# Patient Record
Sex: MALE | Race: WHITE | NOT HISPANIC OR LATINO | Employment: OTHER | URBAN - METROPOLITAN AREA
[De-identification: names, ages, dates, MRNs, and addresses within clinical notes are randomized per-mention and may not be internally consistent; named-entity substitution may affect disease eponyms.]

---

## 2019-05-15 ENCOUNTER — HOSPITAL ENCOUNTER (OUTPATIENT)
Dept: GENERAL RADIOLOGY | Facility: HOSPITAL | Age: 79
Discharge: HOME | End: 2019-05-15
Attending: INTERNAL MEDICINE

## 2019-07-29 ENCOUNTER — HOSPITAL ENCOUNTER (OUTPATIENT)
Dept: LAB | Facility: HOSPITAL | Age: 79
Discharge: HOME | End: 2019-07-29
Attending: INTERNAL MEDICINE

## 2019-07-29 LAB
ALBUMIN SERPL-MCNC: 4 G/DL (ref 3.5–5)
ALBUMIN/GLOB SERPL: 1.6 {RATIO} (ref 1.4–2.6)
ALP SERPL-CCNC: 73 U/L (ref 56–155)
ALT SERPL-CCNC: 17 U/L (ref 10–40)
ANION GAP SERPL CALC-SCNC: 19 MMOL/L (ref 8–19)
AST SERPL-CCNC: 19 U/L (ref 15–50)
BILIRUB SERPL-MCNC: 1.11 MG/DL (ref 0.2–1.3)
BUN SERPL-MCNC: 27 MG/DL (ref 5–25)
BUN/CREAT SERPL: 19 {RATIO} (ref 6–20)
CALCIUM SERPL-MCNC: 9.2 MG/DL (ref 8.7–10.4)
CHLORIDE SERPL-SCNC: 102 MMOL/L (ref 99–111)
CHOLEST SERPL-MCNC: 187 MG/DL (ref 107–200)
CHOLEST/HDLC SERPL: 6.2 {RATIO} (ref 3–6)
CONV CO2: 23 MMOL/L (ref 22–32)
CONV TOTAL PROTEIN: 6.5 G/DL (ref 6.3–8.2)
CREAT UR-MCNC: 1.41 MG/DL (ref 0.7–1.2)
GFR SERPLBLD BASED ON 1.73 SQ M-ARVRAT: 48 ML/MIN/{1.73_M2}
GLOBULIN UR ELPH-MCNC: 2.5 G/DL (ref 2–3.5)
GLUCOSE SERPL-MCNC: 102 MG/DL (ref 70–99)
HDLC SERPL-MCNC: 30 MG/DL (ref 40–60)
LDLC SERPL CALC-MCNC: 123 MG/DL (ref 70–100)
OSMOLALITY SERPL CALC.SUM OF ELEC: 295 MOSM/KG (ref 273–304)
POTASSIUM SERPL-SCNC: 4.4 MMOL/L (ref 3.5–5.3)
SODIUM SERPL-SCNC: 140 MMOL/L (ref 135–147)
TRIGL SERPL-MCNC: 170 MG/DL (ref 40–150)
VLDLC SERPL-MCNC: 34 MG/DL (ref 5–37)

## 2019-08-05 ENCOUNTER — HOSPITAL ENCOUNTER (OUTPATIENT)
Dept: SLEEP MEDICINE | Facility: HOSPITAL | Age: 79
Discharge: HOME | End: 2019-08-05
Attending: INTERNAL MEDICINE

## 2020-07-01 ENCOUNTER — TRANSCRIBE ORDERS (OUTPATIENT)
Dept: ADMINISTRATIVE | Facility: HOSPITAL | Age: 80
End: 2020-07-01

## 2020-07-01 DIAGNOSIS — G89.29 CHRONIC PAIN OF LEFT KNEE: Primary | ICD-10-CM

## 2020-07-01 DIAGNOSIS — M25.562 CHRONIC PAIN OF LEFT KNEE: Primary | ICD-10-CM

## 2021-01-22 ENCOUNTER — OFFICE VISIT CONVERTED (OUTPATIENT)
Dept: UROLOGY | Facility: CLINIC | Age: 81
End: 2021-01-22
Attending: NURSE PRACTITIONER

## 2021-02-05 ENCOUNTER — CONVERSION ENCOUNTER (OUTPATIENT)
Dept: CARDIOLOGY | Facility: CLINIC | Age: 81
End: 2021-02-05
Attending: INTERNAL MEDICINE

## 2021-02-17 ENCOUNTER — OFFICE VISIT CONVERTED (OUTPATIENT)
Dept: CARDIOLOGY | Facility: CLINIC | Age: 81
End: 2021-02-17
Attending: INTERNAL MEDICINE

## 2021-05-10 NOTE — H&P
History and Physical      Patient Name: Javi Martínez   Patient ID: [id number]   Sex: Male   YOB: 1940    Primary Care Provider: Marta Garcia MD   Referring Provider: Marta Garcia MD    Visit Date: February 17, 2021    Provider: Ge Whelan MD   Location: [facility]   Location Address: [address] [place] [address], [facility]   [place]  [id number]   Location Phone: [phone]          Chief Complaint     Atrial fibrillation and CAD.       History Of Present Illness  Consult requested by: Marta Garcia MD   Javi Martínez is a 80 year old /White male with a prior history of 3-vessel CABG in 2006 and atrial fibrillation with dual chamber pacemaker implantation following up for evaluation and treatment for his cardiac problems. The patient symptomatically has been doing very well. He has not been experiencing any chest pain, shortness of breath, or PND. He does sleep on 2 pillows which has been stable. He has had no weight gain problems recently.   PAST MEDICAL HISTORY: Significant for CAD with CABG in 2006, history of prostate cancer in 2010, systolic congestive heart failure, most recent EF of 40%, atrial fibrillation persistent.   FAMILY MEDICAL HISTORY: Diabetes. Nonsignificant for premature coronary atherosclerotic disease.   PSYCHOSOCIAL HISTORY: Rarely uses alcohol. Denies tobacco use.   CURRENT MEDICATIONS: Metoprolol Tartrate 50 mg b.i.d.; Eliquis 5 mg q.d.; Prilosec 20 mg q.d.; Tamsulosin 0.4 mg q.d.   ALLERGIES: No known drug allergies.       Review of Systems  · Constitutional  o Admits  o : fatigue, good general health lately  o Denies  o : recent weight changes   · Eyes  o Denies  o : double vision  · HENT  o Admits  o : hearing loss or ringing  o Denies  o : chronic sinus problem, swollen glands in neck  · Cardiovascular  o Denies  o : chest pain, palpitations (fast, fluttering, or skipping beats), swelling (feet, ankles, hands), shortness of breath while walking or lying  "flat  · Respiratory  o Denies  o : asthma or wheezing, COPD  · Gastrointestinal  o Denies  o : ulcers, nausea or vomiting  · Neurologic  o Admits  o : lightheaded or dizzy  o Denies  o : stroke, headaches  · Musculoskeletal  o Denies  o : joint pain, back pain  · Endocrine  o Denies  o : thyroid disease, diabetes, heat or cold intolerance, excessive thirst or urination  · Heme-Lymph  o Denies  o : bleeding or bruising tendency, anemia      Vitals  Date Time BP Position Site L\R Cuff Size HR RR TEMP (F) WT  HT  BMI kg/m2 BSA m2 O2 Sat FR L/min FiO2        02/17/2021 09:21 /84 Sitting    70 - R   220lbs 0oz 6'  4\" 26.78 2.31             Physical Examination  · Constitutional  o Appearance  o : Awake, alert, in no acute distress.   · Head and Face  o HEENT  o : PERRLA.  · Eyes  o Conjunctivae  o : Normal.  · Ears, Nose, Mouth and Throat  o Oral Cavity  o :   § Oral Mucosa  § : Normal.  · Neck  o Inspection/Palpation  o : No JVD.  · Respiratory  o Respiratory  o : Chest is symmetrical. Lung fields are clear. No rhonchi or wheezes heard.  · Cardiovascular  o Heart  o :   § Auscultation of Heart  § : S1, S2 are normal. Regular rate and rhythm without murmurs, gallops, or rubs.  o Peripheral Vascular System  o :   § Extremities  § : Nails normal. No clubbing or cyanosis. Femoral pulses adequate. Pedal pulses adequate. No peripheral edema.  · Gastrointestinal  o Abdominal Examination  o : Abdomen soft. No masses. No guarding or rigidity. No hepatosplenomegaly. Bowel sounds normal.  · Musculoskeletal  o General  o :   § General Musculoskeletal  § : Muscle tone and strength were normal.  · Skin and Subcutaneous Tissue  o General Inspection  o : Unremarkable.  · EKG  o EKG  o : Was performed in the office today.  o Results  o : Shows a ventricularly paced rhythm.  · Labs  o Labs  o : Magnesium 2.2, hgb A1c was 5.6, LDL cholesterol was 154, HDL 42.   · Device Interrogation  o Device Interrogation  o : Interrogation of " dual chamber Medtronic pacemaker. The atrial lead is paced 0% of the time. Could not get threshold due to patient being in atrial fibrillation. His right ventricular lead was 100% paced. R-wave could not be tested due to pacemaker dependence. Otherwise, the device is working properly. There were no events recorded. No changes were made to his device. Battery life is estimaed at 2.83 years.          Assessment     1.  Coronary artery disease, no ongoing anginal discomfort. I did recommend chronic aspirin 81 mg once a day. Discussed risks and benefits of increased breathing versus decreased cardiovascular events. I told him overall that the benefits outweigh the risk of dual therapy.   2.  Congestive heart failure systolic, likely ischemic in nature. The patient was class 2 symptoms currently. We will start on Entresto 24/26 b.i.d. Repeat a renal panel in a few weeks.   3.  Hyperlipidemia. I recommended the addition of Lipitor 40 mg q.day. We will repeat lipids and LFT and follow-up labs.   4.  Atrial fibrillation, persistent in nature, rate controlled, pacemaker dependent. We will recommend increasing his Eliquis to 5 b.i.d.  5.  Bradycardia. Dual chamber pacemaker working properly. Will repeat interrogation in 3 months.        Ge Whelan MD  /                  Electronically Signed by: Isabela Sherman-, OT -Author on February 28, 2021 06:15:49 AM  Electronically Co-signed by: Ge Whelan MD -Reviewer on March 5, 2021 02:51:53 PM

## 2021-05-10 NOTE — PROCEDURES
"   Procedure Note      Patient Name: Javi Martínez   Patient ID: [id number]   Sex: Male   YOB: 1940    Primary Care Provider: Marta Garcia MD    Visit Date: February 5, 2021    Provider: Ge Whelan MD   Location: [facility]   Location Address: [address] [place] [address], [address]   [place]  [id number]   Location Phone: [phone]          FINAL REPORT   TRANSTHORACIC ECHOCARDIOGRAM REPORT    Diagnosis: CHF (Congestive Heart Failure)   Height: 6'4\" Weight: 213 B/P: BLOOD PRESSURE BSA: 2.28   Tech: JLW   MEASUREMENTS:  RVID (Diastole) : RVID. (NORMAL: 0.7 to 2.4 cm max)   LVID (Systole): 4.8 cm (Diastole): 6.5 cm. (NORMAL: 3.7 - 5.4 cm)   Posterior Wall Thickness (Diastole): 1.2 cm. (NORMAL: 0.8 - 1.1 cm)   Septal Thickness (Diastole): 1.2 cm. (NORMAL: 0.7 - 1.2 cm)   LAID (Systole): 5.5 cm. (NORMAL: 1.9 - 3.8 cm)   Aortic Root Diameter (Diastole): 3.6 cm. (NORMAL: 2.0 - 3.7 cm)   DOPPLER: Continuous-wave, pulse-wave, and color-flow examination of the mitral, aortic, and tricuspid valves was performed. There was moderate tricuspid regurgitation, mild mitral regurgitation, and mild aortic insufficiency. E/A ratio is 1.9 . DT= 261 msec. IVRT is 59 msec. E/E' is 8.   COMMENTS:  The patient underwent 2-D, M-Mode, and Doppler examination, including pulse-wave, continuous-wave, and color-flow analysis; the study is technically adequate.   FINDINGS:  AORTIC VALVE: Appeared to be normal. Trileaflet with central closure point. No evidence of any obstruction. No regurgitation.   MITRAL VALVE: Appeared to be normal. No evidence of any obstruction. No regurgitation.   TRICUSPID VALVE: Appeared to be normal.   PULMONIC VALVE: Not well seen.   LEFT ATRIUM: Moderately enlarged. LA volume index is 90 mL/m2.   AORTIC ROOT: Appeared to be normal in size.   LEFT VENTRICLE: Mildly reduced ejection fraction of 40% with global hypokinesis, mild to moderate LV enlargement, and mild left ventricular hypertrophy.   RIGHT " ATRIUM: Mildly to moderately enlarged.   RIGHT VENTRICLE: Normal size and function.   PERICARDIUM: Unremarkable. No evidence of effusion.   INFERIOR VENA CAVA: Diameter is 1.8 cm.   Fax: 02/07/2021      IMPRESSION:  1.  Mildly reduced ejection fraction of 40% with global hypokinesis.   2.  Mild to moderate left ventricular enlargement.   3.  Mild left ventricular hypertrophy.  4.  Moderate left atrial enlargement.   5.  Mild right atrial enlargement.  6.  Mild aortic insufficiency.   7.  Mild mitral regurgitation.  8.  Moderate tricuspid regurgitation.       Ge Whelan MD  JH/rt                    Electronically Signed by: Laura Antoine-, Other -Author on February 7, 2021 12:51:54 PM  Electronically Co-signed by: Ge Whelan MD -Reviewer on February 8, 2021 10:25:47 AM

## 2021-05-10 NOTE — H&P
History and Physical      Patient Name: Javi Martínez   Patient ID: [id number]   Sex: Male   YOB: 1940    Primary Care Provider: Marta Garcia MD    Visit Date: January 22, 2021    Provider: MEJIA Workman   Location: [facility]   Location Address: [address] [place] [address]  [id number]   Location Phone: [phone]          Chief Complaint  · pt here for urological concerns      History Of Present Illness  The patient is a 80 year old /White male , who is sent by telehealth MD from his insurance company, for evaluation of urinary symptoms.   Symptoms  The patient's complaints are described as frequency, nocturia, urgency of urination, weak stream, and straining to urinate, post void dribbling. The patient needs to void 2-3 times per day. He describes getting up more than 5 times during the night. The patient reports no additional symptoms. This patient denies gross hematuria and dysuria.   He states that nothing has made symptoms better in the past, and nothing makes it worse. The patient's past medical history notable for prostate cancer and BPH.      Patient DX with prostate cancer in 2010 was treated with XRT.    PSA level was checked this week. We will get labs from his PCP.     He is also prescribed testosterone from his PCP Who placed him on this related to his low libido and depression.     Tamsulosin was prescribed in 2010.     He reports no relief of symptoms with this  med.    He drinks 30 oz of orange juice a day.       Past Medical History  Disease Name Date Onset Notes   Cancer --  --    GERD --  --    Heart Disease --  --    Prostate Disorder --  --          Past Surgical History  Procedure Name Date Notes   Appendectomy --  --    Cardiac --  --          Medication List  Name Date Started Instructions   Eliquis 5 mg oral tablet  take 1 tablet (5 mg) by oral route 2 times per day   metoprolol succinate 50 mg oral tablet extended release 24 hr   "take 1 tablet (50 mg) by oral route once daily   omeprazole 20 mg oral capsule,delayed release(DR/EC)  take 1 capsule (20 mg) by oral route once daily before a meal   tamsulosin 0.4 mg oral capsule 01/22/2021 take 2 capsules (0.8 mg) by oral route once daily 1/2 hour following the same meal each day for 90 days         Allergy List  Allergen Name Date Reaction Notes   NO KNOWN DRUG ALLERGIES --  --  --        Allergies Reconciled  Social History  Finding Status Start/Stop Quantity Notes   Tobacco Former --/-- --  --          Review of Systems  · Constitutional  o Denies  o : fever, chills  · Eyes  o Denies  o : double vision, cataracts  · HENT  o Denies  o : hearing loss, headaches  · Cardiovascular  o Denies  o : chest pain at rest, chest pain with exercise, irregular heart beats, palpitations, leg cramps with exercise  · Respiratory  o Denies  o : shortness of breath, wheezing, sleep apnea  · Gastrointestinal  o Denies  o : heartburn or indigestion, nausea or vomiting, change in abdominal girth, diarrhea, constipation, blood in stools  · Genitourinary  o Admits  o : additional symptoms as noted in HPI  · Integument  o Denies  o : rash, new skin lesions  · Neurologic  o Denies  o : memory difficulties, headache, mini-strokes, seizures  · Endocrine  o Denies  o : hot flashes, thyroid disorders  · Psychiatric  o Denies  o : depression, schizophrenia, bipolar disorder  · Heme-Lymph  o Denies  o : easy bleeding, easy bruising, sickle cell disease or trait, lymph node enlargement or tenderness  · Allergic-Immunologic  o Denies  o : immune deficiency, HIV, Hepatitis C      Vitals  Date Time BP Position Site L\R Cuff Size HR RR TEMP (F) WT  HT  BMI kg/m2 BSA m2 O2 Sat FR L/min FiO2 HC       01/22/2021 03:40 PM       16  212lbs 16oz 6'  4\" 25.93 2.28             Physical Examination  · Constitutional  o Appearance  o : Well nourished, well developed patient in no acute distress. Ambulating without difficulty.  · Head and " Face  o Head  o :   § Inspection  § : atraumatic, normocephalic  o Face  o :   § Inspection  § : no facial lesions  · Eyes  o Sclerae  o : sclerae white  · Ears, Nose, Mouth and Throat  o Ears  o :   § External Ears  § : appearance within normal limits, no lesions present  o Nose  o :   § External Nose  § : appearance normal  · Neck  o Inspection/Palpation  o : normal appearance, trachea midline  · Respiratory  o Respiratory Effort  o : breathing unlabored  o Inspection of Chest  o : normal appearance, no retractions  o Auscultation of Lungs  o : normal breath sounds throughout  · Skin and Subcutaneous Tissue  o General Inspection  o : no rashes or lesions present, no lesions present, no areas of discoloration  · Neurologic  o Mental Status Examination  o :   § Orientation  § : grossly oriented to person, place and time  § Speech/Language  § : communication ability within normal limits  o Gait and Station  o : normal gait, able to stand without difficulty  · Psychiatric  o Judgement and Insight  o : judgment and insight intact, judgement for everyday activities and social situations within normal limits, insight intact  o Mood and Affect  o : mood normal, affect appropriate          Results  · In-Office Procedures  o Lab procedure  § Automated dipstick urinalysis with microscopy (10139)   § Color Ur: Yellow   § Clarity Ur: Clear   § Glucose Ur Ql Strip: Negative   § Bilirub Ur Ql Strip: Negative   § Ketones Ur Ql Strip: Negative   § Sp Gr Ur Qn: 1.025   § Hgb Ur Ql Strip: Trace-intact   § pH Ur-LsCnc: 5.5   § Prot Ur Ql Strip: 30   § Urobilinogen Ur Strip-mCnc: 0.2   § Nitrite Ur Ql Strip: Negative   § WBC Est Ur Ql Strip: Negative   § RBC UrnS Qn HPF: 0   § WBC UrnS Qn HPF: 0   § Bacteria UrnS Qn HPF: 0   § Crystals UrnS Qn HPF: 0   § Epithelial Cells (non renal): 0 /HPF  § Epithelial Cells (renal): 0   o Surgical procedure  § IOP - Bladder Scan/Residual Urine (44585)   § Specimen vol Ur: 180        Assessment  · BPH with Urinary Obstruction       Benign prostatic hyperplasia with lower urinary tract symptoms     600.01/N40.1  Other obstructive and reflux uropathy     600.01/N13.8  · Prostate cancer     185/C61      Plan  · Medications  o tamsulosin 0.4 mg oral capsule   SIG: take 2 capsules (0.8 mg) by oral route once daily 1/2 hour following the same meal each day for 90 days   DISP: (180) Capsule with 4 refills  Adjusted on 01/22/2021     o Medications have been Reconciled  o Transition of Care or Provider Policy  · Instructions  o DISCUSSION: discussed behavioral modifications to help alleviate his nocturia including stopping fluid intake at least 2 hours prior to bedtime, elevation of feet at or above level of the heart for 2 hours prior to bedtime as well as speaking to his pcp about a sleep study for his sleep apnea  o Findings, possible etiologies and management options were discussed with patient in comprehensive detail  o PLAN: f/u in 6 weeks  o Increase his tamsulosin dosage to 0.8 mg q day to see if this will alleviate his nocturia  o Electronically Identified Patient Education Materials Provided Electronically            Electronically Signed by: MEJIA Workman -Author on January 24, 2021 01:38:45 PM

## 2021-05-14 VITALS — HEIGHT: 76 IN | RESPIRATION RATE: 16 BRPM | BODY MASS INDEX: 25.94 KG/M2 | WEIGHT: 213 LBS

## 2021-05-14 VITALS
HEIGHT: 76 IN | BODY MASS INDEX: 26.79 KG/M2 | WEIGHT: 220 LBS | SYSTOLIC BLOOD PRESSURE: 140 MMHG | HEART RATE: 70 BPM | DIASTOLIC BLOOD PRESSURE: 84 MMHG

## 2021-07-14 ENCOUNTER — PREP FOR SURGERY (OUTPATIENT)
Dept: OTHER | Facility: HOSPITAL | Age: 81
End: 2021-07-14

## 2021-07-14 ENCOUNTER — OFFICE VISIT (OUTPATIENT)
Dept: ORTHOPEDIC SURGERY | Facility: CLINIC | Age: 81
End: 2021-07-14

## 2021-07-14 ENCOUNTER — TELEPHONE (OUTPATIENT)
Dept: CARDIOLOGY | Facility: CLINIC | Age: 81
End: 2021-07-14

## 2021-07-14 VITALS — HEIGHT: 76 IN | HEART RATE: 82 BPM | OXYGEN SATURATION: 94 % | WEIGHT: 215 LBS | BODY MASS INDEX: 26.18 KG/M2

## 2021-07-14 DIAGNOSIS — M17.12 PRIMARY OSTEOARTHRITIS OF LEFT KNEE: Primary | ICD-10-CM

## 2021-07-14 DIAGNOSIS — M25.562 LEFT KNEE PAIN, UNSPECIFIED CHRONICITY: ICD-10-CM

## 2021-07-14 PROCEDURE — 99203 OFFICE O/P NEW LOW 30 MIN: CPT | Performed by: ORTHOPAEDIC SURGERY

## 2021-07-14 RX ORDER — TRANEXAMIC ACID 10 MG/ML
1000 INJECTION, SOLUTION INTRAVENOUS ONCE
Status: CANCELLED | OUTPATIENT
Start: 2021-07-14 | End: 2021-07-14

## 2021-07-14 RX ORDER — CEFAZOLIN SODIUM IN 0.9 % NACL 3 G/100 ML
3 INTRAVENOUS SOLUTION, PIGGYBACK (ML) INTRAVENOUS ONCE
Status: CANCELLED | OUTPATIENT
Start: 2021-07-14 | End: 2021-07-14

## 2021-07-14 RX ORDER — APIXABAN 5 MG/1
5 TABLET, FILM COATED ORAL 2 TIMES DAILY
COMMUNITY
Start: 2021-06-28 | End: 2022-07-14

## 2021-07-14 RX ORDER — LISINOPRIL 5 MG/1
2.5 TABLET ORAL DAILY
COMMUNITY
Start: 2021-06-28 | End: 2021-07-20 | Stop reason: SDUPTHER

## 2021-07-14 RX ORDER — CEFAZOLIN SODIUM 2 G/100ML
2 INJECTION, SOLUTION INTRAVENOUS ONCE
Status: CANCELLED | OUTPATIENT
Start: 2021-07-14 | End: 2021-07-14

## 2021-07-14 RX ORDER — METOPROLOL SUCCINATE 50 MG/1
TABLET, EXTENDED RELEASE ORAL
COMMUNITY
End: 2021-07-20

## 2021-07-14 RX ORDER — TAMSULOSIN HYDROCHLORIDE 0.4 MG/1
1 CAPSULE ORAL NIGHTLY
COMMUNITY
Start: 2021-06-28 | End: 2022-05-03

## 2021-07-14 NOTE — TELEPHONE ENCOUNTER
Called and informed patient.  Unable to fax to Dr. Zhao.  Will route clearance in [facility].

## 2021-07-14 NOTE — TELEPHONE ENCOUNTER
Cardiac Clearance and Medication Directive:    Procedure: left total knee replacement with Dr. Zhao on 7/23.    Medication Directive:  Hold Eliquis.    Hx:  A-fib w/ dual chamber pacemaker, CABG 2006.  CHF    Echo on 02/21/2021  1.  Mildly reduced ejection fraction of 40% with global hypokinesis.   2.  Mild to moderate left ventricular enlargement.   3.  Mild left ventricular hypertrophy.  4.  Moderate left atrial enlargement.   5.  Mild right atrial enlargement.  6.  Mild aortic insufficiency.   7.  Mild mitral regurgitation.  8.  Moderate tricuspid regurgitation.    Last seen on 2/17/21- stable.    Can patient be cleared and hold medication?

## 2021-07-14 NOTE — PROGRESS NOTES
"Chief Complaint  Pain of the Left Knee     Subjective      Javi Martínez presents to [facility] for an evaluation of left knee. Patient has been having left knee pain that has been ongoing for 28+ years. He has been a  in the past. He has pain with walking and going up and down steps. He states his knee jorgito and gives way on him. He states pain is worse medially. Patient has a history of getting his left knee aspirated and getting cortisone injections, which have helped. Patient takes Aleeve as needed. He has become less active due to left knee pain.     No Known Allergies     Social History     Socioeconomic History   • Marital status:      Spouse name: Not on file   • Number of children: Not on file   • Years of education: Not on file   • Highest education level: Not on file   Tobacco Use   • Smoking status: Former Smoker        Review of Systems     Objective   Vital Signs:   Pulse 82   Ht 193 cm (76\")   Wt 97.5 kg (215 lb)   SpO2 94%   BMI 26.17 kg/m²       Physical Exam  Constitutional:       Appearance: Normal appearance. He is well-developed and normal weight.   HENT:      Head: Normocephalic.      Right Ear: Hearing and external ear normal.      Left Ear: Hearing and external ear normal.      Nose: Nose normal.   Eyes:      Conjunctiva/sclera: Conjunctivae normal.   Cardiovascular:      Rate and Rhythm: Normal rate.   Pulmonary:      Effort: Pulmonary effort is normal.      Breath sounds: No wheezing or rales.   Abdominal:      Palpations: Abdomen is soft.      Tenderness: There is no abdominal tenderness.   Musculoskeletal:      Cervical back: Normal range of motion.   Skin:     Findings: No rash.   Neurological:      Mental Status: He is alert and oriented to person, place, and time.   Psychiatric:         Mood and Affect: Mood and affect normal.         Judgment: Judgment normal.       Ortho Exam      LEFT KNEE: Full extension. 15 degrees of " Valgus deformity. Significant swelling. Tender medial and lateral joint line. Skin intact. Calf supple, non-tender. Weight bearing. Limping gait. Good strength to hamstrings, quadriceps, dorsiflexors and plantar flexors. Sensation grossly intact. Neurovascular intact. Stable to varus/valgus stress. Negative Lachman. Positive crepitus. Flexion to 100 degrees.       Procedures        Imaging Results (Most Recent)     Procedure Component Value Units Date/Time    XR Knee 3 View Left [[id number]] Resulted: 07/14/21 1102     Updated: 07/14/21 1104           Result Review :       X-Ray Report:  Left knee(s) X-Ray  Indication: Evaluation of left knee   AP, Lateral and Standing view(s)  Findings: Bone on bone osteoarthritis. No fracture or dislocation.   Prior studies available for comparison: no       Assessment and Plan     DX: Left knee osteoarthritis     Discussed treatment plans and diagnosis with the patient. Discussed total knee replacement vs conservative management. Patient wishes to proceed with a left total knee arthroplasty.     Discussed surgery., Risks/benefits discussed with patient including, but not limited to: infection, bleeding, neurovascular damage, malunion, nonunion, aesthetic deformity, need for further surgery, and death., Discussed with patient the implant type being used during surgery and patient understands and desires to proceed., Surgery pamphlet given. and Call or return if worsening symptoms.    Follow Up     Post-operatively.       Patient was given instructions and counseling regarding his condition or for health maintenance advice. Please see specific information pulled into the AVS if appropriate.     Scribed for Carlitos Zhao MD by Faby Bryan.  07/14/21   11:13 EDT    I have personally performed the services described in this document as scribed by the above individual and it is both accurate and complete.  Carlitos Zhao MD 07/14/21  11:13 EDT

## 2021-07-15 DIAGNOSIS — M17.12 PRIMARY OSTEOARTHRITIS OF LEFT KNEE: Primary | ICD-10-CM

## 2021-07-15 DIAGNOSIS — Z47.1 AFTERCARE FOLLOWING LEFT KNEE JOINT REPLACEMENT SURGERY: Primary | ICD-10-CM

## 2021-07-15 DIAGNOSIS — Z96.652 AFTERCARE FOLLOWING LEFT KNEE JOINT REPLACEMENT SURGERY: Primary | ICD-10-CM

## 2021-07-20 ENCOUNTER — TELEPHONE (OUTPATIENT)
Dept: CARDIOLOGY | Facility: CLINIC | Age: 81
End: 2021-07-20

## 2021-07-20 ENCOUNTER — PRE-ADMISSION TESTING (OUTPATIENT)
Dept: PREADMISSION TESTING | Facility: HOSPITAL | Age: 81
End: 2021-07-20

## 2021-07-20 VITALS
HEIGHT: 76 IN | SYSTOLIC BLOOD PRESSURE: 110 MMHG | TEMPERATURE: 97.1 F | WEIGHT: 214.95 LBS | BODY MASS INDEX: 26.18 KG/M2 | DIASTOLIC BLOOD PRESSURE: 70 MMHG | HEART RATE: 75 BPM | OXYGEN SATURATION: 95 %

## 2021-07-20 DIAGNOSIS — M17.12 PRIMARY OSTEOARTHRITIS OF LEFT KNEE: ICD-10-CM

## 2021-07-20 LAB
ALBUMIN SERPL-MCNC: 3.6 G/DL (ref 3.5–5.2)
ALBUMIN/GLOB SERPL: 1.3 G/DL
ALP SERPL-CCNC: 83 U/L (ref 39–117)
ALT SERPL W P-5'-P-CCNC: 32 U/L (ref 1–41)
ANION GAP SERPL CALCULATED.3IONS-SCNC: 14.9 MMOL/L (ref 5–15)
AST SERPL-CCNC: 19 U/L (ref 1–40)
BASOPHILS # BLD AUTO: 0.05 10*3/MM3 (ref 0–0.2)
BASOPHILS NFR BLD AUTO: 0.5 % (ref 0–1.5)
BILIRUB SERPL-MCNC: 1.5 MG/DL (ref 0–1.2)
BUN SERPL-MCNC: 29 MG/DL (ref 8–23)
BUN/CREAT SERPL: 14.6 (ref 7–25)
CALCIUM SPEC-SCNC: 9 MG/DL (ref 8.6–10.5)
CHLORIDE SERPL-SCNC: 102 MMOL/L (ref 98–107)
CO2 SERPL-SCNC: 22.1 MMOL/L (ref 22–29)
CREAT SERPL-MCNC: 1.98 MG/DL (ref 0.76–1.27)
DEPRECATED RDW RBC AUTO: 45.4 FL (ref 37–54)
EOSINOPHIL # BLD AUTO: 0.16 10*3/MM3 (ref 0–0.4)
EOSINOPHIL NFR BLD AUTO: 1.7 % (ref 0.3–6.2)
ERYTHROCYTE [DISTWIDTH] IN BLOOD BY AUTOMATED COUNT: 13.9 % (ref 12.3–15.4)
GFR SERPL CREATININE-BSD FRML MDRD: 33 ML/MIN/1.73
GLOBULIN UR ELPH-MCNC: 2.8 GM/DL
GLUCOSE SERPL-MCNC: 126 MG/DL (ref 65–99)
HBA1C MFR BLD: 5.7 % (ref 4.8–5.6)
HCT VFR BLD AUTO: 47.6 % (ref 37.5–51)
HGB BLD-MCNC: 15.5 G/DL (ref 13–17.7)
IMM GRANULOCYTES # BLD AUTO: 0.05 10*3/MM3 (ref 0–0.05)
IMM GRANULOCYTES NFR BLD AUTO: 0.5 % (ref 0–0.5)
INR PPP: 1.21 (ref 2–3)
LYMPHOCYTES # BLD AUTO: 2.65 10*3/MM3 (ref 0.7–3.1)
LYMPHOCYTES NFR BLD AUTO: 27.9 % (ref 19.6–45.3)
MCH RBC QN AUTO: 29.4 PG (ref 26.6–33)
MCHC RBC AUTO-ENTMCNC: 32.6 G/DL (ref 31.5–35.7)
MCV RBC AUTO: 90.3 FL (ref 79–97)
MONOCYTES # BLD AUTO: 0.88 10*3/MM3 (ref 0.1–0.9)
MONOCYTES NFR BLD AUTO: 9.3 % (ref 5–12)
NEUTROPHILS NFR BLD AUTO: 5.7 10*3/MM3 (ref 1.7–7)
NEUTROPHILS NFR BLD AUTO: 60.1 % (ref 42.7–76)
NRBC BLD AUTO-RTO: 0 /100 WBC (ref 0–0.2)
PLATELET # BLD AUTO: 207 10*3/MM3 (ref 140–450)
PMV BLD AUTO: 10.1 FL (ref 6–12)
POTASSIUM SERPL-SCNC: 4 MMOL/L (ref 3.5–5.2)
PROT SERPL-MCNC: 6.4 G/DL (ref 6–8.5)
PROTHROMBIN TIME: 12.8 SECONDS (ref 9.4–12)
RBC # BLD AUTO: 5.27 10*6/MM3 (ref 4.14–5.8)
SODIUM SERPL-SCNC: 139 MMOL/L (ref 136–145)
WBC # BLD AUTO: 9.49 10*3/MM3 (ref 3.4–10.8)

## 2021-07-20 PROCEDURE — 83036 HEMOGLOBIN GLYCOSYLATED A1C: CPT

## 2021-07-20 PROCEDURE — 36415 COLL VENOUS BLD VENIPUNCTURE: CPT

## 2021-07-20 PROCEDURE — 80053 COMPREHEN METABOLIC PANEL: CPT

## 2021-07-20 PROCEDURE — 85610 PROTHROMBIN TIME: CPT

## 2021-07-20 PROCEDURE — 85025 COMPLETE CBC W/AUTO DIFF WBC: CPT

## 2021-07-20 RX ORDER — LISINOPRIL 2.5 MG/1
2.5 TABLET ORAL DAILY
Qty: 30 TABLET | Refills: 1 | Status: SHIPPED | OUTPATIENT
Start: 2021-07-20 | End: 2021-08-17

## 2021-07-20 RX ORDER — OMEPRAZOLE 20 MG/1
20 CAPSULE, DELAYED RELEASE ORAL DAILY
COMMUNITY

## 2021-07-20 RX ORDER — ATORVASTATIN CALCIUM 40 MG/1
40 TABLET, FILM COATED ORAL NIGHTLY
COMMUNITY
End: 2021-10-20

## 2021-07-20 ASSESSMENT — KOOS JR
KOOS JR SCORE: 36.931
KOOS JR SCORE: 20

## 2021-07-20 NOTE — TELEPHONE ENCOUNTER
Received message from Pre-admission Testing at [facility].    Patient is there today, BP is 90/58 and similar on recheck.  Patient c/o dizziness.  Apparently this is unusual for patient.    He has taken all his morning medications.    Anesthesia is asking for you to review.

## 2021-07-20 NOTE — DISCHARGE INSTRUCTIONS
IMPORTANT INSTRUCTIONS - PRE-ADMISSION TESTING  1. DO NOT EAT OR CHEW anything after midnight the night before your procedure.    2. You may have CLEAR liquids up to _3_ hours prior to ARRIVAL time.   Take the following medications the morning of your procedure with JUST A SIP OF WATER: Metoprolol, omeprazole __  3. DO NOT BRING your medications to the hospital with you, UNLESS something has changed since your PRE-Admission Testing appointment.  4. Hold all vitamins, supplements, and NSAIDS (Non- steroidal anti-inflammatory meds) for one week prior to surgery (you MAY take Tylenol or Acetaminophen).  5. If you are diabetic, check your blood sugar the morning of your procedure. If it is less than 70 or if you are feeling symptomatic, call the following number for further instructions: [phone].  6. Use your inhalers/nebulizers as usual, the morning of your procedure. BRING YOUR INHALERS with you.   7. Bring your CPAP or BIPAP to hospital, ONLY IF YOU WILL BE SPENDING THE NIGHT.   8. Make sure you have a ride home and have someone who will stay with you the day of your procedure after you go home.  9. If you have any questions, please call your Pre-Admission Testing Nurse, BERNARD_ at [phone] .   10. Per anesthesia request, do not smoke for 24 hours before your procedure or as instructed by your surgeon.        ••••••Clear Liquid Diet        Find out when you need to start a clear liquid diet.   Think of “clear liquids” as anything you could read a newspaper through. This includes things like water, broth, sports drinks, or tea WITHOUT any kind of milk or cream.           Once you are told to start a clear liquid diet, only drink these things until 3 hours before arrival to the hospital or when the hospital says to stop. Total volume limitation: 8 oz.       Clear liquids you CAN drink:   ; Water   ; Clear broth: beef, chicken, vegetable, or bone broth with nothing in it   ; Gatorade   ; Lemonade or Jann-aid    ; Soda   ; Tea, coffee (NO cream or honey)   ; Jell-O (without fruit)   ; Popsicles (without fruit or cream)   ; Italian ices   ; Juice without pulp: apple, white, grape   ; You may use salt, pepper, and sugar    Do NOT drink:   ; Milk or cream   ; Soy milk, almond milk, coconut milk, or other non-dairy drinks and   creamers   ; Milkshakes or smoothies   ; Tomato juice   ; Orange juice   ; Grapefruit juice   ; Cream soups or any other than broth         Clear Liquid Diet:  ? Do NOT eat any solid food.  ? Do NOT eat or suck on mints or candy.  ? Do NOT chew gum.  ? Do NOT drink thick liquids like milk or juice with pulp in it.  ? Do NOT add milk, cream, or anything like soy milk or almond milk to coffee or tea.

## 2021-07-20 NOTE — TELEPHONE ENCOUNTER
Change lisinopril to 2.5mg daily. Check BP daily for the next week  Okay to proceed with procedure

## 2021-07-20 NOTE — SIGNIFICANT NOTE
Patient has wife who is able to take to therapy.  Patient states already has outpatient rehab set up and states has a rolling walker.  Goal is just to walk better.

## 2021-07-20 NOTE — NURSING NOTE
Patient vitals taken in PAT appointment.  BP 90/58  Patient states this is unusual for him and dose state is dizzy.  Notified colt at Dr. Peck office per Dr. Bailey's request.  Note from office in epic.  Per note patient to decrease lisinopril to 2.5mg and keep BP  log for 1 week.  Patient ok for surgery per note.  Will  instruct patient if further problems go to ED.     Patient aware of changes to med and instructed to keep bp log for 1 week. Patient voiced understanding.

## 2021-07-21 ENCOUNTER — ANESTHESIA EVENT (OUTPATIENT)
Dept: PERIOP | Facility: HOSPITAL | Age: 81
End: 2021-07-21

## 2021-07-23 ENCOUNTER — APPOINTMENT (OUTPATIENT)
Dept: GENERAL RADIOLOGY | Facility: HOSPITAL | Age: 81
End: 2021-07-23

## 2021-07-23 ENCOUNTER — HOSPITAL ENCOUNTER (OUTPATIENT)
Facility: HOSPITAL | Age: 81
Discharge: HOME | End: 2021-07-24
Attending: ORTHOPAEDIC SURGERY | Admitting: ORTHOPAEDIC SURGERY

## 2021-07-23 ENCOUNTER — ANESTHESIA (OUTPATIENT)
Dept: PERIOP | Facility: HOSPITAL | Age: 81
End: 2021-07-23

## 2021-07-23 DIAGNOSIS — M17.12 PRIMARY OSTEOARTHRITIS OF LEFT KNEE: ICD-10-CM

## 2021-07-23 DIAGNOSIS — Z78.9 DECREASED ACTIVITIES OF DAILY LIVING (ADL): ICD-10-CM

## 2021-07-23 DIAGNOSIS — R26.2 DIFFICULTY WALKING: Primary | ICD-10-CM

## 2021-07-23 PROBLEM — Z96.659 S/P TKR (TOTAL KNEE REPLACEMENT): Status: ACTIVE | Noted: 2021-07-23

## 2021-07-23 LAB — QT INTERVAL: 497 MS

## 2021-07-23 PROCEDURE — 97116 GAIT TRAINING THERAPY: CPT

## 2021-07-23 PROCEDURE — 25010000002 EPINEPHRINE 1 MG/ML SOLUTION: Performed by: ORTHOPAEDIC SURGERY

## 2021-07-23 PROCEDURE — A9270 NON-COVERED ITEM OR SERVICE: HCPCS | Performed by: ANESTHESIOLOGY

## 2021-07-23 PROCEDURE — 63710000001 LISINOPRIL 2.5 MG TABLET: Performed by: INTERNAL MEDICINE

## 2021-07-23 PROCEDURE — 63710000001 CELECOXIB 100 MG CAPSULE: Performed by: ANESTHESIOLOGY

## 2021-07-23 PROCEDURE — A9270 NON-COVERED ITEM OR SERVICE: HCPCS | Performed by: INTERNAL MEDICINE

## 2021-07-23 PROCEDURE — 25010000002 DEXAMETHASONE PER 1 MG: Performed by: NURSE ANESTHETIST, CERTIFIED REGISTERED

## 2021-07-23 PROCEDURE — 93005 ELECTROCARDIOGRAM TRACING: CPT | Performed by: ANESTHESIOLOGY

## 2021-07-23 PROCEDURE — 25010000002 ROPIVACAINE PER 1 MG: Performed by: ORTHOPAEDIC SURGERY

## 2021-07-23 PROCEDURE — 25010000002 ONDANSETRON PER 1 MG: Performed by: NURSE ANESTHETIST, CERTIFIED REGISTERED

## 2021-07-23 PROCEDURE — 63710000001 ATORVASTATIN 40 MG TABLET: Performed by: INTERNAL MEDICINE

## 2021-07-23 PROCEDURE — 25010000003 CEFAZOLIN IN DEXTROSE 2-4 GM/100ML-% SOLUTION: Performed by: ORTHOPAEDIC SURGERY

## 2021-07-23 PROCEDURE — 25010000002 MIDAZOLAM PER 1MG: Performed by: ANESTHESIOLOGY

## 2021-07-23 PROCEDURE — C1776 JOINT DEVICE (IMPLANTABLE): HCPCS | Performed by: ORTHOPAEDIC SURGERY

## 2021-07-23 PROCEDURE — 73560 X-RAY EXAM OF KNEE 1 OR 2: CPT

## 2021-07-23 PROCEDURE — 63710000001 GABAPENTIN 300 MG CAPSULE: Performed by: ANESTHESIOLOGY

## 2021-07-23 PROCEDURE — 25010000002 MORPHINE (PF) 10 MG/ML SOLUTION: Performed by: ORTHOPAEDIC SURGERY

## 2021-07-23 PROCEDURE — C1713 ANCHOR/SCREW BN/BN,TIS/BN: HCPCS | Performed by: ORTHOPAEDIC SURGERY

## 2021-07-23 PROCEDURE — 63710000001 TAMSULOSIN 0.4 MG CAPSULE: Performed by: INTERNAL MEDICINE

## 2021-07-23 PROCEDURE — 97161 PT EVAL LOW COMPLEX 20 MIN: CPT

## 2021-07-23 PROCEDURE — 25010000002 PROPOFOL 10 MG/ML EMULSION: Performed by: NURSE ANESTHETIST, CERTIFIED REGISTERED

## 2021-07-23 PROCEDURE — 93010 ELECTROCARDIOGRAM REPORT: CPT | Performed by: INTERNAL MEDICINE

## 2021-07-23 PROCEDURE — 97110 THERAPEUTIC EXERCISES: CPT

## 2021-07-23 PROCEDURE — 76942 ECHO GUIDE FOR BIOPSY: CPT | Performed by: ORTHOPAEDIC SURGERY

## 2021-07-23 PROCEDURE — 25010000002 FENTANYL CITRATE (PF) 50 MCG/ML SOLUTION: Performed by: NURSE ANESTHETIST, CERTIFIED REGISTERED

## 2021-07-23 PROCEDURE — 27447 TOTAL KNEE ARTHROPLASTY: CPT | Performed by: ORTHOPAEDIC SURGERY

## 2021-07-23 PROCEDURE — 25010000002 NEOSTIGMINE 10 MG/10ML SOLUTION: Performed by: NURSE ANESTHETIST, CERTIFIED REGISTERED

## 2021-07-23 PROCEDURE — 20985 CPTR-ASST DIR MS PX: CPT | Performed by: ORTHOPAEDIC SURGERY

## 2021-07-23 PROCEDURE — 94799 UNLISTED PULMONARY SVC/PX: CPT

## 2021-07-23 PROCEDURE — 25010000002 KETOROLAC TROMETHAMINE PER 15 MG: Performed by: ORTHOPAEDIC SURGERY

## 2021-07-23 PROCEDURE — 63710000001 ACETAMINOPHEN 500 MG TABLET: Performed by: ANESTHESIOLOGY

## 2021-07-23 PROCEDURE — 63710000001 METOPROLOL TARTRATE 25 MG TABLET: Performed by: INTERNAL MEDICINE

## 2021-07-23 DEVICE — CMT BONE PALACOS R HI/VISC 1X40: Type: IMPLANTABLE DEVICE | Site: KNEE | Status: FUNCTIONAL

## 2021-07-23 DEVICE — CAP TOTL KN CMT PRIMARY: Type: IMPLANTABLE DEVICE | Site: KNEE | Status: FUNCTIONAL

## 2021-07-23 DEVICE — IMPLANTABLE DEVICE: Type: IMPLANTABLE DEVICE | Site: KNEE | Status: FUNCTIONAL

## 2021-07-23 DEVICE — STEM TIB/KN PERSONA CMT 5D SZG LT: Type: IMPLANTABLE DEVICE | Site: KNEE | Status: FUNCTIONAL

## 2021-07-23 DEVICE — COMP FEM/KN PERSONA CR CMT COCR STD SZ11 LT: Type: IMPLANTABLE DEVICE | Site: KNEE | Status: FUNCTIONAL

## 2021-07-23 RX ORDER — MIDAZOLAM HYDROCHLORIDE 2 MG/2ML
4 INJECTION, SOLUTION INTRAMUSCULAR; INTRAVENOUS ONCE
Status: COMPLETED | OUTPATIENT
Start: 2021-07-23 | End: 2021-07-23

## 2021-07-23 RX ORDER — SODIUM CHLORIDE 0.9 % (FLUSH) 0.9 %
3 SYRINGE (ML) INJECTION EVERY 12 HOURS SCHEDULED
Status: DISCONTINUED | OUTPATIENT
Start: 2021-07-23 | End: 2021-07-24 | Stop reason: HOSPADM

## 2021-07-23 RX ORDER — PANTOPRAZOLE SODIUM 40 MG/1
40 TABLET, DELAYED RELEASE ORAL
Status: DISCONTINUED | OUTPATIENT
Start: 2021-07-24 | End: 2021-07-24 | Stop reason: HOSPADM

## 2021-07-23 RX ORDER — ATORVASTATIN CALCIUM 40 MG/1
40 TABLET, FILM COATED ORAL NIGHTLY
Status: DISCONTINUED | OUTPATIENT
Start: 2021-07-23 | End: 2021-07-24 | Stop reason: HOSPADM

## 2021-07-23 RX ORDER — ONDANSETRON 4 MG/1
4 TABLET, FILM COATED ORAL EVERY 6 HOURS PRN
Status: DISCONTINUED | OUTPATIENT
Start: 2021-07-23 | End: 2021-07-24 | Stop reason: HOSPADM

## 2021-07-23 RX ORDER — NEOSTIGMINE METHYLSULFATE 1 MG/ML
INJECTION, SOLUTION INTRAVENOUS AS NEEDED
Status: DISCONTINUED | OUTPATIENT
Start: 2021-07-23 | End: 2021-07-23 | Stop reason: SURG

## 2021-07-23 RX ORDER — TAMSULOSIN HYDROCHLORIDE 0.4 MG/1
0.4 CAPSULE ORAL NIGHTLY
Status: DISCONTINUED | OUTPATIENT
Start: 2021-07-23 | End: 2021-07-24 | Stop reason: HOSPADM

## 2021-07-23 RX ORDER — OXYCODONE HYDROCHLORIDE 5 MG/1
5 TABLET ORAL
Status: DISCONTINUED | OUTPATIENT
Start: 2021-07-23 | End: 2021-07-23 | Stop reason: HOSPADM

## 2021-07-23 RX ORDER — KETAMINE HYDROCHLORIDE 50 MG/ML
INJECTION, SOLUTION, CONCENTRATE INTRAMUSCULAR; INTRAVENOUS AS NEEDED
Status: DISCONTINUED | OUTPATIENT
Start: 2021-07-23 | End: 2021-07-23 | Stop reason: SURG

## 2021-07-23 RX ORDER — ONDANSETRON 2 MG/ML
4 INJECTION INTRAMUSCULAR; INTRAVENOUS EVERY 6 HOURS PRN
Status: DISCONTINUED | OUTPATIENT
Start: 2021-07-23 | End: 2021-07-24 | Stop reason: HOSPADM

## 2021-07-23 RX ORDER — CEFAZOLIN SODIUM 2 G/100ML
2 INJECTION, SOLUTION INTRAVENOUS EVERY 8 HOURS
Status: COMPLETED | OUTPATIENT
Start: 2021-07-23 | End: 2021-07-24

## 2021-07-23 RX ORDER — SODIUM CHLORIDE, SODIUM LACTATE, POTASSIUM CHLORIDE, CALCIUM CHLORIDE 600; 310; 30; 20 MG/100ML; MG/100ML; MG/100ML; MG/100ML
80 INJECTION, SOLUTION INTRAVENOUS CONTINUOUS
Status: DISCONTINUED | OUTPATIENT
Start: 2021-07-23 | End: 2021-07-24 | Stop reason: HOSPADM

## 2021-07-23 RX ORDER — SODIUM CHLORIDE 0.9 % (FLUSH) 0.9 %
10 SYRINGE (ML) INJECTION AS NEEDED
Status: DISCONTINUED | OUTPATIENT
Start: 2021-07-23 | End: 2021-07-24 | Stop reason: HOSPADM

## 2021-07-23 RX ORDER — PROMETHAZINE HYDROCHLORIDE 12.5 MG/1
25 TABLET ORAL ONCE AS NEEDED
Status: DISCONTINUED | OUTPATIENT
Start: 2021-07-23 | End: 2021-07-23 | Stop reason: HOSPADM

## 2021-07-23 RX ORDER — FENTANYL CITRATE 50 UG/ML
INJECTION, SOLUTION INTRAMUSCULAR; INTRAVENOUS AS NEEDED
Status: DISCONTINUED | OUTPATIENT
Start: 2021-07-23 | End: 2021-07-23 | Stop reason: SURG

## 2021-07-23 RX ORDER — BISACODYL 5 MG/1
10 TABLET, DELAYED RELEASE ORAL DAILY PRN
Status: DISCONTINUED | OUTPATIENT
Start: 2021-07-23 | End: 2021-07-24 | Stop reason: HOSPADM

## 2021-07-23 RX ORDER — HYDROCODONE BITARTRATE AND ACETAMINOPHEN 7.5; 325 MG/1; MG/1
1 TABLET ORAL EVERY 4 HOURS PRN
Status: DISCONTINUED | OUTPATIENT
Start: 2021-07-23 | End: 2021-07-24 | Stop reason: HOSPADM

## 2021-07-23 RX ORDER — DEXAMETHASONE SODIUM PHOSPHATE 4 MG/ML
INJECTION, SOLUTION INTRA-ARTICULAR; INTRALESIONAL; INTRAMUSCULAR; INTRAVENOUS; SOFT TISSUE AS NEEDED
Status: DISCONTINUED | OUTPATIENT
Start: 2021-07-23 | End: 2021-07-23 | Stop reason: SURG

## 2021-07-23 RX ORDER — AMOXICILLIN 250 MG
2 CAPSULE ORAL 2 TIMES DAILY PRN
Status: DISCONTINUED | OUTPATIENT
Start: 2021-07-23 | End: 2021-07-24 | Stop reason: HOSPADM

## 2021-07-23 RX ORDER — PROPOFOL 10 MG/ML
VIAL (ML) INTRAVENOUS AS NEEDED
Status: DISCONTINUED | OUTPATIENT
Start: 2021-07-23 | End: 2021-07-23 | Stop reason: SURG

## 2021-07-23 RX ORDER — BUPIVACAINE HYDROCHLORIDE AND EPINEPHRINE 5; 5 MG/ML; UG/ML
INJECTION, SOLUTION EPIDURAL; INTRACAUDAL; PERINEURAL
Status: COMPLETED | OUTPATIENT
Start: 2021-07-23 | End: 2021-07-23

## 2021-07-23 RX ORDER — LIDOCAINE HYDROCHLORIDE 20 MG/ML
INJECTION, SOLUTION INFILTRATION; PERINEURAL AS NEEDED
Status: DISCONTINUED | OUTPATIENT
Start: 2021-07-23 | End: 2021-07-23 | Stop reason: SURG

## 2021-07-23 RX ORDER — CELECOXIB 100 MG/1
200 CAPSULE ORAL ONCE
Status: COMPLETED | OUTPATIENT
Start: 2021-07-23 | End: 2021-07-23

## 2021-07-23 RX ORDER — TRANEXAMIC ACID 10 MG/ML
1000 INJECTION, SOLUTION INTRAVENOUS ONCE
Status: COMPLETED | OUTPATIENT
Start: 2021-07-23 | End: 2021-07-23

## 2021-07-23 RX ORDER — HYDROCODONE BITARTRATE AND ACETAMINOPHEN 7.5; 325 MG/1; MG/1
2 TABLET ORAL EVERY 4 HOURS PRN
Status: DISCONTINUED | OUTPATIENT
Start: 2021-07-23 | End: 2021-07-24 | Stop reason: HOSPADM

## 2021-07-23 RX ORDER — MAGNESIUM HYDROXIDE 1200 MG/15ML
LIQUID ORAL AS NEEDED
Status: DISCONTINUED | OUTPATIENT
Start: 2021-07-23 | End: 2021-07-23 | Stop reason: HOSPADM

## 2021-07-23 RX ORDER — SODIUM CHLORIDE 0.9 % (FLUSH) 0.9 %
10 SYRINGE (ML) INJECTION AS NEEDED
Status: DISCONTINUED | OUTPATIENT
Start: 2021-07-23 | End: 2021-07-23 | Stop reason: HOSPADM

## 2021-07-23 RX ORDER — GLYCOPYRROLATE 0.2 MG/ML
0.2 INJECTION INTRAMUSCULAR; INTRAVENOUS
Status: COMPLETED | OUTPATIENT
Start: 2021-07-23 | End: 2021-07-23

## 2021-07-23 RX ORDER — SODIUM CHLORIDE, SODIUM LACTATE, POTASSIUM CHLORIDE, CALCIUM CHLORIDE 600; 310; 30; 20 MG/100ML; MG/100ML; MG/100ML; MG/100ML
9 INJECTION, SOLUTION INTRAVENOUS CONTINUOUS PRN
Status: DISCONTINUED | OUTPATIENT
Start: 2021-07-23 | End: 2021-07-23 | Stop reason: HOSPADM

## 2021-07-23 RX ORDER — TRANEXAMIC ACID 10 MG/ML
1000 INJECTION, SOLUTION INTRAVENOUS ONCE
Status: DISCONTINUED | OUTPATIENT
Start: 2021-07-23 | End: 2021-07-23 | Stop reason: HOSPADM

## 2021-07-23 RX ORDER — GLYCOPYRROLATE 0.2 MG/ML
INJECTION INTRAMUSCULAR; INTRAVENOUS AS NEEDED
Status: DISCONTINUED | OUTPATIENT
Start: 2021-07-23 | End: 2021-07-23 | Stop reason: SURG

## 2021-07-23 RX ORDER — BISACODYL 10 MG
10 SUPPOSITORY, RECTAL RECTAL DAILY PRN
Status: DISCONTINUED | OUTPATIENT
Start: 2021-07-23 | End: 2021-07-24 | Stop reason: HOSPADM

## 2021-07-23 RX ORDER — PROMETHAZINE HYDROCHLORIDE 25 MG/1
25 SUPPOSITORY RECTAL ONCE AS NEEDED
Status: DISCONTINUED | OUTPATIENT
Start: 2021-07-23 | End: 2021-07-23 | Stop reason: HOSPADM

## 2021-07-23 RX ORDER — LISINOPRIL 2.5 MG/1
2.5 TABLET ORAL
Status: DISCONTINUED | OUTPATIENT
Start: 2021-07-23 | End: 2021-07-24 | Stop reason: HOSPADM

## 2021-07-23 RX ORDER — NALOXONE HCL 0.4 MG/ML
0.4 VIAL (ML) INJECTION
Status: DISCONTINUED | OUTPATIENT
Start: 2021-07-23 | End: 2021-07-24 | Stop reason: HOSPADM

## 2021-07-23 RX ORDER — ONDANSETRON 2 MG/ML
INJECTION INTRAMUSCULAR; INTRAVENOUS AS NEEDED
Status: DISCONTINUED | OUTPATIENT
Start: 2021-07-23 | End: 2021-07-23 | Stop reason: SURG

## 2021-07-23 RX ORDER — MEPERIDINE HYDROCHLORIDE 25 MG/ML
12.5 INJECTION INTRAMUSCULAR; INTRAVENOUS; SUBCUTANEOUS
Status: DISCONTINUED | OUTPATIENT
Start: 2021-07-23 | End: 2021-07-23 | Stop reason: HOSPADM

## 2021-07-23 RX ORDER — CEFAZOLIN SODIUM 2 G/100ML
2 INJECTION, SOLUTION INTRAVENOUS ONCE
Status: COMPLETED | OUTPATIENT
Start: 2021-07-23 | End: 2021-07-23

## 2021-07-23 RX ORDER — ACETAMINOPHEN 500 MG
1000 TABLET ORAL ONCE
Status: COMPLETED | OUTPATIENT
Start: 2021-07-23 | End: 2021-07-23

## 2021-07-23 RX ORDER — DEXMEDETOMIDINE HYDROCHLORIDE 100 UG/ML
INJECTION, SOLUTION INTRAVENOUS AS NEEDED
Status: DISCONTINUED | OUTPATIENT
Start: 2021-07-23 | End: 2021-07-23 | Stop reason: SURG

## 2021-07-23 RX ORDER — ROCURONIUM BROMIDE 10 MG/ML
INJECTION, SOLUTION INTRAVENOUS AS NEEDED
Status: DISCONTINUED | OUTPATIENT
Start: 2021-07-23 | End: 2021-07-23 | Stop reason: SURG

## 2021-07-23 RX ORDER — KETOROLAC TROMETHAMINE 15 MG/ML
15 INJECTION, SOLUTION INTRAMUSCULAR; INTRAVENOUS EVERY 6 HOURS PRN
Status: DISCONTINUED | OUTPATIENT
Start: 2021-07-23 | End: 2021-07-24 | Stop reason: HOSPADM

## 2021-07-23 RX ORDER — ACETAMINOPHEN 500 MG
1000 TABLET ORAL EVERY 6 HOURS
Status: DISCONTINUED | OUTPATIENT
Start: 2021-07-23 | End: 2021-07-24 | Stop reason: HOSPADM

## 2021-07-23 RX ORDER — ONDANSETRON 2 MG/ML
4 INJECTION INTRAMUSCULAR; INTRAVENOUS ONCE AS NEEDED
Status: DISCONTINUED | OUTPATIENT
Start: 2021-07-23 | End: 2021-07-23 | Stop reason: HOSPADM

## 2021-07-23 RX ORDER — GABAPENTIN 300 MG/1
600 CAPSULE ORAL ONCE
Status: COMPLETED | OUTPATIENT
Start: 2021-07-23 | End: 2021-07-23

## 2021-07-23 RX ORDER — PHENYLEPHRINE HCL IN 0.9% NACL 1 MG/10 ML
SYRINGE (ML) INTRAVENOUS AS NEEDED
Status: DISCONTINUED | OUTPATIENT
Start: 2021-07-23 | End: 2021-07-23 | Stop reason: SURG

## 2021-07-23 RX ADMIN — ROCURONIUM BROMIDE 50 MG: 10 INJECTION INTRAVENOUS at 08:25

## 2021-07-23 RX ADMIN — LIDOCAINE HYDROCHLORIDE 100 MG: 20 INJECTION, SOLUTION INFILTRATION; PERINEURAL at 08:23

## 2021-07-23 RX ADMIN — CELECOXIB 200 MG: 100 CAPSULE ORAL at 07:41

## 2021-07-23 RX ADMIN — CEFAZOLIN SODIUM 2 G: 2 INJECTION, SOLUTION INTRAVENOUS at 08:24

## 2021-07-23 RX ADMIN — Medication 100 MCG: at 09:38

## 2021-07-23 RX ADMIN — SODIUM CHLORIDE, POTASSIUM CHLORIDE, SODIUM LACTATE AND CALCIUM CHLORIDE 80 ML/HR: 600; 310; 30; 20 INJECTION, SOLUTION INTRAVENOUS at 22:09

## 2021-07-23 RX ADMIN — Medication 50 MCG: at 08:37

## 2021-07-23 RX ADMIN — GLYCOPYRROLATE 0.2 MG: 0.2 INJECTION INTRAMUSCULAR; INTRAVENOUS at 07:42

## 2021-07-23 RX ADMIN — Medication 50 MCG: at 08:32

## 2021-07-23 RX ADMIN — MIDAZOLAM HYDROCHLORIDE 4 MG: 1 INJECTION, SOLUTION INTRAMUSCULAR; INTRAVENOUS at 07:42

## 2021-07-23 RX ADMIN — LISINOPRIL 2.5 MG: 2.5 TABLET ORAL at 15:18

## 2021-07-23 RX ADMIN — Medication 100 MCG: at 09:42

## 2021-07-23 RX ADMIN — KETAMINE HYDROCHLORIDE 25 MG: 50 INJECTION, SOLUTION INTRAMUSCULAR; INTRAVENOUS at 08:37

## 2021-07-23 RX ADMIN — KETAMINE HYDROCHLORIDE 25 MG: 50 INJECTION, SOLUTION INTRAMUSCULAR; INTRAVENOUS at 08:22

## 2021-07-23 RX ADMIN — DEXAMETHASONE SODIUM PHOSPHATE 4 MG: 4 INJECTION INTRA-ARTICULAR; INTRALESIONAL; INTRAMUSCULAR; INTRAVENOUS; SOFT TISSUE at 08:21

## 2021-07-23 RX ADMIN — Medication 100 MCG: at 09:33

## 2021-07-23 RX ADMIN — METOPROLOL TARTRATE 25 MG: 25 TABLET, FILM COATED ORAL at 15:18

## 2021-07-23 RX ADMIN — GABAPENTIN 600 MG: 300 CAPSULE ORAL at 07:41

## 2021-07-23 RX ADMIN — ACETAMINOPHEN 1000 MG: 500 TABLET ORAL at 07:41

## 2021-07-23 RX ADMIN — TRANEXAMIC ACID 1000 MG: 10 INJECTION, SOLUTION INTRAVENOUS at 07:42

## 2021-07-23 RX ADMIN — CEFAZOLIN SODIUM 2 G: 2 INJECTION, SOLUTION INTRAVENOUS at 17:08

## 2021-07-23 RX ADMIN — NEOSTIGMINE METHYLSULFATE 3 MG: 1 INJECTION, SOLUTION INTRAVENOUS at 09:36

## 2021-07-23 RX ADMIN — DEXMEDETOMIDINE HYDROCHLORIDE 25 MCG: 100 INJECTION, SOLUTION INTRAVENOUS at 08:25

## 2021-07-23 RX ADMIN — PROPOFOL 150 MG: 10 INJECTION, EMULSION INTRAVENOUS at 08:25

## 2021-07-23 RX ADMIN — TRANEXAMIC ACID 1000 MG: 100 INJECTION, SOLUTION INTRAVENOUS at 09:26

## 2021-07-23 RX ADMIN — FENTANYL CITRATE 100 MCG: 50 INJECTION INTRAMUSCULAR; INTRAVENOUS at 08:25

## 2021-07-23 RX ADMIN — Medication 100 MCG: at 09:44

## 2021-07-23 RX ADMIN — ATORVASTATIN CALCIUM 40 MG: 40 TABLET, FILM COATED ORAL at 21:16

## 2021-07-23 RX ADMIN — GLYCOPYRROLATE 0.3 MG: 0.2 INJECTION INTRAMUSCULAR; INTRAVENOUS at 09:36

## 2021-07-23 RX ADMIN — Medication 100 MCG: at 09:40

## 2021-07-23 RX ADMIN — ONDANSETRON 4 MG: 2 INJECTION INTRAMUSCULAR; INTRAVENOUS at 08:37

## 2021-07-23 RX ADMIN — TAMSULOSIN HYDROCHLORIDE 0.4 MG: 0.4 CAPSULE ORAL at 21:16

## 2021-07-23 RX ADMIN — SODIUM CHLORIDE, POTASSIUM CHLORIDE, SODIUM LACTATE AND CALCIUM CHLORIDE 9 ML/HR: 600; 310; 30; 20 INJECTION, SOLUTION INTRAVENOUS at 07:40

## 2021-07-23 RX ADMIN — BUPIVACAINE HYDROCHLORIDE AND EPINEPHRINE BITARTRATE 30 ML: 5; .005 INJECTION, SOLUTION EPIDURAL; INTRACAUDAL; PERINEURAL at 08:00

## 2021-07-23 NOTE — THERAPY TREATMENT NOTE
"Acute Care - Physical Therapy Treatment Note   Vickie     Patient Name: Javi Martínez  : 1940  MRN: [id number]  Today's Date: 2021   Onset of Illness/Injury or Date of Surgery: 21  Visit Dx:     ICD-10-CM ICD-9-CM   1. Difficulty walking  R26.2 719.7   2. Primary osteoarthritis of left knee  M17.12 715.16     Patient Active Problem List   Diagnosis   • Arthritis of knee, left   • Primary osteoarthritis of left knee     Past Medical History:   Diagnosis Date   • Arthritis     left knee    • Atrial fibrillation (CMS/HCC)    • Cancer (CMS/HCC)     prostate - with seeds implant    • GERD (gastroesophageal reflux disease)    • Heart disease    • Prostate disorder      Past Surgical History:   Procedure Laterality Date   • APPENDECTOMY     • CARDIAC ABLATION      x2  \"years ago\"    • CARDIAC SURGERY  2006    cabg 3v   • ENDOSCOPY      with dilation    • KNEE ARTHROSCOPY Left    • VENTRICULAR CARDIAC PACEMAKER INSERTION      medtronic         PT Assessment (last 12 hours)      PT Evaluation and Treatment     Row Name 21 1349 21 1100       Physical Therapy Time and Intention    Subjective Information  no complaints  -CS  no complaints  -CS    Document Type  therapy note (daily note)  -CS  evaluation  -CS    Mode of Treatment  individual therapy;physical therapy  -CS  individual therapy;physical therapy  -CS    Patient Effort  good  -CS  fair very lethargic  -CS    Symptoms Noted During/After Treatment  significant change in vital signs O2 sat dropped to 79  -CS  other (see comments) lethargic  -CS    Row Name 21 1100          General Information    Patient Profile Reviewed  yes  -CS     Onset of Illness/Injury or Date of Surgery  21  -CS     Referring Physician  Carlitos Zhao  -CS     Patient Observations  cooperative;agree to therapy;lethargic  -CS     Prior Level of Function  independent:;all household mobility;community mobility;gait;transfer  -CS     Equipment Currently " Used at Home  none  -CS     Barriers to Rehab  none identified  -CS     Row Name 07/23/21 1100          Living Environment    Current Living Arrangements  home/apartment/condo  -CS     Home Accessibility  stairs to enter home  -CS     Lives With  spouse  -CS     Row Name 07/23/21 1100          Home Main Entrance    Number of Stairs, Main Entrance  four  -CS     Row Name 07/23/21 1100          Home Use of Assistive/Adaptive Equipment    Equipment Currently Used at Home  none  -CS     Row Name 07/23/21 1100          Range of Motion (ROM)    Range of Motion  other (see comments) unable to assess due to lethargy  -CS     Row Name 07/23/21 1100          Strength (Manual Muscle Testing)    Strength (Manual Muscle Testing)  -- unable to assess due to lethargy  -CS     Row Name 07/23/21 1100          Bed Mobility    Bed Mobility  bed mobility (all) activities  -     All Activities, [place] (Bed Mobility)  minimum assist (75% patient effort)  -     Assistive Device (Bed Mobility)  bed rails  -     Row Name 07/23/21 1100          Transfers    Transfers  sit-stand transfer  -     Sit-Stand [place] (Transfers)  minimum assist (75% patient effort)  -     Row Name 07/23/21 1100          Sit-Stand Transfer    Assistive Device (Sit-Stand Transfers)  walker, front-wheeled  -     Row Name 07/23/21 1100          Gait/Stairs (Locomotion)    Gait/Stairs Locomotion  gait/ambulation independence  -     [place] Level (Gait)  minimum assist (75% patient effort)  -     Assistive Device (Gait)  walker, front-wheeled  -     Distance in Feet (Gait)  30  -     Pattern (Gait)  step-to  -     Row Name 07/23/21 1100          Balance    Balance Assessment  standing dynamic balance  -     Dynamic Standing Balance  mild impairment  -     Row Name 07/23/21 1349          Motor Skills    Therapeutic Exercise  hip;knee;ankle  -     Row Name 07/23/21 1349          Hip (Therapeutic Exercise)    Hip (Therapeutic  Exercise)  AROM (active range of motion);isometric exercises  -     Hip Isometrics (Therapeutic Exercise)  gluteal sets;bilateral;sitting 20 reps  -CS     Row Name 07/23/21 1349          Knee (Therapeutic Exercise)    Knee (Therapeutic Exercise)  AROM (active range of motion);isometric exercises  -CS     Knee AROM (Therapeutic Exercise)  SAQ (short arc quad);SLR (straight leg raise);quadriceps stretch;bilateral;sitting;heel slides;LAQ (long arc quad) 20 reps  -     Row Name 07/23/21 1349          Ankle (Therapeutic Exercise)    Ankle (Therapeutic Exercise)  AROM (active range of motion)  -     Ankle AROM (Therapeutic Exercise)  bilateral;dorsiflexion;plantarflexion;sitting 20 reps  -     Row Name             Wound 07/23/21 0852 Left anterior knee Incision    Wound - Properties Group Placement Date: 07/23/21  -MB Placement Time: 0852  -MB Present on Hospital Admission: N  -MB Side: Left  -MB Orientation: anterior  -MB Location: knee  -MB Primary Wound Type: Incision  -MB    Retired Wound - Properties Group Date first assessed: 07/23/21  -MB Time first assessed: 0852  -MB Present on Hospital Admission: N  -MB Side: Left  -MB Location: knee  -MB Primary Wound Type: Incision  -MB    Row Name 07/23/21 1100          Plan of Care Review    Plan of Care Reviewed With  patient;family  -     Outcome Summary  Patient is a 81 year old male that presents with mobility deficits post knee surgery. He will benefit from skilled PT to address these deficits.  -     Row Name 07/23/21 1100          Physical Therapy Goals    Bed Mobility Goal Selection (PT)  bed mobility, PT goal 1  -CS     Transfer Goal Selection (PT)  transfer, PT goal 1  -CS     Gait Training Goal Selection (PT)  gait training, PT goal 1  -CS     ROM Goal Selection (PT)  ROM, PT goal 1  -     Row Name 07/23/21 1100          Bed Mobility Goal 1 (PT)    Activity/Assistive Device (Bed Mobility Goal 1, PT)  bed mobility activities, all  -CS     [place]  Level/Cues Needed (Bed Mobility Goal 1, PT)  contact guard assist  -CS     Time Frame (Bed Mobility Goal 1, PT)  10 days  -CS     Row Name 07/23/21 1100          Transfer Goal 1 (PT)    Activity/Assistive Device (Transfer Goal 1, PT)  transfers, all  -CS     [place] Level/Cues Needed (Transfer Goal 1, PT)  contact guard assist  -CS     Time Frame (Transfer Goal 1, PT)  10 days  -CS     Row Name 07/23/21 1100          Gait Training Goal 1 (PT)    Activity/Assistive Device (Gait Training Goal 1, PT)  gait (walking locomotion);walker, rolling  -CS     [place] Level (Gait Training Goal 1, PT)  contact guard assist  -CS     Distance (Gait Training Goal 1, PT)  150  -CS     Time Frame (Gait Training Goal 1, PT)  10 days  -CS     Row Name 07/23/21 1100          ROM Goal 1 (PT)    ROM Goal 1 (PT)  0-120  knee ext/ flex  -CS     Time Frame (ROM Goal 1, PT)  -- 10 days  -CS     Row Name 07/23/21 1100          Therapy Assessment/Plan (PT)    Patient/Family Therapy Goals Statement (PT)  to walk better  -CS     PT Diagnosis (PT)  difficulty walking  -CS     Rehab Potential (PT)  good, to achieve stated therapy goals  -CS     Criteria for Skilled Interventions Met (PT)  yes  -CS     Predicted Duration of Therapy Intervention (PT)  10 days  -CS     Problem List (PT)  mobility  -CS     Row Name 07/23/21 1100          PT Evaluation Complexity    History, PT Evaluation Complexity  no personal factors and/or comorbidities  -CS     Examination of Body Systems (PT Eval Complexity)  total of 3 or more elements  -CS     Clinical Presentation (PT Evaluation Complexity)  stable  -CS     Clinical Decision Making (PT Evaluation Complexity)  low complexity  -CS     Overall Complexity (PT Evaluation Complexity)  low complexity  -CS     Row Name 07/23/21 1349          Progress Summary (PT)    Progress Toward Functional Goals (PT)  progress toward functional goals as expected  -CS     Daily Progress Summary (PT)  Patient is progressing  well towards functional goals. He is to continue current plan of care.  -     Row Name 07/23/21 1100          Therapy Plan Review/Discharge Plan (PT)    Therapy Plan Review (PT)  evaluation/treatment results reviewed  -       User Key  (r) = Recorded By, (t) = Taken By, (c) = Cosigned By    Initials Name Provider Type    Padma Bauer, RN Registered Nurse    Coco Garcia PT Physical Therapist        Physical Therapy Education                 Title: PT OT SLP Therapies (Done)     Topic: Physical Therapy (Done)     Point: Mobility training (Done)     Learning Progress Summary           Patient Acceptance, E,TB, VU by  at 7/23/2021 1114                   Point: Home exercise program (Done)     Learning Progress Summary           Patient Acceptance, E,TB, VU by  at 7/23/2021 1114                   Point: Body mechanics (Done)     Learning Progress Summary           Patient Acceptance, E,TB, VU by  at 7/23/2021 1114                   Point: Precautions (Done)     Learning Progress Summary           Patient Acceptance, E,TB, VU by  at 7/23/2021 1114                               User Key     Initials Effective Dates Name Provider Type Discipline     04/25/21 -  Coco Swanson, OMAR Physical Therapist PT              PT Recommendation and Plan  Anticipated Discharge Disposition (PT): home with outpatient therapy services  Planned Therapy Interventions (PT): balance training, gait training, strengthening, transfer training  Therapy Frequency (PT): 2 times/day  Progress Summary (PT)  Progress Toward Functional Goals (PT): progress toward functional goals as expected  Daily Progress Summary (PT): Patient is progressing well towards functional goals. He is to continue current plan of care.  Plan of Care Reviewed With: patient, family  Outcome Summary: Patient is a 81 year old male that presents with mobility deficits post knee surgery. He will benefit from skilled PT to address these  deficits.  Outcome Measures     Row Name 07/23/21 1100             How much help from another person do you currently need...    Turning from your back to your side while in flat bed without using bedrails?  3  -CS      Moving from lying on back to sitting on the side of a flat bed without bedrails?  3  -CS      Moving to and from a bed to a chair (including a wheelchair)?  3  -CS      Standing up from a chair using your arms (e.g., wheelchair, bedside chair)?  3  -CS      Climbing 3-5 steps with a railing?  2  -CS      To walk in hospital room?  2  -CS      AM-PAC 6 Clicks Score (PT)  16  -CS         Functional Assessment    Outcome Measure Options  AM-PAC 6 Clicks Basic Mobility (PT)  -CS        User Key  (r) = Recorded By, (t) = Taken By, (c) = Cosigned By    Initials Name Provider Type    Coco Garcia PT Physical Therapist           Time Calculation:   PT Charges     Row Name 07/23/21 1353 07/23/21 1115          Time Calculation    PT Received On  07/23/21  -CS  07/23/21  -CS     PT Goal Re-Cert Due Date  08/01/21  -CS  08/01/21  -CS        Timed Charges    13118 - PT Therapeutic Exercise Minutes  30  -CS  --     21903 - Gait Training Minutes   --  8  -CS        Untimed Charges    PT Eval/Re-eval Minutes  --  30  -CS        Total Minutes    Timed Charges Total Minutes  30  -CS  8  -CS     Untimed Charges Total Minutes  --  30  -CS      Total Minutes  30  -CS  38  -CS       User Key  (r) = Recorded By, (t) = Taken By, (c) = Cosigned By    Initials Name Provider Type    Coco Garcia PT Physical Therapist        Therapy Charges for Today     Code Description Service Date Service Provider Modifiers Qty    05298243161 HC GAIT TRAINING EA 15 MIN 7/23/2021 Coco Swanson, PT GP 1    80094074944 HC PT EVAL LOW COMPLEXITY 2 7/23/2021 Coco Swanson, PT GP 1    30725594064 HC PT THER PROC EA 15 MIN 7/23/2021 Coco Swanson, PT GP 2          PT G-Codes  Outcome Measure Options: AM-PAC 6 Clicks Basic  Mobility (PT)  AM-PAC 6 Clicks Score (PT): 17    Coco Swanson, PT  7/23/2021

## 2021-07-23 NOTE — PLAN OF CARE
Goal Outcome Evaluation:  Plan of Care Reviewed With: patient, family           Outcome Summary: Patient is a 81 year old male that presents with mobility deficits post knee surgery. He will benefit from skilled PT to address these deficits.

## 2021-07-23 NOTE — ANESTHESIA PROCEDURE NOTES
Airway  Urgency: elective    Date/Time: 7/23/2021 8:27 AM  End Time:7/23/2021 8:27 AM  Airway not difficult    General Information and Staff    Patient location during procedure: OR  CRNA: Beto Khalil CRNA    Indications and Patient Condition  Indications for airway management: airway protection    Preoxygenated: yes  MILS maintained throughout  Mask difficulty assessment: 1 - vent by mask    Final Airway Details  Final airway type: endotracheal airway      Successful airway: ETT  Cuffed: yes   Successful intubation technique: direct laryngoscopy  Endotracheal tube insertion site: oral  Blade: [place]  Blade size: 3  ETT size (mm): 7.5  Cormack-Lehane Classification: grade I - full view of glottis  Placement verified by: chest auscultation, capnometry and palpation of cuff   Measured from: lips  ETT/EBT  to lips (cm): 22  Number of attempts at approach: 1  Assessment: lips, teeth, and gum same as pre-op and atraumatic intubation

## 2021-07-23 NOTE — PLAN OF CARE
Goal Outcome Evaluation:  Plan of Care Reviewed With: patient        Progress: improving  Outcome Summary: POST OP KNEE REPLACEMENT TODAY; PATIENT IS STABLE WITH NO COMPLAINTS OF PAIN

## 2021-07-23 NOTE — ANESTHESIA PREPROCEDURE EVALUATION
Anesthesia Evaluation     Patient summary reviewed and Nursing notes reviewed   no history of anesthetic complications:  NPO Solid Status: > 8 hours  NPO Liquid Status: > 2 hours           Airway   Mallampati: I  TM distance: >3 FB  Neck ROM: full  No difficulty expected  Dental    (+) lower dentures and poor dentition    Pulmonary - negative pulmonary ROS and normal exam    breath sounds clear to auscultation  Cardiovascular - normal exam  Exercise tolerance: good (4-7 METS)    Beta blocker given within 24 hours of surgery  Rhythm: regular    (+) pacemaker pacemaker, CABG >6 Months, dysrhythmias Paroxysmal Atrial Fib,       Neuro/Psych- negative ROS  GI/Hepatic/Renal/Endo - negative ROS     Musculoskeletal     Abdominal    Substance History - negative use     OB/GYN negative ob/gyn ROS         Other   arthritis,                    Anesthesia Plan    ASA 1     general and regional   (Patient understands anesthesia not responsible for dental damage. Regional anesthesia options discussed with patient. Pt accepts regional block.)  intravenous induction     Anesthetic plan, all risks, benefits, and alternatives have been provided, discussed and informed consent has been obtained with: patient.  Use of blood products discussed with patient .   Plan discussed with CRNA.

## 2021-07-23 NOTE — ANESTHESIA PROCEDURE NOTES
Peripheral Block    Pre-sedation assessment completed: 7/23/2021 7:56 AM    Patient reassessed immediately prior to procedure    Patient location during procedure: pre-op  Start time: 7/23/2021 7:56 AM  Stop time: 7/23/2021 8:00 AM  Reason for block: at surgeon's request and post-op pain management  Performed by  Anesthesiologist: Jalil Melo MD  Preanesthetic Checklist  Completed: patient identified, IV checked, site marked, risks and benefits discussed, surgical consent, monitors and equipment checked, pre-op evaluation and timeout performed  Prep:  Pt Position: supine  Sterile barriers:alcohol skin prep, partial drape, cap, washed/disinfected hands, mask and gloves  Prep: ChloraPrep  Patient monitoring: blood pressure monitoring, continuous pulse oximetry and EKG  Procedure  Sedation:yes  Performed under: local infiltration  Guidance:ultrasound guided and nerve stimulator  ULTRASOUND INTERPRETATION. Using ultrasound guidance a 20 G gauge needle was placed in close proximity to the nerve, at which point, under ultrasound guidance anesthetic was injected in the area of the nerve and spread of the anesthesia was seen on ultrasound in close proximity thereto.  There were no abnormalities seen on ultrasound; a digital image was taken; and the patient tolerated the procedure with no complications. Images:still images obtained, printed/placed on chart    Laterality:left  Block Type:adductor canal block  Injection Technique:single-shot  Needle Type:echogenic  Needle Gauge:20 G (4in)  Resistance on Injection: none    Medications Used: bupivacaine-EPINEPHrine PF (MARCAINE w/EPI) 0.5% -1:[id number] injection, 30 mL  Med admintered at 7/23/2021 8:00 AM      Post Assessment  Injection Assessment: negative aspiration for heme, no paresthesia on injection and incremental injection  Patient Tolerance:comfortable throughout block  Complications:no  Additional Notes  The block or continuous infusion is requested by the referring  physician for management of postoperative pain, or pain related to a procedure. Ultrasound guidance (deemed medically necessary). Painless injection, pt was awake and conversant during the procedure without complications. Needle and surrounding structures visualized throughout procedure. No adverse reactions or complications seen during this period. Post-procedure image showed no signs of complication, and anatomy was consistent with an uncomplicated nerve blockade.

## 2021-07-23 NOTE — ANESTHESIA POSTPROCEDURE EVALUATION
Patient: Javi Martínez    Procedure Summary     Date: 07/23/21 Room / Location: [facility] OR 06 / [facility] MAIN OR    Anesthesia Start: 0819 Anesthesia Stop: 0957    Procedure: TOTAL KNEE ARTHROPLASTY WITH DORA NAVIGATION WITH BIOMET (Left Knee) Diagnosis:       Primary osteoarthritis of left knee      (Primary osteoarthritis of left knee [M17.12])    Surgeons: Carlitos Zhao MD Provider: Timothy Bailey MD    Anesthesia Type: general, regional ASA Status: 1          Anesthesia Type: general, regional    Vitals  Vitals Value Taken Time   /81 07/23/21 1040   Temp 36.9 °C (98.5 °F) 07/23/21 1000   Pulse 70 07/23/21 1043   Resp 15 07/23/21 1020   SpO2 95 % 07/23/21 1043   Vitals shown include unvalidated device data.        Post Anesthesia Care and Evaluation    Patient location during evaluation: bedside  Patient participation: complete - patient participated  Level of consciousness: awake  Pain management: adequate  Airway patency: patent  Anesthetic complications: No anesthetic complications  PONV Status: none  Cardiovascular status: acceptable  Respiratory status: acceptable  Hydration status: acceptable    Comments: An Anesthesiologist personally participated in the most demanding procedures (including induction and emergence if applicable) in the anesthesia plan, monitored the course of anesthesia administration at frequent intervals and remained physically present and available for immediate diagnosis and treatment of emergencies.

## 2021-07-23 NOTE — THERAPY EVALUATION
"Acute Care - Physical Therapy Initial Evaluation  [facility]     Patient Name: Javi Martínez  : 1940  MRN: [id number]  Today's Date: 2021   Onset of Illness/Injury or Date of Surgery: 21  Visit Dx:   Admit date: 2021     Referring Physician: Sarai Garcia MD     SurgeryDate:2021   Procedure(s) (LRB):  TOTAL KNEE ARTHROPLASTY WITH DORA NAVIGATION WITH BIOMET (Left)         ICD-10-CM ICD-9-CM   1. Difficulty walking  R26.2 719.7   2. Primary osteoarthritis of left knee  M17.12 715.16     Patient Active Problem List   Diagnosis   • Arthritis of knee, left   • Primary osteoarthritis of left knee     Past Medical History:   Diagnosis Date   • Arthritis     left knee    • Atrial fibrillation (CMS/HCC)    • Cancer (CMS/HCC)     prostate - with seeds implant    • GERD (gastroesophageal reflux disease)    • Heart disease    • Prostate disorder      Past Surgical History:   Procedure Laterality Date   • APPENDECTOMY     • CARDIAC ABLATION      x2  \"years ago\"    • CARDIAC SURGERY      cabg 3v   • ENDOSCOPY      with dilation    • VENTRICULAR CARDIAC PACEMAKER INSERTION      medtronic         PT Assessment (last 12 hours)      PT Evaluation and Treatment     Row Name 21 1100          Physical Therapy Time and Intention    Subjective Information  no complaints  -CS     Document Type  evaluation  -CS     Mode of Treatment  individual therapy;physical therapy  -CS     Patient Effort  fair very lethargic  -CS     Symptoms Noted During/After Treatment  other (see comments) lethargic  -CS     Row Name 21 1100          General Information    Patient Profile Reviewed  yes  -CS     Onset of Illness/Injury or Date of Surgery  21  -CS     Referring Physician  Carlitos Zhao  -CS     Patient Observations  cooperative;agree to therapy;lethargic  -CS     Prior Level of Function  independent:;all household mobility;community mobility;gait;transfer  -CS     Equipment Currently Used " at Home  none  -CS     Barriers to Rehab  none identified  -CS     Row Name 07/23/21 1100          Living Environment    Current Living Arrangements  home/apartment/condo  -CS     Home Accessibility  stairs to enter home  -CS     Lives With  spouse  -CS     Row Name 07/23/21 1100          Home Main Entrance    Number of Stairs, Main Entrance  four  -CS     Row Name 07/23/21 1100          Home Use of Assistive/Adaptive Equipment    Equipment Currently Used at Home  none  -CS     Row Name 07/23/21 1100          Range of Motion (ROM)    Range of Motion  other (see comments) unable to assess due to lethargy  -CS     Row Name 07/23/21 1100          Strength (Manual Muscle Testing)    Strength (Manual Muscle Testing)  -- unable to assess due to lethargy  -CS     Row Name 07/23/21 1100          Bed Mobility    Bed Mobility  bed mobility (all) activities  -     All Activities, Linn (Bed Mobility)  minimum assist (75% patient effort)  -CS     Assistive Device (Bed Mobility)  bed rails  -CS     Row Name 07/23/21 1100          Transfers    Transfers  sit-stand transfer  -     Sit-Stand Linn (Transfers)  minimum assist (75% patient effort)  -CS     Row Name 07/23/21 1100          Sit-Stand Transfer    Assistive Device (Sit-Stand Transfers)  walker, front-wheeled  -CS     Row Name 07/23/21 1100          Gait/Stairs (Locomotion)    Gait/Stairs Locomotion  gait/ambulation independence  -     Linn Level (Gait)  minimum assist (75% patient effort)  -CS     Assistive Device (Gait)  walker, front-wheeled  -     Distance in Feet (Gait)  30  -     Pattern (Gait)  step-to  -CS     Row Name 07/23/21 1100          Balance    Balance Assessment  standing dynamic balance  -     Dynamic Standing Balance  mild impairment  -CS     Row Name             Wound 07/23/21 0852 Left anterior knee Incision    Wound - Properties Group Placement Date: 07/23/21  -MB Placement Time: 0852 -MB Present on Hospital  Admission: N  -MB Side: Left  -MB Orientation: anterior  -MB Location: knee  -MB Primary Wound Type: Incision  -MB    Retired Wound - Properties Group Date first assessed: 07/23/21  -MB Time first assessed: 0852  -MB Present on Hospital Admission: N  -MB Side: Left  -MB Location: knee  -MB Primary Wound Type: Incision  -MB    Row Name 07/23/21 1100          Plan of Care Review    Plan of Care Reviewed With  patient;family  -CS     Outcome Summary  Patient is a 81 year old male that presents with mobility deficits post knee surgery. He will benefit from skilled PT to address these deficits.  -CS     Row Name 07/23/21 1100          Physical Therapy Goals    Bed Mobility Goal Selection (PT)  bed mobility, PT goal 1  -CS     Transfer Goal Selection (PT)  transfer, PT goal 1  -CS     Gait Training Goal Selection (PT)  gait training, PT goal 1  -CS     ROM Goal Selection (PT)  ROM, PT goal 1  -CS     Row Name 07/23/21 1100          Bed Mobility Goal 1 (PT)    Activity/Assistive Device (Bed Mobility Goal 1, PT)  bed mobility activities, all  -CS     [place] Level/Cues Needed (Bed Mobility Goal 1, PT)  contact guard assist  -CS     Time Frame (Bed Mobility Goal 1, PT)  10 days  -CS     Row Name 07/23/21 1100          Transfer Goal 1 (PT)    Activity/Assistive Device (Transfer Goal 1, PT)  transfers, all  -CS     [place]ima Level/Cues Needed (Transfer Goal 1, PT)  contact guard assist  -CS     Time Frame (Transfer Goal 1, PT)  10 days  -CS     Row Name 07/23/21 1100          Gait Training Goal 1 (PT)    Activity/Assistive Device (Gait Training Goal 1, PT)  gait (walking locomotion);walker, rolling  -CS     Pima Level (Gait Training Goal 1, PT)  contact guard assist  -CS     Distance (Gait Training Goal 1, PT)  150  -CS     Time Frame (Gait Training Goal 1, PT)  10 days  -CS     Row Name 07/23/21 1100          ROM Goal 1 (PT)    ROM Goal 1 (PT)  0-120  knee ext/ flex  -CS     Time Frame (ROM Goal 1, PT)  -- 10  days  -CS     Row Name 07/23/21 1100          Therapy Assessment/Plan (PT)    Patient/Family Therapy Goals Statement (PT)  to walk better  -CS     PT Diagnosis (PT)  difficulty walking  -CS     Rehab Potential (PT)  good, to achieve stated therapy goals  -CS     Criteria for Skilled Interventions Met (PT)  yes  -CS     Predicted Duration of Therapy Intervention (PT)  10 days  -CS     Problem List (PT)  mobility  -CS     Row Name 07/23/21 1100          PT Evaluation Complexity    History, PT Evaluation Complexity  no personal factors and/or comorbidities  -CS     Examination of Body Systems (PT Eval Complexity)  total of 3 or more elements  -CS     Clinical Presentation (PT Evaluation Complexity)  stable  -CS     Clinical Decision Making (PT Evaluation Complexity)  low complexity  -CS     Overall Complexity (PT Evaluation Complexity)  low complexity  -CS     Row Name 07/23/21 1100          Therapy Plan Review/Discharge Plan (PT)    Therapy Plan Review (PT)  evaluation/treatment results reviewed  -CS       User Key  (r) = Recorded By, (t) = Taken By, (c) = Cosigned By    Initials Name Provider Type    Padma Bauer, RN Registered Nurse    Coco Garcia, PT Physical Therapist        Physical Therapy Education                 Title: PT OT SLP Therapies (Done)     Topic: Physical Therapy (Done)     Point: Mobility training (Done)     Learning Progress Summary           Patient Acceptance, E,TB, VU by DEMIAN at 7/23/2021 1114                   Point: Home exercise program (Done)     Learning Progress Summary           Patient Acceptance, E,TB, VU by DEMIAN at 7/23/2021 1114                   Point: Body mechanics (Done)     Learning Progress Summary           Patient Acceptance, E,TB, VU by CS at 7/23/2021 1114                   Point: Precautions (Done)     Learning Progress Summary           Patient Acceptance, E,TB, VU by DEMIAN at 7/23/2021 1114                               User Key     Initials Effective Dates Name  Provider Type Discipline     04/25/21 -  Coco Swanson PT Physical Therapist PT              PT Recommendation and Plan  Anticipated Discharge Disposition (PT): home with outpatient therapy services  Planned Therapy Interventions (PT): balance training, gait training, strengthening, transfer training  Therapy Frequency (PT): 2 times/day  Plan of Care Reviewed With: patient, family  Outcome Summary: Patient is a 81 year old male that presents with mobility deficits post knee surgery. He will benefit from skilled PT to address these deficits.  Outcome Measures     Row Name 07/23/21 1100             How much help from another person do you currently need...    Turning from your back to your side while in flat bed without using bedrails?  3  -CS      Moving from lying on back to sitting on the side of a flat bed without bedrails?  3  -CS      Moving to and from a bed to a chair (including a wheelchair)?  3  -CS      Standing up from a chair using your arms (e.g., wheelchair, bedside chair)?  3  -CS      Climbing 3-5 steps with a railing?  2  -CS      To walk in hospital room?  2  -CS      AM-PAC 6 Clicks Score (PT)  16  -CS         Functional Assessment    Outcome Measure Options  AM-PAC 6 Clicks Basic Mobility (PT)  -CS        User Key  (r) = Recorded By, (t) = Taken By, (c) = Cosigned By    Initials Name Provider Type    CS Coco Swanson PT Physical Therapist           Time Calculation:   PT Charges     Row Name 07/23/21 1115             Time Calculation    PT Received On  07/23/21  -CS      PT Goal Re-Cert Due Date  08/01/21  -CS         Timed Charges    69212 - Gait Training Minutes   8  -CS         Untimed Charges    PT Eval/Re-eval Minutes  30  -CS         Total Minutes    Timed Charges Total Minutes  8  -CS      Untimed Charges Total Minutes  30  -CS       Total Minutes  38  -CS        User Key  (r) = Recorded By, (t) = Taken By, (c) = Cosigned By    Initials Name Provider Type    Coco Garcia PT  Physical Therapist        Therapy Charges for Today     Code Description Service Date Service Provider Modifiers Qty    18669888820 HC GAIT TRAINING EA 15 MIN 7/23/2021 Coco Swanson, PT GP 1    46154042453 HC PT EVAL LOW COMPLEXITY 2 7/23/2021 Coco Swanson, PT GP 1          PT G-Codes  Outcome Measure Options: AM-PAC 6 Clicks Basic Mobility (PT)  AM-PAC 6 Clicks Score (PT): 16    Coco Swanson, PT  7/23/2021

## 2021-07-23 NOTE — H&P
"[facility]   HISTORY AND PHYSICAL    Patient Name: Javi Martínez  : 1940  MRN: [id number]  Primary Care Physician:  Marta Garcia MD  Date of admission: 2021    Subjective   Subjective     Chief Complaint: TOTAL KNEE ARTHROPLASTY WITH DORA NAVIGATION WITH BIOMET     Patient reported to the hospital for a left TKA.  He has had knee issues for over 28 years, and had a left knee arthroscopy and injections in the past.  Patient states he feels well and denies N/V or pain.      Review of Systems   All other systems reviewed and are negative.       Personal History     Past Medical History:   Diagnosis Date   • Arthritis     left knee    • Atrial fibrillation (CMS/HCC)    • Cancer (CMS/HCC)     prostate - with seeds implant    • GERD (gastroesophageal reflux disease)    • Heart disease    • Prostate disorder        Past Surgical History:   Procedure Laterality Date   • APPENDECTOMY     • CARDIAC ABLATION      x2  \"years ago\"    • CARDIAC SURGERY      cabg 3v   • ENDOSCOPY      with dilation    • KNEE ARTHROSCOPY Left    • VENTRICULAR CARDIAC PACEMAKER INSERTION      medtronic        Family History: family history includes No Known Problems in his brother, father, maternal aunt, maternal grandfather, maternal grandmother, maternal uncle, mother, paternal aunt, paternal grandfather, paternal grandmother, paternal uncle, sister, and another family member. Otherwise pertinent FHx was reviewed and not pertinent to current issue.    Social History:  reports that he quit smoking about 26 years ago. He has never used smokeless tobacco. Alcohol use questions deferred to the physician. He reports that he does not use drugs.    Home Medications:  apixaban, atorvastatin, lisinopril, metoprolol tartrate, omeprazole, and tamsulosin    Allergies:  No Known Allergies    Objective    Objective     Vitals:   Temp:  [96.9 °F (36.1 °C)-98.5 °F (36.9 °C)] 98.4 °F (36.9 °C)  Heart Rate:  [69-86] 70  Resp:  [14-22] " 18  BP: (125-163)/(51-89) 135/75  Flow (L/min):  [2-4] 2    Physical Exam  HENT:      Mouth/Throat:      Mouth: Mucous membranes are moist.   Eyes:      Pupils: Pupils are equal, round, and reactive to light.   Cardiovascular:      Rate and Rhythm: Normal rate and regular rhythm.      Pulses: Normal pulses.      Heart sounds: Normal heart sounds.   Pulmonary:      Effort: Pulmonary effort is normal.   Abdominal:      General: Abdomen is flat.   Musculoskeletal:        Legs:    Skin:     General: Skin is warm.      Capillary Refill: Capillary refill takes less than 2 seconds.   Neurological:      Mental Status: He is alert.         Result Review    Result Review:  I have personally reviewed the results from the time of this admission to 7/23/2021 15:36 EDT and agree with these findings:  [x]  Laboratory  [x]  Microbiology  [x]  Radiology  []  EKG/Telemetry   []  Cardiology/Vascular   []  Pathology  []  Old records  []  Other:  Most notable findings include:   a1c-5.7  CXR-No acute osseous abnormality.  Postsurgical changes are seen from a total left knee arthroplasty   with no evidence of acute hardware failure  Assessment/Plan   Assessment / Plan     Brief Patient Summary:  Javi Martínez is a 81 y.o. male who reported to the hospital for a left TKA.  He has a PMH of CABG, a fib, GERD and prostate cancer. He has had knee issues for over 28 years, and had a left knee arthroscopy and injections in the past.  Patient states he feels well and denies N/V or pain    Active Hospital Problems:  Active Hospital Problems    Diagnosis    • **Primary osteoarthritis of left knee      Added automatically from request for surgery [id number]     • S/P TKR (total knee replacement)    • Arthritis of knee, left      Plan:   Resume home medication  PT/OT eval and treat  Apply ice machine PRN to knee  Pain medication management    DVT prophylaxis:  Medical and mechanical DVT prophylaxis orders are present.    CODE STATUS:    Level Of  Support Discussed With: Patient  Code Status: CPR  Medical Interventions (Level of Support Prior to Arrest): Full  I have personally seen this patient and confirmed above findings of Vicki Marques  Admission Status:  I believe this patient meets obs status.    Electronically signed by MEJIA Orosco, 07/23/21, 1:24 PM EDT.

## 2021-07-23 NOTE — H&P
"  History and Physical  Chief Complaint  Pain of the Left Knee        Subjective          Javi Martínez presents to [facility] for an evaluation of left knee. Patient has been having left knee pain that has been ongoing for 28+ years. He has been a  in the past. He has pain with walking and going up and down steps. He states his knee jorgito and gives way on him. He states pain is worse medially. Patient has a history of getting his left knee aspirated and getting cortisone injections, which have helped. Patient takes Aleeve as needed. He has become less active due to left knee pain.      No Known Allergies      Social History   Social History            Socioeconomic History   • Marital status:        Spouse name: Not on file   • Number of children: Not on file   • Years of education: Not on file   • Highest education level: Not on file   Tobacco Use   • Smoking status: Former Smoker            Review of Systems            Objective      Vital Signs:   Pulse 82   Ht 193 cm (76\")   Wt 97.5 kg (215 lb)   SpO2 94%   BMI 26.17 kg/m²        Physical Exam  Constitutional:       Appearance: Normal appearance. He is well-developed and normal weight.   HENT:      Head: Normocephalic.      Right Ear: Hearing and external ear normal.      Left Ear: Hearing and external ear normal.      Nose: Nose normal.   Eyes:      Conjunctiva/sclera: Conjunctivae normal.   Cardiovascular:      Rate and Rhythm: Normal rate.   Pulmonary:      Effort: Pulmonary effort is normal.      Breath sounds: No wheezing or rales.   Abdominal:      Palpations: Abdomen is soft.      Tenderness: There is no abdominal tenderness.   Musculoskeletal:      Cervical back: Normal range of motion.   Skin:     Findings: No rash.   Neurological:      Mental Status: He is alert and oriented to person, place, and time.   Psychiatric:         Mood and Affect: Mood and affect normal.         Judgment: Judgment " normal.         Ortho Exam       LEFT KNEE: Full extension. 15 degrees of Valgus deformity. Significant swelling. Tender medial and lateral joint line. Skin intact. Calf supple, non-tender. Weight bearing. Limping gait. Good strength to hamstrings, quadriceps, dorsiflexors and plantar flexors. Sensation grossly intact. Neurovascular intact. Stable to varus/valgus stress. Negative Lachman. Positive crepitus. Flexion to 100 degrees.         Procedures                    Imaging Results (Most Recent)      Procedure Component Value Units Date/Time     XR Knee 3 View Left [[id number]] Resulted: 07/14/21 1102       Updated: 07/14/21 1104                   Result Review    :         X-Ray Report:  Left knee(s) X-Ray  Indication: Evaluation of left knee   AP, Lateral and Standing view(s)  Findings: Bone on bone osteoarthritis. No fracture or dislocation.   Prior studies available for comparison: no         Assessment and Plan      DX: Left knee osteoarthritis      Discussed treatment plans and diagnosis with the patient. Discussed total knee replacement vs conservative management. Patient wishes to proceed with a left total knee arthroplasty.      Discussed surgery., Risks/benefits discussed with patient including, but not limited to: infection, bleeding, neurovascular damage, malunion, nonunion, aesthetic deformity, need for further surgery, and death., Discussed with patient the implant type being used during surgery and patient understands and desires to proceed., Surgery pamphlet given. and Call or return if worsening symptoms.     Follow Up      Post-operatively.      Carlitos Zhao MD  07/22/21

## 2021-07-23 NOTE — OP NOTE
TOTAL KNEE ARTHROPLASTY WITH DORA NAVIGATION  Procedure Report    Patient Name:  Javi Martínez  YOB: 1940    Date of Surgery:  7/23/2021       Pre-op Diagnosis:   Primary osteoarthritis of left knee [M17.12]       Post-Op Diagnosis Codes:     * Primary osteoarthritis of left knee [M17.12]    Procedure/CPT® Codes:      Procedure(s):  TOTAL KNEE ARTHROPLASTY WITH DORA NAVIGATION WITH BIOMET    Staff:  Surgeon(s):  Carlitos Zhao MD    Assistant: Caridad Richards    Anesthesia: General    Estimated Blood Loss: 50 mL    Implants:    Implant Name Type Inv. Item Serial No.  Lot No. LRB No. Used Action   CMT BONE PALACOS R HI/VISC 1X40 - [id number] Implant CMT BONE PALACOS R HI/VISC 1X40  [employer] [id number] Left 1 Implanted   CMT BONE PALACOS R HI/VISC 1X40 - [id number] Implant CMT BONE PALACOS R HI/VISC 1X40  [employer] [id number] Left 1 Implanted   PAT PERSONA ALLPOLY CMT 35MM - [id number] Implant PAT PERSONA ALLPOLY CMT 35MM  [employer] [id number] Left 1 Implanted   STEM TIB PERSONA CMT 5D SZG LT - [id number] Implant STEM TIB PERSONA CMT 5D SZG LT  [employer] [id number] Left 1 Implanted   COMP FEM/KN PERSONA CR CMT COCR STD SZ11 LT - [id number] Implant COMP FEM/KN PERSONA CR CMT COCR STD SZ11 LT  [employer] [id number] Left 1 Implanted   ART/SRF KN PERSONA/VE PS GH 8TO11 14MM LT - [id number] Implant ART/SRF KN PERSONA/VE PS GH 8TO11 14MM LT  [employer] [id number] Left 1 Implanted       Specimen:          None      Complications: None    Description of Procedure: See H&P for risks and benefits.The patient was taken to the operating room and placed supine on the operating table after adductor canal block was done in preoperative holding. After general endotracheal anesthesia was established, the left knee was examined.  The patient had full range of motion and no instability.  The left lower extremity was prepped and draped in the standard usual fashion using alcohol and  ChloraPrep.  A standard incision was made one to two fingerbreadths superior to the superior pole of the patella down to the medial aspect of the tibial tubercle with a knife.  Dissection was carried down raising full thickness skin flaps laterally and medially.  A medial rectus parapatellar approach was undertaken for a.  Appropriate soft tissue release was done in a standard fashion with a knife and a curved osteotome.  The patient had significant signs of osteoarthritis and the knee was brought up into flexion.  The collateral ligaments were protected with retractors and the tracking device for the computer navigation software was placed in the distal femur and distal femoral points were mapped out according to the computer navigation software and the distal femoral cut was made.  The distal femoral cut was in 0 degrees of varus and valgus and 2 degrees of flexion.  It was accepted.  The posterior condylar referencing guide was then pinned.  The femur was sized.  The cutting block was placed in the distal femur and all of the femoral cuts were made, anterior posterior, anterior chamfer, and posterior chamfer cuts.  The bone, from the cuts, was removed.  The PCL retractor was placed and attention was focused on the tibia.  The tracking device was mounted on the medial tibial plateau in standard fashion.  All the femoral points were mapped out according to the computer navigation software and the proximal tibial cut was made.  The resulting cut was in 0 degrees of varus and valgus with a 5 degree posterior slope.  It was accepted.  The posterior condylar spurs were removed, along with the medial and lateral meniscus.  Trials were placed and the extramedullary guide and the trials were pointing to the base of the second metatarsal in the center of the ankle.  The patella was calipered and cut to accommodate a medialized patellar button trial.  The knee was taken through range of motion, had equal flexion-extension  gaps, was stable to varus and valgus stress and had full passive motion.  The trials were removed and the tibia was punched in the standard fashion and the bone ends were copiously irrigated with Bacitracin Simpulse irrigation as the cement was mixed. The tibial implant was cemented in position, followed by the size the femur.  They were malleted and cemented in position and the trial poly was placed.  The knee was held in extension and the patella was cemented into place and held with a clamp.  The knee was held in extension until the cement had hardened.  Excess cement was then removed from the implant edges.  Trials were undertaken and the correct was chosen polyethylene insert was chosen and was implanted.  The knee had the same range of motion and stability as the trials.  The wound was copiously irrigated with Bacitracin Simpulse irrigation and the arthrotomy was closed with Ethibond.  More copious irrigation followed and the deep fat was closed with and 0 Vicryl.  The subcutaneous tissue was closed with 2-0 Vicryl and the skin was closed with staples.  The closure was done in 45 degrees of knee flexion.  The incision was washed and dried and Aquacel dressing and long david hose was applied to the lower extremity. The patient tolerated the procedure well, was extubated, and taken to the recovery room.           Carlitos Zhao MD     Date: 7/23/2021  Time: 17:16 EDT

## 2021-07-24 VITALS
DIASTOLIC BLOOD PRESSURE: 64 MMHG | HEART RATE: 70 BPM | SYSTOLIC BLOOD PRESSURE: 120 MMHG | OXYGEN SATURATION: 94 % | BODY MASS INDEX: 26.04 KG/M2 | RESPIRATION RATE: 19 BRPM | TEMPERATURE: 98.3 F | HEIGHT: 76 IN | WEIGHT: 213.85 LBS

## 2021-07-24 LAB
DEPRECATED RDW RBC AUTO: 45 FL (ref 37–54)
ERYTHROCYTE [DISTWIDTH] IN BLOOD BY AUTOMATED COUNT: 13.9 % (ref 12.3–15.4)
HCT VFR BLD AUTO: 41.4 % (ref 37.5–51)
HGB BLD-MCNC: 13.5 G/DL (ref 13–17.7)
MCH RBC QN AUTO: 29.5 PG (ref 26.6–33)
MCHC RBC AUTO-ENTMCNC: 32.6 G/DL (ref 31.5–35.7)
MCV RBC AUTO: 90.6 FL (ref 79–97)
PLATELET # BLD AUTO: 183 10*3/MM3 (ref 140–450)
PMV BLD AUTO: 10 FL (ref 6–12)
RBC # BLD AUTO: 4.57 10*6/MM3 (ref 4.14–5.8)
WBC # BLD AUTO: 17.61 10*3/MM3 (ref 3.4–10.8)

## 2021-07-24 PROCEDURE — 25010000003 CEFAZOLIN IN DEXTROSE 2-4 GM/100ML-% SOLUTION: Performed by: ORTHOPAEDIC SURGERY

## 2021-07-24 PROCEDURE — A9270 NON-COVERED ITEM OR SERVICE: HCPCS | Performed by: INTERNAL MEDICINE

## 2021-07-24 PROCEDURE — 97165 OT EVAL LOW COMPLEX 30 MIN: CPT

## 2021-07-24 PROCEDURE — 63710000001 PANTOPRAZOLE 40 MG TABLET DELAYED-RELEASE: Performed by: INTERNAL MEDICINE

## 2021-07-24 PROCEDURE — 63710000001 ACETAMINOPHEN 500 MG TABLET: Performed by: ORTHOPAEDIC SURGERY

## 2021-07-24 PROCEDURE — A9270 NON-COVERED ITEM OR SERVICE: HCPCS | Performed by: ORTHOPAEDIC SURGERY

## 2021-07-24 PROCEDURE — 94799 UNLISTED PULMONARY SVC/PX: CPT

## 2021-07-24 PROCEDURE — 97116 GAIT TRAINING THERAPY: CPT

## 2021-07-24 PROCEDURE — 63710000001 APIXABAN 5 MG TABLET: Performed by: ORTHOPAEDIC SURGERY

## 2021-07-24 PROCEDURE — 97535 SELF CARE MNGMENT TRAINING: CPT

## 2021-07-24 PROCEDURE — 97530 THERAPEUTIC ACTIVITIES: CPT

## 2021-07-24 PROCEDURE — 85027 COMPLETE CBC AUTOMATED: CPT | Performed by: ORTHOPAEDIC SURGERY

## 2021-07-24 PROCEDURE — 63710000001 METOPROLOL TARTRATE 25 MG TABLET: Performed by: INTERNAL MEDICINE

## 2021-07-24 RX ORDER — HYDROCODONE BITARTRATE AND ACETAMINOPHEN 7.5; 325 MG/1; MG/1
1 TABLET ORAL EVERY 4 HOURS PRN
Qty: 45 TABLET | Refills: 0 | Status: SHIPPED | OUTPATIENT
Start: 2021-07-24 | End: 2021-07-28 | Stop reason: DRUGHIGH

## 2021-07-24 RX ADMIN — PANTOPRAZOLE SODIUM 40 MG: 40 TABLET, DELAYED RELEASE ORAL at 06:03

## 2021-07-24 RX ADMIN — CEFAZOLIN SODIUM 2 G: 2 INJECTION, SOLUTION INTRAVENOUS at 00:03

## 2021-07-24 RX ADMIN — APIXABAN 5 MG: 5 TABLET, FILM COATED ORAL at 09:51

## 2021-07-24 RX ADMIN — METOPROLOL TARTRATE 25 MG: 25 TABLET, FILM COATED ORAL at 09:52

## 2021-07-24 RX ADMIN — SODIUM CHLORIDE, PRESERVATIVE FREE 3 ML: 5 INJECTION INTRAVENOUS at 09:52

## 2021-07-24 NOTE — THERAPY TREATMENT NOTE
"Acute Care - Physical Therapy Treatment Note  [facility]     Patient Name: Javi Martínez  : 1940  MRN: [id number]  Today's Date: 2021   Onset of Illness/Injury or Date of Surgery: 21  Visit Dx:     ICD-10-CM ICD-9-CM   1. Difficulty walking  R26.2 719.7   2. Primary osteoarthritis of left knee  M17.12 715.16   3. Decreased activities of daily living (ADL)  Z78.9 V49.89     Patient Active Problem List   Diagnosis   • Arthritis of knee, left   • Primary osteoarthritis of left knee   • S/P TKR (total knee replacement)     Past Medical History:   Diagnosis Date   • Arthritis     left knee    • Atrial fibrillation (CMS/HCC)    • Cancer (CMS/HCC) 2010    prostate - with seeds implant    • GERD (gastroesophageal reflux disease)    • Heart disease    • Prostate disorder      Past Surgical History:   Procedure Laterality Date   • APPENDECTOMY     • CARDIAC ABLATION      x2  \"years ago\"    • CARDIAC SURGERY  2006    cabg 3v   • ENDOSCOPY      with dilation    • KNEE ARTHROSCOPY Left    • VENTRICULAR CARDIAC PACEMAKER INSERTION      [place]         PT Assessment (last 12 hours)      PT Evaluation and Treatment     Row Name 21 1459 21 0946       Physical Therapy Time and Intention    Subjective Information  no complaints  -MP  no complaints  -MP    Document Type  therapy note (daily note)  -MP  therapy note (daily note)  -MP    Mode of Treatment  individual therapy  -MP  individual therapy  -MP    Total Minutes, Physical Therapy  25  -MP  24  -MP    Patient Effort  excellent  -MP  excellent  -MP    Symptoms Noted During/After Treatment  none  -MP  dizziness  -MP    Row Name 21 1459          Bed Mobility    All Activities, [place] (Bed Mobility)  independent  -MP     Assistive Device (Bed Mobility)  head of bed elevated;bed rails  -MP     Row Name 21 1459 21 0946       Transfers    Transfers  sit-stand transfer;stand-sit transfer  -MP  sit-stand transfer;stand-sit transfer  " -MP    Maintains Weight-bearing Status (Transfers)  able to maintain  -MP  verbal cues to maintain  -MP    Sit-Stand [place] (Transfers)  independent  -MP  modified independence  -MP    Stand-Sit [place] (Transfers)  independent  -MP  independent  -MP    Row Name 07/24/21 1459 07/24/21 0946       Gait/Stairs (Locomotion)    Gait/Stairs Locomotion  gait/ambulation independence  -MP  --    [place] Level (Gait)  independent  -MP  modified independence  -MP    Assistive Device (Gait)  walker, front-wheeled  -MP  walker, front-wheeled  -MP    Distance in Feet (Gait)  310'  -MP  200'+  -MP    Pattern (Gait)  step-through  -MP  step-through  -MP    Row Name 07/24/21 1459 07/24/21 0946       Safety Issues, Functional Mobility    Safety Issues Affecting Function (Mobility)  --  impulsivity  -MP    Impairments Affecting Function (Mobility)  pain  -MP  --    Row Name 07/24/21 1459 07/24/21 0946       Balance    Dynamic Sitting Balance  WNL  -MP  WNL  -MP    Dynamic Standing Balance  WFL  -MP  WFL  -MP    Row Name             Wound 07/23/21 0852 Left anterior knee Incision    Wound - Properties Group Placement Date: 07/23/21  -MB Placement Time: 0852  -MB Present on Hospital Admission: N  -MB Side: Left  -MB Orientation: anterior  -MB Location: knee  -MB Primary Wound Type: Incision  -MB    Retired Wound - Properties Group Date first assessed: 07/23/21  -MB Time first assessed: 0852  -MB Present on Hospital Admission: N  -MB Side: Left  -MB Location: knee  -MB Primary Wound Type: Incision  -MB    Row Name 07/24/21 1459 07/24/21 0946       Therapy Assessment/Plan (PT)    Rehab Potential (PT)  good, to achieve stated therapy goals  -MP  good, to achieve stated therapy goals  -MP    Criteria for Skilled Interventions Met (PT)  skilled treatment is necessary  -MP  skilled treatment is necessary  -MP    Problem List (PT)  pain;range of motion (ROM)  -MP  --    Row Name 07/24/21 1459 07/24/21 0946       Progress  Summary (PT)    Progress Toward Functional Goals (PT)  progress toward functional goals as expected  -MP  progress toward functional goals as expected  -MP    Row Name 07/24/21 1459 07/24/21 0946       Therapy Plan Review/Discharge Plan (PT)    Therapy Plan Review (PT)  spouse/significant other  -MP  evaluation/treatment results reviewed  -MP      User Key  (r) = Recorded By, (t) = Taken By, (c) = Cosigned By    Initials Name Provider Type    MB Padma You, RN Registered Nurse    Neal Soto PTA Physical Therapy Assistant        Physical Therapy Education                 Title: PT OT SLP Therapies (In Progress)     Topic: Physical Therapy (Done)     Point: Mobility training (Done)     Learning Progress Summary           Patient Acceptance, E,TB, VU by  at 7/23/2021 1114                   Point: Home exercise program (Done)     Learning Progress Summary           Patient Acceptance, E,TB, VU by  at 7/23/2021 1114                   Point: Body mechanics (Done)     Learning Progress Summary           Patient Acceptance, E,TB, VU by  at 7/23/2021 1114                   Point: Precautions (Done)     Learning Progress Summary           Patient Acceptance, E,TB, VU by  at 7/23/2021 1114                               User Key     Initials Effective Dates Name Provider Type Discipline     04/25/21 -  Coco Swanson, OMAR Physical Therapist PT              PT Recommendation and Plan  Anticipated Discharge Disposition (PT): home with assist  Planned Therapy Interventions (PT): balance training, gait training, strengthening  Therapy Frequency (PT): 2 times/day  Progress Summary (PT)  Progress Toward Functional Goals (PT): progress toward functional goals as expected  Outcome Measures     Row Name 07/24/21 1500 07/24/21 0945 07/23/21 1100       How much help from another person do you currently need...    Turning from your back to your side while in flat bed without using bedrails?  4  -MP  4  -MP  3  -CS     Moving from lying on back to sitting on the side of a flat bed without bedrails?  4  -MP  4  -MP  3  -CS    Moving to and from a bed to a chair (including a wheelchair)?  4  -MP  4  -MP  3  -CS    Standing up from a chair using your arms (e.g., wheelchair, bedside chair)?  4  -MP  4  -MP  3  -CS    Climbing 3-5 steps with a railing?  3  -MP  3  -MP  2  -CS    To walk in hospital room?  3  -MP  3  -MP  2  -CS    AM-PAC 6 Clicks Score (PT)  22  -MP  22  -MP  16  -CS       Functional Assessment    Outcome Measure Options  AM-PAC 6 Clicks Basic Mobility (PT)  -MP  --  AM-PAC 6 Clicks Basic Mobility (PT)  -CS      User Key  (r) = Recorded By, (t) = Taken By, (c) = Cosigned By    Initials Name Provider Type    Neal Soto PTA Physical Therapy Assistant    CS Coco Swanson, OMAR Physical Therapist           Time Calculation:   PT Charges     Row Name 07/24/21 1502 07/24/21 0953          Time Calculation    PT Received On  07/24/21  -MP  07/24/21  -MP     PT Goal Re-Cert Due Date  08/01/21  -MP  08/01/21  -MP        Timed Charges    08341 - Gait Training Minutes   25  -MP  24  -MP        Total Minutes    Timed Charges Total Minutes  25  -MP  24  -MP      Total Minutes  25  -MP  24  -MP       User Key  (r) = Recorded By, (t) = Taken By, (c) = Cosigned By    Initials Name Provider Type    Neal Soto PTA Physical Therapy Assistant        Therapy Charges for Today     Code Description Service Date Service Provider Modifiers Qty    47692375968 HC GAIT TRAINING EA 15 MIN 7/24/2021 Neal Jones PTA GP 2    91836691933 HC GAIT TRAINING EA 15 MIN 7/24/2021 Neal Jones PTA GP 2          PT G-Codes  Outcome Measure Options: AM-PAC 6 Clicks Basic Mobility (PT)  AM-PAC 6 Clicks Score (PT): 22  AM-PAC 6 Clicks Score (OT): 21    Neal Jones PTA  7/24/2021

## 2021-07-24 NOTE — PROGRESS NOTES
"    Orthopedic Total Joint Progress Note        Patient: Javi Martínez    Date of Admission: 7/23/2021  6:34 AM    YOB: 1940    Medical Record Number: [id number]    Attending Physician: Sarai Garcia MD      POD # 1 Day Post-Op Procedure(s) (LRB):  TOTAL KNEE ARTHROPLASTY WITH DORA NAVIGATION WITH BIOMET (Left)       Systemic or Specific Complaints: The patient has had a relatively normal postoperative course.  The patient has had no current complaints. The patient has had improving normal postoperative pain.  The patient has had no issues with the wound..      Allergies: No Known Allergies    Medications:   Current Medications:  Scheduled Meds:acetaminophen, 1,000 mg, Oral, Q6H  apixaban, 5 mg, Oral, Q12H  atorvastatin, 40 mg, Oral, Nightly  lisinopril, 2.5 mg, Oral, Q24H  metoprolol tartrate, 25 mg, Oral, Q12H  pantoprazole, 40 mg, Oral, Q AM  sodium chloride, 3 mL, Intravenous, Q12H  tamsulosin, 0.4 mg, Oral, Nightly      Continuous Infusions:lactated ringers, 80 mL/hr, Last Rate: 80 mL/hr (07/23/21 2209)      PRN Meds:.bisacodyl  •  bisacodyl  •  HYDROcodone-acetaminophen  •  HYDROcodone-acetaminophen  •  ketorolac  •  magnesium hydroxide  •  Morphine **AND** naloxone  •  ondansetron **OR** ondansetron  •  senna-docusate sodium  •  sodium chloride      Physical Exam: 81 y.o. male   Wt Readings from Last 3 Encounters:   07/23/21 97 kg (213 lb 13.5 oz)   07/20/21 97.5 kg (214 lb 15.2 oz)   07/14/21 97.5 kg (215 lb)     Ht Readings from Last 3 Encounters:   07/23/21 193 cm (76\")   07/20/21 193 cm (76\")   07/14/21 193 cm (76\")     Body mass index is 26.03 kg/m².    Vitals:    07/23/21 2349 07/24/21 0047 07/24/21 0300 07/24/21 0408   BP: 109/65  122/68    BP Location:   Right arm    Patient Position:   Lying    Pulse: 70  70    Resp:   18    Temp:   97.5 °F (36.4 °C)    TempSrc:   Oral    SpO2: 99% 96% 97% 95%   Weight:       Height:            General Appearance:    General: alert and oriented "         Abdomen/:     soft non-tender, non-distended, voiding without difficulty       Extremities:   Operative extremity neurovascular status intact. ROM appropriate.  Incision intact w/out signs or symptoms of infection.  No cyanosis, calf is soft and nontender.     Activity: Mobilizing Per P.T.   Weight Bearing: As Tolerated    Diagnostic Tests:   Admission on 07/23/2021   Component Date Value Ref Range Status   • QT Interval 07/23/2021 497  ms Final   • WBC 07/24/2021 17.61* 3.40 - 10.80 10*3/mm3 Final   • RBC 07/24/2021 4.57  4.14 - 5.80 10*6/mm3 Final   • Hemoglobin 07/24/2021 13.5  13.0 - 17.7 g/dL Final   • Hematocrit 07/24/2021 41.4  37.5 - 51.0 % Final   • MCV 07/24/2021 90.6  79.0 - 97.0 fL Final   • MCH 07/24/2021 29.5  26.6 - 33.0 pg Final   • MCHC 07/24/2021 32.6  31.5 - 35.7 g/dL Final   • RDW 07/24/2021 13.9  12.3 - 15.4 % Final   • RDW-SD 07/24/2021 45.0  37.0 - 54.0 fl Final   • MPV 07/24/2021 10.0  6.0 - 12.0 fL Final   • Platelets 07/24/2021 183  140 - 450 10*3/mm3 Final       Imaging Results (Last 72 Hours)     Procedure Component Value Units Date/Time    XR Knee 1 or 2 View Left [[id number]] Collected: 07/23/21 1023     Updated: 07/23/21 1027    Narrative:      PROCEDURE: XR KNEE 1 OR 2 VW LEFT     COMPARISON: None     INDICATIONS: POST-OP LEFT KNEE     FINDINGS:   No acute fracture or dislocation is identified.  Postsurgical changes are seen from a total left   knee   arthroplasty.  The hardware appears intact.  No evidence of surrounding lucency.  A joint effusion   is present with anterior soft tissue swelling.  Skin staples are present.     CONCLUSION:   No acute osseous abnormality.  Postsurgical changes are seen from a total left knee arthroplasty   with no evidence of acute hardware failure.               SAYRA WEAVER MD         Electronically Signed and Approved By: SAYRA WEAVER MD on 7/23/2021 at 10:23                           Personally viewed ortho images and report      Assessment:  Doing well 1 Day Post-Op following total joint replacement  Acute Blood Loss Anemia, stable  Post-operative Pain  Limited mobility, requires use of walker and assistance when OOB.    Patient Active Problem List   Diagnosis   • Arthritis of knee, left   • Primary osteoarthritis of left knee   • S/P TKR (total knee replacement)        Plan:    Consults: none, Agree with consultant diagnosis and plan of care.  Continue to monitor labs and/or v/s, for tolerance to post op blood loss.  Continue efforts to increase mobilization.  Continue Pain Control Measures.  Continue incisional Care.  DVT prophylaxis.  Follow up in office with Víctor Becker M.D. In 2 weeks.    Discharge Plan:today to home and when cleared by physical therapy as safe for discharge    Date: 7/24/2021  Carlitos Zhao MD

## 2021-07-24 NOTE — DISCHARGE SUMMARY
[facility]   DISCHARGE SUMMARY    Patient Name: Javi Martínez  : 1940  MRN: [id number]    Date of Admission: 2021  Date of Discharge: 2021  Primary Care Physician: Marta Garcia MD    Consults     Date and Time Order Name Status Description    2021 11:04 AM Inpatient Nephrology Consult      2021 11:00 AM Inpatient Hospitalist Consult            Hospital Course     Presenting Problem:   Primary osteoarthritis of left knee [M17.12]  Arthritis of knee, left [M17.12]    Active and resolved problems  Principal Problem:    Primary osteoarthritis of left knee  Overview:  Active Problems:    Arthritis of knee, left  Overview:    S/P TKR (total knee replacement)  Overview:  Resolved Problems:    * No resolved hospital problems. *       Hospital Course:  Javi Martínez is a 81 y.o. male who has a history of severe arthritis and patient was electively admitted for left knee total replacement.  Postoperatively patient has done really well and he hardly have complaint of pain this morning.  Patient is hemodynamically stable and he is up walking already and he is set up for physical therapy on Monday and patient is interested to go home so he will be discharged home      Vital Signs:  Temp:  [97.4 °F (36.3 °C)-98.5 °F (36.9 °C)] 97.5 °F (36.4 °C)  Heart Rate:  [69-86] 70  Resp:  [14-20] 18  BP: (109-163)/(51-89) 122/68  Flow (L/min):  [2-4] 2      Discharge Details        Discharge Medications      New Medications      Instructions Start Date   HYDROcodone-acetaminophen 7.5-325 MG per tablet  Commonly known as: Norco   1 tablet, Oral, Every 4 Hours PRN         Continue These Medications      Instructions Start Date   atorvastatin 40 MG tablet  Commonly known as: LIPITOR   40 mg, Oral, Nightly      Eliquis 5 MG tablet tablet  Generic drug: apixaban   5 mg, Oral, 2 Times Daily, To stop 2 days prior to surgery as directed by Dr. Whelan      lisinopril 2.5 MG tablet  Commonly known as:  PRINIVIL,ZESTRIL   2.5 mg, Oral, Daily      metoprolol tartrate 25 MG tablet  Commonly known as: LOPRESSOR   25 mg, Oral, 2 Times Daily      omeprazole 20 MG capsule  Commonly known as: priLOSEC   20 mg, Oral, Daily      tamsulosin 0.4 MG capsule 24 hr capsule  Commonly known as: FLOMAX   2 capsules, Oral, Nightly             No Known Allergies      Discharge Disposition:  Home or Self Care    Diet:  Diet Instructions     Diet: Regular      Discharge Diet: Regular            Discharge Activity:   Activity Instructions     Activity as Tolerated              CODE STATUS:    Code Status and Medical Interventions:   Ordered at: 07/23/21 1334     Level Of Support Discussed With:    Patient     Code Status:    CPR     Medical Interventions (Level of Support Prior to Arrest):    Full         Future Appointments   Date Time Provider Department Center   7/26/2021  8:00 AM Tootie Orellana PT [facility] PT [facility]   8/6/2021  3:15 PM Misty Maradiaga PA [facility] ORS [place]   8/12/2021 11:30 AM [facility] CARD [place] DEVICE CHECK [facility] CD [place] [facility]   8/12/2021 11:45 AM Ge Whelan MD [facility] [place] [facility]   10/20/2021 11:00 AM Ge Whelan MD [facility] [place] [facility]           Time spent on Discharge including face to face service: Thirty minutes    Electronically signed by Sarai Garcia MD, 07/24/21, 9:10 AM EDT.

## 2021-07-24 NOTE — THERAPY EVALUATION
"Patient Name: Javi Martínez  : 1940    MRN: [id number]                              Today's Date: 2021       Admit Date: 2021    Visit Dx:     ICD-10-CM ICD-9-CM   1. Difficulty walking  R26.2 719.7   2. Primary osteoarthritis of left knee  M17.12 715.16   3. Decreased activities of daily living (ADL)  Z78.9 V49.89     Patient Active Problem List   Diagnosis   • Arthritis of knee, left   • Primary osteoarthritis of left knee   • S/P TKR (total knee replacement)     Past Medical History:   Diagnosis Date   • Arthritis     left knee    • Atrial fibrillation (CMS/HCC)    • Cancer (CMS/HCC)     prostate - with seeds implant    • GERD (gastroesophageal reflux disease)    • Heart disease    • Prostate disorder      Past Surgical History:   Procedure Laterality Date   • APPENDECTOMY     • CARDIAC ABLATION      x2  \"years ago\"    • CARDIAC SURGERY      cabg 3v   • ENDOSCOPY      with dilation    • KNEE ARTHROSCOPY Left    • VENTRICULAR CARDIAC PACEMAKER INSERTION      medtronic      General Information     Row Name 21 0947 21 0930       OT Time and Intention    Document Type  therapy note (daily note)  -LF  evaluation  -LF    Mode of Treatment  individual therapy;occupational therapy  -LF  individual therapy;occupational therapy  -LF    Row Name 21 0930          General Information    Patient Profile Reviewed  yes  -LF     Prior Level of Function  -- Independent with ADLs, ambulated without a device, walk-in shower with chair, elevated commode, stands to groom, drives, and no home oxygen in place.  -LF     Existing Precautions/Restrictions  no known precautions/restrictions  -LF     Barriers to Rehab  none identified  -     Row Name 21 0930          Occupational Profile    Reason for Services/Referral (Occupational Profile)  Patient is currently status post left total knee replacement on 2021.  Occupational therapy consulted due to recent decline in " ADLs/functional transfers. No previous occupational therapy services for current condition.  -     Row Name 07/24/21 0930          Living Environment    Lives With  spouse  -     Row Name 07/24/21 0930          Home Main Entrance    Number of Stairs, Main Entrance  four  -     Row Name 07/24/21 0930          Cognition    Orientation Status (Cognition)  oriented x 3  -     Row Name 07/24/21 0930          Safety Issues, Functional Mobility    Impairments Affecting Function (Mobility)  balance  -LF       User Key  (r) = Recorded By, (t) = Taken By, (c) = Cosigned By    Initials Name Provider Type     Dang Muñoz OT Occupational Therapist          Mobility/ADL's     Row Name 07/24/21 09 07/24/21 0932       Transfers    Transfers  sit-stand transfer  -LF  sit-stand transfer  -LF    Sit-Stand [place] (Transfers)  contact guard  -LF  contact guard  -    Row Name 07/24/21 09 07/24/21 0932       Sit-Stand Transfer    Assistive Device (Sit-Stand Transfers)  walker, front-wheeled  -LF  walker, front-wheeled  -    Row Name 07/24/21 09 07/24/21 0932       Functional Mobility    Functional Mobility- Ind. Level  contact guard assist  -LF  contact guard assist  -LF    Functional Mobility- Device  rolling walker  -LF  rolling walker  -LF    Functional Mobility- Safety Issues  balance decreased during turns;loses balance backward  -LF  balance decreased during turns;loses balance backward  -LF    Functional Mobility- Comment  Patient ambulated to/from the bathroom with contact-guard assist using rolling walker.  -LF  Patient ambulated to/from the bathroom with contact-guard assist using rolling walker.  -    Row Name 07/24/21 0947 07/24/21 0932       Activities of Daily Living    BADL Assessment/Intervention  upper body dressing;lower body dressing;toileting  -LF  bathing;upper body dressing;lower body dressing;grooming;toileting;feeding  -    Row Name 07/24/21 0947 07/24/21 0932       Mobility     Extremity Weight-bearing Status  left lower extremity  -LF  left lower extremity  -LF    Left Lower Extremity (Weight-bearing Status)  weight-bearing as tolerated (WBAT)  -LF  weight-bearing as tolerated (WBAT)  -LF    Row Name 07/24/21 09 07/24/21 0932       Bathing Assessment/Intervention    [place] Level (Bathing)  --  bathing skills;upper body;set up;lower body;contact guard assist;minimum assist (75% patient effort)  -LF    Position (Bathing)  supported standing;unsupported sitting  -LF  --    Comment (Bathing)  Patient declined bathing.  -LF  --    Row Name 07/24/21 09 07/24/21 0932       Upper Body Dressing Assessment/Training    [place] Level (Upper Body Dressing)  upper body dressing skills;doff;don;pull-over garment;set up  -LF  upper body dressing skills;set up  -LF    Position (Upper Body Dressing)  unsupported sitting  -LF  --    Row Name 07/24/21 09 07/24/21 0932       Lower Body Dressing Assessment/Training    [place] Level (Lower Body Dressing)  lower body dressing skills;doff;don;pants/bottoms;contact guard assist;minimum assist (75% patient effort)  -LF  lower body dressing skills;contact guard assist;minimum assist (75% patient effort)  -LF    Position (Lower Body Dressing)  unsupported sitting;supported standing  -LF  --    Comment (Lower Body Dressing)  Using lower body adaptive dressing technique.  -LF  --    Row Name 07/24/21 0932          Grooming Assessment/Training    [place] Level (Grooming)  grooming skills;set up  -     Row Name 07/24/21 09 07/24/21 0932       Toileting Assessment/Training    [place] Level (Toileting)  toileting skills;standby assist  -LF  toileting skills;standby assist  -LF    Assistive Devices (Toileting)  other (see comments) Rolling walker and grab bars  -LF  --    Position (Toileting)  supported standing  -LF  --    Comment (Toileting)  Patient stood at the commode to void while holding grab bar to maintain balance with standby  assist.  -LF  --    Row Name 07/24/21 0932          Self-Feeding Assessment/Training    [place] Level (Feeding)  feeding skills;set up  -       User Key  (r) = Recorded By, (t) = Taken By, (c) = Cosigned By    Initials Name Provider Type     Dang Muñoz OT Occupational Therapist        Obj/Interventions     Row Name 07/24/21 0933          Sensory Assessment (Somatosensory)    Sensory Assessment (Somatosensory)  UE sensation intact  -     Row Name 07/24/21 0933          Vision Assessment/Intervention    Visual Impairment/Limitations  WFL;corrective lenses full-time  -     Row Name 07/24/21 0933          Range of Motion Comprehensive    General Range of Motion  no range of motion deficits identified  -     Row Name 07/24/21 0933          Strength Comprehensive (MMT)    General Manual Muscle Testing (MMT) Assessment  no strength deficits identified  -     Comment, General Manual Muscle Testing (MMT) Assessment  5/5 bilateral upper extremities  -     Row Name 07/24/21 0933          Motor Skills    Motor Skills  coordination;functional endurance  -     Coordination  WFL Right hand dominant.  -     Functional Endurance  Good for ADLs  -     Row Name 07/24/21 0949 07/24/21 0933       Balance    Balance Assessment  sitting dynamic balance;standing dynamic balance  -LF  sitting dynamic balance;standing dynamic balance  -LF    Dynamic Sitting Balance  WFL  -LF  WFL  -LF    Dynamic Standing Balance  mild impairment;supported;standing  -LF  mild impairment;supported  -LF    Balance Interventions  sitting;standing;supported;dynamic;occupation based/functional task  -LF  --    Comment, Balance  Patient had one loss of balance backwards when performing sit to stand from recliner, requiring mod assist to correct.  -LF  Patient had one loss of balance backwards when performing sit to stand from recliner, requiring mod assist to correct.  -LF      User Key  (r) = Recorded By, (t) = Taken By, (c) =  Cosigned By    Initials Name Provider Type     Dang Muñoz, OT Occupational Therapist        Goals/Plan     Row Name 07/24/21 0945          Transfer Goal 1 (OT)    Activity/Assistive Device (Transfer Goal 1, OT)  transfers, all  -LF     [place] Level/Cues Needed (Transfer Goal 1, OT)  modified independence  -LF     Time Frame (Transfer Goal 1, OT)  long term goal (LTG);10 days  -LF     Row Name 07/24/21 0945          Bathing Goal 1 (OT)    Activity/Device (Bathing Goal 1, OT)  bathing skills, all  -LF     [place] Level/Cues Needed (Bathing Goal 1, OT)  modified independence  -LF     Time Frame (Bathing Goal 1, OT)  long term goal (LTG);10 days  -LF     Row Name 07/24/21 0945          Dressing Goal 1 (OT)    Activity/Device (Dressing Goal 1, OT)  dressing skills, all  -LF     [place]/Cues Needed (Dressing Goal 1, OT)  modified independence  -LF     Time Frame (Dressing Goal 1, OT)  long term goal (LTG);10 days  -LF     Row Name 07/24/21 0945          Toileting Goal 1 (OT)    Activity/Device (Toileting Goal 1, OT)  toileting skills, all  -LF     [place] Level/Cues Needed (Toileting Goal 1, OT)  modified independence  -LF     Time Frame (Toileting Goal 1, OT)  long term goal (LTG);10 days  -LF     Row Name 07/24/21 0945          Therapy Assessment/Plan (OT)    Planned Therapy Interventions (OT)  activity tolerance training;patient/caregiver education/training;BADL retraining;functional balance retraining;transfer/mobility retraining;occupation/activity based interventions  -       User Key  (r) = Recorded By, (t) = Taken By, (c) = Cosigned By    Initials Name Provider Type     Dang Muñoz, OT Occupational Therapist        Clinical Impression     Row Name 07/24/21 0934          Pain Assessment    Additional Documentation  Pain Scale: FACES Pre/Post-Treatment (Group)  -LF     Row Name 07/24/21 0934          Pain Scale: FACES Pre/Post-Treatment    Pain: FACES Scale, Pretreatment  0-->no  hurt  -LF     Posttreatment Pain Rating  0-->no hurt  -LF     Row Name 07/24/21 0934          Plan of Care Review    Plan of Care Reviewed With  patient  -LF     Progress  no change  -LF     Outcome Summary  Patient presents with limitations in self-care, functional transfers, and balance. He would benefit from continued skilled occupational therapy services to maximize ADL performance and return home safely and independently.  -     Row Name 07/24/21 0934          Therapy Assessment/Plan (OT)    Patient/Family Therapy Goal Statement (OT)  To walk better.  -LF     Rehab Potential (OT)  good, to achieve stated therapy goals  -LF     Criteria for Skilled Therapeutic Interventions Met (OT)  yes;meets criteria;skilled treatment is necessary  -LF     Therapy Frequency (OT)  5 times/wk  -     Row Name 07/24/21 0934          Therapy Plan Review/Discharge Plan (OT)    Anticipated Discharge Disposition (OT)  home with assist;home with outpatient therapy services  -       User Key  (r) = Recorded By, (t) = Taken By, (c) = Cosigned By    Initials Name Provider Type     Dang Muñoz, OT Occupational Therapist        Outcome Measures     Row Name 07/24/21 0945          How much help from another is currently needed...    Putting on and taking off regular lower body clothing?  3  -LF     Bathing (including washing, rinsing, and drying)  3  -LF     Toileting (which includes using toilet bed pan or urinal)  3  -LF     Putting on and taking off regular upper body clothing  4  -LF     Taking care of personal grooming (such as brushing teeth)  4  -LF     Eating meals  4  -LF     AM-PAC 6 Clicks Score (OT)  21  -LF     Row Name 07/24/21 0945          Functional Assessment    Outcome Measure Options  AM-PAC 6 Clicks Daily Activity (OT);Optimal Instrument  -LF     Row Name 07/24/21 0945          Optimal Instrument    Optimal Instrument  Optimal - 3  -LF     Bending/Stooping  2  -LF     Standing  2  -LF     Reaching  1  -LF      From the list, choose the 3 activities you would most like to be able to do without any difficulty  Bending/stooping;Standing;Reaching  -     Total Score Optimal - 3  5  -LF       User Key  (r) = Recorded By, (t) = Taken By, (c) = Cosigned By    Initials Name Provider Type     Dang Muñoz OT Occupational Therapist          Occupational Therapy Education                 Title: PT OT SLP Therapies (In Progress)     Topic: Occupational Therapy (In Progress)     Point: ADL training (Done)     Description:   Instruct learner(s) on proper safety adaptation and remediation techniques during self care or transfers.   Instruct in proper use of assistive devices.              Learning Progress Summary           Patient Acceptance, E,TB, VU by  at 7/24/2021 0946                   Point: Home exercise program (Not Started)     Description:   Instruct learner(s) on appropriate technique for monitoring, assisting and/or progressing therapeutic exercises/activities.              Learner Progress:  Not documented in this visit.          Point: Precautions (Done)     Description:   Instruct learner(s) on prescribed precautions during self-care and functional transfers.              Learning Progress Summary           Patient Acceptance, E,TB, VU by  at 7/24/2021 0946                   Point: Body mechanics (Done)     Description:   Instruct learner(s) on proper positioning and spine alignment during self-care, functional mobility activities and/or exercises.              Learning Progress Summary           Patient Acceptance, E,TB, VU by  at 7/24/2021 0946                               User Key     Initials Effective Dates Name Provider Type Discipline     06/16/21 -  Dang Muñoz OT Occupational Therapist OT              OT Recommendation and Plan  Planned Therapy Interventions (OT): activity tolerance training, patient/caregiver education/training, BADL retraining, functional balance retraining,  transfer/mobility retraining, occupation/activity based interventions  Therapy Frequency (OT): 5 times/wk  Plan of Care Review  Plan of Care Reviewed With: patient  Progress: no change  Outcome Summary: Patient presents with limitations in self-care, functional transfers, and balance. He would benefit from continued skilled occupational therapy services to maximize ADL performance and return home safely and independently.     Time Calculation:   Time Calculation- OT     Row Name 07/24/21 0950             Time Calculation- OT    OT Received On  07/24/21  -LF      OT Goal Re-Cert Due Date  08/02/21  -LF         Timed Charges    26273 - OT Therapeutic Activity Minutes  10  -LF      06515 - OT Self Care/Mgmt Minutes  13  -LF         Untimed Charges    OT Eval/Re-eval Minutes  35  -LF         Total Minutes    Timed Charges Total Minutes  23  -LF      Untimed Charges Total Minutes  35  -LF       Total Minutes  58  -LF        User Key  (r) = Recorded By, (t) = Taken By, (c) = Cosigned By    Initials Name Provider Type    LF Dang Muñoz OT Occupational Therapist        Therapy Charges for Today     Code Description Service Date Service Provider Modifiers Qty    31092878981 HC OT THERAPEUTIC ACT EA 15 MIN 7/24/2021 Dang Muñoz OT GO 1    41126300471 HC OT SELF CARE/MGMT/TRAIN EA 15 MIN 7/24/2021 Dang Muñoz OT GO 1    02750560003 HC OT EVAL LOW COMPLEXITY 3 7/24/2021 Dang Muñoz OT GO 1               Dang Muñoz OT  7/24/2021

## 2021-07-24 NOTE — PLAN OF CARE
Goal Outcome Evaluation:      Pt discharging home today. Pt has walker at home. Outpt therapy set up at [facility], first appt 7/26 @ 8am.

## 2021-07-24 NOTE — THERAPY TREATMENT NOTE
"Acute Care - Physical Therapy Treatment Note  [facility]     Patient Name: Javi Martínez  : 1940  MRN: [id number]  Today's Date: 2021   Onset of Illness/Injury or Date of Surgery: 21  Visit Dx:     ICD-10-CM ICD-9-CM   1. Difficulty walking  R26.2 719.7   2. Primary osteoarthritis of left knee  M17.12 715.16   3. Decreased activities of daily living (ADL)  Z78.9 V49.89     Patient Active Problem List   Diagnosis   • Arthritis of knee, left   • Primary osteoarthritis of left knee   • S/P TKR (total knee replacement)     Past Medical History:   Diagnosis Date   • Arthritis     left knee    • Atrial fibrillation (CMS/HCC)    • Cancer (CMS/HCC)     prostate - with seeds implant    • GERD (gastroesophageal reflux disease)    • Heart disease    • Prostate disorder      Past Surgical History:   Procedure Laterality Date   • APPENDECTOMY     • CARDIAC ABLATION      x2  \"years ago\"    • CARDIAC SURGERY  2006    cabg 3v   • ENDOSCOPY      with dilation    • KNEE ARTHROSCOPY Left    • VENTRICULAR CARDIAC PACEMAKER INSERTION      [facility]         PT Assessment (last 12 hours)      PT Evaluation and Treatment     Row Name 21 0946          Physical Therapy Time and Intention    Subjective Information  no complaints  -MP     Document Type  therapy note (daily note)  -MP     Mode of Treatment  individual therapy  -MP     Total Minutes, Physical Therapy  24  -MP     Patient Effort  excellent  -MP     Symptoms Noted During/After Treatment  dizziness  -MP     Row Name 21 0946          Transfers    Transfers  sit-stand transfer;stand-sit transfer  -MP     Maintains Weight-bearing Status (Transfers)  verbal cues to maintain  -MP     Sit-Stand [place] (Transfers)  modified independence  -MP     Stand-Sit [place] (Transfers)  independent  -MP     Row Name 21 0946          Gait/Stairs (Locomotion)    [place] Level (Gait)  modified independence  -MP     Assistive Device (Gait)  " walker, front-wheeled  -MP     Distance in Feet (Gait)  200'+  -MP     Pattern (Gait)  step-through  -MP     Row Name 07/24/21 0946          Safety Issues, Functional Mobility    Safety Issues Affecting Function (Mobility)  impulsivity  -MP     Row Name 07/24/21 0946          Balance    Dynamic Sitting Balance  WNL  -MP     Dynamic Standing Balance  WFL  -MP     Row Name             Wound 07/23/21 0852 Left anterior knee Incision    Wound - Properties Group Placement Date: 07/23/21  -MB Placement Time: 0852  -MB Present on Hospital Admission: N  -MB Side: Left  -MB Orientation: anterior  -MB Location: knee  -MB Primary Wound Type: Incision  -MB    Retired Wound - Properties Group Date first assessed: 07/23/21  -MB Time first assessed: 0852  -MB Present on Hospital Admission: N  -MB Side: Left  -MB Location: knee  -MB Primary Wound Type: Incision  -MB    Row Name 07/24/21 0946          Therapy Assessment/Plan (PT)    Rehab Potential (PT)  good, to achieve stated therapy goals  -MP     Criteria for Skilled Interventions Met (PT)  skilled treatment is necessary  -MP     Row Name 07/24/21 0946          Progress Summary (PT)    Progress Toward Functional Goals (PT)  progress toward functional goals as expected  -MP     Row Name 07/24/21 0946          Therapy Plan Review/Discharge Plan (PT)    Therapy Plan Review (PT)  evaluation/treatment results reviewed  -MP       User Key  (r) = Recorded By, (t) = Taken By, (c) = Cosigned By    Initials Name Provider Type    Padma Bauer, RN Registered Nurse    Neal Soto PTA Physical Therapy Assistant        Physical Therapy Education                 Title: PT OT SLP Therapies (In Progress)     Topic: Physical Therapy (Done)     Point: Mobility training (Done)     Learning Progress Summary           Patient Acceptance, E,TB, VU by  at 7/23/2021 1114                   Point: Home exercise program (Done)     Learning Progress Summary           Patient Acceptance,  E,TB, VU by  at 7/23/2021 1114                   Point: Body mechanics (Done)     Learning Progress Summary           Patient Acceptance, E,TB, VU by  at 7/23/2021 1114                   Point: Precautions (Done)     Learning Progress Summary           Patient Acceptance, E,TB, VU by  at 7/23/2021 1114                               User Key     Initials Effective Dates Name Provider Type Discipline     04/25/21 -  Coco Swanson, OMAR Physical Therapist PT              PT Recommendation and Plan  Anticipated Discharge Disposition (PT): home with assist  Planned Therapy Interventions (PT): balance training, gait training, strengthening  Therapy Frequency (PT): 2 times/day  Progress Summary (PT)  Progress Toward Functional Goals (PT): progress toward functional goals as expected  Outcome Measures     Row Name 07/24/21 0945 07/23/21 1100          How much help from another person do you currently need...    Turning from your back to your side while in flat bed without using bedrails?  4  -MP  3  -CS     Moving from lying on back to sitting on the side of a flat bed without bedrails?  4  -MP  3  -CS     Moving to and from a bed to a chair (including a wheelchair)?  4  -MP  3  -CS     Standing up from a chair using your arms (e.g., wheelchair, bedside chair)?  4  -MP  3  -CS     Climbing 3-5 steps with a railing?  3  -MP  2  -CS     To walk in hospital room?  3  -MP  2  -CS     AM-PAC 6 Clicks Score (PT)  22  -MP  16  -CS        Functional Assessment    Outcome Measure Options  --  AM-PAC 6 Clicks Basic Mobility (PT)  -CS       User Key  (r) = Recorded By, (t) = Taken By, (c) = Cosigned By    Initials Name Provider Type    Neal Soto PTA Physical Therapy Assistant    CS Coco Swanson, PT Physical Therapist           Time Calculation:   PT Charges     Row Name 07/24/21 0953             Time Calculation    PT Received On  07/24/21  -MP      PT Goal Re-Cert Due Date  08/01/21  -MP         Timed Charges     06323 - Gait Training Minutes   24  -MP         Total Minutes    Timed Charges Total Minutes  24  -MP       Total Minutes  24  -MP        User Key  (r) = Recorded By, (t) = Taken By, (c) = Cosigned By    Initials Name Provider Type    Neal Soto PTA Physical Therapy Assistant        Therapy Charges for Today     Code Description Service Date Service Provider Modifiers Qty    20696001760 HC GAIT TRAINING EA 15 MIN 7/24/2021 Neal Jones PTA GP 2          PT G-Codes  Outcome Measure Options: AM-PAC 6 Clicks Daily Activity (OT), Optimal Instrument  AM-PAC 6 Clicks Score (PT): 22  AM-PAC 6 Clicks Score (OT): 21    Neal Jones PTA  7/24/2021

## 2021-07-24 NOTE — NURSING NOTE
MD notified that pt took metoprolol prior to his surgery and received another dose of metoprolol at 1500. Pt is schedule to receive another dose of metoprolol at 2100. MD Order not to give the 2100 metoprolol. Pt aware

## 2021-07-24 NOTE — PLAN OF CARE
Goal Outcome Evaluation:     Pt denies any pain throughout the shift. Dressing to the Lt knee dry and intact

## 2021-07-24 NOTE — PLAN OF CARE
Goal Outcome Evaluation:  Plan of Care Reviewed With: patient        Progress: no change  Outcome Summary: Patient presents with limitations in self-care, functional transfers, and balance. He would benefit from continued skilled occupational therapy services to maximize ADL performance and return home safely and independently.

## 2021-07-26 ENCOUNTER — TELEPHONE (OUTPATIENT)
Dept: ORTHOPEDIC SURGERY | Facility: CLINIC | Age: 81
End: 2021-07-26

## 2021-07-26 ENCOUNTER — HOSPITAL ENCOUNTER (EMERGENCY)
Facility: HOSPITAL | Age: 81
Discharge: HOME | End: 2021-07-26
Attending: EMERGENCY MEDICINE | Admitting: EMERGENCY MEDICINE

## 2021-07-26 VITALS
HEIGHT: 76 IN | OXYGEN SATURATION: 94 % | RESPIRATION RATE: 18 BRPM | HEART RATE: 68 BPM | SYSTOLIC BLOOD PRESSURE: 139 MMHG | TEMPERATURE: 97.8 F | BODY MASS INDEX: 26.03 KG/M2 | DIASTOLIC BLOOD PRESSURE: 74 MMHG

## 2021-07-26 DIAGNOSIS — Z47.1 AFTERCARE FOLLOWING LEFT KNEE JOINT REPLACEMENT SURGERY: Primary | ICD-10-CM

## 2021-07-26 DIAGNOSIS — G89.18 POST-OP PAIN: Primary | ICD-10-CM

## 2021-07-26 DIAGNOSIS — Z96.652 AFTERCARE FOLLOWING LEFT KNEE JOINT REPLACEMENT SURGERY: Primary | ICD-10-CM

## 2021-07-26 PROCEDURE — 25010000002 KETOROLAC TROMETHAMINE PER 15 MG: Performed by: NURSE PRACTITIONER

## 2021-07-26 PROCEDURE — 99283 EMERGENCY DEPT VISIT LOW MDM: CPT

## 2021-07-26 PROCEDURE — 96372 THER/PROPH/DIAG INJ SC/IM: CPT

## 2021-07-26 RX ORDER — KETOROLAC TROMETHAMINE 30 MG/ML
30 INJECTION, SOLUTION INTRAMUSCULAR; INTRAVENOUS EVERY 6 HOURS PRN
Status: DISCONTINUED | OUTPATIENT
Start: 2021-07-26 | End: 2021-07-26 | Stop reason: HOSPADM

## 2021-07-26 RX ADMIN — KETOROLAC TROMETHAMINE 30 MG: 30 INJECTION, SOLUTION INTRAMUSCULAR; INTRAVENOUS at 04:15

## 2021-07-26 NOTE — DISCHARGE INSTRUCTIONS
If hydrocodone causes changes in mental status after taking he can take 1/2 tablet instead of whole tablet to see if this reduces his agitation. Follow up with Dr. Zhao as scheduled for re check of the knee and removal of staples. Return to the ER for fever, drainage from wound,

## 2021-07-26 NOTE — TELEPHONE ENCOUNTER
L TKA 7-23-21 went to ER for pain control last night. Pharm [facility], pt #[phone].

## 2021-07-26 NOTE — TELEPHONE ENCOUNTER
I called and spoke with patient and he said that the 1/2 tablet was making him incoherant and that he went to ER because he was having a panic attack.  I spoke with Dr. Zhao, he said to continue 1 tablet not 2 and take Benadryl with to counteract the agitation.  Patient voiced understanding.

## 2021-07-26 NOTE — ED PROVIDER NOTES
"Subjective   Pt with increased left knee pain today. Pt had knee replacement surgery on Friday with Dr. Zhao and pain has increased since the surgery. Spouse reports changes in mental status after taking prescribed pain medications. Pt reports difficulty initiating urine since surgery.       History provided by:  Patient and spouse      Review of Systems   Constitutional: Negative for chills and fever.   HENT: Negative for congestion, ear pain and sore throat.    Eyes: Negative for pain.   Respiratory: Negative for cough, chest tightness and shortness of breath.    Cardiovascular: Negative for chest pain.   Gastrointestinal: Negative for abdominal pain, diarrhea, nausea and vomiting.   Genitourinary: Negative for flank pain and hematuria.   Musculoskeletal: Positive for joint swelling (left knee).   Skin: Negative for pallor.   Neurological: Negative for seizures and headaches.   All other systems reviewed and are negative.      Past Medical History:   Diagnosis Date   • Arthritis     left knee    • Atrial fibrillation (CMS/HCC)    • Cancer (CMS/HCC) 2010    prostate - with seeds implant    • GERD (gastroesophageal reflux disease)    • Heart disease    • Prostate disorder        No Known Allergies    Past Surgical History:   Procedure Laterality Date   • APPENDECTOMY     • CARDIAC ABLATION      x2  \"years ago\"    • CARDIAC SURGERY  2006    cabg 3v   • ENDOSCOPY      with dilation    • KNEE ARTHROSCOPY Left    • VENTRICULAR CARDIAC PACEMAKER INSERTION      medtronic        Family History   Problem Relation Age of Onset   • No Known Problems Mother    • No Known Problems Father    • No Known Problems Sister    • No Known Problems Brother    • No Known Problems Maternal Aunt    • No Known Problems Maternal Uncle    • No Known Problems Paternal Aunt    • No Known Problems Paternal Uncle    • No Known Problems Maternal Grandmother    • No Known Problems Maternal Grandfather    • No Known Problems Paternal Grandmother  "   • No Known Problems Paternal Grandfather    • No Known Problems Other        Social History     Socioeconomic History   • Marital status:      Spouse name: Not on file   • Number of children: Not on file   • Years of education: Not on file   • Highest education level: Not on file   Tobacco Use   • Smoking status: Former Smoker     Quit date: 1995     Years since quittin.0   • Smokeless tobacco: Never Used   Vaping Use   • Vaping Use: Never assessed   Substance and Sexual Activity   • Alcohol use: Defer   • Drug use: Never   • Sexual activity: Defer           Objective   Physical Exam  Vitals and nursing note reviewed.   Constitutional:       General: He is not in acute distress.     Appearance: Normal appearance. He is not toxic-appearing.   HENT:      Head: Normocephalic and atraumatic.      Mouth/Throat:      Mouth: Mucous membranes are moist.   Eyes:      Extraocular Movements: Extraocular movements intact.      Pupils: Pupils are equal, round, and reactive to light.   Cardiovascular:      Rate and Rhythm: Normal rate and regular rhythm.      Pulses: Normal pulses.      Heart sounds: Normal heart sounds.   Pulmonary:      Effort: Pulmonary effort is normal. No respiratory distress.      Breath sounds: Normal breath sounds.   Abdominal:      General: Abdomen is flat.      Palpations: Abdomen is soft.      Tenderness: There is no abdominal tenderness.   Musculoskeletal:         General: Normal range of motion.      Cervical back: Normal range of motion and neck supple.      Left knee: Swelling and ecchymosis present. No deformity, erythema, lacerations or crepitus.        Legs:    Skin:     General: Skin is warm and dry.   Neurological:      Mental Status: He is alert and oriented to person, place, and time. Mental status is at baseline.         Procedures           ED Course                                           MDM  Number of Diagnoses or Management Options  Post-op pain: new and does not  require workup  Diagnosis management comments: No signs of infection. Pt denies fever. Description of pain sounds like normal post op pain. Description of mental status changes sound to be caused by hydrocodone as it only occurs after a dose. Pt reports his pain has been controlled with the medication but it makes him feel agitated. Discussed taking 1/2 of the tablet instead of whole. Pt is stable, in no distress. Follow up as scheduled with ortho.     Risk of Complications, Morbidity, and/or Mortality  Presenting problems: minimal  Diagnostic procedures: minimal  Management options: minimal    Patient Progress  Patient progress: stable      Final diagnoses:   Post-op pain       ED Disposition  ED Disposition     ED Disposition Condition Comment    Discharge Stable           Marta Garcia MD  [address] [facility]  [address]  [place] [address] [phone]    In 2 days  As needed    Been, Carlitos PETERSON MD  [address]  [place] [address] [phone]      As scheduled         Medication List      No changes were made to your prescriptions during this visit.          Harshil Abbasi, APRN  07/26/21 0634

## 2021-07-27 NOTE — THERAPY DISCHARGE NOTE
Acute Care - Physical Therapy Discharge Summary   Vickie       Patient Name: Javi Martínez  : 1940  MRN: [id number]    Today's Date: 2021  Onset of Illness/Injury or Date of Surgery: 21       Referring Physician: Carlitos Zhao      Admit Date: 2021      PT Recommendation and Plan    Visit Dx:    ICD-10-CM ICD-9-CM   1. Difficulty walking  R26.2 719.7   2. Primary osteoarthritis of left knee  M17.12 715.16   3. Decreased activities of daily living (ADL)  Z78.9 V49.89       Outcome Measures     Row Name 21 1500 21 0945          How much help from another person do you currently need...    Turning from your back to your side while in flat bed without using bedrails?  4  -MP  4  -MP     Moving from lying on back to sitting on the side of a flat bed without bedrails?  4  -MP  4  -MP     Moving to and from a bed to a chair (including a wheelchair)?  4  -MP  4  -MP     Standing up from a chair using your arms (e.g., wheelchair, bedside chair)?  4  -MP  4  -MP     Climbing 3-5 steps with a railing?  3  -MP  3  -MP     To walk in hospital room?  3  -MP  3  -MP     AM-PAC 6 Clicks Score (PT)  22  -MP  22  -MP        Functional Assessment    Outcome Measure Options  AM-PAC 6 Clicks Basic Mobility (PT)  -MP  --       User Key  (r) = Recorded By, (t) = Taken By, (c) = Cosigned By    Initials Name Provider Type    Neal Soto PTA Physical Therapy Assistant                      PT Discharge Summary  Anticipated Discharge Disposition (PT): home with assist  Reason for Discharge: Discharge from facility  Outcomes Achieved: Patient able to partially acheive established goals      Deo Juarez, PT   2021

## 2021-07-28 ENCOUNTER — TELEPHONE (OUTPATIENT)
Dept: ORTHOPEDIC SURGERY | Facility: CLINIC | Age: 81
End: 2021-07-28

## 2021-07-28 DIAGNOSIS — Z96.652 AFTERCARE FOLLOWING LEFT KNEE JOINT REPLACEMENT SURGERY: Primary | ICD-10-CM

## 2021-07-28 DIAGNOSIS — Z47.1 AFTERCARE FOLLOWING LEFT KNEE JOINT REPLACEMENT SURGERY: Primary | ICD-10-CM

## 2021-07-28 RX ORDER — OXYCODONE AND ACETAMINOPHEN 7.5; 325 MG/1; MG/1
1 TABLET ORAL EVERY 4 HOURS PRN
Qty: 42 TABLET | Refills: 0 | Status: SHIPPED | OUTPATIENT
Start: 2021-07-28 | End: 2021-07-30 | Stop reason: SDUPTHER

## 2021-07-29 NOTE — PROGRESS NOTES
"Chief Complaint  Pain of the Left Knee     Subjective      Javi Martínez presents to [facility] for an evaluation of left knee. Patient has been having left knee pain that has been ongoing for 28+ years. He has been a  in the past. He has pain with walking and going up and down steps. He states his knee jorgito and gives way on him. He states pain is worse medially. Patient has a history of getting his left knee aspirated and getting cortisone injections, which have helped. Patient takes Aleeve as needed. He has become less active due to left knee pain.     No Known Allergies     Social History     Socioeconomic History   • Marital status:      Spouse name: Not on file   • Number of children: Not on file   • Years of education: Not on file   • Highest education level: Not on file   Tobacco Use   • Smoking status: Former Smoker     Quit date: 1995     Years since quittin.0   • Smokeless tobacco: Never Used   Vaping Use   • Vaping Use: Never assessed   Substance and Sexual Activity   • Alcohol use: Defer   • Drug use: Never   • Sexual activity: Defer        Review of Systems     Objective   Vital Signs:   Pulse 82   Ht 193 cm (76\")   Wt 97.5 kg (215 lb)   SpO2 94%   BMI 26.17 kg/m²       Physical Exam  Constitutional:       Appearance: Normal appearance. He is well-developed and normal weight.   HENT:      Head: Normocephalic.      Right Ear: Hearing and external ear normal.      Left Ear: Hearing and external ear normal.      Nose: Nose normal.   Eyes:      Conjunctiva/sclera: Conjunctivae normal.   Cardiovascular:      Rate and Rhythm: Normal rate.   Pulmonary:      Effort: Pulmonary effort is normal.      Breath sounds: No wheezing or rales.   Abdominal:      Palpations: Abdomen is soft.      Tenderness: There is no abdominal tenderness.   Musculoskeletal:      Cervical back: Normal range of motion.   Skin:     Findings: No rash.   Neurological: "      Mental Status: He is alert and oriented to person, place, and time.   Psychiatric:         Mood and Affect: Mood and affect normal.         Judgment: Judgment normal.       Ortho Exam      LEFT KNEE: Full extension. 15 degrees of Valgus deformity. Significant swelling. Tender medial and lateral joint line. Skin intact. Calf supple, non-tender. Weight bearing. Limping gait. Good strength to hamstrings, quadriceps, dorsiflexors and plantar flexors. Sensation grossly intact. Neurovascular intact. Stable to varus/valgus stress. Negative Lachman. Positive crepitus. Flexion to 100 degrees.       Procedures        Imaging Results (Most Recent)     Procedure Component Value Units Date/Time    XR Knee 3 View Left [[id number]] Resulted: 07/14/21 1302     Updated: 07/14/21 1303    Narrative:      X-Ray Report:  Left knee(s) X-Ray  Indication: Evaluation of left knee   AP, Lateral and Standing view(s)  Findings: Bone on bone osteoarthritis. No fracture or dislocation.   Prior studies available for comparison: no            Result Review :       X-Ray Report:  Left knee(s) X-Ray  Indication: Evaluation of left knee   AP, Lateral and Standing view(s)  Findings: Bone on bone osteoarthritis. No fracture or dislocation.   Prior studies available for comparison: no       Assessment and Plan     DX: Left knee osteoarthritis     Discussed treatment plans and diagnosis with the patient. Discussed total knee replacement vs conservative management. Patient wishes to proceed with a left total knee arthroplasty.     Discussed surgery., Risks/benefits discussed with patient including, but not limited to: infection, bleeding, neurovascular damage, malunion, nonunion, aesthetic deformity, need for further surgery, and death., Discussed with patient the implant type being used during surgery and patient understands and desires to proceed., Surgery pamphlet given. and Call or return if worsening symptoms.    Follow Up     Post-operatively.        Patient was given instructions and counseling regarding his condition or for health maintenance advice. Please see specific information pulled into the AVS if appropriate.     Scribed for Carlitos Zhao MD by Faby Bryan.  07/14/21   11:13 EDT    I have personally performed the services described in this document as scribed by the above individual and it is both accurate and complete. Carlitos Zhao MD 07/29/21

## 2021-07-30 DIAGNOSIS — Z96.652 AFTERCARE FOLLOWING LEFT KNEE JOINT REPLACEMENT SURGERY: ICD-10-CM

## 2021-07-30 DIAGNOSIS — Z47.1 AFTERCARE FOLLOWING LEFT KNEE JOINT REPLACEMENT SURGERY: ICD-10-CM

## 2021-07-30 RX ORDER — ONDANSETRON 4 MG/1
4 TABLET, ORALLY DISINTEGRATING ORAL EVERY 6 HOURS PRN
Qty: 20 TABLET | Refills: 0 | Status: SHIPPED | OUTPATIENT
Start: 2021-07-30 | End: 2021-10-20

## 2021-07-30 RX ORDER — ONDANSETRON 4 MG/1
4 TABLET, ORALLY DISINTEGRATING ORAL EVERY 6 HOURS PRN
COMMUNITY
Start: 2021-07-30 | End: 2021-07-30 | Stop reason: SDUPTHER

## 2021-07-30 RX ORDER — OXYCODONE AND ACETAMINOPHEN 7.5; 325 MG/1; MG/1
1 TABLET ORAL EVERY 4 HOURS PRN
Qty: 42 TABLET | Refills: 0 | Status: SHIPPED | OUTPATIENT
Start: 2021-07-30 | End: 2021-10-20

## 2021-08-06 ENCOUNTER — OFFICE VISIT (OUTPATIENT)
Dept: ORTHOPEDIC SURGERY | Facility: CLINIC | Age: 81
End: 2021-08-06

## 2021-08-06 VITALS — OXYGEN SATURATION: 95 % | WEIGHT: 211 LBS | HEART RATE: 68 BPM | BODY MASS INDEX: 25.69 KG/M2 | HEIGHT: 76 IN

## 2021-08-06 DIAGNOSIS — Z96.652 AFTERCARE FOLLOWING LEFT KNEE JOINT REPLACEMENT SURGERY: ICD-10-CM

## 2021-08-06 DIAGNOSIS — M25.562 LEFT KNEE PAIN, UNSPECIFIED CHRONICITY: Primary | ICD-10-CM

## 2021-08-06 DIAGNOSIS — Z47.1 AFTERCARE FOLLOWING LEFT KNEE JOINT REPLACEMENT SURGERY: ICD-10-CM

## 2021-08-06 PROCEDURE — 99024 POSTOP FOLLOW-UP VISIT: CPT | Performed by: PHYSICIAN ASSISTANT

## 2021-08-06 NOTE — PROGRESS NOTES
"Chief Complaint  Follow-up and Pain of the Left Knee    Subjective          Javi Martínez presents to [facility] for s/p left total knee arthroplasty on 07-23-21 by Dr. Zhao. Patient states that he feels he is making progress with pain. He states therapy is going well and  therapy is visiting three times weekly. He states swelling of his knee has been minimal. Patient presents today in wheelchair, but states he is using a walker at home for ambulation assistance.     Objective   Vital Signs:   Pulse 68   Ht 193 cm (76\")   Wt 95.7 kg (211 lb)   SpO2 95%   BMI 25.68 kg/m²       Physical Exam  Constitutional:       Appearance: Normal appearance. He is well-developed and normal weight.   HENT:      Head: Normocephalic.      Right Ear: Hearing and external ear normal.      Left Ear: Hearing and external ear normal.      Nose: Nose normal.   Eyes:      Conjunctiva/sclera: Conjunctivae normal.   Cardiovascular:      Rate and Rhythm: Normal rate.   Pulmonary:      Effort: Pulmonary effort is normal.      Breath sounds: No wheezing or rales.   Abdominal:      Palpations: Abdomen is soft.      Tenderness: There is no abdominal tenderness.   Musculoskeletal:      Cervical back: Normal range of motion.   Skin:     Findings: No rash.   Neurological:      Mental Status: He is alert and oriented to person, place, and time.   Psychiatric:         Mood and Affect: Mood and affect normal.         Judgment: Judgment normal.     Ortho Exam  Left knee: Well-healing incision.  Mild swelling.  Mild bruising of the knee.  Tenderness on the medial aspect of the knee.  Patient in wheelchair.  AROM is -5-90 degrees of flexion.  Normal plantar and dorsiflexion.  Good muscle tone of the quadriceps, hamstrings and ankle flexors.  Calf soft nontender.  Negative Homans.  Posterior tibialis pulse 2+.  Dorsalis pedis pulse 2+.  Sensation intact.  Neurovascular intact.    Result Review :   The following data was " reviewed by: DOLORES Castellanos on 08/06/2021:         Imaging Results (Most Recent)     Procedure Component Value Units Date/Time    XR Knee 3 View Left [[id number]] Resulted: 08/06/21 1621     Updated: 08/06/21 1621    Narrative:      X-Ray Report:  Study: X-rays ordered, taken in the office, and reviewed today  Site: left knee Xray  Indication: left knee pain   View: AP, Lateral and Sunrise view(s)  Findings: Intact left total knee arthroplasty without signs of loosening   or hardware failure.   Prior studies available for comparison: no                   Assessment and Plan    Problem List Items Addressed This Visit        Musculoskeletal and Injuries    Left knee pain - Primary    Relevant Orders    XR Knee 3 View Left (Completed)      Other Visit Diagnoses     Aftercare following left knee joint replacement surgery        Relevant Orders    Ambulatory Referral to Physical Therapy POST OP          Follow Up   Return in about 4 weeks (around 9/3/2021).  Patient Instructions   Keep incision clean and dry  Continue ice and elevation for associated swelling  Continue with stretching , ROM and strengthening exercises at PT. Order given today for outpatient therapy.   Advised patient on falls precautions  Follow up in 4 week(s)      Patient was given instructions and counseling regarding his condition or for health maintenance advice. Please see specific information pulled into the AVS if appropriate.

## 2021-08-06 NOTE — PATIENT INSTRUCTIONS
Keep incision clean and dry  Continue ice and elevation for associated swelling  Continue with stretching , ROM and strengthening exercises at PT. Order given today for outpatient therapy.   Advised patient on falls precautions  Follow up in 4 week(s)

## 2021-08-11 PROBLEM — M25.562 LEFT KNEE PAIN: Status: RESOLVED | Noted: 2021-08-06 | Resolved: 2021-08-11

## 2021-08-11 PROBLEM — I25.10 CORONARY ARTERIOSCLEROSIS IN NATIVE ARTERY: Status: ACTIVE | Noted: 2019-05-08

## 2021-08-11 PROBLEM — I11.9 HYPERTENSIVE HEART DISEASE WITHOUT CONGESTIVE HEART FAILURE: Status: RESOLVED | Noted: 2021-08-11 | Resolved: 2021-08-11

## 2021-08-11 PROBLEM — I11.9 HYPERTENSIVE HEART DISEASE WITHOUT CONGESTIVE HEART FAILURE: Status: ACTIVE | Noted: 2021-08-11

## 2021-08-11 PROBLEM — I50.20 HFREF (HEART FAILURE WITH REDUCED EJECTION FRACTION): Status: ACTIVE | Noted: 2021-08-11

## 2021-08-11 PROBLEM — E78.5 HYPERLIPIDEMIA LDL GOAL <70: Status: ACTIVE | Noted: 2021-08-11

## 2021-08-11 PROBLEM — I10 BENIGN ESSENTIAL HYPERTENSION: Status: ACTIVE | Noted: 2019-05-08

## 2021-08-12 ENCOUNTER — TELEPHONE (OUTPATIENT)
Dept: CARDIOLOGY | Facility: CLINIC | Age: 81
End: 2021-08-12

## 2021-08-13 ENCOUNTER — TELEPHONE (OUTPATIENT)
Dept: CARDIOLOGY | Facility: CLINIC | Age: 81
End: 2021-08-13

## 2021-08-13 NOTE — TELEPHONE ENCOUNTER
Received VM from Angie, home health nurse.    RE: parameters for hold BP medications.  Patient has a fall yesterday d/t low BP.    Discussed with ELYSIA BA, hold lisinopril and Lopressor if BP less than 100/60, HR less than 60.    I called and notified home health nurse.

## 2021-08-16 ENCOUNTER — CLINICAL SUPPORT NO REQUIREMENTS (OUTPATIENT)
Dept: CARDIOLOGY | Facility: CLINIC | Age: 81
End: 2021-08-16

## 2021-08-16 ENCOUNTER — OFFICE VISIT (OUTPATIENT)
Dept: CARDIOLOGY | Facility: CLINIC | Age: 81
End: 2021-08-16

## 2021-08-16 VITALS
WEIGHT: 210 LBS | DIASTOLIC BLOOD PRESSURE: 63 MMHG | HEIGHT: 76 IN | SYSTOLIC BLOOD PRESSURE: 119 MMHG | BODY MASS INDEX: 25.57 KG/M2 | HEART RATE: 71 BPM

## 2021-08-16 DIAGNOSIS — E78.5 HYPERLIPIDEMIA LDL GOAL <70: ICD-10-CM

## 2021-08-16 DIAGNOSIS — I44.2 CHB (COMPLETE HEART BLOCK) (HCC): Primary | ICD-10-CM

## 2021-08-16 DIAGNOSIS — I50.20 HFREF (HEART FAILURE WITH REDUCED EJECTION FRACTION) (HCC): ICD-10-CM

## 2021-08-16 DIAGNOSIS — I10 ESSENTIAL HYPERTENSION: ICD-10-CM

## 2021-08-16 DIAGNOSIS — I25.10 CORONARY ARTERIOSCLEROSIS IN NATIVE ARTERY: Primary | ICD-10-CM

## 2021-08-16 DIAGNOSIS — I48.0 PAROXYSMAL ATRIAL FIBRILLATION (HCC): ICD-10-CM

## 2021-08-16 PROCEDURE — 93280 PM DEVICE PROGR EVAL DUAL: CPT | Performed by: INTERNAL MEDICINE

## 2021-08-16 PROCEDURE — 99214 OFFICE O/P EST MOD 30 MIN: CPT | Performed by: NURSE PRACTITIONER

## 2021-08-16 NOTE — PROGRESS NOTES
Normal VVIR Chamber Pacemaker device interrogation.  Normal evaluation of device function and lead measurements.  No optimization was needed of parameters or maximization of device longevity.  Patient is approaching ATTILA, battery voltage is 2.86 and ATTILA is 2.83.  He will come back for a 6 week device follow up.

## 2021-08-16 NOTE — PROGRESS NOTES
"Chief Complaint  Coronary Artery Disease and Hypertension    Subjective            History of Present Illness  Javi Martínez is a 41-year-old white/ male patient who presents to the office today for follow up. He has been having issues with low blood pressure which has resulted in a fall on 8/12/2021. He has history of CAD with 3 vessel CABG in 2006, systolic CHF, atrial fibrillation, hyperlipidemia, and dual chamber pacemaker implantation.  On 8/13/2021 I have instructed the patient to hold his lisinopril and metoprolol for blood pressure less than 100/60 and/or heart rate less than 60.  The patient brought a blood pressure log with him today which shows blood pressure ranging from 104/80 to 130/90.  He reports that he has not taken either medication for the last 2 days.  Today he denies any chest pain, shortness of breath, lightheadedness, or palpitations.  He reports compliance with all his other medications.  His last echocardiogram was 2/5/2021 which shows mildly reduced ejection fraction of 40%, mild to moderate LV enlargement, mild LVH, mild aortic insufficiency, mild mitral regurgitation, and moderate tricuspid regurgitation.    PMH  Past Medical History:   Diagnosis Date   • Arthritis     left knee    • Essential hypertension 5/8/2019   • GERD (gastroesophageal reflux disease)    • HFrEF (heart failure with reduced ejection fraction) 8/11/2021   • Hyperlipidemia 8/11/2021   • Paroxysmal atrial fibrillation    • Prostate cancer 2010    with seeds implant          ALLERGY  No Known Allergies       SURGICALHX  Past Surgical History:   Procedure Laterality Date   • APPENDECTOMY     • CARDIAC ABLATION      x2  \"years ago\"    • CARDIAC SURGERY  2006    cabg 3v   • ENDOSCOPY      with dilation    • KNEE ARTHROSCOPY Left    • TOTAL KNEE ARTHROPLASTY Left 7/23/2021    Procedure: TOTAL KNEE ARTHROPLASTY WITH DORA NAVIGATION WITH BIOMET;  Surgeon: Carlitos Zhao MD;  Location: [facility];  Service: " Orthopedics;  Laterality: Left;   • VENTRICULAR CARDIAC PACEMAKER INSERTION      medtronic           SOC  Social History     Socioeconomic History   • Marital status:      Spouse name: Not on file   • Number of children: Not on file   • Years of education: Not on file   • Highest education level: Not on file   Tobacco Use   • Smoking status: Former Smoker     Quit date: 1995     Years since quittin.0   • Smokeless tobacco: Never Used   Vaping Use   • Vaping Use: Never assessed   Substance and Sexual Activity   • Alcohol use: Defer   • Drug use: Never   • Sexual activity: Defer         FAMHX  Family History   Problem Relation Age of Onset   • No Known Problems Mother    • No Known Problems Father    • No Known Problems Sister    • No Known Problems Brother    • No Known Problems Maternal Aunt    • No Known Problems Maternal Uncle    • No Known Problems Paternal Aunt    • No Known Problems Paternal Uncle    • No Known Problems Maternal Grandmother    • No Known Problems Maternal Grandfather    • No Known Problems Paternal Grandmother    • No Known Problems Paternal Grandfather    • No Known Problems Other           MEDSIGONLY  Current Outpatient Medications on File Prior to Visit   Medication Sig   • atorvastatin (LIPITOR) 40 MG tablet Take 40 mg by mouth Every Night.   • Eliquis 5 MG tablet tablet Take 5 mg by mouth 2 (Two) Times a Day. To stop 2 days prior to surgery as directed by Dr. Whelan   • lisinopril (PRINIVIL,ZESTRIL) 2.5 MG tablet Take 1 tablet by mouth Daily.   • metoprolol tartrate (LOPRESSOR) 25 MG tablet Take 25 mg by mouth 2 (Two) Times a Day.   • omeprazole (priLOSEC) 20 MG capsule Take 20 mg by mouth Daily.   • tamsulosin (FLOMAX) 0.4 MG capsule 24 hr capsule Take 2 capsules by mouth Every Night.   • ondansetron ODT (ZOFRAN-ODT) 4 MG disintegrating tablet Place 1 tablet on the tongue Every 6 (Six) Hours As Needed for Nausea. Disp. 20   • oxyCODONE-acetaminophen (PERCOCET) 7.5-325 MG  "per tablet Take 1 tablet by mouth Every 4 (Four) Hours As Needed for Moderate Pain .     No current facility-administered medications on file prior to visit.         Objective   /63   Pulse 71   Ht 193 cm (76\")   Wt 95.3 kg (210 lb)   BMI 25.56 kg/m²       Physical Exam  Constitutional:       Appearance: Normal appearance.   HENT:      Head: Normocephalic.   Neck:      Vascular: No carotid bruit.   Cardiovascular:      Rate and Rhythm: Normal rate and regular rhythm.      Pulses: Normal pulses.      Heart sounds: Normal heart sounds. No murmur heard.     Pulmonary:      Effort: Pulmonary effort is normal.      Breath sounds: Normal breath sounds.   Musculoskeletal:      Cervical back: Neck supple.      Right lower leg: No edema.      Left lower leg: No edema.   Skin:     General: Skin is dry.   Neurological:      Mental Status: He is alert and oriented to person, place, and time.   Psychiatric:         Mood and Affect: Mood normal.         Behavior: Behavior normal.       Result Review :   The following data was reviewed by: MEJIA Davidson on 08/16/2021:  No results found for: PROBNP  CMP    CMP 7/20/21   Glucose 126 (A)   BUN 29 (A)   Creatinine 1.98 (A)   eGFR Non African Am 33 (A)   Sodium 139   Potassium 4.0   Chloride 102   Calcium 9.0   Albumin 3.60   Total Bilirubin 1.5 (A)   Alkaline Phosphatase 83   AST (SGOT) 19   ALT (SGPT) 32   (A) Abnormal value            CBC w/diff    CBC w/Diff 7/24/21   WBC 17.61 (A)   RBC 4.57   Hemoglobin 13.5   Hematocrit 41.4   MCV 90.6   MCH 29.5   MCHC 32.6   RDW 13.9   Platelets 183   Neutrophil Rel %    Immature Granulocyte Rel %    Lymphocyte Rel %    Monocyte Rel %    Eosinophil Rel %    Basophil Rel %    (A) Abnormal value             No results found for: TSH   No results found for: FREET4   No results found for: DDIMERQUANT  No results found for: MG   No results found for: DIGOXIN   No results found for: TROPONINT               Assessment and Plan  "   Diagnoses and all orders for this visit:    1. CAD with CABG (Primary)  Currently without any anginal symptoms, continue current regimen.    2. HFrEF (heart failure with reduced ejection fraction)        Currently stable symptomatically. I advised patient to take lisinopril and metoprolol if systolic is above 140 and/or heart rate above 100. Check blood pressure twice a day for the next two weeks, blood pressure log provided for patient.  Will review blood pressure log once available to me and make further medication recommendations at that time.     3. Paroxysmal atrial fibrillation        Currently in sinus rhythm and without symptoms, continue Eliquis twice daily for CVA prevention.    4. Essential hypertension         Currently having issues with hypotension. Check blood pressure twice a day for the next two weeks, blood pressure log provided for patient. Check BMP to evaluate renal function.              -     Basic Metabolic Panel; Future    5. Hyperlipidemia          Last lipid panel was over a year ago with LDL of 123. Recheck lipid and hepatic panel. Continue current dose of atorvastatin for now.  -     Lipid Panel; Future  -     Hepatic Function Panel; Future    Pacemaker was checked during this office visit:  Normal VVIR Chamber Pacemaker device interrogation.  Normal evaluation of device function and lead measurements.  No optimization was needed of parameters or maximization of device longevity.  Patient is approaching ATTILA, battery voltage is 2.86 and ATTILA is 2.83.  He will come back for a 6 week device follow up.      Follow Up   Return for He has follow up scheduled with Dr Whelan in October. He needs 6 week device check follow up.    Patient was given instructions and counseling regarding his condition or for health maintenance advice. Please see specific information pulled into the AVS if appropriate.     Javi Martínez  reports that he quit smoking about 26 years ago. He has never used smokeless  tobacco.           Isabel Navarrete, APRN  08/16/21  08:57 EDT    Dictated Utilizing Dragon Dictation

## 2021-08-16 NOTE — PATIENT INSTRUCTIONS
"Low-Sodium Eating Plan  Sodium, which is an element that makes up salt, helps you maintain a healthy balance of fluids in your body. Too much sodium can increase your blood pressure and cause fluid and waste to be held in your body.  Your health care provider or dietitian may recommend following this plan if you have high blood pressure (hypertension), kidney disease, liver disease, or heart failure. Eating less sodium can help lower your blood pressure, reduce swelling, and protect your heart, liver, and kidneys.  What are tips for following this plan?  Reading food labels  · The Nutrition Facts label lists the amount of sodium in one serving of the food. If you eat more than one serving, you must multiply the listed amount of sodium by the number of servings.  · Choose foods with less than 140 mg of sodium per serving.  · Avoid foods with 300 mg of sodium or more per serving.  Shopping    · Look for lower-sodium products, often labeled as \"low-sodium\" or \"no salt added.\"  · Always check the sodium content, even if foods are labeled as \"unsalted\" or \"no salt added.\"  · Buy fresh foods.  ? Avoid canned foods and pre-made or frozen meals.  ? Avoid canned, cured, or processed meats.  · Buy breads that have less than 80 mg of sodium per slice.  Cooking    · Eat more home-cooked food and less restaurant, buffet, and fast food.  · Avoid adding salt when cooking. Use salt-free seasonings or herbs instead of table salt or sea salt. Check with your health care provider or pharmacist before using salt substitutes.  · Cook with plant-based oils, such as canola, sunflower, or olive oil.  Meal planning  · When eating at a restaurant, ask that your food be prepared with less salt or no salt, if possible. Avoid dishes labeled as brined, pickled, cured, smoked, or made with soy sauce, miso, or teriyaki sauce.  · Avoid foods that contain MSG (monosodium glutamate). MSG is sometimes added to Chinese food, bouillon, and some canned " "foods.  · Make meals that can be grilled, baked, poached, roasted, or steamed. These are generally made with less sodium.  General information  Most people on this plan should limit their sodium intake to 1,500-2,000 mg (milligrams) of sodium each day.  What foods should I eat?  Fruits  Fresh, frozen, or canned fruit. Fruit juice.  Vegetables  Fresh or frozen vegetables. \"No salt added\" canned vegetables. \"No salt added\" tomato sauce and paste. Low-sodium or reduced-sodium tomato and vegetable juice.  Grains  Low-sodium cereals, including oats, puffed wheat and rice, and shredded wheat. Low-sodium crackers. Unsalted rice. Unsalted pasta. Low-sodium bread. Whole-grain breads and whole-grain pasta.  Meats and other proteins  Fresh or frozen (no salt added) meat, poultry, seafood, and fish. Low-sodium canned tuna and salmon. Unsalted nuts. Dried peas, beans, and lentils without added salt. Unsalted canned beans. Eggs. Unsalted nut butters.  Dairy  Milk. Soy milk. Cheese that is naturally low in sodium, such as ricotta cheese, fresh mozzarella, or Swiss cheese. Low-sodium or reduced-sodium cheese. Cream cheese. Yogurt.  Seasonings and condiments  Fresh and dried herbs and spices. Salt-free seasonings. Low-sodium mustard and ketchup. Sodium-free salad dressing. Sodium-free light mayonnaise. Fresh or refrigerated horseradish. Lemon juice. Vinegar.  Other foods  Homemade, reduced-sodium, or low-sodium soups. Unsalted popcorn and pretzels. Low-salt or salt-free chips.  The items listed above may not be a complete list of foods and beverages you can eat. Contact a dietitian for more information.  What foods should I avoid?  Vegetables  Sauerkraut, pickled vegetables, and relishes. Olives. French fries. Onion rings. Regular canned vegetables (not low-sodium or reduced-sodium). Regular canned tomato sauce and paste (not low-sodium or reduced-sodium). Regular tomato and vegetable juice (not low-sodium or reduced-sodium). Frozen " vegetables in sauces.  Grains  Instant hot cereals. Bread stuffing, pancake, and biscuit mixes. Croutons. Seasoned rice or pasta mixes. Noodle soup cups. Boxed or frozen macaroni and cheese. Regular salted crackers. Self-rising flour.  Meats and other proteins  Meat or fish that is salted, canned, smoked, spiced, or pickled. Precooked or cured meat, such as sausages or meat loaves. Swan. Ham. Pepperoni. Hot dogs. Corned beef. Chipped beef. Salt pork. Jerky. Pickled herring. Anchovies and sardines. Regular canned tuna. Salted nuts.  Dairy  Processed cheese and cheese spreads. Hard cheeses. Cheese curds. Blue cheese. Feta cheese. String cheese. Regular cottage cheese. Buttermilk. Canned milk.  Fats and oils  Salted butter. Regular margarine. Ghee. Swan fat.  Seasonings and condiments  Onion salt, garlic salt, seasoned salt, table salt, and sea salt. Canned and packaged gravies. Worcestershire sauce. Tartar sauce. Barbecue sauce. Teriyaki sauce. Soy sauce, including reduced-sodium. Steak sauce. Fish sauce. Oyster sauce. Cocktail sauce. Horseradish that you find on the shelf. Regular ketchup and mustard. Meat flavorings and tenderizers. Bouillon cubes. Hot sauce. Pre-made or packaged marinades. Pre-made or packaged taco seasonings. Relishes. Regular salad dressings. Salsa.  Other foods  Salted popcorn and pretzels. Corn chips and puffs. Potato and tortilla chips. Canned or dried soups. Pizza. Frozen entrees and pot pies.  The items listed above may not be a complete list of foods and beverages you should avoid. Contact a dietitian for more information.  Summary  · Eating less sodium can help lower your blood pressure, reduce swelling, and protect your heart, liver, and kidneys.  · Most people on this plan should limit their sodium intake to 1,500-2,000 mg (milligrams) of sodium each day.  · Canned, boxed, and frozen foods are high in sodium. Restaurant foods, fast foods, and pizza are also very high in sodium. You  also get sodium by adding salt to food.  · Try to cook at home, eat more fresh fruits and vegetables, and eat less fast food and canned, processed, or prepared foods.  This information is not intended to replace advice given to you by your health care provider. Make sure you discuss any questions you have with your health care provider.  Document Revised: 01/22/2021 Document Reviewed: 11/18/2020  [employer] Patient Education © 2021 [employer]      Cholesterol Content in Foods  Cholesterol is a waxy, fat-like substance that helps to carry fat in the blood. The body needs cholesterol in small amounts, but too much cholesterol can cause damage to the arteries and heart.  Most people should eat less than 200 milligrams (mg) of cholesterol a day.  Foods with cholesterol    Cholesterol is found in animal-based foods, such as meat, seafood, and dairy. Generally, low-fat dairy and lean meats have less cholesterol than full-fat dairy and fatty meats. The milligrams of cholesterol per serving (mg per serving) of common cholesterol-containing foods are listed below.  Meat and other proteins  · Egg -- one large whole egg has 186 mg.  · Veal shank -- 4 oz has 141 mg.  · Lean ground turkey (93% lean) -- 4 oz has 118 mg.  · Fat-trimmed lamb loin -- 4 oz has 106 mg.  · Lean ground beef (90% lean) -- 4 oz has 100 mg.  · Lobster -- 3.5 oz has 90 mg.  · Pork loin chops -- 4 oz has 86 mg.  · Canned salmon -- 3.5 oz has 83 mg.  · Fat-trimmed beef top loin -- 4 oz has 78 mg.  · Frankfurter -- 1 jorge (3.5 oz) has 77 mg.  · Crab -- 3.5 oz has 71 mg.  · Roasted chicken without skin, white meat -- 4 oz has 66 mg.  · Light bologna -- 2 oz has 45 mg.  · Deli-cut turkey -- 2 oz has 31 mg.  · Canned tuna -- 3.5 oz has 31 mg.  · Swan -- 1 oz has 29 mg.  · Oysters and mussels (raw) -- 3.5 oz has 25 mg.  · Mackerel -- 1 oz has 22 mg.  · Trout -- 1 oz has 20 mg.  · Pork sausage -- 1 link (1 oz) has 17 mg.  · [place] -- 1 oz has 16 mg.  · Tilapia  -- 1 oz has 14 mg.  Dairy  · Soft-serve ice cream -- ½ cup (4 oz) has 103 mg.  · Whole-milk yogurt -- 1 cup (8 oz) has 29 mg.  · Cheddar cheese -- 1 oz has 28 mg.  · American cheese -- 1 oz has 28 mg.  · Whole milk -- 1 cup (8 oz) has 23 mg.  · 2% milk -- 1 cup (8 oz) has 18 mg.  · Cream cheese -- 1 tablespoon (Tbsp) has 15 mg.  · Cottage cheese -- ½ cup (4 oz) has 14 mg.  · Low-fat (1%) milk -- 1 cup (8 oz) has 10 mg.  · Sour cream -- 1 Tbsp has 8.5 mg.  · Low-fat yogurt -- 1 cup (8 oz) has 8 mg.  · Nonfat Greek yogurt -- 1 cup (8 oz) has 7 mg.  · Half-and-half cream -- 1 Tbsp has 5 mg.  Fats and oils  · Cod liver oil -- 1 tablespoon (Tbsp) has 82 mg.  · Butter -- 1 Tbsp has 15 mg.  · Lard -- 1 Tbsp has 14 mg.  · Swan grease -- 1 Tbsp has 14 mg.  · Mayonnaise -- 1 Tbsp has 5-10 mg.  · Margarine -- 1 Tbsp has 3-10 mg.  Exact amounts of cholesterol in these foods may vary depending on specific ingredients and brands.  Foods without cholesterol  Most plant-based foods do not have cholesterol unless you combine them with a food that has cholesterol. Foods without cholesterol include:  · Grains and cereals.  · Vegetables.  · Fruits.  · Vegetable oils, such as olive, canola, and sunflower oil.  · Legumes, such as peas, beans, and lentils.  · Nuts and seeds.  · Egg whites.  Summary  · The body needs cholesterol in small amounts, but too much cholesterol can cause damage to the arteries and heart.  · Most people should eat less than 200 milligrams (mg) of cholesterol a day.  This information is not intended to replace advice given to you by your health care provider. Make sure you discuss any questions you have with your health care provider.  Document Revised: 11/30/2018 Document Reviewed: 08/14/2018  [employer] Patient Education © 2021 [employer]

## 2021-08-17 ENCOUNTER — TELEPHONE (OUTPATIENT)
Dept: CARDIOLOGY | Facility: CLINIC | Age: 81
End: 2021-08-17

## 2021-08-17 RX ORDER — LISINOPRIL 2.5 MG/1
2.5 TABLET ORAL DAILY
Qty: 30 TABLET | Refills: 1 | Status: SHIPPED | OUTPATIENT
Start: 2021-08-17 | End: 2021-10-20

## 2021-08-17 NOTE — TELEPHONE ENCOUNTER
Called and clarified medication instructions with patient.  Patient verbalized understanding with read-back.

## 2021-08-17 NOTE — TELEPHONE ENCOUNTER
Received VM from patient.  RE: clarification on when to take and when to hold his medication.    He stated this was discussed during office visit yesterday.  But he informed his Home Health nurse today who was giving him different instructions.     Does patient only take Lisinopril when SBP greater 140 and only take Metoprolol if HR is above 100?    Call back #: [phone].

## 2021-08-19 DIAGNOSIS — Z96.652 AFTERCARE FOLLOWING LEFT KNEE JOINT REPLACEMENT SURGERY: Primary | ICD-10-CM

## 2021-08-19 DIAGNOSIS — Z47.1 AFTERCARE FOLLOWING LEFT KNEE JOINT REPLACEMENT SURGERY: Primary | ICD-10-CM

## 2021-08-23 ENCOUNTER — HOSPITAL ENCOUNTER (EMERGENCY)
Facility: HOSPITAL | Age: 81
Discharge: HOME | End: 2021-08-24
Attending: EMERGENCY MEDICINE | Admitting: EMERGENCY MEDICINE

## 2021-08-23 ENCOUNTER — APPOINTMENT (OUTPATIENT)
Dept: GENERAL RADIOLOGY | Facility: HOSPITAL | Age: 81
End: 2021-08-23

## 2021-08-23 DIAGNOSIS — I95.2 HYPOTENSION DUE TO MEDICATION: Primary | ICD-10-CM

## 2021-08-23 LAB
ALBUMIN SERPL-MCNC: 3.5 G/DL (ref 3.5–5.2)
ALBUMIN/GLOB SERPL: 1.3 G/DL
ALP SERPL-CCNC: 95 U/L (ref 39–117)
ALT SERPL W P-5'-P-CCNC: 44 U/L (ref 1–41)
ANION GAP SERPL CALCULATED.3IONS-SCNC: 13.6 MMOL/L (ref 5–15)
AST SERPL-CCNC: 24 U/L (ref 1–40)
BASOPHILS # BLD AUTO: 0.03 10*3/MM3 (ref 0–0.2)
BASOPHILS NFR BLD AUTO: 0.2 % (ref 0–1.5)
BILIRUB SERPL-MCNC: 1.4 MG/DL (ref 0–1.2)
BUN SERPL-MCNC: 47 MG/DL (ref 8–23)
BUN/CREAT SERPL: 18.1 (ref 7–25)
CALCIUM SPEC-SCNC: 9 MG/DL (ref 8.6–10.5)
CHLORIDE SERPL-SCNC: 102 MMOL/L (ref 98–107)
CO2 SERPL-SCNC: 22.4 MMOL/L (ref 22–29)
CREAT SERPL-MCNC: 2.6 MG/DL (ref 0.76–1.27)
DEPRECATED RDW RBC AUTO: 48.8 FL (ref 37–54)
EOSINOPHIL # BLD AUTO: 0.12 10*3/MM3 (ref 0–0.4)
EOSINOPHIL NFR BLD AUTO: 0.7 % (ref 0.3–6.2)
ERYTHROCYTE [DISTWIDTH] IN BLOOD BY AUTOMATED COUNT: 15.1 % (ref 12.3–15.4)
GFR SERPL CREATININE-BSD FRML MDRD: 24 ML/MIN/1.73
GLOBULIN UR ELPH-MCNC: 2.6 GM/DL
GLUCOSE SERPL-MCNC: 112 MG/DL (ref 65–99)
HCT VFR BLD AUTO: 46.7 % (ref 37.5–51)
HGB BLD-MCNC: 15.1 G/DL (ref 13–17.7)
HOLD SPECIMEN: NORMAL
HOLD SPECIMEN: NORMAL
IMM GRANULOCYTES # BLD AUTO: 0.43 10*3/MM3 (ref 0–0.05)
IMM GRANULOCYTES NFR BLD AUTO: 2.6 % (ref 0–0.5)
LYMPHOCYTES # BLD AUTO: 3.06 10*3/MM3 (ref 0.7–3.1)
LYMPHOCYTES NFR BLD AUTO: 18.8 % (ref 19.6–45.3)
MAGNESIUM SERPL-MCNC: 2.2 MG/DL (ref 1.6–2.4)
MCH RBC QN AUTO: 28.9 PG (ref 26.6–33)
MCHC RBC AUTO-ENTMCNC: 32.3 G/DL (ref 31.5–35.7)
MCV RBC AUTO: 89.3 FL (ref 79–97)
MONOCYTES # BLD AUTO: 1.69 10*3/MM3 (ref 0.1–0.9)
MONOCYTES NFR BLD AUTO: 10.4 % (ref 5–12)
NEUTROPHILS NFR BLD AUTO: 10.97 10*3/MM3 (ref 1.7–7)
NEUTROPHILS NFR BLD AUTO: 67.3 % (ref 42.7–76)
NRBC BLD AUTO-RTO: 0 /100 WBC (ref 0–0.2)
PLATELET # BLD AUTO: 256 10*3/MM3 (ref 140–450)
PMV BLD AUTO: 9.5 FL (ref 6–12)
POTASSIUM SERPL-SCNC: 4 MMOL/L (ref 3.5–5.2)
PROT SERPL-MCNC: 6.1 G/DL (ref 6–8.5)
RBC # BLD AUTO: 5.23 10*6/MM3 (ref 4.14–5.8)
SODIUM SERPL-SCNC: 138 MMOL/L (ref 136–145)
TROPONIN T SERPL-MCNC: 0.04 NG/ML (ref 0–0.03)
WBC # BLD AUTO: 16.3 10*3/MM3 (ref 3.4–10.8)
WHOLE BLOOD HOLD SPECIMEN: NORMAL
WHOLE BLOOD HOLD SPECIMEN: NORMAL

## 2021-08-23 PROCEDURE — 84484 ASSAY OF TROPONIN QUANT: CPT | Performed by: EMERGENCY MEDICINE

## 2021-08-23 PROCEDURE — 83735 ASSAY OF MAGNESIUM: CPT | Performed by: EMERGENCY MEDICINE

## 2021-08-23 PROCEDURE — 93005 ELECTROCARDIOGRAM TRACING: CPT | Performed by: EMERGENCY MEDICINE

## 2021-08-23 PROCEDURE — 80053 COMPREHEN METABOLIC PANEL: CPT | Performed by: EMERGENCY MEDICINE

## 2021-08-23 PROCEDURE — 85025 COMPLETE CBC W/AUTO DIFF WBC: CPT

## 2021-08-23 PROCEDURE — 71045 X-RAY EXAM CHEST 1 VIEW: CPT

## 2021-08-23 PROCEDURE — 99285 EMERGENCY DEPT VISIT HI MDM: CPT

## 2021-08-23 RX ORDER — SODIUM CHLORIDE 0.9 % (FLUSH) 0.9 %
10 SYRINGE (ML) INJECTION AS NEEDED
Status: DISCONTINUED | OUTPATIENT
Start: 2021-08-23 | End: 2021-08-24 | Stop reason: HOSPADM

## 2021-08-23 RX ADMIN — SODIUM CHLORIDE 1000 ML: 9 INJECTION, SOLUTION INTRAVENOUS at 23:51

## 2021-08-24 ENCOUNTER — TELEPHONE (OUTPATIENT)
Dept: CARDIOLOGY | Facility: CLINIC | Age: 81
End: 2021-08-24

## 2021-08-24 VITALS
TEMPERATURE: 97.9 F | HEIGHT: 76 IN | DIASTOLIC BLOOD PRESSURE: 68 MMHG | HEART RATE: 73 BPM | SYSTOLIC BLOOD PRESSURE: 105 MMHG | OXYGEN SATURATION: 99 % | RESPIRATION RATE: 18 BRPM | BODY MASS INDEX: 25.16 KG/M2 | WEIGHT: 206.57 LBS

## 2021-08-24 LAB
QT INTERVAL: 524 MS
TROPONIN T SERPL-MCNC: 0.03 NG/ML (ref 0–0.03)

## 2021-08-24 PROCEDURE — 84484 ASSAY OF TROPONIN QUANT: CPT | Performed by: EMERGENCY MEDICINE

## 2021-08-24 NOTE — ED PROVIDER NOTES
"Time: 10:58 PM EDT  Arrived by: ambulance  Chief Complaint:   Chief Complaint   Patient presents with   • Dizziness     History provided by: pt and EMS  History is limited by: N/A     History of Present Illness:  Patient is a 81 y.o. year old male that presents to the emergency department with DIZZINESS.    This morning, pt's BP was 150/78 so he took a lisinopril which tanked his BP. Pt states he felt like he could not get out of the car or he would have fallen. Pt denies any abdominal pain or any urinary symptoms.  Pt's cardiologist told him to stop the lisinopril unless his systolic BP went over 140.   Pt had a total knee replacement in July 2021.       History provided by:  Patient and EMS personnel   used: No    Dizziness  Quality:  Unable to specify  Severity:  Moderate  Onset quality:  Gradual  Duration:  1 day  Timing:  Constant  Progression:  Improving  Chronicity:  New  Context: medication    Relieved by:  Nothing  Worsened by:  Medication and standing up  Ineffective treatments:  None tried  Associated symptoms: no chest pain, no diarrhea, no headaches, no nausea, no palpitations, no shortness of breath, no tinnitus, no vomiting and no weakness        Similar Symptoms Previously: none  Recently seen: recently seen in this ED (7/26/2021)    Patient Care Team  Primary Care Provider: Marta Garcia MD    Past Medical History:     No Known Allergies  Past Medical History:   Diagnosis Date   • Arthritis     left knee    • Essential hypertension 5/8/2019   • GERD (gastroesophageal reflux disease)    • HFrEF (heart failure with reduced ejection fraction) 8/11/2021   • Hyperlipidemia 8/11/2021   • Paroxysmal atrial fibrillation    • Prostate cancer 2010    with seeds implant      Past Surgical History:   Procedure Laterality Date   • APPENDECTOMY     • CARDIAC ABLATION      x2  \"years ago\"    • CARDIAC SURGERY  2006    cabg 3v   • ENDOSCOPY      with dilation    • KNEE ARTHROSCOPY Left    • " TOTAL KNEE ARTHROPLASTY Left 7/23/2021    Procedure: TOTAL KNEE ARTHROPLASTY WITH DORA NAVIGATION WITH BIOMET;  Surgeon: Carlitos Zhao MD;  Location: [facility] MAIN OR;  Service: Orthopedics;  Laterality: Left;   • VENTRICULAR CARDIAC PACEMAKER INSERTION      medtronic      Family History   Problem Relation Age of Onset   • No Known Problems Mother    • No Known Problems Father    • No Known Problems Sister    • No Known Problems Brother    • No Known Problems Maternal Aunt    • No Known Problems Maternal Uncle    • No Known Problems Paternal Aunt    • No Known Problems Paternal Uncle    • No Known Problems Maternal Grandmother    • No Known Problems Maternal Grandfather    • No Known Problems Paternal Grandmother    • No Known Problems Paternal Grandfather    • No Known Problems Other        Home Medications:  Prior to Admission medications    Medication Sig Start Date End Date Taking? Authorizing Provider   atorvastatin (LIPITOR) 40 MG tablet Take 40 mg by mouth Every Night.   Yes Ely Srinivasan MD   Eliquis 5 MG tablet tablet Take 5 mg by mouth 2 (Two) Times a Day. To stop 2 days prior to surgery as directed by Dr. Whelan 6/28/21  Yes Ely Srinivasan MD   lisinopril (PRINIVIL,ZESTRIL) 2.5 MG tablet Take 1 tablet by mouth Daily. ONLY TAKE IF SBP IS GREATER THAN 140 8/17/21  Yes Isabel Navarrete APRN   metoprolol tartrate (LOPRESSOR) 25 MG tablet Take 1 tablet by mouth As Needed (IF HR IS GREATER THAN 100.). 8/17/21  Yes Isabel Navarrete APRN   omeprazole (priLOSEC) 20 MG capsule Take 20 mg by mouth Daily.   Yes Ely Srinivasan MD   ondansetron ODT (ZOFRAN-ODT) 4 MG disintegrating tablet Place 1 tablet on the tongue Every 6 (Six) Hours As Needed for Nausea. Disp. 20 7/30/21  Yes Carlitos Zaho MD   tamsulosin (FLOMAX) 0.4 MG capsule 24 hr capsule Take 2 capsules by mouth Every Night. 6/28/21  Yes Ely Srinivasan MD   oxyCODONE-acetaminophen (PERCOCET) 7.5-325 MG per tablet Take  "1 tablet by mouth Every 4 (Four) Hours As Needed for Moderate Pain . 21   Been, Carlitos PETERSON MD        Social History:   Social History     Tobacco Use   • Smoking status: Former Smoker     Quit date: 1995     Years since quittin.1   • Smokeless tobacco: Never Used   Vaping Use   • Vaping Use: Never assessed   Substance Use Topics   • Alcohol use: Defer   • Drug use: Never          Review of Systems:  Review of Systems   Constitutional: Negative for chills and fever.   HENT: Negative for congestion, ear pain, rhinorrhea, sore throat and tinnitus.    Eyes: Negative for photophobia and pain.   Respiratory: Negative for choking and shortness of breath.    Cardiovascular: Negative for chest pain, palpitations and leg swelling.   Gastrointestinal: Negative for abdominal distention, abdominal pain, diarrhea, nausea and vomiting.   Endocrine: Negative for polydipsia.   Genitourinary: Negative for difficulty urinating, dysuria and hematuria.   Musculoskeletal: Negative for back pain, gait problem and neck pain.   Skin: Negative for rash.   Neurological: Positive for dizziness. Negative for seizures, speech difficulty, weakness, light-headedness, numbness and headaches.   Hematological: Negative for adenopathy.   Psychiatric/Behavioral: Negative for agitation, confusion, self-injury and suicidal ideas.        Physical Exam:  /79   Pulse 70   Temp 97.9 °F (36.6 °C) (Oral)   Resp 18   Ht 193 cm (76\")   Wt 93.7 kg (206 lb 9.1 oz)   SpO2 99%   BMI 25.14 kg/m²     Physical Exam  Vitals and nursing note reviewed.   Constitutional:       Appearance: Normal appearance. He is well-developed.   HENT:      Head: Normocephalic and atraumatic.      Right Ear: External ear normal.      Left Ear: External ear normal.      Nose: Nose normal.      Mouth/Throat:      Mouth: Mucous membranes are moist.      Pharynx: Oropharynx is clear.   Eyes:      General: Lids are normal.      Extraocular Movements: Extraocular " movements intact.      Conjunctiva/sclera: Conjunctivae normal.      Pupils: Pupils are equal, round, and reactive to light.   Cardiovascular:      Rate and Rhythm: Normal rate and regular rhythm.      Pulses: Normal pulses.      Heart sounds: Normal heart sounds. No murmur heard.     Pulmonary:      Effort: Pulmonary effort is normal.      Breath sounds: Normal breath sounds. No stridor. No wheezing.   Abdominal:      Palpations: Abdomen is soft.      Tenderness: There is no abdominal tenderness.   Musculoskeletal:         General: Normal range of motion.      Cervical back: Full passive range of motion without pain, normal range of motion and neck supple.      Right lower leg: No edema.      Left lower leg: No edema.      Comments: Pt has a healing surgical scar of the left knee due to a total knee replacement.    Skin:     General: Skin is warm and dry.      Capillary Refill: Capillary refill takes less than 2 seconds.   Neurological:      General: No focal deficit present.      Mental Status: He is alert and oriented to person, place, and time. Mental status is at baseline.      Cranial Nerves: Cranial nerves are intact.      Sensory: Sensation is intact.      Motor: Motor function is intact.      Coordination: Coordination is intact.   Psychiatric:         Attention and Perception: Attention and perception normal.         Mood and Affect: Mood and affect normal.         Speech: Speech normal.         Behavior: Behavior normal. Behavior is cooperative.         Thought Content: Thought content normal.         Cognition and Memory: Cognition and memory normal.                Medications in the Emergency Department:  Medications   sodium chloride 0.9 % flush 10 mL (has no administration in time range)   sodium chloride 0.9 % bolus 1,000 mL (0 mL Intravenous Stopped 8/24/21 0048)        Labs  Lab Results (last 24 hours)     Procedure Component Value Units Date/Time    CBC & Differential [[id number]]  (Abnormal)  Collected: 08/23/21 2229    Specimen: Blood Updated: 08/23/21 2241    Narrative:      The following orders were created for panel order CBC & Differential.  Procedure                               Abnormality         Status                     ---------                               -----------         ------                     CBC Auto Differential[[id number]]        Abnormal            Final result                 Please view results for these tests on the individual orders.    Comprehensive Metabolic Panel [[id number]]  (Abnormal) Collected: 08/23/21 2229    Specimen: Blood Updated: 08/23/21 2303     Glucose 112 mg/dL      BUN 47 mg/dL      Creatinine 2.60 mg/dL      Sodium 138 mmol/L      Potassium 4.0 mmol/L      Chloride 102 mmol/L      CO2 22.4 mmol/L      Calcium 9.0 mg/dL      Total Protein 6.1 g/dL      Albumin 3.50 g/dL      ALT (SGPT) 44 U/L      AST (SGOT) 24 U/L      Alkaline Phosphatase 95 U/L      Total Bilirubin 1.4 mg/dL      eGFR Non African Amer 24 mL/min/1.73      Globulin 2.6 gm/dL      A/G Ratio 1.3 g/dL      BUN/Creatinine Ratio 18.1     Anion Gap 13.6 mmol/L     Narrative:      GFR Normal >60  Chronic Kidney Disease <60  Kidney Failure <15      Troponin [[id number]]  (Abnormal) Collected: 08/23/21 2229    Specimen: Blood Updated: 08/23/21 2323     Troponin T 0.042 ng/mL     Narrative:      Troponin T Reference Range:  <= 0.03 ng/mL-   Negative for AMI  >0.03 ng/mL-     Abnormal for myocardial necrosis.  Clinicians would have to utilize clinical acumen, EKG, Troponin and serial changes to determine if it is an Acute Myocardial Infarction or myocardial injury due to an underlying chronic condition.       Results may be falsely decreased if patient taking Biotin.      Magnesium [[id number]]  (Normal) Collected: 08/23/21 2229    Specimen: Blood Updated: 08/23/21 2303     Magnesium 2.2 mg/dL     CBC Auto Differential [[id number]]  (Abnormal) Collected: 08/23/21 2229    Specimen: Blood Updated:  08/23/21 2241     WBC 16.30 10*3/mm3      RBC 5.23 10*6/mm3      Hemoglobin 15.1 g/dL      Hematocrit 46.7 %      MCV 89.3 fL      MCH 28.9 pg      MCHC 32.3 g/dL      RDW 15.1 %      RDW-SD 48.8 fl      MPV 9.5 fL      Platelets 256 10*3/mm3      Neutrophil % 67.3 %      Lymphocyte % 18.8 %      Monocyte % 10.4 %      Eosinophil % 0.7 %      Basophil % 0.2 %      Immature Grans % 2.6 %      Neutrophils, Absolute 10.97 10*3/mm3      Lymphocytes, Absolute 3.06 10*3/mm3      Monocytes, Absolute 1.69 10*3/mm3      Eosinophils, Absolute 0.12 10*3/mm3      Basophils, Absolute 0.03 10*3/mm3      Immature Grans, Absolute 0.43 10*3/mm3      nRBC 0.0 /100 WBC     Troponin [[id number]]  (Abnormal) Collected: 08/24/21 0104    Specimen: Blood Updated: 08/24/21 0200     Troponin T 0.033 ng/mL     Narrative:      Troponin T Reference Range:  <= 0.03 ng/mL-   Negative for AMI  >0.03 ng/mL-     Abnormal for myocardial necrosis.  Clinicians would have to utilize clinical acumen, EKG, Troponin and serial changes to determine if it is an Acute Myocardial Infarction or myocardial injury due to an underlying chronic condition.       Results may be falsely decreased if patient taking Biotin.             Imaging:  XR Chest 1 View    Result Date: 8/23/2021  PROCEDURE: XR CHEST 1 VW  COMPARISON: [facility], , CHEST PA/AP & LAT 2V, 5/15/2019, 11:45.  INDICATIONS: Weakness; dizziness; AMS (altered mental status).  FINDINGS:A single AP upright portable chest radiograph was performed.  Borderline cardiac enlargement is seen.  No acute infiltrate is appreciated.  No pleural effusion or pneumothorax is identified.  The patient has undergone median sternotomy and CABG surgery, seen previously.  There are fractured sternotomy wires.  There is a left-sided cardiac implantable electronic device (CIED) in place, seen previously.  External artifacts obscure detail.  No significant interval change is seen since the prior study.   CONCLUSION:No acute infiltrate is appreciated.      NELLY ELLIOTT JR, MD       Electronically Signed and Approved By: NELLY ELLIOTT JR, MD on 8/23/2021 at 23:34               Procedures:  Procedures    Progress  ED Course as of Aug 24 0207   Mon Aug 23, 2021   2484 EKG: artrial flutter with a ventricular paced rhythm at 70    [RK]      ED Course User Index  [RK] Nemo Navarrete                            Medical Decision Making:  MDM     Patient initially became hypotensive with standing during evaluation in the emergency department.  He was treated with IV fluids and is taking oral liquids at this time.  He is completely asymptomatic during his ED stay.  His troponin was found to be mildly elevated but has not trended upward over a 2-hour period.  He has not had chest pain or shortness of breath or anginal equivalents throughout the day.  He feels comfortable plan for discharge home and will follow up with his cardiologist.  He understands not to take his as needed antihypertensive dose.  He will maintain oral hydration at home.  We discussed return precautions including worsening symptoms or any additional concerns.    Final diagnoses:   Hypotension due to medication        Disposition:  ED Disposition     ED Disposition Condition Comment    Discharge Stable           Documentation assistance provided by Nemo Navarrete acting as scribe for Bobby Cuello MD. Information recorded by the scribe was done at my direction and has been verified and validated by me.          Nemo Navarrete  08/23/21 2819       Bobby Cuello MD  08/25/21 4215

## 2021-08-24 NOTE — ED NOTES
Pt to ED via ambulance from [facility] first. Pt c/o dizziness that started at 1230. Pt states taking lisinopril 2.5 at 1130. Per EMS pt BP was 80s systolic at care [facility]. Pt currently alert and oriented. Pt states dizziness worse with standing up. Pt hx pacemaker, CHF, HTN.     Rani Sanabria, MANOLO  08/23/21 4307

## 2021-08-24 NOTE — TELEPHONE ENCOUNTER
Received VM from patient.      Patient was in ER and Urgent care yesterday d/t low BP.    He is asking for you or Dr. Whelan to review and discuss with him blood pressure and medications.

## 2021-08-25 ENCOUNTER — TREATMENT (OUTPATIENT)
Dept: PHYSICAL THERAPY | Facility: CLINIC | Age: 81
End: 2021-08-25

## 2021-08-25 DIAGNOSIS — Z96.652 PRESENCE OF LEFT ARTIFICIAL KNEE JOINT: Primary | ICD-10-CM

## 2021-08-25 DIAGNOSIS — M25.562 LEFT KNEE PAIN, UNSPECIFIED CHRONICITY: ICD-10-CM

## 2021-08-25 DIAGNOSIS — R26.9 GAIT DISTURBANCE: ICD-10-CM

## 2021-08-25 PROCEDURE — 97162 PT EVAL MOD COMPLEX 30 MIN: CPT | Performed by: PHYSICAL THERAPIST

## 2021-08-25 NOTE — PROGRESS NOTES
Physical Therapy Initial Evaluation and Plan of Care      Patient: Javi Martínez   : 1940  Diagnosis/ICD-10 Code:  Presence of left artificial knee joint [Z96.652]  Referring practitioner: DOLORES Castellanos  Date of Initial Visit: 2021  Today's Date: 2021  Patient seen for 1 sessions    Progress note due: 2021  Re-cert due: 2021           Subjective Questionnaire: LEFS: 29      Precautions/Contraindication: paroxysmal a-fib, monitor for low BP  HR: 76 bpm  BP: 128/78  Sp02: 98%     Subjective Evaluation    History of Present Illness    Subjective comment: Pt states he has no pain walking into the clinic but states he is having blood pressure issues and has felt dizzy today.  He had HH PT for a month.  He fell about 2 weeks due to low BP but did not sustain any injuries.Pain  Current pain ratin    Patient Goals  Patient goals for therapy: decreased pain, decreased edema, improved balance, increased motion, independence with ADLs/IADLs, increased strength and return to sport/leisure activities             Objective          Active Range of Motion   Left Knee   Flexion: 105 degrees with pain  Extensor la degrees with pain    Right Knee   Flexion: 140 degrees   Extension: 0 degrees     Strength/Myotome Testing     Left Hip   Planes of Motion   Flexion: 4  Abduction: 4  Adduction: 4    Right Hip   Planes of Motion   Flexion: 4  Abduction: 4  Adduction: 4    Left Knee   Flexion: 4  Extension: 4  Quadriceps contraction: fair    Right Knee   Flexion: 4+  Extension: 4+  Quadriceps contraction: good    Ambulation     Observational Gait   Gait: antalgic   Decreased walking speed, stride length, left stance time, left swing time and left step length.               Assessment & Plan     Assessment  Impairments: abnormal gait, abnormal or restricted ROM, activity intolerance, impaired balance, impaired physical strength, pain with function and weight-bearing intolerance  Assessment  details: Pt presents with L TKR and has L knee mobility and strength deficits and pain with activity. Pt has gait dysfunction and decreased balance due to finding. Pt will benefit from skilled PT to address functional deficits and return to PLOF.       Prognosis: good  Functional Limitations: walking, uncomfortable because of pain, standing and unable to perform repetitive tasks  Goals  Plan Goals: KNEE PROBLEMS:     1. The patient has limited ROM of the L knee.   LTG 1: 12 weeks:  The patient will demonstrate 0 to 140 degrees of ROM for the L knee in order to allow patient to complete prolonged walking, standing, stairs and other ADLs with decreased pain/difficulty.    STATUS:  New   STG 1a:  6 weeks:  The patient will demonstrate 0 to 120 degrees of ROM for the L knee.    STATUS:  New   TREATMENT: Manual therapy, therapeutic exercise, home exercise instruction, and modalities as needed to include:  moist heat, electrical stimulation, ultrasound, and ice.    2. The patient has limited strength of the L knee.   LTG 2: 12 weeks: The patient will demonstrate 4+/5 strength for L knee flexion and extension in order to allow patient improved joint stability    STATUS:  New   STG 2a: 6 weeks: The patient will demonstrate 4/5 strength for l knee flexion and extension    STATUS:  New    TREATMENT: Manual therapy, therapeutic exercise, home exercise instruction, aquatic therapy, and modalities as needed to include:  moist heat, electrical stimulation, ultrasound, and ice.     3. The patient has gait dysfunction.   LTG 3: 12 weeks:  The patient will ambulate without assistive device, independently, for community distances with minimal limp to the l lower extremity in order to improve mobility and allow patient to perform activities such as grocery shopping with greater ease.    STATUS:  New   TREATMENT: Gait training, aquatic therapy, therapeutic exercise, and home exercise instruction.    4. Mobility: Walking/Moving Around  Functional Limitation     LTG 4: 12 weeks:  The patient will demonstrate  LEFS score of 61 in order to decrease limitations.    STATUS:  New   STG 4 a: 6 weeks:  The patient will demonstrate  LEFS score of 51 in order to decrease limitations.    STATUS:  New   TREATMENT:  Manual therapy, therapeutic exercise, home exercise instruction.       Plan  Therapy options: will be seen for skilled physical therapy services  Planned modality interventions: cryotherapy and TENS  Planned therapy interventions: balance/weight-bearing training, flexibility, functional ROM exercises, gait training, home exercise program, joint mobilization, manual therapy, neuromuscular re-education, soft tissue mobilization, strengthening, stretching and therapeutic activities  Frequency: 2x week  Duration in weeks: 12        Visit Diagnoses:    ICD-10-CM ICD-9-CM   1. Presence of left artificial knee joint  Z96.652 V43.65   2. Gait disturbance  R26.9 781.2   3. Left knee pain, unspecified chronicity  M25.562 719.46       Timed:         Manual Therapy:    0     mins  03115;     Therapeutic Exercise:    0     mins  61056;     Neuromuscular Hussain:    0    mins  06062;    Therapeutic Activity:     0     mins  90054;     Gait Trainin     mins  71984;     Ultrasound:     0     mins  84654;    Ionto                               0    mins   03511  Self-care  __0__ mins 56741    Un-Timed:  Electrical Stimulation:    0     mins  03085 ( );  Traction     0     mins 10138  Low Eval     0     Mins  57177  Mod Eval     30     Mins  76977  High Eval                       0     Mins  36220  Hot pack     0     Mins    Cold pack                       0     Mins      Timed Treatment:   0   mins   Total Treatment:     30   mins    PT SIGNATURE: Goldie Duarte PT, DPT        Initial Certification  Certification Period: 2021 thru 2021  I certify that the therapy services are furnished while this patient is under my care.  The services outlined  above are required by this patient, and will be reviewed every 90 days.     PHYSICIAN: Misty Maradiaga PA      DATE:     Please sign and return via fax to [phone].. Thank you, [facility].

## 2021-08-26 ENCOUNTER — TREATMENT (OUTPATIENT)
Dept: PHYSICAL THERAPY | Facility: CLINIC | Age: 81
End: 2021-08-26

## 2021-08-26 DIAGNOSIS — R26.9 GAIT DISTURBANCE: ICD-10-CM

## 2021-08-26 DIAGNOSIS — Z96.652 PRESENCE OF LEFT ARTIFICIAL KNEE JOINT: Primary | ICD-10-CM

## 2021-08-26 DIAGNOSIS — M25.562 LEFT KNEE PAIN, UNSPECIFIED CHRONICITY: ICD-10-CM

## 2021-08-26 PROCEDURE — 97110 THERAPEUTIC EXERCISES: CPT | Performed by: PHYSICAL THERAPIST

## 2021-08-26 PROCEDURE — 97140 MANUAL THERAPY 1/> REGIONS: CPT | Performed by: PHYSICAL THERAPIST

## 2021-08-26 NOTE — PROGRESS NOTES
Physical Therapy Daily Progress Note        Patient: Javi Martínez   : 1940  Diagnosis/ICD-10 Code:  Presence of left artificial knee joint [Z96.652]  Referring practitioner: DOLORES Castellanos  Date of Initial Visit: Type: THERAPY  Noted: 2021  Today's Date: 2021  Patient seen for 2 sessions             Subjective   Javi Martínez reports: no pain in knee, states that he does get tired pretty quick.    Objective   Knee Flexion: 116 deg  Knee Extension: 0 deg     See Exercise, Manual, and Modality Logs for complete treatment.       Assessment/Plan  Javi progressing as evident by decreased overall knee pain. Pt able to achieve 0 deg of knee extension and 116 deg of knee flexion. Pt would benefit from skilled PT to address Range of Motion  and Strength deficits, pain management and any concerns with ADLs.     Progress per Plan of Care           Timed:  Manual Therapy:    15     mins  34937;  Therapeutic Exercise:    15     mins  50852;     Neuromuscular Hussain:        mins  22196;    Therapeutic Activity:          mins  75040;     Gait Training:           mins  35782;       Untimed:  Electrical Stimulation:         mins  15940 ( );  Mechanical Traction:         mins  25322;       Timed Treatment:   30   mins   Total Treatment:     30   mins        Nelly Dangelo PTA  Physical Therapist Assistant

## 2021-08-27 ENCOUNTER — TELEPHONE (OUTPATIENT)
Dept: CARDIOLOGY | Facility: CLINIC | Age: 81
End: 2021-08-27

## 2021-08-31 ENCOUNTER — TREATMENT (OUTPATIENT)
Dept: PHYSICAL THERAPY | Facility: CLINIC | Age: 81
End: 2021-08-31

## 2021-08-31 DIAGNOSIS — R26.9 GAIT DISTURBANCE: ICD-10-CM

## 2021-08-31 DIAGNOSIS — Z96.652 PRESENCE OF LEFT ARTIFICIAL KNEE JOINT: Primary | ICD-10-CM

## 2021-08-31 DIAGNOSIS — M25.562 LEFT KNEE PAIN, UNSPECIFIED CHRONICITY: ICD-10-CM

## 2021-08-31 PROCEDURE — 97110 THERAPEUTIC EXERCISES: CPT | Performed by: PHYSICAL THERAPIST

## 2021-08-31 NOTE — PROGRESS NOTES
Physical Therapy Daily Treatment Note      Patient: Javi Martínez   : 1940  Referring practitioner: DOLORES Castellanos  Date of Initial Visit: Type: THERAPY  Noted: 2021  Today's Date: 2021  Patient seen for 3 sessions         Javi Martínez reports: 0/10 pain while walking in today. He reports he has been doing some HEP and not really icing often.       Subjective     Objective   R: 0-120   See Exercise, Manual, and Modality Logs for complete treatment.       Assessment & Plan     Assessment  Assessment details: Pt tolerated session and demonstrates improved L knee ROM and was able to progress exercises. Progress next visit as tolerated.         Visit Diagnoses:    ICD-10-CM ICD-9-CM   1. Presence of left artificial knee joint  Z96.652 V43.65   2. Gait disturbance  R26.9 781.2   3. Left knee pain, unspecified chronicity  M25.562 719.46       Progress per Plan of Care           Timed:         Manual Therapy:    0     mins  94265;     Therapeutic Exercise:    30     mins  40727;     Neuromuscular Hussain:    0    mins  11011;    Therapeutic Activity:     0     mins  78695;     Gait Trainin     mins  47601;     Ultrasound:     0     mins  57697;    Ionto                               0    mins   32949  Self-care  __0__ mins 28448    Un-Timed:  Electrical Stimulation:    0     mins  15360 ( );  Traction     0     mins 81450  Hot pack     00     Mins    Cold pack                       0     Mins      Timed Treatment:   30   mins   Total Treatment:     30   mins    Goldie Duarte PT, DPT  Physical Therapist

## 2021-09-02 ENCOUNTER — TREATMENT (OUTPATIENT)
Dept: PHYSICAL THERAPY | Facility: CLINIC | Age: 81
End: 2021-09-02

## 2021-09-02 DIAGNOSIS — Z96.652 PRESENCE OF LEFT ARTIFICIAL KNEE JOINT: Primary | ICD-10-CM

## 2021-09-02 DIAGNOSIS — R26.9 GAIT DISTURBANCE: ICD-10-CM

## 2021-09-02 DIAGNOSIS — M25.562 LEFT KNEE PAIN, UNSPECIFIED CHRONICITY: ICD-10-CM

## 2021-09-02 PROCEDURE — 97140 MANUAL THERAPY 1/> REGIONS: CPT | Performed by: PHYSICAL THERAPIST

## 2021-09-02 PROCEDURE — 97110 THERAPEUTIC EXERCISES: CPT | Performed by: PHYSICAL THERAPIST

## 2021-09-02 NOTE — PROGRESS NOTES
"Physical Therapy Daily Progress Note        Patient: Javi Martínez   : 1940  Diagnosis/ICD-10 Code:  Presence of left artificial knee joint [Z96.652]  Referring practitioner: DOLORES Castellanos  Date of Initial Visit: No linked episodes  Today's Date: 2021  Patient seen for Visit count could not be calculated. Make sure you are using a visit which is associated with an episode. sessions             Subjective   Javi Martínez denies having any pain in his left knee.  He does report that he has been having \"Gout pain\" in left foot/toe. He also reports that he has been having difficulty sleeping and slight \"Dizziness\" while ambulating into clinic today.  He stated that he has lost about 25# since surgery.    Objective     Left Knee Strength:  Flexion: 4/5  Extension:  4/5    Left Hip Abductor Strength:  Grossly 4-/5  (Ambulates with Trendelenburg pattern)    Hamstring Flexibility:    Hamstring 90/90 measurement:  Left -45 degrees    See Exercise and Manual Logs for complete treatment.       Assessment/Plan  Pt tolerated therapy session moderately well - with performance of basic therapeutic exercises, CKC-Functional activities, and Manual therapy. He has improved, but continues to have deficits in Left Knee ROM,  Strength, and Stability; limiting function and ability to perform ADLs at this time.  Progression continues to be hindered by decreased functional tolerance and need for frequent seated rest breaks secondary to complaints of fatigue.  Pt reports having lost 25# since surgery.  Recommended to pt that he discuss his weight loss and increased fatigue with his surgeon at follow-up and with PCP.  Pt and spouse verbalized good understanding.  Also suggested that he may benefit from supplementing intake with Ensure or protein shake.    Progress per Plan of Care           Timed:  Manual Therapy:    10     mins  04808;  Therapeutic Exercise:    28     mins  73426;     Neuromuscular Hussain:    0    mins  " 10011;    Therapeutic Activity:     0     mins  93428;     Gait Trainin     mins  75942;     Ultrasound:     0     mins  55674;    Electrical Stimulation:    0     mins  64938;  Iontophoresis     0     mins  75109    Untimed:  Electrical Stimulation:    0     mins  33530 ( );  Mechanical Traction:    0     mins  88962;   Fluidotherapy     0     mins  81384  Hot pack     0     mins  46277  Cold pack     0     mins  43989    Timed Treatment:   38   mins   Total Treatment:     38   mins        Brooke Navarro PTA  Physical Therapist Assistant

## 2021-09-08 ENCOUNTER — OFFICE VISIT (OUTPATIENT)
Dept: ORTHOPEDIC SURGERY | Facility: CLINIC | Age: 81
End: 2021-09-08

## 2021-09-08 ENCOUNTER — TREATMENT (OUTPATIENT)
Dept: PHYSICAL THERAPY | Facility: CLINIC | Age: 81
End: 2021-09-08

## 2021-09-08 VITALS — BODY MASS INDEX: 23.75 KG/M2 | WEIGHT: 195 LBS | HEART RATE: 108 BPM | OXYGEN SATURATION: 95 % | HEIGHT: 76 IN

## 2021-09-08 DIAGNOSIS — Z47.89 ORTHOPEDIC AFTERCARE: Primary | ICD-10-CM

## 2021-09-08 DIAGNOSIS — R26.9 GAIT DISTURBANCE: ICD-10-CM

## 2021-09-08 DIAGNOSIS — Z96.652 PRESENCE OF LEFT ARTIFICIAL KNEE JOINT: Primary | ICD-10-CM

## 2021-09-08 DIAGNOSIS — M25.562 LEFT KNEE PAIN, UNSPECIFIED CHRONICITY: ICD-10-CM

## 2021-09-08 PROCEDURE — 97140 MANUAL THERAPY 1/> REGIONS: CPT | Performed by: PHYSICAL THERAPIST

## 2021-09-08 PROCEDURE — 97110 THERAPEUTIC EXERCISES: CPT | Performed by: PHYSICAL THERAPIST

## 2021-09-08 PROCEDURE — 99024 POSTOP FOLLOW-UP VISIT: CPT | Performed by: ORTHOPAEDIC SURGERY

## 2021-09-08 RX ORDER — ALLOPURINOL 100 MG/1
TABLET ORAL
COMMUNITY
End: 2021-10-20

## 2021-09-08 NOTE — PROGRESS NOTES
"Chief Complaint  Pain of the Left Knee     Subjective      Javi Martínez presents to [facility] for an evaluation of left knee. Patient is s/p left total knee arthroplasty performed on 21 by Dr. Zhao. Patient is present today in a wheelchair for ambulation assistance. He states he is working with therapy to get full extension. He states he has had several set backs since surgery. He had a gout attack 2 weeks after surgery. Patient had low b/p and took lisinopril, this caused his BP to tank. He felt like he was unable to get out the car or he would have fallen. He ended up at the ED because of this. His BP is now under control. He has this under control now after being hospitalized. Patient states he has very little stamina. He denies being short of breath. He is unable to to walk very far without feeling tired. He is not very confident in standing on his feet due to his dizzy spells. Aside from these set backs, he has had no knee pain. He states the total knee is doing well, it's just hard being able to walk and get strength back due to his other medical complications.     No Known Allergies     Social History     Socioeconomic History   • Marital status:      Spouse name: Not on file   • Number of children: Not on file   • Years of education: Not on file   • Highest education level: Not on file   Tobacco Use   • Smoking status: Former Smoker     Quit date: 1995     Years since quittin.1   • Smokeless tobacco: Never Used   Vaping Use   • Vaping Use: Never used   Substance and Sexual Activity   • Alcohol use: Defer   • Drug use: Never   • Sexual activity: Defer        Review of Systems     Objective   Vital Signs:   Pulse 108   Ht 193 cm (76\")   Wt 88.5 kg (195 lb)   SpO2 95%   BMI 23.74 kg/m²       Physical Exam  Constitutional:       Appearance: Normal appearance. Patient is well-developed and normal weight.   HENT:      Head: Normocephalic.      Right Ear: " Hearing and external ear normal.      Left Ear: Hearing and external ear normal.      Nose: Nose normal.   Eyes:      Conjunctiva/sclera: Conjunctivae normal.   Cardiovascular:      Rate and Rhythm: Normal rate.   Pulmonary:      Effort: Pulmonary effort is normal.      Breath sounds: No wheezing or rales.   Abdominal:      Palpations: Abdomen is soft.      Tenderness: There is no abdominal tenderness.   Musculoskeletal:      Cervical back: Normal range of motion.   Skin:     Findings: No rash.   Neurological:      Mental Status: Patient  is alert and oriented to person, place, and time.   Psychiatric:         Mood and Affect: Mood and affect normal.         Judgment: Judgment normal.       Ortho Exam      LEFT KNEE:  -5 degrees of extension. Flexion to 110 degrees. Good strength to hamstrings, quadriceps, dorsiflexors and plantar flexors. Sensation grossly intact. Neurovascular intact. Calf supple, non-tender, no signs of DVT. Incisions are well healing, no signs of infection. Moderate swelling. Non-tender medial and lateral joint line. Ambulation with a wheelchair.       Procedures        Imaging Results (Most Recent)     None           Result Review :            Assessment and Plan     DX: Aftercare following left total knee arthroplasty     Patient will follow-up with his PCP to resolve the issue with his dizzy spells and have labs done. He is to continue therapy and home exercises to help with his extension and strength.     Call or return if worsening symptoms.    Follow Up     6 weeks.       Patient was given instructions and counseling regarding his condition or for health maintenance advice. Please see specific information pulled into the AVS if appropriate.     Scribed for Carlitos Zhao MD by Faby Bryan.  09/08/21   13:02 EDT      I have personally performed the services described in this document as scribed by the above individual and it is both accurate and complete. Carlitos Zhao MD 09/08/21

## 2021-09-08 NOTE — PROGRESS NOTES
Physical Therapy Daily Treatment Note      Patient: Javi Martínez   : 1940  Referring practitioner: DOLORES Castellanos  Date of Initial Visit: Type: THERAPY  Noted: 2021  Today's Date: 2021  Patient seen for 5 sessions         Javi Martínez reports: he saw the MD today and said his knee flexion looks good but he does not have full extension yet. No pain walking in the clinic today.       Subjective     Objective   L knee AROM: 3-115 deg    See Exercise, Manual, and Modality Logs for complete treatment.       Assessment & Plan     Assessment  Assessment details: Pt had decreased activity tolerance in today session due to decreased endurance and muscle fatigue. Pt states he feels really weak and tired after the session. Pt demonstrates improved L knee AROM however.         Visit Diagnoses:    ICD-10-CM ICD-9-CM   1. Presence of left artificial knee joint  Z96.652 V43.65   2. Gait disturbance  R26.9 781.2   3. Left knee pain, unspecified chronicity  M25.562 719.46       Progress per Plan of Care           Timed:         Manual Therapy:    8     mins  82922;     Therapeutic Exercise:    22     mins  02794;     Neuromuscular Hussain:    0    mins  30827;    Therapeutic Activity:     0     mins  11811;     Gait Trainin     mins  69460;     Ultrasound:     0     mins  97815;    Ionto                               0    mins   82793  Self-care  __0__ mins 76402    Un-Timed:  Electrical Stimulation:    0     mins  95311 ( );  Traction     0     mins 40630  Hot pack     0     Mins    Cold pack                       0     Mins      Timed Treatment:   30   mins   Total Treatment:     30   mins    Goldie Duarte PT, DPT  Physical Therapist

## 2021-09-10 ENCOUNTER — TREATMENT (OUTPATIENT)
Dept: PHYSICAL THERAPY | Facility: CLINIC | Age: 81
End: 2021-09-10

## 2021-09-10 DIAGNOSIS — M25.562 LEFT KNEE PAIN, UNSPECIFIED CHRONICITY: ICD-10-CM

## 2021-09-10 DIAGNOSIS — Z96.652 PRESENCE OF LEFT ARTIFICIAL KNEE JOINT: Primary | ICD-10-CM

## 2021-09-10 DIAGNOSIS — R26.9 GAIT DISTURBANCE: ICD-10-CM

## 2021-09-10 PROCEDURE — 97140 MANUAL THERAPY 1/> REGIONS: CPT | Performed by: PHYSICAL THERAPIST

## 2021-09-10 PROCEDURE — 97110 THERAPEUTIC EXERCISES: CPT | Performed by: PHYSICAL THERAPIST

## 2021-09-10 NOTE — PROGRESS NOTES
"Physical Therapy Daily Progress Note        Patient: Javi Martínez   : 1940  Diagnosis/ICD-10 Code:  Presence of left artificial knee joint [Z96.652]  Referring practitioner: DOLORES Castellanos  Date of Initial Visit: Type: THERAPY  Noted: 2021  Today's Date: 9/10/2021  Patient seen for 6 sessions             Subjective   Javi Martínez reports that his knee feels good, but states that his \"Gout\" is acting up in his left foot.  He reports that he has begun to take medicine, but states that it hasn't had time to take effect.  He reports that he has placed a call to get an appointment with PCP- awaiting call back.    Objective     Left Hip Abductor Strength:  Grossly 4-/5  (Ambulates with Trendelenburg pattern)     Hamstring Flexibility:    Hamstring 90/90 measurement:  Left -45 degrees    See Exercise and Manual Logs for complete treatment.       Assessment/Plan   Pt tolerated therapy session moderately well - with performance of basic therapeutic exercises, CKC-Functional activities, and Manual therapy. He has improved, but continues to have deficits in Left Knee ROM,  Strength, and Stability; limiting function and ability to perform ADLs at this time.  Progression continues to be hindered by decreased functional tolerance and need for frequent seated rest breaks secondary to complaints of fatigue.        Progress per Plan of Care as tolerated           Timed:  Manual Therapy:    8     mins  15957;  Therapeutic Exercise:    32     mins  16694;     Neuromuscular Hussain:    0    mins  72488;    Therapeutic Activity:     0     mins  64783;     Gait Trainin     mins  92743;     Ultrasound:     0     mins  77380;    Electrical Stimulation:    0     mins  50087;  Iontophoresis     0     mins  14976    Untimed:  Electrical Stimulation:    0     mins  68651 ( );  Mechanical Traction:    0     mins  29146;   Fluidotherapy     0     mins  75391  Hot pack     0     mins  29059  Cold pack     0     " mins  89138    Timed Treatment:   40   mins   Total Treatment:     40   mins        Brooke Navarro PTA  Physical Therapist Assistant

## 2021-09-14 ENCOUNTER — TREATMENT (OUTPATIENT)
Dept: PHYSICAL THERAPY | Facility: CLINIC | Age: 81
End: 2021-09-14

## 2021-09-14 DIAGNOSIS — R26.9 GAIT DISTURBANCE: ICD-10-CM

## 2021-09-14 DIAGNOSIS — Z96.652 PRESENCE OF LEFT ARTIFICIAL KNEE JOINT: Primary | ICD-10-CM

## 2021-09-14 DIAGNOSIS — M25.562 LEFT KNEE PAIN, UNSPECIFIED CHRONICITY: ICD-10-CM

## 2021-09-14 PROCEDURE — 97110 THERAPEUTIC EXERCISES: CPT | Performed by: PHYSICAL THERAPIST

## 2021-09-14 PROCEDURE — 97140 MANUAL THERAPY 1/> REGIONS: CPT | Performed by: PHYSICAL THERAPIST

## 2021-09-14 NOTE — PROGRESS NOTES
Physical Therapy Daily Progress Note        Patient: Javi Martínez   : 1940  Diagnosis/ICD-10 Code:  Presence of left artificial knee joint [Z96.652]  Referring practitioner: DOLORES Castellanos  Date of Initial Visit: Type: THERAPY  Noted: 2021  Today's Date: 2021  Patient seen for 7 sessions             Subjective   Javi Martínez reports: mild L knee pain at beginning of session today. Pt reports that he has R LE atrophy from chronic back issues.  Patient reports that he is going to primary care tomorrow to discuss fatigue and abnormal weight loss.    Objective   L knee PROM;0-5-124  See Exercise, Manual, and Modality Logs for complete treatment.       Assessment/Plan  Patient tolerated all exercise progressions and manual therapy well today but continues to demonstrate strength/ROM deficits limiting function.  Pt requires several seated rest breaks due to fatigue and weakness.  CGA assistance provided during standing activities. Continue to progress per patient tolerance.    Progress per Plan of Care           Timed:  Manual Therapy:    10     mins  67990;  Therapeutic Exercise:    35     mins  01797;     Neuromuscular Hussain:    0    mins  38336;    Therapeutic Activity:     0     mins  57809;     Gait Trainin     mins  35127;     Ultrasound:     0     mins  33981;    Electrical Stimulation:    0     mins  39474;  Iontophoresis     0     mins  92726    Untimed:  Electrical Stimulation:    0     mins  11972 ( );  Mechanical Traction:    0     mins  85513;   Fluidotherapy     0     mins  67351  Hot pack     0     Mins    Cold pack                       0     Mins     Timed Treatment:   45   mins   Total Treatment:     45   mins        Tootie Orellana PT  Physical Therapist

## 2021-09-16 ENCOUNTER — TELEPHONE (OUTPATIENT)
Dept: PHYSICAL THERAPY | Facility: CLINIC | Age: 81
End: 2021-09-16

## 2021-09-21 ENCOUNTER — TREATMENT (OUTPATIENT)
Dept: PHYSICAL THERAPY | Facility: CLINIC | Age: 81
End: 2021-09-21

## 2021-09-21 DIAGNOSIS — R26.9 GAIT DISTURBANCE: ICD-10-CM

## 2021-09-21 DIAGNOSIS — Z96.652 PRESENCE OF LEFT ARTIFICIAL KNEE JOINT: Primary | ICD-10-CM

## 2021-09-21 DIAGNOSIS — M25.562 LEFT KNEE PAIN, UNSPECIFIED CHRONICITY: ICD-10-CM

## 2021-09-21 PROCEDURE — 97110 THERAPEUTIC EXERCISES: CPT | Performed by: PHYSICAL THERAPIST

## 2021-09-21 NOTE — PROGRESS NOTES
Physical Therapy Daily Treatment Note      Patient: Javi Martínez   : 1940  Referring practitioner: DOLORES Castellanos  Date of Initial Visit: Type: THERAPY  Noted: 2021  Today's Date: 2021  Patient seen for 8 sessions         Javi Martínez reports: reports no knee pain today but had to cancel last visit due to gout pain in his foot. He states he has been working on walking at home but has not been doing any exercises.       Subjective     Objective   B knee strength: 4/5   See Exercise, Manual, and Modality Logs for complete treatment.       Assessment & Plan     Assessment  Assessment details: Pt demonstrates increased fatigue and mild report of dizziness after step ups which improved after seated rest break. Pt demonstrates good knee ROM and strength but continues to be limited in endurance and requires long rest breaks due to fatigue. Progress patient next visit as tolerated.   Prognosis: good        Visit Diagnoses:    ICD-10-CM ICD-9-CM   1. Presence of left artificial knee joint  Z96.652 V43.65   2. Gait disturbance  R26.9 781.2   3. Left knee pain, unspecified chronicity  M25.562 719.46       Progress per Plan of Care           Timed:         Manual Therapy:    0     mins  74808;     Therapeutic Exercise:    30     mins  23964;     Neuromuscular Hussain:    0    mins  69181;    Therapeutic Activity:     0     mins  88725;     Gait Trainin     mins  45941;     Ultrasound:     0     mins  04365;    Ionto                               0    mins   95221  Self-care  __0__ mins 65180    Un-Timed:  Electrical Stimulation:    0     mins  22921 ( );  Traction     0     mins 97950  Hot pack     0     Mins    Cold pack                       0     Mins      Timed Treatment:   30   mins   Total Treatment:     30   mins    Goldie Duarte PT, DPT  Physical Therapist

## 2021-09-23 ENCOUNTER — TREATMENT (OUTPATIENT)
Dept: PHYSICAL THERAPY | Facility: CLINIC | Age: 81
End: 2021-09-23

## 2021-09-23 DIAGNOSIS — M25.562 LEFT KNEE PAIN, UNSPECIFIED CHRONICITY: ICD-10-CM

## 2021-09-23 DIAGNOSIS — R26.9 GAIT DISTURBANCE: ICD-10-CM

## 2021-09-23 DIAGNOSIS — Z96.652 PRESENCE OF LEFT ARTIFICIAL KNEE JOINT: Primary | ICD-10-CM

## 2021-09-23 PROCEDURE — 97110 THERAPEUTIC EXERCISES: CPT | Performed by: PHYSICAL THERAPIST

## 2021-09-23 NOTE — PROGRESS NOTES
Physical Therapy Daily Treatment Note      Patient: Javi Martínez   : 1940  Referring practitioner: DOLORES Castellanos  Date of Initial Visit: Type: THERAPY  Noted: 2021  Today's Date: 2021  Patient seen for 9 sessions         Javi Martínez reports: no complaints today.       Subjective     Objective   See Exercise, Manual, and Modality Logs for complete treatment.       Assessment/Plan    Visit Diagnoses:    ICD-10-CM ICD-9-CM   1. Presence of left artificial knee joint  Z96.652 V43.65   2. Gait disturbance  R26.9 781.2   3. Left knee pain, unspecified chronicity  M25.562 719.46       Progress per Plan of Care           Timed:         Manual Therapy:    0     mins  79691;     Therapeutic Exercise:    38     mins  72646;     Neuromuscular Hussain:    0    mins  00639;    Therapeutic Activity:     0     mins  13807;     Gait Trainin     mins  44976;     Ultrasound:     0     mins  03369;    Ionto                               0    mins   48611  Self-care  __0__ mins 73612    Un-Timed:  Electrical Stimulation:    0     mins  67197 ( );  Traction     0     mins 50096  Hot pack     0     Mins    Cold pack                       0     Mins      Timed Treatment:   38   mins   Total Treatment:     38   mins    Goldie Duarte PT, DPT  Physical Therapist

## 2021-09-27 ENCOUNTER — TELEPHONE (OUTPATIENT)
Dept: CARDIOLOGY | Facility: CLINIC | Age: 81
End: 2021-09-27

## 2021-09-27 ENCOUNTER — CLINICAL SUPPORT NO REQUIREMENTS (OUTPATIENT)
Dept: CARDIOLOGY | Facility: CLINIC | Age: 81
End: 2021-09-27

## 2021-09-27 DIAGNOSIS — Z95.0 PRESENCE OF CARDIAC PACEMAKER: Primary | ICD-10-CM

## 2021-09-27 DIAGNOSIS — I44.2 CHB (COMPLETE HEART BLOCK) (HCC): ICD-10-CM

## 2021-09-27 PROCEDURE — 93279 PRGRMG DEV EVAL PM/LDLS PM: CPT | Performed by: INTERNAL MEDICINE

## 2021-09-27 NOTE — PROGRESS NOTES
Normal VVIR Chamber Pacemaker device interrogation.  Normal evaluation of device function and lead measurements.  Patients device has reached ATTILA and he is pacemaker dependent.

## 2021-09-27 NOTE — TELEPHONE ENCOUNTER
Patient has reached ATTILA, He can wait several weeks before being scheduled because he just had knee replacement.  He is Pacemaker Dependent.

## 2021-09-28 ENCOUNTER — TELEPHONE (OUTPATIENT)
Dept: CARDIOLOGY | Facility: CLINIC | Age: 81
End: 2021-09-28

## 2021-09-28 ENCOUNTER — TREATMENT (OUTPATIENT)
Dept: PHYSICAL THERAPY | Facility: CLINIC | Age: 81
End: 2021-09-28

## 2021-09-28 DIAGNOSIS — Z96.652 PRESENCE OF LEFT ARTIFICIAL KNEE JOINT: Primary | ICD-10-CM

## 2021-09-28 DIAGNOSIS — R26.9 GAIT DISTURBANCE: ICD-10-CM

## 2021-09-28 DIAGNOSIS — M25.562 LEFT KNEE PAIN, UNSPECIFIED CHRONICITY: ICD-10-CM

## 2021-09-28 PROCEDURE — 97110 THERAPEUTIC EXERCISES: CPT | Performed by: PHYSICAL THERAPIST

## 2021-09-28 NOTE — TELEPHONE ENCOUNTER
----- Message from Michelle Ramos sent at 9/27/2021 11:07 AM EDT -----  Patient has reached ATTILA

## 2021-09-28 NOTE — PROGRESS NOTES
PROG NOTE      Patient: Javi Martínez   : 1940  Diagnosis/ICD-10 Code:  Presence of left artificial knee joint [Z96.652]  Referring practitioner: DOLORES Castellanos  Date of Initial Visit: Type: THERAPY  Noted: 2021  Today's Date: 2021  Patient seen for 10 sessions    Progress note due: 10/28/2021  Re-cert due: 2021    Subjective:     Subjective Questionnaire: LEFS: 35/80  Clinical Progress: improved  Home Program Compliance: Yes  Treatment has included: therapeutic exercise, manual therapy and gait training    Subjective Evaluation    History of Present Illness    Subjective comment: Reports he is still concerned about his L knee swelling. He got his lab results back and told he is low on Iron and is pre-diabetic. He started a new iron pill.Pain  No pain reported         Objective   Active Range of Motion   Left Knee   Flexion: 123 degrees   Extensor ladegrees      Right Knee   Flexion: 140 degrees   Extension: 0 degrees      Strength/Myotome Testing      Left Hip   Planes of Motion   Flexion: 4  Abduction: 4  Adduction: 4     Right Hip   Planes of Motion   Flexion: 4  Abduction: 4  Adduction: 4     Left Knee   Flexion: 4+  Extension: 4  Quadriceps contraction: good     Right Knee   Flexion: 4+  Extension: 4+  Quadriceps contraction: good     Ambulation      Observational Gait   Gait: antalgic   Decreased walking speed, stride length, left stance time, left swing time and left step length.         Assessment & Plan     Assessment  Impairments: abnormal gait, abnormal or restricted ROM, activity intolerance, impaired balance, impaired physical strength, pain with function and weight-bearing intolerance  Assessment details: Pt continues to present with L knee pain, weakness, decreased strength and endurance, and limited walking and activity tolerance. Pt will benefit from skilled PT to address functional deficits and return to PLOF.       Functional Limitations: walking, uncomfortable  because of pain, standing and unable to perform repetitive tasks  Goals  Plan Goals:   1. The patient has limited ROM of the L knee.              LTG 1: 12 weeks:  The patient will demonstrate 0 to 140 degrees of ROM for the L knee in order to allow patient to complete prolonged walking, standing, stairs and other ADLs with decreased pain/difficulty.                          STATUS:  ongoing              STG 1a:  6 weeks:  The patient will demonstrate 0 to 120 degrees of ROM for the L knee.                          STATUS:  met              TREATMENT: Manual therapy, therapeutic exercise, home exercise instruction, and modalities as needed to include:  moist heat, electrical stimulation, ultrasound, and ice.    2. The patient has limited strength of the L knee.              LTG 2: 12 weeks: The patient will demonstrate 4+/5 strength for L knee flexion and extension in order to allow patient improved joint stability                          STATUS:  ongoing              STG 2a: 6 weeks: The patient will demonstrate 4/5 strength for l knee flexion and extension                          STATUS: met              TREATMENT: Manual therapy, therapeutic exercise, home exercise instruction, aquatic therapy, and modalities as needed to include:  moist heat, electrical stimulation, ultrasound, and ice.                3. The patient has gait dysfunction.              LTG 3: 12 weeks:  The patient will ambulate without assistive device, independently, for community distances with minimal limp to the l lower extremity in order to improve mobility and allow patient to perform activities such as grocery shopping with greater ease.                          STATUS: ongoing              TREATMENT: Gait training, aquatic therapy, therapeutic exercise, and home exercise instruction.    4. Mobility: Walking/Moving Around Functional Limitation                               LTG 4: 12 weeks:  The patient will demonstrate  LEFS score of 61  in order to decrease limitations.                          STATUS:  ongoing              STG 4 a: 6 weeks:  The patient will demonstrate  LEFS score of 51 in order to decrease limitations.                          STATUS:  ongoing              TREATMENT:  Manual therapy, therapeutic exercise, home exercise instruction.     Plan  Therapy options: will be seen for skilled physical therapy services  Planned modality interventions: cryotherapy, thermotherapy (hydrocollator packs) and TENS  Planned therapy interventions: balance/weight-bearing training, flexibility, functional ROM exercises, gait training, home exercise program, joint mobilization, manual therapy, neuromuscular re-education, soft tissue mobilization, strengthening, stretching and therapeutic activities  Frequency: 2x week  Duration in weeks: 8  Treatment plan discussed with: patient        Visit Diagnoses:    ICD-10-CM ICD-9-CM   1. Presence of left artificial knee joint  Z96.652 V43.65   2. Gait disturbance  R26.9 781.2   3. Left knee pain, unspecified chronicity  M25.562 719.46       Progress toward previous goals: Partially Met    Recommendations: Continue as planned  Timeframe: 2 months  Prognosis to achieve goals: good    PT Signature: Goldie Duarte PT, DPT      Based upon review of the patient's progress and continued therapy plan, it is my medical opinion that Javi Martínez should continue physical therapy treatment at [facility]  [address] [place] [id number] [phone].    Signature: __________________________________  Misty Maradiaga PA    Timed:         Manual Therapy:    0     mins  73116;     Therapeutic Exercise:    23     mins  18826;     Neuromuscular Hussain:    0    mins  04547;    Therapeutic Activity:     0     mins  75282;     Gait Trainin     mins  64732;     Ultrasound:     0     mins  92829;    Ionto                               0    mins    85936  Self-care  __0__ mins 06859    Un-Timed:  Electrical Stimulation:    0     mins  84056 ( );  Traction     0     mins 50765  Re- Eval     7     Mins     Hot pack     0     Mins    Cold pack                       0     Mins      Timed Treatment:   23  mins   Total Treatment:     30   mins

## 2021-09-29 DIAGNOSIS — I50.21 ACUTE SYSTOLIC CHF (CONGESTIVE HEART FAILURE) (HCC): ICD-10-CM

## 2021-09-29 DIAGNOSIS — I50.20 HFREF (HEART FAILURE WITH REDUCED EJECTION FRACTION) (HCC): Primary | ICD-10-CM

## 2021-10-07 ENCOUNTER — TREATMENT (OUTPATIENT)
Dept: PHYSICAL THERAPY | Facility: CLINIC | Age: 81
End: 2021-10-07

## 2021-10-07 DIAGNOSIS — M25.562 LEFT KNEE PAIN, UNSPECIFIED CHRONICITY: ICD-10-CM

## 2021-10-07 DIAGNOSIS — R26.9 GAIT DISTURBANCE: ICD-10-CM

## 2021-10-07 DIAGNOSIS — Z96.652 PRESENCE OF LEFT ARTIFICIAL KNEE JOINT: Primary | ICD-10-CM

## 2021-10-07 PROCEDURE — 97110 THERAPEUTIC EXERCISES: CPT | Performed by: PHYSICAL THERAPIST

## 2021-10-07 NOTE — PROGRESS NOTES
Physical Therapy Daily Treatment Note      Patient: Javi Martínez   : 1940  Referring practitioner: DOLORES Castellanos  Date of Initial Visit: Type: THERAPY  Noted: 2021  Today's Date: 10/7/2021  Patient seen for 11 sessions         Javi Martínez reports: reports no pain today in the knee. He presents today with a cane. He states he still feels a little light headed when getting up from sitting. Pt states he got lightheaded getting up from the sofa yesterday and fell back down into the sofa. He denies any injuries and felt fine sitting and resting. He is supposed to see his cardiologist about getting his pacemaker replaced.       Subjective     Objective   See Exercise, Manual, and Modality Logs for complete treatment.       Assessment & Plan     Assessment  Assessment details: Pt able to progress to increased repetitions on exercises indicating improve LE endurance but continues to have limited stamina and stability/strength. Pt had low back spasms during hip exercises but was reduced with moist heat pack. Progress patient next visit as tolerated but be cautions with supine exercises due to low back pain.         Visit Diagnoses:    ICD-10-CM ICD-9-CM   1. Presence of left artificial knee joint  Z96.652 V43.65   2. Gait disturbance  R26.9 781.2   3. Left knee pain, unspecified chronicity  M25.562 719.46       Progress strengthening /stabilization /functional activity           Timed:         Manual Therapy:   0     mins  01617;     Therapeutic Exercise:    45     mins  61942;     Neuromuscular Hussain:    0    mins  75655;    Therapeutic Activity:     0     mins  80815;     Gait Trainin     mins  73780;     Ultrasound:     0     mins  99224;    Ionto                               0    mins   84061  Self-care  __0__ mins 71194    Un-Timed:  Electrical Stimulation:    0     mins  97185 ( );  Traction     0     mins 07659  Hot pack     10     Mins    Cold pack                       0      Mins      Timed Treatment:     45 mins   Total Treatment:     45   mins    Goldie Duarte PT, DPT  Physical Therapist

## 2021-10-12 ENCOUNTER — TELEPHONE (OUTPATIENT)
Dept: CARDIOLOGY | Facility: CLINIC | Age: 81
End: 2021-10-12

## 2021-10-12 ENCOUNTER — TREATMENT (OUTPATIENT)
Dept: PHYSICAL THERAPY | Facility: CLINIC | Age: 81
End: 2021-10-12

## 2021-10-12 ENCOUNTER — PREP FOR SURGERY (OUTPATIENT)
Dept: OTHER | Facility: HOSPITAL | Age: 81
End: 2021-10-12

## 2021-10-12 DIAGNOSIS — Z96.652 PRESENCE OF LEFT ARTIFICIAL KNEE JOINT: Primary | ICD-10-CM

## 2021-10-12 DIAGNOSIS — M25.562 LEFT KNEE PAIN, UNSPECIFIED CHRONICITY: ICD-10-CM

## 2021-10-12 DIAGNOSIS — R26.9 GAIT DISTURBANCE: ICD-10-CM

## 2021-10-12 DIAGNOSIS — R00.1 BRADYCARDIA: Primary | ICD-10-CM

## 2021-10-12 PROCEDURE — 97110 THERAPEUTIC EXERCISES: CPT | Performed by: PHYSICAL THERAPIST

## 2021-10-12 PROCEDURE — 97140 MANUAL THERAPY 1/> REGIONS: CPT | Performed by: PHYSICAL THERAPIST

## 2021-10-12 RX ORDER — CEFAZOLIN SODIUM 2 G/100ML
2 INJECTION, SOLUTION INTRAVENOUS ONCE
Status: CANCELLED | OUTPATIENT
Start: 2021-10-12 | End: 2021-10-12

## 2021-10-12 NOTE — PROGRESS NOTES
Physical Therapy Daily Progress Note        Patient: Javi Martínez   : 1940  Diagnosis/ICD-10 Code:  Presence of left artificial knee joint [Z96.652]  Referring practitioner: DOLORES Castellanos  Date of Initial Visit: Type: THERAPY  Noted: 2021  Today's Date: 10/12/2021  Patient seen for 12 sessions             Subjective   Javi Martínez reports: minimal knee pain today but states that he has been more dizzy the past week.    Objective   Left knee extension MMT: 4-/5  See Exercise, Manual, and Modality Logs for complete treatment.       Assessment/Plan  Patient tolerated all exercise progressions and manual therapy well today but continues to demonstrate strength/ROM deficits limiting function. Pt requires increased rest breaks due to fatigue and weakness and verbal/tactile cues in order to complete exercises with proper form.  Continue to progress per patient tolerance.    Progress per Plan of Care           Timed:  Manual Therapy:    8     mins  03409;  Therapeutic Exercise:    30     mins  33230;     Neuromuscular Hussain:    0    mins  30667;    Therapeutic Activity:     0     mins  06950;     Gait Trainin     mins  54322;     Ultrasound:     0     mins  36153;    Electrical Stimulation:    0     mins  39645;  Iontophoresis     0     mins  88981    Untimed:  Electrical Stimulation:    0     mins  30964 ( );  Mechanical Traction:    0     mins  29328;   Fluidotherapy     0     mins  60492  Hot pack     0     Mins    Cold pack                       0     Mins     Timed Treatment:   38   mins   Total Treatment:     38   mins        Tootie Orellana PT  Physical Therapist

## 2021-10-12 NOTE — TELEPHONE ENCOUNTER
Called patient and discussed with him results of echocardiogram showing mildly reduced ejection fraction of 45%.  Since he has reached ATTILA on his pacemaker discussed with him the possibility of upgrade to biventricular pacemaker he felt since he was doing well symptomatically other than some intermittent dizziness issues that he wished to hold off on that and just do a battery change.  Did discuss the risk benefits alternatives including infection and hematoma with procedure patient was agreeable to proceed

## 2021-10-14 ENCOUNTER — TREATMENT (OUTPATIENT)
Dept: PHYSICAL THERAPY | Facility: CLINIC | Age: 81
End: 2021-10-14

## 2021-10-14 DIAGNOSIS — Z96.652 PRESENCE OF LEFT ARTIFICIAL KNEE JOINT: Primary | ICD-10-CM

## 2021-10-14 DIAGNOSIS — R26.9 GAIT DISTURBANCE: ICD-10-CM

## 2021-10-14 DIAGNOSIS — M25.562 LEFT KNEE PAIN, UNSPECIFIED CHRONICITY: ICD-10-CM

## 2021-10-14 PROCEDURE — 97110 THERAPEUTIC EXERCISES: CPT | Performed by: PHYSICAL THERAPIST

## 2021-10-14 NOTE — PROGRESS NOTES
Physical Therapy Daily Treatment Note      Patient: Javi Martínez   : 1940  Referring practitioner: DOLORES Castellanos  Date of Initial Visit: Type: THERAPY  Noted: 2021  Today's Date: 10/14/2021  Patient seen for 13 sessions         Javi Martínez reports: 0/10 knee pain today. He saw his heart DrKeara And is getting his pacemaker replaced next month. Its been 8 years so its time for a replacement. His Dr. Was unsure what was causing his dizziness. Pt states he was reading the side effects of one of his medication and one side effect listed was dizziness so he cut his pill in half and noticed that he did not have any dizziness today.       Subjective     Objective   See Exercise, Manual, and Modality Logs for complete treatment.       Assessment & Plan     Assessment  Assessment details: Tolerated session well and demonstrate increased BLE strength and endurance , requiring decreased rest breaks and did not have any symptoms of lightheadedness in today's session. Pt to follow up MD on Monday to see if they would like patient to continue with PT.         Visit Diagnoses:    ICD-10-CM ICD-9-CM   1. Presence of left artificial knee joint  Z96.652 V43.65   2. Gait disturbance  R26.9 781.2   3. Left knee pain, unspecified chronicity  M25.562 719.46       Progress per Plan of Care           Timed:         Manual Therapy:    0     mins  08827;     Therapeutic Exercise:    45     mins  17530;     Neuromuscular Hussain:    0    mins  88399;    Therapeutic Activity:     0     mins  11258;     Gait Trainin     mins  89468;     Ultrasound:     0     mins  91263;    Ionto                               0    mins   41862  Self-care  __0__ mins 00148    Un-Timed:  Electrical Stimulation:    0     mins  38275 ( );  Traction     0     mins 40974  Hot pack     0     Mins    Cold pack                       0     Mins      Timed Treatment:   45   mins   Total Treatment:     45   mins    Goldie Duarte PT,  DPT  Physical Therapist

## 2021-10-18 ENCOUNTER — OFFICE VISIT (OUTPATIENT)
Dept: ORTHOPEDIC SURGERY | Facility: CLINIC | Age: 81
End: 2021-10-18

## 2021-10-18 VITALS — WEIGHT: 199 LBS | HEART RATE: 77 BPM | OXYGEN SATURATION: 96 % | HEIGHT: 76 IN | BODY MASS INDEX: 24.23 KG/M2

## 2021-10-18 DIAGNOSIS — Z96.652 STATUS POST TOTAL LEFT KNEE REPLACEMENT: ICD-10-CM

## 2021-10-18 DIAGNOSIS — M25.562 LEFT KNEE PAIN, UNSPECIFIED CHRONICITY: Primary | ICD-10-CM

## 2021-10-18 DIAGNOSIS — Z47.89 ORTHOPEDIC AFTERCARE: ICD-10-CM

## 2021-10-18 PROCEDURE — 99024 POSTOP FOLLOW-UP VISIT: CPT | Performed by: PHYSICIAN ASSISTANT

## 2021-10-18 NOTE — PATIENT INSTRUCTIONS
X-rays taken and reviewed.  Recommend patient continues PT for the next 3 to 4 weeks to work on gait and to hopefully help him discontinue use of cane.  Follow-up in 1 month for recheck without x-rays at that visit.

## 2021-10-18 NOTE — PROGRESS NOTES
"Chief Complaint  Pain of the Left Knee    Subjective          Javi Martínez presents to [facility] for follow-up on left knee status post left knee arthroplasty performed by Dr. Zhao 7/23/2021.  He is 3 months postop and presents today using a cane for ambulation.  He states that he has 2 physical therapy visits left and he is wondering if he can discontinue therapy following this.  He states he occasionally has a sharp pain in the knee at the surface near the skin.  He states after he had his total knee replacement he had issues with his blood pressure and had an episode where he fainted due to low blood pressure, he has since discontinued blood pressure medication but he states he walks with the cane because he is scared he may pass out/fall again.    Objective   No Known Allergies    Vital Signs:   Pulse 77   Ht 193 cm (76\")   Wt 90.3 kg (199 lb)   SpO2 96%   BMI 24.22 kg/m²       Physical Exam  Constitutional:       Appearance: Normal appearance. Patient is well-developed and normal weight.   HENT:      Head: Normocephalic.      Right Ear: Hearing and external ear normal.      Left Ear: Hearing and external ear normal.      Nose: Nose normal.   Eyes:      Conjunctiva/sclera: Conjunctivae normal.   Cardiovascular:      Rate and Rhythm: Normal rate.   Pulmonary:      Effort: Pulmonary effort is normal.      Breath sounds: No wheezing or rales.   Abdominal:      Palpations: Abdomen is soft.      Tenderness: There is no abdominal tenderness.   Musculoskeletal:      Cervical back: Normal range of motion.   Skin:     Findings: No rash.   Neurological:      Mental Status: Patient is alert and oriented to person, place, and time.   Psychiatric:         Mood and Affect: Mood and affect normal.         Judgment: Judgment normal.     Ortho Exam  Left knee: Well-healing surgical incision without erythema dehiscence or purulent drainage, mild swelling, no tenderness to palpation, range of " motion 0-1 20, gait mildly antalgic while using cane, sensation light touch is intact and posterior tib pulses are 2+  Result Review :            Imaging Results (Most Recent)     Procedure Component Value Units Date/Time    XR Knee 3 View Left [[id number]] Resulted: 10/18/21 1431     Updated: 10/18/21 1432    Narrative:      X-Ray Report:  Study: X-rays ordered, taken in the office, and reviewed today  Site: Left knee xray  Indication: Pain  View: AP, Lateral and Sunrise view(s)  Findings: Hardware intact without acute osseous abnormality, malalignment   or soft tissue swelling  Prior studies available for comparison: yes                   Assessment and Plan    Problem List Items Addressed This Visit        Musculoskeletal and Injuries    S/P TKR (total knee replacement)    Current Assessment & Plan     X-rays taken and reviewed.  Recommend patient continues PT for the next 3 to 4 weeks to work on gait and to hopefully help him discontinue use of cane.  Follow-up in 1 month for recheck without x-rays at that visit.           Other Visit Diagnoses     Left knee pain, unspecified chronicity    -  Primary    Relevant Orders    XR Knee 3 View Left (Completed)    Ambulatory Referral to Physical Therapy Evaluate and treat, POST OP    Orthopedic aftercare        Relevant Orders    Ambulatory Referral to Physical Therapy Evaluate and treat, POST OP          Follow Up   Return in about 4 weeks (around 11/15/2021) for Recheck.  Patient Instructions   X-rays taken and reviewed.  Recommend patient continues PT for the next 3 to 4 weeks to work on gait and to hopefully help him discontinue use of cane.  Follow-up in 1 month for recheck without x-rays at that visit.    Patient was given instructions and counseling regarding his condition or for health maintenance advice. Please see specific information pulled into the AVS if appropriate.

## 2021-10-20 ENCOUNTER — OFFICE VISIT (OUTPATIENT)
Dept: CARDIOLOGY | Facility: CLINIC | Age: 81
End: 2021-10-20

## 2021-10-20 ENCOUNTER — PREP FOR SURGERY (OUTPATIENT)
Dept: OTHER | Facility: HOSPITAL | Age: 81
End: 2021-10-20

## 2021-10-20 VITALS
SYSTOLIC BLOOD PRESSURE: 133 MMHG | DIASTOLIC BLOOD PRESSURE: 76 MMHG | HEIGHT: 76 IN | BODY MASS INDEX: 23.99 KG/M2 | HEART RATE: 71 BPM | WEIGHT: 197 LBS

## 2021-10-20 DIAGNOSIS — I10 ESSENTIAL HYPERTENSION: ICD-10-CM

## 2021-10-20 DIAGNOSIS — I25.10 CORONARY ARTERIOSCLEROSIS IN NATIVE ARTERY: Primary | ICD-10-CM

## 2021-10-20 DIAGNOSIS — I50.20 HFREF (HEART FAILURE WITH REDUCED EJECTION FRACTION) (HCC): ICD-10-CM

## 2021-10-20 DIAGNOSIS — E78.5 HYPERLIPIDEMIA LDL GOAL <70: ICD-10-CM

## 2021-10-20 PROBLEM — R00.1 BRADYCARDIA: Status: ACTIVE | Noted: 2021-10-20

## 2021-10-20 PROBLEM — Z95.0 PRESENCE OF CARDIAC PACEMAKER: Status: ACTIVE | Noted: 2021-10-20

## 2021-10-20 PROCEDURE — 99214 OFFICE O/P EST MOD 30 MIN: CPT | Performed by: INTERNAL MEDICINE

## 2021-10-20 RX ORDER — LOSARTAN POTASSIUM 25 MG/1
12.5 TABLET ORAL DAILY
Qty: 45 TABLET | Refills: 3 | Status: SHIPPED | OUTPATIENT
Start: 2021-10-20 | End: 2021-11-26

## 2021-10-20 NOTE — ASSESSMENT & PLAN NOTE
Device has reached ATTILA recommended battery change discussed the possibility of BiV upgrade patient prefers just to have his battery changed after going over risk benefits alternatives

## 2021-10-20 NOTE — ASSESSMENT & PLAN NOTE
I discussed with patient the possibility of upgrade to a BiV pacemaking system he felt since he was doing stable symptom wise that he preferred just to have the battery change at this point and see how he does in the future clinically.  We will have her battery change November 1 discussed risk benefits alternatives.  We will go ahead and attempt to resume heart failure therapy with losartan 12.5 once a day

## 2021-10-20 NOTE — PROGRESS NOTES
Chief Complaint  Atrial Fibrillation, Coronary Artery Disease, Hyperlipidemia, and no refills needed    Subjective    Patient has been doing stable from a shortness of breath standpoint no increased edema.  His blood pressure has improved since holding his antihypertensives he does not report any weight gain    Past Medical History:   Diagnosis Date   • Arthritis     left knee    • Essential hypertension 2019   • GERD (gastroesophageal reflux disease)    • HFrEF (heart failure with reduced ejection fraction) 2021   • Hyperlipidemia 2021   • Paroxysmal atrial fibrillation    • Prostate cancer 2010    with seeds implant          Current Outpatient Medications:   •  Eliquis 5 MG tablet tablet, Take 5 mg by mouth 2 (Two) Times a Day. To stop 2 days prior to surgery as directed by Dr. Whelan, Disp: , Rfl:   •  omeprazole (priLOSEC) 20 MG capsule, Take 20 mg by mouth Daily., Disp: , Rfl:   •  tamsulosin (FLOMAX) 0.4 MG capsule 24 hr capsule, Take 1 capsule by mouth Every Night., Disp: , Rfl:     Medications Discontinued During This Encounter   Medication Reason   • allopurinol (ZYLOPRIM) 100 MG tablet    • atorvastatin (LIPITOR) 40 MG tablet    • lisinopril (PRINIVIL,ZESTRIL) 2.5 MG tablet    • metoprolol tartrate (LOPRESSOR) 25 MG tablet    • ondansetron ODT (ZOFRAN-ODT) 4 MG disintegrating tablet    • oxyCODONE-acetaminophen (PERCOCET) 7.5-325 MG per tablet      No Known Allergies     Social History     Tobacco Use   • Smoking status: Former Smoker     Packs/day: 0.50     Types: Cigarettes     Start date:      Quit date: 1995     Years since quittin.2   • Smokeless tobacco: Never Used   Vaping Use   • Vaping Use: Never used   Substance Use Topics   • Alcohol use: Defer   • Drug use: Never       Family History   Problem Relation Age of Onset   • No Known Problems Mother    • No Known Problems Father    • No Known Problems Sister    • No Known Problems Brother    • No Known Problems Maternal Aunt  "   • No Known Problems Maternal Uncle    • No Known Problems Paternal Aunt    • No Known Problems Paternal Uncle    • No Known Problems Maternal Grandmother    • No Known Problems Maternal Grandfather    • No Known Problems Paternal Grandmother    • No Known Problems Paternal Grandfather    • No Known Problems Other         Objective     /76   Pulse 71   Ht 193 cm (76\")   Wt 89.4 kg (197 lb)   BMI 23.98 kg/m²       Physical Exam    General Appearance:   · no acute distress  · Alert and oriented x3  HENT:   · lips not cyanotic  · Atraumatic  Neck:  · No jvd   · supple  Respiratory:  · no respiratory distress  · normal breath sounds  · no rales  Cardiovascular:  · Regular rate and rhythm  · no S3, no S4   · no murmur  · no rub  Extremities  · No cyanosis  · lower extremity edema: none    Skin:   · warm, dry  · No rashes      Result Review :     No results found for: PROBNP  CMP    CMP 7/20/21 8/23/21   Glucose 126 (A) 112 (A)   BUN 29 (A) 47 (A)   Creatinine 1.98 (A) 2.60 (A)   eGFR Non  Am 33 (A) 24 (A)   Sodium 139 138   Potassium 4.0 4.0   Chloride 102 102   Calcium 9.0 9.0   Albumin 3.60 3.50   Total Bilirubin 1.5 (A) 1.4 (A)   Alkaline Phosphatase 83 95   AST (SGOT) 19 24   ALT (SGPT) 32 44 (A)   (A) Abnormal value            CBC w/diff    CBC w/Diff 7/20/21 7/24/21 8/23/21   WBC 9.49 17.61 (A) 16.30 (A)   RBC 5.27 4.57 5.23   Hemoglobin 15.5 13.5 15.1   Hematocrit 47.6 41.4 46.7   MCV 90.3 90.6 89.3   MCH 29.4 29.5 28.9   MCHC 32.6 32.6 32.3   RDW 13.9 13.9 15.1   Platelets 207 183 256   Neutrophil Rel % 60.1  67.3   Immature Granulocyte Rel % 0.5  2.6 (A)   Lymphocyte Rel % 27.9  18.8 (A)   Monocyte Rel % 9.3  10.4   Eosinophil Rel % 1.7  0.7   Basophil Rel % 0.5  0.2   (A) Abnormal value             No results found for: TSH   No results found for: FREET4   No results found for: DDIMERQUANT  Magnesium   Date Value Ref Range Status   08/23/2021 2.2 1.6 - 2.4 mg/dL Final      No results found " for: DIGOXIN   Lab Results   Component Value Date    TROPONINT 0.033 (C) 08/24/2021             No results found for: POCTROP    Results for orders placed in visit on 10/01/21    Adult Transthoracic Echo Complete W/ Cont if Necessary Per Protocol    Interpretation Summary  · Estimated left ventricular EF = 45% Left ventricular systolic function is low normal.  · The left ventricular cavity is moderately dilated.  · Left ventricular wall thickness is consistent with mild to moderate concentric hypertrophy.  · The right ventricular cavity is mild to moderately dilated.  · The right atrial cavity is moderately dilated.  · Moderate dilation of the aortic root is present. Measuring 5.0 cm at greatest severity  · Estimated right ventricular systolic pressure from tricuspid regurgitation is mildly elevated (35-45 mmHg).  · Left atrium severely dilated  · Tricuspid regurgitation mild to moderate in severity  · Aortic insufficiency mild                 Diagnoses and all orders for this visit:    1. CAD with CABG (Primary)  Assessment & Plan:  No anginal symptoms recommended the resumption of aspirin 81 mg preventatively once a day      2. HFrEF (heart failure with reduced ejection fraction)  Assessment & Plan:  I discussed with patient the possibility of upgrade to a BiV pacemaking system he felt since he was doing stable symptom wise that he preferred just to have the battery change at this point and see how he does in the future clinically.  We will have her battery change November 1 discussed risk benefits alternatives.  We will go ahead and attempt to resume heart failure therapy with losartan 12.5 once a day      3. Essential hypertension    4. Hyperlipidemia          Follow Up     Return in about 6 months (around 4/20/2022).          Patient was given instructions and counseling regarding his condition or for health maintenance advice. Please see specific information pulled into the AVS if appropriate.

## 2021-10-20 NOTE — PATIENT INSTRUCTIONS
"Low-Sodium Eating Plan  Sodium, which is an element that makes up salt, helps you maintain a healthy balance of fluids in your body. Too much sodium can increase your blood pressure and cause fluid and waste to be held in your body.  Your health care provider or dietitian may recommend following this plan if you have high blood pressure (hypertension), kidney disease, liver disease, or heart failure. Eating less sodium can help lower your blood pressure, reduce swelling, and protect your heart, liver, and kidneys.  What are tips for following this plan?  Reading food labels  · The Nutrition Facts label lists the amount of sodium in one serving of the food. If you eat more than one serving, you must multiply the listed amount of sodium by the number of servings.  · Choose foods with less than 140 mg of sodium per serving.  · Avoid foods with 300 mg of sodium or more per serving.  Shopping    · Look for lower-sodium products, often labeled as \"low-sodium\" or \"no salt added.\"  · Always check the sodium content, even if foods are labeled as \"unsalted\" or \"no salt added.\"  · Buy fresh foods.  ? Avoid canned foods and pre-made or frozen meals.  ? Avoid canned, cured, or processed meats.  · Buy breads that have less than 80 mg of sodium per slice.    Cooking    · Eat more home-cooked food and less restaurant, buffet, and fast food.  · Avoid adding salt when cooking. Use salt-free seasonings or herbs instead of table salt or sea salt. Check with your health care provider or pharmacist before using salt substitutes.  · Cook with plant-based oils, such as canola, sunflower, or olive oil.    Meal planning  · When eating at a restaurant, ask that your food be prepared with less salt or no salt, if possible. Avoid dishes labeled as brined, pickled, cured, smoked, or made with soy sauce, miso, or teriyaki sauce.  · Avoid foods that contain MSG (monosodium glutamate). MSG is sometimes added to Chinese food, bouillon, and some " "canned foods.  · Make meals that can be grilled, baked, poached, roasted, or steamed. These are generally made with less sodium.  General information  Most people on this plan should limit their sodium intake to 1,500-2,000 mg (milligrams) of sodium each day.  What foods should I eat?  Fruits  Fresh, frozen, or canned fruit. Fruit juice.  Vegetables  Fresh or frozen vegetables. \"No salt added\" canned vegetables. \"No salt added\" tomato sauce and paste. Low-sodium or reduced-sodium tomato and vegetable juice.  Grains  Low-sodium cereals, including oats, puffed wheat and rice, and shredded wheat. Low-sodium crackers. Unsalted rice. Unsalted pasta. Low-sodium bread. Whole-grain breads and whole-grain pasta.  Meats and other proteins  Fresh or frozen (no salt added) meat, poultry, seafood, and fish. Low-sodium canned tuna and salmon. Unsalted nuts. Dried peas, beans, and lentils without added salt. Unsalted canned beans. Eggs. Unsalted nut butters.  Dairy  Milk. Soy milk. Cheese that is naturally low in sodium, such as ricotta cheese, fresh mozzarella, or Swiss cheese. Low-sodium or reduced-sodium cheese. Cream cheese. Yogurt.  Seasonings and condiments  Fresh and dried herbs and spices. Salt-free seasonings. Low-sodium mustard and ketchup. Sodium-free salad dressing. Sodium-free light mayonnaise. Fresh or refrigerated horseradish. Lemon juice. Vinegar.  Other foods  Homemade, reduced-sodium, or low-sodium soups. Unsalted popcorn and pretzels. Low-salt or salt-free chips.  The items listed above may not be a complete list of foods and beverages you can eat. Contact a dietitian for more information.  What foods should I avoid?  Vegetables  Sauerkraut, pickled vegetables, and relishes. Olives. French fries. Onion rings. Regular canned vegetables (not low-sodium or reduced-sodium). Regular canned tomato sauce and paste (not low-sodium or reduced-sodium). Regular tomato and vegetable juice (not low-sodium or reduced-sodium). " Frozen vegetables in sauces.  Grains  Instant hot cereals. Bread stuffing, pancake, and biscuit mixes. Croutons. Seasoned rice or pasta mixes. Noodle soup cups. Boxed or frozen macaroni and cheese. Regular salted crackers. Self-rising flour.  Meats and other proteins  Meat or fish that is salted, canned, smoked, spiced, or pickled. Precooked or cured meat, such as sausages or meat loaves. Swan. Ham. Pepperoni. Hot dogs. Corned beef. Chipped beef. Salt pork. Jerky. Pickled herring. Anchovies and sardines. Regular canned tuna. Salted nuts.  Dairy  Processed cheese and cheese spreads. Hard cheeses. Cheese curds. Blue cheese. Feta cheese. String cheese. Regular cottage cheese. Buttermilk. Canned milk.  Fats and oils  Salted butter. Regular margarine. Ghee. Swan fat.  Seasonings and condiments  Onion salt, garlic salt, seasoned salt, table salt, and sea salt. Canned and packaged gravies. Worcestershire sauce. Tartar sauce. Barbecue sauce. Teriyaki sauce. Soy sauce, including reduced-sodium. Steak sauce. Fish sauce. Oyster sauce. Cocktail sauce. Horseradish that you find on the shelf. Regular ketchup and mustard. Meat flavorings and tenderizers. Bouillon cubes. Hot sauce. Pre-made or packaged marinades. Pre-made or packaged taco seasonings. Relishes. Regular salad dressings. Salsa.  Other foods  Salted popcorn and pretzels. Corn chips and puffs. Potato and tortilla chips. Canned or dried soups. Pizza. Frozen entrees and pot pies.  The items listed above may not be a complete list of foods and beverages you should avoid. Contact a dietitian for more information.  Summary  · Eating less sodium can help lower your blood pressure, reduce swelling, and protect your heart, liver, and kidneys.  · Most people on this plan should limit their sodium intake to 1,500-2,000 mg (milligrams) of sodium each day.  · Canned, boxed, and frozen foods are high in sodium. Restaurant foods, fast foods, and pizza are also very high in sodium.  You also get sodium by adding salt to food.  · Try to cook at home, eat more fresh fruits and vegetables, and eat less fast food and canned, processed, or prepared foods.  This information is not intended to replace advice given to you by your health care provider. Make sure you discuss any questions you have with your health care provider.  Document Revised: 01/22/2021 Document Reviewed: 11/18/2020  [employer] Patient Education © 2021 [employer]      Cholesterol Content in Foods  Cholesterol is a waxy, fat-like substance that helps to carry fat in the blood. The body needs cholesterol in small amounts, but too much cholesterol can cause damage to the arteries and heart.  Most people should eat less than 200 milligrams (mg) of cholesterol a day.  Foods with cholesterol    Cholesterol is found in animal-based foods, such as meat, seafood, and dairy. Generally, low-fat dairy and lean meats have less cholesterol than full-fat dairy and fatty meats. The milligrams of cholesterol per serving (mg per serving) of common cholesterol-containing foods are listed below.  Meat and other proteins  · Egg -- one large whole egg has 186 mg.  · Veal shank -- 4 oz has 141 mg.  · Lean ground turkey (93% lean) -- 4 oz has 118 mg.  · Fat-trimmed lamb loin -- 4 oz has 106 mg.  · Lean ground beef (90% lean) -- 4 oz has 100 mg.  · Lobster -- 3.5 oz has 90 mg.  · Pork loin chops -- 4 oz has 86 mg.  · Canned salmon -- 3.5 oz has 83 mg.  · Fat-trimmed beef top loin -- 4 oz has 78 mg.  · Frankfurter -- 1 jorge (3.5 oz) has 77 mg.  · Crab -- 3.5 oz has 71 mg.  · Roasted chicken without skin, white meat -- 4 oz has 66 mg.  · Light bologna -- 2 oz has 45 mg.  · Deli-cut turkey -- 2 oz has 31 mg.  · Canned tuna -- 3.5 oz has 31 mg.  · Swan -- 1 oz has 29 mg.  · Oysters and mussels (raw) -- 3.5 oz has 25 mg.  · Mackerel -- 1 oz has 22 mg.  · Trout -- 1 oz has 20 mg.  · Pork sausage -- 1 link (1 oz) has 17 mg.  · Kent -- 1 oz has 16  mg.  · Tilapia -- 1 oz has 14 mg.  Dairy  · Soft-serve ice cream -- ½ cup (4 oz) has 103 mg.  · Whole-milk yogurt -- 1 cup (8 oz) has 29 mg.  · Cheddar cheese -- 1 oz has 28 mg.  · American cheese -- 1 oz has 28 mg.  · Whole milk -- 1 cup (8 oz) has 23 mg.  · 2% milk -- 1 cup (8 oz) has 18 mg.  · Cream cheese -- 1 tablespoon (Tbsp) has 15 mg.  · Cottage cheese -- ½ cup (4 oz) has 14 mg.  · Low-fat (1%) milk -- 1 cup (8 oz) has 10 mg.  · Sour cream -- 1 Tbsp has 8.5 mg.  · Low-fat yogurt -- 1 cup (8 oz) has 8 mg.  · Nonfat Greek yogurt -- 1 cup (8 oz) has 7 mg.  · Half-and-half cream -- 1 Tbsp has 5 mg.  Fats and oils  · Cod liver oil -- 1 tablespoon (Tbsp) has 82 mg.  · Butter -- 1 Tbsp has 15 mg.  · Lard -- 1 Tbsp has 14 mg.  · Swan grease -- 1 Tbsp has 14 mg.  · Mayonnaise -- 1 Tbsp has 5-10 mg.  · Margarine -- 1 Tbsp has 3-10 mg.  Exact amounts of cholesterol in these foods may vary depending on specific ingredients and brands.  Foods without cholesterol  Most plant-based foods do not have cholesterol unless you combine them with a food that has cholesterol. Foods without cholesterol include:  · Grains and cereals.  · Vegetables.  · Fruits.  · Vegetable oils, such as olive, canola, and sunflower oil.  · Legumes, such as peas, beans, and lentils.  · Nuts and seeds.  · Egg whites.  Summary  · The body needs cholesterol in small amounts, but too much cholesterol can cause damage to the arteries and heart.  · Most people should eat less than 200 milligrams (mg) of cholesterol a day.  This information is not intended to replace advice given to you by your health care provider. Make sure you discuss any questions you have with your health care provider.  Document Revised: 05/10/2021 Document Reviewed: 05/10/2021  [employer] Patient Education © 2021 [employer]

## 2021-10-26 ENCOUNTER — TREATMENT (OUTPATIENT)
Dept: PHYSICAL THERAPY | Facility: CLINIC | Age: 81
End: 2021-10-26

## 2021-10-26 DIAGNOSIS — M25.562 LEFT KNEE PAIN, UNSPECIFIED CHRONICITY: ICD-10-CM

## 2021-10-26 DIAGNOSIS — Z96.652 PRESENCE OF LEFT ARTIFICIAL KNEE JOINT: Primary | ICD-10-CM

## 2021-10-26 DIAGNOSIS — R26.9 GAIT DISTURBANCE: ICD-10-CM

## 2021-10-26 PROCEDURE — 97110 THERAPEUTIC EXERCISES: CPT | Performed by: PHYSICAL THERAPIST

## 2021-10-26 NOTE — PROGRESS NOTES
Re-Assessment / Re-Certification      Patient: Javi Martínez   : 1940  Diagnosis/ICD-10 Code:  Presence of left artificial knee joint [Z96.652]  Referring practitioner: DOLORES Castellanos  Date of Initial Visit: Type: THERAPY  Noted: 2021  Today's Date: 10/26/2021  Patient seen for 14 sessions    Progress note due: 2021  Re-cert due: 2022    Subjective:     Subjective Questionnaire: LEFS: 45/80  Clinical Progress: improved  Home Program Compliance: Yes  Treatment has included: therapeutic exercise, manual therapy and gait training    Subjective   Objective   Active Range of Motion   Left Knee   Flexion: 123 degrees   Extensor ladegrees      Right Knee   Flexion: 130 degrees   Extension: 0 degrees      Strength/Myotome Testing      Left Hip   Planes of Motion   Flexion: 4  Abduction: 4  Adduction: 4+     Right Hip   Planes of Motion   Flexion: 4  Abduction: 4  Adduction: 4+     Left Knee   Flexion: 4+  Extension: 4+  Quadriceps contraction: good     Right Knee   Flexion: 4+  Extension: 4+  Quadriceps contraction: good     Ambulation      Observational Gait   Gait: antalgic   Decreased walking speed, stride length, left stance time, left swing time and left step length.    Assessment & Plan     Assessment  Impairments: abnormal gait, abnormal or restricted ROM, activity intolerance, impaired balance, impaired physical strength, pain with function and weight-bearing intolerance  Assessment details:  Pt continues to present with L knee pain, weakness, decreased strength and endurance, and limited walking and activity tolerance. Pt will benefit from skilled PT to address functional deficits and return to PLOF.   Functional Limitations: walking, uncomfortable because of pain, standing and unable to perform repetitive tasks  Goals  Plan Goals: 1. The patient has limited ROM of the L knee.              LTG 1: 12 weeks:  The patient will demonstrate 0 to 130 degrees of ROM for the L knee in  order to allow patient to complete prolonged walking, standing, stairs and other ADLs with decreased pain/difficulty.                          STATUS:  ongoing              STG 1a:  6 weeks:  The patient will demonstrate 0 to 120 degrees of ROM for the L knee.                          STATUS:  met              TREATMENT: Manual therapy, therapeutic exercise, home exercise instruction, and modalities as needed to include:  moist heat, electrical stimulation, ultrasound, and ice.    2. The patient has limited strength of the L knee.              LTG 2: 12 weeks: The patient will demonstrate 4+/5 strength for L knee flexion and extension in order to allow patient improved joint stability                          STATUS:  met              STG 2a: 6 weeks: The patient will demonstrate 4/5 strength for l knee flexion and extension                          STATUS: met              TREATMENT: Manual therapy, therapeutic exercise, home exercise instruction, aquatic therapy, and modalities as needed to include:  moist heat, electrical stimulation, ultrasound, and ice.                3. The patient has gait dysfunction.              LTG 3: 12 weeks:  The patient will ambulate without assistive device, independently, for community distances with minimal limp to the l lower extremity in order to improve mobility and allow patient to perform activities such as grocery shopping with greater ease.                          STATUS: ongoing              TREATMENT: Gait training, aquatic therapy, therapeutic exercise, and home exercise instruction.    4. Mobility: Walking/Moving Around Functional Limitation                               LTG 4: 12 weeks:  The patient will demonstrate  LEFS score of 61 in order to decrease limitations.                          STATUS:  ongoing              STG 4 a: 6 weeks:  The patient will demonstrate  LEFS score of 51 in order to decrease limitations.                          STATUS:  ongoing               TREATMENT:  Manual therapy, therapeutic exercise, home exercise instruction.     Plan  Therapy options: will be seen for skilled physical therapy services  Planned modality interventions: cryotherapy and thermotherapy (hydrocollator packs)  Planned therapy interventions: balance/weight-bearing training, flexibility, functional ROM exercises, gait training, home exercise program, joint mobilization, manual therapy, neuromuscular re-education, soft tissue mobilization, strengthening, stretching and therapeutic activities  Frequency: 2x week  Duration in weeks: 6  Treatment plan discussed with: patient        Visit Diagnoses:    ICD-10-CM ICD-9-CM   1. Presence of left artificial knee joint  Z96.652 V43.65   2. Gait disturbance  R26.9 781.2   3. Left knee pain, unspecified chronicity  M25.562 719.46       Progress toward previous goals: Partially Met    Recommendations: Continue as planned  Timeframe: 6 weeks  Prognosis to achieve goals: good    PT Signature: Goldie Duarte PT, DPT      Based upon review of the patient's progress and continued therapy plan, it is my medical opinion that Javi Martínez should continue physical therapy treatment at [facility]  [address] [place] [address] [id number] [phone].    Signature: __________________________________  Misty Maradiaga PA    Timed:         Manual Therapy:    0     mins  61113;     Therapeutic Exercise:    38     mins  32292;     Neuromuscular Hussain:    0    mins  68359;    Therapeutic Activity:     0     mins  48314;     Gait Trainin     mins  49333;     Ultrasound:     0     mins  38083;    Ionto                               0    mins   73508  Self-care  __0__ mins 15240    Un-Timed:  Electrical Stimulation:    0     mins  04651 (MC );  Traction     0     mins 37570  Re- Eval     5     Mins     Hot pack     0     Mins    Cold pack                       0     Mins      Timed Treatment:   38    mins   Total Treatment:     43   mins

## 2021-10-31 ENCOUNTER — PREP FOR SURGERY (OUTPATIENT)
Dept: OTHER | Facility: HOSPITAL | Age: 81
End: 2021-10-31

## 2021-11-01 ENCOUNTER — HOSPITAL ENCOUNTER (OUTPATIENT)
Facility: HOSPITAL | Age: 81
Setting detail: HOSPITAL OUTPATIENT SURGERY
Discharge: HOME | End: 2021-11-01
Attending: INTERNAL MEDICINE | Admitting: INTERNAL MEDICINE

## 2021-11-01 VITALS
DIASTOLIC BLOOD PRESSURE: 86 MMHG | OXYGEN SATURATION: 100 % | HEART RATE: 61 BPM | TEMPERATURE: 98.2 F | SYSTOLIC BLOOD PRESSURE: 157 MMHG | RESPIRATION RATE: 14 BRPM

## 2021-11-01 DIAGNOSIS — R00.1 BRADYCARDIA: ICD-10-CM

## 2021-11-01 LAB
ANION GAP SERPL CALCULATED.3IONS-SCNC: 11.7 MMOL/L (ref 5–15)
BUN SERPL-MCNC: 18 MG/DL (ref 8–23)
BUN/CREAT SERPL: 13 (ref 7–25)
CALCIUM SPEC-SCNC: 8.7 MG/DL (ref 8.6–10.5)
CHLORIDE SERPL-SCNC: 105 MMOL/L (ref 98–107)
CO2 SERPL-SCNC: 22.3 MMOL/L (ref 22–29)
CREAT SERPL-MCNC: 1.38 MG/DL (ref 0.76–1.27)
DEPRECATED RDW RBC AUTO: 51.7 FL (ref 37–54)
ERYTHROCYTE [DISTWIDTH] IN BLOOD BY AUTOMATED COUNT: 15.9 % (ref 12.3–15.4)
GFR SERPL CREATININE-BSD FRML MDRD: 50 ML/MIN/1.73
GLUCOSE SERPL-MCNC: 122 MG/DL (ref 65–99)
HCT VFR BLD AUTO: 40.1 % (ref 37.5–51)
HGB BLD-MCNC: 12.7 G/DL (ref 13–17.7)
INR PPP: 1.05 (ref 2–3)
MCH RBC QN AUTO: 28.5 PG (ref 26.6–33)
MCHC RBC AUTO-ENTMCNC: 31.7 G/DL (ref 31.5–35.7)
MCV RBC AUTO: 89.9 FL (ref 79–97)
PLATELET # BLD AUTO: 234 10*3/MM3 (ref 140–450)
PMV BLD AUTO: 9.1 FL (ref 6–12)
POTASSIUM SERPL-SCNC: 4.4 MMOL/L (ref 3.5–5.2)
PROTHROMBIN TIME: 11.4 SECONDS (ref 9.4–12)
RBC # BLD AUTO: 4.46 10*6/MM3 (ref 4.14–5.8)
SODIUM SERPL-SCNC: 139 MMOL/L (ref 136–145)
WBC # BLD AUTO: 10 10*3/MM3 (ref 3.4–10.8)

## 2021-11-01 PROCEDURE — 0 CEFAZOLIN IN DEXTROSE 2-4 GM/100ML-% SOLUTION: Performed by: INTERNAL MEDICINE

## 2021-11-01 PROCEDURE — 25010000002 MIDAZOLAM PER 1MG: Performed by: INTERNAL MEDICINE

## 2021-11-01 PROCEDURE — 85027 COMPLETE CBC AUTOMATED: CPT | Performed by: INTERNAL MEDICINE

## 2021-11-01 PROCEDURE — 80048 BASIC METABOLIC PNL TOTAL CA: CPT | Performed by: INTERNAL MEDICINE

## 2021-11-01 PROCEDURE — 85610 PROTHROMBIN TIME: CPT | Performed by: INTERNAL MEDICINE

## 2021-11-01 PROCEDURE — 25010000002 FENTANYL CITRATE (PF) 100 MCG/2ML SOLUTION: Performed by: INTERNAL MEDICINE

## 2021-11-01 PROCEDURE — 33228 REMV&REPLC PM GEN DUAL LEAD: CPT | Performed by: INTERNAL MEDICINE

## 2021-11-01 PROCEDURE — C1785 PMKR, DUAL, RATE-RESP: HCPCS | Performed by: INTERNAL MEDICINE

## 2021-11-01 DEVICE — GEN PM AZURE XT SURESCAN DR MRI: Type: IMPLANTABLE DEVICE | Status: FUNCTIONAL

## 2021-11-01 RX ORDER — MIDAZOLAM HYDROCHLORIDE 2 MG/2ML
INJECTION, SOLUTION INTRAMUSCULAR; INTRAVENOUS AS NEEDED
Status: DISCONTINUED | OUTPATIENT
Start: 2021-11-01 | End: 2021-11-01 | Stop reason: HOSPADM

## 2021-11-01 RX ORDER — SODIUM CHLORIDE 9 MG/ML
INJECTION, SOLUTION INTRAVENOUS CONTINUOUS PRN
Status: COMPLETED | OUTPATIENT
Start: 2021-11-01 | End: 2021-11-01

## 2021-11-01 RX ORDER — CEPHALEXIN 750 MG/1
750 CAPSULE ORAL 4 TIMES DAILY
Qty: 12 CAPSULE | Refills: 0 | Status: SHIPPED | OUTPATIENT
Start: 2021-11-01 | End: 2021-11-26

## 2021-11-01 RX ORDER — LIDOCAINE HYDROCHLORIDE 20 MG/ML
INJECTION, SOLUTION INFILTRATION; PERINEURAL AS NEEDED
Status: DISCONTINUED | OUTPATIENT
Start: 2021-11-01 | End: 2021-11-01 | Stop reason: HOSPADM

## 2021-11-01 RX ORDER — CEFAZOLIN SODIUM 2 G/100ML
2 INJECTION, SOLUTION INTRAVENOUS ONCE
Status: DISCONTINUED | OUTPATIENT
Start: 2021-11-01 | End: 2021-11-01 | Stop reason: HOSPADM

## 2021-11-01 RX ORDER — CEFAZOLIN SODIUM 2 G/100ML
2 INJECTION, SOLUTION INTRAVENOUS ONCE
Status: COMPLETED | OUTPATIENT
Start: 2021-11-01 | End: 2021-11-01

## 2021-11-01 RX ORDER — FENTANYL CITRATE 50 UG/ML
INJECTION, SOLUTION INTRAMUSCULAR; INTRAVENOUS AS NEEDED
Status: DISCONTINUED | OUTPATIENT
Start: 2021-11-01 | End: 2021-11-01 | Stop reason: HOSPADM

## 2021-11-01 RX ORDER — BUPIVACAINE HCL/0.9 % NACL/PF 0.1 %
2 PLASTIC BAG, INJECTION (ML) EPIDURAL ONCE
Status: DISCONTINUED | OUTPATIENT
Start: 2021-11-01 | End: 2021-11-01

## 2021-11-01 RX ORDER — BUPIVACAINE HYDROCHLORIDE 5 MG/ML
INJECTION, SOLUTION EPIDURAL; INTRACAUDAL AS NEEDED
Status: DISCONTINUED | OUTPATIENT
Start: 2021-11-01 | End: 2021-11-01 | Stop reason: HOSPADM

## 2021-11-01 RX ADMIN — CEFAZOLIN SODIUM 2 G: 2 INJECTION, SOLUTION INTRAVENOUS at 08:05

## 2021-11-10 ENCOUNTER — TREATMENT (OUTPATIENT)
Dept: PHYSICAL THERAPY | Facility: CLINIC | Age: 81
End: 2021-11-10

## 2021-11-10 ENCOUNTER — CLINICAL SUPPORT NO REQUIREMENTS (OUTPATIENT)
Dept: CARDIOLOGY | Facility: CLINIC | Age: 81
End: 2021-11-10

## 2021-11-10 DIAGNOSIS — Z96.652 PRESENCE OF LEFT ARTIFICIAL KNEE JOINT: Primary | ICD-10-CM

## 2021-11-10 DIAGNOSIS — I44.2 CHB (COMPLETE HEART BLOCK) (HCC): ICD-10-CM

## 2021-11-10 DIAGNOSIS — R26.9 GAIT DISTURBANCE: ICD-10-CM

## 2021-11-10 DIAGNOSIS — M25.562 LEFT KNEE PAIN, UNSPECIFIED CHRONICITY: ICD-10-CM

## 2021-11-10 DIAGNOSIS — Z95.0 PRESENCE OF CARDIAC PACEMAKER: Primary | ICD-10-CM

## 2021-11-10 PROCEDURE — 97530 THERAPEUTIC ACTIVITIES: CPT | Performed by: PHYSICAL THERAPIST

## 2021-11-10 PROCEDURE — 97110 THERAPEUTIC EXERCISES: CPT | Performed by: PHYSICAL THERAPIST

## 2021-11-10 PROCEDURE — 93280 PM DEVICE PROGR EVAL DUAL: CPT | Performed by: INTERNAL MEDICINE

## 2021-11-10 PROCEDURE — 97140 MANUAL THERAPY 1/> REGIONS: CPT | Performed by: PHYSICAL THERAPIST

## 2021-11-10 NOTE — PROGRESS NOTES
Normal Dual Chamber Pacemaker device interrogation.  Normal evaluation of device function and lead measurements.  No optimization was needed of parameters or maximization of device longevity.  Patient is on automated Home Remote Monitoring.  Steri Strips were removed and incision is healing very well and is intact, all corners are closed.

## 2021-11-10 NOTE — PROGRESS NOTES
"Physical Therapy Daily Progress Note        Patient: Javi Martínez   : 1940  Diagnosis/ICD-10 Code:  Presence of left artificial knee joint [Z96.652]  Referring practitioner: DOLORES Castellanos  Date of Initial Visit: No linked episodes  Today's Date: 11/10/2021  Patient seen for Visit count could not be calculated. Make sure you are using a visit which is associated with an episode. sessions             Subjective   Javi Martínez reports having had follow-up with Cardiologist prior to therapy session today.  He reports that he has been given no new restrictions after receiving - \"New Pacemaker\" last week.    Objective     Bilateral Hip Abductor Strength Grossly 4/5 each    Knee Strength:  Left- approximately 15 degree extensor lag- noted during SLR- Active Quad Insufficiency    +Hamstring Tightness- 90/90 grossly -45 degrees from full extension    See Exercise, Manual, and Logs for complete treatment.       Assessment/Plan   Pt tolerated therapy session moderately well - with performance of basic therapeutic exercises, CKC-Functional activities, and Manual therapy. He has improved, but continues to have deficits in Left Knee ROM,  Strength, Stability, and Tolerance.  He continues to have significant weakness in bilateral Hip abductors and         Progress per Plan of Care           Timed:  Manual Therapy:    8     mins  67613;  Therapeutic Exercise:    30     mins  21241;     Neuromuscular Hussain:    0    mins  76870;    Therapeutic Activity:     15     mins  79740;     Gait Trainin     mins  63827;     Ultrasound:     0     mins  15464;    Electrical Stimulation:    0     mins  98276;  Iontophoresis     0     mins  32733    Untimed:  Electrical Stimulation:    0     mins  59375 (MC );  Mechanical Traction:    0     mins  59499;   Fluidotherapy     0     mins  90543  Hot pack     0     mins  10724  Cold pack     0     mins  71887    Timed Treatment:   53   mins   Total Treatment:     53   " konstantin Navarro PTA  Physical Therapist Assistant

## 2021-11-12 ENCOUNTER — TREATMENT (OUTPATIENT)
Dept: PHYSICAL THERAPY | Facility: CLINIC | Age: 81
End: 2021-11-12

## 2021-11-12 DIAGNOSIS — R26.9 GAIT DISTURBANCE: ICD-10-CM

## 2021-11-12 DIAGNOSIS — M25.562 LEFT KNEE PAIN, UNSPECIFIED CHRONICITY: ICD-10-CM

## 2021-11-12 DIAGNOSIS — Z96.652 PRESENCE OF LEFT ARTIFICIAL KNEE JOINT: Primary | ICD-10-CM

## 2021-11-12 PROCEDURE — 97110 THERAPEUTIC EXERCISES: CPT | Performed by: PHYSICAL THERAPIST

## 2021-11-12 NOTE — PROGRESS NOTES
Physical Therapy Daily Treatment Note      Patient: Javi Martínez   : 1940  Referring practitioner: DOLORES Castellanos  Date of Initial Visit: Type: THERAPY  Noted: 2021  Today's Date: 2021  Patient seen for 16 sessions         Javi Martínez reports: Pt reports his strength and endurance is improving. He is going grocery shopping alone today. He still uses his cane occasionally because his R knee ( non surgical) gives out on him sometimes.     Subjective     Objective   See Exercise, Manual, and Modality Logs for complete treatment.       Assessment & Plan     Assessment  Assessment details: Pt demonstrates increased BLE strength and activity tolerance but still has L knee decreased stability and continues to have difficulty with STS without using UE. Progress next visit as tolerated.         Visit Diagnoses:    ICD-10-CM ICD-9-CM   1. Presence of left artificial knee joint  Z96.652 V43.65   2. Gait disturbance  R26.9 781.2   3. Left knee pain, unspecified chronicity  M25.562 719.46       Progress per Plan of Care           Timed:         Manual Therapy:    0     mins  34808;     Therapeutic Exercise:    30     mins  75026;     Neuromuscular Hussain:    0    mins  65459;    Therapeutic Activity:     0     mins  24524;     Gait Trainin     mins  80788;     Ultrasound:     0     mins  79401;    Ionto                               0    mins   99071  Self-care  __0__ mins 19960    Un-Timed:  Electrical Stimulation:    00     mins  36855 ( );  Traction     0     mins 45791  Hot pack     0     Mins    Cold pack                       0     Mins      Timed Treatment:   30   mins   Total Treatment:     30   mins    Goldie Duarte PT, DPT  Physical Therapist    NPI:[id number]  [place] License: [id number]

## 2021-11-16 ENCOUNTER — TREATMENT (OUTPATIENT)
Dept: PHYSICAL THERAPY | Facility: CLINIC | Age: 81
End: 2021-11-16

## 2021-11-16 DIAGNOSIS — Z96.652 PRESENCE OF LEFT ARTIFICIAL KNEE JOINT: ICD-10-CM

## 2021-11-16 DIAGNOSIS — M25.562 LEFT KNEE PAIN, UNSPECIFIED CHRONICITY: ICD-10-CM

## 2021-11-16 DIAGNOSIS — R26.9 GAIT DISTURBANCE: Primary | ICD-10-CM

## 2021-11-16 PROCEDURE — 97140 MANUAL THERAPY 1/> REGIONS: CPT | Performed by: PHYSICAL THERAPIST

## 2021-11-16 PROCEDURE — 97530 THERAPEUTIC ACTIVITIES: CPT | Performed by: PHYSICAL THERAPIST

## 2021-11-16 PROCEDURE — 97110 THERAPEUTIC EXERCISES: CPT | Performed by: PHYSICAL THERAPIST

## 2021-11-16 NOTE — PROGRESS NOTES
Physical Therapy Daily Progress Note        Patient: Javi Martínez   : 1940  Diagnosis/ICD-10 Code:  Gait disturbance [R26.9]  Referring practitioner: DOLORES Castellanos  Date of Initial Visit: No linked episodes  Today's Date: 2021  Patient seen for Visit count could not be calculated. Make sure you are using a visit which is associated with an episode. sessions             Subjective   Javi Martínez denies having any pain upon arrival- Primarily reporting having general stiffness.    Objective     Bilateral Hip Strength  Abductor Strength Grossly 4-/5 - 4/5 each     Left- approximately 15 degree extensor lag- noted during SLR- Active Quad Insufficiency     +Hamstring Tightness- 90/90 grossly -45 degrees from full extension- Bilaterally    See Exercise and  Manual Logs for complete treatment.       Assessment/Plan     Pt tolerated therapy session moderately well - with performance of basic therapeutic exercises, CKC-Functional activities, and Manual therapy. He has improved, but continues to have deficits in Left Knee ROM,  Strength, Stability, and Tolerance.  He alscontinues to have significant weakness in bilateral Hip abductors and is hindered by instability of right hip and knee.       Progress per Plan of Care           Timed:  Manual Therapy:    8     mins  53366;  Therapeutic Exercise:    22     mins  90177;     Neuromuscular Hussain:    0    mins  28921;    Therapeutic Activity:     15     mins  98303;     Gait Trainin     mins  01841;     Ultrasound:     0     mins  63397;    Electrical Stimulation:    0     mins  23091;  Iontophoresis     0     mins  82307    Untimed:  Electrical Stimulation:    0     mins  08996 (MC );  Mechanical Traction:    0     mins  96502;   Fluidotherapy     0     mins  68750  Hot pack     0     mins  66614  Cold pack     0     mins  34911    Timed Treatment: 45     mins   Total Treatment:     45   mins        Brooke Navarro PTA  Physical Therapist  Assistant

## 2021-11-18 ENCOUNTER — TREATMENT (OUTPATIENT)
Dept: PHYSICAL THERAPY | Facility: CLINIC | Age: 81
End: 2021-11-18

## 2021-11-18 DIAGNOSIS — Z96.652 PRESENCE OF LEFT ARTIFICIAL KNEE JOINT: Primary | ICD-10-CM

## 2021-11-18 DIAGNOSIS — R26.9 GAIT DISTURBANCE: ICD-10-CM

## 2021-11-18 DIAGNOSIS — M25.562 LEFT KNEE PAIN, UNSPECIFIED CHRONICITY: ICD-10-CM

## 2021-11-18 PROCEDURE — 97110 THERAPEUTIC EXERCISES: CPT | Performed by: PHYSICAL THERAPIST

## 2021-11-18 PROCEDURE — 97530 THERAPEUTIC ACTIVITIES: CPT | Performed by: PHYSICAL THERAPIST

## 2021-11-18 PROCEDURE — 97140 MANUAL THERAPY 1/> REGIONS: CPT | Performed by: PHYSICAL THERAPIST

## 2021-11-18 NOTE — PROGRESS NOTES
Physical Therapy Daily Progress Note        Patient: Javi Martínez   : 1940  Diagnosis/ICD-10 Code:  Presence of left artificial knee joint [Z96.652]  Referring practitioner: DOLORES Castellanos  Date of Initial Visit: Type: THERAPY  Noted: 2021  Today's Date: 2021  Patient seen for 18 sessions             Subjective   Javi Martínez reports having only minimal pain or discomfort in his left knee.He reports that increased standing - functional activities cause him to be fatigued for 1-2 days after therapy sessions.    Objective     Active Range of Motion   Left Knee   Flexion: 124 degrees   Extension: 0 degrees      Right Knee   Flexion: 130 degrees   Extension: Hyperextension    Bilateral Hamstring tightness:  Approximately -40 degrees Bilaterally       Strength/Myotome Testing      Left Hip   Planes of Motion   Flexion: 4+/5  Abduction: 4/5  Adduction: 4+/5     Right Hip   Planes of Motion   Flexion: 4+/5  Abduction: 4-/5  Adduction: 4+/5     Left Knee   Flexion: 4+/5  Extension: 4/5  Quadriceps contraction: Fair+/Good-    Left- approximately 15 degree extensor lag- noted during SLR- Active Quad Insufficiency     Right Knee   Flexion: 4+/5  Extension: 4+/5  Quadriceps contraction: Good    See Exercise and Manual Logs for complete treatment.       Assessment/Plan     Pt tolerated therapy session moderately well - with performance of  therapeutic exercises, CKC-Functional activities, and Manual therapy. He has improved, but continues to have deficits in Left Knee ROM,  Strength, Stability, and Tolerance. He also continues to have significant weakness in bilateral Hip abductors and is hindered by instability of right hip and knee.  See Progress note to MD for follow-up with Orthopedic 21       Progress per Plan of Care           Timed:  Manual Therapy:    8     mins  29884;  Therapeutic Exercise:    22     mins  78276;     Neuromuscular Hussain:    0    mins  57384;    Therapeutic Activity:      15     mins  52326;     Gait Trainin     mins  80402;     Ultrasound:     0     mins  06847;    Electrical Stimulation:    0     mins  67979;  Iontophoresis     0     mins  85427    Untimed:  Electrical Stimulation:    0     mins  21834 ( );  Mechanical Traction:    0     mins  41471;   Fluidotherapy     0     mins  09724  Hot pack     0     mins  91525  Cold pack     0     mins  19173    Timed Treatment:   45   mins   Total Treatment:     45   mins        Brooke Navarro PTA  Physical Therapist Assistant

## 2021-11-19 ENCOUNTER — OFFICE VISIT (OUTPATIENT)
Dept: ORTHOPEDIC SURGERY | Facility: CLINIC | Age: 81
End: 2021-11-19

## 2021-11-19 VITALS — OXYGEN SATURATION: 98 % | HEART RATE: 89 BPM | WEIGHT: 197 LBS | BODY MASS INDEX: 23.99 KG/M2 | HEIGHT: 76 IN

## 2021-11-19 DIAGNOSIS — Z96.652 STATUS POST TOTAL LEFT KNEE REPLACEMENT: Primary | ICD-10-CM

## 2021-11-19 PROCEDURE — 99024 POSTOP FOLLOW-UP VISIT: CPT | Performed by: PHYSICIAN ASSISTANT

## 2021-11-19 RX ORDER — ERGOCALCIFEROL 1.25 MG/1
CAPSULE ORAL
COMMUNITY
End: 2021-11-26

## 2021-11-19 RX ORDER — ERGOCALCIFEROL 1.25 MG/1
50000 CAPSULE ORAL WEEKLY
COMMUNITY
Start: 2021-09-28 | End: 2021-11-26

## 2021-11-19 RX ORDER — PREDNISONE 20 MG/1
TABLET ORAL
COMMUNITY
Start: 2021-10-05 | End: 2021-11-26

## 2021-11-19 RX ORDER — COLCHICINE 0.6 MG/1
TABLET ORAL
COMMUNITY
Start: 2021-10-04 | End: 2021-11-26

## 2021-11-19 RX ORDER — FERROUS SULFATE 325(65) MG
1 TABLET ORAL DAILY
COMMUNITY
Start: 2021-09-22 | End: 2021-11-26

## 2021-11-19 NOTE — PATIENT INSTRUCTIONS
Patient has 1 physical therapy visit left and plans to transition to a home exercise program.  Patient still has an antalgic gait and is using a cane but given valgus deformity and symptoms in right knee I believe this is the reason.  He has good range of motion and no pain in the left knee.  We will plan to follow-up in February 2022 for recheck with no x-rays at that time.  I discussed with the patient if he is doing well he may cancel and plan to follow-up in July 2022 for his annual recheck on his total joint.

## 2021-11-19 NOTE — PROGRESS NOTES
"Chief Complaint  Follow-up and Pain of the Left Knee    Subjective          Javi Martínez presents to [facility] for follow-up on left knee status post left TKA 7/23/2021.  Patient is 4 months postop and presents today using a cane for ambulation.  Patient states he has 1 physical therapy visit left and plans to transition to home exercise program thereafter.  He states overall he is doing well, has achieved good range of motion in the left knee and states pain is minimal.  He is no longer taking anything for pain.  Patient is still walking with an antalgic gait.  He is aware that he has right knee osteoarthritis but states he is not interested in a knee replacement at this time.    Objective   No Known Allergies    Vital Signs:   Pulse 89   Ht 193 cm (76\")   Wt 89.4 kg (197 lb)   SpO2 98%   BMI 23.98 kg/m²       Physical Exam  Constitutional:       Appearance: Normal appearance. Patient is well-developed and normal weight.   HENT:      Head: Normocephalic.      Right Ear: Hearing and external ear normal.      Left Ear: Hearing and external ear normal.      Nose: Nose normal.   Eyes:      Conjunctiva/sclera: Conjunctivae normal.   Cardiovascular:      Rate and Rhythm: Normal rate.   Pulmonary:      Effort: Pulmonary effort is normal.      Breath sounds: No wheezing or rales.   Abdominal:      Palpations: Abdomen is soft.      Tenderness: There is no abdominal tenderness.   Musculoskeletal:      Cervical back: Normal range of motion.   Skin:     Findings: No rash.   Neurological:      Mental Status: Patient is alert and oriented to person, place, and time.   Psychiatric:         Mood and Affect: Mood and affect normal.         Judgment: Judgment normal.     Ortho Exam  Left knee: Skin intact with well-healed surgical incision, mild swelling, extension 0 and flexion 120, stable to varus valgus stress, sensation light touch intact in bilateral lower extremities, posterior tib pulses " are 2+ bilaterally, good range of motion bilateral ankles and able to wiggle all digits.  Right knee: Skin intact, no swelling, extension 0 and flexion 115, valgus deformity noted to the knee, gait antalgic while using cane.  Result Review :            Imaging Results (Most Recent)     None                Assessment and Plan    Problem List Items Addressed This Visit        Musculoskeletal and Injuries    S/P TKR (total knee replacement) - Primary    Current Assessment & Plan     Patient has 1 physical therapy visit left and plans to transition to a home exercise program.  Patient still has an antalgic gait and is using a cane but given valgus deformity and symptoms in right knee I believe this is the reason.  He has good range of motion and no pain in the left knee.  We will plan to follow-up in February 2022 for recheck with no x-rays at that time.  I discussed with the patient if he is doing well he may cancel and plan to follow-up in July 2022 for his annual recheck on his total joint.               Follow Up   Return in about 3 months (around 2/16/2022) for Recheck.  Patient Instructions   Patient has 1 physical therapy visit left and plans to transition to a home exercise program.  Patient still has an antalgic gait and is using a cane but given valgus deformity and symptoms in right knee I believe this is the reason.  He has good range of motion and no pain in the left knee.  We will plan to follow-up in February 2022 for recheck with no x-rays at that time.  I discussed with the patient if he is doing well he may cancel and plan to follow-up in July 2022 for his annual recheck on his total joint.    Patient was given instructions and counseling regarding his condition or for health maintenance advice. Please see specific information pulled into the AVS if appropriate.

## 2021-11-23 ENCOUNTER — TREATMENT (OUTPATIENT)
Dept: PHYSICAL THERAPY | Facility: CLINIC | Age: 81
End: 2021-11-23

## 2021-11-23 DIAGNOSIS — M25.562 LEFT KNEE PAIN, UNSPECIFIED CHRONICITY: ICD-10-CM

## 2021-11-23 DIAGNOSIS — R26.9 GAIT DISTURBANCE: ICD-10-CM

## 2021-11-23 DIAGNOSIS — Z96.652 PRESENCE OF LEFT ARTIFICIAL KNEE JOINT: Primary | ICD-10-CM

## 2021-11-23 PROCEDURE — 97110 THERAPEUTIC EXERCISES: CPT | Performed by: PHYSICAL THERAPIST

## 2021-11-23 NOTE — PROGRESS NOTES
Physical Therapy Discharge  Note      Patient: Javi Martínez   : 1940  Referring practitioner: DOLORES Castellanos  Date of Initial Visit: Type: THERAPY  Noted: 2021  Today's Date: 2021  Patient seen for 19 sessions         Javi Martínez reports: no pain. He is doing better and says he will keep up with HEP at home.     LEFS: 45/80      Subjective     Objective          Active Range of Motion   Left Knee   Flexion: 123 degrees   Extension: 0 degrees     Strength/Myotome Testing     Left Knee   Flexion: 4+  Extension: 4+  Quadriceps contraction: good      See Exercise, Manual, and Modality Logs for complete treatment.       Assessment & Plan     Assessment    Assessment details: Pt has reached majority of goals and demonstrates independence with all functional activities and has no pain with activities. Pt demonstrates independence with HEP and will be discharged from PT and will continue with HEP at home.    Goals  Plan Goals: Plan Goals: 1. The patient has limited ROM of the L knee.              LTG 1: 12 weeks:  The patient will demonstrate 0 to 130 degrees of ROM for the L knee in order to allow patient to complete prolonged walking, standing, stairs and other ADLs with decreased pain/difficulty.                          STATUS:  Not met               STG 1a:  6 weeks:  The patient will demonstrate 0 to 120 degrees of ROM for the L knee.                          STATUS:  met              TREATMENT: Manual therapy, therapeutic exercise, home exercise instruction, and modalities as needed to include:  moist heat, electrical stimulation, ultrasound, and ice.    2. The patient has limited strength of the L knee.              LTG 2: 12 weeks: The patient will demonstrate 4+/5 strength for L knee flexion and extension in order to allow patient improved joint stability                          STATUS:  met              STG 2a: 6 weeks: The patient will demonstrate 4/5 strength for l knee flexion and  extension                          STATUS: met              TREATMENT: Manual therapy, therapeutic exercise, home exercise instruction, aquatic therapy, and modalities as needed to include:  moist heat, electrical stimulation, ultrasound, and ice.                3. The patient has gait dysfunction.              LTG 3: 12 weeks:  The patient will ambulate without assistive device, independently, for community distances with minimal limp to the l lower extremity in order to improve mobility and allow patient to perform activities such as grocery shopping with greater ease.                          STATUS: met              TREATMENT: Gait training, aquatic therapy, therapeutic exercise, and home exercise instruction.    4. Mobility: Walking/Moving Around Functional Limitation                               LTG 4: 12 weeks:  The patient will demonstrate  LEFS score of 61 in order to decrease limitations.                          STATUS:  Not met               STG 4 a: 6 weeks:  The patient will demonstrate  LEFS score of 51 in order to decrease limitations.                          STATUS:  Not met               TREATMENT:  Manual therapy, therapeutic exercise, home exercise instruction.     Plan  Therapy options: will not be seen for skilled therapy services  Treatment plan discussed with: patient        Visit Diagnoses:    ICD-10-CM ICD-9-CM   1. Presence of left artificial knee joint  Z96.652 V43.65   2. Left knee pain, unspecified chronicity  M25.562 719.46   3. Gait disturbance  R26.9 781.2       Progress per Plan of Care           Timed:         Manual Therapy:    0     mins  43699;     Therapeutic Exercise:    25     mins  16055;     Neuromuscular Hussain:    0    mins  83388;    Therapeutic Activity:     0     mins  35335;     Gait Trainin     mins  15157;     Ultrasound:     0     mins  90304;    Ionto                               0    mins   37139  Self-care  __0__ mins 62169    Un-Timed:  Electrical  Stimulation:    00     mins  77495 ( );  Traction     0     mins 85907  Hot pack     0     Mins    Cold pack                       00     Mins      Timed Treatment:   25   mins   Total Treatment:     25   mins    Goldie Duarte PT, DPT  Physical Therapist    NPI:[id number]  [place] License: [id number]

## 2021-11-26 ENCOUNTER — HOSPITAL ENCOUNTER (INPATIENT)
Facility: HOSPITAL | Age: 81
LOS: 2 days | Discharge: HOME | End: 2021-11-28
Attending: EMERGENCY MEDICINE | Admitting: INTERNAL MEDICINE

## 2021-11-26 ENCOUNTER — APPOINTMENT (OUTPATIENT)
Dept: GENERAL RADIOLOGY | Facility: HOSPITAL | Age: 81
End: 2021-11-26

## 2021-11-26 DIAGNOSIS — M25.461 EFFUSION OF KNEE JOINT RIGHT: Primary | ICD-10-CM

## 2021-11-26 PROBLEM — M00.9 SEPTIC JOINT (HCC): Status: ACTIVE | Noted: 2021-11-26

## 2021-11-26 LAB
ANION GAP SERPL CALCULATED.3IONS-SCNC: 14.7 MMOL/L (ref 5–15)
APPEARANCE FLD: ABNORMAL
BASOPHILS # BLD AUTO: 0.05 10*3/MM3 (ref 0–0.2)
BASOPHILS NFR BLD AUTO: 0.4 % (ref 0–1.5)
BUN SERPL-MCNC: 23 MG/DL (ref 8–23)
BUN/CREAT SERPL: 15.5 (ref 7–25)
CALCIUM SPEC-SCNC: 9.7 MG/DL (ref 8.6–10.5)
CHLORIDE SERPL-SCNC: 99 MMOL/L (ref 98–107)
CO2 SERPL-SCNC: 24.3 MMOL/L (ref 22–29)
COLOR FLD: YELLOW
CREAT SERPL-MCNC: 1.48 MG/DL (ref 0.76–1.27)
CRP SERPL-MCNC: 8.36 MG/DL (ref 0–0.5)
CRYSTALS FLD MICRO: NORMAL
D-LACTATE SERPL-SCNC: 1.9 MMOL/L (ref 0.5–2)
DEPRECATED RDW RBC AUTO: 45.4 FL (ref 37–54)
EOSINOPHIL # BLD AUTO: 0.05 10*3/MM3 (ref 0–0.4)
EOSINOPHIL NFR BLD AUTO: 0.4 % (ref 0.3–6.2)
ERYTHROCYTE [DISTWIDTH] IN BLOOD BY AUTOMATED COUNT: 14.2 % (ref 12.3–15.4)
ERYTHROCYTE [SEDIMENTATION RATE] IN BLOOD: 14 MM/HR (ref 0–20)
GFR SERPL CREATININE-BSD FRML MDRD: 46 ML/MIN/1.73
GLUCOSE SERPL-MCNC: 138 MG/DL (ref 65–99)
HCT VFR BLD AUTO: 45.4 % (ref 37.5–51)
HGB BLD-MCNC: 14.9 G/DL (ref 13–17.7)
HOLD SPECIMEN: NORMAL
HOLD SPECIMEN: NORMAL
IMM GRANULOCYTES # BLD AUTO: 0.07 10*3/MM3 (ref 0–0.05)
IMM GRANULOCYTES NFR BLD AUTO: 0.5 % (ref 0–0.5)
LYMPHOCYTES # BLD AUTO: 2.18 10*3/MM3 (ref 0.7–3.1)
LYMPHOCYTES NFR BLD AUTO: 16.5 % (ref 19.6–45.3)
LYMPHOCYTES NFR FLD MANUAL: 2 %
MCH RBC QN AUTO: 28.8 PG (ref 26.6–33)
MCHC RBC AUTO-ENTMCNC: 32.8 G/DL (ref 31.5–35.7)
MCV RBC AUTO: 87.8 FL (ref 79–97)
MONOCYTES # BLD AUTO: 1.52 10*3/MM3 (ref 0.1–0.9)
MONOCYTES NFR BLD AUTO: 11.5 % (ref 5–12)
MONOCYTES NFR FLD: 6 %
NEUTROPHILS NFR BLD AUTO: 70.7 % (ref 42.7–76)
NEUTROPHILS NFR BLD AUTO: 9.31 10*3/MM3 (ref 1.7–7)
NEUTROPHILS NFR FLD MANUAL: 92 %
NRBC BLD AUTO-RTO: 0 /100 WBC (ref 0–0.2)
NUC CELL # FLD: ABNORMAL /MM3
PLATELET # BLD AUTO: 275 10*3/MM3 (ref 140–450)
PMV BLD AUTO: 9.6 FL (ref 6–12)
POTASSIUM SERPL-SCNC: 4.5 MMOL/L (ref 3.5–5.2)
RBC # BLD AUTO: 5.17 10*6/MM3 (ref 4.14–5.8)
RBC # FLD AUTO: 2000 /MM3
SODIUM SERPL-SCNC: 138 MMOL/L (ref 136–145)
WBC NRBC COR # BLD: 13.18 10*3/MM3 (ref 3.4–10.8)
WHOLE BLOOD HOLD SPECIMEN: NORMAL
WHOLE BLOOD HOLD SPECIMEN: NORMAL

## 2021-11-26 PROCEDURE — 85025 COMPLETE CBC W/AUTO DIFF WBC: CPT | Performed by: EMERGENCY MEDICINE

## 2021-11-26 PROCEDURE — 87040 BLOOD CULTURE FOR BACTERIA: CPT | Performed by: EMERGENCY MEDICINE

## 2021-11-26 PROCEDURE — 89051 BODY FLUID CELL COUNT: CPT | Performed by: EMERGENCY MEDICINE

## 2021-11-26 PROCEDURE — 25010000002 ONDANSETRON PER 1 MG: Performed by: EMERGENCY MEDICINE

## 2021-11-26 PROCEDURE — 86140 C-REACTIVE PROTEIN: CPT | Performed by: EMERGENCY MEDICINE

## 2021-11-26 PROCEDURE — 80048 BASIC METABOLIC PNL TOTAL CA: CPT | Performed by: EMERGENCY MEDICINE

## 2021-11-26 PROCEDURE — 83605 ASSAY OF LACTIC ACID: CPT | Performed by: EMERGENCY MEDICINE

## 2021-11-26 PROCEDURE — 36415 COLL VENOUS BLD VENIPUNCTURE: CPT | Performed by: EMERGENCY MEDICINE

## 2021-11-26 PROCEDURE — 87205 SMEAR GRAM STAIN: CPT | Performed by: EMERGENCY MEDICINE

## 2021-11-26 PROCEDURE — 99284 EMERGENCY DEPT VISIT MOD MDM: CPT

## 2021-11-26 PROCEDURE — 87070 CULTURE OTHR SPECIMN AEROBIC: CPT | Performed by: EMERGENCY MEDICINE

## 2021-11-26 PROCEDURE — 89060 EXAM SYNOVIAL FLUID CRYSTALS: CPT | Performed by: EMERGENCY MEDICINE

## 2021-11-26 PROCEDURE — 25010000002 DEXAMETHASONE PER 1 MG: Performed by: EMERGENCY MEDICINE

## 2021-11-26 PROCEDURE — 25010000002 HYDROMORPHONE 1 MG/ML SOLUTION: Performed by: EMERGENCY MEDICINE

## 2021-11-26 PROCEDURE — 85652 RBC SED RATE AUTOMATED: CPT | Performed by: EMERGENCY MEDICINE

## 2021-11-26 PROCEDURE — 0 CEFTRIAXONE PER 250 MG: Performed by: EMERGENCY MEDICINE

## 2021-11-26 PROCEDURE — 25010000002 HYDROMORPHONE 1 MG/ML SOLUTION: Performed by: INTERNAL MEDICINE

## 2021-11-26 PROCEDURE — 25010000002 VANCOMYCIN 5 G RECONSTITUTED SOLUTION: Performed by: EMERGENCY MEDICINE

## 2021-11-26 PROCEDURE — 73560 X-RAY EXAM OF KNEE 1 OR 2: CPT

## 2021-11-26 RX ORDER — SODIUM CHLORIDE 0.9 % (FLUSH) 0.9 %
10 SYRINGE (ML) INJECTION AS NEEDED
Status: DISCONTINUED | OUTPATIENT
Start: 2021-11-26 | End: 2021-11-28 | Stop reason: HOSPADM

## 2021-11-26 RX ORDER — TAMSULOSIN HYDROCHLORIDE 0.4 MG/1
0.4 CAPSULE ORAL NIGHTLY
Status: DISCONTINUED | OUTPATIENT
Start: 2021-11-26 | End: 2021-11-28 | Stop reason: HOSPADM

## 2021-11-26 RX ORDER — HYDROCODONE BITARTRATE AND ACETAMINOPHEN 10; 325 MG/1; MG/1
1 TABLET ORAL EVERY 4 HOURS PRN
Status: DISCONTINUED | OUTPATIENT
Start: 2021-11-26 | End: 2021-11-28 | Stop reason: HOSPADM

## 2021-11-26 RX ORDER — ACETAMINOPHEN 160 MG/5ML
650 SOLUTION ORAL EVERY 4 HOURS PRN
Status: DISCONTINUED | OUTPATIENT
Start: 2021-11-26 | End: 2021-11-28 | Stop reason: HOSPADM

## 2021-11-26 RX ORDER — FAMOTIDINE 20 MG/1
40 TABLET, FILM COATED ORAL DAILY
Status: DISCONTINUED | OUTPATIENT
Start: 2021-11-26 | End: 2021-11-28 | Stop reason: HOSPADM

## 2021-11-26 RX ORDER — CHOLECALCIFEROL (VITAMIN D3) 125 MCG
5 CAPSULE ORAL NIGHTLY PRN
Status: DISCONTINUED | OUTPATIENT
Start: 2021-11-26 | End: 2021-11-28 | Stop reason: HOSPADM

## 2021-11-26 RX ORDER — POLYETHYLENE GLYCOL 3350 17 G/17G
17 POWDER, FOR SOLUTION ORAL DAILY PRN
Status: DISCONTINUED | OUTPATIENT
Start: 2021-11-26 | End: 2021-11-28 | Stop reason: HOSPADM

## 2021-11-26 RX ORDER — DEXAMETHASONE SODIUM PHOSPHATE 10 MG/ML
10 INJECTION INTRAMUSCULAR; INTRAVENOUS ONCE
Status: COMPLETED | OUTPATIENT
Start: 2021-11-26 | End: 2021-11-26

## 2021-11-26 RX ORDER — HYDROCODONE BITARTRATE AND ACETAMINOPHEN 5; 325 MG/1; MG/1
1 TABLET ORAL EVERY 4 HOURS PRN
Status: DISCONTINUED | OUTPATIENT
Start: 2021-11-26 | End: 2021-11-28 | Stop reason: HOSPADM

## 2021-11-26 RX ORDER — CEFTRIAXONE SODIUM 2 G/50ML
2 INJECTION, SOLUTION INTRAVENOUS ONCE
Status: COMPLETED | OUTPATIENT
Start: 2021-11-26 | End: 2021-11-26

## 2021-11-26 RX ORDER — BISACODYL 10 MG
10 SUPPOSITORY, RECTAL RECTAL DAILY PRN
Status: DISCONTINUED | OUTPATIENT
Start: 2021-11-26 | End: 2021-11-28 | Stop reason: HOSPADM

## 2021-11-26 RX ORDER — ACETAMINOPHEN 650 MG/1
650 SUPPOSITORY RECTAL EVERY 4 HOURS PRN
Status: DISCONTINUED | OUTPATIENT
Start: 2021-11-26 | End: 2021-11-28 | Stop reason: HOSPADM

## 2021-11-26 RX ORDER — BISACODYL 5 MG/1
5 TABLET, DELAYED RELEASE ORAL DAILY PRN
Status: DISCONTINUED | OUTPATIENT
Start: 2021-11-26 | End: 2021-11-28 | Stop reason: HOSPADM

## 2021-11-26 RX ORDER — ONDANSETRON 2 MG/ML
4 INJECTION INTRAMUSCULAR; INTRAVENOUS EVERY 6 HOURS PRN
Status: DISCONTINUED | OUTPATIENT
Start: 2021-11-26 | End: 2021-11-28 | Stop reason: HOSPADM

## 2021-11-26 RX ORDER — ACETAMINOPHEN 325 MG/1
650 TABLET ORAL EVERY 4 HOURS PRN
Status: DISCONTINUED | OUTPATIENT
Start: 2021-11-26 | End: 2021-11-28 | Stop reason: HOSPADM

## 2021-11-26 RX ORDER — CEFTRIAXONE SODIUM 1 G/50ML
1 INJECTION, SOLUTION INTRAVENOUS EVERY 12 HOURS
Status: DISCONTINUED | OUTPATIENT
Start: 2021-11-27 | End: 2021-11-28 | Stop reason: HOSPADM

## 2021-11-26 RX ORDER — NALOXONE HCL 0.4 MG/ML
0.4 VIAL (ML) INJECTION
Status: DISCONTINUED | OUTPATIENT
Start: 2021-11-26 | End: 2021-11-28 | Stop reason: HOSPADM

## 2021-11-26 RX ORDER — ONDANSETRON 2 MG/ML
4 INJECTION INTRAMUSCULAR; INTRAVENOUS ONCE
Status: COMPLETED | OUTPATIENT
Start: 2021-11-26 | End: 2021-11-26

## 2021-11-26 RX ORDER — ALUMINA, MAGNESIA, AND SIMETHICONE 2400; 2400; 240 MG/30ML; MG/30ML; MG/30ML
15 SUSPENSION ORAL EVERY 6 HOURS PRN
Status: DISCONTINUED | OUTPATIENT
Start: 2021-11-26 | End: 2021-11-28 | Stop reason: HOSPADM

## 2021-11-26 RX ORDER — LIDOCAINE HYDROCHLORIDE 10 MG/ML
10 INJECTION, SOLUTION EPIDURAL; INFILTRATION; INTRACAUDAL; PERINEURAL ONCE
Status: COMPLETED | OUTPATIENT
Start: 2021-11-26 | End: 2021-11-26

## 2021-11-26 RX ORDER — ALPRAZOLAM 0.25 MG/1
0.5 TABLET ORAL EVERY 8 HOURS PRN
Status: DISCONTINUED | OUTPATIENT
Start: 2021-11-26 | End: 2021-11-28 | Stop reason: HOSPADM

## 2021-11-26 RX ORDER — AMOXICILLIN 250 MG
2 CAPSULE ORAL 2 TIMES DAILY
Status: DISCONTINUED | OUTPATIENT
Start: 2021-11-26 | End: 2021-11-28 | Stop reason: HOSPADM

## 2021-11-26 RX ADMIN — HYDROMORPHONE HYDROCHLORIDE 0.5 MG: 1 INJECTION, SOLUTION INTRAMUSCULAR; INTRAVENOUS; SUBCUTANEOUS at 15:55

## 2021-11-26 RX ADMIN — SENNOSIDES AND DOCUSATE SODIUM 2 TABLET: 50; 8.6 TABLET ORAL at 21:22

## 2021-11-26 RX ADMIN — DEXAMETHASONE SODIUM PHOSPHATE 10 MG: 10 INJECTION INTRAMUSCULAR; INTRAVENOUS at 15:55

## 2021-11-26 RX ADMIN — APIXABAN 5 MG: 5 TABLET, FILM COATED ORAL at 21:22

## 2021-11-26 RX ADMIN — VANCOMYCIN HYDROCHLORIDE 1750 MG: 5 INJECTION, POWDER, LYOPHILIZED, FOR SOLUTION INTRAVENOUS at 18:56

## 2021-11-26 RX ADMIN — SODIUM CHLORIDE, PRESERVATIVE FREE 10 ML: 5 INJECTION INTRAVENOUS at 20:34

## 2021-11-26 RX ADMIN — CEFTRIAXONE SODIUM 2 G: 2 INJECTION, SOLUTION INTRAVENOUS at 18:13

## 2021-11-26 RX ADMIN — ONDANSETRON 4 MG: 2 INJECTION INTRAMUSCULAR; INTRAVENOUS at 15:55

## 2021-11-26 RX ADMIN — TAMSULOSIN HYDROCHLORIDE 0.4 MG: 0.4 CAPSULE ORAL at 21:23

## 2021-11-26 RX ADMIN — HYDROMORPHONE HYDROCHLORIDE 0.5 MG: 1 INJECTION, SOLUTION INTRAMUSCULAR; INTRAVENOUS; SUBCUTANEOUS at 20:34

## 2021-11-26 RX ADMIN — LIDOCAINE HYDROCHLORIDE 10 ML: 10 INJECTION, SOLUTION EPIDURAL; INFILTRATION; INTRACAUDAL; PERINEURAL at 15:15

## 2021-11-26 RX ADMIN — FAMOTIDINE 40 MG: 20 TABLET, FILM COATED ORAL at 21:22

## 2021-11-27 LAB — CHOLEST CRY FLD QL MICRO: NORMAL

## 2021-11-27 PROCEDURE — 25010000002 VANCOMYCIN 5 G RECONSTITUTED SOLUTION: Performed by: INTERNAL MEDICINE

## 2021-11-27 PROCEDURE — 25010000002 CEFTRIAXONE PER 250 MG: Performed by: INTERNAL MEDICINE

## 2021-11-27 PROCEDURE — 99221 1ST HOSP IP/OBS SF/LOW 40: CPT | Performed by: ORTHOPAEDIC SURGERY

## 2021-11-27 PROCEDURE — 94799 UNLISTED PULMONARY SVC/PX: CPT

## 2021-11-27 RX ADMIN — CEFTRIAXONE SODIUM 1 G: 1 INJECTION, SOLUTION INTRAVENOUS at 05:32

## 2021-11-27 RX ADMIN — APIXABAN 5 MG: 5 TABLET, FILM COATED ORAL at 08:52

## 2021-11-27 RX ADMIN — Medication 5 MG: at 23:28

## 2021-11-27 RX ADMIN — VANCOMYCIN HYDROCHLORIDE 1000 MG: 5 INJECTION, POWDER, LYOPHILIZED, FOR SOLUTION INTRAVENOUS at 19:05

## 2021-11-27 RX ADMIN — FAMOTIDINE 40 MG: 20 TABLET, FILM COATED ORAL at 08:52

## 2021-11-27 RX ADMIN — TAMSULOSIN HYDROCHLORIDE 0.4 MG: 0.4 CAPSULE ORAL at 20:36

## 2021-11-27 RX ADMIN — APIXABAN 5 MG: 5 TABLET, FILM COATED ORAL at 20:36

## 2021-11-27 RX ADMIN — CEFTRIAXONE SODIUM 1 G: 1 INJECTION, SOLUTION INTRAVENOUS at 17:48

## 2021-11-28 VITALS
BODY MASS INDEX: 22.87 KG/M2 | OXYGEN SATURATION: 97 % | TEMPERATURE: 98 F | HEART RATE: 70 BPM | WEIGHT: 187.83 LBS | RESPIRATION RATE: 18 BRPM | DIASTOLIC BLOOD PRESSURE: 77 MMHG | SYSTOLIC BLOOD PRESSURE: 138 MMHG | HEIGHT: 76 IN

## 2021-11-28 LAB
ANION GAP SERPL CALCULATED.3IONS-SCNC: 8.9 MMOL/L (ref 5–15)
BASOPHILS # BLD AUTO: 0.06 10*3/MM3 (ref 0–0.2)
BASOPHILS NFR BLD AUTO: 0.5 % (ref 0–1.5)
BUN SERPL-MCNC: 38 MG/DL (ref 8–23)
BUN/CREAT SERPL: 25.5 (ref 7–25)
CALCIUM SPEC-SCNC: 9 MG/DL (ref 8.6–10.5)
CHLORIDE SERPL-SCNC: 105 MMOL/L (ref 98–107)
CO2 SERPL-SCNC: 24.1 MMOL/L (ref 22–29)
CREAT SERPL-MCNC: 1.49 MG/DL (ref 0.76–1.27)
CRP SERPL-MCNC: 4.94 MG/DL (ref 0–0.5)
DEPRECATED RDW RBC AUTO: 45.4 FL (ref 37–54)
EOSINOPHIL # BLD AUTO: 0.12 10*3/MM3 (ref 0–0.4)
EOSINOPHIL NFR BLD AUTO: 1 % (ref 0.3–6.2)
ERYTHROCYTE [DISTWIDTH] IN BLOOD BY AUTOMATED COUNT: 14.1 % (ref 12.3–15.4)
GFR SERPL CREATININE-BSD FRML MDRD: 45 ML/MIN/1.73
GLUCOSE SERPL-MCNC: 97 MG/DL (ref 65–99)
HCT VFR BLD AUTO: 40.3 % (ref 37.5–51)
HGB BLD-MCNC: 12.8 G/DL (ref 13–17.7)
IMM GRANULOCYTES # BLD AUTO: 0.09 10*3/MM3 (ref 0–0.05)
IMM GRANULOCYTES NFR BLD AUTO: 0.7 % (ref 0–0.5)
LYMPHOCYTES # BLD AUTO: 2.74 10*3/MM3 (ref 0.7–3.1)
LYMPHOCYTES NFR BLD AUTO: 21.9 % (ref 19.6–45.3)
MCH RBC QN AUTO: 28.1 PG (ref 26.6–33)
MCHC RBC AUTO-ENTMCNC: 31.8 G/DL (ref 31.5–35.7)
MCV RBC AUTO: 88.4 FL (ref 79–97)
MONOCYTES # BLD AUTO: 1.35 10*3/MM3 (ref 0.1–0.9)
MONOCYTES NFR BLD AUTO: 10.8 % (ref 5–12)
NEUTROPHILS NFR BLD AUTO: 65.1 % (ref 42.7–76)
NEUTROPHILS NFR BLD AUTO: 8.17 10*3/MM3 (ref 1.7–7)
NRBC BLD AUTO-RTO: 0 /100 WBC (ref 0–0.2)
PLATELET # BLD AUTO: 267 10*3/MM3 (ref 140–450)
PMV BLD AUTO: 10 FL (ref 6–12)
POTASSIUM SERPL-SCNC: 4.5 MMOL/L (ref 3.5–5.2)
RBC # BLD AUTO: 4.56 10*6/MM3 (ref 4.14–5.8)
SODIUM SERPL-SCNC: 138 MMOL/L (ref 136–145)
WBC NRBC COR # BLD: 12.53 10*3/MM3 (ref 3.4–10.8)

## 2021-11-28 PROCEDURE — 86140 C-REACTIVE PROTEIN: CPT | Performed by: NURSE PRACTITIONER

## 2021-11-28 PROCEDURE — 80048 BASIC METABOLIC PNL TOTAL CA: CPT | Performed by: NURSE PRACTITIONER

## 2021-11-28 PROCEDURE — 25010000002 CEFTRIAXONE PER 250 MG: Performed by: INTERNAL MEDICINE

## 2021-11-28 PROCEDURE — 85025 COMPLETE CBC W/AUTO DIFF WBC: CPT | Performed by: NURSE PRACTITIONER

## 2021-11-28 RX ORDER — CEPHALEXIN 500 MG/1
500 CAPSULE ORAL 2 TIMES DAILY
Qty: 14 CAPSULE | Refills: 0 | Status: SHIPPED | OUTPATIENT
Start: 2021-11-28 | End: 2021-12-05

## 2021-11-28 RX ADMIN — APIXABAN 5 MG: 5 TABLET, FILM COATED ORAL at 09:00

## 2021-11-28 RX ADMIN — FAMOTIDINE 40 MG: 20 TABLET, FILM COATED ORAL at 08:59

## 2021-11-28 RX ADMIN — CEFTRIAXONE SODIUM 1 G: 1 INJECTION, SOLUTION INTRAVENOUS at 06:02

## 2021-11-29 ENCOUNTER — READMISSION MANAGEMENT (OUTPATIENT)
Dept: CALL CENTER | Facility: HOSPITAL | Age: 81
End: 2021-11-29

## 2021-11-29 LAB
BACTERIA FLD CULT: NORMAL
GRAM STN SPEC: NORMAL
GRAM STN SPEC: NORMAL

## 2021-11-29 NOTE — OUTREACH NOTE
Prep Survey      Responses   [facility] patient discharged from? [facility]   Is LACE score < 7 ? No   Emergency Room discharge w/ pulse ox? No   Eligibility Readm Mgmt   Discharge diagnosis Septic joint (   Does the patient have one of the following disease processes/diagnoses(primary or secondary)? Other   Does the patient have Home health ordered? No   Is there a DME ordered? No   Prep survey completed? Yes          Sadie Fisher RN

## 2021-12-01 LAB
BACTERIA SPEC AEROBE CULT: NORMAL
BACTERIA SPEC AEROBE CULT: NORMAL

## 2021-12-02 ENCOUNTER — READMISSION MANAGEMENT (OUTPATIENT)
Dept: CALL CENTER | Facility: HOSPITAL | Age: 81
End: 2021-12-02

## 2021-12-02 NOTE — OUTREACH NOTE
Medical Week 1 Survey      Responses   [facility] patient discharged from? [facility]   Does the patient have one of the following disease processes/diagnoses(primary or secondary)? Other   Week 1 attempt successful? Yes   Call start time 0812   Call end time 0816   Discharge diagnosis Septic joint (   Meds reviewed with patient/caregiver? Yes   Is the patient having any side effects they believe may be caused by any medication additions or changes? No   Does the patient have all medications ordered at discharge? Yes   Is the patient taking all medications as directed (includes completed medication regime)? Yes   Comments regarding appointments Dr. Tubbs 12/3/21   Does the patient have a primary care provider?  Yes   Comments regarding PCP Will make appt    Has the patient kept scheduled appointments due by today? N/A   Psychosocial issues? No   Did the patient receive a copy of their discharge instructions? Yes   Nursing interventions Reviewed instructions with patient   What is the patient's perception of their health status since discharge? Same   Is the patient/caregiver able to teach back signs and symptoms related to disease process for when to call PCP? Yes   Is the patient/caregiver able to teach back signs and symptoms related to disease process for when to call 911? Yes   Is the patient/caregiver able to teach back the hierarchy of who to call/visit for symptoms/problems? PCP, Specialist, Home health nurse, Urgent Care, ED, 911 Yes   Week 1 call completed? Yes   Wrap up additional comments Pt reports he is still having alot of pain in the leg/ knee. he states he has an appt tomorrow with Dr. Tubbs. He states that the Dr is aware of the pain.            Ladonna Mathis, RN

## 2021-12-03 ENCOUNTER — OFFICE VISIT (OUTPATIENT)
Dept: ORTHOPEDIC SURGERY | Facility: CLINIC | Age: 81
End: 2021-12-03

## 2021-12-03 VITALS — HEIGHT: 75 IN | BODY MASS INDEX: 23.25 KG/M2 | WEIGHT: 187 LBS

## 2021-12-03 DIAGNOSIS — M25.561 RIGHT KNEE PAIN, UNSPECIFIED CHRONICITY: Primary | ICD-10-CM

## 2021-12-03 PROCEDURE — 99213 OFFICE O/P EST LOW 20 MIN: CPT | Performed by: ORTHOPAEDIC SURGERY

## 2021-12-03 RX ORDER — HYDROCODONE BITARTRATE AND ACETAMINOPHEN 7.5; 325 MG/1; MG/1
1 TABLET ORAL EVERY 4 HOURS PRN
Qty: 35 TABLET | Refills: 0 | Status: SHIPPED | OUTPATIENT
Start: 2021-12-03 | End: 2022-11-16

## 2021-12-03 RX ORDER — METHYLPREDNISOLONE 4 MG/1
TABLET ORAL
Qty: 21 TABLET | Refills: 0 | Status: SHIPPED | OUTPATIENT
Start: 2021-12-03 | End: 2022-05-03

## 2021-12-03 NOTE — PROGRESS NOTES
"Chief Complaint  Initial Evaluation of the Right Knee and Initial Evaluation of the Right Leg     Subjective      Javi Martínez presents to [facility] for an evaluation of right knee and right leg. Patient is present today in a wheelchair for ambulation assistance. Patient presented himself at the ED on 21 due to right knee pain and swelling. He had a joint aspiration that resulted in septic arthritis. He had been admitted to the hospital and immediately placed on antibiotics. He is now discharged from the hospital and following up with his right knee. Patient has a history of a left total knee arthroplasty and chronic kidney disease stage III. Patient is still taking antibiotics and swelling has improved. He has pain radiating down his leg. At discharge patient was able to ambulate and move around. He now has difficulty with weight bearing. Patient states he has had gout in the past and the pain feels a bit similar. He states depending on the position of the knee, he has pain. Patient reports that he is constantly thirsty and urine does come out dark. He is drinking more than usual.       No Known Allergies     Social History     Socioeconomic History   • Marital status:    Tobacco Use   • Smoking status: Former Smoker     Packs/day: 0.50     Types: Cigarettes     Start date:      Quit date: 1995     Years since quittin.3   • Smokeless tobacco: Never Used   Vaping Use   • Vaping Use: Never used   Substance and Sexual Activity   • Alcohol use: Defer   • Drug use: Never   • Sexual activity: Defer        Review of Systems     Objective   Vital Signs:   Ht 190.5 cm (75\")   Wt 84.8 kg (187 lb)   BMI 23.37 kg/m²       Physical Exam  Constitutional:       Appearance: Normal appearance. Patient is well-developed and normal weight.   HENT:      Head: Normocephalic.      Right Ear: Hearing and external ear normal.      Left Ear: Hearing and external ear normal.      " Nose: Nose normal.   Eyes:      Conjunctiva/sclera: Conjunctivae normal.   Cardiovascular:      Rate and Rhythm: Normal rate.   Pulmonary:      Effort: Pulmonary effort is normal.      Breath sounds: No wheezing or rales.   Abdominal:      Palpations: Abdomen is soft.      Tenderness: There is no abdominal tenderness.   Musculoskeletal:      Cervical back: Normal range of motion.   Skin:     Findings: No rash.   Neurological:      Mental Status: Patient  is alert and oriented to person, place, and time.   Psychiatric:         Mood and Affect: Mood and affect normal.         Judgment: Judgment normal.       Ortho Exam      RIGHT KNEE: Ambulation with a wheelchair. Dorsal Pedal Pulse 2+, posterior tibialis pulse 2+. Calf supple, non-tender, no signs of DVT. Sensation grossly intact. Neurovascular intact. Pain with extension and flexion. Full extension. Flexion is full. No swelling. No skin discoloration. The knee is not hot to touch. Tender patella tendon insertion. No signs of infection in the knee. Mild effusion.     Procedures        Imaging Results (Most Recent)     None           Result Review :         XR Knee 1 or 2 View Right    Result Date: 11/26/2021  Narrative: PROCEDURE: XR KNEE 1 OR 2 VW RIGHT  COMPARISON: None  INDICATIONS: No known injury, possible fluid on right knee  FINDINGS:  There is chondrocalcinosis at the medial and lateral compartments.  Mild joint space narrowing at the lateral compartment.  Minimal marginal spurring.  No fracture.  There is a large joint effusion noted.  The patella is intact.  CONCLUSION:  1. Negative for fracture. 2. 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 3. Large joint effusion.      YI NAVA MD       Electronically Signed and Approved By: YI NAVA MD on 11/26/2021 at 14:47                     Assessment and Plan     DX: Right knee pain    Discussed treatment plans with the patient. Continue antibiotics. Patient placed on Medrol dose  pack. MRI order placed. Follow-up with primary care as well with increased thirst and dark urine to be evaluated for this as well.     Call or return if worsening symptoms.    Follow Up     Follow-up after MRI.       Patient was given instructions and counseling regarding his condition or for health maintenance advice. Please see specific information pulled into the AVS if appropriate.     Scribed for Carlitos Zhao MD by Faby Bryan.  12/03/21   14:00 EST    I have personally performed the services described in this document as scribed by the above individual and it is both accurate and complete. Carlitos Zhao MD 12/08/21

## 2021-12-09 ENCOUNTER — TELEPHONE (OUTPATIENT)
Dept: ORTHOPEDIC SURGERY | Facility: CLINIC | Age: 81
End: 2021-12-09

## 2021-12-09 ENCOUNTER — READMISSION MANAGEMENT (OUTPATIENT)
Dept: CALL CENTER | Facility: HOSPITAL | Age: 81
End: 2021-12-09

## 2021-12-09 NOTE — TELEPHONE ENCOUNTER
[facility] called to let Dr Zhao know that pt cancelled MRI , he doesn't want to have at this time

## 2021-12-09 NOTE — OUTREACH NOTE
Medical Week 2 Survey      Responses   [facility] [place] patient discharged from? [facility]   Does the patient have one of the following disease processes/diagnoses(primary or secondary)? Other   Week 2 attempt successful? Yes   Call start time 0811   Discharge diagnosis Septic joint (   Call end time 0816   Person spoke with today (if not patient) and relationship Wife and Pt   Meds reviewed with patient/caregiver? Yes   Prescription comments Added 10 day prednisone   Is the patient taking all medications as directed (includes completed medication regime)? Yes   Medication comments Dr. Tubbs added extra pain med.    Comments regarding appointments Dr. Tubbs 12/3/21   Does the patient have a primary care provider?  Yes   Comments regarding PCP Has talked with PCP   Has the patient kept scheduled appointments due by today? Yes   Psychosocial issues? No   Did the patient receive a copy of their discharge instructions? Yes   What is the patient's perception of their health status since discharge? Improving   Is the patient/caregiver able to teach back signs and symptoms related to disease process for when to call PCP? Yes   Is the patient/caregiver able to teach back signs and symptoms related to disease process for when to call 911? Yes   Is the patient/caregiver able to teach back the hierarchy of who to call/visit for symptoms/problems? PCP, Specialist, Home health nurse, Urgent Care, ED, 911 Yes   Week 2 Call Completed? Yes   Wrap up additional comments Pt states that Dr. Tubbs took him off the ABT and started Prednisone. He feels that he is making some improvement.           Ladonna Mathis, RN

## 2021-12-09 NOTE — TELEPHONE ENCOUNTER
April FROM MRI AT  [facility] CALLED ABOUT THE PAT AND SAID THAT BEEN ORDERED AN MRI STAT. HE DOES HAVE A PACEMAKER BUT SHE NEEDS MORE INFORMATION IN ORDER TO GET MRI. SHE HAS TRIED TO CALL THE PAT SEVERAL TIMES BUT CANNOT REACH HIM. SHE ALSO SAID THAT IF SHE HAD HIS CARDIOLOGIST, SHE COULD CALL HIM AND GET THE INFO AS WELL. SHE CANNOT GO FORWARD WITH MRI UNTIL SHE REACHES SOMEONE TO GET THE REQUIRED INFO.

## 2021-12-20 ENCOUNTER — TELEPHONE (OUTPATIENT)
Dept: SURGERY | Facility: CLINIC | Age: 81
End: 2021-12-20

## 2021-12-20 NOTE — TELEPHONE ENCOUNTER
Left message on pts vm to please call the office to r/s.      Called [facility] office and got the office VM. Left message that pt did not come to appt and has not r/s.

## 2022-05-03 ENCOUNTER — OFFICE VISIT (OUTPATIENT)
Dept: CARDIOLOGY | Facility: CLINIC | Age: 82
End: 2022-05-03

## 2022-05-03 ENCOUNTER — CLINICAL SUPPORT NO REQUIREMENTS (OUTPATIENT)
Dept: CARDIOLOGY | Facility: CLINIC | Age: 82
End: 2022-05-03

## 2022-05-03 VITALS
HEART RATE: 77 BPM | DIASTOLIC BLOOD PRESSURE: 83 MMHG | BODY MASS INDEX: 25.12 KG/M2 | HEIGHT: 75 IN | WEIGHT: 202 LBS | SYSTOLIC BLOOD PRESSURE: 150 MMHG

## 2022-05-03 DIAGNOSIS — I50.20 HFREF (HEART FAILURE WITH REDUCED EJECTION FRACTION): ICD-10-CM

## 2022-05-03 DIAGNOSIS — I44.2 CHB (COMPLETE HEART BLOCK): Primary | ICD-10-CM

## 2022-05-03 DIAGNOSIS — E78.5 HYPERLIPIDEMIA LDL GOAL <70: ICD-10-CM

## 2022-05-03 DIAGNOSIS — I10 ESSENTIAL HYPERTENSION: ICD-10-CM

## 2022-05-03 DIAGNOSIS — Z95.0 PRESENCE OF CARDIAC PACEMAKER: ICD-10-CM

## 2022-05-03 DIAGNOSIS — I25.10 CORONARY ARTERIOSCLEROSIS IN NATIVE ARTERY: Primary | ICD-10-CM

## 2022-05-03 PROCEDURE — 93279 PRGRMG DEV EVAL PM/LDLS PM: CPT | Performed by: INTERNAL MEDICINE

## 2022-05-03 PROCEDURE — 99214 OFFICE O/P EST MOD 30 MIN: CPT | Performed by: INTERNAL MEDICINE

## 2022-05-03 RX ORDER — ALLOPURINOL 100 MG/1
100 TABLET ORAL DAILY
COMMUNITY
Start: 2022-03-11

## 2022-05-03 RX ORDER — LOSARTAN POTASSIUM 25 MG/1
TABLET ORAL
COMMUNITY
Start: 2022-03-14 | End: 2022-08-23

## 2022-05-03 RX ORDER — ERGOCALCIFEROL 1.25 MG/1
50000 CAPSULE ORAL WEEKLY
COMMUNITY
Start: 2022-03-14 | End: 2022-11-16

## 2022-05-03 NOTE — ASSESSMENT & PLAN NOTE
Recommended restarting on a statin discussed the preventative benefits patient was concerned about side effects and wanted to hold off he is scheduled to get repeat lipids as well previous ones have been borderline high but given his CAD history he was clearly benefit from statin treatment or cholesterol-lowering medication

## 2022-05-03 NOTE — PROGRESS NOTES
Normal Dual Chamber Pacemaker Device Interrogation and Device Testing.  Normal evaluation of device function and lead measurements.  No optimization was needed of parameters or maximization of device longevity.

## 2022-05-03 NOTE — PROGRESS NOTES
Chief Complaint  Hypertension, CAD with CABG, Hyperlipidemia, and HFrEF    Subjective    Patient is doing well denies any chest pain or shortness of breath    Past Medical History:   Diagnosis Date   • Arthritis     left knee    • Essential hypertension 2019   • GERD (gastroesophageal reflux disease)    • HFrEF (heart failure with reduced ejection fraction) 2021   • Hyperlipidemia 2021   • Paroxysmal atrial fibrillation    • Prostate cancer     with seeds implant          Current Outpatient Medications:   •  allopurinol (ZYLOPRIM) 100 MG tablet, Take 100 mg by mouth Daily., Disp: , Rfl:   •  Eliquis 5 MG tablet tablet, Take 5 mg by mouth 2 (Two) Times a Day. To stop 2 days prior to surgery as directed by Dr. Whelan, Disp: , Rfl:   •  HYDROcodone-acetaminophen (Norco) 7.5-325 MG per tablet, Take 1 tablet by mouth Every 4 (Four) Hours As Needed for Moderate Pain ., Disp: 35 tablet, Rfl: 0  •  losartan (COZAAR) 25 MG tablet, Take  by mouth. 1/2 tab qd, Disp: , Rfl:   •  omeprazole (priLOSEC) 20 MG capsule, Take 20 mg by mouth Daily., Disp: , Rfl:   •  vitamin D (ERGOCALCIFEROL) 1.25 MG (68192 UT) capsule capsule, Take 50,000 Units by mouth 1 (One) Time Per Week., Disp: , Rfl:     Medications Discontinued During This Encounter   Medication Reason   • methylPREDNISolone (MEDROL) 4 MG dose pack *Therapy completed   • tamsulosin (FLOMAX) 0.4 MG capsule 24 hr capsule *Therapy completed     No Known Allergies     Social History     Tobacco Use   • Smoking status: Former Smoker     Packs/day: 0.50     Types: Cigarettes     Start date:      Quit date: 1995     Years since quittin.7   • Smokeless tobacco: Never Used   Vaping Use   • Vaping Use: Never used   Substance Use Topics   • Alcohol use: Defer   • Drug use: Never       Family History   Problem Relation Age of Onset   • No Known Problems Mother    • No Known Problems Father    • No Known Problems Sister    • No Known Problems Brother    • No  "Known Problems Maternal Aunt    • No Known Problems Maternal Uncle    • No Known Problems Paternal Aunt    • No Known Problems Paternal Uncle    • No Known Problems Maternal Grandmother    • No Known Problems Maternal Grandfather    • No Known Problems Paternal Grandmother    • No Known Problems Paternal Grandfather    • No Known Problems Other         Objective     /83   Pulse 77   Ht 190.5 cm (75\")   Wt 91.6 kg (202 lb)   BMI 25.25 kg/m²       Physical Exam    General Appearance:   · no acute distress  · Alert and oriented x3  HENT:   · lips not cyanotic  · Atraumatic  Neck:  · No jvd   · supple  Respiratory:  · no respiratory distress  · normal breath sounds  · no rales  Cardiovascular:  · Regular rate and rhythm  · no S3, no S4   · no murmur  · no rub  Extremities  · No cyanosis  · lower extremity edema: none    Skin:   · warm, dry  · No rashes      Result Review :     No results found for: PROBNP  CMP    CMP 11/1/21 11/26/21 11/28/21   Glucose 122 (A) 138 (A) 97   BUN 18 23 38 (A)   Creatinine 1.38 (A) 1.48 (A) 1.49 (A)   eGFR Non  Am 50 (A) 46 (A) 45 (A)   Sodium 139 138 138   Potassium 4.4 4.5 4.5   Chloride 105 99 105   Calcium 8.7 9.7 9.0   (A) Abnormal value            CBC w/diff    CBC w/Diff 11/1/21 11/26/21 11/28/21   WBC 10.00 13.18 (A) 12.53 (A)   RBC 4.46 5.17 4.56   Hemoglobin 12.7 (A) 14.9 12.8 (A)   Hematocrit 40.1 45.4 40.3   MCV 89.9 87.8 88.4   MCH 28.5 28.8 28.1   MCHC 31.7 32.8 31.8   RDW 15.9 (A) 14.2 14.1   Platelets 234 275 267   Neutrophil Rel %  70.7 65.1   Immature Granulocyte Rel %  0.5 0.7 (A)   Lymphocyte Rel %  16.5 (A) 21.9   Monocyte Rel %  11.5 10.8   Eosinophil Rel %  0.4 1.0   Basophil Rel %  0.4 0.5   (A) Abnormal value             No results found for: TSH   No results found for: FREET4   No results found for: DDIMERQUANT  Magnesium   Date Value Ref Range Status   08/23/2021 2.2 1.6 - 2.4 mg/dL Final      No results found for: DIGOXIN   Lab Results "   Component Value Date    TROPONINT 0.033 (C) 08/24/2021           Single-chamber ICD interrogated working well biventricular paced 100% time no episodes no changes made    No results found for: POCTROP    Results for orders placed in visit on 10/01/21    Adult Transthoracic Echo Complete W/ Cont if Necessary Per Protocol    Interpretation Summary  · Estimated left ventricular EF = 45% Left ventricular systolic function is low normal.  · The left ventricular cavity is moderately dilated.  · Left ventricular wall thickness is consistent with mild to moderate concentric hypertrophy.  · The right ventricular cavity is mild to moderately dilated.  · The right atrial cavity is moderately dilated.  · Moderate dilation of the aortic root is present. Measuring 5.0 cm at greatest severity  · Estimated right ventricular systolic pressure from tricuspid regurgitation is mildly elevated (35-45 mmHg).  · Left atrium severely dilated  · Tricuspid regurgitation mild to moderate in severity  · Aortic insufficiency mild                 Diagnoses and all orders for this visit:    1. CAD with CABG (Primary)  Assessment & Plan:  Recommended with chronic aspirin 81 mg preventatively discussed pros and cons of risk versus prevention given concomitant Eliquis use      2. HFrEF (heart failure with reduced ejection fraction)  Assessment & Plan:  Patient with stable symptoms continue on losartan 25 mg once a day      3. Essential hypertension    4. Hyperlipidemia  Assessment & Plan:  Recommended restarting on a statin discussed the preventative benefits patient was concerned about side effects and wanted to hold off he is scheduled to get repeat lipids as well previous ones have been borderline high but given his CAD history he was clearly benefit from statin treatment or cholesterol-lowering medication      5. Presence of cardiac pacemaker single chamber          Follow Up     Return in about 6 months (around 11/3/2022).          Patient  was given instructions and counseling regarding his condition or for health maintenance advice. Please see specific information pulled into the AVS if appropriate.

## 2022-05-03 NOTE — ASSESSMENT & PLAN NOTE
Recommended with chronic aspirin 81 mg preventatively discussed pros and cons of risk versus prevention given concomitant Eliquis use

## 2022-05-12 ENCOUNTER — TELEPHONE (OUTPATIENT)
Dept: CARDIOLOGY | Facility: CLINIC | Age: 82
End: 2022-05-12

## 2022-05-12 NOTE — TELEPHONE ENCOUNTER
Increase to whole tablet, continue to monitor BP for the next 5 days. Notify office if out of range

## 2022-05-12 NOTE — TELEPHONE ENCOUNTER
Received VM from patient    SW patient. Patient states his BP been running 160/90s. Patient denies any symptoms  Confirmed taking:  Losartan 25 mg 0.5 tab HS    Advised I would return call with recommendations.

## 2022-07-13 ENCOUNTER — TRANSCRIBE ORDERS (OUTPATIENT)
Dept: ADMINISTRATIVE | Facility: HOSPITAL | Age: 82
End: 2022-07-13

## 2022-07-13 DIAGNOSIS — N28.89 RENAL MASS: Primary | ICD-10-CM

## 2022-07-14 RX ORDER — APIXABAN 5 MG/1
TABLET, FILM COATED ORAL
Qty: 180 TABLET | Refills: 1 | Status: SHIPPED | OUTPATIENT
Start: 2022-07-14 | End: 2023-03-27

## 2022-07-20 ENCOUNTER — HOSPITAL ENCOUNTER (OUTPATIENT)
Dept: CT IMAGING | Facility: HOSPITAL | Age: 82
Discharge: HOME | End: 2022-07-20
Admitting: UROLOGY

## 2022-07-20 DIAGNOSIS — N28.89 RENAL MASS: ICD-10-CM

## 2022-07-20 LAB
CREAT BLDA-MCNC: 1.7 MG/DL
EGFRCR SERPLBLD CKD-EPI 2021: 40.2 ML/MIN/1.73

## 2022-07-20 PROCEDURE — 74170 CT ABD WO CNTRST FLWD CNTRST: CPT

## 2022-07-20 PROCEDURE — 0 IOPAMIDOL PER 1 ML: Performed by: UROLOGY

## 2022-07-20 PROCEDURE — 82565 ASSAY OF CREATININE: CPT

## 2022-07-20 RX ADMIN — IOPAMIDOL 100 ML: 755 INJECTION, SOLUTION INTRAVENOUS at 11:12

## 2022-07-30 ENCOUNTER — TELEPHONE ENCOUNTER (OUTPATIENT)
Age: 82
End: 2022-07-30

## 2022-07-31 ENCOUNTER — TELEPHONE ENCOUNTER (OUTPATIENT)
Age: 82
End: 2022-07-31

## 2022-08-10 ENCOUNTER — TELEPHONE (OUTPATIENT)
Dept: CARDIOLOGY | Facility: CLINIC | Age: 82
End: 2022-08-10

## 2022-08-23 RX ORDER — LOSARTAN POTASSIUM 25 MG/1
TABLET ORAL
Qty: 45 TABLET | Refills: 0 | Status: SHIPPED | OUTPATIENT
Start: 2022-08-23 | End: 2022-10-07

## 2022-09-02 ENCOUNTER — TRANSCRIBE ORDERS (OUTPATIENT)
Dept: ADMINISTRATIVE | Facility: HOSPITAL | Age: 82
End: 2022-09-02

## 2022-09-02 DIAGNOSIS — N28.89 RENAL MASS: Primary | ICD-10-CM

## 2022-10-07 RX ORDER — LOSARTAN POTASSIUM 25 MG/1
25 TABLET ORAL DAILY
Qty: 90 TABLET | Refills: 1 | Status: SHIPPED | OUTPATIENT
Start: 2022-10-07 | End: 2022-12-14

## 2022-11-16 ENCOUNTER — OFFICE VISIT (OUTPATIENT)
Dept: CARDIOLOGY | Facility: CLINIC | Age: 82
End: 2022-11-16

## 2022-11-16 VITALS
HEIGHT: 75 IN | HEART RATE: 76 BPM | SYSTOLIC BLOOD PRESSURE: 149 MMHG | DIASTOLIC BLOOD PRESSURE: 74 MMHG | WEIGHT: 202 LBS | BODY MASS INDEX: 25.12 KG/M2

## 2022-11-16 DIAGNOSIS — I10 ESSENTIAL HYPERTENSION: ICD-10-CM

## 2022-11-16 DIAGNOSIS — I25.10 CORONARY ARTERIOSCLEROSIS IN NATIVE ARTERY: Primary | ICD-10-CM

## 2022-11-16 DIAGNOSIS — I50.20 HFREF (HEART FAILURE WITH REDUCED EJECTION FRACTION): ICD-10-CM

## 2022-11-16 DIAGNOSIS — E78.5 HYPERLIPIDEMIA LDL GOAL <70: ICD-10-CM

## 2022-11-16 DIAGNOSIS — Z95.0 PRESENCE OF CARDIAC PACEMAKER: ICD-10-CM

## 2022-11-16 DIAGNOSIS — I48.19 ATRIAL FIBRILLATION, PERSISTENT: ICD-10-CM

## 2022-11-16 DIAGNOSIS — R94.31 ABNORMAL ELECTROCARDIOGRAM (ECG) (EKG): ICD-10-CM

## 2022-11-16 PROBLEM — E29.1 TESTICULAR HYPOFUNCTION: Status: ACTIVE | Noted: 2019-05-08

## 2022-11-16 PROBLEM — K21.9 GASTROESOPHAGEAL REFLUX DISEASE: Status: ACTIVE | Noted: 2019-05-08

## 2022-11-16 PROBLEM — M17.12 ARTHRITIS OF KNEE, LEFT: Status: RESOLVED | Noted: 2021-07-14 | Resolved: 2022-11-16

## 2022-11-16 PROBLEM — F41.9 ANXIETY: Status: ACTIVE | Noted: 2022-11-16

## 2022-11-16 PROBLEM — M10.9 GOUT: Status: ACTIVE | Noted: 2019-05-08

## 2022-11-16 PROBLEM — Z85.46 HISTORY OF MALIGNANT NEOPLASM OF PROSTATE: Status: ACTIVE | Noted: 2022-11-16

## 2022-11-16 PROCEDURE — 99214 OFFICE O/P EST MOD 30 MIN: CPT | Performed by: NURSE PRACTITIONER

## 2022-11-16 NOTE — PROGRESS NOTES
"Chief Complaint  Follow-up    Subjective            History of Present Illness  Javi Martínez is an 82-year-old male patient who presents to the office today for follow up. He has CAD with prior CABG, HFrEF, persistent atrial fibrillation, hypertension, hyperlipidemia, and presence of pacemaker. He denies any chest pain, shortness of breath, palpitations, lightheadedness/dizziness, or edema. He reports compliance with medications.     PMH  Past Medical History:   Diagnosis Date   • Arthritis     left knee    • Atrial fibrillation, persistent    • Essential hypertension 2019   • GERD (gastroesophageal reflux disease)    • HFrEF (heart failure with reduced ejection fraction) 2021   • Hyperlipidemia 2021   • Paroxysmal atrial fibrillation    • Prostate cancer     with seeds implant          ALLERGY  No Known Allergies       SURGICALHX  Past Surgical History:   Procedure Laterality Date   • APPENDECTOMY     • CARDIAC ABLATION      x2  \"years ago\"    • CARDIAC ELECTROPHYSIOLOGY PROCEDURE N/A 2021    Procedure: PPM battery change;  Surgeon: Ge Whelan MD;  Location: [facility] CATH INVASIVE LOCATION;  Service: Cardiovascular;  Laterality: N/A;   • CARDIAC SURGERY      cabg 3v   • ENDOSCOPY      with dilation    • KNEE ARTHROSCOPY Left    • TOTAL KNEE ARTHROPLASTY Left 2021    Procedure: TOTAL KNEE ARTHROPLASTY WITH DORA NAVIGATION WITH BIOMET;  Surgeon: Carlitos Zhao MD;  Location: [facility] MAIN OR;  Service: Orthopedics;  Laterality: Left;   • VENTRICULAR CARDIAC PACEMAKER INSERTION      [employer]           SOC  Social History     Socioeconomic History   • Marital status:    Tobacco Use   • Smoking status: Former     Packs/day: 0.50     Types: Cigarettes     Start date:      Quit date: 1995     Years since quittin.3   • Smokeless tobacco: Never   Vaping Use   • Vaping Use: Never used   Substance and Sexual Activity   • Alcohol use: Defer   • Drug use: Never   • " "Sexual activity: Defer         FAMHX  Family History   Problem Relation Age of Onset   • No Known Problems Mother    • No Known Problems Father    • No Known Problems Sister    • No Known Problems Brother    • No Known Problems Maternal Aunt    • No Known Problems Maternal Uncle    • No Known Problems Paternal Aunt    • No Known Problems Paternal Uncle    • No Known Problems Maternal Grandmother    • No Known Problems Maternal Grandfather    • No Known Problems Paternal Grandmother    • No Known Problems Paternal Grandfather    • No Known Problems Other           MEDSIGONLY  Current Outpatient Medications on File Prior to Visit   Medication Sig   • allopurinol (ZYLOPRIM) 100 MG tablet Take 100 mg by mouth Daily.   • Eliquis 5 MG tablet tablet Take 1 tablet by mouth twice daily   • losartan (COZAAR) 25 MG tablet Take 1 tablet by mouth Daily.   • omeprazole (priLOSEC) 20 MG capsule Take 20 mg by mouth Daily.   • [DISCONTINUED] HYDROcodone-acetaminophen (Norco) 7.5-325 MG per tablet Take 1 tablet by mouth Every 4 (Four) Hours As Needed for Moderate Pain .   • [DISCONTINUED] vitamin D (ERGOCALCIFEROL) 1.25 MG (82648 UT) capsule capsule Take 50,000 Units by mouth 1 (One) Time Per Week.     No current facility-administered medications on file prior to visit.       Objective   /74   Pulse 76   Ht 190.5 cm (75\")   Wt 91.6 kg (202 lb)   BMI 25.25 kg/m²       Physical Exam  HENT:      Head: Normocephalic.   Neck:      Vascular: No carotid bruit.   Cardiovascular:      Rate and Rhythm: Normal rate and regular rhythm.      Pulses: Normal pulses.      Heart sounds: Normal heart sounds. No murmur heard.  Pulmonary:      Effort: Pulmonary effort is normal.      Breath sounds: Normal breath sounds.   Musculoskeletal:      Cervical back: Neck supple.      Right lower leg: No edema.      Left lower leg: No edema.   Skin:     General: Skin is dry.      Capillary Refill: Capillary refill takes less than 2 seconds. "   Neurological:      Mental Status: He is alert and oriented to person, place, and time.   Psychiatric:         Behavior: Behavior normal.         Result Review :   The following data was reviewed by: MEJIA Davidson on 11/16/2022:  No results found for: PROBNP  CMP    CMP 11/28/21 7/20/22   Glucose 97    BUN 38 (A)    Creatinine 1.49 (A) 1.70   eGFR Non African Am 45 (A)    Sodium 138    Potassium 4.5    Chloride 105    Calcium 9.0    (A) Abnormal value       Comments are available for some flowsheets but are not being displayed.           CBC w/diff    CBC w/Diff 11/28/21   WBC 12.53 (A)   RBC 4.56   Hemoglobin 12.8 (A)   Hematocrit 40.3   MCV 88.4   MCH 28.1   MCHC 31.8   RDW 14.1   Platelets 267   Neutrophil Rel % 65.1   Immature Granulocyte Rel % 0.7 (A)   Lymphocyte Rel % 21.9   Monocyte Rel % 10.8   Eosinophil Rel % 1.0   Basophil Rel % 0.5   (A) Abnormal value             No results found for: TSH   No results found for: FREET4   No results found for: DDIMERQUANT  Magnesium   Date Value Ref Range Status   08/23/2021 2.2 1.6 - 2.4 mg/dL Final      No results found for: DIGOXIN   Lab Results   Component Value Date    TROPONINT 0.033 (C) 08/24/2021           Results for orders placed in visit on 10/01/21    Adult Transthoracic Echo Complete W/ Cont if Necessary Per Protocol    Interpretation Summary  · Estimated left ventricular EF = 45% Left ventricular systolic function is low normal.  · The left ventricular cavity is moderately dilated.  · Left ventricular wall thickness is consistent with mild to moderate concentric hypertrophy.  · The right ventricular cavity is mild to moderately dilated.  · The right atrial cavity is moderately dilated.  · Moderate dilation of the aortic root is present. Measuring 5.0 cm at greatest severity  · Estimated right ventricular systolic pressure from tricuspid regurgitation is mildly elevated (35-45 mmHg).  · Left atrium severely dilated  · Tricuspid regurgitation mild  to moderate in severity  · Aortic insufficiency mild    [id number] Score: 5        Assessment and Plan    Diagnoses and all orders for this visit:    1. CAD with CABG (Primary)  He denies any anginal symptoms, continue to monitor. He is not on aspirin therapy since he takes eliquis and increased risk for bleeding.     2. HFrEF (heart failure with reduced ejection fraction)  Symptomatically stable at this time and euvolemic on exam today, continue losartan 25 mg daily. We discussed importance of fluid restriction and daily weights. Repeat echocardiogram to assess left ventricular systolic function.   -     Adult Transthoracic Echo Complete W/ Cont if Necessary Per Protocol; Future    3. Atrial fibrillation, persistent  Symptomatically stable, continue eliquis for CVA prevention.     4. Essential hypertension  His blood pressure is slightly elevated in office today. I asked him to check blood pressure at home daily for the next week and to report any elevated readings. Low sodium diet discussed.     5. Hyperlipidemia  Recent lipid panel not available for review, will request most recent labs from PCP. He is not currently on statin therapy. If LDL is above 70, then statin should be initiated.     6. Presence of cardiac pacemaker single chamber  Currently functioning normally, will interrogate on next visit.    Other orders  -     apixaban (ELIQUIS) 5 MG tablet tablet; Take 1 tablet by mouth 2 (Two) Times a Day.  Dispense: 56 tablet; Refill: 0        Follow Up   Return in about 6 months (around 5/16/2023) for Follow up with Dr Whelan with device check.    Patient was given instructions and counseling regarding his condition or for health maintenance advice. Please see specific information pulled into the AVS if appropriate.     Javi Martínez  reports that he quit smoking about 27 years ago. His smoking use included cigarettes. He started smoking about 60 years ago. He smoked an average of .5 packs per day. He has never  used smokeless tobacco.           Isabel Navarrete, APRN  11/16/22  09:31 EST    Dictated Utilizing Dragon Dictation

## 2022-12-01 ENCOUNTER — HOSPITAL ENCOUNTER (OUTPATIENT)
Dept: CT IMAGING | Facility: HOSPITAL | Age: 82
Discharge: HOME | End: 2022-12-01
Admitting: UROLOGY

## 2022-12-01 DIAGNOSIS — N28.89 RENAL MASS: ICD-10-CM

## 2022-12-01 LAB
CREAT BLDA-MCNC: 1.9 MG/DL
EGFRCR SERPLBLD CKD-EPI 2021: 35.2 ML/MIN/1.73

## 2022-12-01 PROCEDURE — 82565 ASSAY OF CREATININE: CPT

## 2022-12-01 PROCEDURE — 74170 CT ABD WO CNTRST FLWD CNTRST: CPT

## 2022-12-01 PROCEDURE — 0 IOPAMIDOL PER 1 ML: Performed by: UROLOGY

## 2022-12-01 RX ADMIN — IOPAMIDOL 100 ML: 755 INJECTION, SOLUTION INTRAVENOUS at 09:54

## 2022-12-14 ENCOUNTER — TELEPHONE (OUTPATIENT)
Dept: CARDIOLOGY | Facility: CLINIC | Age: 82
End: 2022-12-14

## 2022-12-14 RX ORDER — LOSARTAN POTASSIUM 50 MG/1
50 TABLET ORAL DAILY
Qty: 90 TABLET | Refills: 3 | Status: SHIPPED | OUTPATIENT
Start: 2022-12-14

## 2022-12-14 NOTE — TELEPHONE ENCOUNTER
ELIJAH patient. Patient states BP running 150/90s consistently. Patient is taking Losartan 25 mg daily    Advised I would call with recommendations.

## 2022-12-14 NOTE — TELEPHONE ENCOUNTER
Received VM from patient stating his BP elevated.     Attempted to call patient. No answer. VM left

## 2022-12-21 RX ORDER — AMLODIPINE BESYLATE 5 MG/1
5 TABLET ORAL DAILY
Qty: 90 TABLET | Refills: 3 | Status: SHIPPED | OUTPATIENT
Start: 2022-12-21

## 2022-12-21 NOTE — TELEPHONE ENCOUNTER
SW patient. Patient states his BP still running 140/90 with the increased Losartan.     Patient taking Losartan 50 mg daily    Please advise

## 2022-12-21 NOTE — TELEPHONE ENCOUNTER
I will hold off on increasing his losartan any further in regards to his renal function, however he could start on amlodipine 5 mg daily.  Have him continue to monitor his blood pressure over the next week, and we can reevaluate.

## 2023-01-09 NOTE — THERAPY DISCHARGE NOTE
"Acute Care - Occupational Therapy Discharge   [place]     Patient Name: Javi Martínez  : 1940  MRN: [id number]  Today's Date: 2021  Onset of Illness/Injury or Date of Surgery: 21     Referring Physician: Carlitos Zhao      Admit Date: 2021       ICD-10-CM ICD-9-CM   1. Difficulty walking  R26.2 719.7   2. Primary osteoarthritis of left knee  M17.12 715.16   3. Decreased activities of daily living (ADL)  Z78.9 V49.89     Patient Active Problem List   Diagnosis   • Arthritis of knee, left   • Primary osteoarthritis of left knee   • S/P TKR (total knee replacement)     Past Medical History:   Diagnosis Date   • Arthritis     left knee    • Atrial fibrillation (CMS/HCC)    • Cancer (CMS/HCC)     prostate - with seeds implant    • GERD (gastroesophageal reflux disease)    • Heart disease    • Prostate disorder      Past Surgical History:   Procedure Laterality Date   • APPENDECTOMY     • CARDIAC ABLATION      x2  \"years ago\"    • CARDIAC SURGERY  2006    cabg 3v   • ENDOSCOPY      with dilation    • KNEE ARTHROSCOPY Left    • TOTAL KNEE ARTHROPLASTY Left 2021    Procedure: TOTAL KNEE ARTHROPLASTY WITH DORA NAVIGATION WITH BIOMET;  Surgeon: Carlitos Zhao MD;  Location: [facility] MAIN OR;  Service: Orthopedics;  Laterality: Left;   • VENTRICULAR CARDIAC PACEMAKER INSERTION      [email or web address]           OT ASSESSMENT FLOWSHEET (last 12 hours)      OT Evaluation and Treatment    No documentation.         Occupational Therapy Education                 Title: PT OT SLP Therapies (In Progress)     Topic: Occupational Therapy (In Progress)     Point: ADL training (Done)     Description:   Instruct learner(s) on proper safety adaptation and remediation techniques during self care or transfers.   Instruct in proper use of assistive devices.              Learning Progress Summary           Patient Acceptance, E,TB, VU by LF at 2021 09                   Point: Home exercise program (Not " Started)     Description:   Instruct learner(s) on appropriate technique for monitoring, assisting and/or progressing therapeutic exercises/activities.              Learner Progress:  Not documented in this visit.          Point: Precautions (Done)     Description:   Instruct learner(s) on prescribed precautions during self-care and functional transfers.              Learning Progress Summary           Patient Acceptance, E,TB, VU by  at 7/24/2021 0946                   Point: Body mechanics (Done)     Description:   Instruct learner(s) on proper positioning and spine alignment during self-care, functional mobility activities and/or exercises.              Learning Progress Summary           Patient Acceptance, E,TB, VU by  at 7/24/2021 0946                               User Key     Initials Effective Dates Name Provider Type Discipline     06/16/21 -  Dang Muñoz OT Occupational Therapist OT                OT Recommendation and Plan  Planned Therapy Interventions (OT): activity tolerance training, patient/caregiver education/training, BADL retraining, functional balance retraining, transfer/mobility retraining, occupation/activity based interventions  Therapy Frequency (OT): 5 times/wk  Plan of Care Review  Plan of Care Reviewed With: patient  Progress: no change  Outcome Summary: Patient presents with limitations in self-care, functional transfers, and balance. He would benefit from continued skilled occupational therapy services to maximize ADL performance and return home safely and independently.  Plan of Care Reviewed With: patient  Outcome Summary: Patient presents with limitations in self-care, functional transfers, and balance. He would benefit from continued skilled occupational therapy services to maximize ADL performance and return home safely and independently.         Outcome Measures     Row Name 07/24/21 1500 07/24/21 0945          How much help from another person do you currently  need...    Turning from your back to your side while in flat bed without using bedrails?  4  -MP  4  -MP     Moving from lying on back to sitting on the side of a flat bed without bedrails?  4  -MP  4  -MP     Moving to and from a bed to a chair (including a wheelchair)?  4  -MP  4  -MP     Standing up from a chair using your arms (e.g., wheelchair, bedside chair)?  4  -MP  4  -MP     Climbing 3-5 steps with a railing?  3  -MP  3  -MP     To walk in hospital room?  3  -MP  3  -MP     AM-PAC 6 Clicks Score (PT)  22  -MP  22  -MP        Functional Assessment    Outcome Measure Options  AM-PAC 6 Clicks Basic Mobility (PT)  -MP  --       User Key  (r) = Recorded By, (t) = Taken By, (c) = Cosigned By    Initials Name Provider Type    Neal Soto PTA Physical Therapy Assistant          Time Calculation:     Timed Therapy Charges  Total Units: 2    Charges  Total Units: 2    Procedure Name Documented Minutes Units Code    HC OT SELF CARE/MGMT/TRAIN EA 15 MIN 13  1    97016 (CPT®)      HC OT THERAPEUTIC ACT EA 15 MIN 10  1    71976 (CPT®)               Documented Minutes  Total Minutes: 23    Therapy Provided Minutes    77924 - OT Self Care/Mgmt Minutes 13    29822 - OT Therapeutic Activity Minutes 10                       OT Discharge Summary  Anticipated Discharge Disposition (OT): home with assist, home with outpatient therapy services  Reason for Discharge: Discharge from facility  Outcomes Achieved: Discharge from facility occurred on same date as evluation  Discharge Destination: Home with assist, Home with outpatient services    Dang Muñoz OT  7/26/2021   done

## 2023-03-27 ENCOUNTER — TELEPHONE (OUTPATIENT)
Dept: CARDIOLOGY | Facility: CLINIC | Age: 83
End: 2023-03-27

## 2023-03-27 RX ORDER — APIXABAN 5 MG/1
TABLET, FILM COATED ORAL
Qty: 180 TABLET | Refills: 3 | Status: SHIPPED | OUTPATIENT
Start: 2023-03-27

## 2023-03-27 NOTE — TELEPHONE ENCOUNTER
Caller: Javi Martínez    Relationship to patient: Self    Best call back number: [phone]    Patient is needing: PT FINDING WITH SLIGHT EXERTION, UPPER CHEST FEELS LIKE POUNDING, HAPPENS WHEN WALKING EVEN FROM ONE SIDE OF THE HOUSE TO THE OTHER. SAYS IT CAN COME AND GO THROUGHOUT THE DAY OR STAY ALL DAY. NO CHEST PAIN, BUT CAN FEEL HIS HEART POUNDING AND STATES IT GETS VERY UNCOMFORTABLE    Hub staff attempted to follow warm transfer process and was unsuccessful

## 2023-05-18 DIAGNOSIS — I71.21 ANEURYSM OF ASCENDING AORTA WITHOUT RUPTURE: ICD-10-CM

## 2023-05-18 DIAGNOSIS — I50.20 HFREF (HEART FAILURE WITH REDUCED EJECTION FRACTION): Primary | ICD-10-CM

## 2023-06-07 ENCOUNTER — OFFICE VISIT (OUTPATIENT)
Dept: CARDIOLOGY | Facility: CLINIC | Age: 83
End: 2023-06-07
Payer: MEDICARE

## 2023-06-07 ENCOUNTER — CLINICAL SUPPORT NO REQUIREMENTS (OUTPATIENT)
Dept: CARDIOLOGY | Facility: CLINIC | Age: 83
End: 2023-06-07
Payer: MEDICARE

## 2023-06-07 VITALS
DIASTOLIC BLOOD PRESSURE: 77 MMHG | SYSTOLIC BLOOD PRESSURE: 135 MMHG | WEIGHT: 203.2 LBS | BODY MASS INDEX: 25.27 KG/M2 | HEART RATE: 84 BPM | HEIGHT: 75 IN

## 2023-06-07 DIAGNOSIS — I48.19 ATRIAL FIBRILLATION, PERSISTENT: ICD-10-CM

## 2023-06-07 DIAGNOSIS — I10 ESSENTIAL HYPERTENSION: ICD-10-CM

## 2023-06-07 DIAGNOSIS — I50.20 HFREF (HEART FAILURE WITH REDUCED EJECTION FRACTION): ICD-10-CM

## 2023-06-07 DIAGNOSIS — Z95.0 PRESENCE OF CARDIAC PACEMAKER: Primary | ICD-10-CM

## 2023-06-07 DIAGNOSIS — Z95.0 PRESENCE OF CARDIAC PACEMAKER: ICD-10-CM

## 2023-06-07 DIAGNOSIS — I25.10 CORONARY ARTERIOSCLEROSIS IN NATIVE ARTERY: Primary | ICD-10-CM

## 2023-06-07 PROBLEM — I44.2 CHB (COMPLETE HEART BLOCK): Status: ACTIVE | Noted: 2023-06-07

## 2023-06-07 RX ORDER — TESTOSTERONE CYPIONATE 200 MG/ML
INJECTION, SOLUTION INTRAMUSCULAR
COMMUNITY
Start: 2023-04-12

## 2023-06-07 NOTE — PROGRESS NOTES
Normal VVIR Chamber Pacemaker Device Interrogation and Device Testing.  Normal evaluation of device function and lead measurements.  No optimization was needed of parameters or maximization of device longevity.  Patient is on automated Home Remote Monitoring.  2 short NS-VT episodes that are very short and brief.  Pacemaker dependent.

## 2023-06-07 NOTE — ASSESSMENT & PLAN NOTE
Patient's most recent ejection fraction shows a mild decrease to 35% he still feels that he is doing well symptomatically though and no significant volume overload on exam.  Patient did have the addition of Farxiga added to his GDMT therapy.  Discussed with him following back up in 3 months for the possible addition of Aldactone if able to tolerate from a blood pressure standpoint.  Previously had been intolerant to Entresto due to hypotension.  After maximizing his medical therapy for CHF would repeat echocardiogram at the 6-month interval to see how his EF is doing.

## 2023-06-07 NOTE — ASSESSMENT & PLAN NOTE
Blood pressure well controlled previously with hypotension on Entresto discussed with possibly starting Aldactone on follow-up visit may need to cut back on amlodipine dose if blood pressure is an issue.

## 2023-06-07 NOTE — PROGRESS NOTES
Chief Complaint  CAD with CABG, Hypertension, Hyperlipidemia, Follow-up, and Pacemaker Check    Subjective    Patient with some symptoms of increased exhaustion and fatigue.  He has noticed some mild edema sometimes especially the day wears on.  Recently was started on Farxiga last week.  Does not report any anginal chest discomfort issues no problems with increased weight gain  Past Medical History:   Diagnosis Date    Aneurysm 2013    Arthritis     left knee     Atrial fibrillation, persistent     CHB (complete heart block) 2023    Essential hypertension 2019    GERD (gastroesophageal reflux disease)     HFrEF (heart failure with reduced ejection fraction) 2021    Hyperlipidemia 2021    Paroxysmal atrial fibrillation     Prostate cancer     with seeds implant          Current Outpatient Medications:     allopurinol (ZYLOPRIM) 100 MG tablet, Take 1 tablet by mouth Daily., Disp: , Rfl:     amLODIPine (NORVASC) 5 MG tablet, Take 1 tablet by mouth Daily., Disp: 90 tablet, Rfl: 3    Eliquis 5 MG tablet tablet, Take 1 tablet by mouth twice daily, Disp: 180 tablet, Rfl: 3    losartan (Cozaar) 50 MG tablet, Take 1 tablet by mouth Daily., Disp: 90 tablet, Rfl: 3    omeprazole (priLOSEC) 20 MG capsule, Take 1 capsule by mouth Daily., Disp: , Rfl:     Testosterone Cypionate (DEPOTESTOTERONE CYPIONATE) 200 MG/ML injection, Inject  into the appropriate muscle as directed by prescriber., Disp: , Rfl:     Medications Discontinued During This Encounter   Medication Reason    apixaban (ELIQUIS) 5 MG tablet tablet Duplicate order    dapagliflozin Propanediol 10 MG tablet *Therapy completed     No Known Allergies     Social History     Tobacco Use    Smoking status: Former     Packs/day: 0.50     Types: Cigarettes     Start date: 1962     Quit date: 1995     Years since quittin.8    Smokeless tobacco: Never   Vaping Use    Vaping Use: Never used   Substance Use Topics    Alcohol use: Never     "Drug use: Never       Family History   Problem Relation Age of Onset    Heart attack Mother     No Known Problems Father     No Known Problems Sister     No Known Problems Brother     No Known Problems Maternal Aunt     No Known Problems Maternal Uncle     No Known Problems Paternal Aunt     No Known Problems Paternal Uncle     No Known Problems Maternal Grandmother     No Known Problems Maternal Grandfather     No Known Problems Paternal Grandmother     No Known Problems Paternal Grandfather     No Known Problems Other         Objective     /77   Pulse 84   Ht 190.5 cm (75\")   Wt 92.2 kg (203 lb 3.2 oz)   BMI 25.40 kg/m²       Physical Exam    General Appearance:   no acute distress  Alert and oriented x3  HENT:   lips not cyanotic  Atraumatic  Neck:  No jvd   supple  Respiratory:  no respiratory distress  normal breath sounds  no rales  Cardiovascular:  Regular rate and rhythm  no S3, no S4   no murmur  no rub  Extremities  No cyanosis  lower extremity edema: Trace  Skin:   warm, dry  No rashes      Result Review :     No results found for: PROBNP  CMP          7/20/2022    10:55 12/1/2022    09:36   CMP   Creatinine 1.70  1.90    EGFR 40.2  35.2      CBC w/diff          8/23/2022    06:22   CBC w/Diff   WBC 12.90       RBC 5.85       Hemoglobin 16.0       Hematocrit 51.7       MCV 88.4       MCH 27.4       MCHC 30.9       RDW 17.5       Platelets 330       Neutrophil Rel % 59.7       Immature Granulocyte Rel % 0.9       Lymphocyte Rel % 25.0       Monocyte Rel % 12.1       Eosinophil Rel % 1.3       Basophil Rel % 1.0          Details          This result is from an external source.              No results found for: TSH   No results found for: FREET4   No results found for: DDIMERQUANT  Magnesium   Date Value Ref Range Status   08/23/2021 2.2 1.6 - 2.4 mg/dL Final      No results found for: DIGOXIN   Lab Results   Component Value Date    TROPONINT 0.033 (C) 08/24/2021         Pacemaker interrogation " right ventricularly paced 99% time working normally patient with 2 short NSVT episodes no change made to device    No results found for: POCTROP    Results for orders placed in visit on 05/16/23    Adult Transthoracic Echo Complete W/ Cont if Necessary Per Protocol    Interpretation Summary    Left ventricular systolic function is moderately decreased. Estimated left ventricular EF = 35% with global hypokinesis and mildly dyssynchronous contraction pattern    The left ventricular cavity is moderately dilated.    Left ventricular wall thickness is consistent with mild to moderate concentric hypertrophy.    Left ventricular diastolic dysfunction is noted.    The right ventricular cavity is mildly dilated.    The left atrial cavity is moderate to severely dilated.    The right atrial cavity is moderately  dilated.    Estimated right ventricular systolic pressure from tricuspid regurgitation is mildly elevated (35-45 mmHg).    Mild dilation of the aortic root is present. Mild dilation of the ascending aorta is present.                 Diagnoses and all orders for this visit:    1. CAD with CABG (Primary)  Assessment & Plan:  Patient has not experienced any anginal chest discomfort      2. HFrEF (heart failure with reduced ejection fraction)  Assessment & Plan:  Patient's most recent ejection fraction shows a mild decrease to 35% he still feels that he is doing well symptomatically though and no significant volume overload on exam.  Patient did have the addition of Farxiga added to his GDMT therapy.  Discussed with him following back up in 3 months for the possible addition of Aldactone if able to tolerate from a blood pressure standpoint.  Previously had been intolerant to Entresto due to hypotension.  After maximizing his medical therapy for CHF would repeat echocardiogram at the 6-month interval to see how his EF is doing.      3. Essential hypertension  Assessment & Plan:  Blood pressure well controlled previously  with hypotension on Entresto discussed with possibly starting Aldactone on follow-up visit may need to cut back on amlodipine dose if blood pressure is an issue.      4. Presence of cardiac pacemaker single chamber  Assessment & Plan:  Device working normally interrogation today      5. Atrial fibrillation, persistent  Assessment & Plan:  Rate control continue with Eliquis 5 twice daily for CVA prevention              Follow Up     Return in about 3 months (around 9/7/2023) for Follow with Isabel Navarrete.          Patient was given instructions and counseling regarding his condition or for health maintenance advice. Please see specific information pulled into the AVS if appropriate.

## 2023-06-19 ENCOUNTER — OFFICE VISIT (OUTPATIENT)
Dept: NEUROLOGY | Facility: CLINIC | Age: 83
End: 2023-06-19
Payer: MEDICARE

## 2023-06-19 VITALS
HEIGHT: 75 IN | WEIGHT: 208 LBS | HEART RATE: 81 BPM | DIASTOLIC BLOOD PRESSURE: 98 MMHG | SYSTOLIC BLOOD PRESSURE: 129 MMHG | BODY MASS INDEX: 25.86 KG/M2

## 2023-06-19 DIAGNOSIS — G62.9 POLYNEUROPATHY: Primary | ICD-10-CM

## 2023-06-19 DIAGNOSIS — Z11.59 ENCOUNTER FOR SCREENING FOR OTHER VIRAL DISEASES: ICD-10-CM

## 2023-06-19 RX ORDER — DAPAGLIFLOZIN 10 MG/1
1 TABLET, FILM COATED ORAL DAILY
COMMUNITY
Start: 2023-06-12

## 2023-06-19 RX ORDER — INDOMETHACIN 25 MG/1
25 CAPSULE ORAL 3 TIMES DAILY PRN
COMMUNITY

## 2023-06-19 NOTE — PROGRESS NOTES
"Chief Complaint  Numbness (BLE, R>L)    Subjective          Javi Martínez is a 83 y.o. male who presents to [facility]  History of Present Illness  83-year-old man evaluated for numbness in his legs right greater than left.  The right leg has been ongoing for the last 4 months.  The numbness is from his calf down to his foot.  It is not painful.  There is no burning sensation.  The left leg is also the same way but is not as severe as the right foot.  It started about 4 months ago.  He states that his right foot feels very heavy rock when he is driving.  He has a difficult time controlling it.  He states that his right calf has always been smaller than the left calf for the past 20 years.  It was attributed to herniated disc.  C-reactive protein November 26, 2021 which was 8.36.  Repeat C-reactive protein November 28 was 4.94.  It was normal.  Objective   Vital Signs:   /98   Pulse 81   Ht 190.5 cm (75\")   Wt 94.3 kg (208 lb)   BMI 26.00 kg/m²     Physical Exam   He is alert, fluent, phasic, follows commands well.  There is no weakness proximally in his lower extremities.  There is 4/5 on dorsiflexion, eversion, inversion, and plantarflexion.  There is weakness and atrophy of intrinsic foot muscles.  Reflexes are absent in the patellar's and ankles.  Sensation is decreased to pinprick in a stocking distribution to below the knee.  Vibration is impaired in the toes and ankles.        Assessment and Plan  Diagnoses and all orders for this visit:    1. 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  I discussed with him the possible etiologies including rheumatologic disease and vasculitis.  Laboratory work-up will be obtained looking for etiologies for axonal sensorimotor neuropathy.  He is to call us to find out the results.  I discussed with him that " if we are unable to find out the etiology for his polyneuropathy that I may refer him to the [facility] neuropathy clinic for second opinion.  Thank you for letting me participate in his care.      Orders:  -     Immunofixation electrophoresis; Future  -     Immunoglobulin Free LT Chains Blood; Future  -     Sedimentation Rate; Future  -     C-reactive Protein; Future  -     BAUTISTA With / DsDNA, RNP, Sjogrens A / B, Smith; Future  -     C3+C4+BAUTISTA+RA; Future  -     Cryoglobulin; Future  -     Hepatitis B Virus (HBV) Screening and Diagnosis; Future  -     Motor and Sensory Neuropathy Evaluation w Reflex; Future  -     Paraneoplastic Autoantibody Evalulation; Future    2. Encounter for screening for other viral diseases  -     Hepatitis B Virus (HBV) Screening and Diagnosis; Future         Nerve Conduction Study:  10 nerves     EMG:  Complete    Total time spent with the patient and coordinating patient care was 30 minutes.    Follow Up  No follow-ups on file.  Patient was given instructions and counseling regarding his condition or for health maintenance advice. Please see specific information pulled into the AVS if appropriate.

## 2023-06-19 NOTE — ASSESSMENT & PLAN NOTE
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  I discussed with him the possible etiologies including rheumatologic disease and vasculitis.  Laboratory work-up will be obtained looking for etiologies for axonal sensorimotor neuropathy.  He is to call us to find out the results.  I discussed with him that if we are unable to find out the etiology for his polyneuropathy that I may refer him to the [facility] neuropathy clinic for second opinion.  Thank you for letting me participate in his care.

## 2023-09-01 ENCOUNTER — TELEPHONE (OUTPATIENT)
Dept: CARDIOLOGY | Facility: CLINIC | Age: 83
End: 2023-09-01
Payer: MEDICARE

## 2023-09-01 PROBLEM — I44.2 CHB (COMPLETE HEART BLOCK): Status: RESOLVED | Noted: 2023-06-07 | Resolved: 2023-09-01

## 2023-09-01 PROBLEM — J44.9 CHRONIC OBSTRUCTIVE PULMONARY DISEASE: Status: ACTIVE | Noted: 2023-09-01

## 2023-09-01 NOTE — TELEPHONE ENCOUNTER
Would not recommend changing medication dose since blood pressure and heart rates are in normal ranges. Agree with having patient send in an upload for review. Agree with him going to the ER if symptoms persist/worsen over the weekend.

## 2023-09-01 NOTE — TELEPHONE ENCOUNTER
Received VM from patient.     Sw patient. Patient states for couple weeks now he has been getting a sensation in chest that goes to neck. Patient states it happens at any time. Does not matter if rest or exertion. Patient states he does check his BP and Hr when it happens. This last time /75 HR 88. Patient states when his HR is in 80s it feels like it is racing. Did have patient do a download. Also advised patient to keep his apt next week and if it got worse and/or did not go away to go to ER. Advised I would let Isabel know if there was any other recommendations I would return call with recommendations  Patient is taking Toprol 25 HS

## 2023-09-02 ENCOUNTER — HOSPITAL ENCOUNTER (INPATIENT)
Facility: HOSPITAL | Age: 83
LOS: 2 days | Discharge: HOME | DRG: 280 | End: 2023-09-04
Attending: EMERGENCY MEDICINE | Admitting: FAMILY MEDICINE
Payer: MEDICARE

## 2023-09-02 ENCOUNTER — APPOINTMENT (OUTPATIENT)
Dept: CARDIOLOGY | Facility: HOSPITAL | Age: 83
DRG: 280 | End: 2023-09-02
Payer: MEDICARE

## 2023-09-02 ENCOUNTER — APPOINTMENT (OUTPATIENT)
Dept: GENERAL RADIOLOGY | Facility: HOSPITAL | Age: 83
DRG: 280 | End: 2023-09-02
Payer: MEDICARE

## 2023-09-02 DIAGNOSIS — I20.0 UNSTABLE ANGINA: Primary | ICD-10-CM

## 2023-09-02 DIAGNOSIS — R07.9 CHEST PAIN, UNSPECIFIED TYPE: ICD-10-CM

## 2023-09-02 DIAGNOSIS — R77.8 ELEVATED TROPONIN: ICD-10-CM

## 2023-09-02 DIAGNOSIS — I50.9 ACUTE ON CHRONIC CONGESTIVE HEART FAILURE, UNSPECIFIED HEART FAILURE TYPE: ICD-10-CM

## 2023-09-02 DIAGNOSIS — N18.30 STAGE 3 CHRONIC KIDNEY DISEASE, UNSPECIFIED WHETHER STAGE 3A OR 3B CKD: ICD-10-CM

## 2023-09-02 LAB
ALBUMIN SERPL-MCNC: 3.9 G/DL (ref 3.5–5.2)
ALBUMIN/GLOB SERPL: 1.4 G/DL
ALP SERPL-CCNC: 95 U/L (ref 39–117)
ALT SERPL W P-5'-P-CCNC: 20 U/L (ref 1–41)
ANION GAP SERPL CALCULATED.3IONS-SCNC: 12.3 MMOL/L (ref 5–15)
ANION GAP SERPL CALCULATED.3IONS-SCNC: 12.3 MMOL/L (ref 5–15)
AST SERPL-CCNC: 23 U/L (ref 1–40)
BASOPHILS # BLD AUTO: 0.06 10*3/MM3 (ref 0–0.2)
BASOPHILS NFR BLD AUTO: 0.5 % (ref 0–1.5)
BH CV ECHO MEAS - AI P1/2T: 706.3 MSEC
BH CV ECHO MEAS - AO ROOT DIAM: 4.2 CM
BH CV ECHO MEAS - EDV(CUBED): 249 ML
BH CV ECHO MEAS - EDV(MOD-SP2): 148 ML
BH CV ECHO MEAS - EDV(MOD-SP4): 145 ML
BH CV ECHO MEAS - EF(MOD-BP): 30 %
BH CV ECHO MEAS - EF(MOD-SP2): 21.6 %
BH CV ECHO MEAS - EF(MOD-SP4): 26.9 %
BH CV ECHO MEAS - ESV(CUBED): 175.5 ML
BH CV ECHO MEAS - ESV(MOD-SP2): 116 ML
BH CV ECHO MEAS - ESV(MOD-SP4): 106 ML
BH CV ECHO MEAS - FS: 11 %
BH CV ECHO MEAS - IVS/LVPW: 0.9 CM
BH CV ECHO MEAS - IVSD: 1.25 CM
BH CV ECHO MEAS - LA DIMENSION: 7.4 CM
BH CV ECHO MEAS - LAT PEAK E' VEL: 6.4 CM/SEC
BH CV ECHO MEAS - LV DIASTOLIC VOL/BSA (35-75): 65.4 CM2
BH CV ECHO MEAS - LV MASS(C)D: 386.2 GRAMS
BH CV ECHO MEAS - LV SYSTOLIC VOL/BSA (12-30): 47.8 CM2
BH CV ECHO MEAS - LVIDD: 6.3 CM
BH CV ECHO MEAS - LVIDS: 5.6 CM
BH CV ECHO MEAS - LVOT AREA: 3.5 CM2
BH CV ECHO MEAS - LVOT DIAM: 2.12 CM
BH CV ECHO MEAS - LVPWD: 1.39 CM
BH CV ECHO MEAS - MED PEAK E' VEL: 4.3 CM/SEC
BH CV ECHO MEAS - MV A MAX VEL: 45.6 CM/SEC
BH CV ECHO MEAS - MV DEC SLOPE: 424.4 CM/SEC2
BH CV ECHO MEAS - MV DEC TIME: 0.15 MSEC
BH CV ECHO MEAS - MV E MAX VEL: 65 CM/SEC
BH CV ECHO MEAS - MV E/A: 1.43
BH CV ECHO MEAS - RAP SYSTOLE: 10 MMHG
BH CV ECHO MEAS - RVDD: 3 CM
BH CV ECHO MEAS - RVSP: 55.2 MMHG
BH CV ECHO MEAS - SI(MOD-SP2): 14.4 ML/M2
BH CV ECHO MEAS - SI(MOD-SP4): 17.6 ML/M2
BH CV ECHO MEAS - SV(MOD-SP2): 32 ML
BH CV ECHO MEAS - SV(MOD-SP4): 39 ML
BH CV ECHO MEAS - TAPSE (>1.6): 1.58 CM
BH CV ECHO MEAS - TR MAX PG: 45.2 MMHG
BH CV ECHO MEAS - TR MAX VEL: 336.3 CM/SEC
BH CV ECHO MEASUREMENTS AVERAGE E/E' RATIO: 12.15
BILIRUB SERPL-MCNC: 1.3 MG/DL (ref 0–1.2)
BUN SERPL-MCNC: 22 MG/DL (ref 8–23)
BUN SERPL-MCNC: 22 MG/DL (ref 8–23)
BUN/CREAT SERPL: 11.2 (ref 7–25)
BUN/CREAT SERPL: 11.2 (ref 7–25)
CALCIUM SPEC-SCNC: 9.5 MG/DL (ref 8.6–10.5)
CALCIUM SPEC-SCNC: 9.5 MG/DL (ref 8.6–10.5)
CHLORIDE SERPL-SCNC: 104 MMOL/L (ref 98–107)
CHLORIDE SERPL-SCNC: 104 MMOL/L (ref 98–107)
CHOLEST SERPL-MCNC: 151 MG/DL (ref 0–200)
CO2 SERPL-SCNC: 24.7 MMOL/L (ref 22–29)
CO2 SERPL-SCNC: 24.7 MMOL/L (ref 22–29)
CREAT SERPL-MCNC: 1.96 MG/DL (ref 0.76–1.27)
CREAT SERPL-MCNC: 1.96 MG/DL (ref 0.76–1.27)
DEPRECATED RDW RBC AUTO: 49.3 FL (ref 37–54)
EGFRCR SERPLBLD CKD-EPI 2021: 33.7 ML/MIN/1.73
EGFRCR SERPLBLD CKD-EPI 2021: 33.7 ML/MIN/1.73
EOSINOPHIL # BLD AUTO: 0.19 10*3/MM3 (ref 0–0.4)
EOSINOPHIL NFR BLD AUTO: 1.7 % (ref 0.3–6.2)
ERYTHROCYTE [DISTWIDTH] IN BLOOD BY AUTOMATED COUNT: 15.6 % (ref 12.3–15.4)
GEN 5 2HR TROPONIN T REFLEX: 251 NG/L
GLOBULIN UR ELPH-MCNC: 2.8 GM/DL
GLUCOSE SERPL-MCNC: 116 MG/DL (ref 65–99)
GLUCOSE SERPL-MCNC: 116 MG/DL (ref 65–99)
HBA1C MFR BLD: 5.9 % (ref 4.8–5.6)
HCT VFR BLD AUTO: 44.9 % (ref 37.5–51)
HDLC SERPL-MCNC: 26 MG/DL (ref 40–60)
HGB BLD-MCNC: 14.1 G/DL (ref 13–17.7)
HOLD SPECIMEN: NORMAL
HOLD SPECIMEN: NORMAL
IMM GRANULOCYTES # BLD AUTO: 0.05 10*3/MM3 (ref 0–0.05)
IMM GRANULOCYTES NFR BLD AUTO: 0.5 % (ref 0–0.5)
LDLC SERPL CALC-MCNC: 98 MG/DL (ref 0–100)
LDLC/HDLC SERPL: 3.65 {RATIO}
LEFT ATRIUM VOLUME INDEX: 83.8 ML/M2
LIPASE SERPL-CCNC: 29 U/L (ref 13–60)
LYMPHOCYTES # BLD AUTO: 2.99 10*3/MM3 (ref 0.7–3.1)
LYMPHOCYTES NFR BLD AUTO: 27.2 % (ref 19.6–45.3)
MAGNESIUM SERPL-MCNC: 2.2 MG/DL (ref 1.6–2.4)
MCH RBC QN AUTO: 28.6 PG (ref 26.6–33)
MCHC RBC AUTO-ENTMCNC: 31.4 G/DL (ref 31.5–35.7)
MCV RBC AUTO: 91.1 FL (ref 79–97)
MONOCYTES # BLD AUTO: 1.17 10*3/MM3 (ref 0.1–0.9)
MONOCYTES NFR BLD AUTO: 10.6 % (ref 5–12)
NEUTROPHILS NFR BLD AUTO: 59.5 % (ref 42.7–76)
NEUTROPHILS NFR BLD AUTO: 6.55 10*3/MM3 (ref 1.7–7)
NRBC BLD AUTO-RTO: 0 /100 WBC (ref 0–0.2)
NT-PROBNP SERPL-MCNC: 4554 PG/ML (ref 0–1800)
PLATELET # BLD AUTO: 232 10*3/MM3 (ref 140–450)
PMV BLD AUTO: 9.6 FL (ref 6–12)
POTASSIUM SERPL-SCNC: 4.2 MMOL/L (ref 3.5–5.2)
POTASSIUM SERPL-SCNC: 4.2 MMOL/L (ref 3.5–5.2)
PROT SERPL-MCNC: 6.7 G/DL (ref 6–8.5)
RBC # BLD AUTO: 4.93 10*6/MM3 (ref 4.14–5.8)
SODIUM SERPL-SCNC: 141 MMOL/L (ref 136–145)
SODIUM SERPL-SCNC: 141 MMOL/L (ref 136–145)
TRIGL SERPL-MCNC: 151 MG/DL (ref 0–150)
TROPONIN T DELTA: 36 NG/L
TROPONIN T SERPL HS-MCNC: 215 NG/L
TROPONIN T SERPL HS-MCNC: 299 NG/L
TSH SERPL DL<=0.05 MIU/L-ACNC: 2.98 UIU/ML (ref 0.27–4.2)
VLDLC SERPL-MCNC: 27 MG/DL (ref 5–40)
WBC NRBC COR # BLD: 11.01 10*3/MM3 (ref 3.4–10.8)
WHOLE BLOOD HOLD COAG: NORMAL
WHOLE BLOOD HOLD SPECIMEN: NORMAL

## 2023-09-02 PROCEDURE — 83735 ASSAY OF MAGNESIUM: CPT

## 2023-09-02 PROCEDURE — 84484 ASSAY OF TROPONIN QUANT: CPT | Performed by: EMERGENCY MEDICINE

## 2023-09-02 PROCEDURE — 85025 COMPLETE CBC W/AUTO DIFF WBC: CPT

## 2023-09-02 PROCEDURE — 83690 ASSAY OF LIPASE: CPT

## 2023-09-02 PROCEDURE — 25010000002 FUROSEMIDE PER 20 MG: Performed by: FAMILY MEDICINE

## 2023-09-02 PROCEDURE — 93005 ELECTROCARDIOGRAM TRACING: CPT

## 2023-09-02 PROCEDURE — 83880 ASSAY OF NATRIURETIC PEPTIDE: CPT

## 2023-09-02 PROCEDURE — 99222 1ST HOSP IP/OBS MODERATE 55: CPT | Performed by: STUDENT IN AN ORGANIZED HEALTH CARE EDUCATION/TRAINING PROGRAM

## 2023-09-02 PROCEDURE — 99285 EMERGENCY DEPT VISIT HI MDM: CPT

## 2023-09-02 PROCEDURE — 94799 UNLISTED PULMONARY SVC/PX: CPT

## 2023-09-02 PROCEDURE — 84484 ASSAY OF TROPONIN QUANT: CPT | Performed by: INTERNAL MEDICINE

## 2023-09-02 PROCEDURE — 84484 ASSAY OF TROPONIN QUANT: CPT

## 2023-09-02 PROCEDURE — 83036 HEMOGLOBIN GLYCOSYLATED A1C: CPT | Performed by: FAMILY MEDICINE

## 2023-09-02 PROCEDURE — 93306 TTE W/DOPPLER COMPLETE: CPT

## 2023-09-02 PROCEDURE — 71045 X-RAY EXAM CHEST 1 VIEW: CPT

## 2023-09-02 PROCEDURE — 36415 COLL VENOUS BLD VENIPUNCTURE: CPT

## 2023-09-02 PROCEDURE — 93306 TTE W/DOPPLER COMPLETE: CPT | Performed by: STUDENT IN AN ORGANIZED HEALTH CARE EDUCATION/TRAINING PROGRAM

## 2023-09-02 PROCEDURE — 80061 LIPID PANEL: CPT | Performed by: FAMILY MEDICINE

## 2023-09-02 PROCEDURE — 94761 N-INVAS EAR/PLS OXIMETRY MLT: CPT

## 2023-09-02 PROCEDURE — 93010 ELECTROCARDIOGRAM REPORT: CPT | Performed by: SPECIALIST

## 2023-09-02 PROCEDURE — 80053 COMPREHEN METABOLIC PANEL: CPT

## 2023-09-02 PROCEDURE — 93005 ELECTROCARDIOGRAM TRACING: CPT | Performed by: EMERGENCY MEDICINE

## 2023-09-02 PROCEDURE — 84443 ASSAY THYROID STIM HORMONE: CPT | Performed by: FAMILY MEDICINE

## 2023-09-02 RX ORDER — SODIUM CHLORIDE 0.9 % (FLUSH) 0.9 %
10 SYRINGE (ML) INJECTION EVERY 12 HOURS SCHEDULED
Status: DISCONTINUED | OUTPATIENT
Start: 2023-09-02 | End: 2023-09-04 | Stop reason: HOSPADM

## 2023-09-02 RX ORDER — NITROGLYCERIN 0.4 MG/1
0.4 TABLET SUBLINGUAL
Status: DISCONTINUED | OUTPATIENT
Start: 2023-09-02 | End: 2023-09-02 | Stop reason: SDUPTHER

## 2023-09-02 RX ORDER — ASPIRIN 81 MG/1
324 TABLET, CHEWABLE ORAL ONCE
Status: DISCONTINUED | OUTPATIENT
Start: 2023-09-02 | End: 2023-09-02

## 2023-09-02 RX ORDER — BISACODYL 5 MG/1
5 TABLET, DELAYED RELEASE ORAL DAILY PRN
Status: DISCONTINUED | OUTPATIENT
Start: 2023-09-02 | End: 2023-09-04 | Stop reason: HOSPADM

## 2023-09-02 RX ORDER — BISACODYL 10 MG
10 SUPPOSITORY, RECTAL RECTAL DAILY PRN
Status: DISCONTINUED | OUTPATIENT
Start: 2023-09-02 | End: 2023-09-04 | Stop reason: HOSPADM

## 2023-09-02 RX ORDER — SODIUM CHLORIDE 0.9 % (FLUSH) 0.9 %
10 SYRINGE (ML) INJECTION AS NEEDED
Status: DISCONTINUED | OUTPATIENT
Start: 2023-09-02 | End: 2023-09-04 | Stop reason: HOSPADM

## 2023-09-02 RX ORDER — SODIUM CHLORIDE 9 MG/ML
40 INJECTION, SOLUTION INTRAVENOUS AS NEEDED
Status: DISCONTINUED | OUTPATIENT
Start: 2023-09-02 | End: 2023-09-04 | Stop reason: HOSPADM

## 2023-09-02 RX ORDER — POLYETHYLENE GLYCOL 3350 17 G/17G
17 POWDER, FOR SOLUTION ORAL DAILY PRN
Status: DISCONTINUED | OUTPATIENT
Start: 2023-09-02 | End: 2023-09-04 | Stop reason: HOSPADM

## 2023-09-02 RX ORDER — NITROGLYCERIN 0.4 MG/1
0.4 TABLET SUBLINGUAL
Status: DISCONTINUED | OUTPATIENT
Start: 2023-09-02 | End: 2023-09-04 | Stop reason: HOSPADM

## 2023-09-02 RX ORDER — ISOSORBIDE MONONITRATE 30 MG/1
30 TABLET, EXTENDED RELEASE ORAL
Status: DISCONTINUED | OUTPATIENT
Start: 2023-09-02 | End: 2023-09-04 | Stop reason: HOSPADM

## 2023-09-02 RX ORDER — AMOXICILLIN 250 MG
2 CAPSULE ORAL 2 TIMES DAILY
Status: DISCONTINUED | OUTPATIENT
Start: 2023-09-02 | End: 2023-09-04 | Stop reason: HOSPADM

## 2023-09-02 RX ORDER — FUROSEMIDE 10 MG/ML
60 INJECTION INTRAMUSCULAR; INTRAVENOUS 2 TIMES DAILY
Status: DISCONTINUED | OUTPATIENT
Start: 2023-09-02 | End: 2023-09-04 | Stop reason: HOSPADM

## 2023-09-02 RX ADMIN — APIXABAN 5 MG: 5 TABLET, FILM COATED ORAL at 21:21

## 2023-09-02 RX ADMIN — Medication 10 ML: at 21:22

## 2023-09-02 RX ADMIN — APIXABAN 5 MG: 5 TABLET, FILM COATED ORAL at 09:19

## 2023-09-02 RX ADMIN — FUROSEMIDE 60 MG: 10 INJECTION, SOLUTION INTRAMUSCULAR; INTRAVENOUS at 21:22

## 2023-09-02 RX ADMIN — Medication 10 ML: at 09:20

## 2023-09-02 RX ADMIN — FUROSEMIDE 60 MG: 10 INJECTION, SOLUTION INTRAMUSCULAR; INTRAVENOUS at 09:19

## 2023-09-02 RX ADMIN — ISOSORBIDE MONONITRATE 30 MG: 30 TABLET, EXTENDED RELEASE ORAL at 16:43

## 2023-09-02 NOTE — ED PROVIDER NOTES
"Time: 3:30 AM EDT  Date of encounter:  9/2/2023  Independent Historian/Clinical History and Information was obtained by:   Patient    History is limited by: N/A    Chief Complaint: Chest pain      History of Present Illness:  Patient is a 83 y.o. year old male who presents to the emergency department for evaluation of chest pain    Patient describes intermittent episodes of a full sensation that extends from his lower chest over his anterior chest up into his neck.  He states its not quite a pain sensation but it is uncomfortable.  Is happened intermittently over the past several weeks.  But he became concerned tonight as it was lasting longer than usual as it only last seconds previously.  He denies any associated shortness of breath or nausea.  He has some mild lower extremity pedal edema.  Fullness sensation is occasionally induced by physical exertion but other times it happens at rest.  He is due to follow-up with his cardiologist on Thursday of this coming week.    HPI    Patient Care Team  Primary Care Provider: Rani Lopez APRN    Past Medical History:     No Known Allergies  Past Medical History:   Diagnosis Date    Aneurysm 2013    Arthritis     left knee     Atrial fibrillation, persistent     CHB (complete heart block) 06/07/2023    Chronic obstructive pulmonary disease 9/1/2023    Difficulty walking 2023    Numbness in right foot    Essential hypertension 05/08/2019    GERD (gastroesophageal reflux disease)     HFrEF (heart failure with reduced ejection fraction) 08/11/2021    Hyperlipidemia 08/11/2021    Paroxysmal atrial fibrillation     Polyneuropathy 6/19/2023    Prostate cancer 2010    with seeds implant      Past Surgical History:   Procedure Laterality Date    ABLATION OF DYSRHYTHMIC FOCUS  2013    APPENDECTOMY      CARDIAC ABLATION      x2  \"years ago\"     CARDIAC ELECTROPHYSIOLOGY PROCEDURE N/A 11/01/2021    Procedure: PPM battery change;  Surgeon: Ge Whelan MD;  Location: [facility]" INVASIVE LOCATION;  Service: Cardiovascular;  Laterality: N/A;    CARDIAC SURGERY  2006    cabg 3v    CORONARY ARTERY BYPASS GRAFT  2006    ENDOSCOPY      with dilation     KNEE ARTHROSCOPY Left     TOTAL KNEE ARTHROPLASTY Left 07/23/2021    Procedure: TOTAL KNEE ARTHROPLASTY WITH DORA NAVIGATION WITH BIOMET;  Surgeon: Carlitos Zhao MD;  Location: [facility] MAIN OR;  Service: Orthopedics;  Laterality: Left;    VENTRICULAR CARDIAC PACEMAKER INSERTION      medtronic      Family History   Problem Relation Age of Onset    Heart attack Mother     No Known Problems Father     No Known Problems Sister     No Known Problems Brother     No Known Problems Maternal Aunt     No Known Problems Maternal Uncle     No Known Problems Paternal Aunt     No Known Problems Paternal Uncle     No Known Problems Maternal Grandmother     No Known Problems Maternal Grandfather     No Known Problems Paternal Grandmother     No Known Problems Paternal Grandfather     No Known Problems Other        Home Medications:  Prior to Admission medications    Medication Sig Start Date End Date Taking? Authorizing Provider   allopurinol (ZYLOPRIM) 100 MG tablet Take 1 tablet by mouth Daily. 3/11/22  Yes Ely Srinivasan MD   amLODIPine (NORVASC) 5 MG tablet Take 1 tablet by mouth Daily. 12/21/22  Yes Isabel Navarrete APRN   Eliquis 5 MG tablet tablet Take 1 tablet by mouth twice daily 3/27/23  Yes Ge Whelan MD   Farxiga 10 MG tablet Take 10 mg by mouth Daily. 6/12/23  Yes Ely Srinivasan MD   losartan (Cozaar) 50 MG tablet Take 1 tablet by mouth Daily. 12/14/22  Yes Isabel Navarrete APRN   metoprolol succinate XL (TOPROL-XL) 25 MG 24 hr tablet Take 1 tablet by mouth Every Night. 6/29/23  Yes Isabel Navarrete APRN   omeprazole (priLOSEC) 20 MG capsule Take 1 capsule by mouth Daily.   Yes ProviderEly MD   Testosterone Cypionate (DEPOTESTOTERONE CYPIONATE) 200 MG/ML injection Inject  into the appropriate muscle as  "directed by prescriber. 23  Yes ProviderEly MD   indomethacin (INDOCIN) 25 MG capsule Take 1 capsule by mouth 3 (Three) Times a Day As Needed for Mild Pain.  23  ProviderEly MD        Social History:   Social History     Tobacco Use    Smoking status: Former     Packs/day: 0.50     Types: Cigarettes     Start date: 1962     Quit date: 1995     Years since quittin.1     Passive exposure: Never    Smokeless tobacco: Never   Vaping Use    Vaping Use: Never used   Substance Use Topics    Alcohol use: Never    Drug use: Never         Review of Systems:  Review of Systems   Constitutional:  Negative for chills and fever.   HENT:  Negative for congestion, ear pain and sore throat.    Eyes:  Negative for pain.   Respiratory:  Positive for chest tightness. Negative for cough and shortness of breath.    Cardiovascular:  Positive for leg swelling. Negative for chest pain.   Gastrointestinal:  Negative for abdominal pain, diarrhea, nausea and vomiting.   Genitourinary:  Negative for flank pain and hematuria.   Musculoskeletal:  Negative for joint swelling.   Skin:  Negative for pallor.   Neurological:  Negative for seizures and headaches.   All other systems reviewed and are negative.     Physical Exam:  /72   Pulse 70   Temp 97.8 °F (36.6 °C)   Resp 18   Ht 190.5 cm (75\")   Wt 94.8 kg (208 lb 15.9 oz)   SpO2 94%   BMI 26.12 kg/m²     Physical Exam  Vitals and nursing note reviewed.   Constitutional:       General: He is not in acute distress.     Appearance: Normal appearance. He is not toxic-appearing.   HENT:      Head: Normocephalic and atraumatic.      Jaw: There is normal jaw occlusion.   Eyes:      General: Lids are normal.      Extraocular Movements: Extraocular movements intact.      Conjunctiva/sclera: Conjunctivae normal.      Pupils: Pupils are equal, round, and reactive to light.   Cardiovascular:      Rate and Rhythm: Normal rate and regular rhythm.      " Pulses: Normal pulses.      Heart sounds: Normal heart sounds.   Pulmonary:      Effort: Pulmonary effort is normal. No respiratory distress.      Breath sounds: Normal breath sounds. No wheezing or rhonchi.   Abdominal:      General: Abdomen is flat.      Palpations: Abdomen is soft.      Tenderness: There is no abdominal tenderness. There is no guarding or rebound.   Musculoskeletal:         General: Normal range of motion.      Cervical back: Normal range of motion and neck supple.      Right lower leg: Edema present.      Left lower leg: Edema present.   Skin:     General: Skin is warm and dry.   Neurological:      Mental Status: He is alert and oriented to person, place, and time. Mental status is at baseline.   Psychiatric:         Mood and Affect: Mood normal.              Procedures:  Procedures      Medical Decision Making:      Comorbidities that affect care:    GERD, heart failure, atrial fibrillation, aneurysm, COPD, prostate cancer, hyperlipidemia, pacemaker    External Notes reviewed:    Telephone Encounter: Cardiology telephone encounter yesterday.      The following orders were placed and all results were independently analyzed by me:  Orders Placed This Encounter   Procedures    XR Chest 1 View    [place] Draw    High Sensitivity Troponin T    Comprehensive Metabolic Panel    Lipase    BNP    Magnesium    CBC Auto Differential    High Sensitivity Troponin T 2Hr    Basic Metabolic Panel    Lipid Panel    Hemoglobin A1c    TSH    Diet: Cardiac Diets; Healthy Heart (2-3 Na+); Texture: Regular Texture (IDDSI 7); Fluid Consistency: Thin (IDDSI 0)    Undress & Gown    Vital Signs    Intake & Output    Weigh Patient    Oral Care    Daily Weights    Strict Intake & Output    Vital Signs    Telemetry - Place Orders & Notify Provider of Results When Patient Experiences Acute Chest Pain, Dysrhythmia or Respiratory Distress    Telemetry - Maintain IV Access    Telemetry - Place Orders & Notify Provider of  Results When Patient Experiences Acute Chest Pain, Dysrhythmia or Respiratory Distress    Pulse Oximetry, Continuous    Up With Assistance    Up in Chair    Daily Weights    Strict Intake & Output    Code Status and Medical Interventions:    Hospitalist (on-call MD unless specified)    Cardiology (on-call MD unless specified)    Oxygen Therapy- Nasal Cannula; Titrate 1-6 LPM Per SpO2; 90 - 95%    Incentive Spirometry    ECG 12 Lead ED Triage Standing Order; Chest Pain    ECG 12 Lead ED Triage Standing Order; Chest Pain    Adult Transthoracic Echo Complete w/ Color, Spectral and Contrast if necessary per protocol    Insert Peripheral IV    Insert Peripheral IV    Inpatient Admission    Inpatient Admission    CBC & Differential    Green Top (Gel)    Lavender Top    Gold Top - SST    Light Blue Top    CBC & Differential       Medications Given in the Emergency Department:  Medications   sodium chloride 0.9 % flush 10 mL (has no administration in time range)   sodium chloride 0.9 % flush 10 mL (has no administration in time range)   sodium chloride 0.9 % flush 10 mL (has no administration in time range)   sodium chloride 0.9 % infusion 40 mL (has no administration in time range)   sennosides-docusate (PERICOLACE) 8.6-50 MG per tablet 2 tablet (has no administration in time range)     And   polyethylene glycol (MIRALAX) packet 17 g (has no administration in time range)     And   bisacodyl (DULCOLAX) EC tablet 5 mg (has no administration in time range)     And   bisacodyl (DULCOLAX) suppository 10 mg (has no administration in time range)   nitroglycerin (NITROSTAT) SL tablet 0.4 mg (has no administration in time range)   apixaban (ELIQUIS) tablet 5 mg (has no administration in time range)   furosemide (LASIX) injection 60 mg (has no administration in time range)        ED Course:    ED Course as of 09/02/23 0803   Sat Sep 02, 2023   8297 I reviewed the patient's pacemaker interrogation he has had no episodes of  ventricular tachycardia or atrial tachycardia or fibrillation.  Does not appear to have required any overdrive pacing. [JS]   0621 I discussed the patient's work-up and presentation with Dr. Cohen of cardiology.  Patient is currently anticoagulated with Eliquis.  Patient is currently chest pain-free.  He agrees to consultation and current management. [JS]   0622 Patient discussed with hospitalist for admission.  The patient's airway is intact, vital signs, and respiratory status are safe for admission at this time. [JS]      ED Course User Index  [JS] Bobby Cuello MD       Labs:    Lab Results (last 24 hours)       Procedure Component Value Units Date/Time    High Sensitivity Troponin T [[id number]]  (Abnormal) Collected: 09/02/23 0220    Specimen: Blood Updated: 09/02/23 0304     HS Troponin T 215 ng/L     Narrative:      High Sensitive Troponin T Reference Range:  <10.0 ng/L- Negative Female for AMI  <15.0 ng/L- Negative Male for AMI  >=10 - Abnormal Female indicating possible myocardial injury.  >=15 - Abnormal Male indicating possible myocardial injury.   Clinicians would have to utilize clinical acumen, EKG, Troponin, and serial changes to determine if it is an Acute Myocardial Infarction or myocardial injury due to an underlying chronic condition.         CBC & Differential [[id number]]  (Abnormal) Collected: 09/02/23 0220    Specimen: Blood Updated: 09/02/23 0231    Narrative:      The following orders were created for panel order CBC & Differential.  Procedure                               Abnormality         Status                     ---------                               -----------         ------                     CBC Auto Differential[[id number]]        Abnormal            Final result                 Please view results for these tests on the individual orders.    Comprehensive Metabolic Panel [[id number]]  (Abnormal) Collected: 09/02/23 0220    Specimen: Blood Updated: 09/02/23 0251     Glucose 116  mg/dL      BUN 22 mg/dL      Creatinine 1.96 mg/dL      Sodium 141 mmol/L      Potassium 4.2 mmol/L      Chloride 104 mmol/L      CO2 24.7 mmol/L      Calcium 9.5 mg/dL      Total Protein 6.7 g/dL      Albumin 3.9 g/dL      ALT (SGPT) 20 U/L      AST (SGOT) 23 U/L      Alkaline Phosphatase 95 U/L      Total Bilirubin 1.3 mg/dL      Globulin 2.8 gm/dL      A/G Ratio 1.4 g/dL      BUN/Creatinine Ratio 11.2     Anion Gap 12.3 mmol/L      eGFR 33.7 mL/min/1.73     Narrative:      GFR Normal >60  Chronic Kidney Disease <60  Kidney Failure <15    The GFR formula is only valid for adults with stable renal function between ages 18 and 70.    Lipase [[id number]]  (Normal) Collected: 09/02/23 0220    Specimen: Blood Updated: 09/02/23 0251     Lipase 29 U/L     BNP [[id number]]  (Abnormal) Collected: 09/02/23 0220    Specimen: Blood Updated: 09/02/23 0247     proBNP 4,554.0 pg/mL     Narrative:      Among patients with dyspnea, NT-proBNP is highly sensitive for the detection of acute congestive heart failure. In addition NT-proBNP of <300 pg/ml effectively rules out acute congestive heart failure with 99% negative predictive value.      Magnesium [[id number]]  (Normal) Collected: 09/02/23 0220    Specimen: Blood Updated: 09/02/23 0251     Magnesium 2.2 mg/dL     CBC Auto Differential [[id number]]  (Abnormal) Collected: 09/02/23 0220    Specimen: Blood Updated: 09/02/23 0231     WBC 11.01 10*3/mm3      RBC 4.93 10*6/mm3      Hemoglobin 14.1 g/dL      Hematocrit 44.9 %      MCV 91.1 fL      MCH 28.6 pg      MCHC 31.4 g/dL      RDW 15.6 %      RDW-SD 49.3 fl      MPV 9.6 fL      Platelets 232 10*3/mm3      Neutrophil % 59.5 %      Lymphocyte % 27.2 %      Monocyte % 10.6 %      Eosinophil % 1.7 %      Basophil % 0.5 %      Immature Grans % 0.5 %      Neutrophils, Absolute 6.55 10*3/mm3      Lymphocytes, Absolute 2.99 10*3/mm3      Monocytes, Absolute 1.17 10*3/mm3      Eosinophils, Absolute 0.19 10*3/mm3      Basophils, Absolute 0.06  10*3/mm3      Immature Grans, Absolute 0.05 10*3/mm3      nRBC 0.0 /100 WBC     Basic Metabolic Panel [[id number]] Collected: 09/02/23 0220    Specimen: Blood Updated: 09/02/23 0736    Hemoglobin A1c [[id number]] Collected: 09/02/23 0220    Specimen: Blood Updated: 09/02/23 0736    High Sensitivity Troponin T 2Hr [[id number]]  (Abnormal) Collected: 09/02/23 0438    Specimen: Blood Updated: 09/02/23 0518     HS Troponin T 251 ng/L      Troponin T Delta 36 ng/L     Narrative:      High Sensitive Troponin T Reference Range:  <10.0 ng/L- Negative Female for AMI  <15.0 ng/L- Negative Male for AMI  >=10 - Abnormal Female indicating possible myocardial injury.  >=15 - Abnormal Male indicating possible myocardial injury.   Clinicians would have to utilize clinical acumen, EKG, Troponin, and serial changes to determine if it is an Acute Myocardial Infarction or myocardial injury due to an underlying chronic condition.         Lipid Panel [[id number]]  (Abnormal) Collected: 09/02/23 0438    Specimen: Blood Updated: 09/02/23 0737     Total Cholesterol 151 mg/dL      Triglycerides 151 mg/dL      HDL Cholesterol 26 mg/dL      LDL Cholesterol  98 mg/dL      VLDL Cholesterol 27 mg/dL      LDL/HDL Ratio 3.65    Narrative:      Cholesterol Reference Ranges  (U.S. Department of Health and Human Services ATP III Classifications)    Desirable          <200 mg/dL  Borderline High    200-239 mg/dL  High Risk          >240 mg/dL      Triglyceride Reference Ranges  (U.S. Department of Health and Human Services ATP III Classifications)    Normal           <150 mg/dL  Borderline High  150-199 mg/dL  High             200-499 mg/dL  Very High        >500 mg/dL    HDL Reference Ranges  (U.S. Department of Health and Human Services ATP III Classifications)    Low     <40 mg/dl (major risk factor for CHD)  High    >60 mg/dl ('negative' risk factor for CHD)        LDL Reference Ranges  (U.S. Department of Health and Human Services ATP III  Classifications)    Optimal          <100 mg/dL  Near Optimal     100-129 mg/dL  Borderline High  130-159 mg/dL  High             160-189 mg/dL  Very High        >189 mg/dL    TSH [[id number]]  (Normal) Collected: 09/02/23 0438    Specimen: Blood Updated: 09/02/23 0744     TSH 2.980 uIU/mL              Imaging:    XR Chest 1 View    Result Date: 9/2/2023  PROCEDURE: XR CHEST 1 VW  COMPARISON: 8/23/2021.  INDICATIONS: CHEST PAIN.  FINDINGS:  A single AP (or PA) upright portable chest radiograph was performed.  Borderline cardiac enlargement is seen.  No acute infiltrate is appreciated.  No pleural effusion or pneumothorax is identified.  There is a left-sided cardiac implantable electronic device (CIED) in place, seen previously, and unchanged in position.  The patient has undergone median sternotomy and CABG (coronary artery bypass graft surgery) surgery, seen previously.  There are several fractured sternotomy wires, seen previously.  Chronic calcified granulomatous disease may involve the chest.  No significant interval change is seen since the prior study (or studies).        No acute infiltrate is appreciated.  There is possible borderline cardiomegaly.     Please note that portions of this note were completed with a voice recognition program.  NELLY ELLIOTT JR, MD       Electronically Signed and Approved By: NELLY ELLIOTT JR, MD on 9/02/2023 at 3:02                 Differential Diagnosis and Discussion:    Chest Pain:  Based on the patient's signs and symptoms, I considered aortic dissection, myocardial infaction, pulmonary embolism, cardiac tamponade, pericarditis, pneumothorax, musculoskeletal chest pain and other differential diagnosis as an etiology of the patient's chest pain.     All labs were reviewed and interpreted by me.  All X-rays impressions were independently interpreted by me.  EKG was interpreted by me.    [facility]       Critical Care Note: Total Critical Care time of 35 minutes. Total critical care  time documented does not include time spent on separately billed procedures for services of nurses or physician assistants. I personally saw and examined the patient. I have reviewed all diagnostic interpretations and treatment plans as written. I was present for the key portions of any procedures performed and the inclusive time noted in any critical care statement. Critical care time includes patient management by me, time spent at the patients bedside,  time to review lab and imaging results, discussing patient care, documentation in the medical record, and time spent with family or caregiver.    Patient Care Considerations:    I considered heparin anticoagulation but the patient is currently taking a oral anticoagulant      Consultants/Shared Management Plan:    Hospitalist: I have discussed the case with the hospitalist who agrees to accept the patient for admission.  Consultant: I have discussed the case with cardiology who agrees to consult on the patient.    Social Determinants of Health:    Patient has presented with family members who are responsible, reliable and will ensure follow up care.      Disposition and Care Coordination:    Admit:   Through independent evaluation of the patient's history, physical, and imperical data, the patient meets criteria for observation/admission to the hospital.        Final diagnoses:   Unstable angina   Chest pain, unspecified type   Acute on chronic congestive heart failure, unspecified heart failure type   Elevated troponin        ED Disposition       ED Disposition   Decision to Admit    Condition   --    Comment   Level of Care: Telemetry [5]   Diagnosis: Unstable angina [[id number]]   Admitting Physician: YASH HATHAWAY [[id number]]   Attending Physician: YASH HATHAWAY [[id number]]   Certification: I Certify That Inpatient Hospital Services Are Medically Necessary For Greater Than 2 Midnights                 This medical record created using voice recognition  software.             Bobby Cuello MD  09/02/23 0803

## 2023-09-02 NOTE — PLAN OF CARE
Goal Outcome Evaluation:                   VSS, A&O x4, No c/o pain this shift. Patient had echo today, 30-35% EF

## 2023-09-02 NOTE — CONSULTS
[facility]   Cardiology Consult Note    Patient Name: Javi Martínez  : 1940  MRN: [id number]  Primary Care Physician:  Rani Lopez APRN  Referring Physician: No ref. provider found    Date of admission: 2023    Subjective   Subjective     Reason for Consultation : Heart failure  Chief Complaint : Chest pain    HPI:  Javi Martínez is a 83 y.o. male with history of persistent A-fib, systolic heart failure with an EF of 35% status post pacemaker placement, hypertension, hyperlipidemia, and COPD who presented to the hospital with chest discomfort.  The patient normally follows with Dr. Whelan.  He says he has been having chest discomfort that he does not described as a pressure or sharp sensation that started weeks ago and has been intermittent with exertion and rest.  He associates it with a feeling of anxiety.  He also has new lower extremity swelling that has gotten worse over the last couple of days.  He has been taking his medications regularly.  He currently denies any diaphoresis, nausea, or vomiting.  Cardiology was consulted.  He has had 3 troponins at 215, 251, and 299.  BNP elevated to 4554.  He also has a mild DELANEY with what appears to be chronic CKD.    Review of Systems   All systems were reviewed and negative except for: Shortness of breath, chest pain, lower extremity swelling.    Personal History     Past Medical History:   Diagnosis Date    Aneurysm     Arthritis     left knee     Atrial fibrillation, persistent     CHB (complete heart block) 2023    Chronic obstructive pulmonary disease 2023    Difficulty walking     Numbness in right foot    Essential hypertension 2019    GERD (gastroesophageal reflux disease)     HFrEF (heart failure with reduced ejection fraction) 2021    Hyperlipidemia 2021    Paroxysmal atrial fibrillation     Polyneuropathy 2023    Prostate cancer 2010    with seeds implant           Family History: family history  includes Heart attack in his mother; No Known Problems in his brother, father, maternal aunt, maternal grandfather, maternal grandmother, maternal uncle, paternal aunt, paternal grandfather, paternal grandmother, paternal uncle, sister, and another family member. Otherwise pertinent FHx was reviewed and not pertinent to current issue.    Social History:  reports that he quit smoking about 28 years ago. His smoking use included cigarettes. He started smoking about 61 years ago. He smoked an average of .5 packs per day. He has never been exposed to tobacco smoke. He has never used smokeless tobacco. He reports that he does not drink alcohol and does not use drugs.    Home Medications:  Testosterone Cypionate, allopurinol, amLODIPine, apixaban, dapagliflozin Propanediol, losartan, metoprolol succinate XL, and omeprazole    Allergies:  No Known Allergies    Objective    Objective     Vitals:   Temp:  [97.7 °F (36.5 °C)-98.6 °F (37 °C)] 97.7 °F (36.5 °C)  Heart Rate:  [70-81] 72  Resp:  [18] 18  BP: (115-127)/(72-81) 115/78      Physical Exam:   Constitutional: Awake, alert, No acute distress    Eyes: PERRLA, sclerae anicteric, no conjunctival injection   HENT: NCAT, mucous membranes moist   Neck: Supple, no thyromegaly, no lymphadenopathy, trachea midline   Respiratory: Clear to auscultation bilaterally, nonlabored respirations    Cardiovascular: RRR, no murmurs, rubs, or gallops, palpable pedal pulses bilaterally   Gastrointestinal: Positive bowel sounds, soft, nontender, nondistended   Musculoskeletal: Bilateral lower extremity edema up to the shins.  2+., no clubbing or cyanosis to extremities   Psychiatric: Appropriate affect, cooperative   Neurologic: Oriented x 3, strength symmetric in all extremities, Cranial Nerves grossly intact to confrontation, speech clear   Skin: No rashes     Result Review    Result Review:  I have personally reviewed the results from the time of this admission to 9/2/2023 12:01 EDT and  agree with these findings:  [x]  Laboratory  []  Microbiology  [x]  Radiology  [x]  EKG/Telemetry   [x]  Cardiology/Vascular   []  Pathology  [x]  Old records  []  Other:  Most notable findings include:     CMP          6/21/2023    15:28 8/22/2023    11:31 9/2/2023    02:20   CMP   Glucose   116     116    BUN   22     22    Creatinine   1.96     1.96    EGFR   33.7     33.7    Sodium   141     141    Potassium   4.2     4.2    Chloride   104     104    Calcium   9.5     9.5    Total Protein 6.6      Total Protein   6.7    Albumin 3.4   3.9    Globulin 3.2      Globulin   2.8    Total Bilirubin   1.3    Alkaline Phosphatase   95    AST (SGOT)  26     23    ALT (SGPT)  26     20    Albumin/Globulin Ratio   1.4    BUN/Creatinine Ratio   11.2     11.2    Anion Gap   12.3     12.3       Details          This result is from an external source.              CBC          8/22/2023    11:31 9/2/2023    02:20   CBC   WBC 9.03     11.01    RBC 5.08     4.93    Hemoglobin 15.4     14.1    Hematocrit 45.8     44.9    MCV 90.2     91.1    MCH 30.3     28.6    MCHC 33.6     31.4    RDW 14.5     15.6    Platelets 250     232       Details          This result is from an external source.              Lab Results   Component Value Date    TROPONINT 299 (C) 09/02/2023         Assessment & Plan   Assessment / Plan     Brief Patient Summary:  Javi Martínez is a 83 y.o. male who has history of A-fib, heart failure, hypertension, and hyperlipidemia who presents to the hospital with chest pain.    Active Hospital Problems:  Active Hospital Problems    Diagnosis     **Unstable angina    Heart failure with reduced ejection fraction: Repeat echo shows similarly reduced ejection fraction with worsening of pulmonary hypertension.  He has very large atria and is likely volume overloaded based on his physical exam and his symptoms  Persistent A-fib: Status post pacemaker.  He is on apixaban currently  Hypertension  Hyperlipidemia  Type II MI  mildly elevated troponin likely secondary to heart failure exacerbation given elevated BNP, lower extremity swelling.  Echo shows similar moderate to severe reduction in ejection fraction      Plan:   -Continue Lasix  -Continue Eliquis  -Fasting lipid panel  -Trend troponins until they peak.  -Start Imdur 30 mg to see whether this has any effect on his chest discomfort.    Thank you for the consult.  We will continue to follow      Electronically signed by Guanaco Felix MD, 09/02/23, 12:01 PM EDT.

## 2023-09-02 NOTE — CASE MANAGEMENT/SOCIAL WORK
Discharge Planning Assessment   Vickie     Patient Name: Javi Martínez  MRN: [id number]  Today's Date: 9/2/2023    Admit Date: 9/2/2023    Plan: patient admitted due to unstable angina. Patient notes he is independent at home with his spouse and denies additional needs, uses a cane. Patient confirmed pcp and pharm. Patient plans to return home at discharge   Discharge Needs Assessment       Row Name 09/02/23 1409       Living Environment    Potentially Unsafe Housing Conditions none      Row Name 09/02/23 1408       Living Environment    People in Home spouse    Current Living Arrangements home    Primary Care Provided by self    Family Caregiver if Needed spouse    Quality of Family Relationships helpful;involved;supportive    Able to Return to Prior Arrangements yes       Resource/Environmental Concerns    Resource/Environmental Concerns none    Transportation Concerns none       Transition Planning    Patient/Family Anticipates Transition to home with family    Patient/Family Anticipated Services at Transition none    Transportation Anticipated family or friend will provide       Discharge Needs Assessment    Equipment Currently Used at Home cane, straight    Concerns to be Addressed discharge planning    Anticipated Changes Related to Illness none    Equipment Needed After Discharge none                   Discharge Plan       Row Name 09/02/23 1409       Plan    Plan patient admitted due to unstable angina. Patient notes he is independent at home with his spouse and denies additional needs, uses a cane. Patient confirmed pcp and pharm. Patient plans to return home at discharge    Final Discharge Disposition Code 01 - home or self-care                  Continued Care and Services - Admitted Since 9/2/2023    Coordination has not been started for this encounter.          Demographic Summary       Row Name 09/02/23 1409       General Information    Admission Type inpatient    Arrived From home;emergency  department    Reason for Consult discharge planning    Preferred Language English       Contact Information    Permission Granted to Share Info With family/designee    Contact Information Obtained for                    Functional Status       Row Name 09/02/23 3837       Functional Status    Usual Activity Tolerance good    Current Activity Tolerance good       Assessment of Health Literacy    How often do you have someone help you read hospital materials? Occasionally    How often do you have problems learning about your medical condition because of difficulty understanding written information? Never    How often do you have a problem understanding what is told to you about your medical condition? Never    How confident are you filling out medical forms by yourself? Quite a bit    Health Literacy Good       Functional Status, IADL    Medications independent    Meal Preparation independent    Housekeeping independent    Laundry independent    Shopping independent       Mental Status    General Appearance WDL WDL       Mental Status Summary    Recent Changes in Mental Status/Cognitive Functioning no changes                   Psychosocial    No documentation.                  Abuse/Neglect    No documentation.                  Legal    No documentation.                  Substance Abuse    No documentation.                  Patient Forms    No documentation.                     Latonia Figueroa RN

## 2023-09-02 NOTE — PROGRESS NOTES
Patient seen and evaluated on this morning.  Chart has been reviewed.  Patient is without complaints of chest discomfort or shortness of breath at this time.  Cardiac enzymes have been reviewed.  We will continue current therapy.  Await cardiology consult for further recommendations.

## 2023-09-02 NOTE — H&P
[facility]  Date: 2023   Patient Name: Javi Martínez  : 1940  MRN: [id number]  Primary Care Physician:  Rani Lopez APRN  Date of admission: 2023    Subjective   Subjective     Chief Complaint: Chest pain    HPI:    Javi Martínez is a 83 y.o. male with emergency department for evaluation of chest pain.  Patient states the chest pain is started \"weeks ago\", and has slowly progressed from intermittent with exertion to constant even at rest.  Patient describes the pain as \"uncomfortable, like anxiety\".  Patient reports bilateral lower extremity edema approximately 1 week ago, which is new.  Patient has a prolific cardiac history including persistent A-fib, systolic congestive heart failure with an EF of 35%, pacemaker placement, and usually follows with Dr. Whelan for cardiology.  Patient reports taking Eliquis, last dose was 10 PM 2023.  Patient reports while in the emergency department, he had ambulated to and from the bathroom, and had an onset of dread, similar quality to his chief complaint.  Patient reports the heaviness extends from his chest into his neck.  Patient denies any dyspnea, nausea, vomiting, back pain, arm pain.      Personal History     Past Medical History:  Past Medical History:   Diagnosis Date    Aneurysm     Arthritis     left knee     Atrial fibrillation, persistent     CHB (complete heart block) 2023    Chronic obstructive pulmonary disease 2023    Difficulty walking     Numbness in right foot    Essential hypertension 2019    GERD (gastroesophageal reflux disease)     HFrEF (heart failure with reduced ejection fraction) 2021    Hyperlipidemia 2021    Paroxysmal atrial fibrillation     Polyneuropathy 2023    Prostate cancer 2010    with seeds implant          Past Surgical History:  Past Surgical History:   Procedure Laterality Date    ABLATION OF DYSRHYTHMIC FOCUS      " "APPENDECTOMY      CARDIAC ABLATION      x2  \"years ago\"     CARDIAC ELECTROPHYSIOLOGY PROCEDURE N/A 2021    Procedure: PPM battery change;  Surgeon: Ge Whelan MD;  Location: [facility] CATH INVASIVE LOCATION;  Service: Cardiovascular;  Laterality: N/A;    CARDIAC SURGERY  2006    cabg 3v    CORONARY ARTERY BYPASS GRAFT  2006    ENDOSCOPY      with dilation     KNEE ARTHROSCOPY Left     TOTAL KNEE ARTHROPLASTY Left 2021    Procedure: TOTAL KNEE ARTHROPLASTY WITH DORA NAVIGATION WITH BIOMET;  Surgeon: Carlitos Zhao MD;  Location: [facility] MAIN OR;  Service: Orthopedics;  Laterality: Left;    VENTRICULAR CARDIAC PACEMAKER INSERTION      medtronic          Family History:   Family History   Problem Relation Age of Onset    Heart attack Mother     No Known Problems Father     No Known Problems Sister     No Known Problems Brother     No Known Problems Maternal Aunt     No Known Problems Maternal Uncle     No Known Problems Paternal Aunt     No Known Problems Paternal Uncle     No Known Problems Maternal Grandmother     No Known Problems Maternal Grandfather     No Known Problems Paternal Grandmother     No Known Problems Paternal Grandfather     No Known Problems Other          Social History:   Social History     Socioeconomic History    Marital status:    Tobacco Use    Smoking status: Former     Packs/day: 0.50     Types: Cigarettes     Start date: 1962     Quit date: 1995     Years since quittin.1     Passive exposure: Never    Smokeless tobacco: Never   Vaping Use    Vaping Use: Never used   Substance and Sexual Activity    Alcohol use: Never    Drug use: Never    Sexual activity: Yes     Partners: Female         Home Medications:  Testosterone Cypionate, allopurinol, amLODIPine, apixaban, dapagliflozin Propanediol, losartan, metoprolol succinate XL, and omeprazole    Allergies:  No Known Allergies    Review of Systems   All systems were reviewed and negative except for: Chest " pain, lower extremity edema    Objective   Objective     Vitals:   Temp:  [98.6 °F (37 °C)] 98.6 °F (37 °C)  Heart Rate:  [70-80] 72  Resp:  [18] 18  BP: (117-127)/(73-81) 122/73    Physical Exam    Constitutional: Awake, alert, no acute distress, wife at bedside.   Eyes: Pupils equal, sclerae anicteric, no conjunctival injection   HENT: NCAT, mucous membranes moist   Neck: Supple, no thyromegaly, no lymphadenopathy, trachea midline   Respiratory: Clear to auscultation bilaterally, nonlabored respirations    Cardiovascular: RRR, no murmurs, rubs, or gallops, palpable pedal pulses bilaterally   Gastrointestinal: Positive bowel sounds, soft, nontender, nondistended   Musculoskeletal: 2+ bilateral ankle edema, no clubbing or cyanosis to extremities   Psychiatric: Appropriate affect, cooperative   Neurologic: Oriented x 3, strength symmetric in all extremities, Cranial Nerves grossly intact to confrontation, speech clear   Skin: No rashes     Result Review    Result Review:  I have personally reviewed the results from the time of this admission to 9/2/2023 06:14 EDT and agree with these findings:  [x]  Laboratory  []  Microbiology  [x]  Radiology  [x]  EKG/Telemetry   [x]  Cardiology/Vascular   []  Pathology  [x]  Old records  []  Other:      Assessment & Plan   Assessment / Plan     Assessment/Plan:   Unstable angina-cardiology, Dr. Felix, was consulted in the ED, and agreed to consult on this patient.  Recommended against starting heparinization, and to continue Eliquis regularly prescribed doses.  Will defer possible surgical decision making to cardiology, we appreciate the recommendations in the care of this patient.  Maximize medical management.  Acute systolic congestive heart failure-check updated 2D echo, IV diuretics.  Acute kidney injury-likely prerenal, IV diuretics as mentioned above.  If no improvement next 24 to 48 hours, consider nephrology consultation.  Presence of pacemaker-interrogation in the ED  showed no malfunctions.      DVT prophylaxis: Eliquis    CODE STATUS: Full code    Admission Status:  I believe this patient meets inpatient status.    Electronically signed by Ronal Mckee DO, 09/02/23, 6:14 AM EDT.

## 2023-09-02 NOTE — PAYOR COMM NOTE
"Isabella Martínez (83 y.o. Male)       Date of Birth   1940    Social Security Number       Address   [address] [place] [address]    Home Phone   [phone]    MRN   [id number]       [facility]    Marital Status                               Admission Date   23    Admission Type   Emergency    Admitting Provider   Ronal Mckee DO    Attending Provider   Ruy Hunt MD    Department, Room/Bed   [facility] 5TH FLOOR SURGICAL TELEMETRY UNIT, 506/       Discharge Date       Discharge Disposition       Discharge Destination                                 Attending Provider: Ruy Hunt MD    Allergies: No Known Allergies    Isolation: None   Infection: None   Code Status: CPR    Ht: 190.5 cm (75\")   Wt: 94.8 kg (208 lb 15.9 oz)    Admission Cmt: None   Principal Problem: Unstable angina [I20.0]                   Active Insurance as of 2023       Primary Coverage       Payor Plan Insurance Group Employer/Plan Group    [facility] MEDICARE REPLACEMENT [employer]IONS MEDICARE REPLACEMENT [id number]       Payor Plan Address Payor Plan Phone Number Payor Plan Fax Number Effective Dates    [facility] [phone]  2021 - None Entered    [address]       [facility] [address]         Subscriber Name Subscriber Birth Date Member ID       ISABELLA MARTÍNEZ 1940 [id number]                   History & Physical        Ronal Mckee DO at 23 0613           [facility]  Date: 2023   Patient Name: Isabella Martínez  : 1940  MRN: [id number]  Primary Care Physician:  Rani Lopez APRN  Date of admission: 2023    Subjective  Subjective     Chief Complaint: Chest pain    HPI:    Isabella Martínez is a 83 y.o. male with emergency department for evaluation of chest pain.  Patient states the chest pain is started \"weeks ago\", and has slowly progressed from intermittent with exertion " "to constant even at rest.  Patient describes the pain as \"uncomfortable, like anxiety\".  Patient reports bilateral lower extremity edema approximately 1 week ago, which is new.  Patient has a prolific cardiac history including persistent A-fib, systolic congestive heart failure with an EF of 35%, pacemaker placement, and usually follows with Dr. Whelan for cardiology.  Patient reports taking Eliquis, last dose was 10 PM September 1, 2023.  Patient reports while in the emergency department, he had ambulated to and from the bathroom, and had an onset of dread, similar quality to his chief complaint.  Patient reports the heaviness extends from his chest into his neck.  Patient denies any dyspnea, nausea, vomiting, back pain, arm pain.      Personal History     Past Medical History:  Past Medical History:   Diagnosis Date    Aneurysm 2013    Arthritis     left knee     Atrial fibrillation, persistent     CHB (complete heart block) 06/07/2023    Chronic obstructive pulmonary disease 9/1/2023    Difficulty walking 2023    Numbness in right foot    Essential hypertension 05/08/2019    GERD (gastroesophageal reflux disease)     HFrEF (heart failure with reduced ejection fraction) 08/11/2021    Hyperlipidemia 08/11/2021    Paroxysmal atrial fibrillation     Polyneuropathy 6/19/2023    Prostate cancer 2010    with seeds implant          Past Surgical History:  Past Surgical History:   Procedure Laterality Date    ABLATION OF DYSRHYTHMIC FOCUS  2013    APPENDECTOMY      CARDIAC ABLATION      x2  \"years ago\"     CARDIAC ELECTROPHYSIOLOGY PROCEDURE N/A 11/01/2021    Procedure: PPM battery change;  Surgeon: Ge Whelan MD;  Location: [facility] INVASIVE LOCATION;  Service: Cardiovascular;  Laterality: N/A;    CARDIAC SURGERY  2006    cabg 3v    CORONARY ARTERY BYPASS GRAFT  2006    ENDOSCOPY      with dilation     KNEE ARTHROSCOPY Left     TOTAL KNEE ARTHROPLASTY Left 07/23/2021    Procedure: TOTAL KNEE ARTHROPLASTY WITH DORA " NAVIGATION WITH BIOMET;  Surgeon: Carlitos Zhao MD;  Location: [facility] MAIN OR;  Service: Orthopedics;  Laterality: Left;    VENTRICULAR CARDIAC PACEMAKER INSERTION      medtronic          Family History:   Family History   Problem Relation Age of Onset    Heart attack Mother     No Known Problems Father     No Known Problems Sister     No Known Problems Brother     No Known Problems Maternal Aunt     No Known Problems Maternal Uncle     No Known Problems Paternal Aunt     No Known Problems Paternal Uncle     No Known Problems Maternal Grandmother     No Known Problems Maternal Grandfather     No Known Problems Paternal Grandmother     No Known Problems Paternal Grandfather     No Known Problems Other          Social History:   Social History     Socioeconomic History    Marital status:    Tobacco Use    Smoking status: Former     Packs/day: 0.50     Types: Cigarettes     Start date: 1962     Quit date: 1995     Years since quittin.1     Passive exposure: Never    Smokeless tobacco: Never   Vaping Use    Vaping Use: Never used   Substance and Sexual Activity    Alcohol use: Never    Drug use: Never    Sexual activity: Yes     Partners: Female         Home Medications:  Testosterone Cypionate, allopurinol, amLODIPine, apixaban, dapagliflozin Propanediol, losartan, metoprolol succinate XL, and omeprazole    Allergies:  No Known Allergies    Review of Systems   All systems were reviewed and negative except for: Chest pain, lower extremity edema    Objective  Objective     Vitals:   Temp:  [98.6 °F (37 °C)] 98.6 °F (37 °C)  Heart Rate:  [70-80] 72  Resp:  [18] 18  BP: (117-127)/(73-81) 122/73    Physical Exam    Constitutional: Awake, alert, no acute distress, wife at bedside.   Eyes: Pupils equal, sclerae anicteric, no conjunctival injection   HENT: NCAT, mucous membranes moist   Neck: Supple, no thyromegaly, no lymphadenopathy, trachea midline   Respiratory: Clear to auscultation bilaterally,  nonlabored respirations    Cardiovascular: RRR, no murmurs, rubs, or gallops, palpable pedal pulses bilaterally   Gastrointestinal: Positive bowel sounds, soft, nontender, nondistended   Musculoskeletal: 2+ bilateral ankle edema, no clubbing or cyanosis to extremities   Psychiatric: Appropriate affect, cooperative   Neurologic: Oriented x 3, strength symmetric in all extremities, Cranial Nerves grossly intact to confrontation, speech clear   Skin: No rashes     Result Review   Result Review:  I have personally reviewed the results from the time of this admission to 9/2/2023 06:14 EDT and agree with these findings:  [x]  Laboratory  []  Microbiology  [x]  Radiology  [x]  EKG/Telemetry   [x]  Cardiology/Vascular   []  Pathology  [x]  Old records  []  Other:      Assessment & Plan  Assessment / Plan     Assessment/Plan:   Unstable angina-cardiology, Dr. Felix, was consulted in the ED, and agreed to consult on this patient.  Recommended against starting heparinization, and to continue Eliquis regularly prescribed doses.  Will defer possible surgical decision making to cardiology, we appreciate the recommendations in the care of this patient.  Maximize medical management.  Acute systolic congestive heart failure-check updated 2D echo, IV diuretics.  Acute kidney injury-likely prerenal, IV diuretics as mentioned above.  If no improvement next 24 to 48 hours, consider nephrology consultation.  Presence of pacemaker-interrogation in the ED showed no malfunctions.      DVT prophylaxis: Eliquis    CODE STATUS: Full code    Admission Status:  I believe this patient meets inpatient status.    Electronically signed by Ronal Mckee DO, 09/02/23, 6:14 AM EDT.               Electronically signed by Ronal Mckee DO at 09/02/23 0624          Emergency Department Notes        Lindsay Cage RN at 09/02/23 0518          Critical values given to Dr Cuello  Trop 251  Trop Delta 36    Electronically signed by Lindsay Cage RN at  "09/02/23 0519       Bobby Cuello MD at 09/02/23 0330          Time: 3:30 AM EDT  Date of encounter:  9/2/2023  Independent Historian/Clinical History and Information was obtained by:   Patient    History is limited by: N/A    Chief Complaint: Chest pain      History of Present Illness:  Patient is a 83 y.o. year old male who presents to the emergency department for evaluation of chest pain    Patient describes intermittent episodes of a full sensation that extends from his lower chest over his anterior chest up into his neck.  He states its not quite a pain sensation but it is uncomfortable.  Is happened intermittently over the past several weeks.  But he became concerned tonight as it was lasting longer than usual as it only last seconds previously.  He denies any associated shortness of breath or nausea.  He has some mild lower extremity pedal edema.  Fullness sensation is occasionally induced by physical exertion but other times it happens at rest.  He is due to follow-up with his cardiologist on Thursday of this coming week.    HPI    Patient Care Team  Primary Care Provider: Rani Lopez APRN    Past Medical History:     No Known Allergies  Past Medical History:   Diagnosis Date    Aneurysm 2013    Arthritis     left knee     Atrial fibrillation, persistent     CHB (complete heart block) 06/07/2023    Chronic obstructive pulmonary disease 9/1/2023    Difficulty walking 2023    Numbness in right foot    Essential hypertension 05/08/2019    GERD (gastroesophageal reflux disease)     HFrEF (heart failure with reduced ejection fraction) 08/11/2021    Hyperlipidemia 08/11/2021    Paroxysmal atrial fibrillation     Polyneuropathy 6/19/2023    Prostate cancer 2010    with seeds implant      Past Surgical History:   Procedure Laterality Date    ABLATION OF DYSRHYTHMIC FOCUS  2013    APPENDECTOMY      CARDIAC ABLATION      x2  \"years ago\"     CARDIAC ELECTROPHYSIOLOGY PROCEDURE N/A 11/01/2021    Procedure: PPM " battery change;  Surgeon: Ge Whelan MD;  Location: [facility] CATH INVASIVE LOCATION;  Service: Cardiovascular;  Laterality: N/A;    CARDIAC SURGERY  2006    cabg 3v    CORONARY ARTERY BYPASS GRAFT  2006    ENDOSCOPY      with dilation     KNEE ARTHROSCOPY Left     TOTAL KNEE ARTHROPLASTY Left 07/23/2021    Procedure: TOTAL KNEE ARTHROPLASTY WITH DORA NAVIGATION WITH BIOMET;  Surgeon: Carlitos Zhao MD;  Location: [facility] MAIN OR;  Service: Orthopedics;  Laterality: Left;    VENTRICULAR CARDIAC PACEMAKER INSERTION      medtronic      Family History   Problem Relation Age of Onset    Heart attack Mother     No Known Problems Father     No Known Problems Sister     No Known Problems Brother     No Known Problems Maternal Aunt     No Known Problems Maternal Uncle     No Known Problems Paternal Aunt     No Known Problems Paternal Uncle     No Known Problems Maternal Grandmother     No Known Problems Maternal Grandfather     No Known Problems Paternal Grandmother     No Known Problems Paternal Grandfather     No Known Problems Other        Home Medications:  Prior to Admission medications    Medication Sig Start Date End Date Taking? Authorizing Provider   allopurinol (ZYLOPRIM) 100 MG tablet Take 1 tablet by mouth Daily. 3/11/22  Yes ProviderEly MD   amLODIPine (NORVASC) 5 MG tablet Take 1 tablet by mouth Daily. 12/21/22  Yes Isabel Navarrete APRN   Eliquis 5 MG tablet tablet Take 1 tablet by mouth twice daily 3/27/23  Yes Ge Whelan MD   Farxiga 10 MG tablet Take 10 mg by mouth Daily. 6/12/23  Yes ProviderEly MD   losartan (Cozaar) 50 MG tablet Take 1 tablet by mouth Daily. 12/14/22  Yes Isabel Navarrete APRN   metoprolol succinate XL (TOPROL-XL) 25 MG 24 hr tablet Take 1 tablet by mouth Every Night. 6/29/23  Yes Isabel Navarrete APRN   omeprazole (priLOSEC) 20 MG capsule Take 1 capsule by mouth Daily.   Yes ProviderEly MD   Testosterone Cypionate (DEPOTESTOTERONE  "CYPIONATE) 200 MG/ML injection Inject  into the appropriate muscle as directed by prescriber. 23  Yes Ely Srinivasan MD   indomethacin (INDOCIN) 25 MG capsule Take 1 capsule by mouth 3 (Three) Times a Day As Needed for Mild Pain.  23  ProviderEly MD        Social History:   Social History     Tobacco Use    Smoking status: Former     Packs/day: 0.50     Types: Cigarettes     Start date: 1962     Quit date: 1995     Years since quittin.1     Passive exposure: Never    Smokeless tobacco: Never   Vaping Use    Vaping Use: Never used   Substance Use Topics    Alcohol use: Never    Drug use: Never         Review of Systems:  Review of Systems   Constitutional:  Negative for chills and fever.   HENT:  Negative for congestion, ear pain and sore throat.    Eyes:  Negative for pain.   Respiratory:  Positive for chest tightness. Negative for cough and shortness of breath.    Cardiovascular:  Positive for leg swelling. Negative for chest pain.   Gastrointestinal:  Negative for abdominal pain, diarrhea, nausea and vomiting.   Genitourinary:  Negative for flank pain and hematuria.   Musculoskeletal:  Negative for joint swelling.   Skin:  Negative for pallor.   Neurological:  Negative for seizures and headaches.   All other systems reviewed and are negative.     Physical Exam:  /72   Pulse 70   Temp 97.8 °F (36.6 °C)   Resp 18   Ht 190.5 cm (75\")   Wt 94.8 kg (208 lb 15.9 oz)   SpO2 94%   BMI 26.12 kg/m²     Physical Exam  Vitals and nursing note reviewed.   Constitutional:       General: He is not in acute distress.     Appearance: Normal appearance. He is not toxic-appearing.   HENT:      Head: Normocephalic and atraumatic.      Jaw: There is normal jaw occlusion.   Eyes:      General: Lids are normal.      Extraocular Movements: Extraocular movements intact.      Conjunctiva/sclera: Conjunctivae normal.      Pupils: Pupils are equal, round, and reactive to light. "   Cardiovascular:      Rate and Rhythm: Normal rate and regular rhythm.      Pulses: Normal pulses.      Heart sounds: Normal heart sounds.   Pulmonary:      Effort: Pulmonary effort is normal. No respiratory distress.      Breath sounds: Normal breath sounds. No wheezing or rhonchi.   Abdominal:      General: Abdomen is flat.      Palpations: Abdomen is soft.      Tenderness: There is no abdominal tenderness. There is no guarding or rebound.   Musculoskeletal:         General: Normal range of motion.      Cervical back: Normal range of motion and neck supple.      Right lower leg: Edema present.      Left lower leg: Edema present.   Skin:     General: Skin is warm and dry.   Neurological:      Mental Status: He is alert and oriented to person, place, and time. Mental status is at baseline.   Psychiatric:         Mood and Affect: Mood normal.              Procedures:  Procedures      Medical Decision Making:      Comorbidities that affect care:    GERD, heart failure, atrial fibrillation, aneurysm, COPD, prostate cancer, hyperlipidemia, pacemaker    External Notes reviewed:    Telephone Encounter: Cardiology telephone encounter yesterday.      The following orders were placed and all results were independently analyzed by me:  Orders Placed This Encounter   Procedures    XR Chest 1 View    [place] Draw    High Sensitivity Troponin T    Comprehensive Metabolic Panel    Lipase    BNP    Magnesium    CBC Auto Differential    High Sensitivity Troponin T 2Hr    Basic Metabolic Panel    Lipid Panel    Hemoglobin A1c    TSH    Diet: Cardiac Diets; Healthy Heart (2-3 Na+); Texture: Regular Texture (IDDSI 7); Fluid Consistency: Thin (IDDSI 0)    Undress & Gown    Vital Signs    Intake & Output    Weigh Patient    Oral Care    Daily Weights    Strict Intake & Output    Vital Signs    Telemetry - Place Orders & Notify Provider of Results When Patient Experiences Acute Chest Pain, Dysrhythmia or Respiratory Distress     Telemetry - Maintain IV Access    Telemetry - Place Orders & Notify Provider of Results When Patient Experiences Acute Chest Pain, Dysrhythmia or Respiratory Distress    Pulse Oximetry, Continuous    Up With Assistance    Up in Chair    Daily Weights    Strict Intake & Output    Code Status and Medical Interventions:    Hospitalist (on-call MD unless specified)    Cardiology (on-call MD unless specified)    Oxygen Therapy- Nasal Cannula; Titrate 1-6 LPM Per SpO2; 90 - 95%    Incentive Spirometry    ECG 12 Lead ED Triage Standing Order; Chest Pain    ECG 12 Lead ED Triage Standing Order; Chest Pain    Adult Transthoracic Echo Complete w/ Color, Spectral and Contrast if necessary per protocol    Insert Peripheral IV    Insert Peripheral IV    Inpatient Admission    Inpatient Admission    CBC & Differential    Green Top (Gel)    Lavender Top    Gold Top - SST    Light Blue Top    CBC & Differential       Medications Given in the Emergency Department:  Medications   sodium chloride 0.9 % flush 10 mL (has no administration in time range)   sodium chloride 0.9 % flush 10 mL (has no administration in time range)   sodium chloride 0.9 % flush 10 mL (has no administration in time range)   sodium chloride 0.9 % infusion 40 mL (has no administration in time range)   sennosides-docusate (PERICOLACE) 8.6-50 MG per tablet 2 tablet (has no administration in time range)     And   polyethylene glycol (MIRALAX) packet 17 g (has no administration in time range)     And   bisacodyl (DULCOLAX) EC tablet 5 mg (has no administration in time range)     And   bisacodyl (DULCOLAX) suppository 10 mg (has no administration in time range)   nitroglycerin (NITROSTAT) SL tablet 0.4 mg (has no administration in time range)   apixaban (ELIQUIS) tablet 5 mg (has no administration in time range)   furosemide (LASIX) injection 60 mg (has no administration in time range)        ED Course:    ED Course as of 09/02/23 0803   Sat Sep 02, 2023   2683 I  reviewed the patient's pacemaker interrogation he has had no episodes of ventricular tachycardia or atrial tachycardia or fibrillation.  Does not appear to have required any overdrive pacing. [JS]   0621 I discussed the patient's work-up and presentation with Dr. Cohen of cardiology.  Patient is currently anticoagulated with Eliquis.  Patient is currently chest pain-free.  He agrees to consultation and current management. [JS]   0622 Patient discussed with hospitalist for admission.  The patient's airway is intact, vital signs, and respiratory status are safe for admission at this time. [JS]      ED Course User Index  [JS] Bobby Cuello MD       Labs:    Lab Results (last 24 hours)       Procedure Component Value Units Date/Time    High Sensitivity Troponin T [[id number]]  (Abnormal) Collected: 09/02/23 0220    Specimen: Blood Updated: 09/02/23 0304     HS Troponin T 215 ng/L     Narrative:      High Sensitive Troponin T Reference Range:  <10.0 ng/L- Negative Female for AMI  <15.0 ng/L- Negative Male for AMI  >=10 - Abnormal Female indicating possible myocardial injury.  >=15 - Abnormal Male indicating possible myocardial injury.   Clinicians would have to utilize clinical acumen, EKG, Troponin, and serial changes to determine if it is an Acute Myocardial Infarction or myocardial injury due to an underlying chronic condition.         CBC & Differential [[id number]]  (Abnormal) Collected: 09/02/23 0220    Specimen: Blood Updated: 09/02/23 0231    Narrative:      The following orders were created for panel order CBC & Differential.  Procedure                               Abnormality         Status                     ---------                               -----------         ------                     CBC Auto Differential[[id number]]        Abnormal            Final result                 Please view results for these tests on the individual orders.    Comprehensive Metabolic Panel [[id number]]  (Abnormal)  Collected: 09/02/23 0220    Specimen: Blood Updated: 09/02/23 0251     Glucose 116 mg/dL      BUN 22 mg/dL      Creatinine 1.96 mg/dL      Sodium 141 mmol/L      Potassium 4.2 mmol/L      Chloride 104 mmol/L      CO2 24.7 mmol/L      Calcium 9.5 mg/dL      Total Protein 6.7 g/dL      Albumin 3.9 g/dL      ALT (SGPT) 20 U/L      AST (SGOT) 23 U/L      Alkaline Phosphatase 95 U/L      Total Bilirubin 1.3 mg/dL      Globulin 2.8 gm/dL      A/G Ratio 1.4 g/dL      BUN/Creatinine Ratio 11.2     Anion Gap 12.3 mmol/L      eGFR 33.7 mL/min/1.73     Narrative:      GFR Normal >60  Chronic Kidney Disease <60  Kidney Failure <15    The GFR formula is only valid for adults with stable renal function between ages 18 and 70.    Lipase [[id number]]  (Normal) Collected: 09/02/23 0220    Specimen: Blood Updated: 09/02/23 0251     Lipase 29 U/L     BNP [[id number]]  (Abnormal) Collected: 09/02/23 0220    Specimen: Blood Updated: 09/02/23 0247     proBNP 4,554.0 pg/mL     Narrative:      Among patients with dyspnea, NT-proBNP is highly sensitive for the detection of acute congestive heart failure. In addition NT-proBNP of <300 pg/ml effectively rules out acute congestive heart failure with 99% negative predictive value.      Magnesium [[id number]]  (Normal) Collected: 09/02/23 0220    Specimen: Blood Updated: 09/02/23 0251     Magnesium 2.2 mg/dL     CBC Auto Differential [[id number]]  (Abnormal) Collected: 09/02/23 0220    Specimen: Blood Updated: 09/02/23 0231     WBC 11.01 10*3/mm3      RBC 4.93 10*6/mm3      Hemoglobin 14.1 g/dL      Hematocrit 44.9 %      MCV 91.1 fL      MCH 28.6 pg      MCHC 31.4 g/dL      RDW 15.6 %      RDW-SD 49.3 fl      MPV 9.6 fL      Platelets 232 10*3/mm3      Neutrophil % 59.5 %      Lymphocyte % 27.2 %      Monocyte % 10.6 %      Eosinophil % 1.7 %      Basophil % 0.5 %      Immature Grans % 0.5 %      Neutrophils, Absolute 6.55 10*3/mm3      Lymphocytes, Absolute 2.99 10*3/mm3      Monocytes, Absolute  1.17 10*3/mm3      Eosinophils, Absolute 0.19 10*3/mm3      Basophils, Absolute 0.06 10*3/mm3      Immature Grans, Absolute 0.05 10*3/mm3      nRBC 0.0 /100 WBC     Basic Metabolic Panel [[id number]] Collected: 09/02/23 0220    Specimen: Blood Updated: 09/02/23 0736    Hemoglobin A1c [[id number]] Collected: 09/02/23 0220    Specimen: Blood Updated: 09/02/23 0736    High Sensitivity Troponin T 2Hr [[id number]]  (Abnormal) Collected: 09/02/23 0438    Specimen: Blood Updated: 09/02/23 0518     HS Troponin T 251 ng/L      Troponin T Delta 36 ng/L     Narrative:      High Sensitive Troponin T Reference Range:  <10.0 ng/L- Negative Female for AMI  <15.0 ng/L- Negative Male for AMI  >=10 - Abnormal Female indicating possible myocardial injury.  >=15 - Abnormal Male indicating possible myocardial injury.   Clinicians would have to utilize clinical acumen, EKG, Troponin, and serial changes to determine if it is an Acute Myocardial Infarction or myocardial injury due to an underlying chronic condition.         Lipid Panel [[id number]]  (Abnormal) Collected: 09/02/23 0438    Specimen: Blood Updated: 09/02/23 0737     Total Cholesterol 151 mg/dL      Triglycerides 151 mg/dL      HDL Cholesterol 26 mg/dL      LDL Cholesterol  98 mg/dL      VLDL Cholesterol 27 mg/dL      LDL/HDL Ratio 3.65    Narrative:      Cholesterol Reference Ranges  (U.S. Department of Health and Human Services ATP III Classifications)    Desirable          <200 mg/dL  Borderline High    200-239 mg/dL  High Risk          >240 mg/dL      Triglyceride Reference Ranges  (U.S. Department of Health and Human Services ATP III Classifications)    Normal           <150 mg/dL  Borderline High  150-199 mg/dL  High             200-499 mg/dL  Very High        >500 mg/dL    HDL Reference Ranges  (U.S. Department of Health and Human Services ATP III Classifications)    Low     <40 mg/dl (major risk factor for CHD)  High    >60 mg/dl ('negative' risk factor for CHD)        LDL  Reference Ranges  (U.S. Department of Health and Human Services ATP III Classifications)    Optimal          <100 mg/dL  Near Optimal     100-129 mg/dL  Borderline High  130-159 mg/dL  High             160-189 mg/dL  Very High        >189 mg/dL    TSH [[id number]]  (Normal) Collected: 09/02/23 0438    Specimen: Blood Updated: 09/02/23 0744     TSH 2.980 uIU/mL              Imaging:    XR Chest 1 View    Result Date: 9/2/2023  PROCEDURE: XR CHEST 1 VW  COMPARISON: 8/23/2021.  INDICATIONS: CHEST PAIN.  FINDINGS:  A single AP (or PA) upright portable chest radiograph was performed.  Borderline cardiac enlargement is seen.  No acute infiltrate is appreciated.  No pleural effusion or pneumothorax is identified.  There is a left-sided cardiac implantable electronic device (CIED) in place, seen previously, and unchanged in position.  The patient has undergone median sternotomy and CABG (coronary artery bypass graft surgery) surgery, seen previously.  There are several fractured sternotomy wires, seen previously.  Chronic calcified granulomatous disease may involve the chest.  No significant interval change is seen since the prior study (or studies).        No acute infiltrate is appreciated.  There is possible borderline cardiomegaly.     Please note that portions of this note were completed with a voice recognition program.  NELLY ELLIOTT JR, MD       Electronically Signed and Approved By: NELLY ELLIOTT JR, MD on 9/02/2023 at 3:02                 Differential Diagnosis and Discussion:    Chest Pain:  Based on the patient's signs and symptoms, I considered aortic dissection, myocardial infaction, pulmonary embolism, cardiac tamponade, pericarditis, pneumothorax, musculoskeletal chest pain and other differential diagnosis as an etiology of the patient's chest pain.     All labs were reviewed and interpreted by me.  All X-rays impressions were independently interpreted by me.  EKG was interpreted by me.    SHANIQUA        Critical Care Note: Total Critical Care time of 35 minutes. Total critical care time documented does not include time spent on separately billed procedures for services of nurses or physician assistants. I personally saw and examined the patient. I have reviewed all diagnostic interpretations and treatment plans as written. I was present for the key portions of any procedures performed and the inclusive time noted in any critical care statement. Critical care time includes patient management by me, time spent at the patients bedside,  time to review lab and imaging results, discussing patient care, documentation in the medical record, and time spent with family or caregiver.    Patient Care Considerations:    I considered heparin anticoagulation but the patient is currently taking a oral anticoagulant      Consultants/Shared Management Plan:    Hospitalist: I have discussed the case with the hospitalist who agrees to accept the patient for admission.  Consultant: I have discussed the case with cardiology who agrees to consult on the patient.    Social Determinants of Health:    Patient has presented with family members who are responsible, reliable and will ensure follow up care.      Disposition and Care Coordination:    Admit:   Through independent evaluation of the patient's history, physical, and imperical data, the patient meets criteria for observation/admission to the hospital.        Final diagnoses:   Unstable angina   Chest pain, unspecified type   Acute on chronic congestive heart failure, unspecified heart failure type   Elevated troponin        ED Disposition       ED Disposition   Decision to Admit    Condition   --    Comment   Level of Care: Telemetry [5]   Diagnosis: Unstable angina [607263]   Admitting Physician: YASH HATHAWAY [[id number]]   Attending Physician: YASH HATHAWAY [[id number]]   Certification: I Certify That Inpatient Hospital Services Are Medically Necessary For Greater Than 2 Midnights                  This medical record created using voice recognition software.             Bobby Cuello MD  09/02/23 0803      Electronically signed by Bobby Cuello MD at 09/02/23 0803       Lindsay Cage, RN at 09/02/23 0310          Critical value reported to Dr. Cuello   Trop 215    Electronically signed by Lindsay Cage, RN at 09/02/23 0316       Facility-Administered Medications as of 9/2/2023   Medication Dose Route Frequency Provider Last Rate Last Admin    apixaban (ELIQUIS) tablet 5 mg  5 mg Oral Q12H Ronal Mckee DO   5 mg at 09/02/23 0919    sennosides-docusate (PERICOLACE) 8.6-50 MG per tablet 2 tablet  2 tablet Oral BID Ronal Mckee DO        And    polyethylene glycol (MIRALAX) packet 17 g  17 g Oral Daily PRN Ronal Mckee DO        And    bisacodyl (DULCOLAX) EC tablet 5 mg  5 mg Oral Daily PRN Ronal Mckee DO        And    bisacodyl (DULCOLAX) suppository 10 mg  10 mg Rectal Daily PRN Ronal Mckee DO        furosemide (LASIX) injection 60 mg  60 mg Intravenous BID Ronal Mckee DO   60 mg at 09/02/23 0919    nitroglycerin (NITROSTAT) SL tablet 0.4 mg  0.4 mg Sublingual Q5 Min PRN Ronal Mckee DO        sodium chloride 0.9 % flush 10 mL  10 mL Intravenous PRN Ronal Mckee DO        sodium chloride 0.9 % flush 10 mL  10 mL Intravenous Q12H Ronal Mckee DO   10 mL at 09/02/23 0920    sodium chloride 0.9 % flush 10 mL  10 mL Intravenous PRN Ronal Mckee DO        sodium chloride 0.9 % infusion 40 mL  40 mL Intravenous PRN Ronal Mckee DO         Orders (active)        Start     Ordered    09/02/23 1000  Incentive Spirometry  Every 4 Hours While Awake       09/02/23 0716    09/02/23 0900  sodium chloride 0.9 % flush 10 mL  Every 12 Hours Scheduled         09/02/23 0716    09/02/23 0900  sennosides-docusate (PERICOLACE) 8.6-50 MG per tablet 2 tablet  2 Times Daily        See Hyperspace for full Linked Orders Report.    09/02/23 0716    09/02/23 0900  apixaban (ELIQUIS) tablet 5 mg   Every 12 Hours Scheduled         09/02/23 0716    09/02/23 0900  furosemide (LASIX) injection 60 mg  2 Times Daily         09/02/23 0716    09/02/23 0800  Vital Signs  Every 4 Hours       09/02/23 0716    09/02/23 0800  Oral Care  2 Times Daily       09/02/23 0716    09/02/23 0800  Strict Intake & Output  Every 4 Hours       09/02/23 0716    09/02/23 0800  Strict Intake & Output  Every Hour       09/02/23 0716    09/02/23 0717  Intake & Output  Every Shift       09/02/23 0716    09/02/23 0717  Weigh Patient  Once         09/02/23 0716    09/02/23 0717  Insert Peripheral IV  Once         09/02/23 0716    09/02/23 0717  Daily Weights  Daily       09/02/23 0716    09/02/23 0717  Vital Signs  Per Hospital Policy         09/02/23 0716    09/02/23 0717  Telemetry - Place Orders & Notify Provider of Results When Patient Experiences Acute Chest Pain, Dysrhythmia or Respiratory Distress  Until Discontinued         09/02/23 0716    09/02/23 0717  Continuous Cardiac Monitoring  Continuous        Comments: Follow Standing Orders As Outlined in Process Instructions (Open Order Report to View Full Instructions)    09/02/23 0716    09/02/23 0717  Telemetry - Maintain IV Access  Continuous         09/02/23 0716    09/02/23 0717  Telemetry - Place Orders & Notify Provider of Results When Patient Experiences Acute Chest Pain, Dysrhythmia or Respiratory Distress  Until Discontinued         09/02/23 0716    09/02/23 0717  Pulse Oximetry, Continuous  Continuous         09/02/23 0716    09/02/23 0717  Daily Weights  Daily       09/02/23 0716    09/02/23 0717  Diet: Cardiac Diets; Healthy Heart (2-3 Na+); Texture: Regular Texture (IDDSI 7); Fluid Consistency: Thin (IDDSI 0)  Diet Effective Now         09/02/23 0716    09/02/23 0717  Basic Metabolic Panel  Daily       09/02/23 0716    09/02/23 0717  CBC & Differential  Daily       09/02/23 0716    09/02/23 0717  Hemoglobin A1c  Once         09/02/23 0716    09/02/23 0717  Adult  Transthoracic Echo Complete w/ Color, Spectral and Contrast if necessary per protocol  Once         09/02/23 0716    09/02/23 0716  polyethylene glycol (MIRALAX) packet 17 g  Daily PRN        See Hyperspace for full Linked Orders Report.    09/02/23 0716    09/02/23 0716  bisacodyl (DULCOLAX) EC tablet 5 mg  Daily PRN        See Hyperspace for full Linked Orders Report.    09/02/23 0716    09/02/23 0716  bisacodyl (DULCOLAX) suppository 10 mg  Daily PRN        See Hyperspace for full Linked Orders Report.    09/02/23 0716    09/02/23 0716  sodium chloride 0.9 % flush 10 mL  As Needed         09/02/23 0716    09/02/23 0716  sodium chloride 0.9 % infusion 40 mL  As Needed         09/02/23 0716    09/02/23 0716  nitroglycerin (NITROSTAT) SL tablet 0.4 mg  Every 5 Minutes PRN         09/02/23 0716    09/02/23 0623  Code Status and Medical Interventions:  Continuous         09/02/23 0624    09/02/23 0554  Cardiology (on-call MD unless specified)  Once        Specialty:  Cardiology  Provider:  Guanaco Felix MD    09/02/23 0553    09/02/23 0525  Hospitalist (on-call MD unless specified)  Once        Specialty:  Hospitalist  Provider:  Ronal Mckee DO    09/02/23 0524    09/02/23 0220  Insert Peripheral IV  Once         09/02/23 0220    09/02/23 0219  sodium chloride 0.9 % flush 10 mL  As Needed         09/02/23 0220    Unscheduled  Oxygen Therapy- Nasal Cannula; Titrate 1-6 LPM Per SpO2; 90 - 95%  Continuous PRN       09/02/23 0220    Unscheduled  ECG 12 Lead ED Triage Standing Order; Chest Pain  As Needed      Comments: Persistent, Unrelieved or Recurrent Chest Pain    09/02/23 0220    Unscheduled  Up With Assistance  As Needed       09/02/23 0716    Unscheduled  Up in Chair  As Needed       09/02/23 0716

## 2023-09-03 LAB
ANION GAP SERPL CALCULATED.3IONS-SCNC: 12.1 MMOL/L (ref 5–15)
BASOPHILS # BLD AUTO: 0.05 10*3/MM3 (ref 0–0.2)
BASOPHILS NFR BLD AUTO: 0.5 % (ref 0–1.5)
BUN SERPL-MCNC: 24 MG/DL (ref 8–23)
BUN/CREAT SERPL: 11.4 (ref 7–25)
CALCIUM SPEC-SCNC: 9.4 MG/DL (ref 8.6–10.5)
CHLORIDE SERPL-SCNC: 100 MMOL/L (ref 98–107)
CO2 SERPL-SCNC: 26.9 MMOL/L (ref 22–29)
CREAT SERPL-MCNC: 2.11 MG/DL (ref 0.76–1.27)
DEPRECATED RDW RBC AUTO: 50 FL (ref 37–54)
EGFRCR SERPLBLD CKD-EPI 2021: 30.9 ML/MIN/1.73
EOSINOPHIL # BLD AUTO: 0.21 10*3/MM3 (ref 0–0.4)
EOSINOPHIL NFR BLD AUTO: 2 % (ref 0.3–6.2)
ERYTHROCYTE [DISTWIDTH] IN BLOOD BY AUTOMATED COUNT: 16 % (ref 12.3–15.4)
GLUCOSE SERPL-MCNC: 124 MG/DL (ref 65–99)
HCT VFR BLD AUTO: 43.7 % (ref 37.5–51)
HGB BLD-MCNC: 14.2 G/DL (ref 13–17.7)
IMM GRANULOCYTES # BLD AUTO: 0.06 10*3/MM3 (ref 0–0.05)
IMM GRANULOCYTES NFR BLD AUTO: 0.6 % (ref 0–0.5)
LYMPHOCYTES # BLD AUTO: 2.69 10*3/MM3 (ref 0.7–3.1)
LYMPHOCYTES NFR BLD AUTO: 25.1 % (ref 19.6–45.3)
MCH RBC QN AUTO: 29.5 PG (ref 26.6–33)
MCHC RBC AUTO-ENTMCNC: 32.5 G/DL (ref 31.5–35.7)
MCV RBC AUTO: 90.7 FL (ref 79–97)
MONOCYTES # BLD AUTO: 1.4 10*3/MM3 (ref 0.1–0.9)
MONOCYTES NFR BLD AUTO: 13.1 % (ref 5–12)
NEUTROPHILS NFR BLD AUTO: 58.7 % (ref 42.7–76)
NEUTROPHILS NFR BLD AUTO: 6.3 10*3/MM3 (ref 1.7–7)
NRBC BLD AUTO-RTO: 0 /100 WBC (ref 0–0.2)
PLATELET # BLD AUTO: 228 10*3/MM3 (ref 140–450)
PMV BLD AUTO: 9.6 FL (ref 6–12)
POTASSIUM SERPL-SCNC: 4.2 MMOL/L (ref 3.5–5.2)
RBC # BLD AUTO: 4.82 10*6/MM3 (ref 4.14–5.8)
SODIUM SERPL-SCNC: 139 MMOL/L (ref 136–145)
WBC NRBC COR # BLD: 10.71 10*3/MM3 (ref 3.4–10.8)

## 2023-09-03 PROCEDURE — 94761 N-INVAS EAR/PLS OXIMETRY MLT: CPT

## 2023-09-03 PROCEDURE — 99232 SBSQ HOSP IP/OBS MODERATE 35: CPT | Performed by: STUDENT IN AN ORGANIZED HEALTH CARE EDUCATION/TRAINING PROGRAM

## 2023-09-03 PROCEDURE — 94799 UNLISTED PULMONARY SVC/PX: CPT

## 2023-09-03 PROCEDURE — 85025 COMPLETE CBC W/AUTO DIFF WBC: CPT | Performed by: FAMILY MEDICINE

## 2023-09-03 PROCEDURE — 25010000002 FUROSEMIDE PER 20 MG: Performed by: FAMILY MEDICINE

## 2023-09-03 PROCEDURE — 80048 BASIC METABOLIC PNL TOTAL CA: CPT | Performed by: FAMILY MEDICINE

## 2023-09-03 RX ADMIN — ISOSORBIDE MONONITRATE 30 MG: 30 TABLET, EXTENDED RELEASE ORAL at 08:26

## 2023-09-03 RX ADMIN — FUROSEMIDE 60 MG: 10 INJECTION, SOLUTION INTRAMUSCULAR; INTRAVENOUS at 08:26

## 2023-09-03 RX ADMIN — Medication 10 ML: at 08:25

## 2023-09-03 RX ADMIN — Medication 10 ML: at 20:28

## 2023-09-03 RX ADMIN — APIXABAN 5 MG: 5 TABLET, FILM COATED ORAL at 08:26

## 2023-09-03 RX ADMIN — APIXABAN 5 MG: 5 TABLET, FILM COATED ORAL at 20:28

## 2023-09-03 NOTE — PLAN OF CARE
Goal Outcome Evaluation: Pt pleasant and compliant with care. Pt's VSS. Pt's HR paced on the monitor overnight. Pt has had no reports of pain so far this shift. Pt alert and able to make needs known. Call light in reach and bed in lowest locked position. Pt has had no additional changes or complaints so far. Bel Flores RN

## 2023-09-03 NOTE — PROGRESS NOTES
" [facility]   Hospitalist Progress Note  Date: 9/3/2023  Patient Name: Javi Martínez  : 1940  MRN: [id number]  Date of admission: 2023      Subjective   Subjective     Chief Complaint: Chest pain     Summary:      Javi Martínez is a 83 y.o. male with emergency department for evaluation of chest pain.  Patient states the chest pain is started \"weeks ago\", and has slowly progressed from intermittent with exertion to constant even at rest.  Patient describes the pain as \"uncomfortable, like anxiety\".  Patient reports bilateral lower extremity edema approximately 1 week ago, which is new.  Patient has a prolific cardiac history including persistent A-fib, systolic congestive heart failure with an EF of 35%, pacemaker placement, and usually follows with Dr. Whelan for cardiology.  Patient reports taking Eliquis, last dose was 10 PM 2023.  Patient reports while in the emergency department, he had ambulated to and from the bathroom, and had an onset of dread, similar quality to his chief complaint.  Patient reports the heaviness extends from his chest into his neck.  Patient denies any dyspnea, nausea, vomiting, back pain, arm pain.    Interval Followup: Patient seen and evaluated.  He reports that chest discomfort is resolved he has had no further episodes.  He denies any shortness of breath at this time.  He reports his lower extremity edema are improved as well.  He is anxious to be discharged home.     Review of Systems   All systems were reviewed and negative except for: As noted in interval follow-up    Objective   Objective     Vitals:   Temp:  [97.5 °F (36.4 °C)-98.3 °F (36.8 °C)] 97.5 °F (36.4 °C)  Heart Rate:  [69-81] 72  Resp:  [16-18] 16  BP: ()/(58-78) 98/61  Physical Exam    Constitutional: Awake, alert, no acute distress   Eyes: Pupils equal, sclerae anicteric, no conjunctival injection   HENT: NCAT, mucous membranes moist   Neck: Supple, no thyromegaly, no lymphadenopathy, " trachea midline   Respiratory: Clear to auscultation bilaterally, nonlabored respirations    Cardiovascular: Irregular, irregular S1-S2, no murmurs, palpable pedal pulses bilaterally   Gastrointestinal: Positive bowel sounds, soft, nontender, nondistended   Musculoskeletal: Trace bilateral lower extremity edema, no clubbing or cyanosis to extremities   Psychiatric: Appropriate affect, cooperative   Neurologic: Oriented x 3, moving all extremities equally-no focal weakness, Cranial Nerves grossly intact, speech clear   Skin: No rashes     Result Review    Result Review:  I have personally reviewed the results from the time of this admission to 9/3/2023 08:50 EDT and agree with these findings:  [x]  Laboratory  [x]  Microbiology  [x]  Radiology  [x]  EKG/Telemetry   []  Cardiology/Vascular   []  Pathology  []  Old records  []  Other:    Assessment & Plan   Assessment / Plan     Assessment/Plan:  NSTEMI type II most likely secondary to heart failure-cardiology consult noted and greatly appreciated.  Acute on chronic systolic congestive heart failure echo on today showing EF at 35% which is consistent with patient's previous EF.  Continue IV Lasix, strict I's and O's and daily weights.  Chronic kidney disease stage III-baseline creatinine ranging from 1.7-1.9 over the last 6 months.    Chronic atrial fibrillation.  Currently rate controlled.  Metoprolol on hold currently secondary to low normal blood pressures.  Continue home dose of apixaban.  Has pacemaker-interrogation in the ED showed no malfunctions.  Hypertension.  Blood pressure is low normal, currently holding oral antihypertensives at this time.  Dyslipidemia.     Discussed plan with RN.    DVT prophylaxis:  Medical DVT prophylaxis orders are present.    CODE STATUS:   Level Of Support Discussed With: Patient  Code Status (Patient has no pulse and is not breathing): CPR (Attempt to Resuscitate)  Medical Interventions (Patient has pulse or is breathing): Full  Support        Electronically signed by Ruy Hunt MD, 09/03/23, 8:50 AM EDT.

## 2023-09-03 NOTE — PROGRESS NOTES
[facility]     Cardiology Progress Note    Patient Name: Javi Martínez  : 1940  MRN: [id number]  Primary Care Physician:  Rani Lopez APRN  Date of admission: 2023    Subjective   Subjective     Chief Complaint: Heart failure chest pain    Interval HPI:    Patient did well overnight.  This morning, he feels better.  He states that he has had no further chest pain and no swelling in his legs which has improved.     Review of Systems   All systems were reviewed and negative except for: Lower extremity swelling    Objective   Objective     Vitals:   Temp:  [97.5 °F (36.4 °C)-99.1 °F (37.3 °C)] 99.1 °F (37.3 °C)  Heart Rate:  [68-81] 68  Resp:  [16-18] 16  BP: ()/(58-78) 153/78  Physical Exam      Physical Exam:              Constitutional: Awake, alert, No acute distress               Eyes: PERRLA, sclerae anicteric, no conjunctival injection              HENT: NCAT, mucous membranes moist              Neck: Supple, no thyromegaly, no lymphadenopathy, trachea midline              Respiratory: Clear to auscultation bilaterally, nonlabored respirations               Cardiovascular: RRR, no murmurs, rubs, or gallops, palpable pedal pulses bilaterally              Gastrointestinal: Positive bowel sounds, soft, nontender, nondistended              Musculoskeletal: Bilateral lower extremity edema up to the shins.  2+., no clubbing or cyanosis to extremities              Psychiatric: Appropriate affect, cooperative              Neurologic: Oriented x 3, strength symmetric in all extremities, Cranial Nerves grossly intact to confrontation, speech clear              Skin: No rashes        Scheduled Meds:apixaban, 5 mg, Oral, Q12H  furosemide, 60 mg, Intravenous, BID  isosorbide mononitrate, 30 mg, Oral, Q24H  senna-docusate sodium, 2 tablet, Oral, BID  sodium chloride, 10 mL, Intravenous, Q12H      Continuous Infusions:        Result Review    Result Review:  I have personally reviewed the results  from the time of this admission to 9/3/2023 12:13 EDT and agree with these findings:  [x]  Laboratory  []  Microbiology  [x]  Radiology  [x]  EKG/Telemetry   [x]  Cardiology/Vascular   []  Pathology  []  Old records  []  Other:  Most notable findings include:     CBC          8/22/2023    11:31 9/2/2023    02:20 9/3/2023    05:24   CBC   WBC 9.03     11.01  10.71    RBC 5.08     4.93  4.82    Hemoglobin 15.4     14.1  14.2    Hematocrit 45.8     44.9  43.7    MCV 90.2     91.1  90.7    MCH 30.3     28.6  29.5    MCHC 33.6     31.4  32.5    RDW 14.5     15.6  16.0    Platelets 250     232  228       Details          This result is from an external source.             CMP          8/22/2023    11:31 9/2/2023    02:20 9/3/2023    05:24   CMP   Glucose  116     116  124    BUN  22     22  24    Creatinine  1.96     1.96  2.11    EGFR  33.7     33.7  30.9    Sodium  141     141  139    Potassium  4.2     4.2  4.2    Chloride  104     104  100    Calcium  9.5     9.5  9.4    Total Protein  6.7     Albumin  3.9     Globulin  2.8     Total Bilirubin  1.3     Alkaline Phosphatase  95     AST (SGOT) 26     23     ALT (SGPT) 26     20     Albumin/Globulin Ratio  1.4     BUN/Creatinine Ratio  11.2     11.2  11.4    Anion Gap  12.3     12.3  12.1       Details          This result is from an external source.              CARDIAC LABS:      Lab 09/02/23  0908 09/02/23  0438 09/02/23  0220   PROBNP  --   --  4,554.0*   HSTROP T 299* 251* 215*        Assessment & Plan   Assessment / Plan     Brief Patient Summary:  Javi Martínez is a 83 y.o. male with heart failure exacerbation.     Active Hospital Problems:  Active Hospital Problems    Diagnosis     **Unstable angina      Heart failure with reduced ejection fraction: Repeat echo shows similarly reduced ejection fraction with worsening of pulmonary hypertension.  He has very large atria and is likely volume overloaded based on his physical exam and his symptoms  Persistent A-fib:  Status post pacemaker.  He is on apixaban currently  Hypertension  Hyperlipidemia  Type II MI mildly elevated troponin likely secondary to heart failure exacerbation given elevated BNP, lower extremity swelling.  Echo shows similar moderate to severe reduction in ejection fraction        Plan:   -Hold off on diuresis to allow renal function to recover.   -Continue Eliquis  -Fasting lipid panel  -Continue Imdur 30 mg   -Would restart lasix orally tomorrow and get nephrology consultation if renal function worsens.    Thank you for the consult.  We will continue to follow       CODE STATUS:   Level Of Support Discussed With: Patient  Code Status (Patient has no pulse and is not breathing): CPR (Attempt to Resuscitate)  Medical Interventions (Patient has pulse or is breathing): Full Support      Electronically signed by Guanaco Felix MD, 09/03/23, 12:13 PM EDT.

## 2023-09-04 ENCOUNTER — READMISSION MANAGEMENT (OUTPATIENT)
Dept: CALL CENTER | Facility: HOSPITAL | Age: 83
End: 2023-09-04
Payer: MEDICARE

## 2023-09-04 VITALS
SYSTOLIC BLOOD PRESSURE: 113 MMHG | BODY MASS INDEX: 24.97 KG/M2 | RESPIRATION RATE: 16 BRPM | DIASTOLIC BLOOD PRESSURE: 57 MMHG | TEMPERATURE: 97.6 F | HEIGHT: 75 IN | WEIGHT: 200.84 LBS | HEART RATE: 72 BPM | OXYGEN SATURATION: 96 %

## 2023-09-04 LAB
ANION GAP SERPL CALCULATED.3IONS-SCNC: 14.7 MMOL/L (ref 5–15)
BASOPHILS # BLD AUTO: 0.07 10*3/MM3 (ref 0–0.2)
BASOPHILS NFR BLD AUTO: 0.7 % (ref 0–1.5)
BUN SERPL-MCNC: 30 MG/DL (ref 8–23)
BUN/CREAT SERPL: 14.5 (ref 7–25)
CALCIUM SPEC-SCNC: 9.1 MG/DL (ref 8.6–10.5)
CHLORIDE SERPL-SCNC: 99 MMOL/L (ref 98–107)
CO2 SERPL-SCNC: 25.3 MMOL/L (ref 22–29)
CREAT SERPL-MCNC: 2.07 MG/DL (ref 0.76–1.27)
DEPRECATED RDW RBC AUTO: 50.9 FL (ref 37–54)
EGFRCR SERPLBLD CKD-EPI 2021: 31.6 ML/MIN/1.73
EOSINOPHIL # BLD AUTO: 0.23 10*3/MM3 (ref 0–0.4)
EOSINOPHIL NFR BLD AUTO: 2.4 % (ref 0.3–6.2)
ERYTHROCYTE [DISTWIDTH] IN BLOOD BY AUTOMATED COUNT: 16.1 % (ref 12.3–15.4)
GLUCOSE SERPL-MCNC: 120 MG/DL (ref 65–99)
HCT VFR BLD AUTO: 46.7 % (ref 37.5–51)
HGB BLD-MCNC: 14.6 G/DL (ref 13–17.7)
IMM GRANULOCYTES # BLD AUTO: 0.1 10*3/MM3 (ref 0–0.05)
IMM GRANULOCYTES NFR BLD AUTO: 1 % (ref 0–0.5)
LYMPHOCYTES # BLD AUTO: 2.66 10*3/MM3 (ref 0.7–3.1)
LYMPHOCYTES NFR BLD AUTO: 27.7 % (ref 19.6–45.3)
MCH RBC QN AUTO: 28.4 PG (ref 26.6–33)
MCHC RBC AUTO-ENTMCNC: 31.3 G/DL (ref 31.5–35.7)
MCV RBC AUTO: 90.9 FL (ref 79–97)
MONOCYTES # BLD AUTO: 1.21 10*3/MM3 (ref 0.1–0.9)
MONOCYTES NFR BLD AUTO: 12.6 % (ref 5–12)
NEUTROPHILS NFR BLD AUTO: 5.35 10*3/MM3 (ref 1.7–7)
NEUTROPHILS NFR BLD AUTO: 55.6 % (ref 42.7–76)
NRBC BLD AUTO-RTO: 0 /100 WBC (ref 0–0.2)
PLATELET # BLD AUTO: 239 10*3/MM3 (ref 140–450)
PMV BLD AUTO: 9.8 FL (ref 6–12)
POTASSIUM SERPL-SCNC: 4 MMOL/L (ref 3.5–5.2)
RBC # BLD AUTO: 5.14 10*6/MM3 (ref 4.14–5.8)
SODIUM SERPL-SCNC: 139 MMOL/L (ref 136–145)
WBC NRBC COR # BLD: 9.62 10*3/MM3 (ref 3.4–10.8)

## 2023-09-04 PROCEDURE — 85025 COMPLETE CBC W/AUTO DIFF WBC: CPT | Performed by: FAMILY MEDICINE

## 2023-09-04 PROCEDURE — 94761 N-INVAS EAR/PLS OXIMETRY MLT: CPT

## 2023-09-04 PROCEDURE — 94799 UNLISTED PULMONARY SVC/PX: CPT

## 2023-09-04 PROCEDURE — 80048 BASIC METABOLIC PNL TOTAL CA: CPT | Performed by: FAMILY MEDICINE

## 2023-09-04 PROCEDURE — 99232 SBSQ HOSP IP/OBS MODERATE 35: CPT | Performed by: STUDENT IN AN ORGANIZED HEALTH CARE EDUCATION/TRAINING PROGRAM

## 2023-09-04 RX ORDER — ACETAMINOPHEN 325 MG/1
650 TABLET ORAL EVERY 6 HOURS PRN
Status: DISCONTINUED | OUTPATIENT
Start: 2023-09-04 | End: 2023-09-04 | Stop reason: HOSPADM

## 2023-09-04 RX ORDER — ISOSORBIDE MONONITRATE 60 MG/1
30 TABLET, EXTENDED RELEASE ORAL
Qty: 30 TABLET | Refills: 0 | Status: SHIPPED | OUTPATIENT
Start: 2023-09-05 | End: 2023-09-07 | Stop reason: SDUPTHER

## 2023-09-04 RX ORDER — TORSEMIDE 20 MG/1
20 TABLET ORAL DAILY
Qty: 30 TABLET | Refills: 0 | Status: SHIPPED | OUTPATIENT
Start: 2023-09-04 | End: 2023-09-07 | Stop reason: SDUPTHER

## 2023-09-04 RX ADMIN — APIXABAN 5 MG: 5 TABLET, FILM COATED ORAL at 08:37

## 2023-09-04 RX ADMIN — ACETAMINOPHEN 650 MG: 325 TABLET ORAL at 12:04

## 2023-09-04 RX ADMIN — SENNOSIDES AND DOCUSATE SODIUM 2 TABLET: 50; 8.6 TABLET ORAL at 08:38

## 2023-09-04 RX ADMIN — Medication 10 ML: at 08:38

## 2023-09-04 RX ADMIN — ISOSORBIDE MONONITRATE 30 MG: 30 TABLET, EXTENDED RELEASE ORAL at 08:37

## 2023-09-04 NOTE — PROGRESS NOTES
[facility]     Cardiology Progress Note    Patient Name: Javi Martínez  : 1940  MRN: [id number]  Primary Care Physician:  Rani Lopez APRN  Date of admission: 2023    Subjective   Subjective     Chief Complaint: Heart failure chest pain    Interval HPI:    Patient did well overnight.  This morning, he feels better.  He states that he has had 1 transient episode of chest pain that lasted for a minute and then went away.  His lower extremity swelling is doing significantly better.  No PND orthopnea.    Review of Systems   All systems were reviewed and negative except for: Lower extremity swelling    Objective   Objective     Vitals:   Temp:  [97.4 °F (36.3 °C)-98.5 °F (36.9 °C)] 98.5 °F (36.9 °C)  Heart Rate:  [70-80] 71  Resp:  [16-22] 16  BP: (105-123)/(58-73) 118/63  Physical Exam      Physical Exam:              Constitutional: Awake, alert, No acute distress               Eyes: PERRLA, sclerae anicteric, no conjunctival injection              HENT: NCAT, mucous membranes moist              Neck: Supple, no thyromegaly, no lymphadenopathy, trachea midline              Respiratory: Clear to auscultation bilaterally, nonlabored respirations               Cardiovascular: RRR, no murmurs, rubs, or gallops, palpable pedal pulses bilaterally              Gastrointestinal: Positive bowel sounds, soft, nontender, nondistended              Musculoskeletal: Bilateral lower extremity edema up to the shins.  2+., no clubbing or cyanosis to extremities              Psychiatric: Appropriate affect, cooperative              Neurologic: Oriented x 3, strength symmetric in all extremities, Cranial Nerves grossly intact to confrontation, speech clear              Skin: No rashes        Scheduled Meds:apixaban, 5 mg, Oral, Q12H  furosemide, 60 mg, Intravenous, BID  isosorbide mononitrate, 30 mg, Oral, Q24H  senna-docusate sodium, 2 tablet, Oral, BID  sodium chloride, 10 mL, Intravenous, Q12H      Continuous  Infusions:        Result Review    Result Review:  I have personally reviewed the results from the time of this admission to 9/4/2023 14:00 EDT and agree with these findings:  [x]  Laboratory  []  Microbiology  [x]  Radiology  [x]  EKG/Telemetry   [x]  Cardiology/Vascular   []  Pathology  []  Old records  []  Other:  Most notable findings include:     CBC          9/2/2023    02:20 9/3/2023    05:24 9/4/2023    05:08   CBC   WBC 11.01  10.71  9.62    RBC 4.93  4.82  5.14    Hemoglobin 14.1  14.2  14.6    Hematocrit 44.9  43.7  46.7    MCV 91.1  90.7  90.9    MCH 28.6  29.5  28.4    MCHC 31.4  32.5  31.3    RDW 15.6  16.0  16.1    Platelets 232  228  239      CMP          9/2/2023    02:20 9/3/2023    05:24 9/4/2023    05:08   CMP   Glucose 116     116  124  120    BUN 22     22  24  30    Creatinine 1.96     1.96  2.11  2.07    EGFR 33.7     33.7  30.9  31.6    Sodium 141     141  139  139    Potassium 4.2     4.2  4.2  4.0    Chloride 104     104  100  99    Calcium 9.5     9.5  9.4  9.1    Total Protein 6.7      Albumin 3.9      Globulin 2.8      Total Bilirubin 1.3      Alkaline Phosphatase 95      AST (SGOT) 23      ALT (SGPT) 20      Albumin/Globulin Ratio 1.4      BUN/Creatinine Ratio 11.2     11.2  11.4  14.5    Anion Gap 12.3     12.3  12.1  14.7       CARDIAC LABS:      Lab 09/02/23  0908 09/02/23  0438 09/02/23  0220   PROBNP  --   --  4,554.0*   HSTROP T 299* 251* 215*        Assessment & Plan   Assessment / Plan     Brief Patient Summary:  Javi Martínez is a 83 y.o. male with heart failure exacerbation.     Active Hospital Problems:  Active Hospital Problems    Diagnosis     **Unstable angina      Heart failure with reduced ejection fraction: Repeat echo shows similarly reduced ejection fraction with worsening of pulmonary hypertension.  He has very large atria and is likely volume overloaded based on his physical exam and his symptoms  Persistent A-fib: Status post pacemaker.  He is on apixaban  currently  Hypertension  Hyperlipidemia  Type II MI mildly elevated troponin likely secondary to heart failure exacerbation given elevated BNP, lower extremity swelling.  Echo shows similar moderate to severe reduction in ejection fraction.  He has had some nonspecific chest pain so we will treat with some antianginals to see if that helps.        Plan:   -We will transition to torsemide 20 mg daily  -Continue Eliquis  -Increase Imdur to 60 mg  -Restart home metoprolol succinate  -Follow-up in cardiology clinic    Thank you for the consult.  We will sign off.  Please call if you have any further questions       CODE STATUS:   Level Of Support Discussed With: Patient  Code Status (Patient has no pulse and is not breathing): CPR (Attempt to Resuscitate)  Medical Interventions (Patient has pulse or is breathing): Full Support      Electronically signed by Guanaco Felix MD, 09/03/23, 12:13 PM EDT.

## 2023-09-04 NOTE — PROGRESS NOTES
" [facility]   Hospitalist Progress Note  Date: 2023  Patient Name: Javi Martínez  : 1940  MRN: [id number]  Date of admission: 2023      Subjective   Subjective     Chief Complaint: Chest pain     Summary:      Javi Martínez is a 83 y.o. male with emergency department for evaluation of chest pain.  Patient states the chest pain is started \"weeks ago\", and has slowly progressed from intermittent with exertion to constant even at rest.  Patient describes the pain as \"uncomfortable, like anxiety\".  Patient reports bilateral lower extremity edema approximately 1 week ago, which is new.  Patient has a prolific cardiac history including persistent A-fib, systolic congestive heart failure with an EF of 35%, pacemaker placement, and usually follows with Dr. Whelan for cardiology.  Patient reports taking Eliquis, last dose was 10 PM 2023.  Patient reports while in the emergency department, he had ambulated to and from the bathroom, and had an onset of dread, similar quality to his chief complaint.  Patient reports the heaviness extends from his chest into his neck.  Patient denies any dyspnea, nausea, vomiting, back pain, arm pain.    Interval Followup: Patient seen and evaluated.  He denies any chest discomfort or shortness of breath at this time.  He reports his lower extremity edema are improved as well.  He is anxious to be discharged home.     Review of Systems   All systems were reviewed and negative except for: As noted in interval follow-up    Objective   Objective     Vitals:   Temp:  [97.3 °F (36.3 °C)-98 °F (36.7 °C)] 97.5 °F (36.4 °C)  Heart Rate:  [] 70  Resp:  [16-22] 16  BP: ()/(58-73) 123/68  Physical Exam    Constitutional: Awake, alert, no acute distress   Eyes: Pupils equal, sclerae anicteric, no conjunctival injection   HENT: NCAT, mucous membranes moist   Neck: Supple, no thyromegaly, no lymphadenopathy, trachea midline   Respiratory: Clear to auscultation " bilaterally, nonlabored respirations    Cardiovascular: Irregular, irregular S1-S2, no murmurs, palpable pedal pulses bilaterally   Gastrointestinal: Positive bowel sounds, soft, nontender, nondistended   Musculoskeletal: Trace bilateral lower extremity edema, no clubbing or cyanosis to extremities   Psychiatric: Appropriate affect, cooperative   Neurologic: Oriented x 3, moving all extremities equally-no focal weakness, Cranial Nerves grossly intact, speech clear   Skin: No rashes     Result Review    Result Review:  I have personally reviewed the results from the time of this admission to 9/4/2023 09:08 EDT and agree with these findings:  [x]  Laboratory  [x]  Microbiology  [x]  Radiology  [x]  EKG/Telemetry   []  Cardiology/Vascular   []  Pathology  []  Old records  []  Other:    Assessment & Plan   Assessment / Plan     Assessment/Plan:  NSTEMI type II most likely secondary to heart failure-cardiology consult noted and greatly appreciated.  Acute on chronic systolic congestive heart failure echo on today showing EF at 35% which is consistent with patient's previous EF.  Status post IV Lasix.   Chronic kidney disease stage III-baseline creatinine ranging from 1.7-1.9 over the last 6 months.    Chronic atrial fibrillation.  Currently rate controlled.  Metoprolol on hold currently secondary to low normal blood pressures.  Continue home dose of apixaban.  Has pacemaker-interrogation in the ED showed no malfunctions.  Hypertension.  Blood pressure is low normal, currently holding oral antihypertensives at this time.  Dyslipidemia.     Discussed plan with RN.    DVT prophylaxis:  Medical DVT prophylaxis orders are present.    CODE STATUS:   Level Of Support Discussed With: Patient  Code Status (Patient has no pulse and is not breathing): CPR (Attempt to Resuscitate)  Medical Interventions (Patient has pulse or is breathing): Full Support      Electronically signed by Ruy Hunt MD, 09/04/23, 9:08 AM  EDT.

## 2023-09-04 NOTE — DISCHARGE SUMMARY
[facility]         HOSPITALIST  DISCHARGE SUMMARY    Patient Name: Javi Martínez  : 1940  MRN: [id number]    Date of Admission: 2023  Date of Discharge:  ***  Primary Care Physician: Rani Lopez APRN    Consults       Date and Time Order Name Status Description    2023  5:53 AM Cardiology (on-call MD unless specified) Completed     2023  5:24 AM Hospitalist (on-call MD unless specified)              Active and Resolved Hospital Problems:  Active Hospital Problems    Diagnosis POA    **Unstable angina [I20.0] Yes      Resolved Hospital Problems   No resolved problems to display.       Hospital Course     Hospital Course:  Javi Martínez is a 83 y.o. male ***        DISCHARGE Follow Up Recommendations for labs and diagnostics: ***      Day of Discharge     Vital Signs:  Temp:  [97.4 °F (36.3 °C)-98.5 °F (36.9 °C)] 97.6 °F (36.4 °C)  Heart Rate:  [70-80] 72  Resp:  [16-22] 16  BP: (105-123)/(57-73) 113/57  Physical Exam: ***      Discharge Details        Discharge Medications        New Medications        Instructions Start Date   isosorbide mononitrate 60 MG 24 hr tablet  Commonly known as: IMDUR   30 mg, Oral, Every 24 Hours Scheduled   Start Date: 2023     torsemide 20 MG tablet  Commonly known as: DEMADEX   20 mg, Oral, Daily             Continue These Medications        Instructions Start Date   allopurinol 100 MG tablet  Commonly known as: ZYLOPRIM   100 mg, Oral, Daily      Eliquis 5 MG tablet tablet  Generic drug: apixaban   Take 1 tablet by mouth twice daily      Farxiga 10 MG tablet  Generic drug: dapagliflozin Propanediol   1 tablet, Oral, Daily      losartan 50 MG tablet  Commonly known as: Cozaar   50 mg, Oral, Daily      metoprolol succinate XL 25 MG 24 hr tablet  Commonly known as: TOPROL-XL   25 mg, Oral, Nightly      omeprazole 20 MG capsule  Commonly known as: priLOSEC   20 mg, Oral, Daily      Testosterone Cypionate 200 MG/ML  injection  Commonly known as: DEPOTESTOTERONE CYPIONATE   Intramuscular, Every 30 Days             Stop These Medications      amLODIPine 5 MG tablet  Commonly known as: NORVASC              No Known Allergies    Discharge Disposition:  Home or Self Care    Diet:  Hospital:  Diet Order   Procedures    Diet: Cardiac Diets; Healthy Heart (2-3 Na+); Texture: Regular Texture (IDDSI 7); Fluid Consistency: Thin (IDDSI 0)       Discharge Activity:   Activity Instructions       Other Activity Instructions      Activity Instructions: as tolerated            CODE STATUS:  Code Status and Medical Interventions:   Ordered at: 09/02/23 0624     Level Of Support Discussed With:    Patient     Code Status (Patient has no pulse and is not breathing):    CPR (Attempt to Resuscitate)     Medical Interventions (Patient has pulse or is breathing):    Full Support         Future Appointments   Date Time Provider Department Center   9/7/2023  1:30 PM Isabel Navarrete APRN [facility] [place] [facility]       Additional Instructions for the Follow-ups that You Need to Schedule       Call MD With Problems / Concerns   As directed      Instructions: shortness of breath, chest pain or any symptoms concerning to the patient    Order Comments: Instructions: shortness of breath, chest pain or any symptoms concerning to the patient         Discharge Follow-up with PCP   As directed       Currently Documented PCP:    Rani Lopez APRN    PCP Phone Number:    [phone]     Follow Up Details: hospital follow up 2 week        Basic Metabolic Panel    Sep 11, 2023 (Approximate)      Release to patient: Routine Release                Pertinent  and/or Most Recent Results     PROCEDURES:   ***    LAB RESULTS:      Lab 09/04/23  0508 09/03/23  0524 09/02/23  0220   WBC 9.62 10.71 11.01*   HEMOGLOBIN 14.6 14.2 14.1   HEMATOCRIT 46.7 43.7 44.9   PLATELETS 239 228 232   NEUTROS ABS 5.35 6.30 6.55   IMMATURE GRANS (ABS) 0.10* 0.06* 0.05   LYMPHS ABS 2.66 2.69  2.99   MONOS ABS 1.21* 1.40* 1.17*   EOS ABS 0.23 0.21 0.19   MCV 90.9 90.7 91.1         Lab 09/04/23  0508 09/03/23  0524 09/02/23  0438 09/02/23  0220   SODIUM 139 139  --  141  141   POTASSIUM 4.0 4.2  --  4.2  4.2   CHLORIDE 99 100  --  104  104   CO2 25.3 26.9  --  24.7  24.7   ANION GAP 14.7 12.1  --  12.3  12.3   BUN 30* 24*  --  22  22   CREATININE 2.07* 2.11*  --  1.96*  1.96*   EGFR 31.6* 30.9*  --  33.7*  33.7*   GLUCOSE 120* 124*  --  116*  116*   CALCIUM 9.1 9.4  --  9.5  9.5   MAGNESIUM  --   --   --  2.2   HEMOGLOBIN A1C  --   --   --  5.90*   TSH  --   --  2.980  --          Lab 09/02/23 0220   TOTAL PROTEIN 6.7   ALBUMIN 3.9   GLOBULIN 2.8   ALT (SGPT) 20   AST (SGOT) 23   BILIRUBIN 1.3*   ALK PHOS 95   LIPASE 29         Lab 09/02/23  0908 09/02/23  0438 09/02/23 0220   PROBNP  --   --  4,554.0*   HSTROP T 299* 251* 215*         Lab 09/02/23 0438   CHOLESTEROL 151   LDL CHOL 98   HDL CHOL 26*   TRIGLYCERIDES 151*             Brief Urine Lab Results       None          Microbiology Results (last 10 days)       ** No results found for the last 240 hours. **            XR Chest 1 View    Result Date: 9/2/2023    No acute infiltrate is appreciated.  There is possible borderline cardiomegaly.     Please note that portions of this note were completed with a voice recognition program.  NELLY ELLIOTT JR, MD       Electronically Signed and Approved By: NELLY ELLIOTT JR, MD on 9/02/2023 at 3:02                        Results for orders placed during the hospital encounter of 09/02/23    Adult Transthoracic Echo Complete w/ Color, Spectral and Contrast if necessary per protocol    Interpretation Summary    Left ventricular systolic function is moderately decreased. Calculated left ventricular EF = 30-35%, similar to previous study.    The left ventricular cavity is moderately dilated.    Left ventricular wall thickness is consistent with concentric hypertrophy.    Left ventricular diastolic  function is consistent with (grade III w/high LAP) fixed restrictive pattern.    Moderately reduced right ventricular systolic function noted.    The left atrial cavity is severely dilated.    The right atrial cavity is moderate to severely  dilated.    Moderate to severe tricuspid valve regurgitation is present.    Estimated right ventricular systolic pressure from tricuspid regurgitation is moderately elevated (45-55 mmHg).    Mild dilation of the aortic root is present.      Labs Pending at Discharge:        Time spent on Discharge including face to face service:  *** minutes    Electronically signed by Ruy Hunt MD, 09/04/23, 3:45 PM EDT.

## 2023-09-05 NOTE — OUTREACH NOTE
Prep Survey      Flowsheet Row Responses   [facility] facility patient discharged from? [facility]   Is LACE score < 7 ? No   Eligibility Readm Mgmt   Discharge diagnosis Unstable angina-   Does the patient have one of the following disease processes/diagnoses(primary or secondary)? CHF   Does the patient have Home health ordered? No   Is there a DME ordered? No   Medication alerts for this patient Demadex   Prep survey completed? Yes            Kelley FREEDMAN - Registered Nurse

## 2023-09-06 ENCOUNTER — READMISSION MANAGEMENT (OUTPATIENT)
Dept: CALL CENTER | Facility: HOSPITAL | Age: 83
End: 2023-09-06
Payer: MEDICARE

## 2023-09-06 LAB
QT INTERVAL: 500 MS
QT INTERVAL: 516 MS
QTC INTERVAL: 556 MS
QTC INTERVAL: 566 MS

## 2023-09-06 NOTE — OUTREACH NOTE
CHF Week 1 Survey      Flowsheet Row Responses   [facility] facility patient discharged from? [facility]   Does the patient have one of the following disease processes/diagnoses(primary or secondary)? CHF   CHF Week 1 attempt successful? No   Unsuccessful attempts Attempt 1            HARPAL LEIJA - Registered Nurse

## 2023-09-07 ENCOUNTER — OFFICE VISIT (OUTPATIENT)
Dept: CARDIOLOGY | Facility: CLINIC | Age: 83
End: 2023-09-07
Payer: MEDICARE

## 2023-09-07 VITALS
HEART RATE: 81 BPM | HEIGHT: 75 IN | WEIGHT: 207 LBS | BODY MASS INDEX: 25.74 KG/M2 | SYSTOLIC BLOOD PRESSURE: 117 MMHG | DIASTOLIC BLOOD PRESSURE: 66 MMHG

## 2023-09-07 DIAGNOSIS — I50.20 HFREF (HEART FAILURE WITH REDUCED EJECTION FRACTION): ICD-10-CM

## 2023-09-07 DIAGNOSIS — Z95.0 PRESENCE OF CARDIAC PACEMAKER: ICD-10-CM

## 2023-09-07 DIAGNOSIS — R06.09 DYSPNEA ON EXERTION: ICD-10-CM

## 2023-09-07 DIAGNOSIS — I10 ESSENTIAL HYPERTENSION: ICD-10-CM

## 2023-09-07 DIAGNOSIS — I48.19 ATRIAL FIBRILLATION, PERSISTENT: Primary | ICD-10-CM

## 2023-09-07 DIAGNOSIS — I25.10 CORONARY ARTERIOSCLEROSIS IN NATIVE ARTERY: ICD-10-CM

## 2023-09-07 PROBLEM — M00.9 SEPTIC JOINT: Status: RESOLVED | Noted: 2021-11-26 | Resolved: 2023-09-07

## 2023-09-07 PROBLEM — I20.0 UNSTABLE ANGINA: Status: RESOLVED | Noted: 2023-09-02 | Resolved: 2023-09-07

## 2023-09-07 PROBLEM — N40.0 BENIGN PROSTATIC HYPERPLASIA WITHOUT LOWER URINARY TRACT SYMPTOMS: Status: ACTIVE | Noted: 2023-09-07

## 2023-09-07 RX ORDER — TORSEMIDE 20 MG/1
20 TABLET ORAL DAILY PRN
Qty: 90 TABLET | Refills: 1 | Status: SHIPPED | OUTPATIENT
Start: 2023-09-07

## 2023-09-07 RX ORDER — LOSARTAN POTASSIUM 50 MG/1
50 TABLET ORAL DAILY
Qty: 90 TABLET | Refills: 3 | Status: SHIPPED | OUTPATIENT
Start: 2023-09-07

## 2023-09-07 RX ORDER — ISOSORBIDE MONONITRATE 60 MG/1
60 TABLET, EXTENDED RELEASE ORAL
Qty: 90 TABLET | Refills: 1 | Status: SHIPPED | OUTPATIENT
Start: 2023-09-07

## 2023-09-07 NOTE — PROGRESS NOTES
"Chief Complaint  Follow-up, Coronary Artery Disease, Hyperlipidemia, and Hypertension    Subjective            History of Present Illness  Javi Martínez is an 83-year-old white/ male patient who presents to the office today for hospital follow-up.  He was admitted to [facility] from 9/2/2023 to 9/4/2023 for evaluation of chest pain.  He had some upward trending troponins and elevated BNP level.  He was started on Imdur 30 mg daily for angina and was able to obtain symptom relief.  Echocardiogram was performed and showed no real change in ejection fraction.  His pacemaker was interrogated and showed no malfunction.  Lasix was administered for some lower extremity edema which also resolved prior to being discharged.  He was transitioned to torsemide prior to discharge and reports that he has not needed to take it since being home from the hospital.  He is monitoring for signs of fluid retention.  He continues to take the isosorbide and denies any recurrent issues.  Today he denies any chest pain, shortness of breath, lightheadedness/dizziness, or edema.    PMH  Past Medical History:   Diagnosis Date    Aneurysm 2013    Arthritis     left knee     Atrial fibrillation, persistent     CHB (complete heart block) 06/07/2023    Chronic obstructive pulmonary disease 09/01/2023    Difficulty walking 2023    Numbness in right foot    Essential hypertension 05/08/2019    GERD (gastroesophageal reflux disease)     HFrEF (heart failure with reduced ejection fraction) 08/11/2021    Hyperlipidemia 08/11/2021    Paroxysmal atrial fibrillation     Polyneuropathy 06/19/2023    Prostate cancer 2010    with seeds implant     Septic joint 11/26/2021         ALLERGY  No Known Allergies       SURGICALHX  Past Surgical History:   Procedure Laterality Date    ABLATION OF DYSRHYTHMIC FOCUS  2013    APPENDECTOMY      CARDIAC ABLATION      x2  \"years ago\"     CARDIAC ELECTROPHYSIOLOGY PROCEDURE N/A 11/01/2021    Procedure: " PPM battery change;  Surgeon: Ge Whelan MD;  Location: [facility] CATH INVASIVE LOCATION;  Service: Cardiovascular;  Laterality: N/A;    CARDIAC SURGERY  2006    cabg 3v    CORONARY ARTERY BYPASS GRAFT      ENDOSCOPY      with dilation     KNEE ARTHROSCOPY Left     TOTAL KNEE ARTHROPLASTY Left 2021    Procedure: TOTAL KNEE ARTHROPLASTY WITH DORA NAVIGATION WITH BIOMET;  Surgeon: Carlitos Zhao MD;  Location: [facility] MAIN OR;  Service: Orthopedics;  Laterality: Left;    VENTRICULAR CARDIAC PACEMAKER INSERTION      medtronic           SOC  Social History     Socioeconomic History    Marital status:    Tobacco Use    Smoking status: Former     Packs/day: 0.50     Types: Cigarettes     Start date: 1962     Quit date: 1995     Years since quittin.1     Passive exposure: Never    Smokeless tobacco: Never   Vaping Use    Vaping Use: Never used   Substance and Sexual Activity    Alcohol use: Never    Drug use: Never    Sexual activity: Yes     Partners: Female         FAMHX  Family History   Problem Relation Age of Onset    Heart attack Mother     No Known Problems Father     No Known Problems Sister     No Known Problems Brother     No Known Problems Maternal Aunt     No Known Problems Maternal Uncle     No Known Problems Paternal Aunt     No Known Problems Paternal Uncle     No Known Problems Maternal Grandmother     No Known Problems Maternal Grandfather     No Known Problems Paternal Grandmother     No Known Problems Paternal Grandfather     No Known Problems Other           MEDSIGONLY  Current Outpatient Medications on File Prior to Visit   Medication Sig    allopurinol (ZYLOPRIM) 100 MG tablet Take 1 tablet by mouth Daily.    Eliquis 5 MG tablet tablet Take 1 tablet by mouth twice daily    Farxiga 10 MG tablet Take 10 mg by mouth Daily.    isosorbide mononitrate (IMDUR) 60 MG 24 hr tablet Take 0.5 tablets by mouth Daily.    losartan (Cozaar) 50 MG tablet Take 1 tablet by mouth Daily.  "   metoprolol succinate XL (TOPROL-XL) 25 MG 24 hr tablet Take 1 tablet by mouth Every Night.    omeprazole (priLOSEC) 20 MG capsule Take 1 capsule by mouth Daily.    Testosterone Cypionate (DEPOTESTOTERONE CYPIONATE) 200 MG/ML injection Inject  into the appropriate muscle as directed by prescriber Every 30 (Thirty) Days.    torsemide (DEMADEX) 20 MG tablet Take 1 tablet by mouth Daily for 180 days.     No current facility-administered medications on file prior to visit.         Objective   /66 (BP Location: Left arm, Patient Position: Sitting, Cuff Size: Adult)   Pulse 81   Ht 190.5 cm (75\")   Wt 93.9 kg (207 lb)   BMI 25.87 kg/m²       Physical Exam  HENT:      Head: Normocephalic.   Neck:      Vascular: No carotid bruit.   Cardiovascular:      Rate and Rhythm: Normal rate. Rhythm irregular.      Pulses: Normal pulses.      Heart sounds: Normal heart sounds. No murmur heard.  Pulmonary:      Effort: Pulmonary effort is normal.      Breath sounds: Normal breath sounds.   Musculoskeletal:      Cervical back: Neck supple.      Right lower leg: No edema.      Left lower leg: No edema.   Skin:     General: Skin is dry.   Neurological:      Mental Status: He is alert and oriented to person, place, and time.   Psychiatric:         Behavior: Behavior normal.     Result Review :   The following data was reviewed by: MEJIA Davidson on 09/07/2023:  proBNP   Date Value Ref Range Status   09/02/2023 4,554.0 (H) 0.0 - 1,800.0 pg/mL Final         9/4/2023    05:08   CMP   Glucose 120    BUN 30    Creatinine 2.07    EGFR 31.6    Sodium 139    Potassium 4.0    Chloride 99    Calcium 9.1    Total Protein    Albumin    Globulin    Total Bilirubin    Alkaline Phosphatase    AST (SGOT)    ALT (SGPT)    Albumin/Globulin Ratio    BUN/Creatinine Ratio 14.5    Anion Gap 14.7      CBC w/diff          9/4/2023    05:08   CBC w/Diff   WBC 9.62    RBC 5.14    Hemoglobin 14.6    Hematocrit 46.7    MCV 90.9    MCH 28.4    MCHC " 31.3    RDW 16.1    Platelets 239    Neutrophil Rel % 55.6    Immature Granulocyte Rel % 1.0    Lymphocyte Rel % 27.7    Monocyte Rel % 12.6    Eosinophil Rel % 2.4    Basophil Rel % 0.7       Lab Results   Component Value Date    TSH 2.980 09/02/2023      No results found for: FREET4   No results found for: DDIMERQUANT  Magnesium   Date Value Ref Range Status   09/02/2023 2.2 1.6 - 2.4 mg/dL Final      No results found for: DIGOXIN   Lab Results   Component Value Date    TROPONINT 299 (C) 09/02/2023          Lab 09/02/23  0220   HEMOGLOBIN A1C 5.90*      Lipid Panel          9/2/2023    04:38   Lipid Panel   Total Cholesterol 151    Triglycerides 151    HDL Cholesterol 26    VLDL Cholesterol 27    LDL Cholesterol  98    LDL/HDL Ratio 3.65      Results for orders placed during the hospital encounter of 09/02/23    Adult Transthoracic Echo Complete w/ Color, Spectral and Contrast if necessary per protocol    Interpretation Summary    Left ventricular systolic function is moderately decreased. Calculated left ventricular EF = 30-35%, similar to previous study.    The left ventricular cavity is moderately dilated.    Left ventricular wall thickness is consistent with concentric hypertrophy.    Left ventricular diastolic function is consistent with (grade III w/high LAP) fixed restrictive pattern.    Moderately reduced right ventricular systolic function noted.    The left atrial cavity is severely dilated.    The right atrial cavity is moderate to severely  dilated.    Moderate to severe tricuspid valve regurgitation is present.    Estimated right ventricular systolic pressure from tricuspid regurgitation is moderately elevated (45-55 mmHg).    Mild dilation of the aortic root is present.         Assessment and Plan    Diagnoses and all orders for this visit:    1. Atrial fibrillation, persistent (Primary)  Symptomatically stable, rate controlled, continue metoprolol 25 mg nightly.  Continue Eliquis for CVA  prevention.    2. CAD with CABG  He denies any anginal symptoms today, continue isosorbide 60 mg daily.    3. HFrEF (heart failure with reduced ejection fraction)  Repeat echocardiogram showed reduced ejection fraction however no worse than prior echocardiogram.  We talked about how to monitor for fluid retention with performing daily weight.  He will take torsemide 20 mg daily as needed for weight gain of 5 pounds or more in 3 to 5-day span.  Continue Farxiga 10 mg daily, losartan 50 mg daily, and metoprolol 25 mg nightly.  Repeat echocardiogram in 6 months to reassess left ventricular systolic function, sooner if symptoms persist or worsen.  -     Adult Transthoracic Echo Complete W/ Cont if Necessary Per Protocol; Future    4. Essential hypertension  Currently controlled and without adverse effects from medication, continue current doses of losartan and metoprolol.    5. Presence of cardiac pacemaker single chamber  Device was interrogated while in the hospital and showed normal function, repeat interrogation in 6 months.    Other orders  -     isosorbide mononitrate (IMDUR) 60 MG 24 hr tablet; Take 1 tablet by mouth Daily.  Dispense: 90 tablet; Refill: 1  -     torsemide (DEMADEX) 20 MG tablet; Take 1 tablet by mouth Daily As Needed (for swelling or weight gain 5lb or more in 3-5 day span).  Dispense: 90 tablet; Refill: 1  -     losartan (Cozaar) 50 MG tablet; Take 1 tablet by mouth Daily.  Dispense: 90 tablet; Refill: 3            Follow Up   Return in about 6 months (around 3/7/2024) for Follow up with Dr Whelan with device check.    Patient was given instructions and counseling regarding his condition or for health maintenance advice. Please see specific information pulled into the AVS if appropriate.     Javi Martínez  reports that he quit smoking about 28 years ago. His smoking use included cigarettes. He started smoking about 61 years ago. He smoked an average of .5 packs per day. He has never been exposed  to tobacco smoke. He has never used smokeless tobacco.           Isabel Navarrete, APRN  09/07/23  13:43 EDT    Dictated Utilizing Dragon Dictation

## 2023-09-13 ENCOUNTER — READMISSION MANAGEMENT (OUTPATIENT)
Dept: CALL CENTER | Facility: HOSPITAL | Age: 83
End: 2023-09-13
Payer: MEDICARE

## 2023-09-13 RX ORDER — DAPAGLIFLOZIN 10 MG/1
TABLET, FILM COATED ORAL
Qty: 90 TABLET | Refills: 3 | Status: SHIPPED | OUTPATIENT
Start: 2023-09-13

## 2023-09-13 NOTE — OUTREACH NOTE
CHF Week 2 Survey      Flowsheet Row Responses   [facility] facility patient discharged from? [facility]   Does the patient have one of the following disease processes/diagnoses(primary or secondary)? CHF   Week 2 attempt successful? No   Unsuccessful attempts Attempt 1            Emily MCPHERSON - Licensed Nurse

## 2023-09-19 ENCOUNTER — READMISSION MANAGEMENT (OUTPATIENT)
Dept: CALL CENTER | Facility: HOSPITAL | Age: 83
End: 2023-09-19
Payer: MEDICARE

## 2023-09-19 NOTE — OUTREACH NOTE
CHF Week 3 Survey      Flowsheet Row Responses   [facility] patient discharged from? [facility]   Does the patient have one of the following disease processes/diagnoses(primary or secondary)? CHF   Week 3 attempt successful? No   Unsuccessful attempts Attempt 1   Revoke Decline to participate            Deyanira BECKHAM - Licensed Nurse

## 2023-09-29 ENCOUNTER — TELEPHONE (OUTPATIENT)
Dept: CARDIOLOGY | Facility: CLINIC | Age: 83
End: 2023-09-29
Payer: MEDICARE

## 2023-09-29 NOTE — TELEPHONE ENCOUNTER
Pt.s PCP called. She said pt was supposed to have a heart cath and nothing is scheduled. Is that correct?    Call Suzette at [phone].

## 2023-09-29 NOTE — TELEPHONE ENCOUNTER
No cardiac catheterization needed at this time unless patient has new or worsening cardiac symptoms

## 2023-09-29 NOTE — TELEPHONE ENCOUNTER
Spoke with Suzette at BALAJI Lopez's office and she is aware. She is sending resent labs. She says his kidneys are in really bad shape.

## 2023-10-02 ENCOUNTER — TELEPHONE (OUTPATIENT)
Dept: CARDIOLOGY | Facility: CLINIC | Age: 83
End: 2023-10-02
Payer: MEDICARE

## 2023-10-02 DIAGNOSIS — I50.20 HFREF (HEART FAILURE WITH REDUCED EJECTION FRACTION): ICD-10-CM

## 2023-10-02 NOTE — TELEPHONE ENCOUNTER
----- Message from MEJIA Huynh sent at 10/2/2023  4:04 PM EDT -----  Reviewing his labs show a degree of chronic kidney disease, his creatinine is still elevated, however it has improved since his hospitalization.  Based on these numbers, I would recommend to decrease his dose of Eliquis from 5 mg twice daily to 2.5 mg twice daily. (They may cut his current tablet in half until they are able to  a new prescription, please send updated prescription to pharmacy of his choice).  He should also continue monitoring for fluid retention as discussed at last office visit, and notify us if he has any concerns.

## 2023-10-03 ENCOUNTER — TELEPHONE (OUTPATIENT)
Dept: CARDIOLOGY | Facility: CLINIC | Age: 83
End: 2023-10-03
Payer: MEDICARE

## 2023-10-03 NOTE — TELEPHONE ENCOUNTER
Spoke with pt. Gave results and plan. No concerns.    Sent over prescription to pharmacy. PT will cut the 5 mg to make 2.5 mg.

## 2023-10-04 NOTE — TELEPHONE ENCOUNTER
Rani's office said his renal function is bad due to the hosp. Pumping him full of Lasix. They will refer if you want.

## 2023-10-04 NOTE — TELEPHONE ENCOUNTER
Go ahead and place order for referral to nephrology, and let the patient know that after discussing it with his primary care doctor, we are can have him referred for an evaluation of his kidney function.

## 2023-11-14 ENCOUNTER — TRANSCRIBE ORDERS (OUTPATIENT)
Dept: ADMINISTRATIVE | Facility: HOSPITAL | Age: 83
End: 2023-11-14
Payer: MEDICARE

## 2023-11-14 DIAGNOSIS — N18.30 STAGE 3 CHRONIC KIDNEY DISEASE, UNSPECIFIED WHETHER STAGE 3A OR 3B CKD: Primary | ICD-10-CM

## 2023-11-22 ENCOUNTER — HOSPITAL ENCOUNTER (OUTPATIENT)
Dept: ULTRASOUND IMAGING | Facility: HOSPITAL | Age: 83
Discharge: HOME | End: 2023-11-22
Admitting: INTERNAL MEDICINE
Payer: MEDICARE

## 2023-11-22 DIAGNOSIS — N18.30 STAGE 3 CHRONIC KIDNEY DISEASE, UNSPECIFIED WHETHER STAGE 3A OR 3B CKD: ICD-10-CM

## 2023-11-22 PROCEDURE — 76775 US EXAM ABDO BACK WALL LIM: CPT

## 2024-01-02 NOTE — TELEPHONE ENCOUNTER
Patient dropped of FMLA forms prior to his appointment today. Writer spoke with Marcial regarding the forms and per Kirstie Park's last FMLA form completion it said pending pain managements recommendation. Marcial wants to know if the patient has spoke with pain management regarding them filling the FMLA forms out since they are the ones treating the patient for his pain? If they will not fill the forms out for the patient then Marcial states she will need a recommendation letter from his pain management letting her know what they think the patient needs.   Writer called the patient but had to LM for him to call the office back.    Procedure: cystogram urethrogram; urethralplasty    Med Directive: Eliquis    PMH: CAD w/ CABG 2006, CHF, HTN, hyperlipidemia, PAF    Last Seen: 5/3/22

## 2024-03-20 ENCOUNTER — OFFICE VISIT (OUTPATIENT)
Dept: CARDIOLOGY | Facility: CLINIC | Age: 84
End: 2024-03-20
Payer: MEDICARE

## 2024-03-20 ENCOUNTER — CLINICAL SUPPORT NO REQUIREMENTS (OUTPATIENT)
Dept: CARDIOLOGY | Facility: CLINIC | Age: 84
End: 2024-03-20
Payer: MEDICARE

## 2024-03-20 VITALS
BODY MASS INDEX: 24.25 KG/M2 | HEART RATE: 82 BPM | WEIGHT: 195 LBS | DIASTOLIC BLOOD PRESSURE: 70 MMHG | SYSTOLIC BLOOD PRESSURE: 126 MMHG | HEIGHT: 75 IN

## 2024-03-20 DIAGNOSIS — I48.19 ATRIAL FIBRILLATION, PERSISTENT: Primary | ICD-10-CM

## 2024-03-20 DIAGNOSIS — Z95.0 PRESENCE OF CARDIAC PACEMAKER: ICD-10-CM

## 2024-03-20 DIAGNOSIS — I25.10 CORONARY ARTERIOSCLEROSIS IN NATIVE ARTERY: ICD-10-CM

## 2024-03-20 DIAGNOSIS — I50.20 HFREF (HEART FAILURE WITH REDUCED EJECTION FRACTION): Primary | ICD-10-CM

## 2024-03-20 DIAGNOSIS — E78.5 HYPERLIPIDEMIA LDL GOAL <70: ICD-10-CM

## 2024-03-20 DIAGNOSIS — I44.2 CHB (COMPLETE HEART BLOCK): ICD-10-CM

## 2024-03-20 DIAGNOSIS — I10 ESSENTIAL HYPERTENSION: ICD-10-CM

## 2024-03-20 RX ORDER — TAMSULOSIN HYDROCHLORIDE 0.4 MG/1
1 CAPSULE ORAL DAILY
COMMUNITY
Start: 2024-03-15

## 2024-03-20 RX ORDER — APIXABAN 5 MG/1
5 TABLET, FILM COATED ORAL 2 TIMES DAILY
COMMUNITY

## 2024-03-20 RX ORDER — LOSARTAN POTASSIUM 25 MG/1
25 TABLET ORAL DAILY
Qty: 90 TABLET | Refills: 3 | Status: SHIPPED | OUTPATIENT
Start: 2024-03-20

## 2024-03-20 NOTE — PROGRESS NOTES
Chief Complaint  Follow-up, Coronary Artery Disease, Pacemaker Check, Atrial Fibrillation, Hypertension, and Hyperlipidemia    Subjective    Patient reports increased lower extremity edema noticeable over the last few weeks he reports no change in his overall breathing ability though no symptoms of PND orthopnea.  He did stop his losartan due to concerns with the addition of his tamsulosin low-dose and about hypertension  Past Medical History:   Diagnosis Date    Aneurysm 2013    Arthritis     left knee     Atrial fibrillation, persistent     CHB (complete heart block) 06/07/2023    Chronic obstructive pulmonary disease 09/01/2023    Difficulty walking 2023    Numbness in right foot    Essential hypertension 05/08/2019    GERD (gastroesophageal reflux disease)     HFrEF (heart failure with reduced ejection fraction) 08/11/2021    Hyperlipidemia 08/11/2021    Paroxysmal atrial fibrillation     Polyneuropathy 06/19/2023    Prostate cancer 2010    with seeds implant     Septic joint 11/26/2021         Current Outpatient Medications:     allopurinol (ZYLOPRIM) 100 MG tablet, Take 1 tablet by mouth Daily., Disp: , Rfl:     Eliquis 5 MG tablet tablet, Take 1 tablet by mouth 2 (Two) Times a Day., Disp: , Rfl:     Farxiga 10 MG tablet, Take 1 tablet by mouth once daily, Disp: 90 tablet, Rfl: 3    isosorbide mononitrate (IMDUR) 60 MG 24 hr tablet, Take 1 tablet by mouth Daily., Disp: 90 tablet, Rfl: 1    omeprazole (priLOSEC) 20 MG capsule, Take 1 capsule by mouth Daily., Disp: , Rfl:     tamsulosin (FLOMAX) 0.4 MG capsule 24 hr capsule, Take 1 capsule by mouth Daily., Disp: , Rfl:     Testosterone Cypionate (DEPOTESTOTERONE CYPIONATE) 200 MG/ML injection, Inject  into the appropriate muscle as directed by prescriber Every 30 (Thirty) Days., Disp: , Rfl:     losartan (Cozaar) 25 MG tablet, Take 1 tablet by mouth Daily., Disp: 90 tablet, Rfl: 3    Medications Discontinued During This Encounter   Medication Reason    apixaban  "(ELIQUIS) 2.5 MG tablet tablet Alternate therapy    metoprolol succinate XL (TOPROL-XL) 25 MG 24 hr tablet *Therapy completed    torsemide (DEMADEX) 20 MG tablet *Therapy completed    losartan (Cozaar) 50 MG tablet      No Known Allergies     Social History     Tobacco Use    Smoking status: Former     Current packs/day: 0.00     Average packs/day: 0.5 packs/day for 33.6 years (16.8 ttl pk-yrs)     Types: Cigarettes     Start date: 1962     Quit date: 1995     Years since quittin.6     Passive exposure: Never    Smokeless tobacco: Never   Vaping Use    Vaping status: Never Used   Substance Use Topics    Alcohol use: Never    Drug use: Never       Family History   Problem Relation Age of Onset    Heart attack Mother     No Known Problems Father     No Known Problems Sister     No Known Problems Brother     No Known Problems Maternal Aunt     No Known Problems Maternal Uncle     No Known Problems Paternal Aunt     No Known Problems Paternal Uncle     No Known Problems Maternal Grandmother     No Known Problems Maternal Grandfather     No Known Problems Paternal Grandmother     No Known Problems Paternal Grandfather     No Known Problems Other         Objective     /70   Pulse 82   Ht 190.5 cm (75\")   Wt 88.5 kg (195 lb)   BMI 24.37 kg/m²       Physical Exam    General Appearance:   no acute distress  Alert and oriented x3  HENT:   lips not cyanotic  Atraumatic  Neck:  No jvd   supple  Respiratory:  no respiratory distress  normal breath sounds  no rales  Cardiovascular:  Regular rate and rhythm 1-2  no S3, no S4   no murmur  no rub  Extremities  No cyanosis  lower extremity edema: This skin:   warm, dry  No rashes      Result Review :     proBNP   Date Value Ref Range Status   2023 4,554.0 (H) 0.0 - 1,800.0 pg/mL Final     CMP          2023    02:20 9/3/2023    05:24 2023    05:08   CMP   Glucose 116     116  124  120    BUN 22     22  24  30    Creatinine 1.96     1.96  2.11  " "2.07    EGFR 33.7     33.7  30.9  31.6    Sodium 141     141  139  139    Potassium 4.2     4.2  4.2  4.0    Chloride 104     104  100  99    Calcium 9.5     9.5  9.4  9.1    Total Protein 6.7      Albumin 3.9      Globulin 2.8      Total Bilirubin 1.3      Alkaline Phosphatase 95      AST (SGOT) 23      ALT (SGPT) 20      Albumin/Globulin Ratio 1.4      BUN/Creatinine Ratio 11.2     11.2  11.4  14.5    Anion Gap 12.3     12.3  12.1  14.7      CBC w/diff          9/2/2023    02:20 9/3/2023    05:24 9/4/2023    05:08   CBC w/Diff   WBC 11.01  10.71  9.62    RBC 4.93  4.82  5.14    Hemoglobin 14.1  14.2  14.6    Hematocrit 44.9  43.7  46.7    MCV 91.1  90.7  90.9    MCH 28.6  29.5  28.4    MCHC 31.4  32.5  31.3    RDW 15.6  16.0  16.1    Platelets 232  228  239    Neutrophil Rel % 59.5  58.7  55.6    Immature Granulocyte Rel % 0.5  0.6  1.0    Lymphocyte Rel % 27.2  25.1  27.7    Monocyte Rel % 10.6  13.1  12.6    Eosinophil Rel % 1.7  2.0  2.4    Basophil Rel % 0.5  0.5  0.7       Lab Results   Component Value Date    TSH 2.980 09/02/2023      No results found for: \"FREET4\"   No results found for: \"DDIMERQUANT\"  Magnesium   Date Value Ref Range Status   09/02/2023 2.2 1.6 - 2.4 mg/dL Final      No results found for: \"DIGOXIN\"   Lab Results   Component Value Date    TROPONINT 299 (C) 09/02/2023           Lipid Panel          9/2/2023    04:38   Lipid Panel   Total Cholesterol 151    Triglycerides 151    HDL Cholesterol 26    VLDL Cholesterol 27    LDL Cholesterol  98    LDL/HDL Ratio 3.65      No results found for: \"POCTROP\"    Results for orders placed during the hospital encounter of 09/02/23    Adult Transthoracic Echo Complete w/ Color, Spectral and Contrast if necessary per protocol    Interpretation Summary    Left ventricular systolic function is moderately decreased. Calculated left ventricular EF = 30-35%, similar to previous study.    The left ventricular cavity is moderately dilated.    Left ventricular " wall thickness is consistent with concentric hypertrophy.    Left ventricular diastolic function is consistent with (grade III w/high LAP) fixed restrictive pattern.    Moderately reduced right ventricular systolic function noted.    The left atrial cavity is severely dilated.    The right atrial cavity is moderate to severely  dilated.    Moderate to severe tricuspid valve regurgitation is present.    Estimated right ventricular systolic pressure from tricuspid regurgitation is moderately elevated (45-55 mmHg).    Mild dilation of the aortic root is present.  Single-chamber pacemaker atrially paced 100% time working normally patient pacemaker dependent no events recorded no changes made the device              Diagnoses and all orders for this visit:    1. HFrEF (heart failure with reduced ejection fraction) (Primary)  Assessment & Plan:  Patient with moderately reduced ejection fraction on last echocardiogram with some increased lower extremity edema he is 100% ventricularly paced previously attempted what sound like LV lead placement at his hospital in [place] were unsuccessful due to small veins he reports recommended restarting of his losartan 25 we will see him back in a month attempting addition of beta-blocker as well reassess echocardiogram if still reduced ejection fraction consider possible leadless LV lead placement for synchronization of LV function and if not also just consider addition of right ventricular lead placement for ICD.    Orders:  -     Basic Metabolic Panel; Future    2. CAD with CABG  Assessment & Plan:  Patient is doing well no ongoing angina         3. Presence of cardiac pacemaker single chamber  Assessment & Plan:  Patient with normal functioning single-chamber pacemaker battery life 10.3 years      4. Hyperlipidemia    5. Essential hypertension    Other orders  -     losartan (Cozaar) 25 MG tablet; Take 1 tablet by mouth Daily.  Dispense: 90 tablet; Refill: 3            Follow Up      Return in about 4 weeks (around 4/17/2024).          Patient was given instructions and counseling regarding his condition or for health maintenance advice. Please see specific information pulled into the AVS if appropriate.

## 2024-03-20 NOTE — ASSESSMENT & PLAN NOTE
Patient with moderately reduced ejection fraction on last echocardiogram with some increased lower extremity edema he is 100% ventricularly paced previously attempted what sound like LV lead placement at his hospital in [place] were unsuccessful due to small veins he reports recommended restarting of his losartan 25 we will see him back in a month attempting addition of beta-blocker as well reassess echocardiogram if still reduced ejection fraction consider possible leadless LV lead placement for synchronization of LV function and if not also just consider addition of right ventricular lead placement for ICD.

## 2024-03-25 NOTE — PROGRESS NOTES
Normal VVIR Chamber Pacemaker Device Interrogation and Device Testing.  Normal evaluation of device function and lead measurements.  No optimization was needed of parameters or maximization of device longevity.  Patient is on automated Home Remote Monitoring.

## 2024-04-08 ENCOUNTER — APPOINTMENT (OUTPATIENT)
Dept: GENERAL RADIOLOGY | Facility: HOSPITAL | Age: 84
End: 2024-04-08
Payer: MEDICARE

## 2024-04-08 ENCOUNTER — HOSPITAL ENCOUNTER (EMERGENCY)
Facility: HOSPITAL | Age: 84
Discharge: HOME | End: 2024-04-08
Attending: EMERGENCY MEDICINE | Admitting: EMERGENCY MEDICINE
Payer: MEDICARE

## 2024-04-08 VITALS
WEIGHT: 191.36 LBS | DIASTOLIC BLOOD PRESSURE: 91 MMHG | SYSTOLIC BLOOD PRESSURE: 118 MMHG | BODY MASS INDEX: 23.3 KG/M2 | RESPIRATION RATE: 18 BRPM | TEMPERATURE: 98.3 F | HEART RATE: 71 BPM | HEIGHT: 76 IN | OXYGEN SATURATION: 93 %

## 2024-04-08 DIAGNOSIS — I50.9 CONGESTIVE HEART FAILURE, UNSPECIFIED HF CHRONICITY, UNSPECIFIED HEART FAILURE TYPE: Primary | ICD-10-CM

## 2024-04-08 LAB
ALBUMIN SERPL-MCNC: 3.6 G/DL (ref 3.5–5.2)
ALBUMIN/GLOB SERPL: 1.2 G/DL
ALP SERPL-CCNC: 114 U/L (ref 39–117)
ALT SERPL W P-5'-P-CCNC: 13 U/L (ref 1–41)
ANION GAP SERPL CALCULATED.3IONS-SCNC: 11.7 MMOL/L (ref 5–15)
AST SERPL-CCNC: 18 U/L (ref 1–40)
BASOPHILS # BLD AUTO: 0.08 10*3/MM3 (ref 0–0.2)
BASOPHILS NFR BLD AUTO: 0.9 % (ref 0–1.5)
BILIRUB SERPL-MCNC: 1.6 MG/DL (ref 0–1.2)
BUN SERPL-MCNC: 25 MG/DL (ref 8–23)
BUN/CREAT SERPL: 14.8 (ref 7–25)
CALCIUM SPEC-SCNC: 9.2 MG/DL (ref 8.6–10.5)
CHLORIDE SERPL-SCNC: 105 MMOL/L (ref 98–107)
CO2 SERPL-SCNC: 23.3 MMOL/L (ref 22–29)
CREAT SERPL-MCNC: 1.69 MG/DL (ref 0.76–1.27)
DEPRECATED RDW RBC AUTO: 48.2 FL (ref 37–54)
EGFRCR SERPLBLD CKD-EPI 2021: 40.3 ML/MIN/1.73
EOSINOPHIL # BLD AUTO: 0.13 10*3/MM3 (ref 0–0.4)
EOSINOPHIL NFR BLD AUTO: 1.5 % (ref 0.3–6.2)
ERYTHROCYTE [DISTWIDTH] IN BLOOD BY AUTOMATED COUNT: 14.8 % (ref 12.3–15.4)
GLOBULIN UR ELPH-MCNC: 3 GM/DL
GLUCOSE SERPL-MCNC: 91 MG/DL (ref 65–99)
HCT VFR BLD AUTO: 46.5 % (ref 37.5–51)
HGB BLD-MCNC: 14.6 G/DL (ref 13–17.7)
IMM GRANULOCYTES # BLD AUTO: 0.02 10*3/MM3 (ref 0–0.05)
IMM GRANULOCYTES NFR BLD AUTO: 0.2 % (ref 0–0.5)
LYMPHOCYTES # BLD AUTO: 2.11 10*3/MM3 (ref 0.7–3.1)
LYMPHOCYTES NFR BLD AUTO: 24.8 % (ref 19.6–45.3)
MCH RBC QN AUTO: 27.9 PG (ref 26.6–33)
MCHC RBC AUTO-ENTMCNC: 31.4 G/DL (ref 31.5–35.7)
MCV RBC AUTO: 88.9 FL (ref 79–97)
MONOCYTES # BLD AUTO: 0.81 10*3/MM3 (ref 0.1–0.9)
MONOCYTES NFR BLD AUTO: 9.5 % (ref 5–12)
NEUTROPHILS NFR BLD AUTO: 5.35 10*3/MM3 (ref 1.7–7)
NEUTROPHILS NFR BLD AUTO: 63.1 % (ref 42.7–76)
NRBC BLD AUTO-RTO: 0 /100 WBC (ref 0–0.2)
NT-PROBNP SERPL-MCNC: 4830 PG/ML (ref 0–1800)
PLATELET # BLD AUTO: 231 10*3/MM3 (ref 140–450)
PMV BLD AUTO: 9.9 FL (ref 6–12)
POTASSIUM SERPL-SCNC: 4.5 MMOL/L (ref 3.5–5.2)
PROT SERPL-MCNC: 6.6 G/DL (ref 6–8.5)
RBC # BLD AUTO: 5.23 10*6/MM3 (ref 4.14–5.8)
SODIUM SERPL-SCNC: 140 MMOL/L (ref 136–145)
TROPONIN T SERPL HS-MCNC: 44 NG/L
WBC NRBC COR # BLD AUTO: 8.5 10*3/MM3 (ref 3.4–10.8)

## 2024-04-08 PROCEDURE — 93010 ELECTROCARDIOGRAM REPORT: CPT | Performed by: INTERNAL MEDICINE

## 2024-04-08 PROCEDURE — 85025 COMPLETE CBC W/AUTO DIFF WBC: CPT | Performed by: EMERGENCY MEDICINE

## 2024-04-08 PROCEDURE — 84484 ASSAY OF TROPONIN QUANT: CPT | Performed by: EMERGENCY MEDICINE

## 2024-04-08 PROCEDURE — 93005 ELECTROCARDIOGRAM TRACING: CPT | Performed by: EMERGENCY MEDICINE

## 2024-04-08 PROCEDURE — 83880 ASSAY OF NATRIURETIC PEPTIDE: CPT | Performed by: EMERGENCY MEDICINE

## 2024-04-08 PROCEDURE — 36415 COLL VENOUS BLD VENIPUNCTURE: CPT

## 2024-04-08 PROCEDURE — 99284 EMERGENCY DEPT VISIT MOD MDM: CPT

## 2024-04-08 PROCEDURE — 96374 THER/PROPH/DIAG INJ IV PUSH: CPT

## 2024-04-08 PROCEDURE — 25010000002 FUROSEMIDE PER 20 MG: Performed by: EMERGENCY MEDICINE

## 2024-04-08 PROCEDURE — 71045 X-RAY EXAM CHEST 1 VIEW: CPT

## 2024-04-08 PROCEDURE — 80053 COMPREHEN METABOLIC PANEL: CPT | Performed by: EMERGENCY MEDICINE

## 2024-04-08 RX ORDER — FUROSEMIDE 10 MG/ML
40 INJECTION INTRAMUSCULAR; INTRAVENOUS ONCE
Status: COMPLETED | OUTPATIENT
Start: 2024-04-08 | End: 2024-04-08

## 2024-04-08 RX ORDER — FUROSEMIDE 40 MG/1
40 TABLET ORAL DAILY
Qty: 10 TABLET | Refills: 0 | Status: SHIPPED | OUTPATIENT
Start: 2024-04-08

## 2024-04-08 RX ADMIN — FUROSEMIDE 40 MG: 10 INJECTION, SOLUTION INTRAMUSCULAR; INTRAVENOUS at 15:29

## 2024-04-08 NOTE — ED PROVIDER NOTES
"Time: 2:21 PM EDT  Date of encounter:  4/8/2024  Independent Historian/Clinical History and Information was obtained by:   Patient    History is limited by: N/A    Chief Complaint: Dyspnea      History of Present Illness:  Patient is a 83 y.o. year old male who presents to the emergency department for evaluation of dyspnea this gotten worse since Friday.  The patient reports he started taking valsartan when the symptoms started.  Patient states he has not taken valsartan for the last 2 days and states that his symptoms have not gotten worse but have also not gotten better.  Patient denies chest pain.  Patient has no nausea or vomiting.  Patient denies leg pain and swelling.    HPI    Patient Care Team  Primary Care Provider: Rani Lopez APRN    Past Medical History:     No Known Allergies  Past Medical History:   Diagnosis Date    Aneurysm 2013    Arthritis     left knee     Atrial fibrillation, persistent     CHB (complete heart block) 06/07/2023    Chronic obstructive pulmonary disease 09/01/2023    Difficulty walking 2023    Numbness in right foot    Essential hypertension 05/08/2019    GERD (gastroesophageal reflux disease)     HFrEF (heart failure with reduced ejection fraction) 08/11/2021    Hyperlipidemia 08/11/2021    Paroxysmal atrial fibrillation     Polyneuropathy 06/19/2023    Prostate cancer 2010    with seeds implant     Septic joint 11/26/2021     Past Surgical History:   Procedure Laterality Date    ABLATION OF DYSRHYTHMIC FOCUS  2013    APPENDECTOMY      ARTERIAL BYPASS SURGERY      CARDIAC ABLATION      x2  \"years ago\"     CARDIAC ELECTROPHYSIOLOGY PROCEDURE N/A 11/01/2021    Procedure: PPM battery change;  Surgeon: Ge Whelan MD;  Location: [facility] INVASIVE LOCATION;  Service: Cardiovascular;  Laterality: N/A;    CARDIAC SURGERY  2006    cabg 3v    CORONARY ARTERY BYPASS GRAFT  2006    ENDOSCOPY      with dilation     KNEE ARTHROSCOPY Left     TOTAL KNEE ARTHROPLASTY Left 07/23/2021    " Procedure: TOTAL KNEE ARTHROPLASTY WITH DORA NAVIGATION WITH BIOMET;  Surgeon: Carlitos Zhao MD;  Location: [facility] MAIN OR;  Service: Orthopedics;  Laterality: Left;    VENTRICULAR CARDIAC PACEMAKER INSERTION      medtronic      Family History   Problem Relation Age of Onset    Heart attack Mother     No Known Problems Father     No Known Problems Sister     No Known Problems Brother     No Known Problems Maternal Aunt     No Known Problems Maternal Uncle     No Known Problems Paternal Aunt     No Known Problems Paternal Uncle     No Known Problems Maternal Grandmother     No Known Problems Maternal Grandfather     No Known Problems Paternal Grandmother     No Known Problems Paternal Grandfather     No Known Problems Other        Home Medications:  Prior to Admission medications    Medication Sig Start Date End Date Taking? Authorizing Provider   allopurinol (ZYLOPRIM) 100 MG tablet Take 1 tablet by mouth Daily. 3/11/22   Ely Srinivasan MD   Eliquis 5 MG tablet tablet Take 1 tablet by mouth 2 (Two) Times a Day.    Ely Srinivasan MD   Farxiga 10 MG tablet Take 1 tablet by mouth once daily 9/13/23   Isabel Navarrete APRN   isosorbide mononitrate (IMDUR) 60 MG 24 hr tablet Take 1 tablet by mouth Daily. 9/7/23   Isabel Navarrete APRN   losartan (Cozaar) 25 MG tablet Take 1 tablet by mouth Daily. 3/20/24   Ge Whelan MD   omeprazole (priLOSEC) 20 MG capsule Take 1 capsule by mouth Daily.    Ely Srinivasan MD   tamsulosin (FLOMAX) 0.4 MG capsule 24 hr capsule Take 1 capsule by mouth Daily. 3/15/24   Ely Srinivasan MD   Testosterone Cypionate (DEPOTESTOTERONE CYPIONATE) 200 MG/ML injection Inject  into the appropriate muscle as directed by prescriber Every 30 (Thirty) Days. 4/12/23   Ely Srinivasan MD        Social History:   Social History     Tobacco Use    Smoking status: Former     Current packs/day: 0.00     Average packs/day: 0.5 packs/day for 33.6 years (16.8 ttl  "pk-yrs)     Types: Cigarettes     Start date: 1962     Quit date: 1995     Years since quittin.7     Passive exposure: Never    Smokeless tobacco: Never   Vaping Use    Vaping status: Never Used   Substance Use Topics    Alcohol use: Never    Drug use: Never         Review of Systems:  Review of Systems   Constitutional:  Negative for chills and fever.   HENT:  Negative for congestion, rhinorrhea and sore throat.    Eyes:  Negative for pain and visual disturbance.   Respiratory:  Positive for shortness of breath. Negative for apnea, cough and chest tightness.    Cardiovascular:  Negative for chest pain and palpitations.   Gastrointestinal:  Negative for abdominal pain, diarrhea, nausea and vomiting.   Genitourinary:  Negative for difficulty urinating and dysuria.   Musculoskeletal:  Negative for joint swelling and myalgias.   Skin:  Negative for color change.   Neurological:  Negative for seizures and headaches.   Psychiatric/Behavioral: Negative.     All other systems reviewed and are negative.       Physical Exam:  /91   Pulse 71   Temp 98.3 °F (36.8 °C) (Oral)   Resp 18   Ht 193 cm (76\")   Wt 86.8 kg (191 lb 5.8 oz)   SpO2 93%   BMI 23.29 kg/m²     Physical Exam  Vitals and nursing note reviewed.   Constitutional:       General: He is not in acute distress.     Appearance: Normal appearance. He is not toxic-appearing.   HENT:      Head: Normocephalic and atraumatic.      Jaw: There is normal jaw occlusion.   Eyes:      General: Lids are normal.      Extraocular Movements: Extraocular movements intact.      Conjunctiva/sclera: Conjunctivae normal.      Pupils: Pupils are equal, round, and reactive to light.   Cardiovascular:      Rate and Rhythm: Normal rate and regular rhythm.      Pulses: Normal pulses.      Heart sounds: Normal heart sounds.   Pulmonary:      Effort: Pulmonary effort is normal. No respiratory distress.      Breath sounds: Normal breath sounds. No wheezing or rhonchi. "   Abdominal:      General: Abdomen is flat.      Palpations: Abdomen is soft.      Tenderness: There is no abdominal tenderness. There is no guarding or rebound.   Musculoskeletal:         General: Normal range of motion.      Cervical back: Normal range of motion and neck supple.      Right lower leg: No edema.      Left lower leg: No edema.   Skin:     General: Skin is warm and dry.   Neurological:      Mental Status: He is alert and oriented to person, place, and time. Mental status is at baseline.   Psychiatric:         Mood and Affect: Mood normal.                  Procedures:  Procedures      Medical Decision Making:      Comorbidities that affect care:    Congestive Heart Failure, Hypertension    External Notes reviewed:    Previous Clinic Note: Patient was last seen by Dr. Whelan for atrial fibrillation.      The following orders were placed and all results were independently analyzed by me:  Orders Placed This Encounter   Procedures    XR Chest 1 View    Comprehensive Metabolic Panel    Single High Sensitivity Troponin T    BNP    CBC Auto Differential    Insert Indwelling Urinary Catheter    Assess Need for Indwelling Urinary Catheter - Follow Removal Protocol    Urinary Catheter Care    ECG 12 Lead Chest Pain    CBC & Differential       Medications Given in the Emergency Department:  Medications   furosemide (LASIX) injection 40 mg (40 mg Intravenous Given 4/8/24 1529)        ED Course:         Labs:    Lab Results (last 24 hours)       Procedure Component Value Units Date/Time    CBC & Differential [[id number]]  (Abnormal) Collected: 04/08/24 1440    Specimen: Blood Updated: 04/08/24 1451    Narrative:      The following orders were created for panel order CBC & Differential.  Procedure                               Abnormality         Status                     ---------                               -----------         ------                     CBC Auto Differential[[id number]]        Abnormal             Final result                 Please view results for these tests on the individual orders.    Comprehensive Metabolic Panel [[id number]]  (Abnormal) Collected: 04/08/24 1440    Specimen: Blood Updated: 04/08/24 1511     Glucose 91 mg/dL      BUN 25 mg/dL      Creatinine 1.69 mg/dL      Sodium 140 mmol/L      Potassium 4.5 mmol/L      Chloride 105 mmol/L      CO2 23.3 mmol/L      Calcium 9.2 mg/dL      Total Protein 6.6 g/dL      Albumin 3.6 g/dL      ALT (SGPT) 13 U/L      AST (SGOT) 18 U/L      Alkaline Phosphatase 114 U/L      Total Bilirubin 1.6 mg/dL      Globulin 3.0 gm/dL      A/G Ratio 1.2 g/dL      BUN/Creatinine Ratio 14.8     Anion Gap 11.7 mmol/L      eGFR 40.3 mL/min/1.73     Narrative:      GFR Normal >60  Chronic Kidney Disease <60  Kidney Failure <15    The GFR formula is only valid for adults with stable renal function between ages 18 and 70.    Single High Sensitivity Troponin T [[id number]]  (Abnormal) Collected: 04/08/24 1440    Specimen: Blood Updated: 04/08/24 1511     HS Troponin T 44 ng/L     Narrative:      High Sensitive Troponin T Reference Range:  <14.0 ng/L- Negative Female for AMI  <22.0 ng/L- Negative Male for AMI  >=14 - Abnormal Female indicating possible myocardial injury.  >=22 - Abnormal Male indicating possible myocardial injury.   Clinicians would have to utilize clinical acumen, EKG, Troponin, and serial changes to determine if it is an Acute Myocardial Infarction or myocardial injury due to an underlying chronic condition.         BNP [[id number]]  (Abnormal) Collected: 04/08/24 1440    Specimen: Blood Updated: 04/08/24 1508     proBNP 4,830.0 pg/mL     Narrative:      This assay is used as an aid in the diagnosis of individuals suspected of having heart failure. It can be used as an aid in the diagnosis of acute decompensated heart failure (ADHF) in patients presenting with signs and symptoms of ADHF to the emergency department (ED). In addition, NT-proBNP of <300 pg/mL indicates  ADHF is not likely.    Age Range Result Interpretation  NT-proBNP Concentration (pg/mL:      <50             Positive            >450                   Gray                 300-450                    Negative             <300    50-75           Positive            >900                  Gray                300-900                  Negative            <300      >75             Positive            >1800                  Gray                300-1800                  Negative            <300    CBC Auto Differential [[id number]]  (Abnormal) Collected: 04/08/24 1440    Specimen: Blood Updated: 04/08/24 1451     WBC 8.50 10*3/mm3      RBC 5.23 10*6/mm3      Hemoglobin 14.6 g/dL      Hematocrit 46.5 %      MCV 88.9 fL      MCH 27.9 pg      MCHC 31.4 g/dL      RDW 14.8 %      RDW-SD 48.2 fl      MPV 9.9 fL      Platelets 231 10*3/mm3      Neutrophil % 63.1 %      Lymphocyte % 24.8 %      Monocyte % 9.5 %      Eosinophil % 1.5 %      Basophil % 0.9 %      Immature Grans % 0.2 %      Neutrophils, Absolute 5.35 10*3/mm3      Lymphocytes, Absolute 2.11 10*3/mm3      Monocytes, Absolute 0.81 10*3/mm3      Eosinophils, Absolute 0.13 10*3/mm3      Basophils, Absolute 0.08 10*3/mm3      Immature Grans, Absolute 0.02 10*3/mm3      nRBC 0.0 /100 WBC              Imaging:    XR Chest 1 View    Result Date: 4/8/2024  XR CHEST 1 VW-  Date of Exam: 4/8/2024 12:58 PM  Indication: SOA  Comparison: Chest radiograph 9/2/2023  Findings: Dual-chamber AICD. Stable cardiomegaly. Prior median sternotomy and CABG. No infectious consolidation, overt edema or pneumothorax. Small bilateral pleural effusions noted new from prior.      Impression: Cardiomegaly with small bilateral pleural effusions new from prior comparison. Correlate for volume overload versus CHF exacerbation.   Electronically Signed By-Bobby Latham MD On:4/8/2024 1:09 PM         Differential Diagnosis and Discussion:    Dyspnea: Differential diagnosis includes but is not limited  to metabolic acidosis, neurological disorders, psychogenic, asthma, pneumothorax, upper airway obstruction, COPD, pneumonia, noncardiogenic pulmonary edema, interstitial lung disease, anemia, congestive heart failure, and pulmonary embolism    All labs were reviewed and interpreted by me.  All X-rays impressions were independently interpreted by me.  EKG was interpreted by me.    MDM     Amount and/or Complexity of Data Reviewed  Decide to obtain previous medical records or to obtain history from someone other than the patient: yes       The patient´s CBC that was reviewed and interpreted by me shows no abnormalities of critical concern. Of note, there is no anemia requiring a blood transfusion and the platelet count is acceptable.  The patient´s CMP that was reviewed and interpretted by me shows no abnormalities of critical concern. Of note, the patient´s sodium and potassium are acceptable. The patient´s liver enzymes are unremarkable. The patient´s renal function (creatinine) is preserved. The patient has a normal anion gap.  BNP is elevated.  The patient was given Lasix in the emergency department and did diurese 800 and mL.  He states that he has had difficulty with his prostate but does not want a catheter at this time.  Chest x-ray is insistent with congestive heart failure.  Patient was started on Lasix in the emergency department.    Critical Care Note: Total Critical Care time of 40 minutes. Total critical care time documented does not include time spent on separately billed procedures for services of nurses or physician assistants. I personally saw and examined the patient. I have reviewed all diagnostic interpretations and treatment plans as written. I was present for the key portions of any procedures performed and the inclusive time noted in any critical care statement. Critical care time includes patient management by me, time spent at the patients bedside,  time to review lab and imaging results,  discussing patient care, documentation in the medical record, and time spent with family or caregiver.        Patient Care Considerations:    None      Consultants/Shared Management Plan:    None    Social Determinants of Health:    Patient is independent, reliable, and has access to care.       Disposition and Care Coordination:    Discharged: I considered escalation of care by admitting this patient to the hospital, however patient reports that he would like to be discharged and will follow-up as an outpatient.    I have explained the patient´s condition, diagnoses and treatment plan based on the information available to me at this time. I have answered questions and addressed any concerns. The patient has a good  understanding of the patient´s diagnosis, condition, and treatment plan as can be expected at this point. The vital signs have been stable. The patient´s condition is stable and appropriate for discharge from the emergency department.      The patient will pursue further outpatient evaluation with the primary care physician or other designated or consulting physician as outlined in the discharge instructions. They are agreeable to this plan of care and follow-up instructions have been explained in detail. The patient has received these instructions in written format and has expressed an understanding of the discharge instructions. The patient is aware that any significant change in condition or worsening of symptoms should prompt an immediate return to this or the closest emergency department or call to 911.  I have explained discharge medications and the need for follow up with the patient/caretakers. This was also printed in the discharge instructions. Patient was discharged with the following medications and follow up:      Medication List        New Prescriptions      furosemide 40 MG tablet  Commonly known as: LASIX  Take 1 tablet by mouth Daily.               Where to Get Your Medications         These medications were sent to [facility], [place] - [address] - [phone]  - [phone] FX  [address] [place] [address]      Phone: [phone]   furosemide 40 MG tablet      Rani Lopez, APRN  [address]  [address]  [place] [address] [phone]    In 2 days         Final diagnoses:   Congestive heart failure, unspecified HF chronicity, unspecified heart failure type        ED Disposition       ED Disposition   Discharge    Condition   Stable    Comment   --               This medical record created using voice recognition software.             Ashley Hudson MD  04/08/24 2054

## 2024-04-09 LAB
QT INTERVAL: 531 MS
QTC INTERVAL: 583 MS

## 2024-04-17 ENCOUNTER — TELEPHONE (OUTPATIENT)
Dept: UROLOGY | Facility: CLINIC | Age: 84
End: 2024-04-17
Payer: MEDICARE

## 2024-04-17 DIAGNOSIS — Z85.46 HISTORY OF MALIGNANT NEOPLASM OF PROSTATE: Primary | ICD-10-CM

## 2024-04-17 NOTE — TELEPHONE ENCOUNTER
Called pt to see if he could do a PSA prior to appt with us Monday 4/22. He said he will go to a lab but will need to reschedule his appt out a little bit, as he just buried his wife yesterday. We have moved appt to 5/7/24 at 2:00pm. PSA order placed.

## 2024-04-24 ENCOUNTER — APPOINTMENT (OUTPATIENT)
Dept: GENERAL RADIOLOGY | Facility: HOSPITAL | Age: 84
End: 2024-04-24
Payer: MEDICARE

## 2024-04-24 ENCOUNTER — HOSPITAL ENCOUNTER (OUTPATIENT)
Facility: HOSPITAL | Age: 84
Setting detail: OBSERVATION
LOS: 1 days | Discharge: HOME | End: 2024-04-27
Attending: EMERGENCY MEDICINE | Admitting: STUDENT IN AN ORGANIZED HEALTH CARE EDUCATION/TRAINING PROGRAM
Payer: MEDICARE

## 2024-04-24 ENCOUNTER — APPOINTMENT (OUTPATIENT)
Dept: CT IMAGING | Facility: HOSPITAL | Age: 84
End: 2024-04-24
Payer: MEDICARE

## 2024-04-24 DIAGNOSIS — Z78.9 DECREASED ACTIVITIES OF DAILY LIVING (ADL): ICD-10-CM

## 2024-04-24 DIAGNOSIS — I50.23 ACUTE ON CHRONIC HFREF (HEART FAILURE WITH REDUCED EJECTION FRACTION): ICD-10-CM

## 2024-04-24 DIAGNOSIS — I50.9 CONGESTIVE HEART FAILURE, UNSPECIFIED HF CHRONICITY, UNSPECIFIED HEART FAILURE TYPE: Primary | ICD-10-CM

## 2024-04-24 PROBLEM — R07.9 CHEST PAIN: Status: ACTIVE | Noted: 2024-04-24

## 2024-04-24 LAB
ALBUMIN SERPL-MCNC: 3.2 G/DL (ref 3.5–5.2)
ALBUMIN/GLOB SERPL: 0.9 G/DL
ALP SERPL-CCNC: 125 U/L (ref 39–117)
ALT SERPL W P-5'-P-CCNC: 28 U/L (ref 1–41)
AMPHET+METHAMPHET UR QL: NEGATIVE
ANION GAP SERPL CALCULATED.3IONS-SCNC: 10.8 MMOL/L (ref 5–15)
APAP SERPL-MCNC: <5 MCG/ML (ref 0–30)
AST SERPL-CCNC: 30 U/L (ref 1–40)
BARBITURATES UR QL SCN: NEGATIVE
BASOPHILS # BLD AUTO: 0.05 10*3/MM3 (ref 0–0.2)
BASOPHILS NFR BLD AUTO: 0.5 % (ref 0–1.5)
BENZODIAZ UR QL SCN: POSITIVE
BILIRUB SERPL-MCNC: 1.9 MG/DL (ref 0–1.2)
BUN SERPL-MCNC: 20 MG/DL (ref 8–23)
BUN/CREAT SERPL: 13.2 (ref 7–25)
CALCIUM SPEC-SCNC: 9 MG/DL (ref 8.6–10.5)
CANNABINOIDS SERPL QL: NEGATIVE
CHLORIDE SERPL-SCNC: 102 MMOL/L (ref 98–107)
CO2 SERPL-SCNC: 21.2 MMOL/L (ref 22–29)
COCAINE UR QL: NEGATIVE
CREAT SERPL-MCNC: 1.51 MG/DL (ref 0.76–1.27)
DEPRECATED RDW RBC AUTO: 45.2 FL (ref 37–54)
EGFRCR SERPLBLD CKD-EPI 2021: 46.1 ML/MIN/1.73
EOSINOPHIL # BLD AUTO: 0.15 10*3/MM3 (ref 0–0.4)
EOSINOPHIL NFR BLD AUTO: 1.5 % (ref 0.3–6.2)
ERYTHROCYTE [DISTWIDTH] IN BLOOD BY AUTOMATED COUNT: 14.5 % (ref 12.3–15.4)
ETHANOL BLD-MCNC: <10 MG/DL (ref 0–10)
ETHANOL BLD-MCNC: <10 MG/DL (ref 0–10)
ETHANOL UR QL: <0.01 %
ETHANOL UR QL: <0.01 %
FENTANYL UR-MCNC: NEGATIVE NG/ML
GEN 5 2HR TROPONIN T REFLEX: 47 NG/L
GLOBULIN UR ELPH-MCNC: 3.5 GM/DL
GLUCOSE SERPL-MCNC: 114 MG/DL (ref 65–99)
HCT VFR BLD AUTO: 39.7 % (ref 37.5–51)
HGB BLD-MCNC: 12.9 G/DL (ref 13–17.7)
HOLD SPECIMEN: NORMAL
IMM GRANULOCYTES # BLD AUTO: 0.04 10*3/MM3 (ref 0–0.05)
IMM GRANULOCYTES NFR BLD AUTO: 0.4 % (ref 0–0.5)
LIPASE SERPL-CCNC: 27 U/L (ref 13–60)
LYMPHOCYTES # BLD AUTO: 1.64 10*3/MM3 (ref 0.7–3.1)
LYMPHOCYTES NFR BLD AUTO: 16.2 % (ref 19.6–45.3)
MAGNESIUM SERPL-MCNC: 2 MG/DL (ref 1.6–2.4)
MCH RBC QN AUTO: 28 PG (ref 26.6–33)
MCHC RBC AUTO-ENTMCNC: 32.5 G/DL (ref 31.5–35.7)
MCV RBC AUTO: 86.1 FL (ref 79–97)
METHADONE UR QL SCN: NEGATIVE
MONOCYTES # BLD AUTO: 1.22 10*3/MM3 (ref 0.1–0.9)
MONOCYTES NFR BLD AUTO: 12.1 % (ref 5–12)
NEUTROPHILS NFR BLD AUTO: 69.3 % (ref 42.7–76)
NEUTROPHILS NFR BLD AUTO: 7 10*3/MM3 (ref 1.7–7)
NRBC BLD AUTO-RTO: 0 /100 WBC (ref 0–0.2)
NT-PROBNP SERPL-MCNC: 7858 PG/ML (ref 0–1800)
OPIATES UR QL: NEGATIVE
OXYCODONE UR QL SCN: NEGATIVE
PLATELET # BLD AUTO: 231 10*3/MM3 (ref 140–450)
PMV BLD AUTO: 10 FL (ref 6–12)
POTASSIUM SERPL-SCNC: 3.7 MMOL/L (ref 3.5–5.2)
PROT SERPL-MCNC: 6.7 G/DL (ref 6–8.5)
QT INTERVAL: 535 MS
QT INTERVAL: 547 MS
QTC INTERVAL: 582 MS
QTC INTERVAL: 593 MS
RBC # BLD AUTO: 4.61 10*6/MM3 (ref 4.14–5.8)
SALICYLATES SERPL-MCNC: 1.4 MG/DL
SODIUM SERPL-SCNC: 134 MMOL/L (ref 136–145)
T4 FREE SERPL-MCNC: 1.38 NG/DL (ref 0.92–1.68)
TROPONIN T DELTA: -6 NG/L
TROPONIN T SERPL HS-MCNC: 53 NG/L
TSH SERPL DL<=0.05 MIU/L-ACNC: 2.08 UIU/ML (ref 0.27–4.2)
WBC NRBC COR # BLD AUTO: 10.1 10*3/MM3 (ref 3.4–10.8)
WHOLE BLOOD HOLD COAG: NORMAL
WHOLE BLOOD HOLD SPECIMEN: NORMAL

## 2024-04-24 PROCEDURE — G0378 HOSPITAL OBSERVATION PER HR: HCPCS

## 2024-04-24 PROCEDURE — 80307 DRUG TEST PRSMV CHEM ANLYZR: CPT | Performed by: EMERGENCY MEDICINE

## 2024-04-24 PROCEDURE — 82077 ASSAY SPEC XCP UR&BREATH IA: CPT

## 2024-04-24 PROCEDURE — 25010000002 FUROSEMIDE PER 20 MG: Performed by: EMERGENCY MEDICINE

## 2024-04-24 PROCEDURE — 82077 ASSAY SPEC XCP UR&BREATH IA: CPT | Performed by: EMERGENCY MEDICINE

## 2024-04-24 PROCEDURE — 25510000001 IOPAMIDOL PER 1 ML: Performed by: EMERGENCY MEDICINE

## 2024-04-24 PROCEDURE — 93005 ELECTROCARDIOGRAM TRACING: CPT | Performed by: EMERGENCY MEDICINE

## 2024-04-24 PROCEDURE — 25010000002 BUMETANIDE PER 0.5 MG: Performed by: STUDENT IN AN ORGANIZED HEALTH CARE EDUCATION/TRAINING PROGRAM

## 2024-04-24 PROCEDURE — 83735 ASSAY OF MAGNESIUM: CPT

## 2024-04-24 PROCEDURE — 80143 DRUG ASSAY ACETAMINOPHEN: CPT

## 2024-04-24 PROCEDURE — 99291 CRITICAL CARE FIRST HOUR: CPT

## 2024-04-24 PROCEDURE — 84439 ASSAY OF FREE THYROXINE: CPT

## 2024-04-24 PROCEDURE — 25010000002 HEPARIN (PORCINE) PER 1000 UNITS: Performed by: STUDENT IN AN ORGANIZED HEALTH CARE EDUCATION/TRAINING PROGRAM

## 2024-04-24 PROCEDURE — 99285 EMERGENCY DEPT VISIT HI MDM: CPT

## 2024-04-24 PROCEDURE — 85025 COMPLETE CBC W/AUTO DIFF WBC: CPT

## 2024-04-24 PROCEDURE — 84443 ASSAY THYROID STIM HORMONE: CPT

## 2024-04-24 PROCEDURE — 96375 TX/PRO/DX INJ NEW DRUG ADDON: CPT

## 2024-04-24 PROCEDURE — 93005 ELECTROCARDIOGRAM TRACING: CPT

## 2024-04-24 PROCEDURE — 83690 ASSAY OF LIPASE: CPT

## 2024-04-24 PROCEDURE — 71045 X-RAY EXAM CHEST 1 VIEW: CPT

## 2024-04-24 PROCEDURE — 80179 DRUG ASSAY SALICYLATE: CPT

## 2024-04-24 PROCEDURE — 80053 COMPREHEN METABOLIC PANEL: CPT

## 2024-04-24 PROCEDURE — 96372 THER/PROPH/DIAG INJ SC/IM: CPT

## 2024-04-24 PROCEDURE — 71260 CT THORAX DX C+: CPT

## 2024-04-24 PROCEDURE — 83880 ASSAY OF NATRIURETIC PEPTIDE: CPT

## 2024-04-24 PROCEDURE — 84484 ASSAY OF TROPONIN QUANT: CPT | Performed by: EMERGENCY MEDICINE

## 2024-04-24 PROCEDURE — 36415 COLL VENOUS BLD VENIPUNCTURE: CPT

## 2024-04-24 PROCEDURE — 96374 THER/PROPH/DIAG INJ IV PUSH: CPT

## 2024-04-24 PROCEDURE — 84484 ASSAY OF TROPONIN QUANT: CPT

## 2024-04-24 RX ORDER — NITROGLYCERIN 0.4 MG/1
0.4 TABLET SUBLINGUAL
Status: DISCONTINUED | OUTPATIENT
Start: 2024-04-24 | End: 2024-04-27 | Stop reason: HOSPADM

## 2024-04-24 RX ORDER — BUMETANIDE 0.25 MG/ML
0.5 INJECTION INTRAMUSCULAR; INTRAVENOUS 2 TIMES DAILY
Status: DISCONTINUED | OUTPATIENT
Start: 2024-04-24 | End: 2024-04-25

## 2024-04-24 RX ORDER — HEPARIN SODIUM 5000 [USP'U]/ML
5000 INJECTION, SOLUTION INTRAVENOUS; SUBCUTANEOUS EVERY 12 HOURS SCHEDULED
Status: DISCONTINUED | OUTPATIENT
Start: 2024-04-24 | End: 2024-04-26

## 2024-04-24 RX ORDER — FUROSEMIDE 10 MG/ML
60 INJECTION INTRAMUSCULAR; INTRAVENOUS ONCE
Status: COMPLETED | OUTPATIENT
Start: 2024-04-24 | End: 2024-04-24

## 2024-04-24 RX ORDER — LORAZEPAM 0.5 MG/1
0.5 TABLET ORAL 2 TIMES DAILY PRN
COMMUNITY
Start: 2024-04-16

## 2024-04-24 RX ORDER — ASPIRIN 81 MG/1
324 TABLET, CHEWABLE ORAL ONCE
Status: DISCONTINUED | OUTPATIENT
Start: 2024-04-24 | End: 2024-04-25

## 2024-04-24 RX ORDER — LORAZEPAM 2 MG/ML
0.5 INJECTION INTRAMUSCULAR ONCE
Status: COMPLETED | OUTPATIENT
Start: 2024-04-24 | End: 2024-04-25

## 2024-04-24 RX ORDER — SODIUM CHLORIDE 0.9 % (FLUSH) 0.9 %
10 SYRINGE (ML) INJECTION AS NEEDED
Status: DISCONTINUED | OUTPATIENT
Start: 2024-04-24 | End: 2024-04-27 | Stop reason: HOSPADM

## 2024-04-24 RX ADMIN — FUROSEMIDE 60 MG: 10 INJECTION, SOLUTION INTRAMUSCULAR; INTRAVENOUS at 15:25

## 2024-04-24 RX ADMIN — HEPARIN SODIUM 5000 UNITS: 5000 INJECTION INTRAVENOUS; SUBCUTANEOUS at 23:04

## 2024-04-24 RX ADMIN — BUMETANIDE 0.5 MG: 0.25 INJECTION INTRAMUSCULAR; INTRAVENOUS at 23:04

## 2024-04-24 RX ADMIN — IOPAMIDOL 100 ML: 755 INJECTION, SOLUTION INTRAVENOUS at 15:11

## 2024-04-24 NOTE — Clinical Note
Level of Care: Telemetry [5]   Diagnosis: Chest pain [[id number]]   Certification: I Certify That Inpatient Hospital Services Are Medically Necessary For Greater Than 2 Midnights

## 2024-04-24 NOTE — ED NOTES
"ED to Inpatient Report    PATIENT DEMOGRAPHICS  Javi Martínez   1940  83 y.o.  male  No Known Allergies       04/24/24  1056   Weight: 88.5 kg (195 lb)      Code Status and Medical Interventions:   Ordered at: 04/24/24 1753     Level Of Support Discussed With:    Patient     Code Status (Patient has no pulse and is not breathing):    CPR (Attempt to Resuscitate)     Medical Interventions (Patient has pulse or is breathing):    Full Support        HISTORY  Past Medical History:   Diagnosis Date    Aneurysm 2013    Arthritis     left knee     Atrial fibrillation, persistent     CHB (complete heart block) 06/07/2023    CHF exacerbation 4/24/2024    Chronic obstructive pulmonary disease 09/01/2023    Difficulty walking 2023    Numbness in right foot    Essential hypertension 05/08/2019    GERD (gastroesophageal reflux disease)     HFrEF (heart failure with reduced ejection fraction) 08/11/2021    Hyperlipidemia 08/11/2021    Paroxysmal atrial fibrillation     Polyneuropathy 06/19/2023    Prostate cancer 2010    with seeds implant     Septic joint 11/26/2021      Past Surgical History:   Procedure Laterality Date    ABLATION OF DYSRHYTHMIC FOCUS  2013    APPENDECTOMY      ARTERIAL BYPASS SURGERY      CARDIAC ABLATION      x2  \"years ago\"     CARDIAC ELECTROPHYSIOLOGY PROCEDURE N/A 11/01/2021    Procedure: PPM battery change;  Surgeon: Ge Whelan MD;  Location: [facility] CATH INVASIVE LOCATION;  Service: Cardiovascular;  Laterality: N/A;    CARDIAC SURGERY  2006    cabg 3v    CORONARY ARTERY BYPASS GRAFT  2006    ENDOSCOPY      with dilation     KNEE ARTHROSCOPY Left     TOTAL KNEE ARTHROPLASTY Left 07/23/2021    Procedure: TOTAL KNEE ARTHROPLASTY WITH DORA NAVIGATION WITH BIOMET;  Surgeon: Carlitos Zhao MD;  Location: [facility] MAIN OR;  Service: Orthopedics;  Laterality: Left;    VENTRICULAR CARDIAC PACEMAKER INSERTION      medtronic      Prior to Admission medications    Medication Sig Start Date End Date " Taking? Authorizing Provider   allopurinol (ZYLOPRIM) 100 MG tablet Take 1 tablet by mouth Daily. 3/11/22  Yes Ely Srinivasan MD   Eliquis 5 MG tablet tablet Take 1 tablet by mouth 2 (Two) Times a Day.   Yes Ely Srinivasan MD   isosorbide mononitrate (IMDUR) 60 MG 24 hr tablet Take 1 tablet by mouth Daily.  Patient taking differently: Take 0.5 tablets by mouth Daily. 9/7/23  Yes Isabel Navarrete APRN   LORazepam (ATIVAN) 0.5 MG tablet Take 1 tablet by mouth 2 (Two) Times a Day As Needed for Anxiety. 4/16/24  Yes Ely Srinivasan MD   omeprazole (priLOSEC) 20 MG capsule Take 1 capsule by mouth Daily.   Yes Ely Srinivasan MD   tamsulosin (FLOMAX) 0.4 MG capsule 24 hr capsule Take 1 capsule by mouth Daily. 3/15/24  Yes Ely Srinivasan MD   Testosterone Cypionate (DEPOTESTOTERONE CYPIONATE) 200 MG/ML injection Inject  into the appropriate muscle as directed by prescriber Every 30 (Thirty) Days. 4/12/23   Ely Srinivasan MD   Farxiga 10 MG tablet Take 1 tablet by mouth once daily 9/13/23 4/24/24  Isabel Navarrete APRN   furosemide (LASIX) 40 MG tablet Take 1 tablet by mouth Daily. 4/8/24 4/24/24  Ashley Hudson MD   losartan (Cozaar) 25 MG tablet Take 1 tablet by mouth Daily. 3/20/24 4/24/24  Ge Whelan MD        [facility]  Chief Complaint   Patient presents with    Chest Pain      Diagnosis Plan   1. Congestive heart failure, unspecified HF chronicity, unspecified heart failure type           Ge Silva MD  Vitals:    04/24/24 1509 04/24/24 1700 04/24/24 1800 04/24/24 1836   BP: 122/75 122/81 106/76 106/76   BP Location:  Right arm Right arm    Patient Position: Lying Lying Lying    Pulse: 70 71 70 72   Resp: 18 18 17 16   Temp:       TempSrc:       SpO2: 93% 99% 96% 98%   Weight:       Height:         Results for orders placed or performed during the hospital encounter of 04/24/24   High Sensitivity Troponin T    Specimen: Blood   Result Value Ref Range    HS  Troponin T 53 (C) <22 ng/L   Comprehensive Metabolic Panel    Specimen: Blood   Result Value Ref Range    Glucose 114 (H) 65 - 99 mg/dL    BUN 20 8 - 23 mg/dL    Creatinine 1.51 (H) 0.76 - 1.27 mg/dL    Sodium 134 (L) 136 - 145 mmol/L    Potassium 3.7 3.5 - 5.2 mmol/L    Chloride 102 98 - 107 mmol/L    CO2 21.2 (L) 22.0 - 29.0 mmol/L    Calcium 9.0 8.6 - 10.5 mg/dL    Total Protein 6.7 6.0 - 8.5 g/dL    Albumin 3.2 (L) 3.5 - 5.2 g/dL    ALT (SGPT) 28 1 - 41 U/L    AST (SGOT) 30 1 - 40 U/L    Alkaline Phosphatase 125 (H) 39 - 117 U/L    Total Bilirubin 1.9 (H) 0.0 - 1.2 mg/dL    Globulin 3.5 gm/dL    A/G Ratio 0.9 g/dL    BUN/Creatinine Ratio 13.2 7.0 - 25.0    Anion Gap 10.8 5.0 - 15.0 mmol/L    eGFR 46.1 (L) >60.0 mL/min/1.73   Lipase    Specimen: Blood   Result Value Ref Range    Lipase 27 13 - 60 U/L   BNP    Specimen: Blood   Result Value Ref Range    proBNP 7,858.0 (H) 0.0 - 1,800.0 pg/mL   Magnesium    Specimen: Blood   Result Value Ref Range    Magnesium 2.0 1.6 - 2.4 mg/dL   CBC Auto Differential    Specimen: Blood   Result Value Ref Range    WBC 10.10 3.40 - 10.80 10*3/mm3    RBC 4.61 4.14 - 5.80 10*6/mm3    Hemoglobin 12.9 (L) 13.0 - 17.7 g/dL    Hematocrit 39.7 37.5 - 51.0 %    MCV 86.1 79.0 - 97.0 fL    MCH 28.0 26.6 - 33.0 pg    MCHC 32.5 31.5 - 35.7 g/dL    RDW 14.5 12.3 - 15.4 %    RDW-SD 45.2 37.0 - 54.0 fl    MPV 10.0 6.0 - 12.0 fL    Platelets 231 140 - 450 10*3/mm3    Neutrophil % 69.3 42.7 - 76.0 %    Lymphocyte % 16.2 (L) 19.6 - 45.3 %    Monocyte % 12.1 (H) 5.0 - 12.0 %    Eosinophil % 1.5 0.3 - 6.2 %    Basophil % 0.5 0.0 - 1.5 %    Immature Grans % 0.4 0.0 - 0.5 %    Neutrophils, Absolute 7.00 1.70 - 7.00 10*3/mm3    Lymphocytes, Absolute 1.64 0.70 - 3.10 10*3/mm3    Monocytes, Absolute 1.22 (H) 0.10 - 0.90 10*3/mm3    Eosinophils, Absolute 0.15 0.00 - 0.40 10*3/mm3    Basophils, Absolute 0.05 0.00 - 0.20 10*3/mm3    Immature Grans, Absolute 0.04 0.00 - 0.05 10*3/mm3    nRBC 0.0 0.0 - 0.2  /100 WBC   Ethanol    Specimen: Blood   Result Value Ref Range    Ethanol <10 0 - 10 mg/dL    Ethanol % <0.010 %   Urine Drug Screen - Urine, Clean Catch    Specimen: Urine, Clean Catch   Result Value Ref Range    Amphet/Methamphet, Screen Negative Negative    Barbiturates Screen, Urine Negative Negative    Benzodiazepine Screen, Urine Positive (A) Negative    Cocaine Screen, Urine Negative Negative    Opiate Screen Negative Negative    THC, Screen, Urine Negative Negative    Methadone Screen, Urine Negative Negative    Oxycodone Screen, Urine Negative Negative    Fentanyl, Urine Negative Negative   Acetaminophen Level    Specimen: Blood   Result Value Ref Range    Acetaminophen <5.0 0.0 - 30.0 mcg/mL   Salicylate Level    Specimen: Blood   Result Value Ref Range    Salicylate 1.4 <=30.0 mg/dL   TSH    Specimen: Blood   Result Value Ref Range    TSH 2.080 0.270 - 4.200 uIU/mL   T4, Free    Specimen: Blood   Result Value Ref Range    Free T4 1.38 0.92 - 1.68 ng/dL   High Sensitivity Troponin T 2Hr    Specimen: Blood   Result Value Ref Range    HS Troponin T 47 (H) <22 ng/L    Troponin T Delta -6 (L) >=-4 - <+4 ng/L   Ethanol    Specimen: Blood   Result Value Ref Range    Ethanol <10 0 - 10 mg/dL    Ethanol % <0.010 %   ECG 12 Lead ED Triage Standing Order; Chest Pain   Result Value Ref Range    QT Interval 535 ms    QTC Interval 582 ms   ECG 12 Lead ED Triage Standing Order; Chest Pain   Result Value Ref Range    QT Interval 547 ms    QTC Interval 593 ms   Green Top (Gel)   Result Value Ref Range    Extra Tube Hold for add-ons.    Lavender Top   Result Value Ref Range    Extra Tube hold for add-on    Green Top (Gel)   Result Value Ref Range    Extra Tube Hold for add-ons.    Gold Top - SST   Result Value Ref Range    Extra Tube Hold for add-ons.    Light Blue Top   Result Value Ref Range    Extra Tube Hold for add-ons.    Green Top (Gel)   Result Value Ref Range    Extra Tube Hold for add-ons.       CT Chest With  Contrast Diagnostic   Final Result   1. Limited evaluation for pulmonary embolism given poor contrast bolus   timing secondary to patient cardiac dysfunction. No evidence of PE   within the main pulmonary arteries. Cannot exclude PE at the lobar   artery or beyond levels. Consider ventilation/perfusion scan.   2. Trace bilateral pleural effusions.   3. Marked cardiomegaly and postsurgical changes of prior CABG.   4. Cholelithiasis without findings of acute cholecystitis.                   Electronically Signed By-Dominick Toro MD On:4/24/2024 3:23 PM          XR Chest 1 View   Final Result   Impression:   Cardiomegaly. No active pulmonary disease.           Electronically Signed By-JUSTIN TAVERAS MD On:4/24/2024 12:03 PM            Lines, Drains & Airways       Active LDAs       Name Placement date Placement time Site Days    Peripheral IV 04/24/24 1042 Anterior;Distal;Upper;Left Arm 04/24/24  1042  Arm  less than 1    Peripheral IV 04/24/24 1135 Anterior;Right Forearm 04/24/24  1135  Forearm  less than 1                  Medications   sodium chloride 0.9 % flush 10 mL (has no administration in time range)   aspirin chewable tablet 324 mg (324 mg Oral Not Given 4/24/24 1043)   LORazepam (ATIVAN) injection 0.5 mg (0.5 mg Intravenous Not Given 4/24/24 1528)   nitroglycerin (NITROSTAT) SL tablet 0.4 mg (has no administration in time range)   heparin (porcine) 5000 UNIT/ML injection 5,000 Units (has no administration in time range)   bumetanide (BUMEX) injection 0.5 mg (has no administration in time range)   furosemide (LASIX) injection 60 mg (60 mg Intravenous Given 4/24/24 1525)   iopamidol (ISOVUE-370) 76 % injection 100 mL (100 mL Intravenous Given 4/24/24 1511)      ECG 12 Lead ED Triage Standing Order; Chest Pain   Preliminary Result   HEART RATE= 70  bpm   RR Interval= 851  ms   MS Interval= 64  ms   P Horizontal Axis= 206  deg   P Front Axis= 0  deg   QRSD Interval= 238  ms   QT Interval= 547  ms   QTcB= 593   ms   QRS Axis= -84  deg   T Wave Axis= 96  deg   - ABNORMAL ECG -   Ventricular-paced rhythm   No further analysis attempted due to paced rhythm   Electronically Signed By:    Date and Time of Study: 2024-04-24 13:53:25      ECG 12 Lead ED Triage Standing Order; Chest Pain   Preliminary Result   HEART RATE= 70  bpm   RR Interval= 845  ms   MN Interval=   ms   P Horizontal Axis= 215  deg   P Front Axis=   deg   QRSD Interval= 239  ms   QT Interval= 535  ms   QTcB= 582  ms   QRS Axis= -82  deg   T Wave Axis= 95  deg   - ABNORMAL ECG -   Afib/flut and V-paced complexes   No further analysis attempted due to paced rhythm   Electronically Signed By:    Date and Time of Study: 2024-04-24 10:47:07           Devorah Patel RN  04/24/24 19:36 EDT

## 2024-04-24 NOTE — H&P
"    Patient Care Team:  Rani Lopez APRN as PCP - General (Nurse Practitioner)    Chief complaint chest pain    Subjective     Patient is a 83 y.o. male presents with chest pain and shortness of breath that started earlier in the day.  Patient has not had any cough, fevers, hemoptysis, chills, nausea, vomiting.  Pain is substernal and does radiate to the back.  CTA was done that shows no large PE however the contrast bolus was poorly timed so a peripheral PE cannot fully be assessed.  Patient does have trace bilateral effusions as well as marked cardiomegaly.  Patient did have an initial troponin of 53 that down trended to 47.  Delta troponin was -6.  BNP 7800  Patient cardiac history including history of chronic HFrEF, CAD, CABG, complete heart block    Review of Systems   Pertinent items are noted in HPI, all other systems reviewed and negative    History  Past Medical History:   Diagnosis Date    Aneurysm 2013    Arthritis     left knee     Atrial fibrillation, persistent     CHB (complete heart block) 06/07/2023    CHF exacerbation 4/24/2024    Chronic obstructive pulmonary disease 09/01/2023    Difficulty walking 2023    Numbness in right foot    Essential hypertension 05/08/2019    GERD (gastroesophageal reflux disease)     HFrEF (heart failure with reduced ejection fraction) 08/11/2021    Hyperlipidemia 08/11/2021    Paroxysmal atrial fibrillation     Polyneuropathy 06/19/2023    Prostate cancer 2010    with seeds implant     Septic joint 11/26/2021     Past Surgical History:   Procedure Laterality Date    ABLATION OF DYSRHYTHMIC FOCUS  2013    APPENDECTOMY      ARTERIAL BYPASS SURGERY      CARDIAC ABLATION      x2  \"years ago\"     CARDIAC ELECTROPHYSIOLOGY PROCEDURE N/A 11/01/2021    Procedure: PPM battery change;  Surgeon: Ge Whelan MD;  Location: [facility] CATH INVASIVE LOCATION;  Service: Cardiovascular;  Laterality: N/A;    CARDIAC SURGERY  2006    cabg 3v    CORONARY ARTERY BYPASS GRAFT  2006    " ENDOSCOPY      with dilation     KNEE ARTHROSCOPY Left     TOTAL KNEE ARTHROPLASTY Left 2021    Procedure: TOTAL KNEE ARTHROPLASTY WITH DORA NAVIGATION WITH BIOMET;  Surgeon: Carlitos Zhao MD;  Location: [facility];  Service: Orthopedics;  Laterality: Left;    VENTRICULAR CARDIAC PACEMAKER INSERTION      medtronic      Family History   Problem Relation Age of Onset    Heart attack Mother     No Known Problems Father     No Known Problems Sister     No Known Problems Brother     No Known Problems Maternal Aunt     No Known Problems Maternal Uncle     No Known Problems Paternal Aunt     No Known Problems Paternal Uncle     No Known Problems Maternal Grandmother     No Known Problems Maternal Grandfather     No Known Problems Paternal Grandmother     No Known Problems Paternal Grandfather     No Known Problems Other      Social History     Tobacco Use    Smoking status: Former     Current packs/day: 0.00     Average packs/day: 0.5 packs/day for 33.6 years (16.8 ttl pk-yrs)     Types: Cigarettes     Start date: 1962     Quit date: 1995     Years since quittin.7     Passive exposure: Never    Smokeless tobacco: Never   Vaping Use    Vaping status: Never Used   Substance Use Topics    Alcohol use: Never    Drug use: Never     (Not in a hospital admission)    Allergies:  Patient has no known allergies.    Objective     Vital Signs  Temp:  [98.2 °F (36.8 °C)] 98.2 °F (36.8 °C)  Heart Rate:  [69-71] 70  Resp:  [17-20] 17  BP: (106-122)/(64-81) 106/76    Physical Exam:      General Appearance:  Alert, cooperative, in no acute distress   Head:  Normocephalic, without obvious abnormality, atraumatic   Eyes:  Lids and lashes normal, conjunctivae and sclerae normal, no icterus, no pallor, corneas clear, PERRLA   Ears:  Ears appear intact with no abnormalities noted   Throat:  No oral lesions, no thrush, oral mucosa moist   Neck:  No adenopathy, supple, trachea midline, no thyromegaly, no carotid  bruit, no JVD   Back:  No kyphosis present, no scoliosis present, no skin lesions, erythema or scars, no tenderness to percussion or palpation, range of motion normal   Lungs:  Clear to auscultation, respirations regular, even and unlabored    Heart:  Regular rhythm and normal rate, normal S1 and S2, no murmur, no gallop, no rub, no click   Chest Wall:  No abnormalities observed   Abdomen:  Normal bowel sounds, no masses, no organomegaly, soft non-tender, non-distended, no guarding, no rebound tenderness   Rectal:  Deferred   Extremities:  Moves all extremities well, no edema, no cyanosis, no redness   Pulses:  Pulses palpable and equal bilaterally   Skin:  No bleeding, bruising or rash   Lymph nodes:  No palpable adenopathy   Neurologic:  Cranial nerves 2 - 12 grossly intact, sensation intact, DTR present and equal bilaterally                                                                               Results Review:    I reviewed the patient's new clinical results.  I reviewed the patient's new imaging results and agree with the interpretation.  I reviewed the patient's other test results and agree with the interpretation  I personally viewed and interpreted the patient's EKG/Telemetry data    Assessment & Plan       Chest pain    Essential hypertension    Hyperlipidemia    HFrEF (heart failure with reduced ejection fraction)    Atrial fibrillation, persistent    Anxiety    Gastroesophageal reflux disease    Acute on chronic HFrEF (heart failure with reduced ejection fraction)      Admit to telemetry  IV diuresis  Cardio consult  Strict I's and O's  Supportive care        I discussed the patients findings and my recommendations with patient.     Ge Silva MD  04/24/24  18:32 EDT

## 2024-04-24 NOTE — ED PROVIDER NOTES
"Time: 1:52 PM EDT  Date of encounter:  4/24/2024  Independent Historian/Clinical History and Information was obtained by:   Patient    History is limited by: N/A    Chief Complaint: Chest fullness and shortness of breath      History of Present Illness:  Patient is a 83 y.o. year old male who presents to the emergency department for evaluation of chest fullness and shortness of breath noted earlier.  Patient denies cough and hemoptysis.  Patient has no fever or chills.  Patient denies nausea, vomiting, and diarrhea.  Patient reports that the sensation does radiate to his back.    HPI    Patient Care Team  Primary Care Provider: Rani Lopez APRN    Past Medical History:     No Known Allergies  Past Medical History:   Diagnosis Date    Aneurysm 2013    Arthritis     left knee     Atrial fibrillation, persistent     CHB (complete heart block) 06/07/2023    Chronic obstructive pulmonary disease 09/01/2023    Difficulty walking 2023    Numbness in right foot    Essential hypertension 05/08/2019    GERD (gastroesophageal reflux disease)     HFrEF (heart failure with reduced ejection fraction) 08/11/2021    Hyperlipidemia 08/11/2021    Paroxysmal atrial fibrillation     Polyneuropathy 06/19/2023    Prostate cancer 2010    with seeds implant     Septic joint 11/26/2021     Past Surgical History:   Procedure Laterality Date    ABLATION OF DYSRHYTHMIC FOCUS  2013    APPENDECTOMY      ARTERIAL BYPASS SURGERY      CARDIAC ABLATION      x2  \"years ago\"     CARDIAC ELECTROPHYSIOLOGY PROCEDURE N/A 11/01/2021    Procedure: PPM battery change;  Surgeon: Ge Whelan MD;  Location: [facility] INVASIVE LOCATION;  Service: Cardiovascular;  Laterality: N/A;    CARDIAC SURGERY  2006    cabg 3v    CORONARY ARTERY BYPASS GRAFT  2006    ENDOSCOPY      with dilation     KNEE ARTHROSCOPY Left     TOTAL KNEE ARTHROPLASTY Left 07/23/2021    Procedure: TOTAL KNEE ARTHROPLASTY WITH DORA NAVIGATION WITH BIOMET;  Surgeon: Carlitos Zhao, " MD;  Location: [facility] MAIN OR;  Service: Orthopedics;  Laterality: Left;    VENTRICULAR CARDIAC PACEMAKER INSERTION      medtronic      Family History   Problem Relation Age of Onset    Heart attack Mother     No Known Problems Father     No Known Problems Sister     No Known Problems Brother     No Known Problems Maternal Aunt     No Known Problems Maternal Uncle     No Known Problems Paternal Aunt     No Known Problems Paternal Uncle     No Known Problems Maternal Grandmother     No Known Problems Maternal Grandfather     No Known Problems Paternal Grandmother     No Known Problems Paternal Grandfather     No Known Problems Other        Home Medications:  Prior to Admission medications    Medication Sig Start Date End Date Taking? Authorizing Provider   allopurinol (ZYLOPRIM) 100 MG tablet Take 1 tablet by mouth Daily. 3/11/22   Ely Srinivasan MD   Eliquis 5 MG tablet tablet Take 1 tablet by mouth 2 (Two) Times a Day.    Ely Srinivasan MD   Farxiga 10 MG tablet Take 1 tablet by mouth once daily 9/13/23   Isabel Navarrete APRN   furosemide (LASIX) 40 MG tablet Take 1 tablet by mouth Daily. 4/8/24   Ashley Hudson MD   isosorbide mononitrate (IMDUR) 60 MG 24 hr tablet Take 1 tablet by mouth Daily. 9/7/23   Isabel Navarrete APRN   losartan (Cozaar) 25 MG tablet Take 1 tablet by mouth Daily. 3/20/24   Ge Whelan MD   omeprazole (priLOSEC) 20 MG capsule Take 1 capsule by mouth Daily.    Ely Srinivasan MD   tamsulosin (FLOMAX) 0.4 MG capsule 24 hr capsule Take 1 capsule by mouth Daily. 3/15/24   Ely Srinivasan MD   Testosterone Cypionate (DEPOTESTOTERONE CYPIONATE) 200 MG/ML injection Inject  into the appropriate muscle as directed by prescriber Every 30 (Thirty) Days. 4/12/23   Ely Srinivasan MD        Social History:   Social History     Tobacco Use    Smoking status: Former     Current packs/day: 0.00     Average packs/day: 0.5 packs/day for 33.6 years (16.8 ttl  "pk-yrs)     Types: Cigarettes     Start date: 1962     Quit date: 1995     Years since quittin.7     Passive exposure: Never    Smokeless tobacco: Never   Vaping Use    Vaping status: Never Used   Substance Use Topics    Alcohol use: Never    Drug use: Never         Review of Systems:  Review of Systems   Constitutional:  Negative for chills and fever.   HENT:  Negative for congestion, rhinorrhea and sore throat.    Eyes:  Negative for pain and visual disturbance.   Respiratory:  Positive for shortness of breath. Negative for apnea, cough and chest tightness.    Cardiovascular:  Positive for chest pain. Negative for palpitations.   Gastrointestinal:  Negative for abdominal pain, diarrhea, nausea and vomiting.   Genitourinary:  Negative for difficulty urinating and dysuria.   Musculoskeletal:  Negative for joint swelling and myalgias.   Skin:  Negative for color change.   Neurological:  Negative for seizures and headaches.   Psychiatric/Behavioral: Negative.     All other systems reviewed and are negative.       Physical Exam:  /81 (BP Location: Right arm, Patient Position: Lying)   Pulse 71   Temp 98.2 °F (36.8 °C)   Resp 18   Ht 193 cm (76\")   Wt 88.5 kg (195 lb)   SpO2 99%   BMI 23.74 kg/m²     Physical Exam  Vitals and nursing note reviewed.   Constitutional:       General: He is not in acute distress.     Appearance: Normal appearance. He is not toxic-appearing.   HENT:      Head: Normocephalic and atraumatic.      Jaw: There is normal jaw occlusion.   Eyes:      General: Lids are normal.      Extraocular Movements: Extraocular movements intact.      Conjunctiva/sclera: Conjunctivae normal.      Pupils: Pupils are equal, round, and reactive to light.   Cardiovascular:      Rate and Rhythm: Normal rate and regular rhythm.      Pulses: Normal pulses.      Heart sounds: Normal heart sounds.   Pulmonary:      Effort: Pulmonary effort is normal. No respiratory distress.      Breath sounds: " Normal breath sounds. No wheezing or rhonchi.   Abdominal:      General: Abdomen is flat.      Palpations: Abdomen is soft.      Tenderness: There is no abdominal tenderness. There is no guarding or rebound.   Musculoskeletal:         General: Normal range of motion.      Cervical back: Normal range of motion and neck supple.      Right lower leg: Edema present.      Left lower leg: Edema present.   Skin:     General: Skin is warm and dry.   Neurological:      Mental Status: He is alert and oriented to person, place, and time. Mental status is at baseline.   Psychiatric:         Mood and Affect: Mood normal.                  Procedures:  Procedures      Medical Decision Making:      Comorbidities that affect care:    Congestive Heart Failure    External Notes reviewed:    Previous ED Note: Patient was last seen in emergency department for dyspnea      The following orders were placed and all results were independently analyzed by me:  Orders Placed This Encounter   Procedures    XR Chest 1 View    CT Chest With Contrast Diagnostic    [place] Draw    High Sensitivity Troponin T    Comprehensive Metabolic Panel    Lipase    BNP    Magnesium    CBC Auto Differential    [place] Draw    Ethanol    Urine Drug Screen - Urine, Clean Catch    Acetaminophen Level    Salicylate Level    TSH    T4, Free    High Sensitivity Troponin T 2Hr    Ethanol    NPO Diet NPO Type: Strict NPO    Undress & Gown    Daily Weights    Strict Intake & Output    Vital Signs    Maintain IV Access    Telemetry - Place Orders & Notify Provider of Results When Patient Experiences Acute Chest Pain, Dysrhythmia or Respiratory Distress    Continuous Pulse Oximetry    Code Status and Medical Interventions:    Psych / Access to See    Inpatient Hospitalist Consult    Inpatient Cardiology Consult    Oxygen Therapy- Nasal Cannula; Titrate 1-6 LPM Per SpO2; 90 - 95%    Oxygen Therapy- Nasal Cannula; Titrate 1-6 LPM Per SpO2; 90 - 95%    POC Glucose Once     ECG 12 Lead ED Triage Standing Order; Chest Pain    ECG 12 Lead ED Triage Standing Order; Chest Pain    Insert Peripheral IV    Inpatient Admission    Suicide Precautions    CBC & Differential    Green Top (Gel)    Lavender Top    Green Top (Gel)    Gold Top - SST    Light Blue Top    Extra Tubes    Green Top (Gel)       Medications Given in the Emergency Department:  Medications   sodium chloride 0.9 % flush 10 mL (has no administration in time range)   aspirin chewable tablet 324 mg (324 mg Oral Not Given 4/24/24 1043)   LORazepam (ATIVAN) injection 0.5 mg (0.5 mg Intravenous Not Given 4/24/24 1528)   nitroglycerin (NITROSTAT) SL tablet 0.4 mg (has no administration in time range)   heparin (porcine) 5000 UNIT/ML injection 5,000 Units (has no administration in time range)   bumetanide (BUMEX) injection 0.5 mg (has no administration in time range)   furosemide (LASIX) injection 60 mg (60 mg Intravenous Given 4/24/24 1525)   iopamidol (ISOVUE-370) 76 % injection 100 mL (100 mL Intravenous Given 4/24/24 1511)        ED Course:         Labs:    Lab Results (last 24 hours)       Procedure Component Value Units Date/Time    High Sensitivity Troponin T [[id number]]  (Abnormal) Collected: 04/24/24 1135    Specimen: Blood Updated: 04/24/24 1229     HS Troponin T 53 ng/L     Narrative:      High Sensitive Troponin T Reference Range:  <14.0 ng/L- Negative Female for AMI  <22.0 ng/L- Negative Male for AMI  >=14 - Abnormal Female indicating possible myocardial injury.  >=22 - Abnormal Male indicating possible myocardial injury.   Clinicians would have to utilize clinical acumen, EKG, Troponin, and serial changes to determine if it is an Acute Myocardial Infarction or myocardial injury due to an underlying chronic condition.         CBC & Differential [[id number]]  (Abnormal) Collected: 04/24/24 1135    Specimen: Blood Updated: 04/24/24 1145    Narrative:      The following orders were created for panel order CBC &  Differential.  Procedure                               Abnormality         Status                     ---------                               -----------         ------                     CBC Auto Differential[[id number]]        Abnormal            Final result                 Please view results for these tests on the individual orders.    Comprehensive Metabolic Panel [[id number]]  (Abnormal) Collected: 04/24/24 1135    Specimen: Blood Updated: 04/24/24 1213     Glucose 114 mg/dL      BUN 20 mg/dL      Creatinine 1.51 mg/dL      Sodium 134 mmol/L      Potassium 3.7 mmol/L      Chloride 102 mmol/L      CO2 21.2 mmol/L      Calcium 9.0 mg/dL      Total Protein 6.7 g/dL      Albumin 3.2 g/dL      ALT (SGPT) 28 U/L      AST (SGOT) 30 U/L      Alkaline Phosphatase 125 U/L      Total Bilirubin 1.9 mg/dL      Globulin 3.5 gm/dL      A/G Ratio 0.9 g/dL      BUN/Creatinine Ratio 13.2     Anion Gap 10.8 mmol/L      eGFR 46.1 mL/min/1.73     Narrative:      GFR Normal >60  Chronic Kidney Disease <60  Kidney Failure <15    The GFR formula is only valid for adults with stable renal function between ages 18 and 70.    Lipase [[id number]]  (Normal) Collected: 04/24/24 1135    Specimen: Blood Updated: 04/24/24 1213     Lipase 27 U/L     BNP [[id number]]  (Abnormal) Collected: 04/24/24 1135    Specimen: Blood Updated: 04/24/24 1216     proBNP 7,858.0 pg/mL     Narrative:      This assay is used as an aid in the diagnosis of individuals suspected of having heart failure. It can be used as an aid in the diagnosis of acute decompensated heart failure (ADHF) in patients presenting with signs and symptoms of ADHF to the emergency department (ED). In addition, NT-proBNP of <300 pg/mL indicates ADHF is not likely.    Age Range Result Interpretation  NT-proBNP Concentration (pg/mL:      <50             Positive            >450                   Gray                 300-450                    Negative             <300    50-75            Positive            >900                  Gray                300-900                  Negative            <300      >75             Positive            >1800                  Gray                300-1800                  Negative            <300    Magnesium [[id number]]  (Normal) Collected: 04/24/24 1135    Specimen: Blood Updated: 04/24/24 1213     Magnesium 2.0 mg/dL     CBC Auto Differential [[id number]]  (Abnormal) Collected: 04/24/24 1135    Specimen: Blood Updated: 04/24/24 1145     WBC 10.10 10*3/mm3      RBC 4.61 10*6/mm3      Hemoglobin 12.9 g/dL      Hematocrit 39.7 %      MCV 86.1 fL      MCH 28.0 pg      MCHC 32.5 g/dL      RDW 14.5 %      RDW-SD 45.2 fl      MPV 10.0 fL      Platelets 231 10*3/mm3      Neutrophil % 69.3 %      Lymphocyte % 16.2 %      Monocyte % 12.1 %      Eosinophil % 1.5 %      Basophil % 0.5 %      Immature Grans % 0.4 %      Neutrophils, Absolute 7.00 10*3/mm3      Lymphocytes, Absolute 1.64 10*3/mm3      Monocytes, Absolute 1.22 10*3/mm3      Eosinophils, Absolute 0.15 10*3/mm3      Basophils, Absolute 0.05 10*3/mm3      Immature Grans, Absolute 0.04 10*3/mm3      nRBC 0.0 /100 WBC     Ethanol [[id number]] Collected: 04/24/24 1135    Specimen: Blood Updated: 04/24/24 1213     Ethanol <10 mg/dL      Ethanol % <0.010 %     Narrative:      Ethanol (Plasma)  <10 Essentially Negative    Toxic Concentrations           mg/dL    Flushing, slowing of reflexes    Impaired visual activity         Depression of CNS              >100  Possible Coma                  >300       Acetaminophen Level [[id number]]  (Normal) Collected: 04/24/24 1135    Specimen: Blood Updated: 04/24/24 1213     Acetaminophen <5.0 mcg/mL     Salicylate Level [[id number]]  (Normal) Collected: 04/24/24 1135    Specimen: Blood Updated: 04/24/24 1213     Salicylate 1.4 mg/dL     TSH [[id number]]  (Normal) Collected: 04/24/24 1135    Specimen: Blood Updated: 04/24/24 1216     TSH 2.080 uIU/mL     T4, Free  [[id number]]  (Normal) Collected: 04/24/24 1135    Specimen: Blood Updated: 04/24/24 1216     Free T4 1.38 ng/dL     High Sensitivity Troponin T 2Hr [[id number]]  (Abnormal) Collected: 04/24/24 1419    Specimen: Blood Updated: 04/24/24 1453     HS Troponin T 47 ng/L      Troponin T Delta -6 ng/L     Narrative:      High Sensitive Troponin T Reference Range:  <14.0 ng/L- Negative Female for AMI  <22.0 ng/L- Negative Male for AMI  >=14 - Abnormal Female indicating possible myocardial injury.  >=22 - Abnormal Male indicating possible myocardial injury.   Clinicians would have to utilize clinical acumen, EKG, Troponin, and serial changes to determine if it is an Acute Myocardial Infarction or myocardial injury due to an underlying chronic condition.         Ethanol [[id number]] Collected: 04/24/24 1419    Specimen: Blood Updated: 04/24/24 1449     Ethanol <10 mg/dL      Ethanol % <0.010 %     Narrative:      Ethanol (Plasma)  <10 Essentially Negative    Toxic Concentrations           mg/dL    Flushing, slowing of reflexes    Impaired visual activity         Depression of CNS              >100  Possible Coma                  >300       Urine Drug Screen - Urine, Clean Catch [[id number]]  (Abnormal) Collected: 04/24/24 1602    Specimen: Urine, Clean Catch Updated: 04/24/24 1651     Amphet/Methamphet, Screen Negative     Barbiturates Screen, Urine Negative     Benzodiazepine Screen, Urine Positive     Cocaine Screen, Urine Negative     Opiate Screen Negative     THC, Screen, Urine Negative     Methadone Screen, Urine Negative     Oxycodone Screen, Urine Negative     Fentanyl, Urine Negative    Narrative:      Negative Thresholds Per Drugs Screened:    Amphetamines                 500 ng/ml  Barbiturates                 200 ng/ml  Benzodiazepines              100 ng/ml  Cocaine                      300 ng/ml  Methadone                    300 ng/ml  Opiates                      300 ng/ml  Oxycodone                     100 ng/ml  THC                           50 ng/ml  Fentanyl                       5 ng/ml      The Normal Value for all drugs tested is negative. This report includes final unconfirmed screening results to be used for medical treatment purposes only. Unconfirmed results must not be used for non-medical purposes such as employment or legal testing. Clinical consideration should be applied to any drug of abuse test, particularly when unconfirmed results are used.                     Imaging:    CT Chest With Contrast Diagnostic    Result Date: 4/24/2024  CT CHEST W CONTRAST DIAGNOSTIC-  DATE OF EXAM: 4/24/2024 3:06 PM  INDICATION: Shortness of breath..  COMPARISON: Chest radiograph dated 4/24/2024.  TECHNIQUE: Serial and axial CT images of the chest were obtained following the uneventful intravenous administration of 100 mL Isovue 370 contrast. Reconstructions in the coronal and sagittal planes were also performed.  Automated exposure control and iterative reconstruction methods were used.  FINDINGS: The visualized soft tissue structures of the base of the neck appear within normal limits. There is no lower cervical or axillary adenopathy. There is a left chest wall pacemaker with leads in expected position.  There is marked cardiomegaly. There is no pericardial effusion. The aorta is dilated measuring up to 4.7 cm in diameter. There is coronary artery atherosclerotic calcification and aortic atherosclerotic calcification. There are postsurgical changes of prior CABG. The main pulmonary artery appears normal in caliber. There is poor opacification of the branches beyond the main pulmonary arteries secondary to poor cardiac output. This significantly limits assessment for pulmonary embolism. There is no mediastinal or hilar lymphadenopathy by size criteria.  The central airways are patent. There is no abnormal bronchial wall thickening or bronchiectasis. There are trace bilateral pleural effusions. There is no  significant interlobular septal thickening to suggest interstitial edema pattern. There is no consolidation. No suspicious pulmonary nodule. No evidence of pneumothorax.  The esophagus is normal in course and caliber. There is cholelithiasis without findings of acute cholecystitis. There is degenerative disc disease of the cervical spine. Median sternotomy wires are present and intact.      1. Limited evaluation for pulmonary embolism given poor contrast bolus timing secondary to patient cardiac dysfunction. No evidence of PE within the main pulmonary arteries. Cannot exclude PE at the lobar artery or beyond levels. Consider ventilation/perfusion scan. 2. Trace bilateral pleural effusions. 3. Marked cardiomegaly and postsurgical changes of prior CABG. 4. Cholelithiasis without findings of acute cholecystitis.     Electronically Signed By-Dominick Toro MD On:4/24/2024 3:23 PM      XR Chest 1 View    Result Date: 4/24/2024  XR CHEST 1 VW-  Date of Exam: 4/24/2024 11:39 AM  Indication: Chest Pain Triage Protocol  Comparison: Prior examinations dating back to 5/15/2019..  Findings: Stable enlargement of the cardiac silhouette. The pulmonary vasculature is within normal limits. Stable left-sided dual-lead cardiac pacemaker. Prior median sternotomy. No evidence of pneumothorax or large pleural effusion. There are no dense areas of consolidation.      Impression: Cardiomegaly. No active pulmonary disease.   Electronically Signed By-JUSTIN TAVERAS MD On:4/24/2024 12:03 PM         Differential Diagnosis and Discussion:    Chest Pain:  Based on the patient's signs and symptoms, I considered aortic dissection, myocardial infaction, pulmonary embolism, cardiac tamponade, pericarditis, pneumothorax, musculoskeletal chest pain and other differential diagnosis as an etiology of the patient's chest pain.     All labs were reviewed and interpreted by me.  All X-rays impressions were independently interpreted by me.  EKG was  interpreted by me.    MDM     Amount and/or Complexity of Data Reviewed  Decide to obtain previous medical records or to obtain history from someone other than the patient: yes    The patient´s CBC that was reviewed and interpreted by me shows no abnormalities of critical concern. Of note, there is no anemia requiring a blood transfusion and the platelet count is acceptable.  The patient´s CMP that was reviewed and interpretted by me shows no abnormalities of critical concern. Of note, the patient´s sodium and potassium are acceptable. The patient´s liver enzymes are unremarkable. The patient´s renal function (creatinine) is preserved. The patient has a normal anion gap.  BNP is elevated and troponin is slightly elevated.  Patient was given Lasix in the emergency department.    Patient was placed on the cardiac monitor after given Lasix.  They were monitored for ventricular ectopy, arrhythmia, tachycardia, hypoxia, and changes in blood pressure.  Patient was rechecked several times throughout their stay.    Critical Care Note: Total Critical Care time of 50 minutes. Total critical care time documented does not include time spent on separately billed procedures for services of nurses or physician assistants. I personally saw and examined the patient. I have reviewed all diagnostic interpretations and treatment plans as written. I was present for the key portions of any procedures performed and the inclusive time noted in any critical care statement. Critical care time includes patient management by me, time spent at the patients bedside,  time to review lab and imaging results, discussing patient care, documentation in the medical record, and time spent with family or caregiver.        Patient Care Considerations:    None      Consultants/Shared Management Plan:    Case was discussed with Dr. Patton who agrees with admission.    Social Determinants of Health:    Patient is independent, reliable, and has access to  care.       Disposition and Care Coordination:    Admit:   Through independent evaluation of the patient's history, physical, and imperical data, the patient meets criteria for inpatient admission to the hospital.        Final diagnoses:   Congestive heart failure, unspecified HF chronicity, unspecified heart failure type        ED Disposition       ED Disposition   Decision to Admit    Condition   --    Comment   Level of Care: Telemetry [5]   Diagnosis: Chest pain [[id number]]   Certification: I Certify That Inpatient Hospital Services Are Medically Necessary For Greater Than 2 Midnights                 This medical record created using voice recognition software.             Ashley Hudson MD  04/24/24 [id number]

## 2024-04-25 ENCOUNTER — APPOINTMENT (OUTPATIENT)
Dept: CARDIOLOGY | Facility: HOSPITAL | Age: 84
End: 2024-04-25
Payer: MEDICARE

## 2024-04-25 ENCOUNTER — APPOINTMENT (OUTPATIENT)
Dept: NUCLEAR MEDICINE | Facility: HOSPITAL | Age: 84
End: 2024-04-25
Payer: MEDICARE

## 2024-04-25 LAB
BH CV IMMEDIATE POST RECOVERY TECH DATA SYMPTOMS: NORMAL
BH CV IMMEDIATE POST TECH DATA BLOOD PRESSURE: NORMAL MMHG
BH CV IMMEDIATE POST TECH DATA HEART RATE: 70 BPM
BH CV IMMEDIATE POST TECH DATA OXYGEN SATS: 96 %
BH CV REST NUCLEAR ISOTOPE DOSE: 9.8 MCI
BH CV SIX MINUTE RECOVERY TECH DATA BLOOD PRESSURE: NORMAL
BH CV SIX MINUTE RECOVERY TECH DATA HEART RATE: 70 BPM
BH CV SIX MINUTE RECOVERY TECH DATA OXYGEN SATURATION: 97 %
BH CV STRESS BP STAGE 1: NORMAL
BH CV STRESS COMMENTS STAGE 1: NORMAL
BH CV STRESS DOSE REGADENOSON STAGE 1: 0.4
BH CV STRESS DURATION MIN STAGE 1: 0
BH CV STRESS DURATION SEC STAGE 1: 10
BH CV STRESS HR STAGE 1: 70
BH CV STRESS NUCLEAR ISOTOPE DOSE: 32.9 MCI
BH CV STRESS O2 STAGE 1: 96
BH CV STRESS PROTOCOL 1: NORMAL
BH CV STRESS RECOVERY BP: NORMAL MMHG
BH CV STRESS RECOVERY HR: 70 BPM
BH CV STRESS RECOVERY O2: 97 %
BH CV STRESS STAGE 1: 1
BH CV THREE MINUTE POST TECH DATA BLOOD PRESSURE: NORMAL MMHG
BH CV THREE MINUTE POST TECH DATA HEART RATE: 72 BPM
BH CV THREE MINUTE POST TECH DATA OXYGEN SATURATION: 96 %
LV EF NUC BP: 18 %
MAXIMAL PREDICTED HEART RATE: 139 BPM
PERCENT MAX PREDICTED HR: 51.8 %
STRESS BASELINE BP: NORMAL MMHG
STRESS BASELINE HR: 70 BPM
STRESS O2 SAT REST: 96 %
STRESS PERCENT HR: 61 %
STRESS POST O2 SAT PEAK: 97 %
STRESS POST PEAK BP: NORMAL MMHG
STRESS POST PEAK HR: 72 BPM
STRESS TARGET HR: 118 BPM

## 2024-04-25 PROCEDURE — 93016 CV STRESS TEST SUPVJ ONLY: CPT | Performed by: NURSE PRACTITIONER

## 2024-04-25 PROCEDURE — 99232 SBSQ HOSP IP/OBS MODERATE 35: CPT | Performed by: INTERNAL MEDICINE

## 2024-04-25 PROCEDURE — 78452 HT MUSCLE IMAGE SPECT MULT: CPT | Performed by: INTERNAL MEDICINE

## 2024-04-25 PROCEDURE — 25010000002 REGADENOSON 0.4 MG/5ML SOLUTION: Performed by: INTERNAL MEDICINE

## 2024-04-25 PROCEDURE — 25010000002 LORAZEPAM PER 2 MG: Performed by: EMERGENCY MEDICINE

## 2024-04-25 PROCEDURE — 25010000002 FUROSEMIDE PER 20 MG: Performed by: INTERNAL MEDICINE

## 2024-04-25 PROCEDURE — 99214 OFFICE O/P EST MOD 30 MIN: CPT | Performed by: INTERNAL MEDICINE

## 2024-04-25 PROCEDURE — 96372 THER/PROPH/DIAG INJ SC/IM: CPT

## 2024-04-25 PROCEDURE — 78452 HT MUSCLE IMAGE SPECT MULT: CPT

## 2024-04-25 PROCEDURE — A9502 TC99M TETROFOSMIN: HCPCS | Performed by: INTERNAL MEDICINE

## 2024-04-25 PROCEDURE — G0378 HOSPITAL OBSERVATION PER HR: HCPCS

## 2024-04-25 PROCEDURE — 93306 TTE W/DOPPLER COMPLETE: CPT

## 2024-04-25 PROCEDURE — 0 TECHNETIUM TETROFOSMIN KIT: Performed by: INTERNAL MEDICINE

## 2024-04-25 PROCEDURE — 93306 TTE W/DOPPLER COMPLETE: CPT | Performed by: INTERNAL MEDICINE

## 2024-04-25 PROCEDURE — 25010000002 HEPARIN (PORCINE) PER 1000 UNITS: Performed by: STUDENT IN AN ORGANIZED HEALTH CARE EDUCATION/TRAINING PROGRAM

## 2024-04-25 PROCEDURE — 96376 TX/PRO/DX INJ SAME DRUG ADON: CPT

## 2024-04-25 PROCEDURE — 96375 TX/PRO/DX INJ NEW DRUG ADDON: CPT

## 2024-04-25 PROCEDURE — 93018 CV STRESS TEST I&R ONLY: CPT | Performed by: INTERNAL MEDICINE

## 2024-04-25 PROCEDURE — 93017 CV STRESS TEST TRACING ONLY: CPT

## 2024-04-25 RX ORDER — FUROSEMIDE 10 MG/ML
40 INJECTION INTRAMUSCULAR; INTRAVENOUS DAILY
Status: DISCONTINUED | OUTPATIENT
Start: 2024-04-25 | End: 2024-04-27 | Stop reason: HOSPADM

## 2024-04-25 RX ORDER — ALLOPURINOL 100 MG/1
100 TABLET ORAL DAILY
Status: DISCONTINUED | OUTPATIENT
Start: 2024-04-25 | End: 2024-04-27 | Stop reason: HOSPADM

## 2024-04-25 RX ORDER — ATORVASTATIN CALCIUM 20 MG/1
20 TABLET, FILM COATED ORAL NIGHTLY
Status: DISCONTINUED | OUTPATIENT
Start: 2024-04-25 | End: 2024-04-27 | Stop reason: HOSPADM

## 2024-04-25 RX ORDER — REGADENOSON 0.08 MG/ML
0.4 INJECTION, SOLUTION INTRAVENOUS
Status: COMPLETED | OUTPATIENT
Start: 2024-04-25 | End: 2024-04-25

## 2024-04-25 RX ORDER — TAMSULOSIN HYDROCHLORIDE 0.4 MG/1
0.4 CAPSULE ORAL DAILY
Status: DISCONTINUED | OUTPATIENT
Start: 2024-04-25 | End: 2024-04-27 | Stop reason: HOSPADM

## 2024-04-25 RX ORDER — ASPIRIN 81 MG/1
81 TABLET, CHEWABLE ORAL DAILY
Status: DISCONTINUED | OUTPATIENT
Start: 2024-04-25 | End: 2024-04-27 | Stop reason: HOSPADM

## 2024-04-25 RX ORDER — METOPROLOL SUCCINATE 25 MG/1
12.5 TABLET, EXTENDED RELEASE ORAL
Status: DISCONTINUED | OUTPATIENT
Start: 2024-04-25 | End: 2024-04-26

## 2024-04-25 RX ORDER — LORAZEPAM 0.5 MG/1
0.5 TABLET ORAL 2 TIMES DAILY PRN
Status: DISCONTINUED | OUTPATIENT
Start: 2024-04-25 | End: 2024-04-27 | Stop reason: HOSPADM

## 2024-04-25 RX ORDER — PANTOPRAZOLE SODIUM 40 MG/1
40 TABLET, DELAYED RELEASE ORAL
Status: DISCONTINUED | OUTPATIENT
Start: 2024-04-25 | End: 2024-04-27 | Stop reason: HOSPADM

## 2024-04-25 RX ADMIN — ALLOPURINOL 100 MG: 100 TABLET ORAL at 13:45

## 2024-04-25 RX ADMIN — REGADENOSON 0.4 MG: 0.08 INJECTION, SOLUTION INTRAVENOUS at 11:30

## 2024-04-25 RX ADMIN — FUROSEMIDE 40 MG: 10 INJECTION, SOLUTION INTRAMUSCULAR; INTRAVENOUS at 13:42

## 2024-04-25 RX ADMIN — Medication 10 ML: at 13:43

## 2024-04-25 RX ADMIN — PANTOPRAZOLE SODIUM 40 MG: 40 TABLET, DELAYED RELEASE ORAL at 13:40

## 2024-04-25 RX ADMIN — LORAZEPAM 0.5 MG: 2 INJECTION INTRAMUSCULAR; INTRAVENOUS at 00:56

## 2024-04-25 RX ADMIN — ASPIRIN 81 MG: 81 TABLET, CHEWABLE ORAL at 13:44

## 2024-04-25 RX ADMIN — ATORVASTATIN CALCIUM 20 MG: 20 TABLET, FILM COATED ORAL at 21:51

## 2024-04-25 RX ADMIN — METOPROLOL SUCCINATE 12.5 MG: 25 TABLET, EXTENDED RELEASE ORAL at 13:43

## 2024-04-25 RX ADMIN — LORAZEPAM 0.5 MG: 0.5 TABLET ORAL at 21:51

## 2024-04-25 RX ADMIN — TETROFOSMIN 1 DOSE: 1.38 INJECTION, POWDER, LYOPHILIZED, FOR SOLUTION INTRAVENOUS at 11:30

## 2024-04-25 RX ADMIN — TETROFOSMIN 1 DOSE: 1.38 INJECTION, POWDER, LYOPHILIZED, FOR SOLUTION INTRAVENOUS at 09:40

## 2024-04-25 RX ADMIN — HEPARIN SODIUM 5000 UNITS: 5000 INJECTION INTRAVENOUS; SUBCUTANEOUS at 13:42

## 2024-04-25 RX ADMIN — TAMSULOSIN HYDROCHLORIDE 0.4 MG: 0.4 CAPSULE ORAL at 13:43

## 2024-04-25 NOTE — CONSULTS
Cardiology Consult Note  [facility] 4TH FLOOR MEDICAL TELEMETRY UNIT          Patient Identification:  Javi Martínez      [id number]  83 y.o.        male  1940           Reason for Consultation: Shortness of breath and chest pain    PCP: Rani Lopez APRN    History of Present Illness:     Patient is a 83-year-old with a prior history of CAD with previous CABG, systolic congestive heart failure, complete block with dual-chamber pacemaker, persistent atrial fibrillation, essential hypertension, who presented with issues of shortness of breath that started recently after his wife passed away about a week ago the shortness of breath yesterday was also associated some spasm in his back.  He noticed also when he laid down there was a weight on his chest and decided to come in for evaluation.  This morning he does feel better he reports some increased lower extremity edema noted as well.  He has not been have any symptomatic tachycardic issues.  He underwent a CT scan which was unremarkable for PE but contrast was not optimally timed.  His initial troponin was mildly elevated but decreased on the second and his proBNP level was elevated    Past History:  Past Medical History:   Diagnosis Date    Aneurysm 2013    Arthritis     left knee     Atrial fibrillation, persistent     CHB (complete heart block) 06/07/2023    CHF exacerbation 4/24/2024    Chronic obstructive pulmonary disease 09/01/2023    Difficulty walking 2023    Numbness in right foot    Essential hypertension 05/08/2019    GERD (gastroesophageal reflux disease)     HFrEF (heart failure with reduced ejection fraction) 08/11/2021    Hyperlipidemia 08/11/2021    Paroxysmal atrial fibrillation     Polyneuropathy 06/19/2023    Prostate cancer 2010    with seeds implant     Septic joint 11/26/2021     Past Surgical History:   Procedure Laterality Date    ABLATION OF DYSRHYTHMIC FOCUS  2013    APPENDECTOMY      ARTERIAL BYPASS SURGERY      CARDIAC  "ABLATION      x2  \"years ago\"     CARDIAC ELECTROPHYSIOLOGY PROCEDURE N/A 2021    Procedure: PPM battery change;  Surgeon: Ge Whelan MD;  Location: [facility] CATH INVASIVE LOCATION;  Service: Cardiovascular;  Laterality: N/A;    CARDIAC SURGERY      cabg 3v    CORONARY ARTERY BYPASS GRAFT      ENDOSCOPY      with dilation     KNEE ARTHROSCOPY Left     TOTAL KNEE ARTHROPLASTY Left 2021    Procedure: TOTAL KNEE ARTHROPLASTY WITH DORA NAVIGATION WITH BIOMET;  Surgeon: Carlitos Zhao MD;  Location: [facility] MAIN OR;  Service: Orthopedics;  Laterality: Left;    VENTRICULAR CARDIAC PACEMAKER INSERTION      medtronic      No Known Allergies  Social History     Socioeconomic History    Marital status:    Tobacco Use    Smoking status: Former     Current packs/day: 0.00     Average packs/day: 0.5 packs/day for 33.6 years (16.8 ttl pk-yrs)     Types: Cigarettes     Start date: 1962     Quit date: 1995     Years since quittin.7     Passive exposure: Never    Smokeless tobacco: Never   Vaping Use    Vaping status: Never Used   Substance and Sexual Activity    Alcohol use: Never    Drug use: Never    Sexual activity: Yes     Partners: Female     Family History   Problem Relation Age of Onset    Heart attack Mother     No Known Problems Father     No Known Problems Sister     No Known Problems Brother     No Known Problems Maternal Aunt     No Known Problems Maternal Uncle     No Known Problems Paternal Aunt     No Known Problems Paternal Uncle     No Known Problems Maternal Grandmother     No Known Problems Maternal Grandfather     No Known Problems Paternal Grandmother     No Known Problems Paternal Grandfather     No Known Problems Other        Medications:  Prior to Admission medications    Medication Sig Start Date End Date Taking? Authorizing Provider   allopurinol (ZYLOPRIM) 100 MG tablet Take 1 tablet by mouth Daily. 3/11/22  Yes Provider, Historical, MD   Eliquis 5 MG tablet " "tablet Take 1 tablet by mouth 2 (Two) Times a Day.   Yes Ely Srinivasan MD   isosorbide mononitrate (IMDUR) 60 MG 24 hr tablet Take 1 tablet by mouth Daily.  Patient taking differently: Take 0.5 tablets by mouth Daily. 9/7/23  Yes Isabel Navarrete APRN   LORazepam (ATIVAN) 0.5 MG tablet Take 1 tablet by mouth 2 (Two) Times a Day As Needed for Anxiety. 4/16/24  Yes Ely Srinivasan MD   omeprazole (priLOSEC) 20 MG capsule Take 1 capsule by mouth Daily.   Yes Ely Srinivasan MD   tamsulosin (FLOMAX) 0.4 MG capsule 24 hr capsule Take 1 capsule by mouth Daily. 3/15/24  Yes Ely Srinivasan MD   Testosterone Cypionate (DEPOTESTOTERONE CYPIONATE) 200 MG/ML injection Inject  into the appropriate muscle as directed by prescriber Every 30 (Thirty) Days. 4/12/23   Ely Srinivasan MD      Current medications:  !Patient Home Medications Stored in Pharmacy, , Does not apply, BID  aspirin, 324 mg, Oral, Once  bumetanide, 0.5 mg, Intravenous, BID  heparin (porcine), 5,000 Units, Subcutaneous, Q12H      Current IV drips:       Review of Systems   Constitutional: Negative for chills, fever and weight loss.   HENT:  Negative for congestion and nosebleeds.    Cardiovascular:  Negative for orthopnea and paroxysmal nocturnal dyspnea.   Respiratory:  Positive for shortness of breath. Negative for cough.    Endocrine: Negative for cold intolerance and heat intolerance.   Skin:  Negative for rash.   Musculoskeletal:  Negative for back pain and muscle weakness.   Gastrointestinal:  Negative for abdominal pain, nausea and vomiting.   Genitourinary:  Negative for dysuria and nocturia.   Neurological:  Negative for dizziness and light-headedness.   Psychiatric/Behavioral:  Negative for altered mental status and hallucinations.          Physical Exam    /78 (BP Location: Right arm, Patient Position: Lying)   Pulse 70   Temp 98.1 °F (36.7 °C) (Oral)   Resp 18   Ht 193 cm (76\")   Wt 81.5 kg (179 lb " 10.8 oz)   SpO2 97%   BMI 21.87 kg/m²  Body mass index is 21.87 kg/m².   Oxygen saturation   @FLOWAN(10::1)@ SpO2  Min: 93 %  Max: 100 %    General Appearance:   no acute distress  Alert and oriented x3  HENT:   lips not cyanotic  Atraumatic  Neck:  thyroid not enlarged  supple  Respiratory:  no respiratory distress  normal breath sounds  no rales  Cardiovascular:  no jugular venous distention  regular rhythm  apical impulse normal  S1 normal, S2 normal  no S3, no S4   no murmur  no rub, no thrill  no carotid bruit  pedal pulses normal  lower extremity edema: Mild  Gastrointestinal:   bowel sounds normal  non-tender  no hepatomegaly, no splenomegaly  Musculoskeletal:  no clubbing of fingers.   normocephalic, head atraumatic  Skin:   warm, dry  No rashes  Neuro/Psychiatric:  normal mood and affect  judgement and insight appropriate      Cardiographics:     ECG  (personally reviewed)          Telemetry:  (personally reviewed) atrial fibrillation with paced ventricular beats   Results for orders placed during the hospital encounter of 09/02/23    Adult Transthoracic Echo Complete w/ Color, Spectral and Contrast if necessary per protocol    Interpretation Summary    Left ventricular systolic function is moderately decreased. Calculated left ventricular EF = 30-35%, similar to previous study.    The left ventricular cavity is moderately dilated.    Left ventricular wall thickness is consistent with concentric hypertrophy.    Left ventricular diastolic function is consistent with (grade III w/high LAP) fixed restrictive pattern.    Moderately reduced right ventricular systolic function noted.    The left atrial cavity is severely dilated.    The right atrial cavity is moderate to severely  dilated.    Moderate to severe tricuspid valve regurgitation is present.    Estimated right ventricular systolic pressure from tricuspid regurgitation is moderately elevated (45-55 mmHg).    Mild dilation of the aortic root is  "present.         No results found for this or any previous visit.     Cardiolite (Tc-99m Sestamibi) stress test                  Lab Review:       CBC          9/4/2023    05:08 4/8/2024    14:40 4/24/2024    11:35   CBC   WBC 9.62  8.50  10.10    RBC 5.14  5.23  4.61    Hemoglobin 14.6  14.6  12.9    Hematocrit 46.7  46.5  39.7    MCV 90.9  88.9  86.1    MCH 28.4  27.9  28.0    MCHC 31.3  31.4  32.5    RDW 16.1  14.8  14.5    Platelets 239  231  231        CMP          9/4/2023    05:08 4/8/2024    14:40 4/24/2024    11:35   CMP   Glucose 120  91  114    BUN 30  25  20    Creatinine 2.07  1.69  1.51    EGFR 31.6  40.3  46.1    Sodium 139  140  134    Potassium 4.0  4.5  3.7    Chloride 99  105  102    Calcium 9.1  9.2  9.0    Total Protein  6.6  6.7    Albumin  3.6  3.2    Globulin  3.0  3.5    Total Bilirubin  1.6  1.9    Alkaline Phosphatase  114  125    AST (SGOT)  18  30    ALT (SGPT)  13  28    Albumin/Globulin Ratio  1.2  0.9    BUN/Creatinine Ratio 14.5  14.8  13.2    Anion Gap 14.7  11.7  10.8         CARDIAC LABS:      Lab 04/24/24  1419 04/24/24  1135   PROBNP  --  7,858.0*   HSTROP T 47* 53*      No results found for: \"DIGOXIN\"   Lab Results   Component Value Date    TSH 2.080 04/24/2024           Invalid input(s): \"LDLCALC\"  No results found for: \"POCTROP\"  No results found for: \"DDIMERQUAN\"  Lab Results   Component Value Date    MG 2.0 04/24/2024             CARDIAC LABS:      Lab 04/24/24  1419 04/24/24  1135   PROBNP  --  7,858.0*   HSTROP T 47* 53*        Imaging:  CXR  Impression: Impression: Cardiomegaly. No active pulmonary disease. E    Assessment:    Chest pain    HFrEF (heart failure with reduced ejection fraction)    Essential hypertension    Hyperlipidemia    Atrial fibrillation, persistent    Anxiety    Gastroesophageal reflux disease    Acute on chronic HFrEF (heart failure with reduced ejection fraction)      HFrEF acute on chronic patient with increased shortness of breath symptoms " elevated proBNP up from his baseline recommend IV Lasix today will repeat echocardiogram to see if any changes from his last 1    Chest pressure described as more just with laying down suspect related to CHF troponins were downtrending EKG just a paced ventricular rhythm we will go ahead and repeat stress testing as it has been sometime to evaluate for ischemia today    Plan:  1.  Lasix 40 mg IV once a day  2.  Isosorbide 60 mg daily  3.  Toprol 12.5 mg once a day  4.  Repeat echocardiogram  5.  Nuclear stress test  6.  If stress test shows no ischemia would recommend starting on Eliquis back to 5 twice daily to      Thank you for allowing us to share in Javi Tanya   [facility].            Ge Whelan MD   4/25/2024    09:03 EDT

## 2024-04-25 NOTE — PROGRESS NOTES
[facility]   Hospitalist Progress Note  Date: 2024  Patient Name: Javi Martínez  : 1940  MRN: [id number]  Date of admission: 2024      Subjective   Subjective     Chief Complaint: Chest discomfort    Summary: Patient is 83-year-old male past medical history significant for congestive heart failure with reduced ejection fraction, paroxysmal atrial fibrillation, hypertension, hyperlipidemia, osteoarthritis, polyneuropathy, GERD that presents emergency department with chest discomfort associated with shortness of breath been going on for the last couple of days evaluated emergency department CTA was done showed no large PE however contrast bolus was poorly timed had mildly elevated troponin elevated BNP admitted to the hospitalist service cardiology consulted stress test being pursued repeat echocardiogram ordered.    Interval Followup: Patient seen and examined resting comfortably currently without chest pain or chest discomfort no shortness of air no abdominal pain no palpitations.  Patient does state that he has recently lost his wife.  Stress test completed results are pending checking 2D echocardiogram today continue cardiac telemetry.    Review of systems: All systems reviewed and negative except the following: Patient complains of generalized weakness fatigue   Objective   Objective     Vitals:   Temp:  [97.3 °F (36.3 °C)-98.1 °F (36.7 °C)] 97.7 °F (36.5 °C)  Heart Rate:  [70-72] 70  Resp:  [16-18] 18  BP: (106-134)/(71-81) 116/71    Physical Exam    Constitutional: Awake, alert, no acute distress   Eyes: Pupils equal, sclerae anicteric, no conjunctival injection   HENT: NCAT, mucous membranes moist   Neck: Supple, no thyromegaly, no lymphadenopathy, trachea midline   Respiratory: Clear to auscultation bilaterally, nonlabored respirations    Cardiovascular: RRR, no murmurs, rubs, or gallops, palpable pedal pulses bilaterally   Gastrointestinal: Positive bowel sounds, soft, nontender,  nondistended   Musculoskeletal: No bilateral ankle edema, no clubbing or cyanosis to extremities   Psychiatric: Appropriate affect, cooperative   Neurologic: Oriented x 3, strength symmetric in all extremities, Cranial Nerves grossly intact to confrontation, speech clear   Skin: No rashes     Result Review    Result Review:  I have personally reviewed the results from the time of this admission to 4/25/2024 15:54 EDT and agree with these findings:  [x]  Laboratory  []  Microbiology  []  Radiology  []  EKG/Telemetry   []  Cardiology/Vascular   []  Pathology  []  Old records  []  Other:    Assessment & Plan   Assessment / Plan   Assessment    Chest discomfort  Paroxysmal A-fib on chronic anticoagulation with Eliquis  Acute on chronic congestive heart failure with reduced ejection fraction  Hypertension  Hyperlipidemia  Anxiety  Acute grief reaction patient has just recently lost his wife  GERD    Plan:    Checking 2D echocardiogram  Cardiology consulted pursuing stress test results pending  Continue cardiac telemetry  Continuing beta-blocker, ASA,  Check lipid profile check hemoglobin A1c  Add statin  Holding Eliquis but will resume if stress test negative and no further invasive tests are needed from cardiology standpoint  PT OT consultations  Further treatment contingent upon his hospital course  Discussed with RN         DVT prophylaxis:  Medical DVT prophylaxis orders are present.        CODE STATUS:   Level Of Support Discussed With: Patient  Code Status (Patient has no pulse and is not breathing): CPR (Attempt to Resuscitate)  Medical Interventions (Patient has pulse or is breathing): Full Support        Electronically signed by DOLORES Burt, 04/25/24, 3:54 PM EDT.         Attending Documentation:  Patient independently seen and evaluated, above documentation reflects plan put forth during bedside rounds.  More than 51% of the time of this patient encounter was performed by me. I discussed the care plan  with ERIC Hall PA-C, I agree with his findings and plan as documented, what I have added to the care plan and modified is as follows in my documentation and my medical decision making; 83-year-old male with CHF, pAF, presented with chest discomfort, seen by cardiology, stress test, checking 2D echocardiogram. Will continue beta-blocker and aspirin and statin.  If no further invasive testing is needed can resume Eliquis.  Electronically signed by Ge Carmona MD, 04/25/24, 5:56 PM EDT.

## 2024-04-25 NOTE — PLAN OF CARE
Goal Outcome Evaluation:  Plan of Care Reviewed With: patient        Progress: no change  Outcome Evaluation: pt has no complaints of chest pain. has expressed thoughts of SI due to wife passing x1 week ago, but doesnt have a plan. cw at bedside. no other changes this shift. will continue with POC. Suzanne Villa RN

## 2024-04-25 NOTE — PLAN OF CARE
Problem: Adjustment to Illness (Heart Failure)  Goal: Optimal Coping  Intervention: Support Psychosocial Response  Recent Flowsheet Documentation  Taken 4/25/2024 1346 by Abby Love, RN  Family/Support System Care: self-care encouraged     Problem: Adjustment to Illness (Heart Failure)  Goal: Optimal Coping  Intervention: Support Psychosocial Response  Recent Flowsheet Documentation  Taken 4/25/2024 1346 by Abby Love RN  Family/Support System Care: self-care encouraged   Goal Outcome Evaluation:  Plan of Care Reviewed With: patient        Progress: improving  Outcome Evaluation: Patient is A+Ox4 throughout shift.  Patient denies any suicidial thoughts or plans, he reports that he was just caught up in the stress of his visit to the ER.  Patient also denies having chest pain and states that he only had discomfort.

## 2024-04-26 LAB
ALBUMIN SERPL-MCNC: 3.1 G/DL (ref 3.5–5.2)
ALP SERPL-CCNC: 149 U/L (ref 39–117)
ALT SERPL W P-5'-P-CCNC: 43 U/L (ref 1–41)
ANION GAP SERPL CALCULATED.3IONS-SCNC: 13.4 MMOL/L (ref 5–15)
AST SERPL-CCNC: 44 U/L (ref 1–40)
BH CV ECHO MEAS - AI P1/2T: 620.6 MSEC
BH CV ECHO MEAS - AO ROOT DIAM: 4.1 CM
BH CV ECHO MEAS - EDV(CUBED): 254.8 ML
BH CV ECHO MEAS - EDV(MOD-SP2): 248 ML
BH CV ECHO MEAS - EDV(MOD-SP4): 208 ML
BH CV ECHO MEAS - EF(MOD-BP): 12.7 %
BH CV ECHO MEAS - EF(MOD-SP2): 23.4 %
BH CV ECHO MEAS - EF(MOD-SP4): 10.1 %
BH CV ECHO MEAS - ESV(CUBED): 200.2 ML
BH CV ECHO MEAS - ESV(MOD-SP2): 190 ML
BH CV ECHO MEAS - ESV(MOD-SP4): 187 ML
BH CV ECHO MEAS - FS: 7.7 %
BH CV ECHO MEAS - IVS/LVPW: 0.94 CM
BH CV ECHO MEAS - IVSD: 1.12 CM
BH CV ECHO MEAS - LA DIMENSION: 6.1 CM
BH CV ECHO MEAS - LAT PEAK E' VEL: 2.6 CM/SEC
BH CV ECHO MEAS - LV MASS(C)D: 327.1 GRAMS
BH CV ECHO MEAS - LVIDD: 6.3 CM
BH CV ECHO MEAS - LVIDS: 5.9 CM
BH CV ECHO MEAS - LVOT AREA: 3.1 CM2
BH CV ECHO MEAS - LVOT DIAM: 2 CM
BH CV ECHO MEAS - LVPWD: 1.19 CM
BH CV ECHO MEAS - MED PEAK E' VEL: 3.2 CM/SEC
BH CV ECHO MEAS - MV A MAX VEL: 27.9 CM/SEC
BH CV ECHO MEAS - MV DEC SLOPE: 203.5 CM/SEC2
BH CV ECHO MEAS - MV DEC TIME: 0.31 SEC
BH CV ECHO MEAS - MV E MAX VEL: 54.5 CM/SEC
BH CV ECHO MEAS - MV E/A: 1.96
BH CV ECHO MEAS - RVDD: 3.3 CM
BH CV ECHO MEAS - SV(MOD-SP2): 58 ML
BH CV ECHO MEAS - SV(MOD-SP4): 21 ML
BH CV ECHO MEAS - TAPSE (>1.6): 1.63 CM
BH CV ECHO MEAS - TR MAX PG: 11.3 MMHG
BH CV ECHO MEAS - TR MAX VEL: 168 CM/SEC
BH CV ECHO MEASUREMENTS AVERAGE E/E' RATIO: 18.79
BILIRUB CONJ SERPL-MCNC: 0.3 MG/DL (ref 0–0.3)
BILIRUB INDIRECT SERPL-MCNC: 0.6 MG/DL
BILIRUB SERPL-MCNC: 0.9 MG/DL (ref 0–1.2)
BUN SERPL-MCNC: 32 MG/DL (ref 8–23)
BUN/CREAT SERPL: 20.1 (ref 7–25)
CALCIUM SPEC-SCNC: 9.1 MG/DL (ref 8.6–10.5)
CHLORIDE SERPL-SCNC: 99 MMOL/L (ref 98–107)
CHOLEST SERPL-MCNC: 122 MG/DL (ref 0–200)
CO2 SERPL-SCNC: 23.6 MMOL/L (ref 22–29)
CREAT SERPL-MCNC: 1.59 MG/DL (ref 0.76–1.27)
DEPRECATED RDW RBC AUTO: 45.5 FL (ref 37–54)
EGFRCR SERPLBLD CKD-EPI 2021: 43.3 ML/MIN/1.73
ERYTHROCYTE [DISTWIDTH] IN BLOOD BY AUTOMATED COUNT: 14.2 % (ref 12.3–15.4)
GLUCOSE SERPL-MCNC: 88 MG/DL (ref 65–99)
HBA1C MFR BLD: 5.6 % (ref 4.8–5.6)
HCT VFR BLD AUTO: 42.7 % (ref 37.5–51)
HDLC SERPL-MCNC: 32 MG/DL (ref 40–60)
HGB BLD-MCNC: 13.4 G/DL (ref 13–17.7)
LDLC SERPL CALC-MCNC: 74 MG/DL (ref 0–100)
LDLC/HDLC SERPL: 2.3 {RATIO}
LEFT ATRIUM VOLUME INDEX: 24.3 ML/M2
LV EF 2D ECHO EST: 20 %
MAGNESIUM SERPL-MCNC: 1.9 MG/DL (ref 1.6–2.4)
MCH RBC QN AUTO: 27.9 PG (ref 26.6–33)
MCHC RBC AUTO-ENTMCNC: 31.4 G/DL (ref 31.5–35.7)
MCV RBC AUTO: 88.8 FL (ref 79–97)
PHOSPHATE SERPL-MCNC: 3.5 MG/DL (ref 2.5–4.5)
PLATELET # BLD AUTO: 293 10*3/MM3 (ref 140–450)
PMV BLD AUTO: 10.2 FL (ref 6–12)
POTASSIUM SERPL-SCNC: 3.5 MMOL/L (ref 3.5–5.2)
PROT SERPL-MCNC: 6.9 G/DL (ref 6–8.5)
RBC # BLD AUTO: 4.81 10*6/MM3 (ref 4.14–5.8)
SODIUM SERPL-SCNC: 136 MMOL/L (ref 136–145)
TRIGL SERPL-MCNC: 82 MG/DL (ref 0–150)
VLDLC SERPL-MCNC: 16 MG/DL (ref 5–40)
WBC NRBC COR # BLD AUTO: 8.5 10*3/MM3 (ref 3.4–10.8)

## 2024-04-26 PROCEDURE — G0378 HOSPITAL OBSERVATION PER HR: HCPCS

## 2024-04-26 PROCEDURE — 99233 SBSQ HOSP IP/OBS HIGH 50: CPT | Performed by: INTERNAL MEDICINE

## 2024-04-26 PROCEDURE — 97165 OT EVAL LOW COMPLEX 30 MIN: CPT

## 2024-04-26 PROCEDURE — 83735 ASSAY OF MAGNESIUM: CPT | Performed by: PHYSICIAN ASSISTANT

## 2024-04-26 PROCEDURE — 83036 HEMOGLOBIN GLYCOSYLATED A1C: CPT | Performed by: PHYSICIAN ASSISTANT

## 2024-04-26 PROCEDURE — 85027 COMPLETE CBC AUTOMATED: CPT | Performed by: PHYSICIAN ASSISTANT

## 2024-04-26 PROCEDURE — 80076 HEPATIC FUNCTION PANEL: CPT | Performed by: PHYSICIAN ASSISTANT

## 2024-04-26 PROCEDURE — 84100 ASSAY OF PHOSPHORUS: CPT | Performed by: PHYSICIAN ASSISTANT

## 2024-04-26 PROCEDURE — 99232 SBSQ HOSP IP/OBS MODERATE 35: CPT | Performed by: INTERNAL MEDICINE

## 2024-04-26 PROCEDURE — 80061 LIPID PANEL: CPT | Performed by: PHYSICIAN ASSISTANT

## 2024-04-26 PROCEDURE — 80048 BASIC METABOLIC PNL TOTAL CA: CPT | Performed by: PHYSICIAN ASSISTANT

## 2024-04-26 RX ORDER — CHOLECALCIFEROL (VITAMIN D3) 125 MCG
10 CAPSULE ORAL NIGHTLY PRN
Status: DISCONTINUED | OUTPATIENT
Start: 2024-04-26 | End: 2024-04-27 | Stop reason: HOSPADM

## 2024-04-26 RX ORDER — CARVEDILOL 3.12 MG/1
3.12 TABLET ORAL EVERY 12 HOURS SCHEDULED
Status: DISCONTINUED | OUTPATIENT
Start: 2024-04-26 | End: 2024-04-27 | Stop reason: HOSPADM

## 2024-04-26 RX ADMIN — PANTOPRAZOLE SODIUM 40 MG: 40 TABLET, DELAYED RELEASE ORAL at 08:11

## 2024-04-26 RX ADMIN — ALLOPURINOL 100 MG: 100 TABLET ORAL at 08:11

## 2024-04-26 RX ADMIN — TAMSULOSIN HYDROCHLORIDE 0.4 MG: 0.4 CAPSULE ORAL at 08:11

## 2024-04-26 RX ADMIN — Medication 10 ML: at 08:12

## 2024-04-26 RX ADMIN — APIXABAN 2.5 MG: 2.5 TABLET, FILM COATED ORAL at 21:30

## 2024-04-26 RX ADMIN — Medication 10 MG: at 21:30

## 2024-04-26 RX ADMIN — ATORVASTATIN CALCIUM 20 MG: 20 TABLET, FILM COATED ORAL at 21:30

## 2024-04-26 RX ADMIN — CARVEDILOL 3.12 MG: 3.12 TABLET, FILM COATED ORAL at 12:38

## 2024-04-26 RX ADMIN — ASPIRIN 81 MG: 81 TABLET, CHEWABLE ORAL at 08:11

## 2024-04-26 RX ADMIN — LORAZEPAM 0.5 MG: 0.5 TABLET ORAL at 21:30

## 2024-04-26 RX ADMIN — APIXABAN 2.5 MG: 2.5 TABLET, FILM COATED ORAL at 12:38

## 2024-04-26 RX ADMIN — CARVEDILOL 3.12 MG: 3.12 TABLET, FILM COATED ORAL at 21:30

## 2024-04-26 NOTE — PLAN OF CARE
Goal Outcome Evaluation:  Plan of Care Reviewed With: patient        Progress: no change (First session for evaluation)  Outcome Evaluation: Patient presents with limitations of balance and endurance/activity tolerance which impede his ability to perform ADL and transfers as prior.  The skills of a therapist will be required to safely and effectively implement treatment plan to restore maximal level of function.      Anticipated Discharge Disposition (OT): home with home health, home with assist

## 2024-04-26 NOTE — PROGRESS NOTES
[facility]   Hospitalist Progress Note  Date: 2024  Patient Name: Javi Martínez  : 1940  MRN: [id number]  Date of admission: 2024      Subjective   Subjective     Chief Complaint: Chest discomfort    Summary: Patient is 83-year-old male past medical history significant for congestive heart failure with reduced ejection fraction, paroxysmal atrial fibrillation, hypertension, hyperlipidemia, osteoarthritis, polyneuropathy, GERD that presents emergency department with chest discomfort associated with shortness of breath been going on for the last couple of days evaluated emergency department CTA was done showed no large PE however contrast bolus was poorly timed had mildly elevated troponin elevated BNP admitted to the hospitalist service cardiology consulted stress test being pursued repeat echocardiogram ordered.    Interval Followup: Patient seen and examined resting comfortably denies chest pains or shortness of air no further workup inpatient per cardiology ejection fraction severely reduced 18 to 20%.  Will refer to heart failure clinic outpatient transition to Lasix 40 mg daily Coreg started continuing with Eliquis can add Jardiance Aldactone and ACE inhibitor in outpatient setting if blood pressures and renal function allow we will hold off for now given borderline blood pressures     Review of systems: All systems reviewed and negative except the following: Patient complains of generalized weakness fatigue   Objective   Objective     Vitals:   Temp:  [97.3 °F (36.3 °C)-98.2 °F (36.8 °C)] 97.3 °F (36.3 °C)  Heart Rate:  [69-71] 69  Resp:  [16-18] 16  BP: (115-122)/(68-74) 120/74    Physical Exam    Constitutional: Awake, alert, no acute distress   Eyes: Pupils equal, sclerae anicteric, no conjunctival injection   HENT: NCAT, mucous membranes moist   Neck: Supple, no thyromegaly, no lymphadenopathy, trachea midline   Respiratory: Clear to auscultation bilaterally, nonlabored respirations     Cardiovascular: RRR, no murmurs, rubs, or gallops, palpable pedal pulses bilaterally   Gastrointestinal: Positive bowel sounds, soft, nontender, nondistended   Musculoskeletal: No bilateral ankle edema, no clubbing or cyanosis to extremities   Psychiatric: Appropriate affect, cooperative   Neurologic: Oriented x 3, strength symmetric in all extremities, Cranial Nerves grossly intact to confrontation, speech clear   Skin: No rashes     Result Review    Result Review:  I have personally reviewed the results from the time of this admission to 4/26/2024 14:25 EDT and agree with these findings:  [x]  Laboratory  CBC          4/8/2024    14:40 4/24/2024    11:35 4/26/2024    05:20   CBC   WBC 8.50  10.10  8.50    RBC 5.23  4.61  4.81    Hemoglobin 14.6  12.9  13.4    Hematocrit 46.5  39.7  42.7    MCV 88.9  86.1  88.8    MCH 27.9  28.0  27.9    MCHC 31.4  32.5  31.4    RDW 14.8  14.5  14.2    Platelets 231  231  293      CMP          4/8/2024    14:40 4/24/2024    11:35 4/26/2024    05:20   CMP   Glucose 91  114  88    BUN 25  20  32    Creatinine 1.69  1.51  1.59    EGFR 40.3  46.1  43.3    Sodium 140  134  136    Potassium 4.5  3.7  3.5    Chloride 105  102  99    Calcium 9.2  9.0  9.1    Total Protein 6.6  6.7  6.9    Albumin 3.6  3.2  3.1    Globulin 3.0  3.5     Total Bilirubin 1.6  1.9  0.9    Alkaline Phosphatase 114  125  149    AST (SGOT) 18  30  44    ALT (SGPT) 13  28  43    Albumin/Globulin Ratio 1.2  0.9     BUN/Creatinine Ratio 14.8  13.2  20.1    Anion Gap 11.7  10.8  13.4        []  Microbiology  []  Radiology  []  EKG/Telemetry   []  Cardiology/Vascular   []  Pathology  []  Old records  []  Other:    Assessment & Plan   Assessment / Plan   Assessment    Chest discomfort  Paroxysmal A-fib on chronic anticoagulation with Eliquis  Acute on chronic congestive heart failure with reduced ejection fraction  Hypertension  Hyperlipidemia  Anxiety  Acute grief reaction patient has just recently lost his  wife  GERD    Plan:    Checking 2D echocardiogram reviewed ejection fraction 18 to 20%  Stress test completed ejection fraction severely reduced 18% EKG portion nondiagnostic secondary to paced rhythm moderate size fixed with moderate intensity inferior lateral defect consistent with prior infarct no significant ischemic changes noted  Eliquis resumed but at lower dose secondary to CKD  Continue with beta-blocker continue Lasix 40 mg daily  Can add Jardiance Aldactone and ACE inhibitor or Entresto at later date if blood pressures and renal function allow would likely also be a good candidate for upgrade to BiV pacing system this can be followed up in the outpatient setting at the heart failure clinic and with his cardiologist  Follow-up with heart failure clinic outpatient  Evidently had some suicidal ideations in the emergency department in the setting of his recently  wife suspect acute grief reaction however safety sitter/close Lorch has been placed consulting psychiatry.  PT OT consultations  Further treatment contingent upon his hospital course  Can likely discharge home next 24 to 48 hours once cleared from cardiology and psychiatry  Discussed with RN         DVT prophylaxis:  Medical DVT prophylaxis orders are present.        CODE STATUS:   Level Of Support Discussed With: Patient  Code Status (Patient has no pulse and is not breathing): CPR (Attempt to Resuscitate)  Medical Interventions (Patient has pulse or is breathing): Full Support      Electronically signed by DOLORES Burt, 24, 2:30 PM EDT.           Attending Documentation:  Patient independently seen and evaluated, above documentation reflects plan put forth during bedside rounds.  More than 51% of the time of this patient encounter was performed by me. I discussed the care plan with ERIC Hall PA-C, I agree with his findings and plan as documented, what I have added to the care plan and modified is as follows in my  documentation and my medical decision making; 83-year-old male with CHF, pAF, presented with chest discomfort, seen by cardiology, stress test, 2D echocardiogram.  Patient doing better from a cardiac standpoint, cleared for discharge from cardiac standpoint.  EF 18 to 20% on stress test, medical management per cardiology.  Patient also having grief reaction due to recent loss of wife, had apparently mentioned some SI in the ED, he adamantly denies suicidal thoughts at this time but will have psychiatry see him tomorrow.  Electronically signed by Ge Carmona MD, 04/26/24, 5:29 PM EDT.

## 2024-04-26 NOTE — THERAPY EVALUATION
"Patient Name: Javi Martínez  : 1940    MRN: [id number]                              Today's Date: 2024       Admit Date: 2024    Visit Dx:     ICD-10-CM ICD-9-CM   1. Congestive heart failure, unspecified HF chronicity, unspecified heart failure type  I50.9 428.0   2. Decreased activities of daily living (ADL)  Z78.9 V49.89     Patient Active Problem List   Diagnosis    Primary osteoarthritis of left knee    S/P TKR (total knee replacement)    Essential hypertension    CAD with CABG    Hyperlipidemia    HFrEF (heart failure with reduced ejection fraction)    Atrial fibrillation, persistent    Presence of cardiac pacemaker single chamber    Anxiety    Gastroesophageal reflux disease    Gout    History of malignant neoplasm of prostate    Testicular hypofunction    Polyneuropathy    Chronic obstructive pulmonary disease    Benign prostatic hyperplasia without lower urinary tract symptoms    Chest pain    CHF exacerbation    Acute on chronic HFrEF (heart failure with reduced ejection fraction)     Past Medical History:   Diagnosis Date    Aneurysm     Arthritis     left knee     Atrial fibrillation, persistent     CHB (complete heart block) 2023    CHF exacerbation 2024    Chronic obstructive pulmonary disease 2023    Difficulty walking     Numbness in right foot    Essential hypertension 2019    GERD (gastroesophageal reflux disease)     HFrEF (heart failure with reduced ejection fraction) 2021    Hyperlipidemia 2021    Paroxysmal atrial fibrillation     Polyneuropathy 2023    Prostate cancer 2010    with seeds implant     Septic joint 2021     Past Surgical History:   Procedure Laterality Date    ABLATION OF DYSRHYTHMIC FOCUS      APPENDECTOMY      ARTERIAL BYPASS SURGERY      CARDIAC ABLATION      x2  \"years ago\"     CARDIAC ELECTROPHYSIOLOGY PROCEDURE N/A 2021    Procedure: PPM battery change;  Surgeon: Ge Whelan MD;  Location: " [facility] CATH INVASIVE LOCATION;  Service: Cardiovascular;  Laterality: N/A;    CARDIAC SURGERY  2006    cabg 3v    CORONARY ARTERY BYPASS GRAFT  2006    ENDOSCOPY      with dilation     KNEE ARTHROSCOPY Left     TOTAL KNEE ARTHROPLASTY Left 07/23/2021    Procedure: TOTAL KNEE ARTHROPLASTY WITH DORA NAVIGATION WITH BIOMET;  Surgeon: Carlitos Zhao MD;  Location: [facility] MAIN OR;  Service: Orthopedics;  Laterality: Left;    VENTRICULAR CARDIAC PACEMAKER INSERTION      [employer]       General Information       Row Name 04/26/24 1141          OT Time and Intention    Document Type evaluation  -AV     Mode of Treatment individual therapy;occupational therapy  -AV       Row Name 04/26/24 1141          General Information    Patient Profile Reviewed yes  -AV     Prior Level of Function independent:;ADL's;transfer;all household mobility  Stands to shower (primarily uses tub, also has walk-in shower).  Stands at sink to groom.  Standard commode.  Ambulates without assistive device for household mobility/STC for community mobility.  No home oxygen.  -AV     Existing Precautions/Restrictions fall  Close watch  -AV     Barriers to Rehab none identified  -AV       Row Name 04/26/24 1141          Occupational Profile    Reason for Services/Referral (Occupational Profile) Patient is a 83 year old male admitted to [facility] on April 24, 2024 with chest pain and shortness of air.  He is currently on fourth floor/room air.   OT consulted due to recent decline in ADL/transfer independence.  No previous OT services for current condition.  -AV       Row Name 04/26/24 1141          Living Environment    People in Home alone  Wife passed away 1 week ago  -AV       Row Name 04/26/24 1141          Home Main Entrance    Number of Stairs, Main Entrance two  -AV       Row Name 04/26/24 1141          Stairs Within Home, Primary    Number of Stairs, Within Home, Primary none  -AV       Row Name 04/26/24 1141          Cognition     Orientation Status (Cognition) --  Patient is alert, pleasant and cooperative- able to retain information and follow commands.  -AV       Row Name 04/26/24 1141          Safety Issues, Functional Mobility    Impairments Affecting Function (Mobility) balance;endurance/activity tolerance  -AV               User Key  (r) = Recorded By, (t) = Taken By, (c) = Cosigned By      Initials Name Provider Type    Antonio Ramírez, OT Occupational Therapist                     Mobility/ADL's       Row Name 04/26/24 1143          Transfers    Comment, (Transfers) CGA/STC  -AV       Row Name 04/26/24 1143          Activities of Daily Living    BADL Assessment/Intervention --  Independent feeding and grooming with set up while seated.  CGA bathing/dressing.  CGA toilet hygiene (ambulates to bathroom vs BSC).  -AV               User Key  (r) = Recorded By, (t) = Taken By, (c) = Cosigned By      Initials Name Provider Type    Antonio Ramírez, DARREN Occupational Therapist                   Obj/Interventions       Row Name 04/26/24 1143          Sensory Assessment (Somatosensory)    Sensory Assessment (Somatosensory) UE sensation intact  -AV       Row Name 04/26/24 1143          Vision Assessment/Intervention    Visual Impairment/Limitations WFL;corrective lenses full-time  -AV       Row Name 04/26/24 1143          Range of Motion Comprehensive    General Range of Motion bilateral upper extremity ROM WFL  -AV     Comment, General Range of Motion AROM  -AV       Row Name 04/26/24 1143          Strength Comprehensive (MMT)    Comment, General Manual Muscle Testing (MMT) Assessment 5/5 bilateral biceps, triceps and   -AV       Row Name 04/26/24 1143          Motor Skills    Motor Skills coordination;functional endurance  -AV     Coordination --  Right dominant  -AV     Functional Endurance Fair minus  -AV       Row Name 04/26/24 1143          Balance    Comment, Balance CGA/STC  -AV               User Key  (r) = Recorded By, (t) =  Taken By, (c) = Cosigned By      Initials Name Provider Type    AV Antonio Ross, OT Occupational Therapist                   Goals/Plan       Row Name 04/26/24 1145          Transfer Goal 1 (OT)    Activity/Assistive Device (Transfer Goal 1, OT) transfers, all  -AV     [place] Level/Cues Needed (Transfer Goal 1, OT) modified independence  -AV     Time Frame (Transfer Goal 1, OT) long term goal (LTG);10 days  -AV       Row Name 04/26/24 1145          Bathing Goal 1 (OT)    Activity/Device (Bathing Goal 1, OT) bathing skills, all;tub bench  -AV     [place] Level/Cues Needed (Bathing Goal 1, OT) modified independence  -AV     Time Frame (Bathing Goal 1, OT) long term goal (LTG);10 days  -AV       Row Name 04/26/24 1145          Dressing Goal 1 (OT)    Activity/Device (Dressing Goal 1, OT) dressing skills, all  -AV     [place]/Cues Needed (Dressing Goal 1, OT) modified independence  -AV     Time Frame (Dressing Goal 1, OT) long term goal (LTG);10 days  -AV       Row Name 04/26/24 1145          Toileting Goal 1 (OT)    Activity/Device (Toileting Goal 1, OT) toileting skills, all;raised toilet seat  -AV     [place] Level/Cues Needed (Toileting Goal 1, OT) modified independence  -AV     Time Frame (Toileting Goal 1, OT) long term goal (LTG);10 days  -AV       Row Name 04/26/24 1145          Grooming Goal 1 (OT)    Activity/Device (Grooming Goal 1, OT) grooming skills, all  -AV     [place] (Grooming Goal 1, OT) modified independence  Standing at sink  -AV     Time Frame (Grooming Goal 1, OT) long term goal (LTG);10 days  -AV       Row Name 04/26/24 1145          Problem Specific Goal 1 (OT)    Problem Specific Goal 1 (OT) Patient will demonstrate fair endurance/activity tolerance needed to support ADLs.  -AV     Time Frame (Problem Specific Goal 1, OT) long term goal (LTG);10 days  -AV       Row Name 04/26/24 1145          Therapy Assessment/Plan (OT)    Planned Therapy Interventions (OT) activity  tolerance training;BADL retraining;functional balance retraining;occupation/activity based interventions;patient/caregiver education/training;ROM/therapeutic exercise;transfer/mobility retraining  -AV               User Key  (r) = Recorded By, (t) = Taken By, (c) = Cosigned By      Initials Name Provider Type    Antonio Ramírez, DARREN Occupational Therapist                   Clinical Impression       Row Name 04/26/24 1144          Pain Assessment    Additional Documentation Pain Scale: FACES Pre/Post-Treatment (Group)  -AV       Row Name 04/26/24 1144          Pain Scale: FACES Pre/Post-Treatment    Pain: FACES Scale, Pretreatment 0-->no hurt  -AV     Posttreatment Pain Rating 0-->no hurt  -AV       [facility] Name 04/26/24 1144          Plan of Care Review    Plan of Care Reviewed With patient  -AV     Progress no change  First session for evaluation  -AV     Outcome Evaluation Patient presents with limitations of balance and endurance/activity tolerance which impede his ability to perform ADL and transfers as prior.  The skills of a therapist will be required to safely and effectively implement treatment plan to restore maximal level of function.  -AV       Row Name 04/26/24 1144          Therapy Assessment/Plan (OT)    Patient/Family Therapy Goal Statement (OT) Hopes to discharge home soon  -AV     Rehab Potential (OT) good, to achieve stated therapy goals  -AV     Criteria for Skilled Therapeutic Interventions Met (OT) yes;meets criteria;skilled treatment is necessary  -AV     Therapy Frequency (OT) 5 times/wk  -AV       Row Name 04/26/24 1144          Therapy Plan Review/Discharge Plan (OT)    Equipment Needs Upon Discharge (OT) commode chair;tub bench  -AV     Anticipated Discharge Disposition (OT) home with home health;home with assist  -AV       [facility] Name 04/26/24 1144          Vital Signs    O2 Delivery Pre Treatment room air  -AV     O2 Delivery Intra Treatment room air  -AV     O2 Delivery Post Treatment room air   -AV       Row Name 04/26/24 1144          Positioning and Restraints    Pre-Treatment Position in bed  -AV     Post Treatment Position bed  -AV     In Bed sitting EOB;call light within reach;encouraged to call for assist;with other staff  Sitter  -AV               User Key  (r) = Recorded By, (t) = Taken By, (c) = Cosigned By      Initials Name Provider Type    Antonio Ramírez, DARREN Occupational Therapist                   Outcome Measures       Row Name 04/26/24 1145          How much help from another is currently needed...    Putting on and taking off regular lower body clothing? 3  -AV     Bathing (including washing, rinsing, and drying) 3  -AV     Toileting (which includes using toilet bed pan or urinal) 3  -AV     Putting on and taking off regular upper body clothing 4  -AV     Taking care of personal grooming (such as brushing teeth) 4  -AV     Eating meals 4  -AV     AM-PAC 6 Clicks Score (OT) 21  -AV       Row Name 04/26/24 1000 04/26/24 0900       How much help from another person do you currently need...    Turning from your back to your side while in flat bed without using bedrails? 4  -BB 4  -BB    Moving from lying on back to sitting on the side of a flat bed without bedrails? 4  -BB 4  -BB    Moving to and from a bed to a chair (including a wheelchair)? 4  -BB 4  -BB    Standing up from a chair using your arms (e.g., wheelchair, bedside chair)? 3  -BB 3  -BB    Climbing 3-5 steps with a railing? 2  -BB 2  -BB    To walk in hospital room? 3  -BB 3  -BB    AM-PAC 6 Clicks Score (PT) 20  -BB 20  -BB    Highest Level of Mobility Goal 6 --> Walk 10 steps or more  -BB 6 --> Walk 10 steps or more  -BB      Row Name 04/26/24 1145          Functional Assessment    Outcome Measure Options AM-PAC 6 Clicks Daily Activity (OT);Optimal Instrument  -AV       Row Name 04/26/24 1145          Optimal Instrument    Optimal Instrument Optimal - 3  -AV     Bending/Stooping 1  -AV     Standing 2  -AV     Reaching 1  -AV      From the list, choose the 3 activities you would most like to be able to do without any difficulty Bending/stooping;Standing;Reaching  -     Total Score Optimal - 3 4  -AV               User Key  (r) = Recorded By, (t) = Taken By, (c) = Cosigned By      Initials Name Provider Type    BB Samantha Wheat, RN Registered Nurse    Antonio Ramírez OT Occupational Therapist                    Occupational Therapy Education       Title: PT OT SLP Therapies (Done)       Topic: Occupational Therapy (Done)       Point: ADL training (Done)       Description:   Instruct learner(s) on proper safety adaptation and remediation techniques during self care or transfers.   Instruct in proper use of assistive devices.                  Learning Progress Summary             Patient Acceptance, E, VU by AV at 4/26/2024 1146                         Point: Home exercise program (Done)       Description:   Instruct learner(s) on appropriate technique for monitoring, assisting and/or progressing therapeutic exercises/activities.                  Learning Progress Summary             Patient Acceptance, E, VU by AV at 4/26/2024 1146                         Point: Precautions (Done)       Description:   Instruct learner(s) on prescribed precautions during self-care and functional transfers.                  Learning Progress Summary             Patient Acceptance, E, VU by AV at 4/26/2024 1146                         Point: Body mechanics (Done)       Description:   Instruct learner(s) on proper positioning and spine alignment during self-care, functional mobility activities and/or exercises.                  Learning Progress Summary             Patient Acceptance, E, VU by AV at 4/26/2024 1146                                         User Key       Initials Effective Dates Name Provider Type Discipline     06/16/21 -  Antonio Ross OT Occupational Therapist OT                  OT Recommendation and Plan  Planned Therapy  Interventions (OT): activity tolerance training, BADL retraining, functional balance retraining, occupation/activity based interventions, patient/caregiver education/training, ROM/therapeutic exercise, transfer/mobility retraining  Therapy Frequency (OT): 5 times/wk  Plan of Care Review  Plan of Care Reviewed With: patient  Progress: no change (First session for evaluation)  Outcome Evaluation: Patient presents with limitations of balance and endurance/activity tolerance which impede his ability to perform ADL and transfers as prior.  The skills of a therapist will be required to safely and effectively implement treatment plan to restore maximal level of function.     Time Calculation:   Evaluation Complexity (OT)  Review Occupational Profile/Medical/Therapy History Complexity: expanded/moderate complexity  Assessment, Occupational Performance/Identification of Deficit Complexity: 1-3 performance deficits  Clinical Decision Making Complexity (OT): problem focused assessment/low complexity  Overall Complexity of Evaluation (OT): low complexity     Time Calculation- OT       Row Name 04/26/24 1146             Time Calculation- OT    OT Received On 04/26/24  -AV      OT Goal Re-Cert Due Date 05/05/24  -AV         Untimed Charges    OT Eval/Re-eval Minutes 32  -AV         Total Minutes    Untimed Charges Total Minutes 32  -AV       Total Minutes 32  -AV                User Key  (r) = Recorded By, (t) = Taken By, (c) = Cosigned By      Initials Name Provider Type    AV Antonio Ross OT Occupational Therapist                  Therapy Charges for Today       Code Description Service Date Service Provider Modifiers Qty    [id number] HC OT EVAL LOW COMPLEXITY 3 4/26/2024 Antonio Ross OT GO 1                 Antonio Ross OT  4/26/2024

## 2024-04-26 NOTE — PROGRESS NOTES
CARDIOLOGY  INPATIENT PROGRESS NOTE                [facility] 4TH FLOOR MEDICAL TELEMETRY UNIT    4/26/2024      PATIENT IDENTIFICATION:   Name:  Javi Martínez      MRN:  [id number]     83 y.o.  male                 SUBJECTIVE:   Patient symptomatically improved this morning no chest pain problems breathing better  OBJECTIVE:  Vitals:    04/25/24 2245 04/26/24 0310 04/26/24 0510 04/26/24 0717   BP: 118/71 115/71  116/68   BP Location: Right arm Right arm  Right arm   Patient Position: Lying Lying  Lying   Pulse: 71 70  70   Resp: 18 18  16   Temp: 98.1 °F (36.7 °C) 98.2 °F (36.8 °C)  98.2 °F (36.8 °C)   TempSrc: Oral Oral  Oral   SpO2: 95% 99%  95%   Weight:   82.5 kg (181 lb 14.1 oz)    Height:               Body mass index is 22.14 kg/m².  No intake or output data in the 24 hours ending 04/26/24 1002    Telemetry: Paced rhythm    Physical Exam  General Appearance:   no acute distress  Alert and oriented x3  HENT:   lips not cyanotic  Atraumatic  Neck:  No jvd   supple  Respiratory:  no respiratory distress  normal breath sounds  no rales  Cardiovascular:    regular rhythm  no S3, no S4   no murmur  no rub  lower extremity edema: Mild skin:   warm, dry  No rashes      No Known Allergies  Scheduled meds:  !Patient Home Medications Stored in Pharmacy, , Does not apply, BID  allopurinol, 100 mg, Oral, Daily  apixaban, 5 mg, Oral, Q12H  aspirin, 81 mg, Oral, Daily  atorvastatin, 20 mg, Oral, Nightly  furosemide, 40 mg, Intravenous, Daily  metoprolol succinate XL, 12.5 mg, Oral, Q24H  pantoprazole, 40 mg, Oral, Q AM  tamsulosin, 0.4 mg, Oral, Daily      IV meds:                         Data Review:  CBC          4/8/2024    14:40 4/24/2024    11:35 4/26/2024    05:20   CBC   WBC 8.50  10.10  8.50    RBC 5.23  4.61  4.81    Hemoglobin 14.6  12.9  13.4    Hematocrit 46.5  39.7  42.7    MCV 88.9  86.1  88.8    MCH 27.9  28.0  27.9    MCHC 31.4  32.5  31.4    RDW 14.8  14.5  14.2    Platelets 231  231  293   "    CMP          4/8/2024    14:40 4/24/2024    11:35 4/26/2024    05:20   CMP   Glucose 91  114  88    BUN 25  20  32    Creatinine 1.69  1.51  1.59    EGFR 40.3  46.1  43.3    Sodium 140  134  136    Potassium 4.5  3.7  3.5    Chloride 105  102  99    Calcium 9.2  9.0  9.1    Total Protein 6.6  6.7  6.9    Albumin 3.6  3.2  3.1    Globulin 3.0  3.5     Total Bilirubin 1.6  1.9  0.9    Alkaline Phosphatase 114  125  149    AST (SGOT) 18  30  44    ALT (SGPT) 13  28  43    Albumin/Globulin Ratio 1.2  0.9     BUN/Creatinine Ratio 14.8  13.2  20.1    Anion Gap 11.7  10.8  13.4       CARDIAC LABS:      Lab 04/24/24  1419 04/24/24  1135   PROBNP  --  7,858.0*   HSTROP T 47* 53*        No results found for: \"DIGOXIN\"   Lab Results   Component Value Date    TSH 2.080 04/24/2024     Results from last 7 days   Lab Units 04/26/24  0520   CHOLESTEROL mg/dL 122   HDL CHOL mg/dL 32*     No results found for: \"POCTROP\"  Lab Results   Component Value Date    TROPONINT 47 (H) 04/24/2024   (  Lab Results   Component Value Date    MG 1.9 04/26/2024     Results for orders placed during the hospital encounter of 09/02/23    Adult Transthoracic Echo Complete w/ Color, Spectral and Contrast if necessary per protocol    Interpretation Summary    Left ventricular systolic function is moderately decreased. Calculated left ventricular EF = 30-35%, similar to previous study.    The left ventricular cavity is moderately dilated.    Left ventricular wall thickness is consistent with concentric hypertrophy.    Left ventricular diastolic function is consistent with (grade III w/high LAP) fixed restrictive pattern.    Moderately reduced right ventricular systolic function noted.    The left atrial cavity is severely dilated.    The right atrial cavity is moderate to severely  dilated.    Moderate to severe tricuspid valve regurgitation is present.    Estimated right ventricular systolic pressure from tricuspid regurgitation is moderately elevated " (45-55 mmHg).    Mild dilation of the aortic root is present.         Results for orders placed during the hospital encounter of 04/24/24    Stress Test With Myocardial Perfusion One Day    Interpretation Summary    Left ventricular ejection fraction is severely reduced (Calculated EF = 18%).  May be inaccurate due to irregularities in gating from underlying rhythm    EKG portion was nondiagnostic due to paced rhythm    Moderate sized fixed moderate intensity inferior lateral defect consistent with prior infarct no significant ischemic changes    ASSESSMENT:    Chest pain    HFrEF (heart failure with reduced ejection fraction)    Essential hypertension    Hyperlipidemia    Atrial fibrillation, persistent    Anxiety    Gastroesophageal reflux disease    Acute on chronic HFrEF (heart failure with reduced ejection fraction)        PLAN:  1.  Patient has had issues with Toprol he states in the past before was willing to try Coreg recommend starting at 3.125 twice daily  2.  Feel can change IV Lasix to oral at 40 mg once a day and discharge patient home on that dose  3.  Patient would benefit from advancement of GDMT but since patient is doing well clinically feel that he can be discharged and follow-up in heart failure clinic for further titration for further titration.  In addition after optimization if his EF still remains persistently decreased patient also would be a candidate for upgrade to a BiV pacing system.          Ge Whelan MD  4/26/2024    10:02 EDT

## 2024-04-26 NOTE — PLAN OF CARE
Goal Outcome Evaluation:         Patients VSS. No complaints of dizziness or soa when walking to the bathroom. CW remains at bedside. Patient denies SI.

## 2024-04-27 ENCOUNTER — READMISSION MANAGEMENT (OUTPATIENT)
Dept: CALL CENTER | Facility: HOSPITAL | Age: 84
End: 2024-04-27
Payer: MEDICARE

## 2024-04-27 VITALS
RESPIRATION RATE: 18 BRPM | TEMPERATURE: 97.7 F | DIASTOLIC BLOOD PRESSURE: 79 MMHG | WEIGHT: 183.64 LBS | BODY MASS INDEX: 22.36 KG/M2 | HEIGHT: 76 IN | HEART RATE: 70 BPM | OXYGEN SATURATION: 98 % | SYSTOLIC BLOOD PRESSURE: 124 MMHG

## 2024-04-27 LAB
ALBUMIN SERPL-MCNC: 3 G/DL (ref 3.5–5.2)
ALP SERPL-CCNC: 138 U/L (ref 39–117)
ALT SERPL W P-5'-P-CCNC: 41 U/L (ref 1–41)
ANION GAP SERPL CALCULATED.3IONS-SCNC: 12.6 MMOL/L (ref 5–15)
AST SERPL-CCNC: 36 U/L (ref 1–40)
BILIRUB CONJ SERPL-MCNC: 0.3 MG/DL (ref 0–0.3)
BILIRUB INDIRECT SERPL-MCNC: 0.6 MG/DL
BILIRUB SERPL-MCNC: 0.9 MG/DL (ref 0–1.2)
BUN SERPL-MCNC: 30 MG/DL (ref 8–23)
BUN/CREAT SERPL: 19.7 (ref 7–25)
CALCIUM SPEC-SCNC: 9 MG/DL (ref 8.6–10.5)
CHLORIDE SERPL-SCNC: 101 MMOL/L (ref 98–107)
CO2 SERPL-SCNC: 23.4 MMOL/L (ref 22–29)
CREAT SERPL-MCNC: 1.52 MG/DL (ref 0.76–1.27)
DEPRECATED RDW RBC AUTO: 44.4 FL (ref 37–54)
EGFRCR SERPLBLD CKD-EPI 2021: 45.7 ML/MIN/1.73
ERYTHROCYTE [DISTWIDTH] IN BLOOD BY AUTOMATED COUNT: 14.4 % (ref 12.3–15.4)
GLUCOSE SERPL-MCNC: 93 MG/DL (ref 65–99)
HCT VFR BLD AUTO: 43.2 % (ref 37.5–51)
HGB BLD-MCNC: 13.9 G/DL (ref 13–17.7)
MAGNESIUM SERPL-MCNC: 1.9 MG/DL (ref 1.6–2.4)
MCH RBC QN AUTO: 27.9 PG (ref 26.6–33)
MCHC RBC AUTO-ENTMCNC: 32.2 G/DL (ref 31.5–35.7)
MCV RBC AUTO: 86.6 FL (ref 79–97)
PHOSPHATE SERPL-MCNC: 3.7 MG/DL (ref 2.5–4.5)
PLATELET # BLD AUTO: 295 10*3/MM3 (ref 140–450)
PMV BLD AUTO: 10.1 FL (ref 6–12)
POTASSIUM SERPL-SCNC: 3.7 MMOL/L (ref 3.5–5.2)
PROT SERPL-MCNC: 6.7 G/DL (ref 6–8.5)
RBC # BLD AUTO: 4.99 10*6/MM3 (ref 4.14–5.8)
SODIUM SERPL-SCNC: 137 MMOL/L (ref 136–145)
WBC NRBC COR # BLD AUTO: 7.08 10*3/MM3 (ref 3.4–10.8)

## 2024-04-27 PROCEDURE — 99239 HOSP IP/OBS DSCHRG MGMT >30: CPT | Performed by: INTERNAL MEDICINE

## 2024-04-27 PROCEDURE — 80076 HEPATIC FUNCTION PANEL: CPT | Performed by: PHYSICIAN ASSISTANT

## 2024-04-27 PROCEDURE — 83735 ASSAY OF MAGNESIUM: CPT | Performed by: PHYSICIAN ASSISTANT

## 2024-04-27 PROCEDURE — G0378 HOSPITAL OBSERVATION PER HR: HCPCS

## 2024-04-27 PROCEDURE — 84100 ASSAY OF PHOSPHORUS: CPT | Performed by: PHYSICIAN ASSISTANT

## 2024-04-27 PROCEDURE — 85027 COMPLETE CBC AUTOMATED: CPT | Performed by: PHYSICIAN ASSISTANT

## 2024-04-27 PROCEDURE — 80048 BASIC METABOLIC PNL TOTAL CA: CPT | Performed by: PHYSICIAN ASSISTANT

## 2024-04-27 RX ORDER — CARVEDILOL 3.12 MG/1
3.12 TABLET ORAL EVERY 12 HOURS SCHEDULED
Qty: 60 TABLET | Refills: 0 | Status: SHIPPED | OUTPATIENT
Start: 2024-04-27 | End: 2024-04-27

## 2024-04-27 RX ORDER — ATORVASTATIN CALCIUM 20 MG/1
20 TABLET, FILM COATED ORAL NIGHTLY
Qty: 30 TABLET | Refills: 0 | Status: SHIPPED | OUTPATIENT
Start: 2024-04-27 | End: 2024-04-27

## 2024-04-27 RX ORDER — ASPIRIN 81 MG/1
81 TABLET, CHEWABLE ORAL DAILY
Qty: 30 TABLET | Refills: 0 | Status: SHIPPED | OUTPATIENT
Start: 2024-04-27 | End: 2024-04-27

## 2024-04-27 RX ORDER — FUROSEMIDE 40 MG/1
40 TABLET ORAL DAILY
Qty: 30 TABLET | Refills: 0 | Status: SHIPPED | OUTPATIENT
Start: 2024-04-27 | End: 2024-04-27

## 2024-04-27 RX ORDER — ASPIRIN 81 MG/1
81 TABLET, CHEWABLE ORAL DAILY
Qty: 30 TABLET | Refills: 0 | Status: SHIPPED | OUTPATIENT
Start: 2024-04-27 | End: 2024-05-14

## 2024-04-27 RX ORDER — ATORVASTATIN CALCIUM 20 MG/1
20 TABLET, FILM COATED ORAL NIGHTLY
Qty: 30 TABLET | Refills: 0 | Status: SHIPPED | OUTPATIENT
Start: 2024-04-27 | End: 2024-05-14

## 2024-04-27 RX ORDER — FUROSEMIDE 40 MG/1
40 TABLET ORAL DAILY
Qty: 30 TABLET | Refills: 0 | Status: SHIPPED | OUTPATIENT
Start: 2024-04-27 | End: 2024-05-14

## 2024-04-27 RX ORDER — CARVEDILOL 3.12 MG/1
3.12 TABLET ORAL EVERY 12 HOURS SCHEDULED
Qty: 60 TABLET | Refills: 0 | Status: SHIPPED | OUTPATIENT
Start: 2024-04-27 | End: 2024-05-14

## 2024-04-27 RX ADMIN — ASPIRIN 81 MG: 81 TABLET, CHEWABLE ORAL at 10:00

## 2024-04-27 RX ADMIN — Medication 10 ML: at 10:00

## 2024-04-27 RX ADMIN — ALLOPURINOL 100 MG: 100 TABLET ORAL at 10:00

## 2024-04-27 RX ADMIN — APIXABAN 2.5 MG: 2.5 TABLET, FILM COATED ORAL at 10:00

## 2024-04-27 RX ADMIN — CARVEDILOL 3.12 MG: 3.12 TABLET, FILM COATED ORAL at 10:00

## 2024-04-27 RX ADMIN — PANTOPRAZOLE SODIUM 40 MG: 40 TABLET, DELAYED RELEASE ORAL at 07:33

## 2024-04-27 RX ADMIN — TAMSULOSIN HYDROCHLORIDE 0.4 MG: 0.4 CAPSULE ORAL at 10:00

## 2024-04-27 NOTE — PLAN OF CARE
Problem: Adult Inpatient Plan of Care  Goal: Plan of Care Review  Outcome: Met  Flowsheets (Taken 4/27/2024 1604)  Progress: improving  Plan of Care Reviewed With: patient  Outcome Evaluation: Patient alert and oriented throughout shift. VSS. No complaints of pain or discomfort at this time. Patient has rested quietly throughout shift and has exhibitied calm, cooperative and appropriate behavior throughout shift. Plan of care discussed with patient, physician and physician assistant. Attempts made to reach out to patient contact with no return phone call. Patient to discharge later this shift.   Goal Outcome Evaluation:  Plan of Care Reviewed With: patient        Progress: improving  Outcome Evaluation: Patient alert and oriented throughout shift. VSS. No complaints of pain or discomfort at this time. Patient has rested quietly throughout shift and has exhibitied calm, cooperative and appropriate behavior throughout shift. Plan of care discussed with patient, physician and physician assistant. Attempts made to reach out to patient contact with no return phone call. Patient to discharge later this shift.

## 2024-04-27 NOTE — DISCHARGE SUMMARY
[facility]         HOSPITALIST  DISCHARGE SUMMARY    Patient Name: Javi Martínez  : 1940  MRN: [id number]    Date of Admission: 2024  Date of Discharge: 2024  Primary Care Physician: Rani Lopez APRN  Reason for admission:  Chest discomfort    Final diagnosis:  Chest discomfort resolved  Paroxysmal A-fib on chronic anticoagulation with Eliquis  Acute on chronic congestive heart failure with reduced ejection fraction  Hypertension  Hyperlipidemia  Anxiety  Acute grief reaction patient has just recently lost his wife  GERD    Consults       Date and Time Order Name Status Description    2024  8:18 AM Inpatient Psychiatrist Consult Completed     2024  5:53 PM Inpatient Cardiology Consult      2024  3:48 PM Inpatient Hospitalist Consult              Active and Resolved Hospital Problems:  Active Hospital Problems    Diagnosis POA    **Chest pain [R07.9] Yes    Acute on chronic HFrEF (heart failure with reduced ejection fraction) [I50.23] Unknown    Anxiety [F41.9] Yes    Hyperlipidemia [E78.5] Yes    HFrEF (heart failure with reduced ejection fraction) [I50.20] Yes    Atrial fibrillation, persistent [I48.19] Yes    Essential hypertension [I10] Yes    Gastroesophageal reflux disease [K21.9] Yes      Resolved Hospital Problems   No resolved problems to display.       Hospital Course     Hospital Course:  Javi Martínez is a 83 y.o. male past medical history significant for hypertension hyperlipidemia anxiety GERD congestive heart failure and atrial fibrillation that presents to the emergency department with chest discomfort patient evaluated admitted to the hospitalist service cardiology consulted patient underwent stress test showed severely reduced ejection fraction calculated at 18% EKG portion was nondiagnostic secondary to paced rhythm showed moderate-sized fixed moderate intensity inferior lateral defect consistent with prior infarct no significant ischemic  changes noted no further cardiac workup needed at this time per cardiology.  Additionally patient started on IV Lasix for CHF exacerbation started on statin aspirin and beta-blocker Eliquis dose reduced secondary to renal insufficiency.  Jardiance Aldactone ACE inhibitor/ARB to be added outpatient if blood pressures and renal function allow patient will be referred to heart failure clinic.  Please note patient has recently lost his wife suffering from acute grief reaction some concern for suicidal ideations in the emergency department safety sitter placed psychiatry consulted they did not feel the patient needed inpatient admission patient is not suicidal.  Seen and examined on 4/27/2024 hemodynamically clinically stable for discharge will be asked to follow-up with his PCP follow-up with his cardiologist Dr. Whelan in the heart failure clinic patient should return to ED for worsening symptoms.         DISCHARGE Follow Up Recommendations for labs and diagnostics: As above      Day of Discharge     Vital Signs:  Temp:  [97.2 °F (36.2 °C)-97.9 °F (36.6 °C)] 97.7 °F (36.5 °C)  Heart Rate:  [70-71] 70  Resp:  [18-19] 18  BP: (106-122)/(68-79) 112/70  Physical Exam:   Constitutional: Awake alert oriented  Cardiovascular: RRR  GI: Abdomen soft nontender bowel sounds present  Respiratory: Clear      Discharge Details        Discharge Medications        New Medications        Instructions Start Date   aspirin 81 MG chewable tablet   81 mg, Oral, Daily      atorvastatin 20 MG tablet  Commonly known as: LIPITOR   20 mg, Oral, Nightly      carvedilol 3.125 MG tablet  Commonly known as: COREG   3.125 mg, Oral, Every 12 Hours Scheduled      furosemide 40 MG tablet  Commonly known as: Lasix   40 mg, Oral, Daily             Changes to Medications        Instructions Start Date   apixaban 2.5 MG tablet tablet  Commonly known as: ELIQUIS  What changed:   medication strength  how much to take  when to take this   2.5 mg, Oral, Every 12  Hours Scheduled             Continue These Medications        Instructions Start Date   allopurinol 100 MG tablet  Commonly known as: ZYLOPRIM   100 mg, Oral, Daily      isosorbide mononitrate 60 MG 24 hr tablet  Commonly known as: IMDUR   60 mg, Oral, Every 24 Hours Scheduled      LORazepam 0.5 MG tablet  Commonly known as: ATIVAN   0.5 mg, Oral, 2 Times Daily PRN      omeprazole 20 MG capsule  Commonly known as: priLOSEC   20 mg, Oral, Daily      tamsulosin 0.4 MG capsule 24 hr capsule  Commonly known as: FLOMAX   1 capsule, Oral, Daily      Testosterone Cypionate 200 MG/ML injection  Commonly known as: DEPOTESTOTERONE CYPIONATE   Intramuscular, Every 30 Days               No Known Allergies    Discharge Disposition:  Home or Self Care    Diet:  Hospital:  Diet Order   Procedures    Diet: Cardiac; Healthy Heart (2-3 Na+); Safe Tray; Fluid Consistency: Thin (IDDSI 0)       Discharge Activity:       CODE STATUS:  Code Status and Medical Interventions:   Ordered at: 04/24/24 2505     Level Of Support Discussed With:    Patient     Code Status (Patient has no pulse and is not breathing):    CPR (Attempt to Resuscitate)     Medical Interventions (Patient has pulse or is breathing):    Full Support         Future Appointments   Date Time Provider Department Center   5/7/2024  2:00 PM August Myers MD [facility]       Additional Instructions for the Follow-ups that You Need to Schedule       Discharge Follow-up with PCP   As directed       Currently Documented PCP:    Rani Lopez APRN    PCP Phone Number:    [phone]     Follow Up Details: one week        Discharge Follow-up with Specified Provider: Dr. Whelan; 1 Week   As directed      To: Dr. Whelan   Follow Up: 1 Week                Pertinent  and/or Most Recent Results     PROCEDURES:   Stress Test With Myocardial Perfusion One Day     Interpretation Summary    Left ventricular ejection fraction is severely reduced (Calculated EF = 18%).  May be  inaccurate due to irregularities in gating from underlying rhythm    EKG portion was nondiagnostic due to paced rhythm    Moderate sized fixed moderate intensity inferior lateral defect consistent with prior infarct no significant ischemic changes    LAB RESULTS:      Lab 04/27/24 0447 04/26/24  0520 04/24/24  1135   WBC 7.08 8.50 10.10   HEMOGLOBIN 13.9 13.4 12.9*   HEMATOCRIT 43.2 42.7 39.7   PLATELETS 295 293 231   NEUTROS ABS  --   --  7.00   IMMATURE GRANS (ABS)  --   --  0.04   LYMPHS ABS  --   --  1.64   MONOS ABS  --   --  1.22*   EOS ABS  --   --  0.15   MCV 86.6 88.8 86.1         Lab 04/27/24 0447 04/26/24  0520 04/24/24  1135   SODIUM 137 136 134*   POTASSIUM 3.7 3.5 3.7   CHLORIDE 101 99 102   CO2 23.4 23.6 21.2*   ANION GAP 12.6 13.4 10.8   BUN 30* 32* 20   CREATININE 1.52* 1.59* 1.51*   EGFR 45.7* 43.3* 46.1*   GLUCOSE 93 88 114*   CALCIUM 9.0 9.1 9.0   MAGNESIUM 1.9 1.9 2.0   PHOSPHORUS 3.7 3.5  --    HEMOGLOBIN A1C  --  5.60  --    TSH  --   --  2.080         Lab 04/27/24 0447 04/26/24  0520 04/24/24  1135   TOTAL PROTEIN 6.7 6.9 6.7   ALBUMIN 3.0* 3.1* 3.2*   GLOBULIN  --   --  3.5   ALT (SGPT) 41 43* 28   AST (SGOT) 36 44* 30   BILIRUBIN 0.9 0.9 1.9*   INDIRECT BILIRUBIN 0.6 0.6  --    BILIRUBIN DIRECT 0.3 0.3  --    ALK PHOS 138* 149* 125*   LIPASE  --   --  27         Lab 04/24/24  1419 04/24/24  1135   PROBNP  --  7,858.0*   HSTROP T 47* 53*         Lab 04/26/24  0520   CHOLESTEROL 122   LDL CHOL 74   HDL CHOL 32*   TRIGLYCERIDES 82             Brief Urine Lab Results       None          Microbiology Results (last 10 days)       ** No results found for the last 240 hours. **            CT Chest With Contrast Diagnostic    Result Date: 4/24/2024  Impression: 1. Limited evaluation for pulmonary embolism given poor contrast bolus timing secondary to patient cardiac dysfunction. No evidence of PE within the main pulmonary arteries. Cannot exclude PE at the lobar artery or beyond levels. Consider  ventilation/perfusion scan. 2. Trace bilateral pleural effusions. 3. Marked cardiomegaly and postsurgical changes of prior CABG. 4. Cholelithiasis without findings of acute cholecystitis.     Electronically Signed By-Dominick Toro MD On:4/24/2024 3:23 PM      XR Chest 1 View    Result Date: 4/24/2024  Impression: Impression: Cardiomegaly. No active pulmonary disease.   Electronically Signed By-JUSTIN TAVERAS MD On:4/24/2024 12:03 PM      XR Chest 1 View    Result Date: 4/8/2024  Impression: Impression: Cardiomegaly with small bilateral pleural effusions new from prior comparison. Correlate for volume overload versus CHF exacerbation.   Electronically Signed By-Bobby Latham MD On:4/8/2024 1:09 PM               Results for orders placed during the hospital encounter of 04/24/24    Adult Transthoracic Echo Complete w/ Color, Spectral and Contrast if necessary per protocol    Interpretation Summary    Left ventricular systolic function is severely decreased. Estimated left ventricular EF = 20% with global hypokinesis and a dyssynchronous contraction pattern    The left ventricular cavity is moderately dilated.    Left ventricular wall thickness is consistent with mild concentric hypertrophy.    Left ventricular diastolic dysfunction is noted.    The right ventricular cavity is mildly dilated.    The left atrial cavity is moderate to severely dilated.    The right atrial cavity is moderately  dilated.    Mild dilation of the aortic root is present.      Labs Pending at Discharge:        Time spent on Discharge including face to face service: 35 minutes    Electronically signed by DOLORES Burt, 04/27/24, 3:31 PM EDT.    Attending Documentation:  Patient independently seen and evaluated, above documentation reflects plan put forth during bedside rounds.  More than 51% of the time of this patient encounter was performed by me. I discussed the care plan with ERIC Hall PA-C, I agree with his findings and  plan as documented, what I have added to the care plan and modified is as follows in my documentation and my medical decision making; 83-year-old male presented with chest discomfort, found to have EF of 20% with global hypokinesis.  Seen by cardiology, underwent stress test showing severely reduced EF, fixed defect, no further ischemic workup was recommended.  Patient will discharge home on medical management for his cardiac disease.  Patient recently lost his wife about 2 weeks ago, suffering from acute grief reaction, there is some initial concerns for SI in the ED, patient denies any SI and feels that he was just grieving.  He was seen by psychiatry, cleared for discharge.  Did not meet any criteria for involuntary psychiatric hospitalization.  Patient says he is looking forward to the future, plans to follow-up with his PCP next week, reports good family support that he can reach out to anytime.  Denies any SI. I discussed the case with psychiatry on the day of discharge and we agreed upon the plan.  Electronically signed by Ge Carmona MD, 04/27/24, 6:41 PM EDT.

## 2024-04-27 NOTE — OUTREACH NOTE
Prep Survey      Flowsheet Row Responses   [facility] facility patient discharged from? [facility]   Is LACE score < 7 ? No   Eligibility Readm Mgmt   Discharge diagnosis Chest pain   Does the patient have one of the following disease processes/diagnoses(primary or secondary)? Other   Does the patient have Home health ordered? No   Is there a DME ordered? No   Prep survey completed? Yes            HARPAL LEIJA - Registered Nurse

## 2024-04-27 NOTE — PLAN OF CARE
Goal Outcome Evaluation:      AxO. VSS. Denies suicidal ideations. Nervous about going home and beginning the mourning process but eager to get out of the hospital.

## 2024-04-27 NOTE — PROGRESS NOTES
[facility]   Hospitalist Progress Note  Date: 2024  Patient Name: Javi Martínez  : 1940  MRN: [id number]  Date of admission: 2024      Subjective   Subjective     Chief Complaint: Chest discomfort    Summary: Patient is 83-year-old male past medical history significant for congestive heart failure with reduced ejection fraction, paroxysmal atrial fibrillation, hypertension, hyperlipidemia, osteoarthritis, polyneuropathy, GERD that presents emergency department with chest discomfort associated with shortness of breath been going on for the last couple of days evaluated emergency department CTA was done showed no large PE however contrast bolus was poorly timed had mildly elevated troponin elevated BNP admitted to the hospitalist service cardiology consulted stress test being pursued repeat echocardiogram ordered.    Interval Followup: Patient seen and examined resting comfortably in bed denies chest pain shortness of air or palpitations.  Denies suicidal ideations.  Has been cleared from cardiology standpoint will discharge home once cleared from psychiatry standpoint.      Review of systems: All systems reviewed and negative except the following: Patient complains of generalized weakness fatigue   Objective   Objective     Vitals:   Temp:  [97.2 °F (36.2 °C)-97.9 °F (36.6 °C)] 97.7 °F (36.5 °C)  Heart Rate:  [70-71] 70  Resp:  [14-19] 18  BP: (106-122)/(68-79) 112/70    Physical Exam    Constitutional: Awake, alert, no acute distress   Eyes: Pupils equal, sclerae anicteric, no conjunctival injection   HENT: NCAT, mucous membranes moist   Neck: Supple, no thyromegaly, no lymphadenopathy, trachea midline   Respiratory: Clear to auscultation bilaterally, nonlabored respirations    Cardiovascular: RRR, no murmurs, rubs, or gallops, palpable pedal pulses bilaterally   Gastrointestinal: Positive bowel sounds, soft, nontender, nondistended   Musculoskeletal: No bilateral ankle edema, no clubbing or  cyanosis to extremities   Psychiatric: Appropriate affect, cooperative   Neurologic: Oriented x 3, strength symmetric in all extremities, Cranial Nerves grossly intact to confrontation, speech clear   Skin: No rashes     Result Review    Result Review:  I have personally reviewed the results from the time of this admission to 4/27/2024 13:40 EDT and agree with these findings:  [x]  Laboratory  CBC          4/24/2024    11:35 4/26/2024    05:20 4/27/2024    04:47   CBC   WBC 10.10  8.50  7.08    RBC 4.61  4.81  4.99    Hemoglobin 12.9  13.4  13.9    Hematocrit 39.7  42.7  43.2    MCV 86.1  88.8  86.6    MCH 28.0  27.9  27.9    MCHC 32.5  31.4  32.2    RDW 14.5  14.2  14.4    Platelets 231  293  295      CMP          4/24/2024    11:35 4/26/2024    05:20 4/27/2024    04:47   CMP   Glucose 114  88  93    BUN 20  32  30    Creatinine 1.51  1.59  1.52    EGFR 46.1  43.3  45.7    Sodium 134  136  137    Potassium 3.7  3.5  3.7    Chloride 102  99  101    Calcium 9.0  9.1  9.0    Total Protein 6.7  6.9  6.7    Albumin 3.2  3.1  3.0    Globulin 3.5      Total Bilirubin 1.9  0.9  0.9    Alkaline Phosphatase 125  149  138    AST (SGOT) 30  44  36    ALT (SGPT) 28  43  41    Albumin/Globulin Ratio 0.9      BUN/Creatinine Ratio 13.2  20.1  19.7    Anion Gap 10.8  13.4  12.6        []  Microbiology  []  Radiology  []  EKG/Telemetry   []  Cardiology/Vascular   []  Pathology  []  Old records  []  Other:    Assessment & Plan   Assessment / Plan   Assessment    Chest discomfort  Paroxysmal A-fib on chronic anticoagulation with Eliquis  Acute on chronic congestive heart failure with reduced ejection fraction  Hypertension  Hyperlipidemia  Anxiety  Acute grief reaction patient has just recently lost his wife  GERD    Plan:    Checking 2D echocardiogram reviewed ejection fraction 18 to 20%  Stress test completed ejection fraction severely reduced 18% EKG portion nondiagnostic secondary to paced rhythm moderate size fixed with moderate  intensity inferior lateral defect consistent with prior infarct no significant ischemic changes noted  Eliquis resumed but at lower dose secondary to CKD  Continue with beta-blocker continue Lasix 40 mg daily  Can add Jardiance Aldactone and ACE inhibitor or Entresto at later date if blood pressures and renal function allow would likely also be a good candidate for upgrade to BiV pacing system this can be followed up in the outpatient setting at the heart failure clinic and with his cardiologist  Follow-up with heart failure clinic outpatient  Evidently had some suicidal ideations in the emergency department in the setting of his recently  wife suspect acute grief reaction however safety sitter/close  has been placed consulting psychiatry, will DC home once cleared from psychiatric standpoint patient is no longer suicidal  PT OT consultations  Further treatment contingent upon his hospital course  Can likely discharge home next 24 to 48 hours once cleared from cardiology and psychiatry  Discussed with RN         DVT prophylaxis:  Medical DVT prophylaxis orders are present.        CODE STATUS:   Level Of Support Discussed With: Patient  Code Status (Patient has no pulse and is not breathing): CPR (Attempt to Resuscitate)  Medical Interventions (Patient has pulse or is breathing): Full Support    Electronically signed by DOLORES Burt, 24, 1:41 PM EDT.             Attending Documentation:  Patient independently seen and evaluated, above documentation reflects plan put forth during bedside rounds.  More than 51% of the time of this patient encounter was performed by me. I discussed the care plan with ERIC Hall PA-C, I agree with his findings and plan as documented, what I have added to the care plan and modified is as follows in my documentation and my medical decision making; 83-year-old male with CHF, pAF, presented with chest discomfort, seen by cardiology, stress test, 2D echocardiogram.   "Patient doing better from a cardiac standpoint, cleared for discharge from cardiac standpoint.  EF 18 to 20% on stress test, medical management per cardiology.  Patient also having grief reaction due to recent loss of wife, had apparently mentioned some SI in the ED, he adamantly denies suicidal thoughts at this time, stating \"that would never happen.\"  Psychiatry has seen the patient, does not appear to meet criteria for involuntary hospitalization.  DC home when okay from cardiology/psych.  Electronically signed by Ge Carmona MD, 04/27/24, 2:43 PM EDT.      " Chronic back pain, unspecified back location, unspecified back pain laterality

## 2024-04-27 NOTE — CONSULTS
" [facility]   PSYCHIATRIC CONSULTATION    Patient Name: Javi Martínez  : 1940  MRN: [id number]  Primary Care Physician:  Rani Lopez APRN  Date of admission: 2024    Referring Provider: DOLORES Burt  Reason for Consultation: SI    Subjective   Subjective     Chief Complaint: \"I lost my wife two weeks ago.\"     HPI:     Javi Martínez is a 83 y.o. male with a history of congestive heart failure, atrial fibrillation, hypertension, hyperlipidemia, osteoarthritis, polyneuropathy, and GERD who has been admitted to the medical floor after presenting to the emergency department with chest pain and shortness of air. Psychiatry consulted after patient apparently voiced suicidal ideation in the emergency department, in the setting of his recently  wife.    Patient seen and evaluated today at bedside by me. He is pleasant and cooperative during the assessment. Patient discusses that his wife of five years passed away unexpectedly about two weeks ago. Patient describes feeling grief and sadness around this loss. He acknowledges making statements to staff here in the hospital suggestive of having suicidal thoughts when he says he was specifically asked about this by staff. Patient denies ever having any intent or plan to harm himself. He states: \"I would never do that. I was just being honest about how I felt in that moment. When you go through grief, sometimes you feel tired of life or wonder how you will go on. But it doesn't mean you will kill yourself.\" Patient expresses some frustration about having been \"stripped of my clothes\" and \"having someone here watching me now.\" He states: \"I was just being honest about how I was feeling. When you lose someone, you can have those feelings.\" Patient denies any history of suicide attempts and identifies reasons to live, including his daughter. Patient identifies having support, including from his daughter and his stepson. Patient says his daughter " lives in [place] and was present for the  and that she plans to return in about two weeks to provide him with support and assistance. He says his stepson and family live close to him and are supportive and comforting to him during this difficult time. Patient also identifies having support from his wife's Sabianism. He says many people have reached out to him and have been there for him since his wife passed away. He says he would reach out for help if feeling overwhelmed or more depressed. He reports fair sleep. He does endorse some fatigue, low energy levels but attributes much of this to his medical conditions. He describes somewhat of a decreased appetite here in the hospital, which he attributes mostly to the food. He denies any recent weight changes. He denies suicidal ideation currently. He says the last time he experienced such thoughts, which he describes as feeling tired of life, was while he was in the emergency department. He is adamant that these feelings were not accompanied by any intent or plan to actually harm himself or end his life. He denies HI, AVH, paranoia. He denies hopelessness. He denies any history of depressive or mood disorder. He does report a history of panic attacks, though notes he has not experienced these very often, possibly about one time per year recently. He says he has not actually had any panic attacks since his wife's passing, though he notes he did reach out to his primary care physician, who prescribed a medication he can take to help if he does experience some anxiety. Patient says he does not recall the name of the medication and says he has not actually had to utilize this. Patient shares that he lost his son to an overdose in 2016 and that he has been through the grieving process before. He is able to engage in a discussion about safety planning. He denies access to firearms. He says he is willing to set up an appointment with his primary care physician to  "follow-up on his grief, anxiety. He says he does not believe he needs any additional mental health resources at this time, though he expresses a willingness to listen to any recommendations provided to him. He provides consent for hospital staff to speak with his family, including his daughter and evetteon.     Patient demonstrates fair insight into his medical history and current treatment. He says he has been started on new medication for congestive heart failure and that he will also have follow-up with a clinic that specializes in treating this condition. He appears future-oriented and forthcoming. He says he hopes he will be able to be discharged soon. He says he lives alone in the home he shared with his wife. Patient says he plans to either remain in that home or explore the option of moving to live with his daughter in [place].       Review of Systems   All systems were reviewed and negative except for: Sadness about the loss of his wife; decreased appetite related to quality of food in the hospital; decreased energy and fatigue.    Personal History     Past Medical History:   Diagnosis Date    Aneurysm 2013    Arthritis     left knee     Atrial fibrillation, persistent     CHB (complete heart block) 06/07/2023    CHF exacerbation 4/24/2024    Chronic obstructive pulmonary disease 09/01/2023    Difficulty walking 2023    Numbness in right foot    Essential hypertension 05/08/2019    GERD (gastroesophageal reflux disease)     HFrEF (heart failure with reduced ejection fraction) 08/11/2021    Hyperlipidemia 08/11/2021    Paroxysmal atrial fibrillation     Polyneuropathy 06/19/2023    Prostate cancer 2010    with seeds implant     Septic joint 11/26/2021       Past Surgical History:   Procedure Laterality Date    ABLATION OF DYSRHYTHMIC FOCUS  2013    APPENDECTOMY      ARTERIAL BYPASS SURGERY      CARDIAC ABLATION      x2  \"years ago\"     CARDIAC ELECTROPHYSIOLOGY PROCEDURE N/A 11/01/2021    Procedure: PPM " battery change;  Surgeon: Ge Whelan MD;  Location: [facility] CATH INVASIVE LOCATION;  Service: Cardiovascular;  Laterality: N/A;    CARDIAC SURGERY  2006    cabg 3v    CORONARY ARTERY BYPASS GRAFT  2006    ENDOSCOPY      with dilation     KNEE ARTHROSCOPY Left     TOTAL KNEE ARTHROPLASTY Left 2021    Procedure: TOTAL KNEE ARTHROPLASTY WITH DORA NAVIGATION WITH BIOMET;  Surgeon: Carlitos Zhao MD;  Location: [facility] MAIN OR;  Service: Orthopedics;  Laterality: Left;    VENTRICULAR CARDIAC PACEMAKER INSERTION      medtronic        Past Psychiatric History: Patient reports a history of panic attacks, managed by his primary care physician.     Psychiatric Hospitalizations: Patient denies    Suicide Attempts: Patient denies    Prior Treatment and Medications Tried: Patient reports recent prescription for anxiety/panic attacks, which he says he not had to utilize yet.         Family History: family history includes Heart attack in his mother; No Known Problems in his brother, father, maternal aunt, maternal grandfather, maternal grandmother, maternal uncle, paternal aunt, paternal grandfather, paternal grandmother, paternal uncle, sister, and another family member. Otherwise pertinent FHx was reviewed and not pertinent to current issue.    Social History:     Social History     Socioeconomic History    Marital status:    Tobacco Use    Smoking status: Former     Current packs/day: 0.00     Average packs/day: 0.5 packs/day for 33.6 years (16.8 ttl pk-yrs)     Types: Cigarettes     Start date: 1962     Quit date: 1995     Years since quittin.7     Passive exposure: Never    Smokeless tobacco: Never   Vaping Use    Vaping status: Never Used   Substance and Sexual Activity    Alcohol use: Never    Drug use: Never    Sexual activity: Yes     Partners: Female       Substance Abuse History: reports that he quit smoking about 28 years ago. His smoking use included cigarettes. He started smoking  "about 62 years ago. He has a 16.8 pack-year smoking history. He has never been exposed to tobacco smoke. He has never used smokeless tobacco. He reports that he does not drink alcohol and does not use drugs.    Home Medications:  LORazepam, Testosterone Cypionate, allopurinol, apixaban, isosorbide mononitrate, omeprazole, and tamsulosin      Allergies:  No Known Allergies    Objective   Objective     Vitals:   Temp:  [97.2 °F (36.2 °C)-97.9 °F (36.6 °C)] 97.7 °F (36.5 °C)  Heart Rate:  [70-71] 70  Resp:  [14-19] 18  BP: (106-122)/(68-79) 112/70  Physical Exam       Mental Status Exam:     Hygiene:   good  Cooperation:  Cooperative  Eye Contact:  Good  Psychomotor Behavior:  Appropriate  Mood: \"Sad about losing my wife\"  Affect:  Full range  Speech:  Normal  Language: Fluent English  Thought Process:  Linear  Thought Content:  Normal  Suicidal:   Patient denies SI currently; he acknowledges recent feelings of being tired of life after wife's passing; he denies these were ever accompanied by any intent or plan to actually end his life.   Homicidal:  None  Hallucinations:  None  Delusion:  None  Memory:  Intact  Orientation:  Person, Place, Time, and Situation  Reliability:  good  Insight:  Good  Judgement:  Good  Impulse Control:  Good    Result Review    Result Review:  I have personally reviewed the results from the time of this admission to 4/27/2024 11:35 EDT and agree with these findings:  [x]  Laboratory  []  Microbiology  []  Radiology  [x]  EKG/Telemetry   []  Cardiology/Vascular   []  Pathology  []  Old records  []  Other:      Assessment & Plan   Assessment / Plan     Brief Patient Summary:  Javi Martínez is a 83 y.o. male who is being evaluated by psychiatry for suicidal ideation.     Active Hospital Problems:  Active Hospital Problems    Diagnosis     **Chest pain     Acute on chronic HFrEF (heart failure with reduced ejection fraction)     Anxiety     Hyperlipidemia     HFrEF (heart failure with reduced " ejection fraction)     Atrial fibrillation, persistent     Essential hypertension     Gastroesophageal reflux disease          Plan:     Patient denies current suicidal ideation and identifies having support; appears future-oriented and willing to engage in outpatient services. Based on my assessment today, patient does not appear to meet criteria for involuntary hospitalization. Furthermore, due to age, he would not be a candidate for transfer to [facility] unit. Patient's presentation appears consistent with a normal grief reaction, however do need to consider potential risk factors in patient for suicide (including age, male gender, recent loss). He does identify having support and is able to engage in a discussion on safety planning. I did discuss pharmacotherapy for treatment of depressive, anxiety symptoms, if warranted. Could consider low dose Sertraline (25mg daily) or Mirtazapine (7.5mg at bedtime). I would avoid Citalopram given patient's cardiac history and potential for QTC prolongation. I would recommend social work reach out to family (daughter, chance) to obtain collateral information and discuss support, safety plans, discharge plans. I did attempt to contact patient's listed  today, with patient's consent, but the individual did not answer the call. Psychiatry can continue to follow while patient remains in the hospital. I would also recommend follow-up with his primary care physician at discharge, at a minimum, so continued assessment of depressive and anxiety symptoms, panic attacks can occur.           Electronically signed by Juli Daniels MD, 04/27/24, 11:35 AM EDT.

## 2024-04-29 NOTE — THERAPY DISCHARGE NOTE
Acute Care - Occupational Therapy Discharge   [facility]    Patient Name: Javi Martínez  : 1940    MRN: [id number]                              Today's Date: 2024       Admit Date: 2024    Visit Dx:     ICD-10-CM ICD-9-CM   1. Congestive heart failure, unspecified HF chronicity, unspecified heart failure type  I50.9 428.0   2. Decreased activities of daily living (ADL)  Z78.9 V49.89   3. Acute on chronic HFrEF (heart failure with reduced ejection fraction)  I50.23 428.23     Patient Active Problem List   Diagnosis    Primary osteoarthritis of left knee    S/P TKR (total knee replacement)    Essential hypertension    CAD with CABG    Hyperlipidemia    HFrEF (heart failure with reduced ejection fraction)    Atrial fibrillation, persistent    Presence of cardiac pacemaker single chamber    Anxiety    Gastroesophageal reflux disease    Gout    History of malignant neoplasm of prostate    Testicular hypofunction    Polyneuropathy    Chronic obstructive pulmonary disease    Benign prostatic hyperplasia without lower urinary tract symptoms    Chest pain    CHF exacerbation    Acute on chronic HFrEF (heart failure with reduced ejection fraction)     Past Medical History:   Diagnosis Date    Aneurysm 2013    Arthritis     left knee     Atrial fibrillation, persistent     CHB (complete heart block) 2023    CHF exacerbation 2024    Chronic obstructive pulmonary disease 2023    Difficulty walking     Numbness in right foot    Essential hypertension 2019    GERD (gastroesophageal reflux disease)     HFrEF (heart failure with reduced ejection fraction) 2021    Hyperlipidemia 2021    Paroxysmal atrial fibrillation     Polyneuropathy 2023    Prostate cancer 2010    with seeds implant     Septic joint 2021     Past Surgical History:   Procedure Laterality Date    ABLATION OF DYSRHYTHMIC FOCUS  2013    APPENDECTOMY      ARTERIAL BYPASS SURGERY      CARDIAC ABLATION    "   x2  \"years ago\"     CARDIAC ELECTROPHYSIOLOGY PROCEDURE N/A 11/01/2021    Procedure: PPM battery change;  Surgeon: Ge Whelan MD;  Location: [facility] CATH INVASIVE LOCATION;  Service: Cardiovascular;  Laterality: N/A;    CARDIAC SURGERY  2006    cabg 3v    CORONARY ARTERY BYPASS GRAFT  2006    ENDOSCOPY      with dilation     KNEE ARTHROSCOPY Left     TOTAL KNEE ARTHROPLASTY Left 07/23/2021    Procedure: TOTAL KNEE ARTHROPLASTY WITH DORA NAVIGATION WITH BIOMET;  Surgeon: Carlitos Zhao MD;  Location: [facility] MAIN OR;  Service: Orthopedics;  Laterality: Left;    VENTRICULAR CARDIAC PACEMAKER INSERTION      medtronic       General Information    No documentation.                  Mobility/ADL's    No documentation.                  Obj/Interventions    No documentation.                  Goals/Plan       Row Name 04/29/24 0959          Transfer Goal 1 (OT)    Activity/Assistive Device (Transfer Goal 1, OT) transfers, all  -AV     [place] Level/Cues Needed (Transfer Goal 1, OT) modified independence  -AV     Time Frame (Transfer Goal 1, OT) long term goal (LTG);10 days  -AV     Progress/Outcome (Transfer Goal 1, OT) goal not met;discharged from facility  -AV       Row Name 04/29/24 0959          Bathing Goal 1 (OT)    Activity/Device (Bathing Goal 1, OT) bathing skills, all;tub bench  -AV     [place] Level/Cues Needed (Bathing Goal 1, OT) modified independence  -AV     Time Frame (Bathing Goal 1, OT) long term goal (LTG);10 days  -AV     Progress/Outcomes (Bathing Goal 1, OT) goal not met;discharged from facility  -AV       Row Name 04/29/24 0959          Dressing Goal 1 (OT)    Activity/Device (Dressing Goal 1, OT) dressing skills, all  -AV     [place]/Cues Needed (Dressing Goal 1, OT) modified independence  -AV     Time Frame (Dressing Goal 1, OT) long term goal (LTG);10 days  -AV     Progress/Outcome (Dressing Goal 1, OT) goal not met;discharged from facility  -AV       Row Name 04/29/24 0959 "          Toileting Goal 1 (OT)    Activity/Device (Toileting Goal 1, OT) toileting skills, all;raised toilet seat  -AV     [place] Level/Cues Needed (Toileting Goal 1, OT) modified independence  -AV     Time Frame (Toileting Goal 1, OT) long term goal (LTG);10 days  -AV     Progress/Outcome (Toileting Goal 1, OT) goal not met;discharged from facility  -AV       Row Name 04/29/24 0959          Grooming Goal 1 (OT)    Activity/Device (Grooming Goal 1, OT) grooming skills, all  -AV     [place] (Grooming Goal 1, OT) modified independence  -AV     Time Frame (Grooming Goal 1, OT) long term goal (LTG);10 days  -AV     Progress/Outcome (Grooming Goal 1, OT) goal not met;discharged from facility  -AV       Row Name 04/29/24 0959          Problem Specific Goal 1 (OT)    Problem Specific Goal 1 (OT) Patient will demonstrate fair endurance/activity tolerance needed to support ADLs.  -AV     Time Frame (Problem Specific Goal 1, OT) long term goal (LTG);10 days  -AV     Progress/Outcome (Problem Specific Goal 1, OT) goal not met;discharged from facility  -AV               User Key  (r) = Recorded By, (t) = Taken By, (c) = Cosigned By      Initials Name Provider Type    Antonio Ramírez OT Occupational Therapist                   Clinical Impression    No documentation.                  Outcome Measures    No documentation.                 Occupational Therapy Education       Title: PT OT SLP Therapies (Resolved)       Topic: Occupational Therapy (Resolved)       Point: ADL training (Resolved)       Description:   Instruct learner(s) on proper safety adaptation and remediation techniques during self care or transfers.   Instruct in proper use of assistive devices.                  Learning Progress Summary             Patient Acceptance, E, VU by AV at 4/26/2024 1146                         Point: Home exercise program (Resolved)       Description:   Instruct learner(s) on appropriate technique for monitoring,  assisting and/or progressing therapeutic exercises/activities.                  Learning Progress Summary             Patient Acceptance, E, VU by AV at 4/26/2024 1146                         Point: Precautions (Resolved)       Description:   Instruct learner(s) on prescribed precautions during self-care and functional transfers.                  Learning Progress Summary             Patient Acceptance, E, VU by AV at 4/26/2024 1146                         Point: Body mechanics (Resolved)       Description:   Instruct learner(s) on proper positioning and spine alignment during self-care, functional mobility activities and/or exercises.                  Learning Progress Summary             Patient Acceptance, E, VU by AV at 4/26/2024 1146                                         User Key       Initials Effective Dates Name Provider Type Discipline     06/16/21 -  Antonio Ross OT Occupational Therapist OT                  OT Recommendation and Plan  Planned Therapy Interventions (OT): activity tolerance training, BADL retraining, functional balance retraining, occupation/activity based interventions, patient/caregiver education/training, ROM/therapeutic exercise, transfer/mobility retraining  Therapy Frequency (OT): 5 times/wk  Plan of Care Review  Plan of Care Reviewed With: patient  Progress: no change (First session for evaluation)  Outcome Evaluation: Patient presents with limitations of balance and endurance/activity tolerance which impede his ability to perform ADL and transfers as prior.  The skills of a therapist will be required to safely and effectively implement treatment plan to restore maximal level of function.  Plan of Care Reviewed With: patient  Outcome Evaluation: Patient presents with limitations of balance and endurance/activity tolerance which impede his ability to perform ADL and transfers as prior.  The skills of a therapist will be required to safely and effectively implement treatment  plan to restore maximal level of function.     Time Calculation:   Evaluation Complexity (OT)  Review Occupational Profile/Medical/Therapy History Complexity: expanded/moderate complexity  Assessment, Occupational Performance/Identification of Deficit Complexity: 1-3 performance deficits  Clinical Decision Making Complexity (OT): problem focused assessment/low complexity  Overall Complexity of Evaluation (OT): low complexity             OT Discharge Summary  Anticipated Discharge Disposition (OT): home with home health, home with assist  Reason for Discharge: Discharge from facility    Antonio Ross, OT  4/29/2024

## 2024-05-01 ENCOUNTER — READMISSION MANAGEMENT (OUTPATIENT)
Dept: CALL CENTER | Facility: HOSPITAL | Age: 84
End: 2024-05-01
Payer: MEDICARE

## 2024-05-01 NOTE — OUTREACH NOTE
Medical Week 1 Survey      Flowsheet Row Responses   [facility] facility patient discharged from? [facility]   Does the patient have one of the following disease processes/diagnoses(primary or secondary)? Other   Week 1 attempt successful? No   Unsuccessful attempts Attempt 1            Brenda Tom Registered Nurse

## 2024-05-07 ENCOUNTER — READMISSION MANAGEMENT (OUTPATIENT)
Dept: CALL CENTER | Facility: HOSPITAL | Age: 84
End: 2024-05-07
Payer: MEDICARE

## 2024-05-14 ENCOUNTER — OFFICE VISIT (OUTPATIENT)
Dept: CARDIOLOGY | Facility: CLINIC | Age: 84
End: 2024-05-14
Payer: MEDICARE

## 2024-05-14 ENCOUNTER — READMISSION MANAGEMENT (OUTPATIENT)
Dept: CALL CENTER | Facility: HOSPITAL | Age: 84
End: 2024-05-14
Payer: MEDICARE

## 2024-05-14 VITALS
HEART RATE: 72 BPM | BODY MASS INDEX: 23.14 KG/M2 | SYSTOLIC BLOOD PRESSURE: 106 MMHG | WEIGHT: 190 LBS | DIASTOLIC BLOOD PRESSURE: 80 MMHG | HEIGHT: 76 IN

## 2024-05-14 DIAGNOSIS — I48.19 ATRIAL FIBRILLATION, PERSISTENT: ICD-10-CM

## 2024-05-14 DIAGNOSIS — Z95.0 PRESENCE OF CARDIAC PACEMAKER: ICD-10-CM

## 2024-05-14 DIAGNOSIS — I25.10 CORONARY ARTERIOSCLEROSIS IN NATIVE ARTERY: ICD-10-CM

## 2024-05-14 DIAGNOSIS — I10 ESSENTIAL HYPERTENSION: ICD-10-CM

## 2024-05-14 DIAGNOSIS — I50.20 HFREF (HEART FAILURE WITH REDUCED EJECTION FRACTION): ICD-10-CM

## 2024-05-14 DIAGNOSIS — I50.23 ACUTE ON CHRONIC HFREF (HEART FAILURE WITH REDUCED EJECTION FRACTION): Primary | ICD-10-CM

## 2024-05-14 DIAGNOSIS — N18.32 STAGE 3B CHRONIC KIDNEY DISEASE: ICD-10-CM

## 2024-05-14 DIAGNOSIS — E78.5 HYPERLIPIDEMIA LDL GOAL <70: ICD-10-CM

## 2024-05-14 RX ORDER — VERICIGUAT 2.5 MG/1
2.5 TABLET, FILM COATED ORAL DAILY
Qty: 14 TABLET | Refills: 0 | COMMUNITY
Start: 2024-05-14

## 2024-05-14 RX ORDER — CITALOPRAM HYDROBROMIDE 10 MG/1
10 TABLET ORAL DAILY
COMMUNITY

## 2024-05-14 RX ORDER — SPIRONOLACTONE 25 MG/1
25 TABLET ORAL EVERY OTHER DAY
Qty: 45 TABLET | Refills: 3 | Status: SHIPPED | OUTPATIENT
Start: 2024-05-14

## 2024-05-14 RX ORDER — VERICIGUAT 2.5 MG/1
2.5 TABLET, FILM COATED ORAL DAILY
Qty: 90 TABLET | Refills: 3 | Status: SHIPPED | OUTPATIENT
Start: 2024-05-14

## 2024-05-14 RX ORDER — DAPAGLIFLOZIN 10 MG/1
10 TABLET, FILM COATED ORAL DAILY
Qty: 30 TABLET | Refills: 3 | Status: SHIPPED | OUTPATIENT
Start: 2024-05-14

## 2024-05-14 RX ORDER — CARVEDILOL 6.25 MG/1
6.25 TABLET ORAL EVERY 12 HOURS SCHEDULED
Qty: 180 TABLET | Refills: 3 | Status: SHIPPED | OUTPATIENT
Start: 2024-05-14

## 2024-05-14 RX ORDER — TRAZODONE HYDROCHLORIDE 50 MG/1
50 TABLET ORAL NIGHTLY PRN
COMMUNITY

## 2024-05-14 NOTE — ASSESSMENT & PLAN NOTE
He has established coronary disease and therefore needs to be on a statin but had some trouble with atorvastatin in the past and does not want to take any more medication.  Will recheck his lipids  And then tried to convince him to go on another low-dose statin

## 2024-05-14 NOTE — ASSESSMENT & PLAN NOTE
He has chronic atrial fibrillation for quite some time.  He also has a pacemaker placed.  We will gradually increase his carvedilol.

## 2024-05-14 NOTE — ASSESSMENT & PLAN NOTE
He denies any symptoms of chest pain.  He is s/p CABG.  He will continue the aspirin beta-blocker and low-dose Imdur

## 2024-05-14 NOTE — OUTREACH NOTE
Medical Week 3 Survey      Flowsheet Row Responses   [facility] [place] patient discharged from? [facility]   Does the patient have one of the following disease processes/diagnoses(primary or secondary)? Other   Week 3 attempt successful? No   Unsuccessful attempts Attempt 1   Revoke Decline to participate            Emily MCPHERSON - Licensed Nurse

## 2024-05-14 NOTE — ASSESSMENT & PLAN NOTE
Javi is an 83 years old gentleman with congestive heart failure with severely reduced ejection fraction, atrial fibrillation, coronary artery disease with previous bypass surgery, who has acute on chronic heart failure with reduced ejection fraction.  He is in class III heart failure.  Here made the following changes in his medications:    1.  Start Farxiga 10 mg once a day.  2.  Start spironolactone 25 mg every other day.  3.  Start Verquvo 2.5 mg once a day.  4.  Increase carvedilol to 3.125 mg 2 tablets twice a day till you finish the current supply then go to new prescription 6.25 mg 1 tablet twice twice daily.  5.  Do a heart rate blood pressure and weight log and bring it to us.  6.  Do blood work 1 or 2 days before your next visit

## 2024-05-14 NOTE — PATIENT INSTRUCTIONS
1.  Start Farxiga 10 mg once a day.  2.  Start spironolactone 25 mg every other day.  3.  Start Verquvo 2.5 mg once a day.  4.  Increase carvedilol to 3.125 mg 2 tablets twice a day till you finish the current supply then go to new prescription 6.25 mg 1 tablet twice twice daily.  5.  Do a heart rate blood pressure and weight log and bring it to us.  6.  Do blood work 1 or 2 days before your next visit

## 2024-05-14 NOTE — PROGRESS NOTES
"New Patient Office Visit    Chief Complaint  Acute on Chronic HFrEF    Subjective            Javi Martínez presents to [facility] CARDIOLOGY  History of Present Illness  Mr. Lawrence is an 83 years old gentleman with heart failure with severely reduced ejection fraction, coronary disease with previous bypass surgery, chronic atrial fibrillation, s/p pacemaker who is quite teary-eyed.  He has lost his desire to live since his wife passed away suddenly secondary to a stroke.  He does not feel like doing anything.  He denies chest pain palpitation dizziness syncope.      Past Medical History:   Diagnosis Date    Aneurysm 2013    Arthritis     left knee     Atrial fibrillation, persistent     CHB (complete heart block) 06/07/2023    CHF exacerbation 4/24/2024    Chronic obstructive pulmonary disease 09/01/2023    Difficulty walking 2023    Numbness in right foot    Essential hypertension 05/08/2019    GERD (gastroesophageal reflux disease)     HFrEF (heart failure with reduced ejection fraction) 08/11/2021    Hyperlipidemia 08/11/2021    Paroxysmal atrial fibrillation     Polyneuropathy 06/19/2023    Prostate cancer 2010    with seeds implant     Septic joint 11/26/2021       No Known Allergies     Past Surgical History:   Procedure Laterality Date    ABLATION OF DYSRHYTHMIC FOCUS  2013    APPENDECTOMY      ARTERIAL BYPASS SURGERY      CARDIAC ABLATION      x2  \"years ago\"     CARDIAC ELECTROPHYSIOLOGY PROCEDURE N/A 11/01/2021    Procedure: PPM battery change;  Surgeon: Ge Whelan MD;  Location: [facility] CATH INVASIVE LOCATION;  Service: Cardiovascular;  Laterality: N/A;    CARDIAC SURGERY  2006    cabg 3v    CORONARY ARTERY BYPASS GRAFT  2006    ENDOSCOPY      with dilation     KNEE ARTHROSCOPY Left     TOTAL KNEE ARTHROPLASTY Left 07/23/2021    Procedure: TOTAL KNEE ARTHROPLASTY WITH DORA NAVIGATION WITH BIOMET;  Surgeon: Carlitos Zhao MD;  Location: [facility] MAIN OR;  Service: Orthopedics;  " Laterality: Left;    VENTRICULAR CARDIAC PACEMAKER INSERTION      medtronic         Social History     Tobacco Use    Smoking status: Former     Current packs/day: 0.00     Average packs/day: 0.5 packs/day for 33.6 years (16.8 ttl pk-yrs)     Types: Cigarettes     Start date: 1962     Quit date: 1995     Years since quittin.8     Passive exposure: Never    Smokeless tobacco: Never   Vaping Use    Vaping status: Never Used   Substance Use Topics    Alcohol use: Never    Drug use: Never       Family History   Problem Relation Age of Onset    Heart attack Mother     No Known Problems Father     No Known Problems Sister     No Known Problems Brother     No Known Problems Maternal Aunt     No Known Problems Maternal Uncle     No Known Problems Paternal Aunt     No Known Problems Paternal Uncle     No Known Problems Maternal Grandmother     No Known Problems Maternal Grandfather     No Known Problems Paternal Grandmother     No Known Problems Paternal Grandfather     No Known Problems Other         Prior to Admission medications    Medication Sig Start Date End Date Taking? Authorizing Provider   allopurinol (ZYLOPRIM) 100 MG tablet Take 1 tablet by mouth Daily. 3/11/22  Yes ProviderEly MD   apixaban (ELIQUIS) 2.5 MG tablet tablet Take 1 tablet by mouth Every 12 (Twelve) Hours for 30 days. Indications: Atrial Fibrillation 24 Yes Jasen Hall PA   carvedilol (COREG) 3.125 MG tablet Take 1 tablet by mouth Every 12 (Twelve) Hours for 30 days. 24 Yes Jasen Hall PA   citalopram (CeleXA) 10 MG tablet Take 1 tablet by mouth Daily.   Yes ProviderEly MD   isosorbide mononitrate (IMDUR) 60 MG 24 hr tablet Take 1 tablet by mouth Daily.  Patient taking differently: Take 0.5 tablets by mouth Daily. 23  Yes Isabel Navarrete APRN   LORazepam (ATIVAN) 0.5 MG tablet Take 1 tablet by mouth 2 (Two) Times a Day As Needed for Anxiety. 24  Yes Zarina  "MD Ely   omeprazole (priLOSEC) 20 MG capsule Take 1 capsule by mouth Daily.   Yes ProviderEly MD   tamsulosin (FLOMAX) 0.4 MG capsule 24 hr capsule Take 1 capsule by mouth Daily. 3/15/24  Yes Ely Srinivasan MD   traZODone (DESYREL) 50 MG tablet Take 1 tablet by mouth At Night As Needed for Sleep. 1-2 tabs hs prn   Yes Ely Srinivasan MD   aspirin 81 MG chewable tablet Chew 1 tablet Daily for 30 days. 4/27/24 5/27/24  aJsen Hall PA   atorvastatin (LIPITOR) 20 MG tablet Take 1 tablet by mouth Every Night for 30 days. 4/27/24 5/27/24  Jasen Hall PA   furosemide (Lasix) 40 MG tablet Take 1 tablet by mouth Daily for 30 days. 4/27/24 5/27/24  Jasen Hall PA   Testosterone Cypionate (DEPOTESTOTERONE CYPIONATE) 200 MG/ML injection Inject  into the appropriate muscle as directed by prescriber Every 30 (Thirty) Days. 4/12/23   Ely Srinivasan MD        Review of Systems   Constitutional:  Positive for fatigue and unexpected weight loss. Negative for unexpected weight gain.   HENT:  Positive for hearing loss. Negative for sinus pressure and swollen glands.    Eyes:  Negative for blurred vision and double vision.   Respiratory:  Positive for wheezing. Negative for cough and shortness of breath.    Cardiovascular:  Positive for leg swelling. Negative for chest pain and palpitations.   Gastrointestinal:  Negative for nausea and vomiting.   Endocrine: Negative for cold intolerance, heat intolerance, polydipsia and polyuria.   Musculoskeletal:  Negative for arthralgias and back pain.   Neurological:  Negative for dizziness, light-headedness and headache.   Hematological:  Bruises/bleeds easily.        Symptom Course: Been Stable    Weight Trend: Decreasing Steadily      Objective     /80   Pulse 72   Ht 193 cm (76\")   Wt 86.2 kg (190 lb)   BMI 23.13 kg/m²       Physical Exam  Constitutional:       General: He is awake.      Appearance: Normal appearance.   Neck:     "  Thyroid: No thyromegaly.      Vascular: JVD present. No carotid bruit.   Cardiovascular:      Rate and Rhythm: Normal rate. Rhythm irregular.      Chest Wall: PMI is not displaced.      Pulses: Normal pulses.      Heart sounds: S1 normal and S2 normal. Murmur heard.      Systolic murmur is present.      No friction rub. No gallop. No S3 or S4 sounds.   Pulmonary:      Effort: Pulmonary effort is normal.      Breath sounds: Normal breath sounds and air entry. No wheezing, rhonchi or rales.   Abdominal:      General: Bowel sounds are normal.      Palpations: Abdomen is soft. There is no mass.      Tenderness: There is no abdominal tenderness.   Musculoskeletal:      Cervical back: Neck supple.      Right lower le+ Pitting Edema present.      Left lower le+ Edema present.   Neurological:      Mental Status: He is alert and oriented to person, place, and time.   Psychiatric:         Mood and Affect: Mood normal.         Behavior: Behavior is cooperative.           Result Review :                      Lab Results   Component Value Date    PROBNP 7,858.0 (H) 2024    PROBNP 4,830.0 (H) 2024    PROBNP 4,554.0 (H) 2023     CMP          2024    11:35 2024    05:20 2024    04:47   CMP   Glucose 114  88  93    BUN 20  32  30    Creatinine 1.51  1.59  1.52    EGFR 46.1  43.3  45.7    Sodium 134  136  137    Potassium 3.7  3.5  3.7    Chloride 102  99  101    Calcium 9.0  9.1  9.0    Total Protein 6.7  6.9  6.7    Albumin 3.2  3.1  3.0    Globulin 3.5      Total Bilirubin 1.9  0.9  0.9    Alkaline Phosphatase 125  149  138    AST (SGOT) 30  44  36    ALT (SGPT) 28  43  41    Albumin/Globulin Ratio 0.9      BUN/Creatinine Ratio 13.2  20.1  19.7    Anion Gap 10.8  13.4  12.6      CBC w/diff          2024    11:35 2024    05:20 2024    04:47   CBC w/Diff   WBC 10.10  8.50  7.08    RBC 4.61  4.81  4.99    Hemoglobin 12.9  13.4  13.9    Hematocrit 39.7  42.7  43.2    MCV 86.1   "88.8  86.6    MCH 28.0  27.9  27.9    MCHC 32.5  31.4  32.2    RDW 14.5  14.2  14.4    Platelets 231  293  295    Neutrophil Rel % 69.3      Immature Granulocyte Rel % 0.4      Lymphocyte Rel % 16.2      Monocyte Rel % 12.1      Eosinophil Rel % 1.5      Basophil Rel % 0.5         Lipid Panel          9/2/2023    04:38 4/26/2024    05:20   Lipid Panel   Total Cholesterol 151  122    Triglycerides 151  82    HDL Cholesterol 26  32    VLDL Cholesterol 27  16    LDL Cholesterol  98  74    LDL/HDL Ratio 3.65  2.30       Lab Results   Component Value Date    TSH 2.080 04/24/2024    TSH 2.980 09/02/2023      Lab Results   Component Value Date    FREET4 1.38 04/24/2024      No results found for: \"DDIMERQUANT\"  Magnesium   Date Value Ref Range Status   04/27/2024 1.9 1.6 - 2.4 mg/dL Final      No results found for: \"DIGOXIN\"   A1C Last 3 Results          9/2/2023    02:20 4/26/2024    05:20   HGBA1C Last 3 Results   Hemoglobin A1C 5.90  5.60           Results for orders placed during the hospital encounter of 04/24/24    Adult Transthoracic Echo Complete w/ Color, Spectral and Contrast if necessary per protocol    Interpretation Summary    Left ventricular systolic function is severely decreased. Estimated left ventricular EF = 20% with global hypokinesis and a dyssynchronous contraction pattern    The left ventricular cavity is moderately dilated.    Left ventricular wall thickness is consistent with mild concentric hypertrophy.    Left ventricular diastolic dysfunction is noted.    The right ventricular cavity is mildly dilated.    The left atrial cavity is moderate to severely dilated.    The right atrial cavity is moderately  dilated.    Mild dilation of the aortic root is present.        Assessment and Plan        Diagnoses and all orders for this visit:    1. Acute on chronic HFrEF (heart failure with reduced ejection fraction) (Primary)  Assessment & Plan:  Javi is an 83 years old gentleman with congestive heart " failure with severely reduced ejection fraction, atrial fibrillation, coronary artery disease with previous bypass surgery, who has acute on chronic heart failure with reduced ejection fraction.  He is in class III heart failure.  Here made the following changes in his medications:    1.  Start Farxiga 10 mg once a day.  2.  Start spironolactone 25 mg every other day.  3.  Start Verquvo 2.5 mg once a day.  4.  Increase carvedilol to 3.125 mg 2 tablets twice a day till you finish the current supply then go to new prescription 6.25 mg 1 tablet twice twice daily.  5.  Do a heart rate blood pressure and weight log and bring it to us.  6.  Do blood work 1 or 2 days before your next visit    Orders:  -     Lipid Panel; Future  -     CBC & Differential; Future  -     Comprehensive Metabolic Panel; Future  -     T4, Free; Future  -     TSH; Future  -     proBNP; Future  -     Magnesium; Future  -     25-HydroxyVitamin D LCMS D2+D3; Future  -     Iron Profile; Future    2. Atrial fibrillation, persistent  Assessment & Plan:  He has chronic atrial fibrillation for quite some time.  He also has a pacemaker placed.  We will gradually increase his carvedilol.      3. CAD with CABG  Assessment & Plan:  He denies any symptoms of chest pain.  He is s/p CABG.  He will continue the aspirin beta-blocker and low-dose Imdur    Orders:  -     Lipid Panel; Future  -     CBC & Differential; Future  -     Comprehensive Metabolic Panel; Future  -     T4, Free; Future  -     TSH; Future  -     proBNP; Future  -     Magnesium; Future  -     25-HydroxyVitamin D LCMS D2+D3; Future  -     Iron Profile; Future    4. Essential hypertension  Assessment & Plan:  I have asked him to maintain a blood pressure and pulse log and bring it to us      5. HFrEF (heart failure with reduced ejection fraction)    6. Hyperlipidemia  Assessment & Plan:  He has established coronary disease and therefore needs to be on a statin but had some trouble with  atorvastatin in the past and does not want to take any more medication.  Will recheck his lipids  And then tried to convince him to go on another low-dose statin    Orders:  -     Lipid Panel; Future    7. Presence of cardiac pacemaker single chamber    8. Stage 3b chronic kidney disease  -     25-HydroxyVitamin D LCMS D2+D3; Future    Other orders  -     carvedilol (COREG) 6.25 MG tablet; Take 1 tablet by mouth Every 12 (Twelve) Hours.  Dispense: 180 tablet; Refill: 3  -     dapagliflozin Propanediol (Farxiga) 10 MG tablet; Take 10 mg by mouth Daily.  Dispense: 30 tablet; Refill: 3  -     Vericiguat (Verquvo) 2.5 MG tablet; Take 1 tablet by mouth Daily.  Dispense: 90 tablet; Refill: 3  -     spironolactone (ALDACTONE) 25 MG tablet; Take 1 tablet by mouth Every Other Day.  Dispense: 45 tablet; Refill: 3  -     Vericiguat (Verquvo) 2.5 MG tablet; Take 1 tablet by mouth Daily. Indications: Cardiac Failure, [id number] exp: 06/10/2025  Dispense: 14 tablet; Refill: 0        Education  Javi Martínez 1940 male  who presents for follow-up of congestive heart failure. Counseling was provided to Javi Martínez for the following topics.   Daily log and monitoring of blood pressure, Javi Martínez was instructed to keep a blood pressure log to monitor for trends of normal and possible abnormal blood pressure readings. Javi Martínez was instructed to bring the blood pressure log to next appointment. Javi Martínez was instructed to keep a daily weight log and to monitor for weight gain. Instructed to notify the physician office if patient experiences a weight gain of 2-3 pounds overnight, or if experiences a weight gain of 5 pounds in a week. Instruction provided to the patient to elevate bilateral lower extremities to help alleviate edema. Instruction provided to patient to decrease salt and fluid intake. Instruction provided on new medications and possible side effects, and when to call the physician office.     Follow Up      Return for Follow-up 2 weeks with JANES Merrill.    Patient was given instructions and counseling regarding his condition or for health maintenance advice. Please see specific information pulled into the AVS if appropriate.     Total time spent, 45 minutes.This time includes time spent by me for the following activities:  Reviewing past records including hospitalizations, performing a cardiac focused examination and/or evaluation, educating and counselling the patient , independently interpreting results and diagnostic tests and communicating that information to patient , documenting information in the medical record, etc. E/M time spent excludes any time spent on other reported services.    Electronically signed by Paul Singh MD, 05/14/24, 10:47 AM EDT.

## 2024-05-21 LAB
QT INTERVAL: 535 MS
QT INTERVAL: 547 MS
QTC INTERVAL: 582 MS
QTC INTERVAL: 593 MS

## 2024-05-29 PROBLEM — I48.20 ATRIAL FIBRILLATION, CHRONIC: Status: ACTIVE | Noted: 2024-05-29

## 2024-05-29 PROBLEM — N18.32 STAGE 3B CHRONIC KIDNEY DISEASE: Status: ACTIVE | Noted: 2024-05-29

## 2024-07-22 ENCOUNTER — APPOINTMENT (OUTPATIENT)
Dept: GENERAL RADIOLOGY | Facility: HOSPITAL | Age: 84
End: 2024-07-22
Payer: MEDICARE

## 2024-07-22 ENCOUNTER — PREP FOR SURGERY (OUTPATIENT)
Dept: OTHER | Facility: HOSPITAL | Age: 84
End: 2024-07-22
Payer: MEDICARE

## 2024-07-22 ENCOUNTER — OFFICE VISIT (OUTPATIENT)
Dept: UROLOGY | Facility: CLINIC | Age: 84
End: 2024-07-22
Payer: MEDICARE

## 2024-07-22 ENCOUNTER — HOSPITAL ENCOUNTER (EMERGENCY)
Facility: HOSPITAL | Age: 84
Discharge: HOME | End: 2024-07-22
Attending: EMERGENCY MEDICINE
Payer: MEDICARE

## 2024-07-22 VITALS
SYSTOLIC BLOOD PRESSURE: 120 MMHG | OXYGEN SATURATION: 95 % | HEIGHT: 76 IN | BODY MASS INDEX: 23.95 KG/M2 | DIASTOLIC BLOOD PRESSURE: 77 MMHG | HEART RATE: 74 BPM | WEIGHT: 196.65 LBS | TEMPERATURE: 98.2 F | RESPIRATION RATE: 18 BRPM

## 2024-07-22 VITALS
WEIGHT: 196 LBS | HEIGHT: 76 IN | SYSTOLIC BLOOD PRESSURE: 113 MMHG | DIASTOLIC BLOOD PRESSURE: 77 MMHG | BODY MASS INDEX: 23.87 KG/M2

## 2024-07-22 DIAGNOSIS — R33.9 URINARY RETENTION: Primary | ICD-10-CM

## 2024-07-22 DIAGNOSIS — N99.112 POSTPROCEDURAL MEMBRANOUS URETHRAL STRICTURE: ICD-10-CM

## 2024-07-22 DIAGNOSIS — Z85.46 HISTORY OF MALIGNANT NEOPLASM OF PROSTATE: Primary | ICD-10-CM

## 2024-07-22 LAB
ALBUMIN SERPL-MCNC: 3.9 G/DL (ref 3.5–5.2)
ALBUMIN/GLOB SERPL: 1.3 G/DL
ALP SERPL-CCNC: 107 U/L (ref 39–117)
ALT SERPL W P-5'-P-CCNC: 12 U/L (ref 1–41)
ANION GAP SERPL CALCULATED.3IONS-SCNC: 13.5 MMOL/L (ref 5–15)
AST SERPL-CCNC: 19 U/L (ref 1–40)
BASOPHILS # BLD AUTO: 0.04 10*3/MM3 (ref 0–0.2)
BASOPHILS NFR BLD AUTO: 0.4 % (ref 0–1.5)
BILIRUB SERPL-MCNC: 2.1 MG/DL (ref 0–1.2)
BUN SERPL-MCNC: 28 MG/DL (ref 8–23)
BUN/CREAT SERPL: 14.6 (ref 7–25)
CALCIUM SPEC-SCNC: 9.7 MG/DL (ref 8.6–10.5)
CHLORIDE SERPL-SCNC: 103 MMOL/L (ref 98–107)
CO2 SERPL-SCNC: 22.5 MMOL/L (ref 22–29)
CREAT SERPL-MCNC: 1.92 MG/DL (ref 0.76–1.27)
DEPRECATED RDW RBC AUTO: 55.5 FL (ref 37–54)
EGFRCR SERPLBLD CKD-EPI 2021: 34.4 ML/MIN/1.73
EOSINOPHIL # BLD AUTO: 0.02 10*3/MM3 (ref 0–0.4)
EOSINOPHIL NFR BLD AUTO: 0.2 % (ref 0.3–6.2)
ERYTHROCYTE [DISTWIDTH] IN BLOOD BY AUTOMATED COUNT: 16.9 % (ref 12.3–15.4)
GLOBULIN UR ELPH-MCNC: 2.9 GM/DL
GLUCOSE SERPL-MCNC: 106 MG/DL (ref 65–99)
HCT VFR BLD AUTO: 42.9 % (ref 37.5–51)
HGB BLD-MCNC: 13.6 G/DL (ref 13–17.7)
IMM GRANULOCYTES # BLD AUTO: 0.06 10*3/MM3 (ref 0–0.05)
IMM GRANULOCYTES NFR BLD AUTO: 0.5 % (ref 0–0.5)
LIPASE SERPL-CCNC: 29 U/L (ref 13–60)
LYMPHOCYTES # BLD AUTO: 1.61 10*3/MM3 (ref 0.7–3.1)
LYMPHOCYTES NFR BLD AUTO: 14.2 % (ref 19.6–45.3)
MCH RBC QN AUTO: 28.6 PG (ref 26.6–33)
MCHC RBC AUTO-ENTMCNC: 31.7 G/DL (ref 31.5–35.7)
MCV RBC AUTO: 90.3 FL (ref 79–97)
MONOCYTES # BLD AUTO: 1.12 10*3/MM3 (ref 0.1–0.9)
MONOCYTES NFR BLD AUTO: 9.9 % (ref 5–12)
NEUTROPHILS NFR BLD AUTO: 74.8 % (ref 42.7–76)
NEUTROPHILS NFR BLD AUTO: 8.47 10*3/MM3 (ref 1.7–7)
NRBC BLD AUTO-RTO: 0 /100 WBC (ref 0–0.2)
PLATELET # BLD AUTO: 189 10*3/MM3 (ref 140–450)
PMV BLD AUTO: 10.1 FL (ref 6–12)
POTASSIUM SERPL-SCNC: 4.8 MMOL/L (ref 3.5–5.2)
PROT SERPL-MCNC: 6.8 G/DL (ref 6–8.5)
RBC # BLD AUTO: 4.75 10*6/MM3 (ref 4.14–5.8)
SODIUM SERPL-SCNC: 139 MMOL/L (ref 136–145)
WBC NRBC COR # BLD AUTO: 11.32 10*3/MM3 (ref 3.4–10.8)
WHOLE BLOOD HOLD COAG: NORMAL

## 2024-07-22 PROCEDURE — 1159F MED LIST DOCD IN RCRD: CPT | Performed by: UROLOGY

## 2024-07-22 PROCEDURE — 83690 ASSAY OF LIPASE: CPT | Performed by: EMERGENCY MEDICINE

## 2024-07-22 PROCEDURE — 25010000002 MORPHINE PER 10 MG: Performed by: EMERGENCY MEDICINE

## 2024-07-22 PROCEDURE — 93005 ELECTROCARDIOGRAM TRACING: CPT | Performed by: EMERGENCY MEDICINE

## 2024-07-22 PROCEDURE — 80053 COMPREHEN METABOLIC PANEL: CPT | Performed by: EMERGENCY MEDICINE

## 2024-07-22 PROCEDURE — 93010 ELECTROCARDIOGRAM REPORT: CPT | Performed by: INTERNAL MEDICINE

## 2024-07-22 PROCEDURE — 71045 X-RAY EXAM CHEST 1 VIEW: CPT

## 2024-07-22 PROCEDURE — 51798 US URINE CAPACITY MEASURE: CPT

## 2024-07-22 PROCEDURE — 99284 EMERGENCY DEPT VISIT MOD MDM: CPT

## 2024-07-22 PROCEDURE — 1160F RVW MEDS BY RX/DR IN RCRD: CPT | Performed by: UROLOGY

## 2024-07-22 PROCEDURE — 96374 THER/PROPH/DIAG INJ IV PUSH: CPT

## 2024-07-22 PROCEDURE — 3074F SYST BP LT 130 MM HG: CPT | Performed by: UROLOGY

## 2024-07-22 PROCEDURE — 3078F DIAST BP <80 MM HG: CPT | Performed by: UROLOGY

## 2024-07-22 PROCEDURE — 51102 DRAIN BL W/CATH INSERTION: CPT | Performed by: UROLOGY

## 2024-07-22 PROCEDURE — 85025 COMPLETE CBC W/AUTO DIFF WBC: CPT | Performed by: EMERGENCY MEDICINE

## 2024-07-22 RX ORDER — SODIUM CHLORIDE 0.9 % (FLUSH) 0.9 %
10 SYRINGE (ML) INJECTION AS NEEDED
OUTPATIENT
Start: 2024-07-22

## 2024-07-22 RX ORDER — SODIUM CHLORIDE 0.9 % (FLUSH) 0.9 %
10 SYRINGE (ML) INJECTION EVERY 12 HOURS SCHEDULED
OUTPATIENT
Start: 2024-07-22

## 2024-07-22 RX ORDER — SODIUM CHLORIDE 9 MG/ML
40 INJECTION, SOLUTION INTRAVENOUS AS NEEDED
OUTPATIENT
Start: 2024-07-22

## 2024-07-22 RX ORDER — SODIUM CHLORIDE 9 MG/ML
100 INJECTION, SOLUTION INTRAVENOUS CONTINUOUS
OUTPATIENT
Start: 2024-07-22

## 2024-07-22 RX ORDER — CIPROFLOXACIN 500 MG/1
TABLET, FILM COATED ORAL
COMMUNITY
Start: 2024-07-20

## 2024-07-22 RX ADMIN — MORPHINE SULFATE 4 MG: 4 INJECTION, SOLUTION INTRAMUSCULAR; INTRAVENOUS at 12:21

## 2024-07-22 NOTE — PROGRESS NOTES
Insert Temp Indwelling Blad Cath, Comp    Date/Time: 7/22/2024 5:27 PM    Performed by: Mally Bernal MD  Authorized by: Mally Bernal MD  Preparation: Patient was prepped and draped in the usual sterile fashion.  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  I attempted to place a wire using a [employer] urology tray and was unable to get a wire through.  I then passed the flexible cystoscope but was unable to get the scope through a dense stricture.  There was at best may be a pinhole opening but the wire would not go through this opening either.    At this point I placed a suprapubic catheter percutaneously into the bladder using about [employer] SP tube kit.  It was prepped and draped using sterile technique.  10 cc of lidocaine were injected under the skin and needle introducer was used to introduce the catheter into the bladder.  This was then stitched in place.  There was return of about 1200 cc of clear yellow urine.

## 2024-07-22 NOTE — H&P
"[facility]   Urology HISTORY AND PHYSICAL    Patient Name: Javi Martínez  : 1940  MRN: [id number]  Primary Care Physician:  Rani Lopez APRN  Date of admission: (Not on file)    Subjective   Subjective       History of Present Illness  Patient is urethral stricture and presents for cystoscopy and optical internal urethrotomy.      Personal History     Past Medical History:   Diagnosis Date    Aneurysm 2013    Arthritis     left knee     Atrial fibrillation, persistent     CHB (complete heart block) 2023    CHF exacerbation 2024    Chronic obstructive pulmonary disease 2023    Difficulty walking     Numbness in right foot    Essential hypertension 2019    GERD (gastroesophageal reflux disease)     HFrEF (heart failure with reduced ejection fraction) 2021    Hyperlipidemia 2021    Paroxysmal atrial fibrillation     Polyneuropathy 2023    Prostate cancer     with seeds implant     Septic joint 2021       Past Surgical History:   Procedure Laterality Date    ABLATION OF DYSRHYTHMIC FOCUS      APPENDECTOMY      ARTERIAL BYPASS SURGERY      CARDIAC ABLATION      x2  \"years ago\"     CARDIAC ELECTROPHYSIOLOGY PROCEDURE N/A 2021    Procedure: PPM battery change;  Surgeon: Ge Whelan MD;  Location: [facility] CATH INVASIVE LOCATION;  Service: Cardiovascular;  Laterality: N/A;    CARDIAC SURGERY  2006    cabg 3v    CORONARY ARTERY BYPASS GRAFT  2006    ENDOSCOPY      with dilation     KNEE ARTHROSCOPY Left     TOTAL KNEE ARTHROPLASTY Left 2021    Procedure: TOTAL KNEE ARTHROPLASTY WITH DORA NAVIGATION WITH BIOMET;  Surgeon: Carlitos Zhao MD;  Location: [facility] OR;  Service: Orthopedics;  Laterality: Left;    VENTRICULAR CARDIAC PACEMAKER INSERTION      medtronic        Family History: family history includes Heart attack in his mother; No Known Problems in his brother, father, maternal aunt, maternal grandfather, maternal " grandmother, maternal uncle, paternal aunt, paternal grandfather, paternal grandmother, paternal uncle, sister, and another family member. Otherwise pertinent FHx was reviewed and not pertinent to current issue.    Social History:  reports that he quit smoking about 29 years ago. His smoking use included cigarettes. He started smoking about 62 years ago. He has a 16.8 pack-year smoking history. He has never been exposed to tobacco smoke. He has never used smokeless tobacco. He reports that he does not drink alcohol and does not use drugs.    Home Medications:  LORazepam, Vericiguat, allopurinol, carvedilol, ciprofloxacin, citalopram, dapagliflozin Propanediol, isosorbide mononitrate, omeprazole, spironolactone, tamsulosin, and traZODone    Allergies:  No Known Allergies    Objective    Objective     Vitals:   Temp:  [98.2 °F (36.8 °C)-98.7 °F (37.1 °C)] 98.2 °F (36.8 °C)  Heart Rate:  [71-74] 74  Resp:  [18] 18  BP: (112-120)/(72-77) 113/77    Physical Exam  Constitutional:       Appearance: Normal appearance.   Cardiovascular:      Rate and Rhythm: Normal rate and regular rhythm.   Pulmonary:      Effort: Pulmonary effort is normal.      Breath sounds: Normal breath sounds.   Neurological:      Mental Status: He is alert. Mental status is at baseline.   Psychiatric:         Mood and Affect: Mood and affect normal.         Speech: Speech normal.         Judgment: Judgment normal.         Result Review    Result Review:  I have personally reviewed the results from the time of this admission to 7/22/2024 17:59 EDT and agree with these findings:  []  Laboratory  []  Microbiology  []  Radiology  []  EKG/Telemetry   []  Cardiology/Vascular   []  Pathology  []  Old records  []  Other:      Assessment & Plan   Assessment / Plan       Active Hospital Problems:  There are no active hospital problems to display for this patient.      Plan:  cystoscopy and optical internal urethrotomy  Risks and benefits discussed with patient  and they are agreeable to proceed.    VTE Prophylaxis:  No VTE prophylaxis order currently exists.        CODE STATUS:           Electronically signed by Mally Bernal MD, 07/22/24, 5:59 PM EDT.

## 2024-07-22 NOTE — ED PROVIDER NOTES
"Time: 11:13 AM EDT  Date of encounter:  7/22/2024  Independent Historian/Clinical History and Information was obtained by:   Patient    History is limited by: N/A    Chief Complaint: Lower abdominal spasming      History of Present Illness:  Patient is a 84 y.o. year old male who presents to the emergency department for evaluation of spasming.  The patient reports that he has had lower abdominal spasming and decreased urination.  Patient denies chest pain but does report some shortness of breath.  Patient has no cough hemoptysis.  Patient denies nausea, vomiting, and diarrhea.    HPI    Patient Care Team  Primary Care Provider: Rani Lopez APRN    Past Medical History:     No Known Allergies  Past Medical History:   Diagnosis Date    Aneurysm 2013    Arthritis     left knee     Atrial fibrillation, persistent     CHB (complete heart block) 06/07/2023    CHF exacerbation 4/24/2024    Chronic obstructive pulmonary disease 09/01/2023    Difficulty walking 2023    Numbness in right foot    Essential hypertension 05/08/2019    GERD (gastroesophageal reflux disease)     HFrEF (heart failure with reduced ejection fraction) 08/11/2021    Hyperlipidemia 08/11/2021    Paroxysmal atrial fibrillation     Polyneuropathy 06/19/2023    Prostate cancer 2010    with seeds implant     Septic joint 11/26/2021     Past Surgical History:   Procedure Laterality Date    ABLATION OF DYSRHYTHMIC FOCUS  2013    APPENDECTOMY      ARTERIAL BYPASS SURGERY      CARDIAC ABLATION      x2  \"years ago\"     CARDIAC ELECTROPHYSIOLOGY PROCEDURE N/A 11/01/2021    Procedure: PPM battery change;  Surgeon: Ge Whelan MD;  Location: [facility] INVASIVE LOCATION;  Service: Cardiovascular;  Laterality: N/A;    CARDIAC SURGERY  2006    cabg 3v    CORONARY ARTERY BYPASS GRAFT  2006    ENDOSCOPY      with dilation     KNEE ARTHROSCOPY Left     TOTAL KNEE ARTHROPLASTY Left 07/23/2021    Procedure: TOTAL KNEE ARTHROPLASTY WITH DORA NAVIGATION WITH " BIOMET;  Surgeon: Carlitos Zhao MD;  Location: [facility] MAIN OR;  Service: Orthopedics;  Laterality: Left;    VENTRICULAR CARDIAC PACEMAKER INSERTION      medtronic      Family History   Problem Relation Age of Onset    Heart attack Mother     No Known Problems Father     No Known Problems Sister     No Known Problems Brother     No Known Problems Maternal Aunt     No Known Problems Maternal Uncle     No Known Problems Paternal Aunt     No Known Problems Paternal Uncle     No Known Problems Maternal Grandmother     No Known Problems Maternal Grandfather     No Known Problems Paternal Grandmother     No Known Problems Paternal Grandfather     No Known Problems Other        Home Medications:  Prior to Admission medications    Medication Sig Start Date End Date Taking? Authorizing Provider   allopurinol (ZYLOPRIM) 100 MG tablet Take 1 tablet by mouth Daily. 3/11/22   Ely Srinivasan MD   carvedilol (COREG) 6.25 MG tablet Take 1 tablet by mouth Every 12 (Twelve) Hours. 5/14/24   Paul Singh MD   citalopram (CeleXA) 10 MG tablet Take 1 tablet by mouth Daily.    Ely Srinivasan MD   dapagliflozin Propanediol (Farxiga) 10 MG tablet Take 10 mg by mouth Daily. 5/14/24   Paul Singh MD   isosorbide mononitrate (IMDUR) 60 MG 24 hr tablet Take 1 tablet by mouth Daily.  Patient taking differently: Take 0.5 tablets by mouth Daily. 9/7/23   Isabel Navarrete APRN   LORazepam (ATIVAN) 0.5 MG tablet Take 1 tablet by mouth 2 (Two) Times a Day As Needed for Anxiety. 4/16/24   Ely Srinivasan MD   omeprazole (priLOSEC) 20 MG capsule Take 1 capsule by mouth Daily.    Ely Srinivasan MD   spironolactone (ALDACTONE) 25 MG tablet Take 1 tablet by mouth Every Other Day. 5/14/24   Paul Singh MD   tamsulosin (FLOMAX) 0.4 MG capsule 24 hr capsule Take 1 capsule by mouth Daily. 3/15/24   Ely Srinivasan MD   traZODone (DESYREL) 50 MG tablet Take 1 tablet by mouth At Night As Needed for Sleep. 1-2  "tabs hs prn    Provider, MD Ely   Vericiguat (Verquvo) 2.5 MG tablet Take 1 tablet by mouth Daily. 24   Paul Singh MD   Vericiguat (Verquvo) 2.5 MG tablet Take 1 tablet by mouth Daily. Indications: Cardiac Failure, [id number] exp: 06/10/2025 5/14/24   Paul Singh MD        Social History:   Social History     Tobacco Use    Smoking status: Former     Current packs/day: 0.00     Average packs/day: 0.5 packs/day for 33.6 years (16.8 ttl pk-yrs)     Types: Cigarettes     Start date: 1962     Quit date: 1995     Years since quittin.0     Passive exposure: Never    Smokeless tobacco: Never   Vaping Use    Vaping status: Never Used   Substance Use Topics    Alcohol use: Never    Drug use: Never         Review of Systems:  Review of Systems   Constitutional:  Negative for chills and fever.   HENT:  Negative for congestion, rhinorrhea and sore throat.    Eyes:  Negative for pain and visual disturbance.   Respiratory:  Negative for apnea, cough, chest tightness and shortness of breath.    Cardiovascular:  Negative for chest pain and palpitations.   Gastrointestinal:  Positive for abdominal pain. Negative for diarrhea, nausea and vomiting.   Genitourinary:  Negative for difficulty urinating and dysuria.   Musculoskeletal:  Negative for joint swelling and myalgias.   Skin:  Negative for color change.   Neurological:  Negative for seizures and headaches.   Psychiatric/Behavioral: Negative.     All other systems reviewed and are negative.       Physical Exam:  /72   Pulse 71   Temp 98.7 °F (37.1 °C) (Oral)   Resp 18   Ht 193 cm (76\")   Wt 89.2 kg (196 lb 10.4 oz)   SpO2 94%   BMI 23.94 kg/m²     Physical Exam  Vitals and nursing note reviewed.   Constitutional:       General: He is not in acute distress.     Appearance: Normal appearance. He is not toxic-appearing.   HENT:      Head: Normocephalic and atraumatic.      Jaw: There is normal jaw occlusion.   Eyes:      General: Lids are " normal.      Extraocular Movements: Extraocular movements intact.      Conjunctiva/sclera: Conjunctivae normal.      Pupils: Pupils are equal, round, and reactive to light.   Cardiovascular:      Rate and Rhythm: Normal rate and regular rhythm.      Pulses: Normal pulses.      Heart sounds: Normal heart sounds.   Pulmonary:      Effort: Pulmonary effort is normal. No respiratory distress.      Breath sounds: Normal breath sounds. No wheezing or rhonchi.   Abdominal:      General: Abdomen is flat.      Palpations: Abdomen is soft.      Tenderness: There is no abdominal tenderness. There is no guarding or rebound.   Musculoskeletal:         General: Normal range of motion.      Cervical back: Normal range of motion and neck supple.      Right lower leg: No edema.      Left lower leg: No edema.   Skin:     General: Skin is warm and dry.   Neurological:      Mental Status: He is alert and oriented to person, place, and time. Mental status is at baseline.   Psychiatric:         Mood and Affect: Mood normal.                  Procedures:  Procedures      Medical Decision Making:      Comorbidities that affect care:    Prostate issues    External Notes reviewed:    Hospital discharge summary: Patient was admitted for chest pain.      The following orders were placed and all results were independently analyzed by me:  Orders Placed This Encounter   Procedures    XR Chest 1 View    Comprehensive Metabolic Panel    Lipase    Urinalysis With Microscopic If Indicated (No Culture) - Urine, Clean Catch    CBC Auto Differential    Insert Indwelling Urinary Catheter    Assess Need for Indwelling Urinary Catheter - Follow Removal Protocol    Urinary Catheter Care    Bladder scan    Inpatient Urology Consult    ECG 12 Lead Chest Pain    CBC & Differential    Extra Tubes    Light Blue Top       Medications Given in the Emergency Department:  Medications   morphine injection 4 mg (4 mg Intravenous Given 7/22/24 1221)        ED  Course:         Labs:    Lab Results (last 24 hours)       Procedure Component Value Units Date/Time    CBC & Differential [[id number]]  (Abnormal) Collected: 07/22/24 1220    Specimen: Blood from Arm, Left Updated: 07/22/24 1237    Narrative:      The following orders were created for panel order CBC & Differential.  Procedure                               Abnormality         Status                     ---------                               -----------         ------                     CBC Auto Differential[[id number]]        Abnormal            Final result                 Please view results for these tests on the individual orders.    Comprehensive Metabolic Panel [[id number]]  (Abnormal) Collected: 07/22/24 1220    Specimen: Blood from Arm, Left Updated: 07/22/24 1256     Glucose 106 mg/dL      BUN 28 mg/dL      Creatinine 1.92 mg/dL      Sodium 139 mmol/L      Potassium 4.8 mmol/L      Chloride 103 mmol/L      CO2 22.5 mmol/L      Calcium 9.7 mg/dL      Total Protein 6.8 g/dL      Albumin 3.9 g/dL      ALT (SGPT) 12 U/L      AST (SGOT) 19 U/L      Alkaline Phosphatase 107 U/L      Total Bilirubin 2.1 mg/dL      Globulin 2.9 gm/dL      A/G Ratio 1.3 g/dL      BUN/Creatinine Ratio 14.6     Anion Gap 13.5 mmol/L      eGFR 34.4 mL/min/1.73     Narrative:      GFR Normal >60  Chronic Kidney Disease <60  Kidney Failure <15    The GFR formula is only valid for adults with stable renal function between ages 18 and 70.    Lipase [[id number]]  (Normal) Collected: 07/22/24 1220    Specimen: Blood from Arm, Left Updated: 07/22/24 1256     Lipase 29 U/L     CBC Auto Differential [[id number]]  (Abnormal) Collected: 07/22/24 1220    Specimen: Blood from Arm, Left Updated: 07/22/24 1237     WBC 11.32 10*3/mm3      RBC 4.75 10*6/mm3      Hemoglobin 13.6 g/dL      Hematocrit 42.9 %      MCV 90.3 fL      MCH 28.6 pg      MCHC 31.7 g/dL      RDW 16.9 %      RDW-SD 55.5 fl      MPV 10.1 fL      Platelets 189 10*3/mm3      Neutrophil  % 74.8 %      Lymphocyte % 14.2 %      Monocyte % 9.9 %      Eosinophil % 0.2 %      Basophil % 0.4 %      Immature Grans % 0.5 %      Neutrophils, Absolute 8.47 10*3/mm3      Lymphocytes, Absolute 1.61 10*3/mm3      Monocytes, Absolute 1.12 10*3/mm3      Eosinophils, Absolute 0.02 10*3/mm3      Basophils, Absolute 0.04 10*3/mm3      Immature Grans, Absolute 0.06 10*3/mm3      nRBC 0.0 /100 WBC              Imaging:    XR Chest 1 View    Result Date: 7/22/2024  XR CHEST 1 VW Date of Exam: 7/22/2024 11:44 AM EDT Indication: Cough, persistent Cough cough Comparison: 4/24/2024 Findings: Patient is status post median sternotomy. Left chest wall pacemaker is present. Cardiac silhouette is enlarged. There are small bilateral pleural effusions with bibasilar atelectasis. No acute infiltrate is definitely identified. Pulmonary vasculature is  unremarkable. No pneumothorax is seen. No acute osseous lesion is seen.     Impression: 1.Cardiomegaly with small bilateral pleural effusions and bibasilar atelectasis. Electronically Signed: West Peña MD  7/22/2024 12:03 PM EDT  Workstation ID: [id number]       Differential Diagnosis and Discussion:    Dysuria: Differential diagnosis includes but is not limited to urethritis, cystitis, pyelonephritis, ureteral calculi, neoplasm, chemical irritant, urethral stricture, and trauma    All labs were reviewed and interpreted by me.    MDM     Amount and/or Complexity of Data Reviewed  Decide to obtain previous medical records or to obtain history from someone other than the patient: yes       The patient´s CBC that was reviewed and interpreted by me shows no abnormalities of critical concern. Of note, there is no anemia requiring a blood transfusion and the platelet count is acceptable.  CMP shows a BUN of 28 and a creatinine of 1.92.  Patient unable to give us a urine sample and nursing staff is unable to catheterize the patient.  Patient did have 400 cc of urine on bladder  scan.          Patient Care Considerations:    CT ABDOMEN AND PELVIS: I considered ordering a CT scan of the abdomen and pelvis however abdomen is soft and nondistended      Consultants/Shared Management Plan:    Case was discussed with Dr. Walker who will place a catheter in her office.    Social Determinants of Health:    Patient is independent, reliable, and has access to care.       Disposition and Care Coordination:    Discharged: I considered escalation of care by admitting this patient to the hospital, however patient will be seen by urology today for catheter placement.    I have explained the patient´s condition, diagnoses and treatment plan based on the information available to me at this time. I have answered questions and addressed any concerns. The patient has a good  understanding of the patient´s diagnosis, condition, and treatment plan as can be expected at this point. The vital signs have been stable. The patient´s condition is stable and appropriate for discharge from the emergency department.      The patient will pursue further outpatient evaluation with the primary care physician or other designated or consulting physician as outlined in the discharge instructions. They are agreeable to this plan of care and follow-up instructions have been explained in detail. The patient has received these instructions in written format and has expressed an understanding of the discharge instructions. The patient is aware that any significant change in condition or worsening of symptoms should prompt an immediate return to this or the closest emergency department or call to 911.  I have explained discharge medications and the need for follow up with the patient/caretakers. This was also printed in the discharge instructions. Patient was discharged with the following medications and follow up:      Medication List        Changed      isosorbide mononitrate 60 MG 24 hr tablet  Commonly known as: IMDUR  Take 1  tablet by mouth Daily.  What changed: how much to take           Rani Lopez, APRN  [address] [facility] [address]  [place] [id number] [phone]          Mally Bernal MD  [address] [place] [id number]  [phone]      Follow-up immediately on discharge.       Final diagnoses:   Urinary retention        ED Disposition       ED Disposition   Discharge    Condition   Stable    Comment   --               This medical record created using voice recognition software.             Ashley Hudson MD  07/22/24 0957

## 2024-07-23 ENCOUNTER — TELEPHONE (OUTPATIENT)
Dept: CARDIOLOGY | Facility: CLINIC | Age: 84
End: 2024-07-23
Payer: MEDICARE

## 2024-07-23 LAB
QT INTERVAL: 533 MS
QTC INTERVAL: 585 MS

## 2024-07-23 NOTE — TELEPHONE ENCOUNTER
Procedure: direct visual internal urethrotomy     Med Directive: Eliquis    PMH:  CAD, pacemaker, HFrEF, afib, HTN, HLD    Last Seen: 3/20/24

## 2024-07-24 ENCOUNTER — TELEPHONE (OUTPATIENT)
Dept: UROLOGY | Facility: CLINIC | Age: 84
End: 2024-07-24
Payer: MEDICARE

## 2024-07-24 PROBLEM — N99.112 POSTPROCEDURAL MEMBRANOUS URETHRAL STRICTURE: Status: ACTIVE | Noted: 2024-07-22

## 2024-07-24 NOTE — TELEPHONE ENCOUNTER
Spoke to patient and scheduled procedure for 8/20/24. I went over preop instructions and informed that I would be mailing the information. I informed that I would call with instructions on how long to hold eliquis prior to procedure. Patient voiced understanding.

## 2024-07-24 NOTE — TELEPHONE ENCOUNTER
Left message informing patient to hold eliquis for 2 days prior to procedure. Asked for call back to verify that patient received information.

## 2024-07-30 ENCOUNTER — APPOINTMENT (OUTPATIENT)
Dept: GENERAL RADIOLOGY | Facility: HOSPITAL | Age: 84
End: 2024-07-30
Payer: MEDICARE

## 2024-07-30 ENCOUNTER — APPOINTMENT (OUTPATIENT)
Dept: CARDIOLOGY | Facility: HOSPITAL | Age: 84
End: 2024-07-30
Payer: MEDICARE

## 2024-07-30 ENCOUNTER — HOSPITAL ENCOUNTER (INPATIENT)
Facility: HOSPITAL | Age: 84
LOS: 4 days | Discharge: HOME | End: 2024-08-03
Attending: EMERGENCY MEDICINE | Admitting: FAMILY MEDICINE
Payer: MEDICARE

## 2024-07-30 DIAGNOSIS — I50.23 ACUTE ON CHRONIC HFREF (HEART FAILURE WITH REDUCED EJECTION FRACTION): ICD-10-CM

## 2024-07-30 DIAGNOSIS — I50.9 ACUTE ON CHRONIC CONGESTIVE HEART FAILURE, UNSPECIFIED HEART FAILURE TYPE: Primary | ICD-10-CM

## 2024-07-30 DIAGNOSIS — R09.02 HYPOXIA: ICD-10-CM

## 2024-07-30 DIAGNOSIS — Z78.9 DECREASED ACTIVITIES OF DAILY LIVING (ADL): ICD-10-CM

## 2024-07-30 LAB
ALBUMIN SERPL-MCNC: 3.3 G/DL (ref 3.5–5.2)
ALBUMIN/GLOB SERPL: 1.1 G/DL
ALP SERPL-CCNC: 127 U/L (ref 39–117)
ALT SERPL W P-5'-P-CCNC: 23 U/L (ref 1–41)
ANION GAP SERPL CALCULATED.3IONS-SCNC: 10.2 MMOL/L (ref 5–15)
AST SERPL-CCNC: 28 U/L (ref 1–40)
BASOPHILS # BLD AUTO: 0.08 10*3/MM3 (ref 0–0.2)
BASOPHILS NFR BLD AUTO: 0.9 % (ref 0–1.5)
BILIRUB SERPL-MCNC: 1.5 MG/DL (ref 0–1.2)
BUN SERPL-MCNC: 23 MG/DL (ref 8–23)
BUN/CREAT SERPL: 14.2 (ref 7–25)
CALCIUM SPEC-SCNC: 8.7 MG/DL (ref 8.6–10.5)
CHLORIDE SERPL-SCNC: 106 MMOL/L (ref 98–107)
CO2 SERPL-SCNC: 23.8 MMOL/L (ref 22–29)
CREAT SERPL-MCNC: 1.62 MG/DL (ref 0.76–1.27)
DEPRECATED RDW RBC AUTO: 54.7 FL (ref 37–54)
EGFRCR SERPLBLD CKD-EPI 2021: 42.1 ML/MIN/1.73
EOSINOPHIL # BLD AUTO: 0.17 10*3/MM3 (ref 0–0.4)
EOSINOPHIL NFR BLD AUTO: 1.9 % (ref 0.3–6.2)
ERYTHROCYTE [DISTWIDTH] IN BLOOD BY AUTOMATED COUNT: 16.5 % (ref 12.3–15.4)
GLOBULIN UR ELPH-MCNC: 3 GM/DL
GLUCOSE SERPL-MCNC: 78 MG/DL (ref 65–99)
HCT VFR BLD AUTO: 44.5 % (ref 37.5–51)
HGB BLD-MCNC: 14 G/DL (ref 13–17.7)
HOLD SPECIMEN: NORMAL
HOLD SPECIMEN: NORMAL
IMM GRANULOCYTES # BLD AUTO: 0.02 10*3/MM3 (ref 0–0.05)
IMM GRANULOCYTES NFR BLD AUTO: 0.2 % (ref 0–0.5)
LYMPHOCYTES # BLD AUTO: 1.91 10*3/MM3 (ref 0.7–3.1)
LYMPHOCYTES NFR BLD AUTO: 21.7 % (ref 19.6–45.3)
MCH RBC QN AUTO: 28.7 PG (ref 26.6–33)
MCHC RBC AUTO-ENTMCNC: 31.5 G/DL (ref 31.5–35.7)
MCV RBC AUTO: 91.2 FL (ref 79–97)
MONOCYTES # BLD AUTO: 0.97 10*3/MM3 (ref 0.1–0.9)
MONOCYTES NFR BLD AUTO: 11 % (ref 5–12)
NEUTROPHILS NFR BLD AUTO: 5.67 10*3/MM3 (ref 1.7–7)
NEUTROPHILS NFR BLD AUTO: 64.3 % (ref 42.7–76)
NRBC BLD AUTO-RTO: 0 /100 WBC (ref 0–0.2)
NT-PROBNP SERPL-MCNC: ABNORMAL PG/ML (ref 0–1800)
PLATELET # BLD AUTO: 220 10*3/MM3 (ref 140–450)
PMV BLD AUTO: 10.3 FL (ref 6–12)
POTASSIUM SERPL-SCNC: 4.4 MMOL/L (ref 3.5–5.2)
PROT SERPL-MCNC: 6.3 G/DL (ref 6–8.5)
QT INTERVAL: 527 MS
QTC INTERVAL: 571 MS
RBC # BLD AUTO: 4.88 10*6/MM3 (ref 4.14–5.8)
SODIUM SERPL-SCNC: 140 MMOL/L (ref 136–145)
TROPONIN T SERPL HS-MCNC: 50 NG/L
WBC NRBC COR # BLD AUTO: 8.82 10*3/MM3 (ref 3.4–10.8)
WHOLE BLOOD HOLD COAG: NORMAL
WHOLE BLOOD HOLD SPECIMEN: NORMAL

## 2024-07-30 PROCEDURE — 71045 X-RAY EXAM CHEST 1 VIEW: CPT

## 2024-07-30 PROCEDURE — 25010000002 FUROSEMIDE PER 20 MG: Performed by: FAMILY MEDICINE

## 2024-07-30 PROCEDURE — 25010000002 FUROSEMIDE PER 20 MG

## 2024-07-30 PROCEDURE — 36415 COLL VENOUS BLD VENIPUNCTURE: CPT

## 2024-07-30 PROCEDURE — 93306 TTE W/DOPPLER COMPLETE: CPT | Performed by: INTERNAL MEDICINE

## 2024-07-30 PROCEDURE — 99285 EMERGENCY DEPT VISIT HI MDM: CPT

## 2024-07-30 PROCEDURE — 84484 ASSAY OF TROPONIN QUANT: CPT | Performed by: EMERGENCY MEDICINE

## 2024-07-30 PROCEDURE — 99222 1ST HOSP IP/OBS MODERATE 55: CPT | Performed by: FAMILY MEDICINE

## 2024-07-30 PROCEDURE — 93306 TTE W/DOPPLER COMPLETE: CPT

## 2024-07-30 PROCEDURE — 85025 COMPLETE CBC W/AUTO DIFF WBC: CPT | Performed by: EMERGENCY MEDICINE

## 2024-07-30 PROCEDURE — 83880 ASSAY OF NATRIURETIC PEPTIDE: CPT | Performed by: EMERGENCY MEDICINE

## 2024-07-30 PROCEDURE — 93005 ELECTROCARDIOGRAM TRACING: CPT | Performed by: EMERGENCY MEDICINE

## 2024-07-30 PROCEDURE — 80053 COMPREHEN METABOLIC PANEL: CPT | Performed by: EMERGENCY MEDICINE

## 2024-07-30 RX ORDER — ACETAMINOPHEN 650 MG/1
650 SUPPOSITORY RECTAL EVERY 4 HOURS PRN
Status: DISCONTINUED | OUTPATIENT
Start: 2024-07-30 | End: 2024-08-03 | Stop reason: HOSPADM

## 2024-07-30 RX ORDER — ACETAMINOPHEN 325 MG/1
650 TABLET ORAL EVERY 4 HOURS PRN
Status: DISCONTINUED | OUTPATIENT
Start: 2024-07-30 | End: 2024-08-03 | Stop reason: HOSPADM

## 2024-07-30 RX ORDER — ONDANSETRON 2 MG/ML
4 INJECTION INTRAMUSCULAR; INTRAVENOUS EVERY 6 HOURS PRN
Status: DISCONTINUED | OUTPATIENT
Start: 2024-07-30 | End: 2024-08-03 | Stop reason: HOSPADM

## 2024-07-30 RX ORDER — SODIUM CHLORIDE 0.9 % (FLUSH) 0.9 %
10 SYRINGE (ML) INJECTION AS NEEDED
Status: DISCONTINUED | OUTPATIENT
Start: 2024-07-30 | End: 2024-08-03 | Stop reason: HOSPADM

## 2024-07-30 RX ORDER — BISACODYL 5 MG/1
5 TABLET, DELAYED RELEASE ORAL DAILY PRN
Status: DISCONTINUED | OUTPATIENT
Start: 2024-07-30 | End: 2024-08-03 | Stop reason: HOSPADM

## 2024-07-30 RX ORDER — FUROSEMIDE 10 MG/ML
80 INJECTION INTRAMUSCULAR; INTRAVENOUS ONCE
Status: COMPLETED | OUTPATIENT
Start: 2024-07-30 | End: 2024-07-30

## 2024-07-30 RX ORDER — SODIUM CHLORIDE 0.9 % (FLUSH) 0.9 %
10 SYRINGE (ML) INJECTION EVERY 12 HOURS SCHEDULED
Status: DISCONTINUED | OUTPATIENT
Start: 2024-07-30 | End: 2024-08-03 | Stop reason: HOSPADM

## 2024-07-30 RX ORDER — SODIUM CHLORIDE 9 MG/ML
40 INJECTION, SOLUTION INTRAVENOUS AS NEEDED
Status: DISCONTINUED | OUTPATIENT
Start: 2024-07-30 | End: 2024-08-03 | Stop reason: HOSPADM

## 2024-07-30 RX ORDER — ACETAMINOPHEN 160 MG/5ML
650 SOLUTION ORAL EVERY 4 HOURS PRN
Status: DISCONTINUED | OUTPATIENT
Start: 2024-07-30 | End: 2024-08-03 | Stop reason: HOSPADM

## 2024-07-30 RX ORDER — ENOXAPARIN SODIUM 100 MG/ML
40 INJECTION SUBCUTANEOUS DAILY
Status: DISCONTINUED | OUTPATIENT
Start: 2024-07-31 | End: 2024-07-31

## 2024-07-30 RX ORDER — TRAZODONE HYDROCHLORIDE 50 MG/1
50 TABLET ORAL NIGHTLY PRN
Status: DISCONTINUED | OUTPATIENT
Start: 2024-07-30 | End: 2024-08-03 | Stop reason: HOSPADM

## 2024-07-30 RX ORDER — AMOXICILLIN 250 MG
2 CAPSULE ORAL 2 TIMES DAILY PRN
Status: DISCONTINUED | OUTPATIENT
Start: 2024-07-30 | End: 2024-08-03 | Stop reason: HOSPADM

## 2024-07-30 RX ORDER — FUROSEMIDE 10 MG/ML
40 INJECTION INTRAMUSCULAR; INTRAVENOUS EVERY 12 HOURS
Status: DISCONTINUED | OUTPATIENT
Start: 2024-07-30 | End: 2024-08-01

## 2024-07-30 RX ORDER — POLYETHYLENE GLYCOL 3350 17 G/17G
17 POWDER, FOR SOLUTION ORAL DAILY PRN
Status: DISCONTINUED | OUTPATIENT
Start: 2024-07-30 | End: 2024-08-03 | Stop reason: HOSPADM

## 2024-07-30 RX ORDER — BISACODYL 10 MG
10 SUPPOSITORY, RECTAL RECTAL DAILY PRN
Status: DISCONTINUED | OUTPATIENT
Start: 2024-07-30 | End: 2024-08-03 | Stop reason: HOSPADM

## 2024-07-30 RX ORDER — ISOSORBIDE MONONITRATE 60 MG/1
30 TABLET, EXTENDED RELEASE ORAL DAILY
COMMUNITY

## 2024-07-30 RX ADMIN — Medication 2.5 MG: at 21:16

## 2024-07-30 RX ADMIN — FUROSEMIDE 40 MG: 10 INJECTION, SOLUTION INTRAMUSCULAR; INTRAVENOUS at 21:16

## 2024-07-30 RX ADMIN — Medication 10 ML: at 21:16

## 2024-07-30 RX ADMIN — FUROSEMIDE 80 MG: 10 INJECTION, SOLUTION INTRAMUSCULAR; INTRAVENOUS at 17:33

## 2024-07-30 RX ADMIN — Medication: at 21:16

## 2024-07-30 RX ADMIN — TRAZODONE HYDROCHLORIDE 50 MG: 50 TABLET ORAL at 23:58

## 2024-07-30 NOTE — H&P
[facility]   HISTORY AND PHYSICAL    Patient Name: Javi Martínez  : 1940  MRN: [id number]  Primary Care Physician:  Rani Lopez APRN  Date of admission: 2024    Subjective   Subjective     Chief Complaint: Shortness of breath     History of Present Illness  Patient is a 84 y.o. year old male past medical history of hypertension, paroxysmal atrial fibrillation on Eliquis, HFrEF 20%, COPD, aortic aneurysm, prostate cancer who presents to the emergency department for evaluation of shortness of breath with exertion and bilateral leg swelling that began 3 days ago per patient.  Patient explains that he noticed his lower extremities swelling more.  He also explains that he had 1 day history of shortness of breath.  He currently has a indwelling Diamond due to urethral stricture and is awaiting procedure.  He denies any fevers, chills, diaphoresis, chest pain, nausea, vomiting, constipation or diarrhea.  In the ED he was given p IV Lasix.  He was admitted for further evaluation and management.        Review of Systems   Constitutional:  Negative for activity change, appetite change, chills, diaphoresis and fatigue.   HENT:  Negative for drooling, facial swelling, sneezing and sore throat.    Respiratory:  Positive for shortness of breath. Negative for cough.    Cardiovascular:  Positive for leg swelling. Negative for chest pain.   Gastrointestinal:  Negative for abdominal pain, constipation, diarrhea and nausea.   Endocrine: Negative for polydipsia and polyphagia.   Musculoskeletal:  Negative for joint swelling.   Neurological:  Negative for dizziness, weakness, numbness and headaches.   Psychiatric/Behavioral:  Negative for agitation.         Personal History     Past Medical History:   Diagnosis Date    Aneurysm 2013    Arthritis     left knee     Atrial fibrillation, persistent     CHB (complete heart block) 2023    CHF exacerbation 2024    Chronic obstructive pulmonary disease  "09/01/2023    Difficulty walking 2023    Numbness in right foot    Essential hypertension 05/08/2019    GERD (gastroesophageal reflux disease)     HFrEF (heart failure with reduced ejection fraction) 08/11/2021    Hyperlipidemia 08/11/2021    Paroxysmal atrial fibrillation     Polyneuropathy 06/19/2023    Prostate cancer 2010    with seeds implant     Septic joint 11/26/2021       Past Surgical History:   Procedure Laterality Date    ABLATION OF DYSRHYTHMIC FOCUS  2013    APPENDECTOMY      ARTERIAL BYPASS SURGERY      CARDIAC ABLATION      x2  \"years ago\"     CARDIAC ELECTROPHYSIOLOGY PROCEDURE N/A 11/01/2021    Procedure: PPM battery change;  Surgeon: Ge Whelan MD;  Location: [facility] CATH INVASIVE LOCATION;  Service: Cardiovascular;  Laterality: N/A;    CARDIAC SURGERY  2006    cabg 3v    CORONARY ARTERY BYPASS GRAFT  2006    ENDOSCOPY      with dilation     KNEE ARTHROSCOPY Left     TOTAL KNEE ARTHROPLASTY Left 07/23/2021    Procedure: TOTAL KNEE ARTHROPLASTY WITH DORA NAVIGATION WITH BIOMET;  Surgeon: Carlitos Zhao MD;  Location: [facility] MAIN OR;  Service: Orthopedics;  Laterality: Left;    VENTRICULAR CARDIAC PACEMAKER INSERTION      medtronic        Family History: family history includes Heart attack in his mother; No Known Problems in his brother, father, maternal aunt, maternal grandfather, maternal grandmother, maternal uncle, paternal aunt, paternal grandfather, paternal grandmother, paternal uncle, sister, and another family member. Otherwise pertinent FHx was reviewed and not pertinent to current issue.    Social History:  reports that he quit smoking about 29 years ago. His smoking use included cigarettes. He started smoking about 62 years ago. He has a 16.8 pack-year smoking history. He has never been exposed to tobacco smoke. He has never used smokeless tobacco. He reports that he does not drink alcohol and does not use drugs.    Home Medications:  LORazepam, Vericiguat, allopurinol, " carvedilol, ciprofloxacin, citalopram, dapagliflozin Propanediol, isosorbide mononitrate, omeprazole, spironolactone, tamsulosin, and traZODone    Allergies:  No Known Allergies    Objective    Objective     Vitals:   Temp:  [97.8 °F (36.6 °C)] 97.8 °F (36.6 °C)  Heart Rate:  [72-78] 73  Resp:  [27-28] 28  BP: (114-123)/(82-88) 123/82    Physical Exam  Constitutional:       General: He is not in acute distress.     Appearance: He is not ill-appearing.   HENT:      Head: Normocephalic and atraumatic.      Nose: No rhinorrhea.      Mouth/Throat:      Mouth: Mucous membranes are moist.      Pharynx: Oropharynx is clear.   Eyes:      Extraocular Movements: Extraocular movements intact.      Pupils: Pupils are equal, round, and reactive to light.   Cardiovascular:      Rate and Rhythm: Normal rate and regular rhythm.      Heart sounds: No murmur heard.  Pulmonary:      Effort: Pulmonary effort is normal. No respiratory distress.      Breath sounds: Normal breath sounds. No wheezing.   Chest:      Chest wall: No tenderness.   Abdominal:      General: Abdomen is flat. Bowel sounds are normal. There is no distension.      Palpations: Abdomen is soft.      Tenderness: There is no abdominal tenderness. There is no guarding.   Musculoskeletal:         General: No swelling or tenderness.      Cervical back: Normal range of motion.      Right lower leg: Edema present.      Left lower leg: Edema present.   Skin:     General: Skin is warm and dry.   Neurological:      General: No focal deficit present.      Mental Status: He is alert and oriented to person, place, and time.   Psychiatric:         Mood and Affect: Mood normal.         Result Review    Result Review:  I have personally reviewed the results from the time of this admission to 7/30/2024 17:56 EDT and agree with these findings:  []  Laboratory list / accordion  []  Microbiology  []  Radiology  []  EKG/Telemetry   []  Cardiology/Vascular   []  Pathology  []  Old  records  []  Other:  Most notable findings include:      Assessment & Plan   Assessment / Plan     Brief Patient Summary:  Javi Martínez is a 84 y.o. male who presented to the ED with lower extremity edema.    Active Hospital Problems:  Active Hospital Problems    Diagnosis     **Acute exacerbation of CHF (congestive heart failure)     Atrial fibrillation, chronic     Stage 3b chronic kidney disease     Chronic obstructive pulmonary disease     History of malignant neoplasm of prostate     Hyperlipidemia     Primary osteoarthritis of left knee     CAD with CABG     Essential hypertension      Plan:   CHF exacerbation:  BNP 10,860  Chest x-ray showed  Findings suggest worsening congestive heart failure. Superimposed pneumonia is also possible.   Admit to inpatient  Telemetry monitoring  Cardiac diet  Fluid restriction 1.5 L  Strict LES's, daily weights  Ordered IV Lasix 40 mg twice daily  Consulted cardiology, recommendations with  Daily CBC and CMP, will continue to monitor  Will continue to monitor vitals    Hx of HTN:   Will continue home medications    Hx of CAD:   Will continue home medications     DVT prophylaxis: Lovenox  GI prophylaxis: Zofran, Protonix  CODE STATUS: CPR (full code)      VTE Prophylaxis:  Pharmacologic VTE prophylaxis orders are present.        CODE STATUS:    Level Of Support Discussed With: Patient  Code Status (Patient has no pulse and is not breathing): CPR (Attempt to Resuscitate)  Medical Interventions (Patient has pulse or is breathing): Full Support  .    Alvarado Zhong MD

## 2024-07-30 NOTE — ED PROVIDER NOTES
"Time: 5:47 PM EDT  Date of encounter:  7/30/2024  Independent Historian/Clinical History and Information was obtained by:   Patient    History is limited by: N/A    Chief Complaint: Shortness of breath      History of Present Illness:  Patient is a 84 y.o. year old male who presents to the emergency department for evaluation of shortness of breath with exertion and bilateral leg swelling that began 3 days ago per patient.  Patient denies chest pain.  Patient denies cough and fever.    HPI    Patient Care Team  Primary Care Provider: Rani Lopez APRN    Past Medical History:     No Known Allergies  Past Medical History:   Diagnosis Date    Aneurysm 2013    Arthritis     left knee     Atrial fibrillation, persistent     CHB (complete heart block) 06/07/2023    CHF exacerbation 4/24/2024    Chronic obstructive pulmonary disease 09/01/2023    Difficulty walking 2023    Numbness in right foot    Essential hypertension 05/08/2019    GERD (gastroesophageal reflux disease)     HFrEF (heart failure with reduced ejection fraction) 08/11/2021    Hyperlipidemia 08/11/2021    Paroxysmal atrial fibrillation     Polyneuropathy 06/19/2023    Prostate cancer 2010    with seeds implant     Septic joint 11/26/2021     Past Surgical History:   Procedure Laterality Date    ABLATION OF DYSRHYTHMIC FOCUS  2013    APPENDECTOMY      ARTERIAL BYPASS SURGERY      CARDIAC ABLATION      x2  \"years ago\"     CARDIAC ELECTROPHYSIOLOGY PROCEDURE N/A 11/01/2021    Procedure: PPM battery change;  Surgeon: Ge Whelan MD;  Location: [facility] CATH INVASIVE LOCATION;  Service: Cardiovascular;  Laterality: N/A;    CARDIAC SURGERY  2006    cabg 3v    CORONARY ARTERY BYPASS GRAFT  2006    ENDOSCOPY      with dilation     KNEE ARTHROSCOPY Left     TOTAL KNEE ARTHROPLASTY Left 07/23/2021    Procedure: TOTAL KNEE ARTHROPLASTY WITH DORA NAVIGATION WITH BIOMET;  Surgeon: Carlitos Zhao MD;  Location: [facility] OR;  Service: Orthopedics;  Laterality: " Left;    VENTRICULAR CARDIAC PACEMAKER INSERTION      medtronic      Family History   Problem Relation Age of Onset    Heart attack Mother     No Known Problems Father     No Known Problems Sister     No Known Problems Brother     No Known Problems Maternal Aunt     No Known Problems Maternal Uncle     No Known Problems Paternal Aunt     No Known Problems Paternal Uncle     No Known Problems Maternal Grandmother     No Known Problems Maternal Grandfather     No Known Problems Paternal Grandmother     No Known Problems Paternal Grandfather     No Known Problems Other        Home Medications:  Prior to Admission medications    Medication Sig Start Date End Date Taking? Authorizing Provider   allopurinol (ZYLOPRIM) 100 MG tablet Take 1 tablet by mouth Daily. 3/11/22   Ely Srinivasan MD   carvedilol (COREG) 6.25 MG tablet Take 1 tablet by mouth Every 12 (Twelve) Hours. 5/14/24   Paul Singh MD   ciprofloxacin (CIPRO) 500 MG tablet TAKE 1 TABLET BY MOUTH BY MOUTH TWICE DAILY 7/20/24   Ely Srinivasan MD   citalopram (CeleXA) 10 MG tablet Take 1 tablet by mouth Daily.    Ely Srinivasan MD   dapagliflozin Propanediol (Farxiga) 10 MG tablet Take 10 mg by mouth Daily. 5/14/24   Paul Singh MD   isosorbide mononitrate (IMDUR) 60 MG 24 hr tablet Take 1 tablet by mouth Daily.  Patient taking differently: Take 0.5 tablets by mouth Daily. 9/7/23   Isabel Navarrete APRN   LORazepam (ATIVAN) 0.5 MG tablet Take 1 tablet by mouth 2 (Two) Times a Day As Needed for Anxiety. 4/16/24   Ely Srinivasan MD   omeprazole (priLOSEC) 20 MG capsule Take 1 capsule by mouth Daily.    Ely Srinivasan MD   spironolactone (ALDACTONE) 25 MG tablet Take 1 tablet by mouth Every Other Day. 5/14/24   Paul Singh MD   tamsulosin (FLOMAX) 0.4 MG capsule 24 hr capsule Take 1 capsule by mouth Daily. 3/15/24   Ely Srinivasan MD   traZODone (DESYREL) 50 MG tablet Take 1 tablet by mouth At Night As Needed  "for Sleep. 1-2 tabs hs prn    Provider, MD Ely   Vericiguat (Verquvo) 2.5 MG tablet Take 1 tablet by mouth Daily. 24   Paul Singh MD   Vericiguat (Verquvo) 2.5 MG tablet Take 1 tablet by mouth Daily. Indications: Cardiac Failure, [id number] exp: 06/10/2025 5/14/24   Paul Singh MD        Social History:   Social History     Tobacco Use    Smoking status: Former     Current packs/day: 0.00     Average packs/day: 0.5 packs/day for 33.6 years (16.8 ttl pk-yrs)     Types: Cigarettes     Start date: 1962     Quit date: 1995     Years since quittin.0     Passive exposure: Never    Smokeless tobacco: Never   Vaping Use    Vaping status: Never Used   Substance Use Topics    Alcohol use: Never    Drug use: Never         Review of Systems:  Review of Systems   Constitutional:  Negative for chills and fever.   HENT:  Negative for congestion, rhinorrhea and sore throat.    Eyes:  Negative for pain and visual disturbance.   Respiratory:  Positive for shortness of breath. Negative for apnea, cough and chest tightness.    Cardiovascular:  Positive for leg swelling. Negative for chest pain and palpitations.   Gastrointestinal:  Negative for abdominal pain, diarrhea, nausea and vomiting.   Genitourinary:  Negative for difficulty urinating and dysuria.   Musculoskeletal:  Negative for joint swelling and myalgias.   Skin:  Negative for color change.   Neurological:  Negative for seizures and headaches.   Psychiatric/Behavioral: Negative.     All other systems reviewed and are negative.       Physical Exam:  /82   Pulse 73   Temp 97.8 °F (36.6 °C) (Oral)   Resp 28   Ht 193 cm (76\")   Wt 88.4 kg (194 lb 14.2 oz)   SpO2 95%   BMI 23.72 kg/m²     Physical Exam  Vitals and nursing note reviewed.   Constitutional:       General: He is not in acute distress.     Appearance: Normal appearance. He is not toxic-appearing.   HENT:      Head: Normocephalic and atraumatic.      Jaw: There is normal jaw " occlusion.   Eyes:      General: Lids are normal.      Extraocular Movements: Extraocular movements intact.      Conjunctiva/sclera: Conjunctivae normal.      Pupils: Pupils are equal, round, and reactive to light.   Cardiovascular:      Rate and Rhythm: Normal rate and regular rhythm.      Pulses: Normal pulses.      Heart sounds: Normal heart sounds.   Pulmonary:      Effort: Pulmonary effort is normal. No respiratory distress.      Breath sounds: Normal breath sounds. No wheezing or rhonchi.   Abdominal:      General: Abdomen is flat.      Palpations: Abdomen is soft.      Tenderness: There is no abdominal tenderness. There is no guarding or rebound.   Musculoskeletal:         General: Normal range of motion.      Cervical back: Normal range of motion and neck supple.      Right lower leg: Edema present.      Left lower leg: Edema present.   Skin:     General: Skin is warm and dry.   Neurological:      Mental Status: He is alert and oriented to person, place, and time. Mental status is at baseline.   Psychiatric:         Mood and Affect: Mood normal.                  Procedures:  Procedures      Medical Decision Making:      Comorbidities that affect care:    Hyperlipidemia, prostate cancer, atrial fibrillation, COPD    External Notes reviewed:          The following orders were placed and all results were independently analyzed by me:  Orders Placed This Encounter   Procedures    XR Chest 1 View    [employer] Draw    Comprehensive Metabolic Panel    BNP    Single High Sensitivity Troponin T    CBC Auto Differential    NPO Diet NPO Type: Strict NPO    Undress & Gown    Continuous Pulse Oximetry    Vital Signs    Check Pulse Oximetry while ambulating    Inpatient Hospitalist Consult    Oxygen Therapy- Nasal Cannula; Titrate 1-6 LPM Per SpO2; 90 - 95%    ECG 12 Lead ED Triage Standing Order; SOA    Insert Peripheral IV    CBC & Differential    Green Top (Gel)    Lavender Top    Gold Top - SST    Light Blue Top        Medications Given in the Emergency Department:  Medications   sodium chloride 0.9 % flush 10 mL (has no administration in time range)   furosemide (LASIX) injection 80 mg (80 mg Intravenous Given 7/30/24 1733)        ED Course:         Labs:    Lab Results (last 24 hours)       Procedure Component Value Units Date/Time    CBC & Differential [[id number]]  (Abnormal) Collected: 07/30/24 1642    Specimen: Blood from Arm, Right Updated: 07/30/24 1649    Narrative:      The following orders were created for panel order CBC & Differential.  Procedure                               Abnormality         Status                     ---------                               -----------         ------                     CBC Auto Differential[[id number]]        Abnormal            Final result                 Please view results for these tests on the individual orders.    Comprehensive Metabolic Panel [[id number]]  (Abnormal) Collected: 07/30/24 1642    Specimen: Blood from Arm, Right Updated: 07/30/24 1714     Glucose 78 mg/dL      BUN 23 mg/dL      Creatinine 1.62 mg/dL      Sodium 140 mmol/L      Potassium 4.4 mmol/L      Chloride 106 mmol/L      CO2 23.8 mmol/L      Calcium 8.7 mg/dL      Total Protein 6.3 g/dL      Albumin 3.3 g/dL      ALT (SGPT) 23 U/L      AST (SGOT) 28 U/L      Alkaline Phosphatase 127 U/L      Total Bilirubin 1.5 mg/dL      Globulin 3.0 gm/dL      A/G Ratio 1.1 g/dL      BUN/Creatinine Ratio 14.2     Anion Gap 10.2 mmol/L      eGFR 42.1 mL/min/1.73     Narrative:      GFR Normal >60  Chronic Kidney Disease <60  Kidney Failure <15    The GFR formula is only valid for adults with stable renal function between ages 18 and 70.    BNP [[id number]]  (Abnormal) Collected: 07/30/24 1642    Specimen: Blood from Arm, Right Updated: 07/30/24 1709     proBNP 10,868.0 pg/mL     Narrative:      This assay is used as an aid in the diagnosis of individuals suspected of having heart failure. It can be used as an aid in  the diagnosis of acute decompensated heart failure (ADHF) in patients presenting with signs and symptoms of ADHF to the emergency department (ED). In addition, NT-proBNP of <300 pg/mL indicates ADHF is not likely.    Age Range Result Interpretation  NT-proBNP Concentration (pg/mL:      <50             Positive            >450                   Gray                 300-450                    Negative             <300    50-75           Positive            >900                  Gray                300-900                  Negative            <300      >75             Positive            >1800                  Gray                300-1800                  Negative            <300    Single High Sensitivity Troponin T [[id number]]  (Abnormal) Collected: 07/30/24 1642    Specimen: Blood from Arm, Right Updated: 07/30/24 1714     HS Troponin T 50 ng/L     Narrative:      High Sensitive Troponin T Reference Range:  <14.0 ng/L- Negative Female for AMI  <22.0 ng/L- Negative Male for AMI  >=14 - Abnormal Female indicating possible myocardial injury.  >=22 - Abnormal Male indicating possible myocardial injury.   Clinicians would have to utilize clinical acumen, EKG, Troponin, and serial changes to determine if it is an Acute Myocardial Infarction or myocardial injury due to an underlying chronic condition.         CBC Auto Differential [[id number]]  (Abnormal) Collected: 07/30/24 1642    Specimen: Blood from Arm, Right Updated: 07/30/24 1649     WBC 8.82 10*3/mm3      RBC 4.88 10*6/mm3      Hemoglobin 14.0 g/dL      Hematocrit 44.5 %      MCV 91.2 fL      MCH 28.7 pg      MCHC 31.5 g/dL      RDW 16.5 %      RDW-SD 54.7 fl      MPV 10.3 fL      Platelets 220 10*3/mm3      Neutrophil % 64.3 %      Lymphocyte % 21.7 %      Monocyte % 11.0 %      Eosinophil % 1.9 %      Basophil % 0.9 %      Immature Grans % 0.2 %      Neutrophils, Absolute 5.67 10*3/mm3      Lymphocytes, Absolute 1.91 10*3/mm3      Monocytes, Absolute 0.97 10*3/mm3       Eosinophils, Absolute 0.17 10*3/mm3      Basophils, Absolute 0.08 10*3/mm3      Immature Grans, Absolute 0.02 10*3/mm3      nRBC 0.0 /100 WBC              Imaging:    XR Chest 1 View    Result Date: 7/30/2024  XR CHEST 1 VW Date of Exam: 7/30/2024 4:40 PM EDT Indication: SOA Triage Protocol Comparison: 7/22/2024. Findings: Stable cardiomegaly. There appears to be mild pulmonary edema with a slight increase in the effusion and consolidation in the lung bases. No evidence of pneumothorax. Prior median sternotomy and CABG procedure. Dual-lead left-sided cardiac pacemaker.     Impression: Findings suggest worsening congestive heart failure. Superimposed pneumonia is also possible. Electronically Signed: Zeeshan Lopez MD  7/30/2024 4:55 PM EDT  Workstation ID: [id number]       Differential Diagnosis and Discussion:    Dyspnea: Differential diagnosis includes but is not limited to metabolic acidosis, neurological disorders, psychogenic, asthma, pneumothorax, upper airway obstruction, COPD, pneumonia, noncardiogenic pulmonary edema, interstitial lung disease, anemia, congestive heart failure, and pulmonary embolism  Edema: Based on the patient's history, signs, and symptoms, the differential diagnosis includes but is not limited to heart failure, kidney disease, liver disease, venous insufficiency, lymphedema, pregnancy, medications, allergic reactions.    All labs were reviewed and interpreted by me.  All X-rays impressions were independently interpreted by me.  EKG was interpreted by supervising attending.    MDM     Amount and/or Complexity of Data Reviewed  Clinical lab tests: reviewed  Tests in the radiology section of CPT®: reviewed  Tests in the medicine section of CPT®: reviewed  Decide to obtain previous medical records or to obtain history from someone other than the patient: yes       Patient's BNP was elevated today and had 2+ pitting bilateral lower extremity edema.  I gave the patient 80 of Lasix.  Upon  ambulation around the emergency department patient's oxygen saturations dropped to 86% on room air.  Patient does not wear oxygen at home.  I spoke with hospitalist Dr. Zhong who agrees to admit the patient.          Patient Care Considerations:          Consultants/Shared Management Plan:     I spoke with hospitalist Dr. Zhong who agrees to admit the patient.    Social Determinants of Health:          Disposition and Care Coordination:    Admit:   Through independent evaluation of the patient's history, physical, and imperical data, the patient meets criteria for inpatient admission to the hospital.        Final diagnoses:   Acute on chronic congestive heart failure, unspecified heart failure type   Hypoxia        ED Disposition       ED Disposition   Decision to Admit    Condition   --    Comment   --               This medical record created using voice recognition software.             Timothy Brock PA-C  07/30/24 5261

## 2024-07-30 NOTE — CASE MANAGEMENT/SOCIAL WORK
Discharge Planning Assessment   Vickie     Patient Name: Javi Martínez  MRN: [id number]  Today's Date: 7/30/2024    Admit Date: 7/30/2024        Discharge Needs Assessment       Row Name 07/30/24 1753       Living Environment    People in Home alone (P)     Current Living Arrangements home (P)     Potentially Unsafe Housing Conditions none (P)     In the past 12 months has the electric, gas, oil, or water company threatened to shut off services in your home? No (P)     Primary Care Provided by self (P)     Provides Primary Care For no one (P)     Quality of Family Relationships supportive (P)     Able to Return to Prior Arrangements yes (P)        Resource/Environmental Concerns    Resource/Environmental Concerns none (P)     Transportation Concerns none (P)        Transportation Needs    In the past 12 months, has lack of transportation kept you from medical appointments or from getting medications? no (P)     In the past 12 months, has lack of transportation kept you from meetings, work, or from getting things needed for daily living? No (P)        Food Insecurity    Within the past 12 months, you worried that your food would run out before you got the money to buy more. Never true (P)     Within the past 12 months, the food you bought just didn't last and you didn't have money to get more. Never true (P)        Transition Planning    Patient/Family Anticipates Transition to home (P)     Patient/Family Anticipated Services at Transition none (P)        Discharge Needs Assessment    Readmission Within the Last 30 Days no previous admission in last 30 days (P)     Equipment Currently Used at Home cane, straight (P)     Concerns to be Addressed no discharge needs identified;denies needs/concerns at this time (P)     Equipment Needed After Discharge none (P)                    Discharge Plan    No documentation.                 Continued Care and Services - Admitted Since 7/30/2024    No active coordination exists  for this encounter.          Demographic Summary       Row Name 07/30/24 2893       General Information    Admission Type inpatient (P)     Arrived From home (P)     Referral Source emergency department (P)     Reason for Consult discharge planning (P)     Preferred Language English (P)                    Functional Status       Row Name 07/30/24 5937       Functional Status    Usual Activity Tolerance moderate (P)     Current Activity Tolerance moderate (P)        Physical Activity    On average, how many days per week do you engage in moderate to strenuous exercise (like a brisk walk)? 0 days (P)     On average, how many minutes do you engage in exercise at this level? 0 min (P)     Number of minutes of exercise per week 0 (P)        Assessment of Health Literacy    How often do you have someone help you read hospital materials? Sometimes (P)     How often do you have problems learning about your medical condition because of difficulty understanding written information? Sometimes (P)     How often do you have a problem understanding what is told to you about your medical condition? Sometimes (P)     How confident are you filling out medical forms by yourself? Somewhat (P)     Health Literacy Moderate (P)        Functional Status, IADL    Medications independent (P)     Meal Preparation independent (P)     Housekeeping independent (P)     Laundry independent (P)     Shopping independent (P)        Mental Status    General Appearance WDL WDL (P)        Mental Status Summary    Recent Changes in Mental Status/Cognitive Functioning no changes (P)                    Psychosocial    No documentation.                  Abuse/Neglect       Row Name 07/30/24 7899       Personal Safety    Feels Unsafe at Home or Work/School no (P)     Feels Threatened by Someone no (P)     Does Anyone Try to Keep You From Having Contact with Others or Doing Things Outside Your Home? no (P)     Physical Signs of Abuse Present no (P)                     Legal       Row Name 07/30/24 1752       Financial Resource Strain    How hard is it for you to pay for the very basics like food, housing, medical care, and heating? Not hard (P)                    Substance Abuse    No documentation.                  Patient Forms    No documentation.                 ELIJAH student met with pt bedside in ED. Pt PCP is Rani Lopez. Preferred pharmacy is [facility]. Pt lives at home alone. Pt provides primary care for himself. Pt denies concerns for transportation, access to food, and paying bills. Pt has family that lives a couple blocks away. Family relationships are supportive. Pt uses a cane at home. Pt feels safe at home. Pt is not employed. Pt is a . Pt denies alcohol, substance, and tobacco use. Pt stated he lost his wife a couple months ago and that depression is easy when you lose your partner. Pt denies feeling hopeless. Pt denies any discharge needs at this time.    Raissa Tucker, Social Work Student

## 2024-07-31 PROBLEM — I50.23 ACUTE ON CHRONIC HFREF (HEART FAILURE WITH REDUCED EJECTION FRACTION): Status: ACTIVE | Noted: 2024-07-30

## 2024-07-31 LAB
ALBUMIN SERPL-MCNC: 3.2 G/DL (ref 3.5–5.2)
ALBUMIN/GLOB SERPL: 1.2 G/DL
ALP SERPL-CCNC: 108 U/L (ref 39–117)
ALT SERPL W P-5'-P-CCNC: 19 U/L (ref 1–41)
ANION GAP SERPL CALCULATED.3IONS-SCNC: 12.6 MMOL/L (ref 5–15)
AST SERPL-CCNC: 22 U/L (ref 1–40)
BASOPHILS # BLD AUTO: 0.05 10*3/MM3 (ref 0–0.2)
BASOPHILS NFR BLD AUTO: 0.6 % (ref 0–1.5)
BH CV ECHO MEAS - AI P1/2T: 484 MSEC
BH CV ECHO MEAS - AO ROOT DIAM: 3.7 CM
BH CV ECHO MEAS - EDV(CUBED): 475.6 ML
BH CV ECHO MEAS - EDV(MOD-SP2): 272 ML
BH CV ECHO MEAS - EDV(MOD-SP4): 249 ML
BH CV ECHO MEAS - EF(MOD-BP): 26.3 %
BH CV ECHO MEAS - EF(MOD-SP2): 29.8 %
BH CV ECHO MEAS - EF(MOD-SP4): 22.9 %
BH CV ECHO MEAS - ESV(CUBED): 340 ML
BH CV ECHO MEAS - ESV(MOD-SP2): 191 ML
BH CV ECHO MEAS - ESV(MOD-SP4): 192 ML
BH CV ECHO MEAS - FS: 10.6 %
BH CV ECHO MEAS - IVS/LVPW: 0.95 CM
BH CV ECHO MEAS - IVSD: 1.02 CM
BH CV ECHO MEAS - LA DIMENSION: 6.6 CM
BH CV ECHO MEAS - LAT PEAK E' VEL: 2.28 CM/SEC
BH CV ECHO MEAS - LV DIASTOLIC VOL/BSA (35-75): 113.9 CM2
BH CV ECHO MEAS - LV MASS(C)D: 413.2 GRAMS
BH CV ECHO MEAS - LV SYSTOLIC VOL/BSA (12-30): 87.8 CM2
BH CV ECHO MEAS - LVIDD: 7.8 CM
BH CV ECHO MEAS - LVIDS: 7 CM
BH CV ECHO MEAS - LVOT AREA: 4.2 CM2
BH CV ECHO MEAS - LVOT DIAM: 2.3 CM
BH CV ECHO MEAS - LVPWD: 1.08 CM
BH CV ECHO MEAS - MED PEAK E' VEL: 2.9 CM/SEC
BH CV ECHO MEAS - MR MAX PG: 80.1 MMHG
BH CV ECHO MEAS - MR MAX VEL: 447.4 CM/SEC
BH CV ECHO MEAS - MR MEAN PG: 49.3 MMHG
BH CV ECHO MEAS - MR MEAN VEL: 326.6 CM/SEC
BH CV ECHO MEAS - MR VTI: 161.3 CM
BH CV ECHO MEAS - MV A MAX VEL: 36 CM/SEC
BH CV ECHO MEAS - MV DEC SLOPE: 344 CM/SEC2
BH CV ECHO MEAS - MV DEC TIME: 0.22 SEC
BH CV ECHO MEAS - MV E MAX VEL: 75.4 CM/SEC
BH CV ECHO MEAS - MV E/A: 2.09
BH CV ECHO MEAS - RVDD: 3.6 CM
BH CV ECHO MEAS - SV(MOD-SP2): 81 ML
BH CV ECHO MEAS - SV(MOD-SP4): 57 ML
BH CV ECHO MEAS - SVI(MOD-SP2): 37 ML/M2
BH CV ECHO MEAS - SVI(MOD-SP4): 26.1 ML/M2
BH CV ECHO MEAS - TAPSE (>1.6): 1.18 CM
BH CV ECHO MEAS - TR MAX PG: 37.8 MMHG
BH CV ECHO MEAS - TR MAX VEL: 307.4 CM/SEC
BH CV ECHO MEASUREMENTS AVERAGE E/E' RATIO: 29.11
BILIRUB SERPL-MCNC: 1.4 MG/DL (ref 0–1.2)
BUN SERPL-MCNC: 26 MG/DL (ref 8–23)
BUN/CREAT SERPL: 16 (ref 7–25)
CALCIUM SPEC-SCNC: 9 MG/DL (ref 8.6–10.5)
CHLORIDE SERPL-SCNC: 105 MMOL/L (ref 98–107)
CO2 SERPL-SCNC: 24.4 MMOL/L (ref 22–29)
CREAT SERPL-MCNC: 1.63 MG/DL (ref 0.76–1.27)
DEPRECATED RDW RBC AUTO: 54.2 FL (ref 37–54)
EGFRCR SERPLBLD CKD-EPI 2021: 41.8 ML/MIN/1.73
EOSINOPHIL # BLD AUTO: 0.21 10*3/MM3 (ref 0–0.4)
EOSINOPHIL NFR BLD AUTO: 2.6 % (ref 0.3–6.2)
ERYTHROCYTE [DISTWIDTH] IN BLOOD BY AUTOMATED COUNT: 16.4 % (ref 12.3–15.4)
GLOBULIN UR ELPH-MCNC: 2.7 GM/DL
GLUCOSE SERPL-MCNC: 89 MG/DL (ref 65–99)
HCT VFR BLD AUTO: 40.1 % (ref 37.5–51)
HGB BLD-MCNC: 13 G/DL (ref 13–17.7)
IMM GRANULOCYTES # BLD AUTO: 0.02 10*3/MM3 (ref 0–0.05)
IMM GRANULOCYTES NFR BLD AUTO: 0.2 % (ref 0–0.5)
LV EF 2D ECHO EST: 20 %
LYMPHOCYTES # BLD AUTO: 2.32 10*3/MM3 (ref 0.7–3.1)
LYMPHOCYTES NFR BLD AUTO: 28.4 % (ref 19.6–45.3)
MCH RBC QN AUTO: 29.8 PG (ref 26.6–33)
MCHC RBC AUTO-ENTMCNC: 32.4 G/DL (ref 31.5–35.7)
MCV RBC AUTO: 92 FL (ref 79–97)
MONOCYTES # BLD AUTO: 1.06 10*3/MM3 (ref 0.1–0.9)
MONOCYTES NFR BLD AUTO: 13 % (ref 5–12)
NEUTROPHILS NFR BLD AUTO: 4.51 10*3/MM3 (ref 1.7–7)
NEUTROPHILS NFR BLD AUTO: 55.2 % (ref 42.7–76)
NRBC BLD AUTO-RTO: 0 /100 WBC (ref 0–0.2)
PLATELET # BLD AUTO: 207 10*3/MM3 (ref 140–450)
PMV BLD AUTO: 10.1 FL (ref 6–12)
POTASSIUM SERPL-SCNC: 4 MMOL/L (ref 3.5–5.2)
PROT SERPL-MCNC: 5.9 G/DL (ref 6–8.5)
RBC # BLD AUTO: 4.36 10*6/MM3 (ref 4.14–5.8)
SODIUM SERPL-SCNC: 142 MMOL/L (ref 136–145)
WBC NRBC COR # BLD AUTO: 8.17 10*3/MM3 (ref 3.4–10.8)

## 2024-07-31 PROCEDURE — 36415 COLL VENOUS BLD VENIPUNCTURE: CPT | Performed by: FAMILY MEDICINE

## 2024-07-31 PROCEDURE — 85025 COMPLETE CBC W/AUTO DIFF WBC: CPT | Performed by: FAMILY MEDICINE

## 2024-07-31 PROCEDURE — 80053 COMPREHEN METABOLIC PANEL: CPT | Performed by: FAMILY MEDICINE

## 2024-07-31 PROCEDURE — 25010000002 FUROSEMIDE PER 20 MG: Performed by: FAMILY MEDICINE

## 2024-07-31 PROCEDURE — 99233 SBSQ HOSP IP/OBS HIGH 50: CPT | Performed by: INTERNAL MEDICINE

## 2024-07-31 PROCEDURE — 25010000002 ONDANSETRON PER 1 MG: Performed by: FAMILY MEDICINE

## 2024-07-31 RX ORDER — SPIRONOLACTONE 25 MG/1
25 TABLET ORAL EVERY OTHER DAY
Status: DISCONTINUED | OUTPATIENT
Start: 2024-08-01 | End: 2024-08-03 | Stop reason: HOSPADM

## 2024-07-31 RX ORDER — PANTOPRAZOLE SODIUM 40 MG/1
40 TABLET, DELAYED RELEASE ORAL
Status: DISCONTINUED | OUTPATIENT
Start: 2024-07-31 | End: 2024-08-03 | Stop reason: HOSPADM

## 2024-07-31 RX ORDER — ISOSORBIDE MONONITRATE 30 MG/1
30 TABLET, EXTENDED RELEASE ORAL DAILY
Status: DISCONTINUED | OUTPATIENT
Start: 2024-07-31 | End: 2024-08-03 | Stop reason: HOSPADM

## 2024-07-31 RX ORDER — CITALOPRAM 20 MG/1
10 TABLET ORAL DAILY
Status: DISCONTINUED | OUTPATIENT
Start: 2024-07-31 | End: 2024-08-03 | Stop reason: HOSPADM

## 2024-07-31 RX ORDER — ALLOPURINOL 100 MG/1
100 TABLET ORAL DAILY
Status: DISCONTINUED | OUTPATIENT
Start: 2024-07-31 | End: 2024-08-03 | Stop reason: HOSPADM

## 2024-07-31 RX ORDER — CARVEDILOL 6.25 MG/1
6.25 TABLET ORAL EVERY 12 HOURS SCHEDULED
Status: DISCONTINUED | OUTPATIENT
Start: 2024-07-31 | End: 2024-08-03 | Stop reason: HOSPADM

## 2024-07-31 RX ORDER — TAMSULOSIN HYDROCHLORIDE 0.4 MG/1
0.4 CAPSULE ORAL DAILY
Status: DISCONTINUED | OUTPATIENT
Start: 2024-07-31 | End: 2024-08-03 | Stop reason: HOSPADM

## 2024-07-31 RX ORDER — LORAZEPAM 0.5 MG/1
0.5 TABLET ORAL 2 TIMES DAILY PRN
Status: DISCONTINUED | OUTPATIENT
Start: 2024-07-31 | End: 2024-08-03 | Stop reason: HOSPADM

## 2024-07-31 RX ADMIN — ONDANSETRON 4 MG: 2 INJECTION INTRAMUSCULAR; INTRAVENOUS at 01:31

## 2024-07-31 RX ADMIN — APIXABAN 2.5 MG: 2.5 TABLET, FILM COATED ORAL at 08:55

## 2024-07-31 RX ADMIN — ISOSORBIDE MONONITRATE 30 MG: 30 TABLET, EXTENDED RELEASE ORAL at 08:55

## 2024-07-31 RX ADMIN — APIXABAN 2.5 MG: 2.5 TABLET, FILM COATED ORAL at 20:29

## 2024-07-31 RX ADMIN — Medication 10 MG: at 21:11

## 2024-07-31 RX ADMIN — ALLOPURINOL 100 MG: 100 TABLET ORAL at 08:55

## 2024-07-31 RX ADMIN — CARVEDILOL 6.25 MG: 6.25 TABLET, FILM COATED ORAL at 08:55

## 2024-07-31 RX ADMIN — PANTOPRAZOLE SODIUM 40 MG: 40 TABLET, DELAYED RELEASE ORAL at 08:54

## 2024-07-31 RX ADMIN — Medication 10 ML: at 08:55

## 2024-07-31 RX ADMIN — EMPAGLIFLOZIN 5 MG: 10 TABLET, FILM COATED ORAL at 10:38

## 2024-07-31 RX ADMIN — TAMSULOSIN HYDROCHLORIDE 0.4 MG: 0.4 CAPSULE ORAL at 08:54

## 2024-07-31 RX ADMIN — Medication 10 ML: at 20:29

## 2024-07-31 RX ADMIN — CARVEDILOL 6.25 MG: 6.25 TABLET, FILM COATED ORAL at 20:29

## 2024-07-31 RX ADMIN — FUROSEMIDE 40 MG: 10 INJECTION, SOLUTION INTRAMUSCULAR; INTRAVENOUS at 17:48

## 2024-07-31 RX ADMIN — FUROSEMIDE 40 MG: 10 INJECTION, SOLUTION INTRAMUSCULAR; INTRAVENOUS at 05:26

## 2024-07-31 RX ADMIN — CITALOPRAM HYDROBROMIDE 10 MG: 20 TABLET ORAL at 08:54

## 2024-07-31 NOTE — CONSULTS
"Cardiology Consult Note  [facility] CARE UNIT          Patient Identification:  Javi Martínez      [id number]  84 y.o.        male  1940           Reason for Consultation: CHF    PCP: Rani Lopez APRN    History of Present Illness:     Patient is an 84-year-old with HFrEF, COPD, dyslipidemia, paroxysmal atrial fibrillation, history of prostate cancer, and aortic aneurysm who presented with symptoms of increased shortness of breath along with lower extremity edema over the last several days.  He not been experiencing any chest discomfort fever chills or cough.  He does appear to not be on his Lasix he states that he had not been taking this after his discharge due to his urinary retention incontinence issues.  It also appears that he may have been on several of his other CHF medications unclear as to why this was based on his last hospital heart failure clinic.    Past History:  Past Medical History:   Diagnosis Date    Aneurysm 2013    Arthritis     left knee     Atrial fibrillation, persistent     CHB (complete heart block) 06/07/2023    CHF exacerbation 4/24/2024    Chronic obstructive pulmonary disease 09/01/2023    Difficulty walking 2023    Numbness in right foot    Essential hypertension 05/08/2019    GERD (gastroesophageal reflux disease)     HFrEF (heart failure with reduced ejection fraction) 08/11/2021    Hyperlipidemia 08/11/2021    Paroxysmal atrial fibrillation     Polyneuropathy 06/19/2023    Prostate cancer 2010    with seeds implant     Septic joint 11/26/2021     Past Surgical History:   Procedure Laterality Date    ABLATION OF DYSRHYTHMIC FOCUS  2013    APPENDECTOMY      ARTERIAL BYPASS SURGERY      CARDIAC ABLATION      x2  \"years ago\"     CARDIAC ELECTROPHYSIOLOGY PROCEDURE N/A 11/01/2021    Procedure: PPM battery change;  Surgeon: Ge Whelan MD;  Location: [facility] INVASIVE LOCATION;  Service: Cardiovascular;  Laterality: N/A;    CARDIAC SURGERY  2006    " cabg 3v    CORONARY ARTERY BYPASS GRAFT  2006    ENDOSCOPY      with dilation     KNEE ARTHROSCOPY Left     TOTAL KNEE ARTHROPLASTY Left 2021    Procedure: TOTAL KNEE ARTHROPLASTY WITH DORA NAVIGATION WITH BIOMET;  Surgeon: Carlitos Zhao MD;  Location: [facility];  Service: Orthopedics;  Laterality: Left;    VENTRICULAR CARDIAC PACEMAKER INSERTION      medtronic      No Known Allergies  Social History     Socioeconomic History    Marital status:    Tobacco Use    Smoking status: Former     Current packs/day: 0.00     Average packs/day: 0.5 packs/day for 33.6 years (16.8 ttl pk-yrs)     Types: Cigarettes     Start date: 1962     Quit date: 1995     Years since quittin.0     Passive exposure: Never    Smokeless tobacco: Never   Vaping Use    Vaping status: Never Used   Substance and Sexual Activity    Alcohol use: Never    Drug use: Never    Sexual activity: Yes     Partners: Female     Family History   Problem Relation Age of Onset    Heart attack Mother     No Known Problems Father     No Known Problems Sister     No Known Problems Brother     No Known Problems Maternal Aunt     No Known Problems Maternal Uncle     No Known Problems Paternal Aunt     No Known Problems Paternal Uncle     No Known Problems Maternal Grandmother     No Known Problems Maternal Grandfather     No Known Problems Paternal Grandmother     No Known Problems Paternal Grandfather     No Known Problems Other        Medications:  Prior to Admission medications    Medication Sig Start Date End Date Taking? Authorizing Provider   allopurinol (ZYLOPRIM) 100 MG tablet Take 1 tablet by mouth Daily. 3/11/22  Yes Ely Srinivasan MD   apixaban (ELIQUIS) 5 MG tablet tablet Take 1 tablet by mouth Daily.   Yes ProviderEly MD   carvedilol (COREG) 6.25 MG tablet Take 1 tablet by mouth Every 12 (Twelve) Hours. 24  Yes Paul Singh MD   citalopram (CeleXA) 10 MG tablet Take 1 tablet by mouth Daily.    Yes Ely Srinivasan MD   isosorbide mononitrate (IMDUR) 60 MG 24 hr tablet Take 0.5 tablets by mouth Daily.   Yes Ely Srinivasan MD   LORazepam (ATIVAN) 0.5 MG tablet Take 1 tablet by mouth 2 (Two) Times a Day As Needed for Anxiety. 4/16/24  Yes Ely Srinivasan MD   omeprazole (priLOSEC) 20 MG capsule Take 1 capsule by mouth Daily.   Yes Ely Srinivasan MD   spironolactone (ALDACTONE) 25 MG tablet Take 1 tablet by mouth Every Other Day. 5/14/24  Yes Paul Singh MD   tamsulosin (FLOMAX) 0.4 MG capsule 24 hr capsule Take 1 capsule by mouth Daily. 3/15/24  Yes Ely Srinivasan MD   traZODone (DESYREL) 50 MG tablet Take 1-2 tablets by mouth At Night As Needed for Sleep. 1-2 tabs hs prn   Yes Ely Srinivasan MD      Current medications:  !Patient Home Medications Stored in Pharmacy, , Does not apply, BID  allopurinol, 100 mg, Oral, Daily  apixaban, 2.5 mg, Oral, BID  carvedilol, 6.25 mg, Oral, Q12H  citalopram, 10 mg, Oral, Daily  furosemide, 40 mg, Intravenous, Q12H  isosorbide mononitrate, 30 mg, Oral, Daily  pantoprazole, 40 mg, Oral, Q AM  sodium chloride, 10 mL, Intravenous, Q12H  [START ON 8/1/2024] spironolactone, 25 mg, Oral, Every Other Day  tamsulosin, 0.4 mg, Oral, Daily      Current IV drips:       Review of Systems   Constitutional: Negative for chills, fever and weight loss.   HENT:  Negative for congestion and nosebleeds.    Cardiovascular:  Positive for leg swelling. Negative for orthopnea and paroxysmal nocturnal dyspnea.   Respiratory:  Positive for shortness of breath. Negative for cough.    Endocrine: Negative for cold intolerance and heat intolerance.   Skin:  Negative for rash.   Musculoskeletal:  Negative for back pain and muscle weakness.   Gastrointestinal:  Negative for abdominal pain, nausea and vomiting.   Genitourinary:  Negative for dysuria and nocturia.   Neurological:  Negative for dizziness and light-headedness.   Psychiatric/Behavioral:  Negative  "for altered mental status and hallucinations.          Physical Exam    /74 (BP Location: Left arm, Patient Position: Lying)   Pulse 72   Temp 97.3 °F (36.3 °C) (Oral)   Resp 20   Ht 193 cm (76\")   Wt 89.1 kg (196 lb 6.9 oz)   SpO2 96%   BMI 23.91 kg/m²  Body mass index is 23.91 kg/m².   Oxygen saturation   @FLOWAN(10::1)@ SpO2  Min: 86 %  Max: 97 %    General Appearance:   no acute distress  Alert and oriented x3  HENT:   lips not cyanotic  Atraumatic  Neck:  thyroid not enlarged  supple  Respiratory:  no respiratory distress  Bibasilar crackles  no rales  Cardiovascular:  no jugular venous distention  regular rhythm  apical impulse normal  S1 normal, S2 normal  no S3, no S4   no murmur  no rub, no thrill  no carotid bruit  pedal pulses normal  lower extremity edema: Mild   gastrointestinal:   bowel sounds normal  non-tender  no hepatomegaly, no splenomegaly  Musculoskeletal:  no clubbing of fingers.   normocephalic, head atraumatic  Skin:   warm, dry  No rashes  Neuro/Psychiatric:  normal mood and affect  judgement and insight appropriate      Cardiographics:     ECG  (personally reviewed)    Telemetry:  (personally reviewed) ventricularly paced rhythm   Results for orders placed during the hospital encounter of 04/24/24    Adult Transthoracic Echo Complete w/ Color, Spectral and Contrast if necessary per protocol    Interpretation Summary    Left ventricular systolic function is severely decreased. Estimated left ventricular EF = 20% with global hypokinesis and a dyssynchronous contraction pattern    The left ventricular cavity is moderately dilated.    Left ventricular wall thickness is consistent with mild concentric hypertrophy.    Left ventricular diastolic dysfunction is noted.    The right ventricular cavity is mildly dilated.    The left atrial cavity is moderate to severely dilated.    The right atrial cavity is moderately  dilated.    Mild dilation of the aortic root is present.     Results " for orders placed during the hospital encounter of 04/24/24    Stress Test With Myocardial Perfusion One Day    Interpretation Summary    Left ventricular ejection fraction is severely reduced (Calculated EF = 18%).  May be inaccurate due to irregularities in gating from underlying rhythm    EKG portion was nondiagnostic due to paced rhythm    Moderate sized fixed moderate intensity inferior lateral defect consistent with prior infarct no significant ischemic changes      No results found for this or any previous visit.     Cardiolite (Tc-99m Sestamibi) stress test              Results for orders placed during the hospital encounter of 04/24/24    Stress Test With Myocardial Perfusion One Day    Interpretation Summary    Left ventricular ejection fraction is severely reduced (Calculated EF = 18%).  May be inaccurate due to irregularities in gating from underlying rhythm    EKG portion was nondiagnostic due to paced rhythm    Moderate sized fixed moderate intensity inferior lateral defect consistent with prior infarct no significant ischemic changes      Lab Review:       CBC          7/22/2024    12:20 7/30/2024    16:42 7/31/2024    04:26   CBC   WBC 11.32  8.82  8.17    RBC 4.75  4.88  4.36    Hemoglobin 13.6  14.0  13.0    Hematocrit 42.9  44.5  40.1    MCV 90.3  91.2  92.0    MCH 28.6  28.7  29.8    MCHC 31.7  31.5  32.4    RDW 16.9  16.5  16.4    Platelets 189  220  207        CMP          7/22/2024    12:20 7/30/2024    16:42 7/31/2024    04:26   CMP   Glucose 106  78  89    BUN 28  23  26    Creatinine 1.92  1.62  1.63    EGFR 34.4  42.1  41.8    Sodium 139  140  142    Potassium 4.8  4.4  4.0    Chloride 103  106  105    Calcium 9.7  8.7  9.0    Total Protein 6.8  6.3  5.9    Albumin 3.9  3.3  3.2    Globulin 2.9  3.0  2.7    Total Bilirubin 2.1  1.5  1.4    Alkaline Phosphatase 107  127  108    AST (SGOT) 19  28  22    ALT (SGPT) 12  23  19    Albumin/Globulin Ratio 1.3  1.1  1.2    BUN/Creatinine Ratio  "14.6  14.2  16.0    Anion Gap 13.5  10.2  12.6         CARDIAC LABS:      Lab 07/30/24  1642   PROBNP 10,868.0*   HSTROP T 50*      No results found for: \"DIGOXIN\"   Lab Results   Component Value Date    TSH 2.080 04/24/2024           Invalid input(s): \"LDLCALC\"  No results found for: \"POCTROP\"  No results found for: \"DDIMERQUAN\"  Lab Results   Component Value Date    MG 1.9 04/27/2024             CARDIAC LABS:      Lab 07/30/24  1642   PROBNP 10,868.0*   HSTROP T 50*        Imaging:  CXR   Impression: Impression: Findings suggest worsening congestive heart failure. Superimposed pneumonia is also possible.        Assessment:    Acute exacerbation of CHF (congestive heart failure)    CAD with CABG    Essential hypertension    Hyperlipidemia    Primary osteoarthritis of left knee    History of malignant neoplasm of prostate    Chronic obstructive pulmonary disease    Atrial fibrillation, chronic    Stage 3b chronic kidney disease      CHF acute on chronic systolic with evidence of volume overload on exam patient appears to be not been on his diuretic due to urinary related problems also question whether or not some of his other heart failure medication he may have been off of this may be contributed to his current CHF episode.  Agree with IV Lasix 40 mg twice daily with strict I's and O's will see how he responds in addition to his Coreg 6.25 twice daily and instead lactone 25 daily we will try adding Jardiance 5 mg once a day and if his kidney function remained stable addition of ACE/ARB or Entresto next.    Plan:  1.  Lasix 40 twice daily  2.  Jardiance 5 mg once a day  3.  Coreg 25 twice daily  4.  Aldactone 25 daily  5.  Strict I's and O's      Thank you for allowing us to share in Javi Martínez   [facility].            Ge Whelan MD   7/31/2024    09:05 EDT    "

## 2024-07-31 NOTE — PLAN OF CARE
Goal Outcome Evaluation:  Plan of Care Reviewed With: patient        Progress: improving  Outcome Evaluation: No acute events this shift. Pt states it's easier to breathe. Edema in lower extremities is decreasing.

## 2024-07-31 NOTE — PROGRESS NOTES
[facility]   Hospitalist Progress Note  Date: 2024  Patient Name: Javi Martínez  : 1940  MRN: [id number]  Date of admission: 2024      Subjective   Subjective     Chief Complaint: Shortness of breath    Summary: Patient is a 84 y.o. year old male past medical history of hypertension, paroxysmal atrial fibrillation on Eliquis, HFrEF 20%, COPD, aortic aneurysm, prostate cancer who presents to the emergency department for evaluation of shortness of breath with exertion and bilateral leg swelling that began 3 days ago per patient. Patient explains that he noticed his lower extremities swelling more. He also explains that he had 1 day history of shortness of breath. He currently has a indwelling Diamond due to urethral stricture and is awaiting procedure. He denies any fevers, chills, diaphoresis, chest pain, nausea, vomiting, constipation or diarrhea. In the ED he was given p IV Lasix. He was admitted for further evaluation and management.     Interval Followup: Patient seen and examined resting comfortably suprapubic Diamond catheter in place appears to be diuresing well shortness of breath improved    Review of systems: All systems reviewed negative septa following: Patient planes of generalized weakness fatigue shortness of air  Objective   Objective     Vitals:   Temp:  [97.2 °F (36.2 °C)-98 °F (36.7 °C)] 97.3 °F (36.3 °C)  Heart Rate:  [70-89] 72  Resp:  [18-28] 20  BP: (110-125)/(70-88) 117/74    Physical Exam   Constitutional: Awake alert oriented no acute distress  Respiratory: Crackles at the base  Cardiovascular: RRR  GI: Abdomen soft nontender bowel sounds present  Extremities edema noted  : Suprapubic Diamond catheter in place    Result Review    Result Review:  I have personally reviewed the results from the time of this admission to 2024 11:17 EDT and agree with these findings:  [x]  Laboratory  []  Microbiology  []  Radiology  []  EKG/Telemetry   []  Cardiology/Vascular   []   Pathology  []  Old records  []  Other:    Assessment & Plan   Assessment / Plan   Assessment:    Acute on chronic systolic congestive heart failure ejection fraction around 20%  Coronary artery disease that is post coronary artery bypass grafting  Chronic atrial fibrillation  Chronic kidney disease stage IIIb  COPD  History of malignant neoplasm of the prostate  Chronic urinary retention status post suprapubic catheter placement  Hyperlipidemia    Plan:    Continue diuresis Lasix 40 mg IV twice daily  Resume Coreg Aldactone and Eliquis  Jardiance added  PT OT consulted  Cardiology consulted  Checking 2D echo  A.m. labs  Further treatment contingent upon her hospital course     Discussed plan with RN.    VTE Prophylaxis:  Pharmacologic VTE prophylaxis orders are present.        CODE STATUS:   Level Of Support Discussed With: Patient  Code Status (Patient has no pulse and is not breathing): CPR (Attempt to Resuscitate)  Medical Interventions (Patient has pulse or is breathing): Full Support        Electronically signed by DOLORES Burt, 07/31/24, 11:17 AM EDT.      Attending Documentation:  Patient independently seen and evaluated, above documentation reflects plan put forth during bedside rounds.  More than 51% of the time of this patient encounter was performed by me. I discussed the care plan with ERIC Hall PA-C, I agree with his findings and plan as documented, what I have added to the care plan and modified is as follows in my documentation and my medical decision making; 83 yo male here with SOA, AESHF, doing better after diuresis.  Will continue on Lasix 40mg IV twice daily and monitor volume status. No resp distress, pt reports feeling better, still room for diuresis however.  Electronically signed by Ge Carmona MD, 07/31/24, 5:40 PM EDT.

## 2024-08-01 LAB
ALBUMIN SERPL-MCNC: 3.3 G/DL (ref 3.5–5.2)
ALBUMIN/GLOB SERPL: 1.1 G/DL
ALP SERPL-CCNC: 110 U/L (ref 39–117)
ALT SERPL W P-5'-P-CCNC: 17 U/L (ref 1–41)
ANION GAP SERPL CALCULATED.3IONS-SCNC: 9.8 MMOL/L (ref 5–15)
AST SERPL-CCNC: 19 U/L (ref 1–40)
BASOPHILS # BLD AUTO: 0.05 10*3/MM3 (ref 0–0.2)
BASOPHILS NFR BLD AUTO: 0.5 % (ref 0–1.5)
BILIRUB SERPL-MCNC: 1.6 MG/DL (ref 0–1.2)
BUN SERPL-MCNC: 29 MG/DL (ref 8–23)
BUN/CREAT SERPL: 14.7 (ref 7–25)
CALCIUM SPEC-SCNC: 9.1 MG/DL (ref 8.6–10.5)
CHLORIDE SERPL-SCNC: 100 MMOL/L (ref 98–107)
CO2 SERPL-SCNC: 29.2 MMOL/L (ref 22–29)
CREAT SERPL-MCNC: 1.97 MG/DL (ref 0.76–1.27)
DEPRECATED RDW RBC AUTO: 53.2 FL (ref 37–54)
EGFRCR SERPLBLD CKD-EPI 2021: 33.3 ML/MIN/1.73
EOSINOPHIL # BLD AUTO: 0.17 10*3/MM3 (ref 0–0.4)
EOSINOPHIL NFR BLD AUTO: 1.8 % (ref 0.3–6.2)
ERYTHROCYTE [DISTWIDTH] IN BLOOD BY AUTOMATED COUNT: 16.4 % (ref 12.3–15.4)
GLOBULIN UR ELPH-MCNC: 2.9 GM/DL
GLUCOSE SERPL-MCNC: 109 MG/DL (ref 65–99)
HCT VFR BLD AUTO: 42.6 % (ref 37.5–51)
HGB BLD-MCNC: 13.9 G/DL (ref 13–17.7)
IMM GRANULOCYTES # BLD AUTO: 0.03 10*3/MM3 (ref 0–0.05)
IMM GRANULOCYTES NFR BLD AUTO: 0.3 % (ref 0–0.5)
LYMPHOCYTES # BLD AUTO: 2.08 10*3/MM3 (ref 0.7–3.1)
LYMPHOCYTES NFR BLD AUTO: 21.8 % (ref 19.6–45.3)
MAGNESIUM SERPL-MCNC: 1.9 MG/DL (ref 1.6–2.4)
MCH RBC QN AUTO: 29.8 PG (ref 26.6–33)
MCHC RBC AUTO-ENTMCNC: 32.6 G/DL (ref 31.5–35.7)
MCV RBC AUTO: 91.2 FL (ref 79–97)
MONOCYTES # BLD AUTO: 1.08 10*3/MM3 (ref 0.1–0.9)
MONOCYTES NFR BLD AUTO: 11.3 % (ref 5–12)
NEUTROPHILS NFR BLD AUTO: 6.12 10*3/MM3 (ref 1.7–7)
NEUTROPHILS NFR BLD AUTO: 64.3 % (ref 42.7–76)
NRBC BLD AUTO-RTO: 0 /100 WBC (ref 0–0.2)
PHOSPHATE SERPL-MCNC: 4.2 MG/DL (ref 2.5–4.5)
PLATELET # BLD AUTO: 214 10*3/MM3 (ref 140–450)
PMV BLD AUTO: 10.1 FL (ref 6–12)
POTASSIUM SERPL-SCNC: 4 MMOL/L (ref 3.5–5.2)
PROT SERPL-MCNC: 6.2 G/DL (ref 6–8.5)
RBC # BLD AUTO: 4.67 10*6/MM3 (ref 4.14–5.8)
SODIUM SERPL-SCNC: 139 MMOL/L (ref 136–145)
WBC NRBC COR # BLD AUTO: 9.53 10*3/MM3 (ref 3.4–10.8)

## 2024-08-01 PROCEDURE — 83735 ASSAY OF MAGNESIUM: CPT | Performed by: PHYSICIAN ASSISTANT

## 2024-08-01 PROCEDURE — 99232 SBSQ HOSP IP/OBS MODERATE 35: CPT | Performed by: INTERNAL MEDICINE

## 2024-08-01 PROCEDURE — 84100 ASSAY OF PHOSPHORUS: CPT | Performed by: PHYSICIAN ASSISTANT

## 2024-08-01 PROCEDURE — 25010000002 FUROSEMIDE PER 20 MG: Performed by: FAMILY MEDICINE

## 2024-08-01 PROCEDURE — 97165 OT EVAL LOW COMPLEX 30 MIN: CPT

## 2024-08-01 PROCEDURE — 85025 COMPLETE CBC W/AUTO DIFF WBC: CPT | Performed by: FAMILY MEDICINE

## 2024-08-01 PROCEDURE — 36415 COLL VENOUS BLD VENIPUNCTURE: CPT | Performed by: FAMILY MEDICINE

## 2024-08-01 PROCEDURE — 80053 COMPREHEN METABOLIC PANEL: CPT | Performed by: FAMILY MEDICINE

## 2024-08-01 RX ORDER — FUROSEMIDE 10 MG/ML
40 INJECTION INTRAMUSCULAR; INTRAVENOUS DAILY
Status: DISCONTINUED | OUTPATIENT
Start: 2024-08-02 | End: 2024-08-02

## 2024-08-01 RX ADMIN — TAMSULOSIN HYDROCHLORIDE 0.4 MG: 0.4 CAPSULE ORAL at 08:17

## 2024-08-01 RX ADMIN — APIXABAN 2.5 MG: 2.5 TABLET, FILM COATED ORAL at 08:17

## 2024-08-01 RX ADMIN — CARVEDILOL 6.25 MG: 6.25 TABLET, FILM COATED ORAL at 20:27

## 2024-08-01 RX ADMIN — PANTOPRAZOLE SODIUM 40 MG: 40 TABLET, DELAYED RELEASE ORAL at 05:39

## 2024-08-01 RX ADMIN — APIXABAN 2.5 MG: 2.5 TABLET, FILM COATED ORAL at 20:27

## 2024-08-01 RX ADMIN — FUROSEMIDE 40 MG: 10 INJECTION, SOLUTION INTRAMUSCULAR; INTRAVENOUS at 05:39

## 2024-08-01 RX ADMIN — EMPAGLIFLOZIN 5 MG: 10 TABLET, FILM COATED ORAL at 08:16

## 2024-08-01 RX ADMIN — CARVEDILOL 6.25 MG: 6.25 TABLET, FILM COATED ORAL at 08:17

## 2024-08-01 RX ADMIN — ALLOPURINOL 100 MG: 100 TABLET ORAL at 08:17

## 2024-08-01 RX ADMIN — LORAZEPAM 0.5 MG: 0.5 TABLET ORAL at 20:27

## 2024-08-01 RX ADMIN — Medication 10 ML: at 21:08

## 2024-08-01 RX ADMIN — Medication 10 ML: at 08:17

## 2024-08-01 RX ADMIN — CITALOPRAM HYDROBROMIDE 10 MG: 20 TABLET ORAL at 08:16

## 2024-08-01 RX ADMIN — SPIRONOLACTONE 25 MG: 25 TABLET ORAL at 08:17

## 2024-08-01 RX ADMIN — ISOSORBIDE MONONITRATE 30 MG: 30 TABLET, EXTENDED RELEASE ORAL at 08:17

## 2024-08-01 RX ADMIN — SENNOSIDES AND DOCUSATE SODIUM 2 TABLET: 50; 8.6 TABLET ORAL at 16:56

## 2024-08-01 NOTE — SIGNIFICANT NOTE
08/01/24 1100   Physical Therapy Time and Intention   Session Not Performed patient/family declined evaluation   Comment Pt declined PT evaluation x2.

## 2024-08-01 NOTE — PROGRESS NOTES
CARDIOLOGY  INPATIENT PROGRESS NOTE                [facility] UNIT    8/1/2024      PATIENT IDENTIFICATION:   Name:  Javi Martínez      MRN:  [id number]     84 y.o.  male             Chief Complaint   Patient presents with    Shortness of Breath    Leg Swelling        SUBJECTIVE:   Patient symptom wise doing well reports improvement in swelling as well as shortness of breath  OBJECTIVE:  Vitals:    08/01/24 0200 08/01/24 0300 08/01/24 0345 08/01/24 0715   BP: 120/74  113/70 112/68   BP Location:   Left arm Left arm   Patient Position:   Lying Lying   Pulse: 71 70 70 71   Resp:   16 16   Temp:   98.2 °F (36.8 °C) 97.3 °F (36.3 °C)   TempSrc:   Oral Oral   SpO2:   94% 92%   Weight:   87.5 kg (192 lb 14.4 oz)    Height:               Body mass index is 23.48 kg/m².    Intake/Output Summary (Last 24 hours) at 8/1/2024 0838  Last data filed at 8/1/2024 0345  Gross per 24 hour   Intake 960 ml   Output 4450 ml   Net -3490 ml       Telemetry: Ventricular paced rhythm    Physical Exam  General Appearance:   no acute distress  Alert and oriented x3  HENT:   lips not cyanotic  Atraumatic  Neck:  No jvd   supple  Respiratory:  no respiratory distress  normal breath sounds  no rales  Cardiovascular:    regular rhythm  no S3, no S4   no murmur  no rub  lower extremity edema:+1    Skin:   warm, dry  No rashes      No Known Allergies  Scheduled meds:  !Patient Home Medications Stored in Pharmacy, , Does not apply, BID  allopurinol, 100 mg, Oral, Daily  apixaban, 2.5 mg, Oral, BID  carvedilol, 6.25 mg, Oral, Q12H  citalopram, 10 mg, Oral, Daily  empagliflozin, 5 mg, Oral, Daily  furosemide, 40 mg, Intravenous, Q12H  isosorbide mononitrate, 30 mg, Oral, Daily  pantoprazole, 40 mg, Oral, Q AM  sodium chloride, 10 mL, Intravenous, Q12H  spironolactone, 25 mg, Oral, Every Other Day  tamsulosin, 0.4 mg, Oral, Daily      IV meds:                         Data Review:  CBC          7/30/2024    16:42 7/31/2024  "   04:26 8/1/2024    04:38   CBC   WBC 8.82  8.17  9.53    RBC 4.88  4.36  4.67    Hemoglobin 14.0  13.0  13.9    Hematocrit 44.5  40.1  42.6    MCV 91.2  92.0  91.2    MCH 28.7  29.8  29.8    MCHC 31.5  32.4  32.6    RDW 16.5  16.4  16.4    Platelets 220  207  214      CMP          7/30/2024    16:42 7/31/2024    04:26 8/1/2024    04:38   CMP   Glucose 78  89  109    BUN 23  26  29    Creatinine 1.62  1.63  1.97    EGFR 42.1  41.8  33.3    Sodium 140  142  139    Potassium 4.4  4.0  4.0    Chloride 106  105  100    Calcium 8.7  9.0  9.1    Total Protein 6.3  5.9  6.2    Albumin 3.3  3.2  3.3    Globulin 3.0  2.7  2.9    Total Bilirubin 1.5  1.4  1.6    Alkaline Phosphatase 127  108  110    AST (SGOT) 28  22  19    ALT (SGPT) 23  19  17    Albumin/Globulin Ratio 1.1  1.2  1.1    BUN/Creatinine Ratio 14.2  16.0  14.7    Anion Gap 10.2  12.6  9.8       CARDIAC LABS:      Lab 07/30/24  1642   PROBNP 10,868.0*   HSTROP T 50*        No results found for: \"DIGOXIN\"   Lab Results   Component Value Date    TSH 2.080 04/24/2024           Invalid input(s): \"LDLCALC\"  No results found for: \"POCTROP\"  Lab Results   Component Value Date    TROPONINT 50 (H) 07/30/2024   (  Lab Results   Component Value Date    MG 1.9 08/01/2024     Results for orders placed during the hospital encounter of 07/30/24    Adult Transthoracic Echo Complete w/ Color, Spectral and Contrast if necessary per protocol    Interpretation Summary    Left ventricular systolic function is severely decreased. Estimated left ventricular EF = 20%    The left ventricular cavity is moderate to severely dilated.    Left ventricular diastolic function is consistent with (grade II w/high LAP) pseudonormalization.    The left atrial cavity is severely dilated.    The right atrial cavity is moderate to severely  dilated.         Results for orders placed during the hospital encounter of 04/24/24    Stress Test With Myocardial Perfusion One Day    Interpretation Summary   "  Left ventricular ejection fraction is severely reduced (Calculated EF = 18%).  May be inaccurate due to irregularities in gating from underlying rhythm    EKG portion was nondiagnostic due to paced rhythm    Moderate sized fixed moderate intensity inferior lateral defect consistent with prior infarct no significant ischemic changes    ASSESSMENT:    Acute on chronic HFrEF (heart failure with reduced ejection fraction)    CAD with CABG    Essential hypertension    Hyperlipidemia    Primary osteoarthritis of left knee    History of malignant neoplasm of prostate    Chronic obstructive pulmonary disease    Atrial fibrillation, chronic    Stage 3b chronic kidney disease        PLAN:  1.  Good urine output for the last 2 days slight bump in creatinine will change Lasix to once a day repeat BMP in morning  2.  Continue with Coreg 25 mg twice daily  3.  Jardiance 5 mg daily  4.  Aldactone 25 4 times daily  5.  Holding on ACE/ARB until renal function stable may need to be added as outpatient          Ge Whelan MD  8/1/2024    08:38 EDT

## 2024-08-01 NOTE — PLAN OF CARE
Goal Outcome Evaluation:  Plan of Care Reviewed With: patient        Progress: improving  Outcome Evaluation: VSS. Up ad consuelo in room.

## 2024-08-01 NOTE — PROGRESS NOTES
[facility]   Hospitalist Progress Note  Date: 2024  Patient Name: Javi Martínez  : 1940  MRN: [id number]  Date of admission: 2024      Subjective   Subjective     Chief Complaint: Shortness of breath    Summary: Patient is a 84 y.o. year old male past medical history of hypertension, paroxysmal atrial fibrillation on Eliquis, HFrEF 20%, COPD, aortic aneurysm, prostate cancer who presents to the emergency department for evaluation of shortness of breath with exertion and bilateral leg swelling that began 3 days ago per patient. Patient explains that he noticed his lower extremities swelling more. He also explains that he had 1 day history of shortness of breath. He currently has a indwelling Diamond due to urethral stricture and is awaiting procedure. He denies any fevers, chills, diaphoresis, chest pain, nausea, vomiting, constipation or diarrhea. In the ED he was given p IV Lasix. He was admitted for further evaluation and management.     Interval Followup: Patient seen and examined resting comfortably diuresing well respiratory status is improving close to baseline serum creatinine increasing decreasing IV Lasix continuing down Jardiance and beta-blocker holding ACE inhibitor until serum creatinine stabilizes.    Review of systems: All systems reviewed negative septa following: Patient planes of generalized weakness fatigue shortness of air  Objective   Objective     Vitals:   Temp:  [97.3 °F (36.3 °C)-98.2 °F (36.8 °C)] 97.7 °F (36.5 °C)  Heart Rate:  [70-71] 70  Resp:  [16-20] 16  BP: ()/(60-74) 96/66    Physical Exam   Constitutional: Awake alert oriented no acute distress  Respiratory: Crackles at the base  Cardiovascular: RRR  GI: Abdomen soft nontender bowel sounds present  Extremities edema noted  : Suprapubic Diamond catheter in place    Result Review    Result Review:  I have reviewed the following:  [x]  Laboratory  CBC          2024    16:42 2024    04:26 2024     04:38   CBC   WBC 8.82  8.17  9.53    RBC 4.88  4.36  4.67    Hemoglobin 14.0  13.0  13.9    Hematocrit 44.5  40.1  42.6    MCV 91.2  92.0  91.2    MCH 28.7  29.8  29.8    MCHC 31.5  32.4  32.6    RDW 16.5  16.4  16.4    Platelets 220  207  214      CMP          7/30/2024    16:42 7/31/2024    04:26 8/1/2024    04:38   CMP   Glucose 78  89  109    BUN 23  26  29    Creatinine 1.62  1.63  1.97    EGFR 42.1  41.8  33.3    Sodium 140  142  139    Potassium 4.4  4.0  4.0    Chloride 106  105  100    Calcium 8.7  9.0  9.1    Total Protein 6.3  5.9  6.2    Albumin 3.3  3.2  3.3    Globulin 3.0  2.7  2.9    Total Bilirubin 1.5  1.4  1.6    Alkaline Phosphatase 127  108  110    AST (SGOT) 28  22  19    ALT (SGPT) 23  19  17    Albumin/Globulin Ratio 1.1  1.2  1.1    BUN/Creatinine Ratio 14.2  16.0  14.7    Anion Gap 10.2  12.6  9.8        []  Microbiology  []  Radiology  []  EKG/Telemetry   []  Cardiology/Vascular   []  Pathology  []  Old records  []  Other:    Assessment & Plan   Assessment / Plan   Assessment:    Acute on chronic systolic congestive heart failure ejection fraction around 20%  Coronary artery disease that is post coronary artery bypass grafting  Chronic atrial fibrillation  Chronic kidney disease stage IIIb  COPD  History of malignant neoplasm of the prostate  Chronic urinary retention status post suprapubic catheter placement  Hyperlipidemia    Plan:    Decrease IV Lasix  Continue Coreg Aldactone and Eliquis  Continue Jardiance  ACE inhibitor on hold until creatinine stabilizes  Repeat chemistries in a.m.  PT OT consulted  Cardiology consulted  Checking 2D echo  A.m. labs  Likely home 24 to 48 hours  Further treatment contingent upon her hospital course     Discussed plan with RN.    VTE Prophylaxis:  Pharmacologic VTE prophylaxis orders are present.        CODE STATUS:   Level Of Support Discussed With: Patient  Code Status (Patient has no pulse and is not breathing): CPR (Attempt to  Resuscitate)  Medical Interventions (Patient has pulse or is breathing): Full Support        Electronically signed by DOLORES Burt, 08/01/24, 1:30 PM EDT.        Attending Documentation:  Patient independently seen and evaluated, above documentation reflects plan put forth during bedside rounds.  More than 51% of the time of this patient encounter was performed by me. I discussed the care plan with ERIC Hall PA-C, I agree with his findings and plan as documented, what I have added to the care plan and modified is as follows in my documentation and my medical decision making; 83 yo male here with SOA, AESHF, doing better after diuresis.  Will continue on Lasix 40mg IV twice daily, responding well.  Likely discharge 24 to 48 hours.  Electronically signed by Ge Carmona MD, 08/01/24, 5:54 PM EDT.

## 2024-08-01 NOTE — THERAPY EVALUATION
"Patient Name: Javi Martínez  : 1940    MRN: [id number]                              Today's Date: 2024       Admit Date: 2024    Visit Dx:     ICD-10-CM ICD-9-CM   1. Acute on chronic congestive heart failure, unspecified heart failure type  I50.9 428.0   2. Hypoxia  R09.02 799.02   3. Decreased activities of daily living (ADL)  Z78.9 V49.89     Patient Active Problem List   Diagnosis    Primary osteoarthritis of left knee    S/P TKR (total knee replacement)    Essential hypertension    CAD with CABG    Hyperlipidemia    Presence of cardiac pacemaker single chamber    Anxiety    Gastroesophageal reflux disease    Gout    History of malignant neoplasm of prostate    Testicular hypofunction    Polyneuropathy    Chronic obstructive pulmonary disease    Benign prostatic hyperplasia without lower urinary tract symptoms    Chest pain    CHF exacerbation    Atrial fibrillation, chronic    Stage 3b chronic kidney disease    Postprocedural membranous urethral stricture    Acute on chronic HFrEF (heart failure with reduced ejection fraction)     Past Medical History:   Diagnosis Date    Aneurysm     Arthritis     left knee     Atrial fibrillation, persistent     CHB (complete heart block) 2023    CHF exacerbation 2024    Chronic obstructive pulmonary disease 2023    Difficulty walking     Numbness in right foot    Essential hypertension 2019    GERD (gastroesophageal reflux disease)     HFrEF (heart failure with reduced ejection fraction) 2021    Hyperlipidemia 2021    Paroxysmal atrial fibrillation     Polyneuropathy 2023    Prostate cancer 2010    with seeds implant     Septic joint 2021     Past Surgical History:   Procedure Laterality Date    ABLATION OF DYSRHYTHMIC FOCUS  2013    APPENDECTOMY      ARTERIAL BYPASS SURGERY      CARDIAC ABLATION      x2  \"years ago\"     CARDIAC ELECTROPHYSIOLOGY PROCEDURE N/A 2021    Procedure: PPM battery " change;  Surgeon: Ge Whelan MD;  Location: [facility] CATH INVASIVE LOCATION;  Service: Cardiovascular;  Laterality: N/A;    CARDIAC SURGERY  2006    cabg 3v    CORONARY ARTERY BYPASS GRAFT  2006    ENDOSCOPY      with dilation     KNEE ARTHROSCOPY Left     TOTAL KNEE ARTHROPLASTY Left 07/23/2021    Procedure: TOTAL KNEE ARTHROPLASTY WITH DORA NAVIGATION WITH BIOMET;  Surgeon: Carlitos Zhao MD;  Location: [facility] MAIN OR;  Service: Orthopedics;  Laterality: Left;    VENTRICULAR CARDIAC PACEMAKER INSERTION      [facility]       General Information       Row Name 08/01/24 1219          OT Time and Intention    Document Type evaluation  -ES     Mode of Treatment individual therapy;occupational therapy  -ES       Row Name 08/01/24 1219          General Information    Patient Profile Reviewed yes  -ES     Prior Level of Function independent:;ADL's;all household mobility;community mobility  Patient reports he is independent with ADLs at baseline. SPC for functional mobility. Walk in shower, standing shower completion. [place] in stance. No home O2 use. Patient denies recent falls.  -ES     Existing Precautions/Restrictions fall  -ES     Barriers to Rehab none identified  -ES       Row Name 08/01/24 1219          Occupational Profile    Reason for Services/Referral (Occupational Profile) Patient is 84 yr old male admitted to [facility] on 7/30/2024 with reports of shortness of breath on exertion, bilateral LE swelling. OT evaluation and treatment ordered d/t recent decline in ADLs/transfer ability and discharge planning recommendations. No previous OT services for current condition.  -ES       Row Name 08/01/24 1219          Living Environment    People in Home alone  -ES       Row Name 08/01/24 1219          Home Main Entrance    Number of Stairs, Main Entrance two  -ES       Row Name 08/01/24 1219          Stairs Within Home, Primary    Number of Stairs, Within Home, Primary none  -ES       Row Name 08/01/24  1219          Cognition    Orientation Status (Cognition) oriented x 3  patient pleasant and cooperative, agreeable to therapy evaluation.  -ES               User Key  (r) = Recorded By, (t) = Taken By, (c) = Cosigned By      Initials Name Provider Type    Abena Steen, OTR/L, CSRS Occupational Therapist                     Mobility/ADL's       Row Name 08/01/24 1225          Bed Mobility    Bed Mobility supine-sit;sit-supine  -ES     Supine-Sit [place] (Bed Mobility) independent  -ES     Sit-Supine [place] (Bed Mobility) independent  -ES       Row Name 08/01/24 1225          Transfers    Transfers sit-stand transfer;stand-sit transfer  -ES       Row Name 08/01/24 1225          Sit-Stand Transfer    Sit-Stand [place] (Transfers) supervision  -ES     Assistive Device (Sit-Stand Transfers) cane, straight  -ES       Row Name 08/01/24 1225          Stand-Sit Transfer    Stand-Sit [place] (Transfers) supervision  -ES     Assistive Device (Stand-Sit Transfers) cane, straight  -ES       Row Name 08/01/24 1225          Functional Mobility    Functional Mobility- Ind. Level supervision required  -ES     Functional Mobility- Device cane, straight  -ES       Row Name 08/01/24 1225          Activities of Daily Living    BADL Assessment/Intervention bathing;upper body dressing;lower body dressing;grooming;feeding;toileting  -ES       Row Name 08/01/24 1225          Bathing Assessment/Intervention    [place] Level (Bathing) bathing skills;independent  -ES       Row Name 08/01/24 1225          Upper Body Dressing Assessment/Training    [place] Level (Upper Body Dressing) upper body dressing skills;independent  -ES       Row Name 08/01/24 1225          Lower Body Dressing Assessment/Training    [place] Level (Lower Body Dressing) lower body dressing skills;independent  -ES       Row Name 08/01/24 1225          Grooming Assessment/Training    [place] Level (Grooming) grooming  skills;independent  -ES       Row Name 08/01/24 1225          Self-Feeding Assessment/Training    [place] Level (Feeding) feeding skills;independent  -ES       Row Name 08/01/24 1225          Toileting Assessment/Training    [place] Level (Toileting) toileting skills;independent  -ES               User Key  (r) = Recorded By, (t) = Taken By, (c) = Cosigned By      Initials Name Provider Type    ES Abena Patiño, DARRENR/L, VALERY Occupational Therapist                   Obj/Interventions       Row Name 08/01/24 1225          Range of Motion Comprehensive    General Range of Motion bilateral upper extremity ROM WNL  -ES       Row Name 08/01/24 1225          Strength Comprehensive (MMT)    General Manual Muscle Testing (MMT) Assessment no strength deficits identified  -ES     Comment, General Manual Muscle Testing (MMT) Assessment BUEs 4+/5  -ES       Row Name 08/01/24 1225          Motor Skills    Motor Skills functional endurance  -ES     Functional Endurance good  -ES       Row Name 08/01/24 1225          Balance    Balance Assessment sitting dynamic balance;standing dynamic balance  -ES     Dynamic Sitting Balance independent  -ES     Position, Sitting Balance unsupported  -ES     Dynamic Standing Balance supervision  -ES     Position/Device Used, Standing Balance supported;cane, straight  -ES               User Key  (r) = Recorded By, (t) = Taken By, (c) = Cosigned By      Initials Name Provider Type    ES Abena Patiño OTR/L, VALERY Occupational Therapist                   Goals/Plan    No documentation.                  Clinical Impression       Row Name 08/01/24 1226          Pain Assessment    Pretreatment Pain Rating 0/10 - no pain  -ES     Posttreatment Pain Rating 0/10 - no pain  -ES       Row Name 08/01/24 1226          Plan of Care Review    Plan of Care Reviewed With patient  -ES     Outcome Evaluation Patient presents at or near baseline functional status at time of evaluation with no identified  functional deficits that impede patient independence with activities of daily living.  No indicated need for skilled occupational therapy intervention in the acute care setting.  Occupational therapy will sign off at this time.  -ES       Row Name 08/01/24 1226          Therapy Assessment/Plan (OT)    Criteria for Skilled Therapeutic Interventions Met (OT) no problems identified which require skilled intervention  -ES     Therapy Frequency (OT) evaluation only  -ES       Row Name 08/01/24 1226          Therapy Plan Review/Discharge Plan (OT)    Anticipated Discharge Disposition (OT) home  -ES       Row Name 08/01/24 1226          Positioning and Restraints    Pre-Treatment Position in bed  -ES     Post Treatment Position bed  -ES               User Key  (r) = Recorded By, (t) = Taken By, (c) = Cosigned By      Initials Name Provider Type    Abena Steen, OTR/L, CSRS Occupational Therapist                   Outcome Measures       Row Name 08/01/24 1226          How much help from another is currently needed...    Putting on and taking off regular lower body clothing? 4  -ES     Bathing (including washing, rinsing, and drying) 4  -ES     Toileting (which includes using toilet bed pan or urinal) 4  -ES     Putting on and taking off regular upper body clothing 4  -ES     Taking care of personal grooming (such as brushing teeth) 4  -ES     Eating meals 4  -ES     AM-PAC 6 Clicks Score (OT) 24  -ES       Row Name 08/01/24 0720          How much help from another person do you currently need...    Turning from your back to your side while in flat bed without using bedrails? 4  -MD     Moving from lying on back to sitting on the side of a flat bed without bedrails? 4  -MD     Moving to and from a bed to a chair (including a wheelchair)? 4  -MD     Standing up from a chair using your arms (e.g., wheelchair, bedside chair)? 4  -MD     Climbing 3-5 steps with a railing? 3  -MD     To walk in hospital room? 3  -MD      AM-PAC 6 Clicks Score (PT) 22  -MD     Highest Level of Mobility Goal 7 --> Walk 25 feet or more  -MD       Row Name 08/01/24 1226          Functional Assessment    Outcome Measure Options AM-PAC 6 Clicks Daily Activity (OT)  -ES               User Key  (r) = Recorded By, (t) = Taken By, (c) = Cosigned By      Initials Name Provider Type    Melina Ontiveros, RN Registered Nurse    Abena Steen, OTR/L, CSRS Occupational Therapist                      OT Recommendation and Plan  Therapy Frequency (OT): evaluation only  Plan of Care Review  Plan of Care Reviewed With: patient  Outcome Evaluation: Patient presents at or near baseline functional status at time of evaluation with no identified functional deficits that impede patient independence with activities of daily living.  No indicated need for skilled occupational therapy intervention in the acute care setting.  Occupational therapy will sign off at this time.     Time Calculation:   Evaluation Complexity (OT)  Review Occupational Profile/Medical/Therapy History Complexity: brief/low complexity  Assessment, Occupational Performance/Identification of Deficit Complexity: 1-3 performance deficits  Clinical Decision Making Complexity (OT): problem focused assessment/low complexity  Overall Complexity of Evaluation (OT): low complexity     Time Calculation- OT       Row Name 08/01/24 1226             Time Calculation- OT    OT Received On 08/01/24  -ES         Untimed Charges    OT Eval/Re-eval Minutes 31  -ES         Total Minutes    Untimed Charges Total Minutes 31  -ES       Total Minutes 31  -ES                User Key  (r) = Recorded By, (t) = Taken By, (c) = Cosigned By      Initials Name Provider Type    Abena Steen, OTR/L, CSRS Occupational Therapist                  Therapy Charges for Today       Code Description Service Date Service Provider Modifiers Qty    [id number]  OT EVAL LOW COMPLEXITY 3 8/1/2024 Abena Patiño, OTR/L, CSRS GO 1                  Abena Patiño, OTR/L, CSRS  8/1/2024

## 2024-08-01 NOTE — PLAN OF CARE
Goal Outcome Evaluation:   Pt is alert and oriented times four. His VS are WNL. He has adequate output overnight. No acute events overnight. He is refusing perineal care. He has some redness in his creases in his right groin.

## 2024-08-02 LAB
ALBUMIN SERPL-MCNC: 3.2 G/DL (ref 3.5–5.2)
ANION GAP SERPL CALCULATED.3IONS-SCNC: 8.8 MMOL/L (ref 5–15)
BUN SERPL-MCNC: 34 MG/DL (ref 8–23)
BUN/CREAT SERPL: 17.7 (ref 7–25)
CALCIUM SPEC-SCNC: 8.7 MG/DL (ref 8.6–10.5)
CHLORIDE SERPL-SCNC: 99 MMOL/L (ref 98–107)
CO2 SERPL-SCNC: 31.2 MMOL/L (ref 22–29)
CREAT SERPL-MCNC: 1.92 MG/DL (ref 0.76–1.27)
DEPRECATED RDW RBC AUTO: 52.2 FL (ref 37–54)
EGFRCR SERPLBLD CKD-EPI 2021: 34.4 ML/MIN/1.73
ERYTHROCYTE [DISTWIDTH] IN BLOOD BY AUTOMATED COUNT: 16.1 % (ref 12.3–15.4)
GLUCOSE SERPL-MCNC: 103 MG/DL (ref 65–99)
HCT VFR BLD AUTO: 44.6 % (ref 37.5–51)
HGB BLD-MCNC: 14.3 G/DL (ref 13–17.7)
MAGNESIUM SERPL-MCNC: 2 MG/DL (ref 1.6–2.4)
MCH RBC QN AUTO: 28.8 PG (ref 26.6–33)
MCHC RBC AUTO-ENTMCNC: 32.1 G/DL (ref 31.5–35.7)
MCV RBC AUTO: 89.7 FL (ref 79–97)
PHOSPHATE SERPL-MCNC: 3.6 MG/DL (ref 2.5–4.5)
PLATELET # BLD AUTO: 209 10*3/MM3 (ref 140–450)
PMV BLD AUTO: 9.8 FL (ref 6–12)
POTASSIUM SERPL-SCNC: 3.7 MMOL/L (ref 3.5–5.2)
RBC # BLD AUTO: 4.97 10*6/MM3 (ref 4.14–5.8)
SODIUM SERPL-SCNC: 139 MMOL/L (ref 136–145)
WBC NRBC COR # BLD AUTO: 10.81 10*3/MM3 (ref 3.4–10.8)

## 2024-08-02 PROCEDURE — 99232 SBSQ HOSP IP/OBS MODERATE 35: CPT | Performed by: INTERNAL MEDICINE

## 2024-08-02 PROCEDURE — 80069 RENAL FUNCTION PANEL: CPT | Performed by: PHYSICIAN ASSISTANT

## 2024-08-02 PROCEDURE — 83735 ASSAY OF MAGNESIUM: CPT | Performed by: PHYSICIAN ASSISTANT

## 2024-08-02 PROCEDURE — 85027 COMPLETE CBC AUTOMATED: CPT | Performed by: PHYSICIAN ASSISTANT

## 2024-08-02 PROCEDURE — 25010000002 FUROSEMIDE PER 20 MG: Performed by: INTERNAL MEDICINE

## 2024-08-02 RX ORDER — FUROSEMIDE 40 MG/1
40 TABLET ORAL DAILY
Status: DISCONTINUED | OUTPATIENT
Start: 2024-08-02 | End: 2024-08-03 | Stop reason: HOSPADM

## 2024-08-02 RX ADMIN — APIXABAN 2.5 MG: 2.5 TABLET, FILM COATED ORAL at 09:26

## 2024-08-02 RX ADMIN — FUROSEMIDE 40 MG: 10 INJECTION, SOLUTION INTRAMUSCULAR; INTRAVENOUS at 09:27

## 2024-08-02 RX ADMIN — EMPAGLIFLOZIN 5 MG: 10 TABLET, FILM COATED ORAL at 09:26

## 2024-08-02 RX ADMIN — CARVEDILOL 6.25 MG: 6.25 TABLET, FILM COATED ORAL at 09:27

## 2024-08-02 RX ADMIN — Medication 10 ML: at 20:41

## 2024-08-02 RX ADMIN — PANTOPRAZOLE SODIUM 40 MG: 40 TABLET, DELAYED RELEASE ORAL at 05:07

## 2024-08-02 RX ADMIN — ALLOPURINOL 100 MG: 100 TABLET ORAL at 09:26

## 2024-08-02 RX ADMIN — Medication 10 ML: at 09:27

## 2024-08-02 RX ADMIN — APIXABAN 2.5 MG: 2.5 TABLET, FILM COATED ORAL at 20:38

## 2024-08-02 RX ADMIN — ISOSORBIDE MONONITRATE 30 MG: 30 TABLET, EXTENDED RELEASE ORAL at 09:26

## 2024-08-02 RX ADMIN — CITALOPRAM HYDROBROMIDE 10 MG: 20 TABLET ORAL at 09:27

## 2024-08-02 RX ADMIN — TAMSULOSIN HYDROCHLORIDE 0.4 MG: 0.4 CAPSULE ORAL at 09:26

## 2024-08-02 RX ADMIN — CARVEDILOL 6.25 MG: 6.25 TABLET, FILM COATED ORAL at 20:38

## 2024-08-02 NOTE — PROGRESS NOTES
[facility]   Hospitalist Progress Note  Date: 2024  Patient Name: Javi Martínez  : 1940  MRN: [id number]  Date of admission: 2024      Subjective   Subjective     Chief Complaint: Shortness of breath    Summary: Patient is a 84 y.o. year old male past medical history of hypertension, paroxysmal atrial fibrillation on Eliquis, HFrEF 20%, COPD, aortic aneurysm, prostate cancer who presents to the emergency department for evaluation of shortness of breath with exertion and bilateral leg swelling that began 3 days ago per patient. Patient explains that he noticed his lower extremities swelling more. He also explains that he had 1 day history of shortness of breath. He currently has a indwelling Diamond due to urethral stricture and is awaiting procedure. He denies any fevers, chills, diaphoresis, chest pain, nausea, vomiting, constipation or diarrhea. In the ED he was given p IV Lasix. He was admitted for further evaluation and management.     Interval Followup: Patient seen and examined resting comfortably respiratory status improving initially felt like he could go home however continues to have dyspnea on exertion we will discharge and continue to monitor.  Will transition to oral Lasix recheck chemistries in a.m. additionally will make referral to heart failure clinic.      Review of systems: All systems reviewed negative septa following: Patient planes of generalized weakness fatigue shortness of air  Objective   Objective     Vitals:   Temp:  [97.7 °F (36.5 °C)-98.2 °F (36.8 °C)] 98.1 °F (36.7 °C)  Heart Rate:  [70-73] 71  Resp:  [16-18] 18  BP: ()/(60-75) 108/69    Physical Exam   Constitutional: Awake alert oriented no acute distress  Respiratory: Crackles at the base  Cardiovascular: RRR  GI: Abdomen soft nontender bowel sounds present  Extremities edema noted  : Suprapubic Diamond catheter in place    Result Review    Result Review:  I have reviewed the following:  [x]   Laboratory  CBC          7/31/2024    04:26 8/1/2024    04:38 8/2/2024    04:32   CBC   WBC 8.17  9.53  10.81    RBC 4.36  4.67  4.97    Hemoglobin 13.0  13.9  14.3    Hematocrit 40.1  42.6  44.6    MCV 92.0  91.2  89.7    MCH 29.8  29.8  28.8    MCHC 32.4  32.6  32.1    RDW 16.4  16.4  16.1    Platelets 207  214  209      CMP          7/31/2024    04:26 8/1/2024    04:38 8/2/2024    04:32   CMP   Glucose 89  109  103    BUN 26  29  34    Creatinine 1.63  1.97  1.92    EGFR 41.8  33.3  34.4    Sodium 142  139  139    Potassium 4.0  4.0  3.7    Chloride 105  100  99    Calcium 9.0  9.1  8.7    Total Protein 5.9  6.2     Albumin 3.2  3.3  3.2    Globulin 2.7  2.9     Total Bilirubin 1.4  1.6     Alkaline Phosphatase 108  110     AST (SGOT) 22  19     ALT (SGPT) 19  17     Albumin/Globulin Ratio 1.2  1.1     BUN/Creatinine Ratio 16.0  14.7  17.7    Anion Gap 12.6  9.8  8.8        []  Microbiology  []  Radiology  []  EKG/Telemetry   []  Cardiology/Vascular   []  Pathology  []  Old records  []  Other:    Assessment & Plan   Assessment / Plan   Assessment:    Acute on chronic systolic congestive heart failure ejection fraction around 20%  Coronary artery disease that is post coronary artery bypass grafting  Chronic atrial fibrillation  Chronic kidney disease stage IIIb  COPD  History of malignant neoplasm of the prostate  Chronic urinary retention status post suprapubic catheter placement  Hyperlipidemia    Plan:    Transition to p.o. Lasix  Continue Coreg Aldactone and Eliquis  Continue Jardiance  ACE inhibitor on hold until creatinine stabilizes  Repeat chemistries in a.m.  Refer to heart failure clinic  PT OT consulted  Cardiology consulted  Checking 2D echo reviewed EF 20%  Likely home tomorrow if exertional dyspnea improved  Further treatment contingent upon her hospital course     Discussed plan with RN.    VTE Prophylaxis:  Pharmacologic VTE prophylaxis orders are present.        CODE STATUS:   Level Of Support  Discussed With: Patient  Code Status (Patient has no pulse and is not breathing): CPR (Attempt to Resuscitate)  Medical Interventions (Patient has pulse or is breathing): Full Support        Electronically signed by DOLORES Burt, 08/02/24, 11:12 AM EDT.          Attending Documentation:  Patient independently seen and evaluated, above documentation reflects plan put forth during bedside rounds.  More than 51% of the time of this patient encounter was performed by me. I discussed the care plan with ERIC Hall PA-C, I agree with his findings and plan as documented, what I have added to the care plan and modified is as follows in my documentation and my medical decision making; 83 yo male here with SOA, AESHF, doing better after diuresis.  Plan for discharge today but patient having some more dyspnea on exertion so we will defer discharge, continue IV Lasix and reassess tomorrow.  Electronically signed by Ge Carmona MD, 08/02/24, 6:54 PM EDT.

## 2024-08-02 NOTE — PROGRESS NOTES
CARDIOLOGY  INPATIENT PROGRESS NOTE                [facility] UNIT    8/2/2024      PATIENT IDENTIFICATION:   Name:  Javi Martínez      MRN:  [id number]     84 y.o.  male             Chief Complaint   Patient presents with    Shortness of Breath    Leg Swelling        SUBJECTIVE:   Doing well this morning no new active complaints  OBJECTIVE:  Vitals:    08/01/24 2340 08/02/24 0420 08/02/24 0715 08/02/24 0854   BP: 106/72 115/75 97/61 108/69   BP Location: Left arm Left arm Left arm Left arm   Patient Position: Lying Lying Lying Lying   Pulse: 73 70 71    Resp: 16 17 18    Temp: 98.1 °F (36.7 °C) 98.1 °F (36.7 °C) 98.1 °F (36.7 °C)    TempSrc: Oral Oral Oral    SpO2: 92% 90% 90%    Weight:  79.5 kg (175 lb 4.3 oz)     Height:               Body mass index is 21.33 kg/m².    Intake/Output Summary (Last 24 hours) at 8/2/2024 0942  Last data filed at 8/2/2024 0523  Gross per 24 hour   Intake 600 ml   Output 1250 ml   Net -650 ml       Telemetry: Ventricular rhythm    Physical Exam  General Appearance:   no acute distress  Alert and oriented x3  HENT:   lips not cyanotic  Atraumatic  Neck:  No jvd   supple  Respiratory:  no respiratory distress  normal breath sounds  no rales  Cardiovascular:    regular rhythm  no S3, no S4   no murmur  no rub  lower extremity edema: Mild  Skin:   warm, dry  No rashes      No Known Allergies  Scheduled meds:  !Patient Home Medications Stored in Pharmacy, , Does not apply, BID  allopurinol, 100 mg, Oral, Daily  apixaban, 2.5 mg, Oral, BID  carvedilol, 6.25 mg, Oral, Q12H  citalopram, 10 mg, Oral, Daily  empagliflozin, 5 mg, Oral, Daily  furosemide, 40 mg, Intravenous, Daily  isosorbide mononitrate, 30 mg, Oral, Daily  pantoprazole, 40 mg, Oral, Q AM  sodium chloride, 10 mL, Intravenous, Q12H  spironolactone, 25 mg, Oral, Every Other Day  tamsulosin, 0.4 mg, Oral, Daily      IV meds:                         Data Review:  CBC          7/31/2024    04:26 8/1/2024  "   04:38 8/2/2024    04:32   CBC   WBC 8.17  9.53  10.81    RBC 4.36  4.67  4.97    Hemoglobin 13.0  13.9  14.3    Hematocrit 40.1  42.6  44.6    MCV 92.0  91.2  89.7    MCH 29.8  29.8  28.8    MCHC 32.4  32.6  32.1    RDW 16.4  16.4  16.1    Platelets 207  214  209      CMP          7/31/2024    04:26 8/1/2024    04:38 8/2/2024    04:32   CMP   Glucose 89  109  103    BUN 26  29  34    Creatinine 1.63  1.97  1.92    EGFR 41.8  33.3  34.4    Sodium 142  139  139    Potassium 4.0  4.0  3.7    Chloride 105  100  99    Calcium 9.0  9.1  8.7    Total Protein 5.9  6.2     Albumin 3.2  3.3  3.2    Globulin 2.7  2.9     Total Bilirubin 1.4  1.6     Alkaline Phosphatase 108  110     AST (SGOT) 22  19     ALT (SGPT) 19  17     Albumin/Globulin Ratio 1.2  1.1     BUN/Creatinine Ratio 16.0  14.7  17.7    Anion Gap 12.6  9.8  8.8       CARDIAC LABS:      Lab 07/30/24  1642   PROBNP 10,868.0*   HSTROP T 50*        No results found for: \"DIGOXIN\"   Lab Results   Component Value Date    TSH 2.080 04/24/2024           Invalid input(s): \"LDLCALC\"  No results found for: \"POCTROP\"  Lab Results   Component Value Date    TROPONINT 50 (H) 07/30/2024   (  Lab Results   Component Value Date    MG 2.0 08/02/2024     Results for orders placed during the hospital encounter of 07/30/24    Adult Transthoracic Echo Complete w/ Color, Spectral and Contrast if necessary per protocol    Interpretation Summary    Left ventricular systolic function is severely decreased. Estimated left ventricular EF = 20%    The left ventricular cavity is moderate to severely dilated.    Left ventricular diastolic function is consistent with (grade II w/high LAP) pseudonormalization.    The left atrial cavity is severely dilated.    The right atrial cavity is moderate to severely  dilated.         Results for orders placed during the hospital encounter of 04/24/24    Stress Test With Myocardial Perfusion One Day    Interpretation Summary    Left ventricular ejection " fraction is severely reduced (Calculated EF = 18%).  May be inaccurate due to irregularities in gating from underlying rhythm    EKG portion was nondiagnostic due to paced rhythm    Moderate sized fixed moderate intensity inferior lateral defect consistent with prior infarct no significant ischemic changes    ASSESSMENT:    Acute on chronic HFrEF (heart failure with reduced ejection fraction)    CAD with CABG    Essential hypertension    Hyperlipidemia    Primary osteoarthritis of left knee    History of malignant neoplasm of prostate    Chronic obstructive pulmonary disease    Atrial fibrillation, chronic    Stage 3b chronic kidney disease        PLAN:  1.  Can change Lasix to 40 mg p.o.  2.  Continue with the rest of his GDMT for the patient can be discharged from cardiac standpoint would recommend follow-up in heart failure clinic for close outpatient monitoring and further titration as tolerated of his medication          Ge Whelan MD  8/2/2024    09:42 EDT

## 2024-08-02 NOTE — PLAN OF CARE
Problem: Adult Inpatient Plan of Care  Goal: Plan of Care Review  Outcome: Ongoing, Progressing  Goal: Patient-Specific Goal (Individualized)  Outcome: Ongoing, Progressing  Goal: Absence of Hospital-Acquired Illness or Injury  Outcome: Ongoing, Progressing  Intervention: Identify and Manage Fall Risk  Recent Flowsheet Documentation  Taken 8/1/2024 2300 by Sophie Alford RN  Safety Promotion/Fall Prevention: safety round/check completed  Taken 8/1/2024 2109 by Sophie Alford RN  Safety Promotion/Fall Prevention: safety round/check completed  Taken 8/1/2024 1947 by Sophie Alford RN  Safety Promotion/Fall Prevention: safety round/check completed  Intervention: Prevent Skin Injury  Recent Flowsheet Documentation  Taken 8/1/2024 2109 by Sophie Alford RN  Body Position: position changed independently  Taken 8/1/2024 1947 by Sophie Alford RN  Body Position: position changed independently  Intervention: Prevent and Manage VTE (Venous Thromboembolism) Risk  Recent Flowsheet Documentation  Taken 8/1/2024 1947 by Sophie Alford RN  VTE Prevention/Management: bilateral  Intervention: Prevent Infection  Recent Flowsheet Documentation  Taken 8/1/2024 1947 by Sophie Alford RN  Infection Prevention: hand hygiene promoted  Goal: Optimal Comfort and Wellbeing  Outcome: Ongoing, Progressing  Intervention: Provide Person-Centered Care  Recent Flowsheet Documentation  Taken 8/1/2024 1947 by Sophie Alford RN  Trust Relationship/Rapport: care explained  Goal: Readiness for Transition of Care  Outcome: Ongoing, Progressing     Problem: Heart Failure Comorbidity  Goal: Maintenance of Heart Failure Symptom Control  Outcome: Ongoing, Progressing  Intervention: Maintain Heart Failure-Management  Recent Flowsheet Documentation  Taken 8/1/2024 2300 by Sophie Alford RN  Medication Review/Management: medications reviewed  Taken 8/1/2024 2109 by Sophie Alford RN  Medication Review/Management: medications reviewed  Taken 8/1/2024 1947 by Rocío  MANOLO Barrios  Medication Review/Management: medications reviewed     Problem: Osteoarthritis Comorbidity  Goal: Maintenance of Osteoarthritis Symptom Control  Outcome: Ongoing, Progressing  Intervention: Maintain Osteoarthritis Symptom Control  Recent Flowsheet Documentation  Taken 8/1/2024 2300 by Sophie Alford RN  Medication Review/Management: medications reviewed  Taken 8/1/2024 2109 by Sophie Alford RN  Medication Review/Management: medications reviewed  Taken 8/1/2024 1947 by Sophie Alford RN  Medication Review/Management: medications reviewed     Problem: Dysrhythmia  Goal: Normalized Cardiac Rhythm  Outcome: Ongoing, Progressing  Intervention: Monitor and Manage Cardiac Rhythm Effect  Recent Flowsheet Documentation  Taken 8/1/2024 1947 by Sophie Alford RN  VTE Prevention/Management: bilateral     Problem: Fall Injury Risk  Goal: Absence of Fall and Fall-Related Injury  Outcome: Ongoing, Progressing  Intervention: Identify and Manage Contributors  Recent Flowsheet Documentation  Taken 8/1/2024 2300 by Sophie Alford RN  Medication Review/Management: medications reviewed  Taken 8/1/2024 2109 by Sophie Alford RN  Medication Review/Management: medications reviewed  Taken 8/1/2024 1947 by Sophie Alford RN  Medication Review/Management: medications reviewed  Intervention: Promote Injury-Free Environment  Recent Flowsheet Documentation  Taken 8/1/2024 2300 by Sophie Alford RN  Safety Promotion/Fall Prevention: safety round/check completed  Taken 8/1/2024 2109 by Sophie Alford RN  Safety Promotion/Fall Prevention: safety round/check completed  Taken 8/1/2024 1947 by Sophie Alford RN  Safety Promotion/Fall Prevention: safety round/check completed   Goal Outcome Evaluation:

## 2024-08-02 NOTE — PLAN OF CARE
Goal Outcome Evaluation:  Plan of Care Reviewed With: patient        Progress: improving  Outcome Evaluation: Patient concerned about discharging today d/t dyspnea with exertion; MD aware. No other acute changes noted this shift. VSS.

## 2024-08-03 ENCOUNTER — READMISSION MANAGEMENT (OUTPATIENT)
Dept: CALL CENTER | Facility: HOSPITAL | Age: 84
End: 2024-08-03
Payer: MEDICARE

## 2024-08-03 VITALS
OXYGEN SATURATION: 98 % | RESPIRATION RATE: 18 BRPM | BODY MASS INDEX: 21.34 KG/M2 | SYSTOLIC BLOOD PRESSURE: 111 MMHG | WEIGHT: 175.27 LBS | TEMPERATURE: 97.7 F | HEIGHT: 76 IN | DIASTOLIC BLOOD PRESSURE: 75 MMHG | HEART RATE: 72 BPM

## 2024-08-03 PROCEDURE — 99239 HOSP IP/OBS DSCHRG MGMT >30: CPT | Performed by: INTERNAL MEDICINE

## 2024-08-03 RX ORDER — FUROSEMIDE 40 MG/1
40 TABLET ORAL DAILY
Qty: 30 TABLET | Refills: 0 | Status: SHIPPED | OUTPATIENT
Start: 2024-08-04 | End: 2024-09-03

## 2024-08-03 RX ORDER — SPIRONOLACTONE 25 MG/1
25 TABLET ORAL EVERY OTHER DAY
Qty: 15 TABLET | Refills: 0 | Status: SHIPPED | OUTPATIENT
Start: 2024-08-03 | End: 2024-09-02

## 2024-08-03 RX ADMIN — SPIRONOLACTONE 25 MG: 25 TABLET ORAL at 08:20

## 2024-08-03 RX ADMIN — CITALOPRAM HYDROBROMIDE 10 MG: 20 TABLET ORAL at 08:15

## 2024-08-03 RX ADMIN — ISOSORBIDE MONONITRATE 30 MG: 30 TABLET, EXTENDED RELEASE ORAL at 08:15

## 2024-08-03 RX ADMIN — APIXABAN 2.5 MG: 2.5 TABLET, FILM COATED ORAL at 08:15

## 2024-08-03 RX ADMIN — FUROSEMIDE 40 MG: 40 TABLET ORAL at 08:15

## 2024-08-03 RX ADMIN — EMPAGLIFLOZIN 5 MG: 10 TABLET, FILM COATED ORAL at 08:15

## 2024-08-03 RX ADMIN — CARVEDILOL 6.25 MG: 6.25 TABLET, FILM COATED ORAL at 08:15

## 2024-08-03 RX ADMIN — PANTOPRAZOLE SODIUM 40 MG: 40 TABLET, DELAYED RELEASE ORAL at 05:24

## 2024-08-03 RX ADMIN — Medication 10 ML: at 08:15

## 2024-08-03 RX ADMIN — TAMSULOSIN HYDROCHLORIDE 0.4 MG: 0.4 CAPSULE ORAL at 08:15

## 2024-08-03 RX ADMIN — ALLOPURINOL 100 MG: 100 TABLET ORAL at 08:15

## 2024-08-03 NOTE — DISCHARGE SUMMARY
[facility]         HOSPITALIST  DISCHARGE SUMMARY    Patient Name: Javi Martínez  : 1940  MRN: [id number]    Date of Admission: 2024  Date of Discharge: 8/3/2024  Primary Care Physician: Rnai Lopez APRN  Reason for admission:  Shortness of air    Final diagnosis:  Acute on chronic systolic congestive heart failure ejection fraction around 20%  Coronary artery disease that is post coronary artery bypass grafting  Chronic atrial fibrillation  Chronic kidney disease stage IIIb  COPD  History of malignant neoplasm of the prostate  Chronic urinary retention status post suprapubic catheter placement  Hyperlipidemia    Consults       Date and Time Order Name Status Description    2024  6:19 AM Inpatient Cardiology Consult      2024  5:29 PM Inpatient Hospitalist Consult              Active and Resolved Hospital Problems:  Active Hospital Problems    Diagnosis POA    **Acute on chronic HFrEF (heart failure with reduced ejection fraction) [I50.23] Yes    Atrial fibrillation, chronic [I48.20] Yes    Stage 3b chronic kidney disease [N18.32] Yes    Chronic obstructive pulmonary disease [J44.9] Yes    History of malignant neoplasm of prostate [Z85.46] Not Applicable    Hyperlipidemia [E78.5] Yes    Primary osteoarthritis of left knee [M17.12] Yes    CAD with CABG [I25.10] Yes    Essential hypertension [I10] Yes      Resolved Hospital Problems   No resolved problems to display.       Hospital Course     Hospital Course:  Javi Martínez is a 84 y.o. male past medical history significant for systolic congestive heart failure, atrial fibrillation on anticoagulation, chronic kidney disease, COPD, chronic suprapubic Diamond catheter presents emergency department with chief complaint of shortness of air patient had elevated BNP evidence of volume overload on x-ray admitted to the hospitalist service.  Patient started on IV diuretics proceed with 2D echocardiogram cardiology consultation  obtained patient started on Aldactone Jardiance his beta-blocker was resumed ACE inhibitor/ARB can be added in the outpatient setting once serum creatinine has stabilized.  Additionally patient was taking Eliquis 5 mg daily we have changed that to 2.5 p.o. twice daily based on renal function.  Patient's respiratory status improved transition to oral Lasix 40 mg daily which she will continue in the outpatient setting seen and examined 8/3/2024 hemodynamically clinically stable for discharge patient will follow-up with his PCP in a week follow-up with his primary cardiologist additionally will refer the patient to the heart failure clinic.        DISCHARGE Follow Up Recommendations for labs and diagnostics: As above      Day of Discharge     Vital Signs:  Temp:  [97.3 °F (36.3 °C)-98.6 °F (37 °C)] 97.7 °F (36.5 °C)  Heart Rate:  [70-72] 72  Resp:  [18] 18  BP: ()/(66-75) 111/75  Physical Exam:   Constitutional: Awake alert oriented no acute distress  Respiratory: Clear breath sounds noted bilaterally  Cardiovascular RRR      Discharge Details        Discharge Medications        New Medications        Instructions Start Date   empagliflozin 10 MG tablet tablet  Commonly known as: JARDIANCE   5 mg, Oral, Daily   Start Date: August 4, 2024     furosemide 40 MG tablet  Commonly known as: LASIX   40 mg, Oral, Daily   Start Date: August 4, 2024            Changes to Medications        Instructions Start Date   apixaban 2.5 MG tablet tablet  Commonly known as: ELIQUIS  What changed:   medication strength  how much to take  when to take this   2.5 mg, Oral, 2 Times Daily             Continue These Medications        Instructions Start Date   allopurinol 100 MG tablet  Commonly known as: ZYLOPRIM   100 mg, Oral, Daily      carvedilol 6.25 MG tablet  Commonly known as: COREG   6.25 mg, Oral, Every 12 Hours Scheduled      citalopram 10 MG tablet  Commonly known as: CeleXA   10 mg, Oral, Daily      isosorbide mononitrate 60  MG 24 hr tablet  Commonly known as: IMDUR   30 mg, Oral, Daily      LORazepam 0.5 MG tablet  Commonly known as: ATIVAN   0.5 mg, Oral, 2 Times Daily PRN      omeprazole 20 MG capsule  Commonly known as: priLOSEC   20 mg, Oral, Daily      spironolactone 25 MG tablet  Commonly known as: ALDACTONE   25 mg, Oral, Every Other Day      tamsulosin 0.4 MG capsule 24 hr capsule  Commonly known as: FLOMAX   1 capsule, Oral, Daily      traZODone 50 MG tablet  Commonly known as: DESYREL    mg, Oral, Nightly PRN, 1-2 tabs hs prn               No Known Allergies    Discharge Disposition:  Home or Self Care    Diet:  Hospital:  Diet Order   Procedures    Diet: Cardiac, Fluid Restriction (240 mL/tray); Healthy Heart (2-3 Na+); 1500 mL/day; Fluid Consistency: Thin (IDDSI 0)       Discharge Activity: As tolerated      CODE STATUS:  Code Status and Medical Interventions: CPR (Attempt to Resuscitate); Full Support   Ordered at: 07/30/24 4810     Level Of Support Discussed With:    Patient     Code Status (Patient has no pulse and is not breathing):    CPR (Attempt to Resuscitate)     Medical Interventions (Patient has pulse or is breathing):    Full Support         Future Appointments   Date Time Provider Department Center   8/28/2024 10:45 AM Mally Bernal MD [facility]       Additional Instructions for the Follow-ups that You Need to Schedule       Discharge Follow-up with PCP   As directed       Currently Documented PCP:    Rani Lopez APRN    PCP Phone Number:    [phone]     Follow Up Details: one week        Discharge Follow-up with Specified Provider: Dr. Whelan; 2 Weeks   As directed      To: Dr. Whelan   Follow Up: 2 Weeks                Pertinent  and/or Most Recent Results     PROCEDURES:   None    LAB RESULTS:      Lab 08/02/24  0432 08/01/24  0438 07/31/24  0426 07/30/24  1642   WBC 10.81* 9.53 8.17 8.82   HEMOGLOBIN 14.3 13.9 13.0 14.0   HEMATOCRIT 44.6 42.6 40.1 44.5   PLATELETS 209 214 207 220    NEUTROS ABS  --  6.12 4.51 5.67   IMMATURE GRANS (ABS)  --  0.03 0.02 0.02   LYMPHS ABS  --  2.08 2.32 1.91   MONOS ABS  --  1.08* 1.06* 0.97*   EOS ABS  --  0.17 0.21 0.17   MCV 89.7 91.2 92.0 91.2         Lab 08/02/24  0432 08/01/24 0438 07/31/24 0426 07/30/24  1642   SODIUM 139 139 142 140   POTASSIUM 3.7 4.0 4.0 4.4   CHLORIDE 99 100 105 106   CO2 31.2* 29.2* 24.4 23.8   ANION GAP 8.8 9.8 12.6 10.2   BUN 34* 29* 26* 23   CREATININE 1.92* 1.97* 1.63* 1.62*   EGFR 34.4* 33.3* 41.8* 42.1*   GLUCOSE 103* 109* 89 78   CALCIUM 8.7 9.1 9.0 8.7   MAGNESIUM 2.0 1.9  --   --    PHOSPHORUS 3.6 4.2  --   --          Lab 08/02/24  0432 08/01/24 0438 07/31/24  0426 07/30/24  1642   TOTAL PROTEIN  --  6.2 5.9* 6.3   ALBUMIN 3.2* 3.3* 3.2* 3.3*   GLOBULIN  --  2.9 2.7 3.0   ALT (SGPT)  --  17 19 23   AST (SGOT)  --  19 22 28   BILIRUBIN  --  1.6* 1.4* 1.5*   ALK PHOS  --  110 108 127*         Lab 07/30/24  1642   PROBNP 10,868.0*   HSTROP T 50*                 Brief Urine Lab Results       None          Microbiology Results (last 10 days)       ** No results found for the last 240 hours. **            XR Chest 1 View    Result Date: 7/30/2024  Impression: Impression: Findings suggest worsening congestive heart failure. Superimposed pneumonia is also possible. Electronically Signed: Zeeshan Lopez MD  7/30/2024 4:55 PM EDT  Workstation ID: [id number]    XR Chest 1 View    Result Date: 7/22/2024  Impression: Impression: 1.Cardiomegaly with small bilateral pleural effusions and bibasilar atelectasis. Electronically Signed: West Peña MD  7/22/2024 12:03 PM EDT  Workstation ID: [id number]             Results for orders placed during the hospital encounter of 07/30/24    Adult Transthoracic Echo Complete w/ Color, Spectral and Contrast if necessary per protocol    Interpretation Summary    Left ventricular systolic function is severely decreased. Estimated left ventricular EF = 20%    The left ventricular cavity is moderate to  severely dilated.    Left ventricular diastolic function is consistent with (grade II w/high LAP) pseudonormalization.    The left atrial cavity is severely dilated.    The right atrial cavity is moderate to severely  dilated.      Labs Pending at Discharge:        Time spent on Discharge including face to face service: 35 minutes    Electronically signed by DOLORES Burt, 08/03/24, 9:48 AM EDT.    Attending Documentation:  Patient independently seen and evaluated, above documentation reflects plan put forth during bedside rounds.  More than 51% of the time of this patient encounter was performed by me. I discussed the care plan with ERIC Hall PA-C, I agree with his findings and plan as documented, what I have added to the care plan and modified is as follows in my documentation and my medical decision making; very pleasant 84-year-old male with CHF presenting with shortness of breath, volume overload, CHF exacerbation.  He has done well, improved with IV diuresis with IV Lasix.  Stable for discharge home today.  Will refer him to the CHF clinic.  Electronically signed by Ge Carmona MD, 08/03/24, 4:00 PM EDT.

## 2024-08-03 NOTE — OUTREACH NOTE
Prep Survey      Flowsheet Row Responses   [facility] facility patient discharged from? [facility]   Is LACE score < 7 ? No   Eligibility Readm Mgmt   Discharge diagnosis Acute on chronic HFrEF (heart failure with reduced ejection fraction)   Does the patient have one of the following disease processes/diagnoses(primary or secondary)? CHF   Prep survey completed? Yes            Yasemin LIGHT - Registered Nurse

## 2024-08-08 ENCOUNTER — TELEPHONE (OUTPATIENT)
Dept: UROLOGY | Facility: CLINIC | Age: 84
End: 2024-08-08
Payer: MEDICARE

## 2024-08-08 ENCOUNTER — READMISSION MANAGEMENT (OUTPATIENT)
Dept: CALL CENTER | Facility: HOSPITAL | Age: 84
End: 2024-08-08
Payer: MEDICARE

## 2024-08-08 NOTE — OUTREACH NOTE
CHF Week 1 Survey      Flowsheet Row Responses   [facility] facility patient discharged from? [facility]   Does the patient have one of the following disease processes/diagnoses(primary or secondary)? CHF   CHF Week 1 attempt successful? No  [No answer by patient. Relative, Rodriguez Marks, states has talked with patient two days ago, and was doing well. Deferred to patient for f/u on meds/appts.]   Unsuccessful attempts Attempt 1            Jayne LIGHT - Registered Nurse

## 2024-08-08 NOTE — TELEPHONE ENCOUNTER
Spoke to patient and informed to hold eliquis for 2 days prior to surgery and then restart it after surgery completed. Patient verbalized understanding.

## 2024-08-15 ENCOUNTER — ANESTHESIA (OUTPATIENT)
Dept: EMERGENCY DEPT | Facility: HOSPITAL | Age: 84
End: 2024-08-15

## 2024-08-15 ENCOUNTER — ANESTHESIA EVENT (OUTPATIENT)
Dept: PERIOP | Facility: HOSPITAL | Age: 84
End: 2024-08-15
Payer: MEDICARE

## 2024-08-15 ENCOUNTER — ANESTHESIA (OUTPATIENT)
Dept: PERIOP | Facility: HOSPITAL | Age: 84
End: 2024-08-15
Payer: MEDICARE

## 2024-08-15 ENCOUNTER — HOSPITAL ENCOUNTER (OUTPATIENT)
Facility: HOSPITAL | Age: 84
Setting detail: OBSERVATION
Discharge: HOME | End: 2024-08-16
Attending: EMERGENCY MEDICINE | Admitting: STUDENT IN AN ORGANIZED HEALTH CARE EDUCATION/TRAINING PROGRAM
Payer: MEDICARE

## 2024-08-15 ENCOUNTER — APPOINTMENT (OUTPATIENT)
Dept: GENERAL RADIOLOGY | Facility: HOSPITAL | Age: 84
End: 2024-08-15
Payer: MEDICARE

## 2024-08-15 ENCOUNTER — ANESTHESIA EVENT (OUTPATIENT)
Dept: EMERGENCY DEPT | Facility: HOSPITAL | Age: 84
End: 2024-08-15

## 2024-08-15 DIAGNOSIS — R26.2 DIFFICULTY WALKING: ICD-10-CM

## 2024-08-15 DIAGNOSIS — T83.010A SUPRAPUBIC CATHETER DYSFUNCTION, INITIAL ENCOUNTER: Primary | ICD-10-CM

## 2024-08-15 LAB
ALBUMIN SERPL-MCNC: 3.2 G/DL (ref 3.5–5.2)
ALBUMIN/GLOB SERPL: 0.9 G/DL
ALP SERPL-CCNC: 117 U/L (ref 39–117)
ALT SERPL W P-5'-P-CCNC: 24 U/L (ref 1–41)
ANION GAP SERPL CALCULATED.3IONS-SCNC: 11.9 MMOL/L (ref 5–15)
AST SERPL-CCNC: 33 U/L (ref 1–40)
BASOPHILS # BLD AUTO: 0.07 10*3/MM3 (ref 0–0.2)
BASOPHILS NFR BLD AUTO: 0.7 % (ref 0–1.5)
BILIRUB SERPL-MCNC: 0.7 MG/DL (ref 0–1.2)
BUN SERPL-MCNC: 35 MG/DL (ref 8–23)
BUN/CREAT SERPL: 19 (ref 7–25)
CALCIUM SPEC-SCNC: 8.7 MG/DL (ref 8.6–10.5)
CHLORIDE SERPL-SCNC: 100 MMOL/L (ref 98–107)
CO2 SERPL-SCNC: 27.1 MMOL/L (ref 22–29)
CREAT SERPL-MCNC: 1.84 MG/DL (ref 0.76–1.27)
DEPRECATED RDW RBC AUTO: 50 FL (ref 37–54)
EGFRCR SERPLBLD CKD-EPI 2021: 36.2 ML/MIN/1.73
EOSINOPHIL # BLD AUTO: 0.18 10*3/MM3 (ref 0–0.4)
EOSINOPHIL NFR BLD AUTO: 1.8 % (ref 0.3–6.2)
ERYTHROCYTE [DISTWIDTH] IN BLOOD BY AUTOMATED COUNT: 15.4 % (ref 12.3–15.4)
GLOBULIN UR ELPH-MCNC: 3.4 GM/DL
GLUCOSE SERPL-MCNC: 114 MG/DL (ref 65–99)
HCT VFR BLD AUTO: 46 % (ref 37.5–51)
HGB BLD-MCNC: 14.7 G/DL (ref 13–17.7)
IMM GRANULOCYTES # BLD AUTO: 0.05 10*3/MM3 (ref 0–0.05)
IMM GRANULOCYTES NFR BLD AUTO: 0.5 % (ref 0–0.5)
LYMPHOCYTES # BLD AUTO: 2.33 10*3/MM3 (ref 0.7–3.1)
LYMPHOCYTES NFR BLD AUTO: 23.5 % (ref 19.6–45.3)
MAGNESIUM SERPL-MCNC: 2.2 MG/DL (ref 1.6–2.4)
MCH RBC QN AUTO: 29.4 PG (ref 26.6–33)
MCHC RBC AUTO-ENTMCNC: 32 G/DL (ref 31.5–35.7)
MCV RBC AUTO: 92 FL (ref 79–97)
MONOCYTES # BLD AUTO: 0.78 10*3/MM3 (ref 0.1–0.9)
MONOCYTES NFR BLD AUTO: 7.9 % (ref 5–12)
NEUTROPHILS NFR BLD AUTO: 6.49 10*3/MM3 (ref 1.7–7)
NEUTROPHILS NFR BLD AUTO: 65.6 % (ref 42.7–76)
NRBC BLD AUTO-RTO: 0 /100 WBC (ref 0–0.2)
PLATELET # BLD AUTO: 205 10*3/MM3 (ref 140–450)
PMV BLD AUTO: 10.5 FL (ref 6–12)
POTASSIUM SERPL-SCNC: 4 MMOL/L (ref 3.5–5.2)
PROT SERPL-MCNC: 6.6 G/DL (ref 6–8.5)
RBC # BLD AUTO: 5 10*6/MM3 (ref 4.14–5.8)
SODIUM SERPL-SCNC: 139 MMOL/L (ref 136–145)
WBC NRBC COR # BLD AUTO: 9.9 10*3/MM3 (ref 3.4–10.8)

## 2024-08-15 PROCEDURE — 99284 EMERGENCY DEPT VISIT MOD MDM: CPT | Performed by: UROLOGY

## 2024-08-15 PROCEDURE — G0378 HOSPITAL OBSERVATION PER HR: HCPCS

## 2024-08-15 PROCEDURE — 52281 CYSTOSCOPY AND TREATMENT: CPT | Performed by: UROLOGY

## 2024-08-15 PROCEDURE — 25010000002 FENTANYL CITRATE (PF) 50 MCG/ML SOLUTION: Performed by: ANESTHESIOLOGY

## 2024-08-15 PROCEDURE — 76000 FLUOROSCOPY <1 HR PHYS/QHP: CPT

## 2024-08-15 PROCEDURE — 74430 CONTRAST X-RAY BLADDER: CPT | Performed by: UROLOGY

## 2024-08-15 PROCEDURE — 99222 1ST HOSP IP/OBS MODERATE 55: CPT | Performed by: STUDENT IN AN ORGANIZED HEALTH CARE EDUCATION/TRAINING PROGRAM

## 2024-08-15 PROCEDURE — 51610 INJECTION FOR BLADDER X-RAY: CPT | Performed by: UROLOGY

## 2024-08-15 PROCEDURE — C1769 GUIDE WIRE: HCPCS | Performed by: UROLOGY

## 2024-08-15 PROCEDURE — 80053 COMPREHEN METABOLIC PANEL: CPT | Performed by: STUDENT IN AN ORGANIZED HEALTH CARE EDUCATION/TRAINING PROGRAM

## 2024-08-15 PROCEDURE — 25810000003 SODIUM CHLORIDE 0.9 % SOLUTION: Performed by: NURSE PRACTITIONER

## 2024-08-15 PROCEDURE — 83735 ASSAY OF MAGNESIUM: CPT | Performed by: STUDENT IN AN ORGANIZED HEALTH CARE EDUCATION/TRAINING PROGRAM

## 2024-08-15 PROCEDURE — C1726 CATH, BAL DIL, NON-VASCULAR: HCPCS | Performed by: UROLOGY

## 2024-08-15 PROCEDURE — 87077 CULTURE AEROBIC IDENTIFY: CPT | Performed by: UROLOGY

## 2024-08-15 PROCEDURE — 94799 UNLISTED PULMONARY SVC/PX: CPT

## 2024-08-15 PROCEDURE — 25010000002 PROPOFOL 10 MG/ML EMULSION: Performed by: ANESTHESIOLOGY

## 2024-08-15 PROCEDURE — 25010000002 MIDAZOLAM PER 1MG: Performed by: ANESTHESIOLOGY

## 2024-08-15 PROCEDURE — 85025 COMPLETE CBC W/AUTO DIFF WBC: CPT | Performed by: STUDENT IN AN ORGANIZED HEALTH CARE EDUCATION/TRAINING PROGRAM

## 2024-08-15 PROCEDURE — 25510000001 IOPAMIDOL PER 1 ML: Performed by: UROLOGY

## 2024-08-15 PROCEDURE — 25010000002 CEFTRIAXONE PER 250 MG: Performed by: UROLOGY

## 2024-08-15 PROCEDURE — 99285 EMERGENCY DEPT VISIT HI MDM: CPT

## 2024-08-15 PROCEDURE — 87086 URINE CULTURE/COLONY COUNT: CPT | Performed by: UROLOGY

## 2024-08-15 PROCEDURE — C1758 CATHETER, URETERAL: HCPCS | Performed by: UROLOGY

## 2024-08-15 PROCEDURE — 94761 N-INVAS EAR/PLS OXIMETRY MLT: CPT

## 2024-08-15 RX ORDER — DEXMEDETOMIDINE HYDROCHLORIDE 100 UG/ML
INJECTION, SOLUTION INTRAVENOUS AS NEEDED
Status: DISCONTINUED | OUTPATIENT
Start: 2024-08-15 | End: 2024-08-15 | Stop reason: SURG

## 2024-08-15 RX ORDER — SODIUM CHLORIDE 0.9 % (FLUSH) 0.9 %
10 SYRINGE (ML) INJECTION EVERY 12 HOURS SCHEDULED
Status: DISCONTINUED | OUTPATIENT
Start: 2024-08-15 | End: 2024-08-16 | Stop reason: HOSPADM

## 2024-08-15 RX ORDER — FENTANYL CITRATE 50 UG/ML
INJECTION, SOLUTION INTRAMUSCULAR; INTRAVENOUS AS NEEDED
Status: DISCONTINUED | OUTPATIENT
Start: 2024-08-15 | End: 2024-08-15 | Stop reason: SURG

## 2024-08-15 RX ORDER — ONDANSETRON 4 MG/1
4 TABLET, ORALLY DISINTEGRATING ORAL EVERY 6 HOURS PRN
Status: DISCONTINUED | OUTPATIENT
Start: 2024-08-15 | End: 2024-08-16 | Stop reason: HOSPADM

## 2024-08-15 RX ORDER — ETOMIDATE 2 MG/ML
INJECTION INTRAVENOUS AS NEEDED
Status: DISCONTINUED | OUTPATIENT
Start: 2024-08-15 | End: 2024-08-15 | Stop reason: SURG

## 2024-08-15 RX ORDER — ONDANSETRON 2 MG/ML
4 INJECTION INTRAMUSCULAR; INTRAVENOUS ONCE AS NEEDED
Status: DISCONTINUED | OUTPATIENT
Start: 2024-08-15 | End: 2024-08-15 | Stop reason: HOSPADM

## 2024-08-15 RX ORDER — LIDOCAINE HYDROCHLORIDE AND EPINEPHRINE 10; 10 MG/ML; UG/ML
INJECTION, SOLUTION INFILTRATION; PERINEURAL AS NEEDED
Status: DISCONTINUED | OUTPATIENT
Start: 2024-08-15 | End: 2024-08-15 | Stop reason: HOSPADM

## 2024-08-15 RX ORDER — SODIUM CHLORIDE, SODIUM LACTATE, POTASSIUM CHLORIDE, CALCIUM CHLORIDE 600; 310; 30; 20 MG/100ML; MG/100ML; MG/100ML; MG/100ML
9 INJECTION, SOLUTION INTRAVENOUS CONTINUOUS PRN
Status: DISCONTINUED | OUTPATIENT
Start: 2024-08-15 | End: 2024-08-15 | Stop reason: HOSPADM

## 2024-08-15 RX ORDER — PROMETHAZINE HYDROCHLORIDE 12.5 MG/1
25 TABLET ORAL ONCE AS NEEDED
Status: DISCONTINUED | OUTPATIENT
Start: 2024-08-15 | End: 2024-08-15 | Stop reason: HOSPADM

## 2024-08-15 RX ORDER — OXYCODONE HYDROCHLORIDE 5 MG/1
5 TABLET ORAL
Status: DISCONTINUED | OUTPATIENT
Start: 2024-08-15 | End: 2024-08-15 | Stop reason: HOSPADM

## 2024-08-15 RX ORDER — SODIUM CHLORIDE 0.9 % (FLUSH) 0.9 %
10 SYRINGE (ML) INJECTION AS NEEDED
Status: DISCONTINUED | OUTPATIENT
Start: 2024-08-15 | End: 2024-08-16 | Stop reason: HOSPADM

## 2024-08-15 RX ORDER — SODIUM CHLORIDE 9 MG/ML
40 INJECTION, SOLUTION INTRAVENOUS AS NEEDED
Status: DISCONTINUED | OUTPATIENT
Start: 2024-08-15 | End: 2024-08-16 | Stop reason: HOSPADM

## 2024-08-15 RX ORDER — MIDAZOLAM HYDROCHLORIDE 2 MG/2ML
INJECTION, SOLUTION INTRAMUSCULAR; INTRAVENOUS AS NEEDED
Status: DISCONTINUED | OUTPATIENT
Start: 2024-08-15 | End: 2024-08-15 | Stop reason: SURG

## 2024-08-15 RX ORDER — ONDANSETRON 2 MG/ML
4 INJECTION INTRAMUSCULAR; INTRAVENOUS EVERY 6 HOURS PRN
Status: DISCONTINUED | OUTPATIENT
Start: 2024-08-15 | End: 2024-08-16 | Stop reason: HOSPADM

## 2024-08-15 RX ORDER — PROPOFOL 10 MG/ML
VIAL (ML) INTRAVENOUS AS NEEDED
Status: DISCONTINUED | OUTPATIENT
Start: 2024-08-15 | End: 2024-08-15 | Stop reason: SURG

## 2024-08-15 RX ORDER — PROMETHAZINE HYDROCHLORIDE 25 MG/1
25 SUPPOSITORY RECTAL ONCE AS NEEDED
Status: DISCONTINUED | OUTPATIENT
Start: 2024-08-15 | End: 2024-08-15 | Stop reason: HOSPADM

## 2024-08-15 RX ADMIN — PROPOFOL 20 MG: 10 INJECTION, EMULSION INTRAVENOUS at 21:43

## 2024-08-15 RX ADMIN — CEFTRIAXONE SODIUM 1000 MG: 1 INJECTION, POWDER, FOR SOLUTION INTRAMUSCULAR; INTRAVENOUS at 21:35

## 2024-08-15 RX ADMIN — SODIUM CHLORIDE 1000 ML: 9 INJECTION, SOLUTION INTRAVENOUS at 20:39

## 2024-08-15 RX ADMIN — DEXMEDETOMIDINE HYDROCHLORIDE 4 MCG: 100 INJECTION, SOLUTION, CONCENTRATE INTRAVENOUS at 21:38

## 2024-08-15 RX ADMIN — ETOMIDATE 2 MG: 40 INJECTION, SOLUTION INTRAVENOUS at 21:47

## 2024-08-15 RX ADMIN — PROPOFOL 20 MG: 10 INJECTION, EMULSION INTRAVENOUS at 21:54

## 2024-08-15 RX ADMIN — MIDAZOLAM HYDROCHLORIDE 2 MG: 1 INJECTION, SOLUTION INTRAMUSCULAR; INTRAVENOUS at 21:35

## 2024-08-15 RX ADMIN — PROPOFOL 20 MG: 10 INJECTION, EMULSION INTRAVENOUS at 21:44

## 2024-08-15 RX ADMIN — DEXMEDETOMIDINE HYDROCHLORIDE 4 MCG: 100 INJECTION, SOLUTION, CONCENTRATE INTRAVENOUS at 21:50

## 2024-08-15 RX ADMIN — PROPOFOL 20 MG: 10 INJECTION, EMULSION INTRAVENOUS at 21:49

## 2024-08-15 RX ADMIN — ETOMIDATE 2 MG: 40 INJECTION, SOLUTION INTRAVENOUS at 21:38

## 2024-08-15 RX ADMIN — DEXMEDETOMIDINE HYDROCHLORIDE 4 MCG: 100 INJECTION, SOLUTION, CONCENTRATE INTRAVENOUS at 21:44

## 2024-08-15 RX ADMIN — PROPOFOL 20 MG: 10 INJECTION, EMULSION INTRAVENOUS at 21:59

## 2024-08-15 RX ADMIN — FENTANYL CITRATE 25 MCG: 50 INJECTION, SOLUTION INTRAMUSCULAR; INTRAVENOUS at 21:33

## 2024-08-15 RX ADMIN — PROPOFOL 20 MG: 10 INJECTION, EMULSION INTRAVENOUS at 21:38

## 2024-08-15 RX ADMIN — ETOMIDATE 2 MG: 40 INJECTION, SOLUTION INTRAVENOUS at 21:43

## 2024-08-15 NOTE — ED PROVIDER NOTES
"Time: 6:26 PM EDT  Date of encounter:  8/15/2024  Independent Historian/Clinical History and Information was obtained by:   Patient    History is limited by: N/A    Chief Complaint   Patient presents with    Catheter Dislodged         History of Present Illness:  Patient is a 84 y.o. year old male who presents to the emergency department for evaluation after suprapubic catheter came out this afternoon.  MEJIA Glass    Patient Care Team  Primary Care Provider: Rani Lopez APRN    Past Medical History:     No Known Allergies  Past Medical History:   Diagnosis Date    Aneurysm 2013    Arthritis     left knee     Atrial fibrillation, persistent     CHB (complete heart block) 06/07/2023    CHF exacerbation 4/24/2024    Chronic obstructive pulmonary disease 09/01/2023    Difficulty walking 2023    Numbness in right foot    Essential hypertension 05/08/2019    GERD (gastroesophageal reflux disease)     HFrEF (heart failure with reduced ejection fraction) 08/11/2021    Hyperlipidemia 08/11/2021    Paroxysmal atrial fibrillation     Polyneuropathy 06/19/2023    Prostate cancer 2010    with seeds implant     Septic joint 11/26/2021     Past Surgical History:   Procedure Laterality Date    ABLATION OF DYSRHYTHMIC FOCUS  2013    APPENDECTOMY      ARTERIAL BYPASS SURGERY      CARDIAC ABLATION      x2  \"years ago\"     CARDIAC ELECTROPHYSIOLOGY PROCEDURE N/A 11/01/2021    Procedure: PPM battery change;  Surgeon: Ge Whelan MD;  Location: [facility] CATH INVASIVE LOCATION;  Service: Cardiovascular;  Laterality: N/A;    CARDIAC SURGERY  2006    cabg 3v    CORONARY ARTERY BYPASS GRAFT  2006    ENDOSCOPY      with dilation     KNEE ARTHROSCOPY Left     TOTAL KNEE ARTHROPLASTY Left 07/23/2021    Procedure: TOTAL KNEE ARTHROPLASTY WITH DORA NAVIGATION WITH BIOMET;  Surgeon: Carlitos Zhao MD;  Location: [facility] MAIN OR;  Service: Orthopedics;  Laterality: Left;    VENTRICULAR CARDIAC PACEMAKER INSERTION      medtronic  "     Family History   Problem Relation Age of Onset    Heart attack Mother     No Known Problems Father     No Known Problems Sister     No Known Problems Brother     No Known Problems Maternal Aunt     No Known Problems Maternal Uncle     No Known Problems Paternal Aunt     No Known Problems Paternal Uncle     No Known Problems Maternal Grandmother     No Known Problems Maternal Grandfather     No Known Problems Paternal Grandmother     No Known Problems Paternal Grandfather     No Known Problems Other        Home Medications:  Prior to Admission medications    Medication Sig Start Date End Date Taking? Authorizing Provider   allopurinol (ZYLOPRIM) 100 MG tablet Take 1 tablet by mouth Daily. 3/11/22   Ely Srinivasan MD   apixaban (ELIQUIS) 2.5 MG tablet tablet Take 1 tablet by mouth 2 (Two) Times a Day for 30 days. Indications: Atrial Fibrillation 8/3/24 9/2/24  Jasen Hall PA   carvedilol (COREG) 6.25 MG tablet Take 1 tablet by mouth Every 12 (Twelve) Hours. 5/14/24   Paul Singh MD   citalopram (CeleXA) 10 MG tablet Take 1 tablet by mouth Daily.    Ely Srinivasan MD   empagliflozin (JARDIANCE) 10 MG tablet tablet Take 0.5 tablets by mouth Daily for 30 days. 8/4/24 9/3/24  Jasen Hall PA   furosemide (LASIX) 40 MG tablet Take 1 tablet by mouth Daily for 30 days. 8/4/24 9/3/24  Jasen Hall PA   isosorbide mononitrate (IMDUR) 60 MG 24 hr tablet Take 0.5 tablets by mouth Daily.    Ely Srinivasan MD   LORazepam (ATIVAN) 0.5 MG tablet Take 1 tablet by mouth 2 (Two) Times a Day As Needed for Anxiety. 4/16/24   Ely Srinivasan MD   omeprazole (priLOSEC) 20 MG capsule Take 1 capsule by mouth Daily.    Ely Srinivasan MD   spironolactone (ALDACTONE) 25 MG tablet Take 1 tablet by mouth Every Other Day for 30 days. 8/3/24 9/2/24  Jasen Hall PA   tamsulosin (FLOMAX) 0.4 MG capsule 24 hr capsule Take 1 capsule by mouth Daily. 3/15/24   Ely Srinivasan MD  "  traZODone (DESYREL) 50 MG tablet Take 1-2 tablets by mouth At Night As Needed for Sleep. 1-2 tabs hs prn    Provider, Ely, MD        Social History:   Social History     Tobacco Use    Smoking status: Former     Current packs/day: 0.00     Average packs/day: 0.5 packs/day for 33.6 years (16.8 ttl pk-yrs)     Types: Cigarettes     Start date: 1962     Quit date: 1995     Years since quittin.0     Passive exposure: Never    Smokeless tobacco: Never   Vaping Use    Vaping status: Never Used   Substance Use Topics    Alcohol use: Never    Drug use: Never         Review of Systems:  Review of Systems   Constitutional:  Negative for fever.   Gastrointestinal:  Negative for abdominal pain.   Genitourinary:  Positive for difficulty urinating.   Skin:  Positive for wound (Suprapubic surgeries site seems to have closed).   Psychiatric/Behavioral: Negative.     All other systems reviewed and are negative.       Physical Exam:  /77 (BP Location: Right arm, Patient Position: Sitting)   Pulse 70   Temp 98.3 °F (36.8 °C) (Oral)   Resp 20   Ht 193 cm (76\")   Wt 77.9 kg (171 lb 11.8 oz)   SpO2 100%   BMI 20.90 kg/m²         Physical Exam  Vitals and nursing note reviewed.   Constitutional:       Appearance: Normal appearance.   HENT:      Head: Normocephalic.      Mouth/Throat:      Mouth: Mucous membranes are moist.   Eyes:      Conjunctiva/sclera: Conjunctivae normal.   Cardiovascular:      Rate and Rhythm: Normal rate and regular rhythm.      Heart sounds: Normal heart sounds.   Pulmonary:      Effort: Pulmonary effort is normal.      Breath sounds: Normal breath sounds.   Abdominal:      General: There is no distension.      Palpations: Abdomen is soft.      Tenderness: There is no abdominal tenderness.   Musculoskeletal:         General: Normal range of motion.      Cervical back: Normal range of motion.   Skin:     General: Skin is warm.      Coloration: Skin is not cyanotic.      Comments: " Sutures are still in tact old surgery site suprapubic area.  No opening noted for replacement of suprapubic catheter noted   Neurological:      Mental Status: He is alert and oriented to person, place, and time.   Psychiatric:         Attention and Perception: Attention and perception normal.         Mood and Affect: Mood normal.              Medical Decision Making:      Comorbidities that affect care:    Patient on Eliquis, Atrial Fibrillation urethral strictures    External Notes reviewed:    Previous Clinic Note: Clinic note noted this month from Dr. Bernal she was planning on doing a a scope tried to get rid of urethral strictures on the 20th      The following orders were placed and all results were independently analyzed by me:  Orders Placed This Encounter   Procedures    NPO Diet NPO Type: Strict NPO    Obtain Informed Consent    Verify / Perform Chlorhexidine Skin Prep    Place Sequential Compression Device    Maintain Sequential Compression Device    Vital Signs - Per Anesthesia Protocol    Remove Scopolamine Patch 24 Hours After Application    Continuous Pulse Oximetry    Urology (on-call MD unless specified)    Hospitalist (on-call MD unless specified)    Insert Peripheral IV    Initiate Observation Status       Medications Given in the Emergency Department:  Medications   sodium chloride 0.9 % bolus 1,000 mL (1,000 mL Intravenous New Bag 8/15/24 2039)   lactated ringers infusion (has no administration in time range)   cefTRIAXone (ROCEPHIN) 1,000 mg in sodium chloride 0.9 % 100 mL IVPB-VTB (has no administration in time range)        ED Course:    The patient was initially evaluated in the triage area where orders were placed. The patient was later dispositioned by MEJIA Alvarado.      The patient was advised to stay for completion of workup which includes but is not limited to communication of labs and radiological results, reassessment and plan. The patient was advised that leaving prior to  disposition by a provider could result in critical findings that are not communicated to the patient.          Labs:    Lab Results (last 24 hours)       ** No results found for the last 24 hours. **             Imaging:    No Radiology Exams Resulted Within Past 24 Hours      Differential Diagnosis and Discussion:      Dysuria: Differential diagnosis includes but is not limited to urethritis, cystitis, pyelonephritis, ureteral calculi, neoplasm, chemical irritant, urethral stricture, and trauma        MDM  Number of Diagnoses or Management Options  Suprapubic catheter dysfunction, initial encounter  Diagnosis management comments: Patient presented to the emergency department with accidental dislodgment of suprapubic catheter that was placed 3 weeks ago.  Patient denies any pain or other symptoms.  Catheter was unable to be replaced in the emergency department by the urologist and patient will be admitted to the hospital for surgical placement and management.  Dr. Myers the on-call urologist has seen and evaluated the patient in the ER.  The on-call hospitalist has accepted the patient.  Patient's vital signs are stable patient is agreeable with this plan.  He is n.p.o. and was told not to take his nightly dose of Eliquis    Risk of Complications, Morbidity, and/or Mortality  Presenting problems: low  Management options: moderate    Patient Progress  Patient progress: stable           Patient Care Considerations:    LABS: I considered ordering labs, however not ordered by urology      Consultants/Shared Management Plan:    Hospitalist: I have discussed the case with dr melchor who agrees to accept the patient for admission.  Consultant: I have discussed the case with Dr. Myers who states he will take the patient to the OR and replace the suprapubic catheter    Social Determinants of Health:    Patient has presented with family members who are responsible, reliable and will ensure follow up  care.      Disposition and Care Coordination:    Admit:   Through independent evaluation of the patient's history, physical, and imperical data, the patient meets criteria for inpatient admission to the hospital.        Final diagnoses:   Suprapubic catheter dysfunction, initial encounter        ED Disposition       ED Disposition   Decision to Admit    Condition   --    Comment   Level of Care: Remote Telemetry [26]   Diagnosis: Suprapubic catheter dysfunction [[id number]]                 This medical record created using voice recognition software.             Rody Garcia, MEJIA  08/15/24 8333

## 2024-08-16 ENCOUNTER — TELEPHONE (OUTPATIENT)
Dept: SURGERY | Facility: CLINIC | Age: 84
End: 2024-08-16
Payer: MEDICARE

## 2024-08-16 ENCOUNTER — READMISSION MANAGEMENT (OUTPATIENT)
Dept: CALL CENTER | Facility: HOSPITAL | Age: 84
End: 2024-08-16
Payer: MEDICARE

## 2024-08-16 VITALS
BODY MASS INDEX: 20.94 KG/M2 | HEIGHT: 76 IN | WEIGHT: 171.96 LBS | SYSTOLIC BLOOD PRESSURE: 92 MMHG | HEART RATE: 70 BPM | DIASTOLIC BLOOD PRESSURE: 73 MMHG | TEMPERATURE: 98.8 F | RESPIRATION RATE: 15 BRPM | OXYGEN SATURATION: 97 %

## 2024-08-16 PROCEDURE — 97161 PT EVAL LOW COMPLEX 20 MIN: CPT

## 2024-08-16 PROCEDURE — 99239 HOSP IP/OBS DSCHRG MGMT >30: CPT | Performed by: STUDENT IN AN ORGANIZED HEALTH CARE EDUCATION/TRAINING PROGRAM

## 2024-08-16 PROCEDURE — A9270 NON-COVERED ITEM OR SERVICE: HCPCS | Performed by: STUDENT IN AN ORGANIZED HEALTH CARE EDUCATION/TRAINING PROGRAM

## 2024-08-16 PROCEDURE — 63710000001 DICYCLOMINE PER 20 MG: Performed by: STUDENT IN AN ORGANIZED HEALTH CARE EDUCATION/TRAINING PROGRAM

## 2024-08-16 PROCEDURE — 63710000001 ISOSORBIDE MONONITRATE 30 MG TABLET SUSTAINED-RELEASE 24 HOUR: Performed by: STUDENT IN AN ORGANIZED HEALTH CARE EDUCATION/TRAINING PROGRAM

## 2024-08-16 PROCEDURE — 63710000001 ALLOPURINOL 100 MG TABLET: Performed by: STUDENT IN AN ORGANIZED HEALTH CARE EDUCATION/TRAINING PROGRAM

## 2024-08-16 PROCEDURE — G0378 HOSPITAL OBSERVATION PER HR: HCPCS

## 2024-08-16 PROCEDURE — 63710000001 CARVEDILOL 6.25 MG TABLET: Performed by: STUDENT IN AN ORGANIZED HEALTH CARE EDUCATION/TRAINING PROGRAM

## 2024-08-16 PROCEDURE — 63710000001 CITALOPRAM 20 MG TABLET: Performed by: STUDENT IN AN ORGANIZED HEALTH CARE EDUCATION/TRAINING PROGRAM

## 2024-08-16 PROCEDURE — 97165 OT EVAL LOW COMPLEX 30 MIN: CPT

## 2024-08-16 PROCEDURE — 63710000001 TAMSULOSIN 0.4 MG CAPSULE: Performed by: STUDENT IN AN ORGANIZED HEALTH CARE EDUCATION/TRAINING PROGRAM

## 2024-08-16 PROCEDURE — 63710000001 APIXABAN 2.5 MG TABLET: Performed by: STUDENT IN AN ORGANIZED HEALTH CARE EDUCATION/TRAINING PROGRAM

## 2024-08-16 PROCEDURE — 99213 OFFICE O/P EST LOW 20 MIN: CPT | Performed by: UROLOGY

## 2024-08-16 RX ORDER — DICYCLOMINE HCL 20 MG
20 TABLET ORAL 4 TIMES DAILY
Status: DISCONTINUED | OUTPATIENT
Start: 2024-08-16 | End: 2024-08-16 | Stop reason: HOSPADM

## 2024-08-16 RX ORDER — CITALOPRAM 20 MG/1
10 TABLET ORAL DAILY
Status: DISCONTINUED | OUTPATIENT
Start: 2024-08-16 | End: 2024-08-16 | Stop reason: HOSPADM

## 2024-08-16 RX ORDER — HYDROCODONE BITARTRATE AND ACETAMINOPHEN 5; 325 MG/1; MG/1
1 TABLET ORAL EVERY 6 HOURS PRN
Status: DISCONTINUED | OUTPATIENT
Start: 2024-08-16 | End: 2024-08-16 | Stop reason: HOSPADM

## 2024-08-16 RX ORDER — ISOSORBIDE MONONITRATE 30 MG/1
30 TABLET, EXTENDED RELEASE ORAL DAILY
Status: DISCONTINUED | OUTPATIENT
Start: 2024-08-16 | End: 2024-08-16 | Stop reason: HOSPADM

## 2024-08-16 RX ORDER — TAMSULOSIN HYDROCHLORIDE 0.4 MG/1
0.4 CAPSULE ORAL DAILY
Status: DISCONTINUED | OUTPATIENT
Start: 2024-08-16 | End: 2024-08-16 | Stop reason: HOSPADM

## 2024-08-16 RX ORDER — TRAZODONE HYDROCHLORIDE 50 MG/1
50 TABLET ORAL NIGHTLY PRN
Status: DISCONTINUED | OUTPATIENT
Start: 2024-08-16 | End: 2024-08-16 | Stop reason: HOSPADM

## 2024-08-16 RX ORDER — CARVEDILOL 6.25 MG/1
6.25 TABLET ORAL EVERY 12 HOURS SCHEDULED
Status: DISCONTINUED | OUTPATIENT
Start: 2024-08-16 | End: 2024-08-16 | Stop reason: HOSPADM

## 2024-08-16 RX ORDER — SPIRONOLACTONE 25 MG/1
25 TABLET ORAL EVERY OTHER DAY
Status: DISCONTINUED | OUTPATIENT
Start: 2024-08-17 | End: 2024-08-16 | Stop reason: HOSPADM

## 2024-08-16 RX ORDER — ALLOPURINOL 100 MG/1
100 TABLET ORAL DAILY
Status: DISCONTINUED | OUTPATIENT
Start: 2024-08-16 | End: 2024-08-16 | Stop reason: HOSPADM

## 2024-08-16 RX ADMIN — CITALOPRAM HYDROBROMIDE 10 MG: 20 TABLET ORAL at 10:58

## 2024-08-16 RX ADMIN — DICYCLOMINE HYDROCHLORIDE 20 MG: 20 TABLET ORAL at 08:28

## 2024-08-16 RX ADMIN — CARVEDILOL 6.25 MG: 6.25 TABLET, FILM COATED ORAL at 10:58

## 2024-08-16 RX ADMIN — DICYCLOMINE HYDROCHLORIDE 20 MG: 20 TABLET ORAL at 11:00

## 2024-08-16 RX ADMIN — ISOSORBIDE MONONITRATE 30 MG: 30 TABLET, EXTENDED RELEASE ORAL at 10:58

## 2024-08-16 RX ADMIN — TAMSULOSIN HYDROCHLORIDE 0.4 MG: 0.4 CAPSULE ORAL at 10:58

## 2024-08-16 RX ADMIN — Medication 10 ML: at 08:28

## 2024-08-16 RX ADMIN — APIXABAN 2.5 MG: 2.5 TABLET, FILM COATED ORAL at 10:59

## 2024-08-16 RX ADMIN — ALLOPURINOL 100 MG: 100 TABLET ORAL at 10:58

## 2024-08-16 NOTE — PLAN OF CARE
Goal Outcome Evaluation:  Plan of Care Reviewed With: patient           Outcome Evaluation: Pt. alert and oriented x4 on room air. Pt. arrived to floor after having urethral loyd placed. Pt. requested no pain medications and rested comfortably throughout shift. Continue POC.

## 2024-08-16 NOTE — PLAN OF CARE
Goal Outcome Evaluation:  Plan of Care Reviewed With: patient         VSS, no complaints of pain, tolerating diet and activity well, will be discharged. SUSI RN

## 2024-08-16 NOTE — PLAN OF CARE
Goal Outcome Evaluation:           Progress: no change (Evaluation)  Outcome Evaluation: Patient presents wtih limitations of balance and activity tolerance. He will benefit from a short episode of services while in the hospital for carry over of safety education during high level ADLs and activity tolerance activities in order to assist patient in returning to [facility].      Anticipated Discharge Disposition (OT): home with home health

## 2024-08-16 NOTE — DISCHARGE SUMMARY
[facility]         HOSPITALIST  DISCHARGE SUMMARY    Patient Name: Javi Martínez  : 1940  MRN: [id number]    Date of Admission: 8/15/2024  Date of Discharge:  2024    Primary Care Physician: Rani Lopez APRN    Consults       Date and Time Order Name Status Description    8/15/2024  8:07 PM Hospitalist (on-call MD unless specified)      8/15/2024  7:17 PM Urology (on-call MD unless specified)              Active and Resolved Hospital Problems:  Active Hospital Problems    Diagnosis POA    **Suprapubic catheter dysfunction [T83.010A] Yes      Resolved Hospital Problems   No resolved problems to display.       Hospital Course     Hospital Course:  Javi Martínez is a 84 y.o. male with past medical history of prostate cancer s/p brachytherapy and external beam radiation.  He has a suprapubic catheter which was dislodged and fell out.  Urology took him for replacement on 8/15/2024.  He had no immediate postoperative complications.  After returning from the OR he was back to his baseline and urine was draining well.    Patient discharged in stable condition.    DISCHARGE Follow Up Recommendations for labs and diagnostics: Follow-up with PCP in 1 week.  Follow-up with urology as instructed.      Day of Discharge     Vital Signs:  Temp:  [97.2 °F (36.2 °C)-98.8 °F (37.1 °C)] 98.8 °F (37.1 °C)  Heart Rate:  [70-81] 70  Resp:  [14-20] 15  BP: ()/(60-82) 92/73  Flow (L/min):  [3] 3    Physical Exam:   Gen: NAD, Alert and Oriented  Cards: RRR, no murmur   Pulm: CTA b/l, no wheezing  Abd: soft, nondistended  Extremities: no pitting edema      Discharge Details        Discharge Medications        Continue These Medications        Instructions Start Date   allopurinol 100 MG tablet  Commonly known as: ZYLOPRIM   100 mg, Oral, Daily      apixaban 2.5 MG tablet tablet  Commonly known as: ELIQUIS   2.5 mg, Oral, 2 Times Daily      carvedilol 6.25 MG tablet  Commonly known as: COREG   6.25  mg, Oral, Every 12 Hours Scheduled      citalopram 10 MG tablet  Commonly known as: CeleXA   10 mg, Oral, Daily      empagliflozin 10 MG tablet tablet  Commonly known as: JARDIANCE   5 mg, Oral, Daily      furosemide 40 MG tablet  Commonly known as: LASIX   40 mg, Oral, Daily      isosorbide mononitrate 60 MG 24 hr tablet  Commonly known as: IMDUR   30 mg, Oral, Daily      LORazepam 0.5 MG tablet  Commonly known as: ATIVAN   0.5 mg, Oral, 2 Times Daily PRN      omeprazole 20 MG capsule  Commonly known as: priLOSEC   20 mg, Oral, Daily      spironolactone 25 MG tablet  Commonly known as: ALDACTONE   25 mg, Oral, Every Other Day      tamsulosin 0.4 MG capsule 24 hr capsule  Commonly known as: FLOMAX   1 capsule, Oral, Daily      traZODone 50 MG tablet  Commonly known as: DESYREL    mg, Oral, Nightly PRN, 1-2 tabs hs prn               No Known Allergies    Discharge Disposition:  Home or Self Care    Diet:  Hospital:  Diet Order   Procedures    Diet: Cardiac; Healthy Heart (2-3 Na+); Fluid Consistency: Thin (IDDSI 0)       Discharge Activity:       CODE STATUS:  Code Status and Medical Interventions: CPR (Attempt to Resuscitate); Full Support   Ordered at: 08/15/24 7599     Code Status (Patient has no pulse and is not breathing):    CPR (Attempt to Resuscitate)     Medical Interventions (Patient has pulse or is breathing):    Full Support         Future Appointments   Date Time Provider Department Center   8/28/2024 10:45 AM Mally Bernal MD [facility]       Additional Instructions for the Follow-ups that You Need to Schedule       Discharge Follow-up with PCP   As directed       Currently Documented PCP:    Rani Lopez APRN    PCP Phone Number:    [phone]     Follow Up Details: one week        Discharge Follow-up with Specified Provider: Dr. Myers; 2 Weeks   As directed      To: Dr. Myers   Follow Up: 2 Weeks                Pertinent  and/or Most Recent Results         LAB RESULTS:       Lab 08/15/24  2310   WBC 9.90   HEMOGLOBIN 14.7   HEMATOCRIT 46.0   PLATELETS 205   NEUTROS ABS 6.49   IMMATURE GRANS (ABS) 0.05   LYMPHS ABS 2.33   MONOS ABS 0.78   EOS ABS 0.18   MCV 92.0         Lab 08/15/24  2310   SODIUM 139   POTASSIUM 4.0   CHLORIDE 100   CO2 27.1   ANION GAP 11.9   BUN 35*   CREATININE 1.84*   EGFR 36.2*   GLUCOSE 114*   CALCIUM 8.7   MAGNESIUM 2.2         Lab 08/15/24  2310   TOTAL PROTEIN 6.6   ALBUMIN 3.2*   GLOBULIN 3.4   ALT (SGPT) 24   AST (SGOT) 33   BILIRUBIN 0.7   ALK PHOS 117                     Brief Urine Lab Results       None          Microbiology Results (last 10 days)       Procedure Component Value - Date/Time    Urine Culture - Urine, Urinary Bladder [[id number]]  (Normal) Collected: 08/15/24 2200    Lab Status: Preliminary result Specimen: Urine from Urinary Bladder Updated: 08/16/24 1204     Urine Culture Culture in progress            No radiology results for the last 7 days               Time spent on Discharge including face to face service:  >30 minutes    Electronically signed by Meghana Ugalde DO, 08/16/24, 5:03 PM EDT.

## 2024-08-16 NOTE — THERAPY EVALUATION
"Patient Name: Javi Martínez  : 1940    MRN: [id number]                              Today's Date: 2024       Admit Date: 8/15/2024    Visit Dx:     ICD-10-CM ICD-9-CM   1. Suprapubic catheter dysfunction, initial encounter  T83.010A 996.31     Patient Active Problem List   Diagnosis    Primary osteoarthritis of left knee    S/P TKR (total knee replacement)    Essential hypertension    CAD with CABG    Hyperlipidemia    Presence of cardiac pacemaker single chamber    Anxiety    Gastroesophageal reflux disease    Gout    History of malignant neoplasm of prostate    Testicular hypofunction    Polyneuropathy    Chronic obstructive pulmonary disease    Benign prostatic hyperplasia without lower urinary tract symptoms    Chest pain    CHF exacerbation    Atrial fibrillation, chronic    Stage 3b chronic kidney disease    Postprocedural membranous urethral stricture    Acute on chronic HFrEF (heart failure with reduced ejection fraction)    Suprapubic catheter dysfunction     Past Medical History:   Diagnosis Date    Aneurysm 2013    Arthritis     left knee     Atrial fibrillation, persistent     CHB (complete heart block) 2023    CHF exacerbation 2024    Chronic obstructive pulmonary disease 2023    Difficulty walking     Numbness in right foot    Essential hypertension 2019    GERD (gastroesophageal reflux disease)     HFrEF (heart failure with reduced ejection fraction) 2021    Hyperlipidemia 2021    Paroxysmal atrial fibrillation     Polyneuropathy 2023    Prostate cancer 2010    with seeds implant     Septic joint 2021     Past Surgical History:   Procedure Laterality Date    ABLATION OF DYSRHYTHMIC FOCUS      APPENDECTOMY      ARTERIAL BYPASS SURGERY      CARDIAC ABLATION      x2  \"years ago\"     CARDIAC ELECTROPHYSIOLOGY PROCEDURE N/A 2021    Procedure: PPM battery change;  Surgeon: Ge Whelan MD;  Location: [facility] CATH INVASIVE LOCATION;  " Service: Cardiovascular;  Laterality: N/A;    CARDIAC SURGERY  2006    cabg 3v    CORONARY ARTERY BYPASS GRAFT  2006    CYSTOSCOPY URETHROTOMY VISUAL INTERNAL N/A 8/15/2024    Procedure: CYSTOSCOPY, URETHRAL DILATION, CATHETER PLACEMENT, RETROGRADE URETHROGRAM;  Surgeon: August Myers MD;  Location: [facility];  Service: Urology;  Laterality: N/A;    ENDOSCOPY      with dilation     KNEE ARTHROSCOPY Left     TOTAL KNEE ARTHROPLASTY Left 07/23/2021    Procedure: TOTAL KNEE ARTHROPLASTY WITH DORA NAVIGATION WITH BIOMET;  Surgeon: Carlitos Zhao MD;  Location: [facility] OR;  Service: Orthopedics;  Laterality: Left;    VENTRICULAR CARDIAC PACEMAKER INSERTION      [place]       General Information       Row Name 08/16/24 1346          OT Time and Intention    Document Type evaluation  -SC     Mode of Treatment individual therapy;occupational therapy  -SC       Row Name 08/16/24 1342          General Information    Patient Profile Reviewed yes  -SC     Prior Level of Function independent:  Patient lives independently and PLOF is independence in the community. Patient has a walk in shower with seat. He does not use an AD for mobility but does have a walker and cane at home just in case needed.  -SC     Existing Precautions/Restrictions fall  -SC       Row Name 08/16/24 9536          Occupational Profile    Reason for Services/Referral (Occupational Profile) Patient is admitted to [facility] on 8/15/24 due to suprapubic catheter falling out. OT consulted with potential decline in function/mobility. No previous services have been provided for this condition.  -SC     Successful Occupations (Occupational Profile) Patient is a retired  .  -SC     Patient Goals (Occupational Profile) Patient desires to return to independent living.  -SC       Row Name 08/16/24 1343          Living Environment    People in Home alone  -SC       Row Name 08/16/24 1346          Home Main Entrance    Number of  Stairs, Main Entrance two  -SC     Stair Railings, Main Entrance railings safe and in good condition  -SC       Row Name 08/16/24 1346          Stairs Within Home, Primary    Number of Stairs, Within Home, Primary none  -SC     Stair Railings, Within Home, Primary none  -SC       Row Name 08/16/24 1346          Cognition    Orientation Status (Cognition) oriented x 3  -SC       Row Name 08/16/24 1346          Safety Issues, Functional Mobility    Impairments Affecting Function (Mobility) balance;endurance/activity tolerance  -SC     Comment, Safety Issues/Impairments (Mobility) Patient is slightly unsteady but is observed to become steadier the longer he is on his feet. Cliniican recommended use of shower chair for bathing, and use of assistive device for walking for optimal safety. Patient is open to receiving  services as well.  -SC               User Key  (r) = Recorded By, (t) = Taken By, (c) = Cosigned By      Initials Name Provider Type    SC Cyndee Hardwick OT Occupational Therapist                     Mobility/ADL's       Row Name 08/16/24 1349          Bed Mobility    Bed Mobility bed mobility (all) activities;rolling left;rolling right  -SC     All Activities, [place] (Bed Mobility) modified independence  -SC     Rolling Left [place] (Bed Mobility) modified independence  -SC     Rolling Right [place] (Bed Mobility) modified independence  -SC     Assistive Device (Bed Mobility) bed rails  -SC       Row Name 08/16/24 1349          Transfers    Transfers bed-chair transfer;stand-sit transfer;sit-stand transfer  -SC       Row Name 08/16/24 1349          Bed-Chair Transfer    Bed-Chair [place] (Transfers) standby assist;contact guard  -SC     Assistive Device (Bed-Chair Transfers) cane, straight  -SC       Row Name 08/16/24 1349          Sit-Stand Transfer    Sit-Stand [place] (Transfers) standby assist  -SC       Row Name 08/16/24 1349          Stand-Sit Transfer    Stand-Sit  [place] (Transfers) standby assist  -SC     Assistive Device (Stand-Sit Transfers) cane, straight  -[facility] Name 08/16/24 1349          Functional Mobility    Functional Mobility- Comment Patient able to ambulate from bed to/from bathroom at [facility] with use of cane. Cautioned patient against stabilizing with hands on walls/objects and educated that arms dont have strength in this position. Recommended use of walker at home. Patient agreeable.  -SC       Row Name 08/16/24 1349          Activities of Daily Living    BADL Assessment/Intervention bathing;upper body dressing;lower body dressing;grooming;toileting  -SC       Row Name 08/16/24 1349          Bathing Assessment/Intervention    [place] Level (Bathing) set up;standby assist  -SC       Row Name 08/16/24 1349          Upper Body Dressing Assessment/Training    [place] Level (Upper Body Dressing) standby assist  -SC       Row Name 08/16/24 1349          Lower Body Dressing Assessment/Training    [place] Level (Lower Body Dressing) shoes/slippers;pants/bottoms;doff;don;standby assist  -SC       Row Name 08/16/24 1349          Grooming Assessment/Training    [place] Level (Grooming) standby assist  -SC       Row Name 08/16/24 1349          Toileting Assessment/Training    [place] Level (Toileting) [facility] assist  -SC     Assistive Devices (Toileting) commode  -SC               User Key  (r) = Recorded By, (t) = Taken By, (c) = Cosigned By      Initials Name Provider Type    Cyndee Mathews OT Occupational Therapist                   Obj/Interventions       [facility] Name 08/16/24 1352          Sensory Assessment (Somatosensory)    Sensory Assessment (Somatosensory) sensation intact  -[facility] 08/16/24 1352          Vision Assessment/Intervention    Visual Impairment/Limitations WFL  -[facility] 08/16/24 1352          Range of Motion Comprehensive    General Range of Motion no range of motion deficits identified  -SC        [facility] Name 08/16/24 [facility]          Strength Comprehensive (MMT)    General Manual Muscle Testing (MMT) Assessment no strength deficits identified  -[facility] Name 08/16/24 1352          Motor Skills    Motor Skills functional endurance  -SC     Functional Endurance good  -[facility] Name 08/16/24 [facility]          Balance    Comment, Balance CGA-SBA  -SC               User Key  (r) = Recorded By, (t) = Taken By, (c) = Cosigned By      Initials Name Provider Type    SC Ronen, Cyndee, OT Occupational Therapist                   Goals/Plan       [facility] Name 08/16/24 1354          Bed Mobility Goal 1 (OT)    Activity/Assistive Device (Bed Mobility Goal 1, OT) bed mobility activities, all  -SC     [place] Level/Cues Needed (Bed Mobility Goal 1, OT) modified independence  -SC     Time Frame (Bed Mobility Goal 1, OT) long term goal (LTG);10 days  -SC       Row Name 08/16/24 [facility]          Transfer Goal 1 (OT)    Activity/Assistive Device (Transfer Goal 1, OT) transfers, all  -SC     [place] Level/Cues Needed (Transfer Goal 1, OT) modified independence  -SC     Time Frame (Transfer Goal 1, OT) long term goal (LTG);10 days  -[facility] Name 08/16/24 [facility]          Bathing Goal 1 (OT)    Activity/Device (Bathing Goal 1, OT) bathing skills, all  -SC     [place] Level/Cues Needed (Bathing Goal 1, OT) modified independence  -SC     Time Frame (Bathing Goal 1, OT) long term goal (LTG);10 days  -SC       Row Name 08/16/24 [facility]          Dressing Goal 1 (OT)    Activity/Device (Dressing Goal 1, OT) dressing skills, all  -SC     [place]/Cues Needed (Dressing Goal 1, OT) modified independence  -SC     Time Frame (Dressing Goal 1, OT) long term goal (LTG);10 days  -[facility] Name 08/16/24 1354          Toileting Goal 1 (OT)    Activity/Device (Toileting Goal 1, OT) toileting skills, all  -SC     [place] Level/Cues Needed (Toileting Goal 1, OT) modified independence  -SC     Time Frame (Toileting Goal 1, OT)  10 days;long term goal (LTG)  -SC       Row Name 08/16/24 1359          Grooming Goal 1 (OT)    Activity/Device (Grooming Goal 1, OT) grooming skills, all  -SC     [place] (Grooming Goal 1, OT) modified independence  -SC     Time Frame (Grooming Goal 1, OT) long term goal (LTG);10 days  -SC       Row Name 08/16/24 8020          Therapy Assessment/Plan (OT)    Planned Therapy Interventions (OT) activity tolerance training;functional balance retraining;patient/caregiver education/training;occupation/activity based interventions  -SC               User Key  (r) = Recorded By, (t) = Taken By, (c) = Cosigned By      Initials Name Provider Type    SC Cyndee Hardwick OT Occupational Therapist                   Clinical Impression       Row Name 08/16/24 1224          Pain Assessment    Pretreatment Pain Rating 0/10 - no pain  -SC     Posttreatment Pain Rating 0/10 - no pain  -[facility] Name 08/16/24 [facility]0          Plan of Care Review    Progress no change  Evaluation  -SC     Outcome Evaluation Patient presents wtih limitations of balance and activity tolerance. He will benefit from a short episode of services while in the hospital for carry over of safety education during high level ADLs and activity tolerance activities in order to assist patient in returning to [facility].  -SC       Row Name 08/16/24 1357          Therapy Assessment/Plan (OT)    Criteria for Skilled Therapeutic Interventions Met (OT) yes;meets criteria;skilled treatment is necessary  -SC     Therapy Frequency (OT) 5 times/wk  -SC       Row Name 08/16/24 5789          Therapy Plan Review/Discharge Plan (OT)    Anticipated Discharge Disposition (OT) home with home health  -[facility] Name 08/16/24 6145          Positioning and Restraints    Pre-Treatment Position in bed  -SC     Post Treatment Position bed  -SC     In Bed call light within reach;encouraged to call for assist;exit alarm on  -SC               User Key  (r) = Recorded By, (t) = Taken By,  (c) = Cosigned By      Initials Name Provider Type    SC Cyndee Hardwick OT Occupational Therapist                   Outcome Measures       Row Name 08/16/24 1355          How much help from another is currently needed...    Putting on and taking off regular lower body clothing? 4  -SC     Bathing (including washing, rinsing, and drying) 4  -SC     Toileting (which includes using toilet bed pan or urinal) 4  -SC     Putting on and taking off regular upper body clothing 4  -SC     Taking care of personal grooming (such as brushing teeth) 4  -SC     Eating meals 4  -SC     AM-PAC 6 Clicks Score (OT) 24  -SC       Row Name 08/16/24 1355          Functional Assessment    Outcome Measure Options AM-PAC 6 Clicks Daily Activity (OT);Optimal Instrument  -SC       Row Name 08/16/24 1355          Optimal Instrument    Optimal Instrument Optimal - 3  -SC     Bending/Stooping 3  -SC     Standing 2  -SC     Reaching 1  -SC     From the list, choose the 3 activities you would most like to be able to do without any difficulty Bending/stooping;Standing;Reaching  -SC     Total Score Optimal - 3 6  -SC               User Key  (r) = Recorded By, (t) = Taken By, (c) = Cosigned By      Initials Name Provider Type    SC Cyndee Hardwick OT Occupational Therapist                    Occupational Therapy Education       Title: PT OT SLP Therapies (Done)       Topic: Occupational Therapy (Done)       Point: ADL training (Done)       Description:   Instruct learner(s) on proper safety adaptation and remediation techniques during self care or transfers.   Instruct in proper use of assistive devices.                  Learning Progress Summary             Patient Acceptance, E, VU by SC at 8/16/2024 0675                         Point: Home exercise program (Done)       Description:   Instruct learner(s) on appropriate technique for monitoring, assisting and/or progressing therapeutic exercises/activities.                  Learning Progress Summary              Patient Acceptance, E, VU by SC at 8/16/2024 135                         Point: Precautions (Done)       Description:   Instruct learner(s) on prescribed precautions during self-care and functional transfers.                  Learning Progress Summary             Patient Acceptance, E, VU by SC at 8/16/2024 1355                         Point: Body mechanics (Done)       Description:   Instruct learner(s) on proper positioning and spine alignment during self-care, functional mobility activities and/or exercises.                  Learning Progress Summary             Patient Acceptance, E, VU by SC at 8/16/2024 1355                                         User Key       Initials Effective Dates Name Provider Type Discipline    SC 02/05/24 -  Cyndee Hardwick OT Occupational Therapist OT                  OT Recommendation and Plan  Planned Therapy Interventions (OT): activity tolerance training, functional balance retraining, patient/caregiver education/training, occupation/activity based interventions  Therapy Frequency (OT): 5 times/wk  Plan of Care Review  Progress: no change (Evaluation)  Outcome Evaluation: Patient presents wtih limitations of balance and activity tolerance. He will benefit from a short episode of services while in the hospital for carry over of safety education during high level ADLs and activity tolerance activities in order to assist patient in returning to [facility].     Time Calculation:   Evaluation Complexity (OT)  Review Occupational Profile/Medical/Therapy History Complexity: expanded/moderate complexity  Assessment, Occupational Performance/Identification of Deficit Complexity: 1-3 performance deficits  Clinical Decision Making Complexity (OT): problem focused assessment/low complexity  Overall Complexity of Evaluation (OT): low complexity     Time Calculation- OT       Row Name 08/16/24 1357             Time Calculation- OT    OT Received On 08/16/24  -SC      OT Goal Re-Cert Due  Date 08/25/24  -SC         Untimed Charges    OT Eval/Re-eval Minutes 35  -SC         Total Minutes    Untimed Charges Total Minutes 35  -SC       Total Minutes 35  -SC                User Key  (r) = Recorded By, (t) = Taken By, (c) = Cosigned By      Initials Name Provider Type    Cyndee Mathews OT Occupational Therapist                  Therapy Charges for Today       Code Description Service Date Service Provider Modifiers Qty    [id number]  OT EVAL LOW COMPLEXITY 3 8/16/2024 Cyndee Hardwick OT GO 1                 Cyndee Hardwick OT  8/16/2024

## 2024-08-16 NOTE — ANESTHESIA PREPROCEDURE EVALUATION
Anesthesia Evaluation     Patient summary reviewed and Nursing notes reviewed   no history of anesthetic complications:   NPO Solid Status: > 8 hours  NPO Liquid Status: > 2 hours           Airway   Mallampati: II  TM distance: <3 FB  Neck ROM: full  No difficulty expected  Dental    (+) lower dentures and poor dentition    Pulmonary - negative pulmonary ROS and normal exam    breath sounds clear to auscultation  Cardiovascular - normal exam  Exercise tolerance: good (4-7 METS)    ECG reviewed  PT is on anticoagulation therapy  Beta blocker given within 24 hours of surgery  Rhythm: regular    (+) pacemaker pacemaker, hypertension, CAD, CABG >6 Months, cardiac stents more than 12 months ago , dysrhythmias (conplete heart block, pacer dependent) Paroxysmal Atrial Fib, CHF (EF <20%) Systolic <55%, hyperlipidemia      Neuro/Psych  (+) weakness, numbness  GI/Hepatic/Renal/Endo    (+) GERD well controlled, renal disease (urinary retention)-    Musculoskeletal     Abdominal    Substance History - negative use     OB/GYN negative ob/gyn ROS         Other   arthritis,   history of cancer    ROS/Med Hx Other: Urinary retention    Echo 7/30/24- Ef 20%, severely dilated LV    Stress 4/24/24- Ef 18%, no ischemia, prior infarct    EKG 7/24/24- vpaced (70)    Took Eliquis this AM                    Anesthesia Plan    ASA 4     general and MAC     (MAC vs GA.  Will attempt to do a moderate sedation to avoid GA.  Discussed risks of anesthesia in light of complex cardiac history at length with patient.  Pt voices understanding and wishes to proceed)  intravenous induction     Anesthetic plan, risks, benefits, and alternatives have been provided, discussed and informed consent has been obtained with: patient.    Use of blood products discussed with patient .

## 2024-08-16 NOTE — OUTREACH NOTE
CHF Week 3 Survey      Flowsheet Row Responses   [facility] facility patient discharged from? [facility]   Does the patient have one of the following disease processes/diagnoses(primary or secondary)? CHF   Week 3 attempt successful? No   Unsuccessful attempts Attempt 2   Revoke Readmitted            Day BECKHAM - Registered Nurse

## 2024-08-16 NOTE — H&P
"    Patient Care Team:  Rani Lopez APRN as PCP - General (Nurse Practitioner)    Chief complaint suprapubic catheter dysfunction    Subjective     Patient is a 84 y.o. male presents for evaluation of his suprapubic catheter that came out this afternoon. He has some paint at the site that he describes as jolts. Dr. Myers attempted to place wire through site to insert a new catheter however the site had closed up. He was taken for cystoscopy with Dr. Myers in OR to re-place suprapubic catheter    Review of Systems   Pertinent items are noted in HPI    History  Past Medical History:   Diagnosis Date    Aneurysm 2013    Arthritis     left knee     Atrial fibrillation, persistent     CHB (complete heart block) 06/07/2023    CHF exacerbation 4/24/2024    Chronic obstructive pulmonary disease 09/01/2023    Difficulty walking 2023    Numbness in right foot    Essential hypertension 05/08/2019    GERD (gastroesophageal reflux disease)     HFrEF (heart failure with reduced ejection fraction) 08/11/2021    Hyperlipidemia 08/11/2021    Paroxysmal atrial fibrillation     Polyneuropathy 06/19/2023    Prostate cancer 2010    with seeds implant     Septic joint 11/26/2021     Past Surgical History:   Procedure Laterality Date    ABLATION OF DYSRHYTHMIC FOCUS  2013    APPENDECTOMY      ARTERIAL BYPASS SURGERY      CARDIAC ABLATION      x2  \"years ago\"     CARDIAC ELECTROPHYSIOLOGY PROCEDURE N/A 11/01/2021    Procedure: PPM battery change;  Surgeon: Ge Whelan MD;  Location: [facility] CATH INVASIVE LOCATION;  Service: Cardiovascular;  Laterality: N/A;    CARDIAC SURGERY  2006    cabg 3v    CORONARY ARTERY BYPASS GRAFT  2006    ENDOSCOPY      with dilation     KNEE ARTHROSCOPY Left     TOTAL KNEE ARTHROPLASTY Left 07/23/2021    Procedure: TOTAL KNEE ARTHROPLASTY WITH DORA NAVIGATION WITH BIOMET;  Surgeon: Carlitos Zhao MD;  Location: [facility] MAIN OR;  Service: Orthopedics;  Laterality: Left;    VENTRICULAR CARDIAC " PACEMAKER INSERTION      medtronic      Family History   Problem Relation Age of Onset    Heart attack Mother     No Known Problems Father     No Known Problems Sister     No Known Problems Brother     No Known Problems Maternal Aunt     No Known Problems Maternal Uncle     No Known Problems Paternal Aunt     No Known Problems Paternal Uncle     No Known Problems Maternal Grandmother     No Known Problems Maternal Grandfather     No Known Problems Paternal Grandmother     No Known Problems Paternal Grandfather     No Known Problems Other      Social History     Tobacco Use    Smoking status: Former     Current packs/day: 0.00     Average packs/day: 0.5 packs/day for 33.6 years (16.8 ttl pk-yrs)     Types: Cigarettes     Start date: 1962     Quit date: 1995     Years since quittin.0     Passive exposure: Never    Smokeless tobacco: Never   Vaping Use    Vaping status: Never Used   Substance Use Topics    Alcohol use: Never    Drug use: Never     Medications Prior to Admission   Medication Sig Dispense Refill Last Dose    allopurinol (ZYLOPRIM) 100 MG tablet Take 1 tablet by mouth Daily.   8/15/2024    apixaban (ELIQUIS) 2.5 MG tablet tablet Take 1 tablet by mouth 2 (Two) Times a Day for 30 days. Indications: Atrial Fibrillation 60 tablet 0 8/15/2024    carvedilol (COREG) 6.25 MG tablet Take 1 tablet by mouth Every 12 (Twelve) Hours. 180 tablet 3 8/15/2024    citalopram (CeleXA) 10 MG tablet Take 1 tablet by mouth Daily.   8/15/2024    empagliflozin (JARDIANCE) 10 MG tablet tablet Take 0.5 tablets by mouth Daily for 30 days. 15 tablet 0 8/15/2024    furosemide (LASIX) 40 MG tablet Take 1 tablet by mouth Daily for 30 days. 30 tablet 0 8/15/2024    isosorbide mononitrate (IMDUR) 60 MG 24 hr tablet Take 0.5 tablets by mouth Daily.   8/15/2024    LORazepam (ATIVAN) 0.5 MG tablet Take 1 tablet by mouth 2 (Two) Times a Day As Needed for Anxiety.       omeprazole (priLOSEC) 20 MG capsule Take 1 capsule by mouth  Daily.   8/15/2024    spironolactone (ALDACTONE) 25 MG tablet Take 1 tablet by mouth Every Other Day for 30 days. 15 tablet 0 8/15/2024    tamsulosin (FLOMAX) 0.4 MG capsule 24 hr capsule Take 1 capsule by mouth Daily.   8/15/2024    traZODone (DESYREL) 50 MG tablet Take 1-2 tablets by mouth At Night As Needed for Sleep. 1-2 tabs hs prn   8/14/2024     Allergies:  Patient has no known allergies.    Objective     Vital Signs  Temp:  [98.3 °F (36.8 °C)] 98.3 °F (36.8 °C)  Heart Rate:  [76] 76  Resp:  [18] 18  BP: (105)/(68) 105/68    Physical Exam:      General Appearance:  Alert, cooperative, in no acute distress   Head:  Normocephalic, without obvious abnormality, atraumatic   Eyes:  Lids and lashes normal, conjunctivae and sclerae normal, no icterus, no pallor, corneas clear, PERRLA   Ears:  Ears appear intact with no abnormalities noted   Throat:  No oral lesions, no thrush, oral mucosa moist   Neck:  No adenopathy, supple, trachea midline, no thyromegaly, no carotid bruit, no JVD   Back:  No kyphosis present, no scoliosis present, no skin lesions, erythema or scars, no tenderness to percussion or palpation, range of motion normal   Lungs:  Clear to auscultation, respirations regular, even and unlabored    Heart:  Regular rhythm and normal rate, normal S1 and S2, no murmur, no gallop, no rub, no click   Chest Wall:  No abnormalities observed   Abdomen:  Normal bowel sounds, no masses, no organomegaly, soft non-tender, non-distended, no guarding, no rebound tenderness   Rectal:  Deferred   Extremities:  Moves all extremities well, no edema, no cyanosis, no redness   Pulses:  Pulses palpable and equal bilaterally   Skin:  No bleeding, bruising or rash   Lymph nodes:  No palpable adenopathy   Neurologic:  Cranial nerves 2 - 12 grossly intact, sensation intact, DTR present and equal bilaterally       Results Review:    I reviewed the patient's new clinical results.  I reviewed the patient's new imaging results and  agree with the interpretation.  I reviewed the patient's other test results and agree with the interpretation  I personally viewed and interpreted the patient's EKG/Telemetry data    Assessment & Plan       Suprapubic catheter dysfunction      Admit to telemetry under observation status  OR for suprapubic catheter replacement  Pain control with norco  Restart home medications once list is profiled  Advance diet as tolerated  Full Code    Ge Silva MD  08/15/24  20:43 EDT

## 2024-08-16 NOTE — OP NOTE
CYSTOSCOPY URETHROTOMY VISUAL INTERNAL  Procedure Report    Patient Name:  Javi Martínez  YOB: 1940    Date of Surgery:  8/15/2024      Pre-op Diagnosis:   Suprapubic catheter dysfunction, initial encounter [T83.010A]       Postop diagnosis:    Same    Procedure/CPT® Codes:      Procedure(s):    CYSTOSCOPY  URETHRAL DILATION, CATHETER PLACEMENT  Cystogram  RETROGRADE URETHROGRAM    Staff:  Surgeon(s):  August Myers MD         Anesthesia: Monitored Anesthesia Care    Estimated Blood Loss: minimal    Implants:    Nothing was implanted during the procedure    Specimen:          Specimens       ID Source Type Tests Collected By Collected At Frozen?    1 Urinary Bladder Urine URINE CULTURE   August Myers MD 8/15/24 2200                 Findings:     Retrograde urethrogram -no contrast would go past the distal bulbar urethra.    On cystoscopy I could see a small opening -with some pressure I was finally able to manipulate the wire into the bladder.  Dilated to 18 Serbian 16 Serbian [place] tip placed in the bladder without issue.          Complications: none    Description of Procedure:     After informed consent patient taken to the operating room.  Patient was laid supine and placed under general anesthesia by the anesthesia team.  At this point patient was placed in dorsal lithotomy position and prepped and draped in normal sterile fashion.  A multidisciplinary timeout was undertaken documenting the correct patient site and procedure.      At this point a retrograde urethrogram was undertaken with contrast -no contrast to go past the distal bulbar urethra.  Up to that point there was no stricture    On cystoscopy could see 1 pinpoint opening with a Glidewire I did work to try to get this to go through but it would not go.  I placed a Pollick over the Glidewire and with gentle pressure was able to finally get the wire to advance.    It did finally go into what I thought was the bladder.  I was  able to advance the Pollick into the bladder    Through the Pollick catheter shot a cystogram.  This showed contrast filling a large smooth-walled bladder.      Stiff wire was placed into the bladder and Pollick was removed    I then used sequential dilators to dilate to 18 Korean without issue.    16 Korean [place] tip catheter placed over the wire without issue 10 mL of sterile water placed in the balloon and placed to straight drainage wire was removed.      Patient tolerated the procedure well, he was taken to the postanesthesia care unit without issue.          August Myers MD     Date: 8/15/2024  Time: 22:04 EDT

## 2024-08-16 NOTE — TELEPHONE ENCOUNTER
Abena ellis PAT called and wanted Dr Bernal to be aware that pt is inpatient & Dr Myers performed a surgery on him yesterday. She's not sure if he will still need surgery on 8-20.

## 2024-08-16 NOTE — H&P
History and physical for surgery      hief Complaint:      HPI:  Javi Martínez is a 84 y.o. male         Urinary retention  Urethral stricture  History of prostate cancer status post brachytherapy and external beam XRT        Was having frequent small-volume voiding with severe spasms and urgency.  Has been doing okay until suprapubic tube fell out today        8/24   1.9, GFR 34       8/15/2024 SP tube fell out  7/22/2024 SP tube placed in clinic 10 F     On tamsulosin     On TRT through PCP        2010 prostate CA treated with brachytherapy and external beam XRT           CAD,  COPD, dyslipidemia, paroxysmal atrial fibrillation, history of prostate cancer, and aortic aneurysm   On eliquis        Review of Systems                10 systems reviewed and are negative other than what is listed in the HPI     Personal History      Medical History        Past Medical History:   Diagnosis Date    Aneurysm 2013    Arthritis       left knee     Atrial fibrillation, persistent      CHB (complete heart block) 06/07/2023    CHF exacerbation 4/24/2024    Chronic obstructive pulmonary disease 09/01/2023    Difficulty walking 2023     Numbness in right foot    Essential hypertension 05/08/2019    GERD (gastroesophageal reflux disease)      HFrEF (heart failure with reduced ejection fraction) 08/11/2021    Hyperlipidemia 08/11/2021    Paroxysmal atrial fibrillation      Polyneuropathy 06/19/2023    Prostate cancer 2010     with seeds implant     Septic joint 11/26/2021                  Home Medications:  LORazepam, allopurinol, apixaban, carvedilol, citalopram, empagliflozin, furosemide, isosorbide mononitrate, omeprazole, spironolactone, tamsulosin, and traZODone     Allergies:  Allergies   No Known Allergies              Objective  Objective      Vitals:   Temp:  [98.3 °F (36.8 °C)] 98.3 °F (36.8 °C)  Heart Rate:  [76] 76  Resp:  [18] 18  BP: (105)/(68) 105/68     Physical Exam:              Constitutional: Awake, alert                           Respiratory: Clear to auscultation bilaterally, nonlabored respirations               Cardiovascular: RRR, no murmurs, rubs, or gallops, palpable pedal pulses bilaterally              Gastrointestinal: Positive bowel sounds, soft, nontender, nondistended              Musculoskeletal: No bilateral ankle edema, no clubbing or cyanosis to extremities                   SP tube site couple centimeters above the suprapubic pretty much close just erythematous           Result Review  Result Review:  I have personally reviewed the results from the time of this admission to 8/15/2024 20:19 EDT and agree with these findings:  []  Laboratory  []  Microbiology  []  Radiology  []  EKG/Telemetry   []  Cardiology/Vascular   []  Pathology  []  Old records  []  Other:              Assessment & Plan  Assessment / Plan      Brief Patient Summary:  Javi Martínez is a 84 y.o. male      Active Hospital Problems:  There are no active hospital problems to display for this patient.        Urinary retention  Urethral stricture  History of prostate cancer status post brachytherapy and external beam XRT           Tried to place a wire under sterile technique through the site but it was already closed.  Could not get anything through.        Will take to the operating room for cystoscopy possible DVIU, possible suprapubic tube placement.  Patient is anticoagulated.  We discussed risk and benefits of this.  Risks and benefits were discussed including bleeding, infection and damage to the urinary system.  We also discussed the risk of anesthesia up to and including death.  Patient voiced understanding and would like to proceed.

## 2024-08-16 NOTE — PROGRESS NOTES
[facility]   Urology Progress Note    Patient Name: Javi Martínez  : 1940  MRN: [id number]  Primary Care Physician:  Rani Lopez APRN  Date of admission: 8/15/2024    Subjective   Subjective       No events, catheter draining okay      Objective   Objective     Vitals:   Temp:  [97.2 °F (36.2 °C)-98.3 °F (36.8 °C)] 97.2 °F (36.2 °C)  Heart Rate:  [70-81] 70  Resp:  [14-20] 15  BP: (105-125)/(60-82) 123/72  Flow (L/min):  [3] 3  Physical Exam     Alert and oriented x3  No acute distress  Unlabored respirations  Nontender/nondistended     Catheter in place draining clear/yellow urine      Result Review    Result Review:  I have personally reviewed the results from the time of this admission to 2024 05:40 EDT and agree with these findings:  []  Laboratory  []  Microbiology  []  Radiology  []  EKG/Telemetry   []  Cardiology/Vascular   []  Pathology  []  Old records  []  Other:      Assessment & Plan   Assessment / Plan     Brief Patient Summary:  Javi Martínez is a 84 y.o. male     Postop day 1 urethral dilation catheter placement    Active Hospital Problems:  Active Hospital Problems    Diagnosis     **Suprapubic catheter dysfunction        Can be discharged today if okay with primary service with catheter    Will send message to Dr. Bernal for follow-up    Appreciate hospitalist help with this patient          Electronically signed by August Myers MD, 24, 5:40 AM EDT.

## 2024-08-16 NOTE — ANESTHESIA POSTPROCEDURE EVALUATION
Patient: Javi Martínez    Procedure Summary       Date: 08/15/24 Room / Location: [facility] OR 07 / [facility] MAIN OR    Anesthesia Start: 2129 Anesthesia Stop: 2215    Procedure: CYSTOSCOPY, URETHRAL DILATION, CATHETER PLACEMENT, RETROGRADE URETHROGRAM Diagnosis:       Suprapubic catheter dysfunction, initial encounter      (Suprapubic catheter dysfunction, initial encounter [T83.010A])    Surgeons: August Myers MD Provider: Javi Villatoro MD    Anesthesia Type: general, MAC ASA Status: 4            Anesthesia Type: general, MAC    Vitals  Vitals Value Taken Time   /72 08/15/24 2240   Temp 36.4 °C (97.6 °F) 08/15/24 2215   Pulse 70 08/15/24 2243   Resp 18 08/15/24 2230   SpO2 98 % 08/15/24 2243   Vitals shown include unfiled device data.        Post Anesthesia Care and Evaluation    Patient location during evaluation: bedside  Patient participation: complete - patient participated  Level of consciousness: awake  Pain management: adequate    Airway patency: patent  PONV Status: none  Cardiovascular status: acceptable and stable  Respiratory status: acceptable  Hydration status: acceptable

## 2024-08-16 NOTE — CONSULTS
[facility]   UROLOGY Consult Note    Patient Name: Javi Marítnez  : 1940  MRN: [id number]  Primary Care Physician:  Rani Lopez APRN  Referring Physician: No ref. provider found  Date of admission: 8/15/2024    Subjective   Subjective     Chief Complaint:     HPI:  Javi Martínez is a 84 y.o. male       Urinary retention  Urethral stricture  History of prostate cancer status post brachytherapy and external beam XRT      Was having frequent small-volume voiding with severe spasms and urgency.  Has been doing okay until suprapubic tube fell out today         1.9, GFR 34      8/15/2024 SP tube fell out  2024 SP tube placed in clinic 10 F    On tamsulosin    On TRT through PCP       prostate CA treated with brachytherapy and external beam XRT        CAD,  COPD, dyslipidemia, paroxysmal atrial fibrillation, history of prostate cancer, and aortic aneurysm   On eliquis      Review of Systems     10 systems reviewed and are negative other than what is listed in the HPI    Personal History     Past Medical History:   Diagnosis Date    Aneurysm     Arthritis     left knee     Atrial fibrillation, persistent     CHB (complete heart block) 2023    CHF exacerbation 2024    Chronic obstructive pulmonary disease 2023    Difficulty walking     Numbness in right foot    Essential hypertension 2019    GERD (gastroesophageal reflux disease)     HFrEF (heart failure with reduced ejection fraction) 2021    Hyperlipidemia 2021    Paroxysmal atrial fibrillation     Polyneuropathy 2023    Prostate cancer     with seeds implant     Septic joint 2021           Home Medications:  LORazepam, allopurinol, apixaban, carvedilol, citalopram, empagliflozin, furosemide, isosorbide mononitrate, omeprazole, spironolactone, tamsulosin, and traZODone    Allergies:  No Known Allergies    Objective    Objective     Vitals:   Temp:  [98.3 °F (36.8 °C)] 98.3 °F (36.8  °C)  Heart Rate:  [76] 76  Resp:  [18] 18  BP: (105)/(68) 105/68    Physical Exam:   Constitutional: Awake, alert    Respiratory: Clear to auscultation bilaterally, nonlabored respirations    Cardiovascular: RRR, no murmurs, rubs, or gallops, palpable pedal pulses bilaterally   Gastrointestinal: Positive bowel sounds, soft, nontender, nondistended   Musculoskeletal: No bilateral ankle edema, no clubbing or cyanosis to extremities       SP tube site couple centimeters above the suprapubic pretty much close just erythematous    Result Review    Result Review:  I have personally reviewed the results from the time of this admission to 8/15/2024 20:19 EDT and agree with these findings:  []  Laboratory  []  Microbiology  []  Radiology  []  EKG/Telemetry   []  Cardiology/Vascular   []  Pathology  []  Old records  []  Other:      Assessment & Plan   Assessment / Plan     Brief Patient Summary:  Javi Martínez is a 84 y.o. male     Active Hospital Problems:  There are no active hospital problems to display for this patient.      Urinary retention  Urethral stricture  History of prostate cancer status post brachytherapy and external beam XRT        Tried to place a wire under sterile technique through the site but it was already closed.  Could not get anything through.      Will take to the operating room for cystoscopy possible DVIU, possible suprapubic tube placement.  Patient is anticoagulated.  We discussed risk and benefits of this.  Risks and benefits were discussed including bleeding, infection and damage to the urinary system.  We also discussed the risk of anesthesia up to and including death.  Patient voiced understanding and would like to proceed.            55 minutes used in counseling coordination of care and surgical planning                Electronically signed by August Myers MD, 08/15/24, 8:19 PM EDT.

## 2024-08-16 NOTE — THERAPY EVALUATION
"Acute Care - Physical Therapy Initial Evaluation  [facility]     Patient Name: Javi Martínez  : 1940  MRN: [id number]  Today's Date: 2024      Visit Dx:     ICD-10-CM ICD-9-CM   1. Suprapubic catheter dysfunction, initial encounter  T83.010A 996.31   2. Difficulty walking  R26.2 719.7     Patient Active Problem List   Diagnosis    Primary osteoarthritis of left knee    S/P TKR (total knee replacement)    Essential hypertension    CAD with CABG    Hyperlipidemia    Presence of cardiac pacemaker single chamber    Anxiety    Gastroesophageal reflux disease    Gout    History of malignant neoplasm of prostate    Testicular hypofunction    Polyneuropathy    Chronic obstructive pulmonary disease    Benign prostatic hyperplasia without lower urinary tract symptoms    Chest pain    CHF exacerbation    Atrial fibrillation, chronic    Stage 3b chronic kidney disease    Postprocedural membranous urethral stricture    Acute on chronic HFrEF (heart failure with reduced ejection fraction)    Suprapubic catheter dysfunction     Past Medical History:   Diagnosis Date    Aneurysm     Arthritis     left knee     Atrial fibrillation, persistent     CHB (complete heart block) 2023    CHF exacerbation 2024    Chronic obstructive pulmonary disease 2023    Difficulty walking     Numbness in right foot    Essential hypertension 2019    GERD (gastroesophageal reflux disease)     HFrEF (heart failure with reduced ejection fraction) 2021    Hyperlipidemia 2021    Paroxysmal atrial fibrillation     Polyneuropathy 2023    Prostate cancer 2010    with seeds implant     Septic joint 2021     Past Surgical History:   Procedure Laterality Date    ABLATION OF DYSRHYTHMIC FOCUS      APPENDECTOMY      ARTERIAL BYPASS SURGERY      CARDIAC ABLATION      x2  \"years ago\"     CARDIAC ELECTROPHYSIOLOGY PROCEDURE N/A 2021    Procedure: PPM battery change;  Surgeon: Ge Whelan, " MD;  Location: [facility] CATH INVASIVE LOCATION;  Service: Cardiovascular;  Laterality: N/A;    CARDIAC SURGERY  2006    cabg 3v    CORONARY ARTERY BYPASS GRAFT  2006    CYSTOSCOPY URETHROTOMY VISUAL INTERNAL N/A 8/15/2024    Procedure: CYSTOSCOPY, URETHRAL DILATION, CATHETER PLACEMENT, RETROGRADE URETHROGRAM;  Surgeon: August Myers MD;  Location: [facility] MAIN OR;  Service: Urology;  Laterality: N/A;    ENDOSCOPY      with dilation     KNEE ARTHROSCOPY Left     TOTAL KNEE ARTHROPLASTY Left 07/23/2021    Procedure: TOTAL KNEE ARTHROPLASTY WITH DORA NAVIGATION WITH BIOMET;  Surgeon: Carlitos Zhao MD;  Location: [facility] MAIN OR;  Service: Orthopedics;  Laterality: Left;    VENTRICULAR CARDIAC PACEMAKER INSERTION      Fara      PT Assessment (Last 12 Hours)       PT Evaluation and Treatment       Row Name 08/16/24 1400          Physical Therapy Time and Intention    Document Type evaluation  -AV     Mode of Treatment individual therapy;physical therapy  -AV       Row Name 08/16/24 1400          General Information    Patient Profile Reviewed yes  -AV     Prior Level of Function independent:;all household mobility;gait;transfer;ADL's  Ambulated with STC, also has RW to use if needed. No home O2.  -AV     Existing Precautions/Restrictions fall  -AV       Row Name 08/16/24 1400          Living Environment    Current Living Arrangements home  -AV     Home Accessibility stairs to enter home  -AV     People in Home alone  -AV       Row Name 08/16/24 1400          Home Main Entrance    Number of Stairs, Main Entrance two  -AV     Stair Railings, Main Entrance railings on both sides of stairs  -AV       Row Name 08/16/24 1400          Cognition    Orientation Status (Cognition) oriented x 3  -AV       Row Name 08/16/24 1400          Range of Motion (ROM)    Range of Motion bilateral lower extremities;ROM is WFL  -AV       Row Name 08/16/24 1400          Strength (Manual Muscle Testing)    Strength (Manual Muscle  Testing) bilateral lower extremities;strength is WFL  -AV       Row Name 08/16/24 1400          Bed Mobility    Bed Mobility bed mobility (all) activities  -AV     All Activities, [place] (Bed Mobility) modified independence  -AV       Row Name 08/16/24 1400          Transfers    Transfers sit-stand transfer;stand-sit transfer  -AV       Row Name 08/16/24 1400          Sit-Stand Transfer    Sit-Stand [place] (Transfers) standby assist  -AV     Assistive Device (Sit-Stand Transfers) cane, straight  -AV       Row Name 08/16/24 1400          Stand-Sit Transfer    Stand-Sit [place] (Transfers) standby assist  -AV     Assistive Device (Stand-Sit Transfers) cane, straight  -AV       Row Name 08/16/24 1400          Gait/Stairs (Locomotion)    Gait/Stairs Locomotion gait/ambulation independence;gait/ambulation assistive device;distance ambulated  -AV     [place] Level (Gait) contact guard  -AV     Assistive Device (Gait) cane, straight  -AV     Distance in Feet (Gait) 50  Patient deferred ambulating in hallway  -AV       Row Name 08/16/24 1400          Balance    Balance Assessment standing dynamic balance  -AV     Dynamic Standing Balance contact guard  -AV     Position/Device Used, Standing Balance supported;cane, straight  -AV       Row Name             Wound 08/15/24 2146 Other (See comments) urethral meatus    Wound - Properties Group Placement Date: 08/15/24  -SP Placement Time: 2146  -SP Present on Original Admission: N  -SP Side: Other (See comments)  -SP, insertion site for scope  Location: urethral meatus  -SP    Retired Wound - Properties Group Placement Date: 08/15/24  -SP Placement Time: 2146  -SP Present on Original Admission: N  -SP Side: Other (See comments)  -SP, insertion site for scope  Location: urethral meatus  -SP    Retired Wound - Properties Group Date first assessed: 08/15/24  -SP Time first assessed: 2146  -SP Present on Original Admission: N  -SP Side: Other (See comments)   -SP, insertion site for scope  Location: urethral meatus  -SP      Row Name 08/16/24 1400          Plan of Care Review    Plan of Care Reviewed With patient  -AV     Progress no change  -AV     Outcome Evaluation Patient presents with deficits in balance, endurance, transfers, and ambulation. Patient will benefit from skilled PT services to address these mobility deficits and decrease risk of falls.  -AV       Row Name 08/16/24 1400          Therapy Assessment/Plan (PT)    Rehab Potential (PT) good, to achieve stated therapy goals  -AV     Criteria for Skilled Interventions Met (PT) yes;meets criteria  -AV     Therapy Frequency (PT) daily  -AV     Predicted Duration of Therapy Intervention (PT) 10 days  -AV     Problem List (PT) problems related to;balance;mobility  -AV     Activity Limitations Related to Problem List (PT) unable to transfer safely;unable to ambulate safely  -AV       Row Name 08/16/24 1400          PT Evaluation Complexity    History, PT Evaluation Complexity 1-2 personal factors and/or comorbidities  -AV     Examination of Body Systems (PT Eval Complexity) total of 4 or more elements  -AV     Clinical Presentation (PT Evaluation Complexity) stable  -AV     Clinical Decision Making (PT Evaluation Complexity) low complexity  -AV     Overall Complexity (PT Evaluation Complexity) low complexity  -AV       Row Name 08/16/24 1400          Therapy Plan Review/Discharge Plan (PT)    Therapy Plan Review (PT) evaluation/treatment results reviewed;patient  -AV       Row Name 08/16/24 1400          Physical Therapy Goals    Transfer Goal Selection (PT) transfer, PT goal 1  -AV     Gait Training Goal Selection (PT) gait training, PT goal 1  -AV       Row Name 08/16/24 1400          Transfer Goal 1 (PT)    Activity/Assistive Device (Transfer Goal 1, PT) sit-to-stand/stand-to-sit;bed-to-chair/chair-to-bed;cane, straight  -AV     [place] Level/Cues Needed (Transfer Goal 1, PT) modified independence  -AV      Time Frame (Transfer Goal 1, PT) 10 days  -AV       Row Name 08/16/24 1400          Gait Training Goal 1 (PT)    Activity/Assistive Device (Gait Training Goal 1, PT) gait (walking locomotion);assistive device use;cane, straight  -AV     [place] Level (Gait Training Goal 1, PT) modified independence  -AV     Distance (Gait Training Goal 1, PT) 200  -AV     Time Frame (Gait Training Goal 1, PT) 10 days  -AV               User Key  (r) = Recorded By, (t) = Taken By, (c) = Cosigned By      Initials Name Provider Type    AV Regan Fenton, PT Physical Therapist    Marycarmen Welch, RN Registered Nurse                    Physical Therapy Education       Title: PT OT SLP Therapies (Done)       Topic: Physical Therapy (Done)       Point: Mobility training (Done)       Learning Progress Summary             Patient Acceptance, E, VU by SC at 8/16/2024 1355                         Point: Home exercise program (Done)       Learning Progress Summary             Patient Acceptance, E, VU by SC at 8/16/2024 1355                         Point: Body mechanics (Done)       Learning Progress Summary             Patient Acceptance, E, VU by SC at 8/16/2024 1355                         Point: Precautions (Done)       Learning Progress Summary             Patient Acceptance, E, VU by SC at 8/16/2024 1355                                         User Key       Initials Effective Dates Name Provider Type Discipline    SC 02/05/24 -  Cyndee Hardwick OT Occupational Therapist OT                  PT Recommendation and Plan  Anticipated Discharge Disposition (PT): home with home health  Planned Therapy Interventions (PT): balance training, bed mobility training, gait training, home exercise program, neuromuscular re-education, strengthening, transfer training  Therapy Frequency (PT): daily  Plan of Care Reviewed With: patient  Progress: no change  Outcome Evaluation: Patient presents with deficits in balance, endurance,  transfers, and ambulation. Patient will benefit from skilled PT services to address these mobility deficits and decrease risk of falls.   Outcome Measures       Row Name 08/16/24 1400             How much help from another person do you currently need...    Turning from your back to your side while in flat bed without using bedrails? 4  -AV      Moving from lying on back to sitting on the side of a flat bed without bedrails? 4  -AV      Moving to and from a bed to a chair (including a wheelchair)? 3  -AV      Standing up from a chair using your arms (e.g., wheelchair, bedside chair)? 3  -AV      Climbing 3-5 steps with a railing? 3  -AV      To walk in hospital room? 3  -AV      AM-PAC 6 Clicks Score (PT) 20  -AV      Highest Level of Mobility Goal 6 --> Walk 10 steps or more  -AV         Functional Assessment    Outcome Measure Options AM-PAC 6 Clicks Basic Mobility (PT)  -AV                User Key  (r) = Recorded By, (t) = Taken By, (c) = Cosigned By      Initials Name Provider Type    AV Regan Fenton, PT Physical Therapist                     Time Calculation:    PT Charges       Row Name 08/16/24 1422             Time Calculation    PT Received On 08/16/24  -AV      PT Goal Re-Cert Due Date 08/25/24  -AV         Untimed Charges    PT Eval/Re-eval Minutes 35  -AV         Total Minutes    Untimed Charges Total Minutes 35  -AV       Total Minutes 35  -AV                User Key  (r) = Recorded By, (t) = Taken By, (c) = Cosigned By      Initials Name Provider Type    AV Regan Fenton, OMAR Physical Therapist                  Therapy Charges for Today       Code Description Service Date Service Provider Modifiers Qty    52388409179 HC PT EVAL LOW COMPLEXITY 2 8/16/2024 Regan Fenton, PT GP 1            PT G-Codes  Outcome Measure Options: AM-PAC 6 Clicks Basic Mobility (PT)  AM-PAC 6 Clicks Score (PT): 20  AM-PAC 6 Clicks Score (OT): 24    Regan Fenton PT  8/16/2024

## 2024-08-17 NOTE — OUTREACH NOTE
Prep Survey      Flowsheet Row Responses   [facility] patient discharged from? [facility]   Is LACE score < 7 ? No   Eligibility Readm Mgmt   Discharge diagnosis Suprapubic catheter dysfunction   Does the patient have one of the following disease processes/diagnoses(primary or secondary)? Other   Prep survey completed? Yes            Yasemin LIGHT - Registered Nurse

## 2024-08-18 LAB — BACTERIA SPEC AEROBE CULT: ABNORMAL

## 2024-08-19 NOTE — NURSING NOTE
PATIENT HAD SURGERY WHEN ADMITTED WITH DR AGRAWAL, CALLED DR KUMARI'S OFFICE SINCE PT WAS UNSURE IF NEEDED SURGERY ON 8-20-24. PER DR KUMARI OFFICE PT DOESN'T NEED SURGERY ON 8-20-24 BUT WILL NEED TO KEEP FOLLOW UP APPOINTMENT WITH HER. PATIENT IS AWARE AND VOICED UNDERSTANDING.

## 2024-08-20 ENCOUNTER — READMISSION MANAGEMENT (OUTPATIENT)
Dept: CALL CENTER | Facility: HOSPITAL | Age: 84
End: 2024-08-20
Payer: MEDICARE

## 2024-08-20 NOTE — OUTREACH NOTE
Medical Week 1 Survey      Flowsheet Row Responses   [facility] facility patient discharged from? [facility]   Does the patient have one of the following disease processes/diagnoses(primary or secondary)? Other   Week 1 attempt successful? No   Unsuccessful attempts Attempt 1            KULWINDER LIU - Registered Nurse

## 2024-08-26 ENCOUNTER — PATIENT MESSAGE (OUTPATIENT)
Dept: CARDIOLOGY | Facility: CLINIC | Age: 84
End: 2024-08-26
Payer: MEDICARE

## 2024-08-27 ENCOUNTER — NURSE TRIAGE (OUTPATIENT)
Dept: CALL CENTER | Facility: HOSPITAL | Age: 84
End: 2024-08-27
Payer: MEDICARE

## 2024-08-27 ENCOUNTER — READMISSION MANAGEMENT (OUTPATIENT)
Dept: CALL CENTER | Facility: HOSPITAL | Age: 84
End: 2024-08-27
Payer: MEDICARE

## 2024-08-27 NOTE — TELEPHONE ENCOUNTER
Returning call to [facility].  Secure message to [facility] nurse to call pt back.  Reason for Disposition  • Health Information question, no triage required and triager able to answer question    Additional Information  • Negative: [1] Caller is not with the adult (patient) AND [2] reporting urgent symptoms  • Negative: Lab result questions  • Negative: Medication questions  • Negative: Caller can't be reached by phone  • Negative: Caller has already spoken to PCP or another triager  • Negative: RN needs further essential information from caller in order to complete triage  • Negative: Requesting regular office appointment  • Negative: [1] Caller requesting NON-URGENT health information AND [2] PCP's office is the best resource    Answer Assessment - Initial Assessment Questions  Calling back    Protocols used: Information Only Call - No Triage-ADULT-

## 2024-08-27 NOTE — OUTREACH NOTE
Medical Week 2 Survey      Flowsheet Row Responses   [facility] patient discharged from? [facility]   Does the patient have one of the following disease processes/diagnoses(primary or secondary)? Other   Week 2 attempt successful? Yes   Call start time 1343   Call end time 1347   Meds reviewed with patient/caregiver? Yes   Is the patient having any side effects they believe may be caused by any medication additions or changes? No   Does the patient have all medications ordered at discharge? N/A   Is the patient taking all medications as directed (includes completed medication regime)? Yes   Does the patient have a primary care provider?  Yes   Does the patient have an appointment with their PCP within 7 days of discharge? Yes   Has the patient kept scheduled appointments due by today? Yes   Has home health visited the patient within 72 hours of discharge? N/A   Psychosocial issues? No   Did the patient receive a copy of their discharge instructions? Yes   Nursing interventions Reviewed instructions with patient   What is the patient's perception of their health status since discharge? Improving   Is the patient/caregiver able to teach back signs and symptoms related to disease process for when to call PCP? Yes   Is the patient/caregiver able to teach back signs and symptoms related to disease process for when to call 911? Yes   Is the patient/caregiver able to teach back the hierarchy of who to call/visit for symptoms/problems? PCP, Specialist, Home health nurse, Urgent Care, ED, 911 Yes   If the patient is a current smoker, are they able to teach back resources for cessation? Not a smoker   Week 2 Call Completed? Yes   Graduated Yes   Is the patient interested in additional calls from an ambulatory ? No   Would this patient benefit from a Referral to Amb Social Work? No   Wrap up additional comments Patient states is doing well. States suprapubic catheter is functioning well. Has kept f/u appts.  States has everything needed. Denies any concerns today.   Call end time [id number]            Jayne LIGHT - Registered Nurse

## 2024-08-28 ENCOUNTER — PREP FOR SURGERY (OUTPATIENT)
Dept: OTHER | Facility: HOSPITAL | Age: 84
End: 2024-08-28
Payer: MEDICARE

## 2024-08-28 ENCOUNTER — OFFICE VISIT (OUTPATIENT)
Dept: UROLOGY | Facility: CLINIC | Age: 84
End: 2024-08-28
Payer: MEDICARE

## 2024-08-28 VITALS
WEIGHT: 171 LBS | HEIGHT: 76 IN | BODY MASS INDEX: 20.82 KG/M2 | DIASTOLIC BLOOD PRESSURE: 59 MMHG | SYSTOLIC BLOOD PRESSURE: 104 MMHG

## 2024-08-28 DIAGNOSIS — N35.912 STRICTURE OF BULBOUS URETHRA IN MALE, UNSPECIFIED STRICTURE TYPE: Primary | ICD-10-CM

## 2024-08-28 NOTE — PROGRESS NOTES
"Chief Complaint  Urethral stricture    Subjective          Javi Martínez presents to [facility]    History of Present Illness  Patient is here status post ureteral stricture.  He was seen in the ER and Dr. Myers actually took him to the OR was finally able to get a wire through we dilated up to 18 [place].  He has a Diamond catheter in place.  SP tube was removed.      Objective   Vital Signs:   /59   Ht 193 cm (76\")   Wt 77.6 kg (171 lb)   BMI 20.81 kg/m²       Physical Exam  Vitals and nursing note reviewed.   Constitutional:       Appearance: Normal appearance. He is well-developed.   Pulmonary:      Effort: Pulmonary effort is normal.      Breath sounds: Normal air entry.   Neurological:      Mental Status: He is alert and oriented to person, place, and time.      Motor: Motor function is intact.   Psychiatric:         Mood and Affect: Mood normal.         Behavior: Behavior normal.          Result Review :   The following data was reviewed by: Mally Bernal MD on 08/28/2024:    Results for orders placed or performed during the hospital encounter of 08/15/24   Urine Culture - Urine, Urinary Bladder    Specimen: Urinary Bladder; Urine   Result Value Ref Range    Urine Culture (A)      >100,000 CFU/mL Staphylococcus, coagulase Neg, Not S. saprophyticus   Comprehensive Metabolic Panel    Specimen: Blood   Result Value Ref Range    Glucose 114 (H) 65 - 99 mg/dL    BUN 35 (H) 8 - 23 mg/dL    Creatinine 1.84 (H) 0.76 - 1.27 mg/dL    Sodium 139 136 - 145 mmol/L    Potassium 4.0 3.5 - 5.2 mmol/L    Chloride 100 98 - 107 mmol/L    CO2 27.1 22.0 - 29.0 mmol/L    Calcium 8.7 8.6 - 10.5 mg/dL    Total Protein 6.6 6.0 - 8.5 g/dL    Albumin 3.2 (L) 3.5 - 5.2 g/dL    ALT (SGPT) 24 1 - 41 U/L    AST (SGOT) 33 1 - 40 U/L    Alkaline Phosphatase 117 39 - 117 U/L    Total Bilirubin 0.7 0.0 - 1.2 mg/dL    Globulin 3.4 gm/dL    A/G Ratio 0.9 g/dL    BUN/Creatinine Ratio 19.0 7.0 - 25.0    " Anion Gap 11.9 5.0 - 15.0 mmol/L    eGFR 36.2 (L) >60.0 mL/min/1.73   Magnesium    Specimen: Blood   Result Value Ref Range    Magnesium 2.2 1.6 - 2.4 mg/dL   CBC Auto Differential    Specimen: Blood   Result Value Ref Range    WBC 9.90 3.40 - 10.80 10*3/mm3    RBC 5.00 4.14 - 5.80 10*6/mm3    Hemoglobin 14.7 13.0 - 17.7 g/dL    Hematocrit 46.0 37.5 - 51.0 %    MCV 92.0 79.0 - 97.0 fL    MCH 29.4 26.6 - 33.0 pg    MCHC 32.0 31.5 - 35.7 g/dL    RDW 15.4 12.3 - 15.4 %    RDW-SD 50.0 37.0 - 54.0 fl    MPV 10.5 6.0 - 12.0 fL    Platelets 205 140 - 450 10*3/mm3    Neutrophil % 65.6 42.7 - 76.0 %    Lymphocyte % 23.5 19.6 - 45.3 %    Monocyte % 7.9 5.0 - 12.0 %    Eosinophil % 1.8 0.3 - 6.2 %    Basophil % 0.7 0.0 - 1.5 %    Immature Grans % 0.5 0.0 - 0.5 %    Neutrophils, Absolute 6.49 1.70 - 7.00 10*3/mm3    Lymphocytes, Absolute 2.33 0.70 - 3.10 10*3/mm3    Monocytes, Absolute 0.78 0.10 - 0.90 10*3/mm3    Eosinophils, Absolute 0.18 0.00 - 0.40 10*3/mm3    Basophils, Absolute 0.07 0.00 - 0.20 10*3/mm3    Immature Grans, Absolute 0.05 0.00 - 0.05 10*3/mm3    nRBC 0.0 0.0 - 0.2 /100 WBC              Assessment and Plan    Diagnoses and all orders for this visit:    1. 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  Will remove it in a week or 2 with a void trial.  Will likely teach about a straight cath at that time to help prevent this from recurring.        Follow Up       No follow-ups on file.  Patient was given instructions and counseling regarding his condition or for health maintenance advice. Please see specific information pulled into the AVS if appropriate.

## 2024-09-09 ENCOUNTER — OFFICE VISIT (OUTPATIENT)
Dept: UROLOGY | Facility: CLINIC | Age: 84
End: 2024-09-09
Payer: MEDICARE

## 2024-09-09 VITALS
DIASTOLIC BLOOD PRESSURE: 65 MMHG | SYSTOLIC BLOOD PRESSURE: 118 MMHG | BODY MASS INDEX: 20.73 KG/M2 | HEIGHT: 76 IN | WEIGHT: 170.2 LBS

## 2024-09-09 DIAGNOSIS — N35.912 STRICTURE OF BULBOUS URETHRA IN MALE, UNSPECIFIED STRICTURE TYPE: Primary | ICD-10-CM

## 2024-09-09 LAB — URINE VOLUME: NORMAL

## 2024-09-09 RX ORDER — EMPAGLIFLOZIN 10 MG/1
10 TABLET, FILM COATED ORAL DAILY
COMMUNITY
Start: 2024-09-03

## 2024-09-09 RX ORDER — APIXABAN 2.5 MG/1
2.5 TABLET, FILM COATED ORAL EVERY 12 HOURS SCHEDULED
COMMUNITY
Start: 2024-09-03

## 2024-09-09 NOTE — PROGRESS NOTES
"Chief Complaint  Follow-up (Voiding trial)    Subjective          Javi Martínez presents to [facility] UR[facility]    History of Present Illness  Patient is here for follow-up for urethral stricture.  He had a DVIU with Dr. Myers in the operating room.  Prior to that he had had an SP tube placement.  Patient's Diamond catheter was removed today.  He voided afterwards but still quite a bit in his bladder however he was not able to hold very much prior to pulling his catheter.  No other complaints.      Objective   Vital Signs:   /65   Ht 193 cm (76\")   Wt 77.2 kg (170 lb 3.2 oz)   BMI 20.72 kg/m²       Physical Exam  Vitals and nursing note reviewed.   Constitutional:       Appearance: Normal appearance. He is well-developed.   Pulmonary:      Effort: Pulmonary effort is normal.      Breath sounds: Normal air entry.   Neurological:      Mental Status: He is alert and oriented to person, place, and time.      Motor: Motor function is intact.   Psychiatric:         Mood and Affect: Mood normal.         Behavior: Behavior normal.          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   Findings: An anechoic area was demonstrated which represented the bladder, with measurement of residual urine as noted. I inspected this myself. In that the residual urine was stable or insignificant, refer to plan for treatment and plan necessary at this " time.            Assessment and Plan    Diagnoses and all orders for this visit:    1. 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  He will call sooner if he has any other issues emptying his bladder.          Follow Up       No follow-ups on file.  Patient was given instructions and counseling regarding his condition or for health maintenance advice. Please see specific information pulled into the AVS if appropriate.

## 2024-09-23 ENCOUNTER — OFFICE VISIT (OUTPATIENT)
Dept: UROLOGY | Facility: CLINIC | Age: 84
End: 2024-09-23
Payer: MEDICARE

## 2024-09-23 VITALS
BODY MASS INDEX: 20.7 KG/M2 | HEIGHT: 76 IN | DIASTOLIC BLOOD PRESSURE: 75 MMHG | WEIGHT: 170 LBS | SYSTOLIC BLOOD PRESSURE: 132 MMHG

## 2024-09-23 DIAGNOSIS — N35.912 STRICTURE OF BULBOUS URETHRA IN MALE, UNSPECIFIED STRICTURE TYPE: Primary | ICD-10-CM

## 2024-09-23 LAB
BILIRUB BLD-MCNC: NEGATIVE MG/DL
CLARITY, POC: CLEAR
COLOR UR: YELLOW
EXPIRATION DATE: ABNORMAL
GLUCOSE UR STRIP-MCNC: ABNORMAL MG/DL
KETONES UR QL: NEGATIVE
LEUKOCYTE EST, POC: ABNORMAL
Lab: ABNORMAL
NITRITE UR-MCNC: NEGATIVE MG/ML
PH UR: 6.5 [PH] (ref 5–8)
PROT UR STRIP-MCNC: NEGATIVE MG/DL
RBC # UR STRIP: ABNORMAL /UL
SP GR UR: 1.01 (ref 1–1.03)
URINE VOLUME: NORMAL
UROBILINOGEN UR QL: ABNORMAL

## 2024-09-23 PROCEDURE — 51798 US URINE CAPACITY MEASURE: CPT | Performed by: UROLOGY

## 2024-09-23 PROCEDURE — 99213 OFFICE O/P EST LOW 20 MIN: CPT | Performed by: UROLOGY

## 2024-09-23 PROCEDURE — 3075F SYST BP GE 130 - 139MM HG: CPT | Performed by: UROLOGY

## 2024-09-23 PROCEDURE — 81003 URINALYSIS AUTO W/O SCOPE: CPT | Performed by: UROLOGY

## 2024-09-23 PROCEDURE — 1159F MED LIST DOCD IN RCRD: CPT | Performed by: UROLOGY

## 2024-09-23 PROCEDURE — 1160F RVW MEDS BY RX/DR IN RCRD: CPT | Performed by: UROLOGY

## 2024-09-23 PROCEDURE — 3078F DIAST BP <80 MM HG: CPT | Performed by: UROLOGY

## 2024-10-01 RX ORDER — LOSARTAN POTASSIUM 50 MG/1
50 TABLET ORAL DAILY
Qty: 90 TABLET | Refills: 0 | OUTPATIENT
Start: 2024-10-01

## 2024-10-01 NOTE — TELEPHONE ENCOUNTER
Looks like dr burton wanted to change the dose from 50 mg daily to 25 mg daily, find out from patient if he has been taking it

## 2024-10-23 ENCOUNTER — OFFICE VISIT (OUTPATIENT)
Dept: UROLOGY | Facility: CLINIC | Age: 84
End: 2024-10-23
Payer: MEDICARE

## 2024-10-23 ENCOUNTER — PREP FOR SURGERY (OUTPATIENT)
Dept: OTHER | Facility: HOSPITAL | Age: 84
End: 2024-10-23
Payer: MEDICARE

## 2024-10-23 ENCOUNTER — TELEPHONE (OUTPATIENT)
Dept: UROLOGY | Facility: CLINIC | Age: 84
End: 2024-10-23

## 2024-10-23 VITALS
BODY MASS INDEX: 22.41 KG/M2 | WEIGHT: 184 LBS | SYSTOLIC BLOOD PRESSURE: 106 MMHG | HEIGHT: 76 IN | DIASTOLIC BLOOD PRESSURE: 72 MMHG

## 2024-10-23 DIAGNOSIS — R33.9 RETENTION OF URINE: Primary | ICD-10-CM

## 2024-10-23 DIAGNOSIS — N35.812 OTHER STRICTURE OF BULBOUS URETHRA IN MALE: ICD-10-CM

## 2024-10-23 DIAGNOSIS — Z85.46 HISTORY OF MALIGNANT NEOPLASM OF PROSTATE: ICD-10-CM

## 2024-10-23 LAB — URINE VOLUME: NORMAL

## 2024-10-23 NOTE — TELEPHONE ENCOUNTER
Hub staff attempted to follow warm transfer process and was unsuccessful     Caller: ISABELLA PITTMAN    Relationship to patient: SELF    Best call back number:[phone] (home)      Patient is needing: PT HAD PROCEDURE DONE BACK IN AUGUST AND IS STILL HAVING SOME OF THE SAME CONCERNS AS BEFORE. PT HAS URINARY RETENTION AND WOULD LIKE TO BE SEEN ASAP. PLEASE CONTACT PAT TO SET UP AN APPT. THANK YOU.

## 2024-10-23 NOTE — H&P
"[facility]   Urology HISTORY AND PHYSICAL    Patient Name: Javi Martínez  : 1940  MRN: [id number]  Primary Care Physician:  Rani Lopez APRN  Date of admission: (Not on file)    Subjective   Subjective       History of Present Illness  Patient has urethral stricture and presents for cystoscopy and possible optical internal urethrotomy.      Personal History     Past Medical History:   Diagnosis Date    Aneurysm     Arthritis     left knee     Atrial fibrillation, persistent     CHB (complete heart block) 2023    CHF exacerbation 2024    Chronic obstructive pulmonary disease 2023    Difficulty walking     Numbness in right foot    Essential hypertension 2019    GERD (gastroesophageal reflux disease)     HFrEF (heart failure with reduced ejection fraction) 2021    Hyperlipidemia 2021    Paroxysmal atrial fibrillation     Polyneuropathy 2023    Prostate cancer     with seeds implant     Septic joint 2021       Past Surgical History:   Procedure Laterality Date    ABLATION OF DYSRHYTHMIC FOCUS      APPENDECTOMY      ARTERIAL BYPASS SURGERY      CARDIAC ABLATION      x2  \"years ago\"     CARDIAC ELECTROPHYSIOLOGY PROCEDURE N/A 2021    Procedure: PPM battery change;  Surgeon: Ge Whelan MD;  Location: [facility] CATH INVASIVE LOCATION;  Service: Cardiovascular;  Laterality: N/A;    CARDIAC SURGERY  2006    cabg 3v    CORONARY ARTERY BYPASS GRAFT      CYSTOSCOPY URETHROTOMY VISUAL INTERNAL N/A 08/15/2024    Procedure: CYSTOSCOPY, URETHRAL DILATION, CATHETER PLACEMENT, RETROGRADE URETHROGRAM;  Surgeon: August Myers MD;  Location: [facility] OR;  Service: Urology;  Laterality: N/A;    ENDOSCOPY      with dilation     KNEE ARTHROSCOPY Left     TOTAL KNEE ARTHROPLASTY Left 2021    Procedure: TOTAL KNEE ARTHROPLASTY WITH DORA NAVIGATION WITH BIOMET;  Surgeon: Carlitos Zhao MD;  Location: [facility] MAIN OR;  Service: " Orthopedics;  Laterality: Left;    VENTRICULAR CARDIAC PACEMAKER INSERTION      medtronic        Family History: family history includes Heart attack in his mother; No Known Problems in his brother, father, maternal aunt, maternal grandfather, maternal grandmother, maternal uncle, paternal aunt, paternal grandfather, paternal grandmother, paternal uncle, sister, and another family member. Otherwise pertinent FHx was reviewed and not pertinent to current issue.    Social History:  reports that he quit smoking about 29 years ago. His smoking use included cigarettes. He started smoking about 62 years ago. He has a 16.8 pack-year smoking history. He has never been exposed to tobacco smoke. He has never used smokeless tobacco. He reports that he does not drink alcohol and does not use drugs.    Home Medications:  LORazepam, allopurinol, apixaban, carvedilol, citalopram, empagliflozin, furosemide, isosorbide mononitrate, omeprazole, spironolactone, tamsulosin, and traZODone    Allergies:  No Known Allergies    Objective    Objective     Vitals:   BP: (106)/(72) 106/72    Physical Exam  Constitutional:       Appearance: Normal appearance.   Cardiovascular:      Rate and Rhythm: Normal rate and regular rhythm.   Pulmonary:      Effort: Pulmonary effort is normal.      Breath sounds: Normal breath sounds.   Neurological:      Mental Status: He is alert. Mental status is at baseline.   Psychiatric:         Mood and Affect: Mood and affect normal.         Speech: Speech normal.         Judgment: Judgment normal.         Result Review    Result Review:  I have personally reviewed the results from the time of this admission to 10/23/2024 13:03 EDT and agree with these findings:  [x]  Laboratory  []  Microbiology  [x]  Radiology  []  EKG/Telemetry   []  Cardiology/Vascular   [x]  Pathology  [x]  Old records  []  Other:      Assessment & Plan   Assessment / Plan       Active Hospital Problems:  There are no active hospital  problems to display for this patient.      Plan: cystoscopy and possible optical internal urethrotomy  Risks and benefits discussed with patient and they are agreeable to proceed.    VTE Prophylaxis:  No VTE prophylaxis order currently exists.        CODE STATUS:           Electronically signed by Mally Bernal MD, 10/23/24, 1:03 PM EDT.

## 2024-10-23 NOTE — PROGRESS NOTES
"Chief Complaint  Urinary Retention    Subjective          Javi Martínez presents to [facility] UROLOGY    History of Present Illness  Patient has a known repro stricture and had that dilated in the operating with Dr. Myers.  He had a Diamond catheter for a few weeks after that.  I removed it.  He had been voiding well but over the past few weeks has been having decreased stream and having to strain more to urinate.  PVR today was 224 mL.      Objective   Vital Signs:   /72   Ht 193 cm (76\")   Wt 83.5 kg (184 lb)   BMI 22.40 kg/m²       Physical Exam  Vitals and nursing note reviewed.   Constitutional:       Appearance: Normal appearance. He is well-developed.   Pulmonary:      Effort: Pulmonary effort is normal.      Breath sounds: Normal air entry.   Neurological:      Mental Status: He is alert and oriented to person, place, and time.      Motor: Motor function is intact.   Psychiatric:         Mood and Affect: Mood normal.         Behavior: Behavior normal.          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   Findings: An anechoic area was demonstrated which represented the bladder, with measurement of residual urine as noted. I inspected this myself. In that the residual urine was stable or insignificant, refer to plan for treatment and plan " necessary at this time.            Assessment and Plan    Diagnoses and all orders for this visit:    1. Retention of urine (Primary)  -     Bladder Scan    2. History of malignant neoplasm of prostate    3. 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  Risk and benefits discussed and he is agreeable to proceed.        Follow Up       No follow-ups on file.  Patient was given instructions and counseling regarding his condition or for health maintenance advice. Please see specific information pulled into the AVS if appropriate.

## 2024-10-23 NOTE — H&P (VIEW-ONLY)
"[facility]   Urology HISTORY AND PHYSICAL    Patient Name: Javi Martínez  : 1940  MRN: [id number]  Primary Care Physician:  Rani Lopez APRN  Date of admission: (Not on file)    Subjective   Subjective       History of Present Illness  Patient has urethral stricture and presents for cystoscopy and possible optical internal urethrotomy.      Personal History     Past Medical History:   Diagnosis Date    Aneurysm     Arthritis     left knee     Atrial fibrillation, persistent     CHB (complete heart block) 2023    CHF exacerbation 2024    Chronic obstructive pulmonary disease 2023    Difficulty walking     Numbness in right foot    Essential hypertension 2019    GERD (gastroesophageal reflux disease)     HFrEF (heart failure with reduced ejection fraction) 2021    Hyperlipidemia 2021    Paroxysmal atrial fibrillation     Polyneuropathy 2023    Prostate cancer     with seeds implant     Septic joint 2021       Past Surgical History:   Procedure Laterality Date    ABLATION OF DYSRHYTHMIC FOCUS      APPENDECTOMY      ARTERIAL BYPASS SURGERY      CARDIAC ABLATION      x2  \"years ago\"     CARDIAC ELECTROPHYSIOLOGY PROCEDURE N/A 2021    Procedure: PPM battery change;  Surgeon: Ge Whelan MD;  Location: [facility] CATH INVASIVE LOCATION;  Service: Cardiovascular;  Laterality: N/A;    CARDIAC SURGERY  2006    cabg 3v    CORONARY ARTERY BYPASS GRAFT      CYSTOSCOPY URETHROTOMY VISUAL INTERNAL N/A 08/15/2024    Procedure: CYSTOSCOPY, URETHRAL DILATION, CATHETER PLACEMENT, RETROGRADE URETHROGRAM;  Surgeon: August Myers MD;  Location: [facility] OR;  Service: Urology;  Laterality: N/A;    ENDOSCOPY      with dilation     KNEE ARTHROSCOPY Left     TOTAL KNEE ARTHROPLASTY Left 2021    Procedure: TOTAL KNEE ARTHROPLASTY WITH DORA NAVIGATION WITH BIOMET;  Surgeon: Carlitos Zhao MD;  Location: [facility] MAIN OR;  Service: " Orthopedics;  Laterality: Left;    VENTRICULAR CARDIAC PACEMAKER INSERTION      medtronic        Family History: family history includes Heart attack in his mother; No Known Problems in his brother, father, maternal aunt, maternal grandfather, maternal grandmother, maternal uncle, paternal aunt, paternal grandfather, paternal grandmother, paternal uncle, sister, and another family member. Otherwise pertinent FHx was reviewed and not pertinent to current issue.    Social History:  reports that he quit smoking about 29 years ago. His smoking use included cigarettes. He started smoking about 62 years ago. He has a 16.8 pack-year smoking history. He has never been exposed to tobacco smoke. He has never used smokeless tobacco. He reports that he does not drink alcohol and does not use drugs.    Home Medications:  LORazepam, allopurinol, apixaban, carvedilol, citalopram, empagliflozin, furosemide, isosorbide mononitrate, omeprazole, spironolactone, tamsulosin, and traZODone    Allergies:  No Known Allergies    Objective    Objective     Vitals:   BP: (106)/(72) 106/72    Physical Exam  Constitutional:       Appearance: Normal appearance.   Cardiovascular:      Rate and Rhythm: Normal rate and regular rhythm.   Pulmonary:      Effort: Pulmonary effort is normal.      Breath sounds: Normal breath sounds.   Neurological:      Mental Status: He is alert. Mental status is at baseline.   Psychiatric:         Mood and Affect: Mood and affect normal.         Speech: Speech normal.         Judgment: Judgment normal.         Result Review    Result Review:  I have personally reviewed the results from the time of this admission to 10/23/2024 13:03 EDT and agree with these findings:  [x]  Laboratory  []  Microbiology  [x]  Radiology  []  EKG/Telemetry   []  Cardiology/Vascular   [x]  Pathology  [x]  Old records  []  Other:      Assessment & Plan   Assessment / Plan       Active Hospital Problems:  There are no active hospital  problems to display for this patient.      Plan: cystoscopy and possible optical internal urethrotomy  Risks and benefits discussed with patient and they are agreeable to proceed.    VTE Prophylaxis:  No VTE prophylaxis order currently exists.        CODE STATUS:           Electronically signed by Mally Bernal MD, 10/23/24, 1:03 PM EDT.

## 2024-10-24 ENCOUNTER — ANESTHESIA EVENT (OUTPATIENT)
Dept: PERIOP | Facility: HOSPITAL | Age: 84
End: 2024-10-24
Payer: MEDICARE

## 2024-10-24 ENCOUNTER — HOSPITAL ENCOUNTER (OUTPATIENT)
Facility: HOSPITAL | Age: 84
Discharge: HOME | End: 2024-10-25
Attending: UROLOGY | Admitting: UROLOGY
Payer: MEDICARE

## 2024-10-24 ENCOUNTER — ANESTHESIA (OUTPATIENT)
Dept: PERIOP | Facility: HOSPITAL | Age: 84
End: 2024-10-24
Payer: MEDICARE

## 2024-10-24 DIAGNOSIS — N99.112 POSTPROCEDURAL MEMBRANOUS URETHRAL STRICTURE: ICD-10-CM

## 2024-10-24 DIAGNOSIS — N35.912 STRICTURE OF BULBOUS URETHRA IN MALE, UNSPECIFIED STRICTURE TYPE: ICD-10-CM

## 2024-10-24 DIAGNOSIS — Z85.46 HISTORY OF MALIGNANT NEOPLASM OF PROSTATE: ICD-10-CM

## 2024-10-24 DIAGNOSIS — N35.912: Primary | ICD-10-CM

## 2024-10-24 PROBLEM — N35.919 URETHRAL STRICTURE IN MALE: Status: ACTIVE | Noted: 2024-10-24

## 2024-10-24 PROCEDURE — 25010000002 FENTANYL CITRATE (PF) 50 MCG/ML SOLUTION: Performed by: NURSE ANESTHETIST, CERTIFIED REGISTERED

## 2024-10-24 PROCEDURE — 25010000002 DEXAMETHASONE PER 1 MG: Performed by: NURSE ANESTHETIST, CERTIFIED REGISTERED

## 2024-10-24 PROCEDURE — 25010000002 VASOPRESSIN 20 UNIT/ML SOLUTION: Performed by: NURSE ANESTHETIST, CERTIFIED REGISTERED

## 2024-10-24 PROCEDURE — A9270 NON-COVERED ITEM OR SERVICE: HCPCS | Performed by: UROLOGY

## 2024-10-24 PROCEDURE — 25810000003 LACTATED RINGERS PER 1000 ML: Performed by: ANESTHESIOLOGY

## 2024-10-24 PROCEDURE — 25010000002 LIDOCAINE PF 2% 2 % SOLUTION: Performed by: NURSE ANESTHETIST, CERTIFIED REGISTERED

## 2024-10-24 PROCEDURE — 63710000001 HYDROCODONE-ACETAMINOPHEN 5-325 MG TABLET: Performed by: UROLOGY

## 2024-10-24 PROCEDURE — 25010000002 PROPOFOL 10 MG/ML EMULSION: Performed by: NURSE ANESTHETIST, CERTIFIED REGISTERED

## 2024-10-24 PROCEDURE — 25010000002 EPINEPHRINE 1 MG/ML SOLUTION: Performed by: NURSE ANESTHETIST, CERTIFIED REGISTERED

## 2024-10-24 PROCEDURE — 25010000002 CEFAZOLIN PER 500 MG: Performed by: UROLOGY

## 2024-10-24 PROCEDURE — 25010000002 ONDANSETRON PER 1 MG: Performed by: NURSE ANESTHETIST, CERTIFIED REGISTERED

## 2024-10-24 PROCEDURE — 52276 CYSTOSCOPY AND TREATMENT: CPT | Performed by: UROLOGY

## 2024-10-24 RX ORDER — ONDANSETRON 2 MG/ML
INJECTION INTRAMUSCULAR; INTRAVENOUS AS NEEDED
Status: DISCONTINUED | OUTPATIENT
Start: 2024-10-24 | End: 2024-10-24 | Stop reason: SURG

## 2024-10-24 RX ORDER — PROMETHAZINE HYDROCHLORIDE 12.5 MG/1
25 TABLET ORAL ONCE AS NEEDED
Status: DISCONTINUED | OUTPATIENT
Start: 2024-10-24 | End: 2024-10-24 | Stop reason: HOSPADM

## 2024-10-24 RX ORDER — OXYCODONE HYDROCHLORIDE 5 MG/1
5 TABLET ORAL
Status: COMPLETED | OUTPATIENT
Start: 2024-10-24 | End: 2024-10-24

## 2024-10-24 RX ORDER — PROMETHAZINE HYDROCHLORIDE 25 MG/1
25 SUPPOSITORY RECTAL ONCE AS NEEDED
Status: DISCONTINUED | OUTPATIENT
Start: 2024-10-24 | End: 2024-10-24 | Stop reason: HOSPADM

## 2024-10-24 RX ORDER — PROMETHAZINE HYDROCHLORIDE 25 MG/1
12.5 TABLET ORAL ONCE AS NEEDED
Status: DISCONTINUED | OUTPATIENT
Start: 2024-10-24 | End: 2024-10-25 | Stop reason: HOSPADM

## 2024-10-24 RX ORDER — HYDROCODONE BITARTRATE AND ACETAMINOPHEN 5; 325 MG/1; MG/1
2 TABLET ORAL EVERY 6 HOURS PRN
Status: DISCONTINUED | OUTPATIENT
Start: 2024-10-24 | End: 2024-10-25 | Stop reason: HOSPADM

## 2024-10-24 RX ORDER — LIDOCAINE HYDROCHLORIDE 20 MG/ML
INJECTION, SOLUTION EPIDURAL; INFILTRATION; INTRACAUDAL; PERINEURAL AS NEEDED
Status: DISCONTINUED | OUTPATIENT
Start: 2024-10-24 | End: 2024-10-24 | Stop reason: SURG

## 2024-10-24 RX ORDER — ACETAMINOPHEN 500 MG
1000 TABLET ORAL ONCE
Status: COMPLETED | OUTPATIENT
Start: 2024-10-24 | End: 2024-10-24

## 2024-10-24 RX ORDER — SODIUM CHLORIDE 9 MG/ML
40 INJECTION, SOLUTION INTRAVENOUS AS NEEDED
Status: DISCONTINUED | OUTPATIENT
Start: 2024-10-24 | End: 2024-10-24 | Stop reason: HOSPADM

## 2024-10-24 RX ORDER — DEXAMETHASONE SODIUM PHOSPHATE 4 MG/ML
INJECTION, SOLUTION INTRA-ARTICULAR; INTRALESIONAL; INTRAMUSCULAR; INTRAVENOUS; SOFT TISSUE AS NEEDED
Status: DISCONTINUED | OUTPATIENT
Start: 2024-10-24 | End: 2024-10-24 | Stop reason: SURG

## 2024-10-24 RX ORDER — SODIUM CHLORIDE 0.9 % (FLUSH) 0.9 %
10 SYRINGE (ML) INJECTION AS NEEDED
Status: DISCONTINUED | OUTPATIENT
Start: 2024-10-24 | End: 2024-10-24 | Stop reason: HOSPADM

## 2024-10-24 RX ORDER — IBUPROFEN 600 MG/1
600 TABLET, FILM COATED ORAL EVERY 6 HOURS PRN
Status: DISCONTINUED | OUTPATIENT
Start: 2024-10-24 | End: 2024-10-24 | Stop reason: HOSPADM

## 2024-10-24 RX ORDER — PROPOFOL 10 MG/ML
VIAL (ML) INTRAVENOUS AS NEEDED
Status: DISCONTINUED | OUTPATIENT
Start: 2024-10-24 | End: 2024-10-24 | Stop reason: SURG

## 2024-10-24 RX ORDER — FENTANYL CITRATE 50 UG/ML
INJECTION, SOLUTION INTRAMUSCULAR; INTRAVENOUS AS NEEDED
Status: DISCONTINUED | OUTPATIENT
Start: 2024-10-24 | End: 2024-10-24 | Stop reason: SURG

## 2024-10-24 RX ORDER — SODIUM CHLORIDE 9 MG/ML
100 INJECTION, SOLUTION INTRAVENOUS CONTINUOUS
Status: DISCONTINUED | OUTPATIENT
Start: 2024-10-24 | End: 2024-10-24

## 2024-10-24 RX ORDER — VASOPRESSIN 20 U/ML
INJECTION PARENTERAL AS NEEDED
Status: DISCONTINUED | OUTPATIENT
Start: 2024-10-24 | End: 2024-10-24 | Stop reason: SURG

## 2024-10-24 RX ORDER — ONDANSETRON 2 MG/ML
4 INJECTION INTRAMUSCULAR; INTRAVENOUS ONCE AS NEEDED
Status: DISCONTINUED | OUTPATIENT
Start: 2024-10-24 | End: 2024-10-24 | Stop reason: HOSPADM

## 2024-10-24 RX ORDER — MAGNESIUM HYDROXIDE 1200 MG/15ML
LIQUID ORAL AS NEEDED
Status: DISCONTINUED | OUTPATIENT
Start: 2024-10-24 | End: 2024-10-24 | Stop reason: HOSPADM

## 2024-10-24 RX ORDER — KETAMINE HCL IN NACL, ISO-OSM 100MG/10ML
SYRINGE (ML) INJECTION AS NEEDED
Status: DISCONTINUED | OUTPATIENT
Start: 2024-10-24 | End: 2024-10-24 | Stop reason: SURG

## 2024-10-24 RX ORDER — ACETAMINOPHEN 325 MG/1
650 TABLET ORAL ONCE
Status: DISCONTINUED | OUTPATIENT
Start: 2024-10-24 | End: 2024-10-25 | Stop reason: HOSPADM

## 2024-10-24 RX ORDER — SODIUM CHLORIDE, SODIUM LACTATE, POTASSIUM CHLORIDE, CALCIUM CHLORIDE 600; 310; 30; 20 MG/100ML; MG/100ML; MG/100ML; MG/100ML
9 INJECTION, SOLUTION INTRAVENOUS CONTINUOUS PRN
Status: DISCONTINUED | OUTPATIENT
Start: 2024-10-24 | End: 2024-10-24

## 2024-10-24 RX ORDER — PHENYLEPHRINE HCL IN 0.9% NACL 1 MG/10 ML
SYRINGE (ML) INTRAVENOUS AS NEEDED
Status: DISCONTINUED | OUTPATIENT
Start: 2024-10-24 | End: 2024-10-24 | Stop reason: SURG

## 2024-10-24 RX ORDER — SODIUM CHLORIDE 0.9 % (FLUSH) 0.9 %
10 SYRINGE (ML) INJECTION EVERY 12 HOURS SCHEDULED
Status: DISCONTINUED | OUTPATIENT
Start: 2024-10-24 | End: 2024-10-24 | Stop reason: HOSPADM

## 2024-10-24 RX ADMIN — Medication 200 MCG: at 12:52

## 2024-10-24 RX ADMIN — DEXAMETHASONE SODIUM PHOSPHATE 4 MG: 4 INJECTION, SOLUTION INTRAMUSCULAR; INTRAVENOUS at 12:44

## 2024-10-24 RX ADMIN — HYDROCODONE BITARTRATE AND ACETAMINOPHEN 2 TABLET: 5; 325 TABLET ORAL at 21:10

## 2024-10-24 RX ADMIN — VASOPRESSIN 2 UNITS: 20 INJECTION INTRAVENOUS at 12:56

## 2024-10-24 RX ADMIN — Medication 5 MG: at 12:54

## 2024-10-24 RX ADMIN — OXYCODONE 5 MG: 5 TABLET ORAL at 15:30

## 2024-10-24 RX ADMIN — EPINEPHRINE 5 MCG: 1 INJECTION INTRAMUSCULAR; INTRAVENOUS; SUBCUTANEOUS at 13:05

## 2024-10-24 RX ADMIN — ACETAMINOPHEN 1000 MG: 500 TABLET ORAL at 11:16

## 2024-10-24 RX ADMIN — VASOPRESSIN 2 UNITS: 20 INJECTION INTRAVENOUS at 13:03

## 2024-10-24 RX ADMIN — SODIUM CHLORIDE 2000 MG: 9 INJECTION, SOLUTION INTRAVENOUS at 12:43

## 2024-10-24 RX ADMIN — SODIUM CHLORIDE, POTASSIUM CHLORIDE, SODIUM LACTATE AND CALCIUM CHLORIDE 9 ML/HR: 600; 310; 30; 20 INJECTION, SOLUTION INTRAVENOUS at 11:16

## 2024-10-24 RX ADMIN — Medication 10 MG: at 13:00

## 2024-10-24 RX ADMIN — ONDANSETRON HYDROCHLORIDE 4 MG: 2 SOLUTION INTRAMUSCULAR; INTRAVENOUS at 12:44

## 2024-10-24 RX ADMIN — VASOPRESSIN 4 UNITS: 20 INJECTION INTRAVENOUS at 13:14

## 2024-10-24 RX ADMIN — Medication 200 MCG: at 12:46

## 2024-10-24 RX ADMIN — PROPOFOL 50 MG: 10 INJECTION, EMULSION INTRAVENOUS at 13:00

## 2024-10-24 RX ADMIN — OXYCODONE 5 MG: 5 TABLET ORAL at 15:05

## 2024-10-24 RX ADMIN — Medication 100 MCG: at 12:43

## 2024-10-24 RX ADMIN — Medication 5 MG: at 12:57

## 2024-10-24 RX ADMIN — PROPOFOL 100 MG: 10 INJECTION, EMULSION INTRAVENOUS at 12:42

## 2024-10-24 RX ADMIN — LIDOCAINE HYDROCHLORIDE 60 MG: 20 INJECTION, SOLUTION INTRAVENOUS at 12:42

## 2024-10-24 RX ADMIN — EPINEPHRINE 10 MCG: 1 INJECTION INTRAMUSCULAR; INTRAVENOUS; SUBCUTANEOUS at 13:12

## 2024-10-24 RX ADMIN — FENTANYL CITRATE 12.5 MCG: 50 INJECTION, SOLUTION INTRAMUSCULAR; INTRAVENOUS at 12:59

## 2024-10-24 NOTE — OP NOTE
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  The patient was placed in the dorsal lithotomy position and prepped and draped in the normal sterile fashion for cystoscopy.  The cystoscope was inserted per urethra towards the bladder.  There is a dense urethral stricture noted in the bulbous urethra.    I used the urethrotome with a smooth knife to incise the stricture at the 12:00 and 6:00 positions.  At this point I was able to pass the scope all the way into the bladder.  The prostate itself appeared normal.  The sphincter was not injured.  No other strictures were seen.    The stricture was noted to be about 2 cm or so in length.        Mally Bernal MD     Date: 10/24/2024  Time: 13:31 EDT

## 2024-10-24 NOTE — ANESTHESIA POSTPROCEDURE EVALUATION
Patient: Javi Martínez    Procedure Summary       Date: 10/24/24 Room / Location: [facility] OR 01 / 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        Post Anesthesia Care and Evaluation    Patient location during evaluation: bedside  Patient participation: complete - patient participated  Level of consciousness: awake  Pain management: adequate    Airway patency: patent  PONV Status: none  Cardiovascular status: acceptable and stable  Respiratory status: acceptable  Hydration status: acceptable

## 2024-10-24 NOTE — DISCHARGE INSTRUCTIONS
DISCHARGE INSTRUCTIONS CYSTOSCOPY      For your surgery you had:  General anesthesia (you may have a sore throat for the first 24 hours)  You may experience dizziness, drowsiness, or lightheadedness for several hours following surgery.  Do not stay alone today or tonight.  Limit your activity for 24 hours.  You should not drive, operate machinery, drink alcohol, or sign legally binding documents for 24 hours or while you are taking pain medication.  Resume your diet slowly.  Follow any special dietary instructions you may have been given by your doctor.    NOTIFY YOUR DOCTOR IF YOU EXPERIENCE ANY OF THE FOLLOWING:  Temperature greater than 101 degrees Fahrenheit  Shaking Chills  Redness or excessive drainage from incision  Nausea, vomiting and/or pain that is not controlled by prescribed medications  Increase in bleeding or bleeding that is excessive  Unable to urinate in 6 hours after surgery  If unable to reach your doctor, please go to the closest Emergency Room   You will have a catheter to drain your bladder. Wash around the catheter with soap and water and rinse well.   Following your cystoscopy exam, you may experience burning upon urination.  You may also pass some bloody urine.  If the burning sensation and/or bloody urine should persist beyond 48 hours, call your doctor.  To encourage kidney and bladder function, you should drink as much fluid as possible.    Last dose of pain medication given at:   Tylenol 1000mg at 11:16am .    SPECIAL INSTRUCTIONS:  *Follow all Dr. Bernal's instructions  **Continue home medications as directed.

## 2024-10-24 NOTE — ANESTHESIA PREPROCEDURE EVALUATION
Anesthesia Evaluation     Patient summary reviewed and Nursing notes reviewed   no history of anesthetic complications:   NPO Solid Status: > 8 hours  NPO Liquid Status: > 2 hours           Airway   Mallampati: III  TM distance: >3 FB  Neck ROM: full  No difficulty expected  Dental - normal exam     Pulmonary - normal exam    breath sounds clear to auscultation  (+) a smoker Former, cigarettes, COPD,  Cardiovascular - normal exam  Exercise tolerance: good (4-7 METS)    ECG reviewed  Rhythm: regular  Rate: normal    (+) pacemaker, hypertension, CAD, CABG, dysrhythmias (complete heart block, pacer dependent), CHF Systolic <55%, hyperlipidemia    ROS comment: Echo (24; chf):  ·  Left ventricular systolic function is severely decreased. Estimated left ventricular EF = 20%  ·  The left ventricular cavity is moderate to severely dilated.  ·  Left ventricular diastolic function is consistent with (grade II w/high LAP) pseudonormalization.  ·  The left atrial cavity is severely dilated.  ·  The right atrial cavity is moderate to severely  dilated.     Stress (24):  Ef 18%, no ischemia, prior infarct    EK bpm, v-paced      Neuro/Psych  (+) numbness, psychiatric history Anxiety  GI/Hepatic/Renal/Endo    (+) GERD, renal disease- CRI    Musculoskeletal     Abdominal  - normal exam   Substance History - negative use     OB/GYN negative ob/gyn ROS         Other   arthritis,   history of cancer    ROS/Med Hx Other: PAT Nursing Notes unavailable.                 Anesthesia Plan    ASA 4     general     (Patient understands anesthesia not responsible for dental damage.)  intravenous induction     Anesthetic plan, risks, benefits, and alternatives have been provided, discussed and informed consent has been obtained with: patient.    Use of blood products discussed with patient .    Plan discussed with CRNA.      CODE STATUS:

## 2024-10-25 VITALS
WEIGHT: 186.51 LBS | BODY MASS INDEX: 22.71 KG/M2 | OXYGEN SATURATION: 95 % | RESPIRATION RATE: 16 BRPM | HEART RATE: 70 BPM | HEIGHT: 76 IN | SYSTOLIC BLOOD PRESSURE: 113 MMHG | DIASTOLIC BLOOD PRESSURE: 72 MMHG | TEMPERATURE: 97.3 F

## 2024-10-25 RX ORDER — FUROSEMIDE 40 MG
40 TABLET ORAL DAILY
COMMUNITY

## 2024-10-25 NOTE — NURSING NOTE
Pt requests to leave and drive self home. Pt educated on no driving 24 after anesthesia and no narcotics. MD agreeable for pt to drive after noon. Pt informed and agreeable to wait until 1100 to make decision.

## 2024-10-25 NOTE — ADDENDUM NOTE
Addendum  created 10/25/24 1114 by Helen Orellana MD    Attestation recorded in Intraprocedure, Intraprocedure Attestations filed

## 2024-10-25 NOTE — PLAN OF CARE
Goal Outcome Evaluation:           Progress: improving  Outcome Evaluation: Pt rested well overnight, loyd catheter in place. Pain is well-controlled with prn Glynn. Plan for DC home with loyd today.

## 2024-10-25 NOTE — PLAN OF CARE
Goal Outcome Evaluation:  Plan of Care Reviewed With: patient        Progress: improving  Outcome Evaluation: pt dc home.

## 2024-10-30 ENCOUNTER — OFFICE VISIT (OUTPATIENT)
Dept: UROLOGY | Age: 84
End: 2024-10-30
Payer: MEDICARE

## 2024-10-30 VITALS
SYSTOLIC BLOOD PRESSURE: 122 MMHG | DIASTOLIC BLOOD PRESSURE: 67 MMHG | WEIGHT: 186 LBS | HEIGHT: 76 IN | BODY MASS INDEX: 22.65 KG/M2

## 2024-10-30 DIAGNOSIS — Z85.46 HISTORY OF MALIGNANT NEOPLASM OF PROSTATE: Primary | ICD-10-CM

## 2024-10-30 DIAGNOSIS — N99.111 POSTPROCEDURAL BULBOUS URETHRAL STRICTURE: ICD-10-CM

## 2024-10-30 NOTE — PROGRESS NOTES
"Chief Complaint  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, unspecified stricture    Subjective          Javi Martínez presents to [facility] UROLOGY    History of Present Illness  Patient is here after DVIU in the operating room.  He did have a stricture in the bulbar urethra which I incised into place of this.  He has Diamond catheter in place.  No other complaints.      Objective   Vital Signs:   /67   Ht 193 cm (76\")   Wt 84.4 kg (186 lb)   BMI 22.64 kg/m²       Physical Exam  Vitals and nursing note reviewed.   Constitutional:       Appearance: Normal appearance. He is well-developed.   Pulmonary:      Effort: Pulmonary effort is normal.      Breath sounds: Normal air entry.   Neurological:      Mental Status: He is alert and oriented to person, place, and time.      Motor: Motor function is intact.   Psychiatric:         Mood and Affect: Mood normal.         Behavior: Behavior normal.          Result Review :   The following data was reviewed by: Mally Bernal MD on 10/30/2024:    Results for orders placed or performed in visit on 10/23/24   Bladder Scan    Collection Time: 10/23/24 11:20 AM   Result Value Ref Range    Urine Volume 224ml             Assessment and Plan    Diagnoses and all orders for this visit:    1. History of malignant neoplasm of prostate (Primary)    2. Postprocedural bulbous urethral stricture    We will remove his Diamond catheter in about 3 weeks for void trial.  He may ultimately need a pyeloplasty.          Follow Up       Return in about 3 weeks (around 11/20/2024) for Next scheduled follow up.  Patient was given instructions and counseling regarding his condition or for health maintenance advice. Please see specific information pulled into the AVS if appropriate.       "

## 2024-11-06 ENCOUNTER — HOSPITAL ENCOUNTER (OUTPATIENT)
Facility: HOSPITAL | Age: 84
Setting detail: OBSERVATION
Discharge: HOME HEALTH | End: 2024-11-09
Attending: EMERGENCY MEDICINE | Admitting: INTERNAL MEDICINE
Payer: MEDICARE

## 2024-11-06 ENCOUNTER — APPOINTMENT (OUTPATIENT)
Dept: GENERAL RADIOLOGY | Facility: HOSPITAL | Age: 84
End: 2024-11-06
Payer: MEDICARE

## 2024-11-06 DIAGNOSIS — R07.9 CHEST PAIN, UNSPECIFIED TYPE: Primary | ICD-10-CM

## 2024-11-06 DIAGNOSIS — Z78.9 DECREASED ACTIVITIES OF DAILY LIVING (ADL): ICD-10-CM

## 2024-11-06 DIAGNOSIS — R26.2 DIFFICULTY IN WALKING: ICD-10-CM

## 2024-11-06 LAB
ALBUMIN SERPL-MCNC: 3.6 G/DL (ref 3.5–5.2)
ALBUMIN/GLOB SERPL: 1.1 G/DL
ALP SERPL-CCNC: 115 U/L (ref 39–117)
ALT SERPL W P-5'-P-CCNC: 14 U/L (ref 1–41)
ANION GAP SERPL CALCULATED.3IONS-SCNC: 13.1 MMOL/L (ref 5–15)
AST SERPL-CCNC: 25 U/L (ref 1–40)
BASOPHILS # BLD AUTO: 0.05 10*3/MM3 (ref 0–0.2)
BASOPHILS NFR BLD AUTO: 0.6 % (ref 0–1.5)
BILIRUB SERPL-MCNC: 1.5 MG/DL (ref 0–1.2)
BUN SERPL-MCNC: 24 MG/DL (ref 8–23)
BUN/CREAT SERPL: 15.7 (ref 7–25)
CALCIUM SPEC-SCNC: 9.1 MG/DL (ref 8.6–10.5)
CHLORIDE SERPL-SCNC: 101 MMOL/L (ref 98–107)
CO2 SERPL-SCNC: 24.9 MMOL/L (ref 22–29)
CREAT SERPL-MCNC: 1.53 MG/DL (ref 0.76–1.27)
DEPRECATED RDW RBC AUTO: 54.4 FL (ref 37–54)
EGFRCR SERPLBLD CKD-EPI 2021: 45.1 ML/MIN/1.73
EOSINOPHIL # BLD AUTO: 0.12 10*3/MM3 (ref 0–0.4)
EOSINOPHIL NFR BLD AUTO: 1.3 % (ref 0.3–6.2)
ERYTHROCYTE [DISTWIDTH] IN BLOOD BY AUTOMATED COUNT: 16 % (ref 12.3–15.4)
GEN 5 2HR TROPONIN T REFLEX: 48 NG/L
GLOBULIN UR ELPH-MCNC: 3.3 GM/DL
GLUCOSE SERPL-MCNC: 95 MG/DL (ref 65–99)
HCT VFR BLD AUTO: 46.1 % (ref 37.5–51)
HGB BLD-MCNC: 14.3 G/DL (ref 13–17.7)
HOLD SPECIMEN: NORMAL
HOLD SPECIMEN: NORMAL
IMM GRANULOCYTES # BLD AUTO: 0.02 10*3/MM3 (ref 0–0.05)
IMM GRANULOCYTES NFR BLD AUTO: 0.2 % (ref 0–0.5)
LIPASE SERPL-CCNC: 37 U/L (ref 13–60)
LYMPHOCYTES # BLD AUTO: 1.66 10*3/MM3 (ref 0.7–3.1)
LYMPHOCYTES NFR BLD AUTO: 18.5 % (ref 19.6–45.3)
MAGNESIUM SERPL-MCNC: 2 MG/DL (ref 1.6–2.4)
MCH RBC QN AUTO: 28.7 PG (ref 26.6–33)
MCHC RBC AUTO-ENTMCNC: 31 G/DL (ref 31.5–35.7)
MCV RBC AUTO: 92.6 FL (ref 79–97)
MONOCYTES # BLD AUTO: 0.84 10*3/MM3 (ref 0.1–0.9)
MONOCYTES NFR BLD AUTO: 9.4 % (ref 5–12)
NEUTROPHILS NFR BLD AUTO: 6.28 10*3/MM3 (ref 1.7–7)
NEUTROPHILS NFR BLD AUTO: 70 % (ref 42.7–76)
NRBC BLD AUTO-RTO: 0 /100 WBC (ref 0–0.2)
NT-PROBNP SERPL-MCNC: ABNORMAL PG/ML (ref 0–1800)
PLATELET # BLD AUTO: 231 10*3/MM3 (ref 140–450)
PMV BLD AUTO: 9.5 FL (ref 6–12)
POTASSIUM SERPL-SCNC: 4.6 MMOL/L (ref 3.5–5.2)
PROT SERPL-MCNC: 6.9 G/DL (ref 6–8.5)
QT INTERVAL: 520 MS
QTC INTERVAL: 567 MS
RBC # BLD AUTO: 4.98 10*6/MM3 (ref 4.14–5.8)
SODIUM SERPL-SCNC: 139 MMOL/L (ref 136–145)
TROPONIN T DELTA: 0 NG/L
TROPONIN T SERPL HS-MCNC: 48 NG/L
WBC NRBC COR # BLD AUTO: 8.97 10*3/MM3 (ref 3.4–10.8)
WHOLE BLOOD HOLD COAG: NORMAL
WHOLE BLOOD HOLD SPECIMEN: NORMAL

## 2024-11-06 PROCEDURE — G0378 HOSPITAL OBSERVATION PER HR: HCPCS

## 2024-11-06 PROCEDURE — 99285 EMERGENCY DEPT VISIT HI MDM: CPT

## 2024-11-06 PROCEDURE — 83735 ASSAY OF MAGNESIUM: CPT | Performed by: EMERGENCY MEDICINE

## 2024-11-06 PROCEDURE — 96374 THER/PROPH/DIAG INJ IV PUSH: CPT

## 2024-11-06 PROCEDURE — 85025 COMPLETE CBC W/AUTO DIFF WBC: CPT | Performed by: EMERGENCY MEDICINE

## 2024-11-06 PROCEDURE — 83880 ASSAY OF NATRIURETIC PEPTIDE: CPT | Performed by: EMERGENCY MEDICINE

## 2024-11-06 PROCEDURE — 80053 COMPREHEN METABOLIC PANEL: CPT | Performed by: EMERGENCY MEDICINE

## 2024-11-06 PROCEDURE — 99223 1ST HOSP IP/OBS HIGH 75: CPT | Performed by: INTERNAL MEDICINE

## 2024-11-06 PROCEDURE — 25010000002 FUROSEMIDE PER 20 MG: Performed by: EMERGENCY MEDICINE

## 2024-11-06 PROCEDURE — 71045 X-RAY EXAM CHEST 1 VIEW: CPT

## 2024-11-06 PROCEDURE — 83690 ASSAY OF LIPASE: CPT | Performed by: EMERGENCY MEDICINE

## 2024-11-06 PROCEDURE — 36415 COLL VENOUS BLD VENIPUNCTURE: CPT

## 2024-11-06 PROCEDURE — 99291 CRITICAL CARE FIRST HOUR: CPT

## 2024-11-06 PROCEDURE — 93005 ELECTROCARDIOGRAM TRACING: CPT

## 2024-11-06 PROCEDURE — 84484 ASSAY OF TROPONIN QUANT: CPT | Performed by: EMERGENCY MEDICINE

## 2024-11-06 PROCEDURE — 93005 ELECTROCARDIOGRAM TRACING: CPT | Performed by: EMERGENCY MEDICINE

## 2024-11-06 RX ORDER — FUROSEMIDE 10 MG/ML
60 INJECTION INTRAMUSCULAR; INTRAVENOUS ONCE
Status: COMPLETED | OUTPATIENT
Start: 2024-11-06 | End: 2024-11-06

## 2024-11-06 RX ORDER — ASPIRIN 81 MG/1
324 TABLET, CHEWABLE ORAL ONCE
Status: COMPLETED | OUTPATIENT
Start: 2024-11-06 | End: 2024-11-06

## 2024-11-06 RX ORDER — LORAZEPAM 0.5 MG/1
0.5 TABLET ORAL 2 TIMES DAILY PRN
Status: DISCONTINUED | OUTPATIENT
Start: 2024-11-06 | End: 2024-11-09 | Stop reason: HOSPADM

## 2024-11-06 RX ORDER — SODIUM CHLORIDE 0.9 % (FLUSH) 0.9 %
10 SYRINGE (ML) INJECTION EVERY 12 HOURS SCHEDULED
Status: DISCONTINUED | OUTPATIENT
Start: 2024-11-06 | End: 2024-11-09 | Stop reason: HOSPADM

## 2024-11-06 RX ORDER — SODIUM CHLORIDE 0.9 % (FLUSH) 0.9 %
10 SYRINGE (ML) INJECTION AS NEEDED
Status: DISCONTINUED | OUTPATIENT
Start: 2024-11-06 | End: 2024-11-09 | Stop reason: HOSPADM

## 2024-11-06 RX ORDER — TAMSULOSIN HYDROCHLORIDE 0.4 MG/1
0.4 CAPSULE ORAL NIGHTLY
Status: DISCONTINUED | OUTPATIENT
Start: 2024-11-06 | End: 2024-11-09 | Stop reason: HOSPADM

## 2024-11-06 RX ORDER — SODIUM CHLORIDE 9 MG/ML
40 INJECTION, SOLUTION INTRAVENOUS AS NEEDED
Status: DISCONTINUED | OUTPATIENT
Start: 2024-11-06 | End: 2024-11-09 | Stop reason: HOSPADM

## 2024-11-06 RX ADMIN — FUROSEMIDE 60 MG: 10 INJECTION, SOLUTION INTRAMUSCULAR; INTRAVENOUS at 17:44

## 2024-11-06 RX ADMIN — ASPIRIN 324 MG: 81 TABLET, CHEWABLE ORAL at 15:53

## 2024-11-06 RX ADMIN — TAMSULOSIN HYDROCHLORIDE 0.4 MG: 0.4 CAPSULE ORAL at 21:35

## 2024-11-06 RX ADMIN — APIXABAN 2.5 MG: 2.5 TABLET, FILM COATED ORAL at 21:35

## 2024-11-06 RX ADMIN — Medication 10 ML: at 21:35

## 2024-11-06 NOTE — ED TRIAGE NOTES
Pt c/o chest pain on Saturday 11/2/2024 in the middle of the night. He went back to sleep. States that has been feel fatigued since then and denies chest pain at this time.

## 2024-11-06 NOTE — CASE MANAGEMENT/SOCIAL WORK
Discharge Planning Assessment   Vickie     Patient Name: Javi Martínez  MRN: [id number]  Today's Date: 11/6/2024    Admit Date: 11/6/2024        Discharge Needs Assessment       Row Name 11/06/24 1805       Living Environment    People in Home alone (P)     Current Living Arrangements home (P)     Potentially Unsafe Housing Conditions none (P)     In the past 12 months has the electric, gas, oil, or water company threatened to shut off services in your home? Yes (P)     Primary Care Provided by self (P)     Able to Return to Prior Arrangements yes (P)        Transportation Needs    In the past 12 months, has lack of transportation kept you from medical appointments or from getting medications? no (P)     In the past 12 months, has lack of transportation kept you from meetings, work, or from getting things needed for daily living? No (P)        Food Insecurity    Within the past 12 months, you worried that your food would run out before you got the money to buy more. Never true (P)     Within the past 12 months, the food you bought just didn't last and you didn't have money to get more. Never true (P)        Transition Planning    Patient/Family Anticipates Transition to home (P)        Discharge Needs Assessment    Readmission Within the Last 30 Days no previous admission in last 30 days (P)     Equipment Currently Used at Home none (P)     Equipment Needed After Discharge none (P)                    Discharge Plan    No documentation.                 Continued Care and Services - Admitted Since 11/6/2024    No active coordination exists for this encounter.          Demographic Summary       Row Name 11/06/24 1803       General Information    Admission Type inpatient (P)     Arrived From home (P)     Reason for Consult discharge planning (P)                    Functional Status       Row Name 11/06/24 1804       Functional Status    Usual Activity Tolerance moderate (P)     Current Activity Tolerance moderate (P)         Physical Activity    On average, how many days per week do you engage in moderate to strenuous exercise (like a brisk walk)? 0 days (P)     On average, how many minutes do you engage in exercise at this level? 0 min (P)     Number of minutes of exercise per week 0 (P)                    Psychosocial       Row Name 11/06/24 1804       Mental Health    Little interest or pleasure in doing things Not at all (P)     Feeling down, depressed, or hopeless Not at all (P)        Stress    Do you feel stress - tense, restless, nervous, or anxious, or unable to sleep at night because your mind is troubled all the time - these days? To some exte (P)                    Abuse/Neglect       Row Name 11/06/24 1805       Personal Safety    Feels Unsafe at Home or Work/School no (P)     Feels Threatened by Someone no (P)     Does Anyone Try to Keep You From Having Contact with Others or Doing Things Outside Your Home? no (P)     Physical Signs of Abuse Present no (P)                    Legal       Row Name 11/06/24 1805       Financial Resource Strain    How hard is it for you to pay for the very basics like food, housing, medical care, and heating? Not hard (P)        Financial/Legal    Source of Income pension/jail (P)     Financial/Environmental Concerns none (P)                    Substance Abuse       Row Name 11/06/24 1805       Substance Use    Substance Use Status never used (P)                    Patient Forms    No documentation.                 SW completed discharge needs assessment with PT. Pt reported he lives alone. He reported that his PCP is Rani Lopez and that his pharmacy is the [facility]. Pt does also report that he feels lonely all the time after his wife passed. This leads him to feeling down. Pt refused any literature on this as he feels safe and okay.     Gregory Comer, Social Work Student

## 2024-11-06 NOTE — ED PROVIDER NOTES
"Time: 5:12 PM EST  Date of encounter:  11/6/2024  Independent Historian/Clinical History and Information was obtained by:   Patient    History is limited by: N/A    Chief Complaint: Chest pain      History of Present Illness:  Patient is a 84 y.o. year old male who presents to the emergency department for evaluation of chest pain that occurred this past Saturday.  Patient reports he has had shortness of breath and dyspnea on exertion.  Patient has no cough or hemoptysis.  Patient denies nausea, vomiting, and diarrhea.  Patient denies leg pain and swelling.      Patient Care Team  Primary Care Provider: Rani Lopez APRN    Past Medical History:     No Known Allergies  Past Medical History:   Diagnosis Date    Aneurysm 2013    Arthritis     left knee     Atrial fibrillation, persistent     CHB (complete heart block) 06/07/2023    CHF exacerbation 04/24/2024    Chronic obstructive pulmonary disease 09/01/2023    pt denies having this    Difficulty walking 2023    Numbness in right foot    Essential hypertension 05/08/2019    GERD (gastroesophageal reflux disease)     HFrEF (heart failure with reduced ejection fraction) 08/11/2021    Hyperlipidemia 08/11/2021    Paroxysmal atrial fibrillation     Polyneuropathy 06/19/2023    Prostate cancer 2010    with seeds implant     Septic joint 11/26/2021     Past Surgical History:   Procedure Laterality Date    ABLATION OF DYSRHYTHMIC FOCUS  2013    APPENDECTOMY      ARTERIAL BYPASS SURGERY      CARDIAC ABLATION      x2  \"years ago\"     CARDIAC ELECTROPHYSIOLOGY PROCEDURE N/A 11/01/2021    Procedure: PPM battery change;  Surgeon: Ge Whelan MD;  [facility];  Service: Cardiovascular;  Laterality: N/A;    CARDIAC SURGERY  2006    cabg 3v    CORONARY ARTERY BYPASS GRAFT  2006    CYSTOSCOPY URETHROTOMY VISUAL INTERNAL N/A 08/15/2024    Procedure: CYSTOSCOPY, URETHRAL DILATION, CATHETER PLACEMENT, RETROGRADE URETHROGRAM;  Surgeon: August Myers, " MD;  Location: [facility] MAIN OR;  Service: Urology;  Laterality: N/A;    CYSTOSCOPY URETHROTOMY VISUAL INTERNAL N/A 10/24/2024    Procedure: CYSTOSCOPY URETHROTOMY;  Surgeon: Mally Bernal MD;  Location: [facility] MAIN OR;  Service: Urology;  Laterality: N/A;    ENDOSCOPY      with dilation     KNEE ARTHROSCOPY Left     TOTAL KNEE ARTHROPLASTY Left 07/23/2021    Procedure: TOTAL KNEE ARTHROPLASTY WITH DORA NAVIGATION WITH BIOMET;  Surgeon: Carlitos Zhao MD;  Location: [facility] MAIN OR;  Service: Orthopedics;  Laterality: Left;    VENTRICULAR CARDIAC PACEMAKER INSERTION      medtronic      Family History   Problem Relation Age of Onset    Heart attack Mother     No Known Problems Father     No Known Problems Sister     No Known Problems Brother     No Known Problems Maternal Aunt     No Known Problems Maternal Uncle     No Known Problems Paternal Aunt     No Known Problems Paternal Uncle     No Known Problems Maternal Grandmother     No Known Problems Maternal Grandfather     No Known Problems Paternal Grandmother     No Known Problems Paternal Grandfather     No Known Problems Other        Home Medications:  Prior to Admission medications    Medication Sig Start Date End Date Taking? Authorizing Provider   allopurinol (ZYLOPRIM) 100 MG tablet Take 1 tablet by mouth Daily. 3/11/22  Yes Ely Srinivasan MD   Eliquis 2.5 MG tablet tablet Take 1 tablet by mouth Every 12 (Twelve) Hours. 9/3/24  Yes Ely Srinivasan MD   furosemide (LASIX) 40 MG tablet Take 1 tablet by mouth Daily As Needed (for swelling).   Yes Ely Srinivasan MD   Jardiance 10 MG tablet tablet Take 0.5 tablets by mouth Daily. 9/3/24  Yes Ely Srinivasan MD   LORazepam (ATIVAN) 0.5 MG tablet Take 1 tablet by mouth 2 (Two) Times a Day As Needed for Anxiety. 4/16/24  Yes Ely Srinivasan MD   MELATONIN PO Take 1 tablet by mouth At Night As Needed (sleep).   Yes Ely Srinivasan MD   omeprazole (priLOSEC) 20 MG capsule  Take 1 capsule by mouth Daily.   Yes Ely Srinivasan MD   tamsulosin (FLOMAX) 0.4 MG capsule 24 hr capsule Take 1 capsule by mouth Daily. 3/15/24  Yes Ely Srinivasan MD   carvedilol (COREG) 6.25 MG tablet Take 1 tablet by mouth Every 12 (Twelve) Hours. 24  Paul Singh MD   citalopram (CeleXA) 10 MG tablet Take 1 tablet by mouth Daily As Needed (anxiety).  24  Ely Srinivasan MD   isosorbide mononitrate (IMDUR) 60 MG 24 hr tablet Take 0.5 tablets by mouth Daily.  24  Ely Srinivasan MD   traZODone (DESYREL) 50 MG tablet Take 1-2 tablets by mouth At Night As Needed for Sleep.  24  Ely Srinivasan MD        Social History:   Social History     Tobacco Use    Smoking status: Former     Current packs/day: 0.00     Average packs/day: 0.5 packs/day for 33.6 years (16.8 ttl pk-yrs)     Types: Cigarettes     Start date: 1962     Quit date: 1995     Years since quittin.3     Passive exposure: Never    Smokeless tobacco: Never   Vaping Use    Vaping status: Never Used   Substance Use Topics    Alcohol use: Never    Drug use: Never         Review of Systems:  Review of Systems   Constitutional:  Negative for chills and fever.   HENT:  Negative for congestion, rhinorrhea and sore throat.    Eyes:  Negative for pain and visual disturbance.   Respiratory:  Positive for shortness of breath. Negative for apnea, cough and chest tightness.    Cardiovascular:  Positive for chest pain. Negative for palpitations.   Gastrointestinal:  Negative for abdominal pain, diarrhea, nausea and vomiting.   Genitourinary:  Negative for difficulty urinating and dysuria.   Musculoskeletal:  Negative for joint swelling and myalgias.   Skin:  Negative for color change.   Neurological:  Negative for seizures and headaches.   Psychiatric/Behavioral: Negative.     All other systems reviewed and are negative.       Physical Exam:  /96 (BP Location: Left arm, Patient Position:  "Sitting)   Pulse 71   Temp 97.8 °F (36.6 °C)   Resp 20   Ht 193 cm (76\")   Wt 85.8 kg (189 lb 2.5 oz)   SpO2 98%   BMI 23.02 kg/m²     Physical Exam  Vitals and nursing note reviewed.   Constitutional:       General: He is not in acute distress.     Appearance: Normal appearance. He is not toxic-appearing.   HENT:      Head: Normocephalic and atraumatic.      Jaw: There is normal jaw occlusion.   Eyes:      General: Lids are normal.      Extraocular Movements: Extraocular movements intact.      Conjunctiva/sclera: Conjunctivae normal.      Pupils: Pupils are equal, round, and reactive to light.   Cardiovascular:      Rate and Rhythm: Normal rate and regular rhythm.      Pulses: Normal pulses.      Heart sounds: Normal heart sounds.   Pulmonary:      Effort: Pulmonary effort is normal. No respiratory distress.      Breath sounds: Normal breath sounds. No wheezing or rhonchi.   Abdominal:      General: Abdomen is flat.      Palpations: Abdomen is soft.      Tenderness: There is no abdominal tenderness. There is no guarding or rebound.   Musculoskeletal:         General: Normal range of motion.      Cervical back: Normal range of motion and neck supple.      Right lower leg: No edema.      Left lower leg: No edema.   Skin:     General: Skin is warm and dry.   Neurological:      Mental Status: He is alert and oriented to person, place, and time. Mental status is at baseline.   Psychiatric:         Mood and Affect: Mood normal.                  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      The following orders were placed and all results were independently analyzed by me:  Orders Placed This Encounter   Procedures    XR Chest 1 View    [place] Draw    High Sensitivity Troponin T    Comprehensive Metabolic Panel    Lipase    BNP    " Magnesium    CBC Auto Differential    High Sensitivity Troponin T 2Hr    NPO Diet NPO Type: Strict NPO    Undress & Gown    Continuous Pulse Oximetry    Inpatient Hospitalist Consult    Oxygen Therapy- Nasal Cannula; Titrate 1-6 LPM Per SpO2; 90 - 95%    ECG 12 Lead ED Triage Standing Order; Chest Pain    ECG 12 Lead ED Triage Standing Order; Chest Pain    Insert Peripheral IV    Initiate Observation Status    CBC & Differential    Green Top (Gel)    Lavender Top    Gold Top - SST    Light Blue Top       Medications Given in the Emergency Department:  Medications   sodium chloride 0.9 % flush 10 mL (has no administration in time range)   furosemide (LASIX) injection 60 mg (has no administration in time range)   aspirin chewable tablet 324 mg (324 mg Oral Given 11/6/24 1553)        ED Course:         Labs:    Lab Results (last 24 hours)       Procedure Component Value Units Date/Time    High Sensitivity Troponin T [[id number]]  (Abnormal) Collected: 11/06/24 1549    Specimen: Blood Updated: 11/06/24 1615     HS Troponin T 48 ng/L     Narrative:      High Sensitive Troponin T Reference Range:  <14.0 ng/L- Negative Female for AMI  <22.0 ng/L- Negative Male for AMI  >=14 - Abnormal Female indicating possible myocardial injury.  >=22 - Abnormal Male indicating possible myocardial injury.   Clinicians would have to utilize clinical acumen, EKG, Troponin, and serial changes to determine if it is an Acute Myocardial Infarction or myocardial injury due to an underlying chronic condition.         CBC & Differential [[id number]]  (Abnormal) Collected: 11/06/24 1549    Specimen: Blood Updated: 11/06/24 1556    Narrative:      The following orders were created for panel order CBC & Differential.  Procedure                               Abnormality         Status                     ---------                               -----------         ------                     CBC Auto Differential[[id number]]        Abnormal             Final result                 Please view results for these tests on the individual orders.    Comprehensive Metabolic Panel [[id number]]  (Abnormal) Collected: 11/06/24 1549    Specimen: Blood Updated: 11/06/24 1615     Glucose 95 mg/dL      BUN 24 mg/dL      Creatinine 1.53 mg/dL      Sodium 139 mmol/L      Potassium 4.6 mmol/L      Chloride 101 mmol/L      CO2 24.9 mmol/L      Calcium 9.1 mg/dL      Total Protein 6.9 g/dL      Albumin 3.6 g/dL      ALT (SGPT) 14 U/L      AST (SGOT) 25 U/L      Alkaline Phosphatase 115 U/L      Total Bilirubin 1.5 mg/dL      Globulin 3.3 gm/dL      A/G Ratio 1.1 g/dL      BUN/Creatinine Ratio 15.7     Anion Gap 13.1 mmol/L      eGFR 45.1 mL/min/1.73     Narrative:      GFR Normal >60  Chronic Kidney Disease <60  Kidney Failure <15    The GFR formula is only valid for adults with stable renal function between ages 18 and 70.    Lipase [[id number]]  (Normal) Collected: 11/06/24 1549    Specimen: Blood Updated: 11/06/24 1615     Lipase 37 U/L     BNP [[id number]]  (Abnormal) Collected: 11/06/24 1549    Specimen: Blood Updated: 11/06/24 1613     proBNP 10,013.0 pg/mL     Narrative:      This assay is used as an aid in the diagnosis of individuals suspected of having heart failure. It can be used as an aid in the diagnosis of acute decompensated heart failure (ADHF) in patients presenting with signs and symptoms of ADHF to the emergency department (ED). In addition, NT-proBNP of <300 pg/mL indicates ADHF is not likely.    Age Range Result Interpretation  NT-proBNP Concentration (pg/mL:      <50             Positive            >450                   Gray                 300-450                    Negative             <300    50-75           Positive            >900                  Gray                300-900                  Negative            <300      >75             Positive            >1800                  Gray                300-1800                  Negative            <300     Magnesium [[id number]]  (Normal) Collected: 11/06/24 1549    Specimen: Blood Updated: 11/06/24 1615     Magnesium 2.0 mg/dL     CBC Auto Differential [[id number]]  (Abnormal) Collected: 11/06/24 1549    Specimen: Blood Updated: 11/06/24 1556     WBC 8.97 10*3/mm3      RBC 4.98 10*6/mm3      Hemoglobin 14.3 g/dL      Hematocrit 46.1 %      MCV 92.6 fL      MCH 28.7 pg      MCHC 31.0 g/dL      RDW 16.0 %      RDW-SD 54.4 fl      MPV 9.5 fL      Platelets 231 10*3/mm3      Neutrophil % 70.0 %      Lymphocyte % 18.5 %      Monocyte % 9.4 %      Eosinophil % 1.3 %      Basophil % 0.6 %      Immature Grans % 0.2 %      Neutrophils, Absolute 6.28 10*3/mm3      Lymphocytes, Absolute 1.66 10*3/mm3      Monocytes, Absolute 0.84 10*3/mm3      Eosinophils, Absolute 0.12 10*3/mm3      Basophils, Absolute 0.05 10*3/mm3      Immature Grans, Absolute 0.02 10*3/mm3      nRBC 0.0 /100 WBC              Imaging:    XR Chest 1 View    Result Date: 11/6/2024  XR CHEST 1 VW Date of Exam: 11/6/2024 4:20 PM EST Indication: Chest Pain Triage Protocol Comparison: 7/30/2024 Findings: Left-sided dual-lead pacemaker is noted. The heart remains enlarged. Vasculature is mildly cephalized. Focal patchy disease in the right lung base is improved. There is mild increased hazy opacity of the right upper lung, and stable patchy left lower lung disease. Small pleural effusions appear stable. No pneumothorax is identified.     Impression: 1. Cardiomegaly and mild pulmonary venous hypertension. 2. Shifting pattern of bilateral mid and lower lung disease, with mildly increased right upper lung disease, and improved aeration of the right lung base. Stable left basilar atelectasis and small effusions. Electronically Signed: Jose Hooks MD  11/6/2024 4:23 PM EST  Workstation ID: [id number]       Differential Diagnosis and Discussion:    Chest Pain:  Based on the patient's signs and symptoms, I considered aortic dissection, myocardial infaction, pulmonary embolism,  cardiac tamponade, pericarditis, pneumothorax, musculoskeletal chest pain and other differential diagnosis as an etiology of the patient's chest pain.     All labs were reviewed and interpreted by me.  All X-rays impressions were independently interpreted by me.  EKG was interpreted by me.    MDM     The patient´s CBC that was reviewed and interpreted by me shows no abnormalities of critical concern. Of note, there is no anemia requiring a blood transfusion and the platelet count is acceptable.  The patient´s CMP that was reviewed and interpretted by me shows no abnormalities of critical concern. Of note, the patient´s sodium and potassium are acceptable. The patient´s liver enzymes are unremarkable. The patient´s renal function (creatinine) is preserved. The patient has a normal anion gap.  BNP is elevated.  Troponin is also elevated.      Total Critical Care time of 35 minutes. Total critical care time documented does not include time spent on separately billed procedures for services of nurses or physician assistants. I personally saw and examined the patient. I have reviewed all diagnostic interpretations and treatment plans as written. I was present for the key portions of any procedures performed and the inclusive time noted in any critical care statement. Critical care time includes patient management by me, time spent at the patients bedside,  time to review lab and imaging results, discussing patient care, documentation in the medical record, and time spent with family or caregiver.          Patient Care Considerations:    PERC: I used the PERC score to risk stratify the patient for PE and a CT of the chest was considered but ultimately not indicated in today's visit.      Consultants/Shared Management Plan:    Case was discussed with Dr. Carrera who recommends admission for chest pain workup.  Case was discussed with Dr. Carmona who agrees to admit the patient.    Social Determinants of Health:    Patient is  independent, reliable, and has access to care.       Disposition and Care Coordination:    Admit:   Through independent evaluation of the patient's history, physical, and imperical data, the patient meets criteria for inpatient admission to the hospital.        Final diagnoses:   Chest pain, unspecified type        ED Disposition       ED Disposition   Decision to Admit    Condition   --    Comment   Level of Care: Telemetry [5]   Diagnosis: Chest pain [[id number]]                 This medical record created using voice recognition software.             Ashley Hudson MD  11/06/24 6719

## 2024-11-07 PROBLEM — R79.89 ELEVATED TROPONIN LEVEL NOT DUE MYOCARDIAL INFARCTION: Status: ACTIVE | Noted: 2024-11-07

## 2024-11-07 LAB
ANION GAP SERPL CALCULATED.3IONS-SCNC: 8.5 MMOL/L (ref 5–15)
BASOPHILS # BLD AUTO: 0.07 10*3/MM3 (ref 0–0.2)
BASOPHILS NFR BLD AUTO: 0.8 % (ref 0–1.5)
BUN SERPL-MCNC: 27 MG/DL (ref 8–23)
BUN/CREAT SERPL: 16.6 (ref 7–25)
CALCIUM SPEC-SCNC: 9.3 MG/DL (ref 8.6–10.5)
CHLORIDE SERPL-SCNC: 103 MMOL/L (ref 98–107)
CO2 SERPL-SCNC: 26.5 MMOL/L (ref 22–29)
CREAT SERPL-MCNC: 1.63 MG/DL (ref 0.76–1.27)
DEPRECATED RDW RBC AUTO: 53.2 FL (ref 37–54)
EGFRCR SERPLBLD CKD-EPI 2021: 41.8 ML/MIN/1.73
EOSINOPHIL # BLD AUTO: 0.23 10*3/MM3 (ref 0–0.4)
EOSINOPHIL NFR BLD AUTO: 2.8 % (ref 0.3–6.2)
ERYTHROCYTE [DISTWIDTH] IN BLOOD BY AUTOMATED COUNT: 15.9 % (ref 12.3–15.4)
GLUCOSE SERPL-MCNC: 96 MG/DL (ref 65–99)
HCT VFR BLD AUTO: 41.8 % (ref 37.5–51)
HGB BLD-MCNC: 13.2 G/DL (ref 13–17.7)
IMM GRANULOCYTES # BLD AUTO: 0.03 10*3/MM3 (ref 0–0.05)
IMM GRANULOCYTES NFR BLD AUTO: 0.4 % (ref 0–0.5)
LYMPHOCYTES # BLD AUTO: 1.96 10*3/MM3 (ref 0.7–3.1)
LYMPHOCYTES NFR BLD AUTO: 23.7 % (ref 19.6–45.3)
MCH RBC QN AUTO: 29.2 PG (ref 26.6–33)
MCHC RBC AUTO-ENTMCNC: 31.6 G/DL (ref 31.5–35.7)
MCV RBC AUTO: 92.5 FL (ref 79–97)
MONOCYTES # BLD AUTO: 1 10*3/MM3 (ref 0.1–0.9)
MONOCYTES NFR BLD AUTO: 12.1 % (ref 5–12)
NEUTROPHILS NFR BLD AUTO: 4.99 10*3/MM3 (ref 1.7–7)
NEUTROPHILS NFR BLD AUTO: 60.2 % (ref 42.7–76)
NRBC BLD AUTO-RTO: 0 /100 WBC (ref 0–0.2)
PLATELET # BLD AUTO: 202 10*3/MM3 (ref 140–450)
PMV BLD AUTO: 9.7 FL (ref 6–12)
POTASSIUM SERPL-SCNC: 3.9 MMOL/L (ref 3.5–5.2)
QT INTERVAL: 526 MS
QTC INTERVAL: 567 MS
RBC # BLD AUTO: 4.52 10*6/MM3 (ref 4.14–5.8)
SODIUM SERPL-SCNC: 138 MMOL/L (ref 136–145)
WBC NRBC COR # BLD AUTO: 8.28 10*3/MM3 (ref 3.4–10.8)

## 2024-11-07 PROCEDURE — 99233 SBSQ HOSP IP/OBS HIGH 50: CPT | Performed by: INTERNAL MEDICINE

## 2024-11-07 PROCEDURE — G0378 HOSPITAL OBSERVATION PER HR: HCPCS

## 2024-11-07 PROCEDURE — 85025 COMPLETE CBC W/AUTO DIFF WBC: CPT | Performed by: INTERNAL MEDICINE

## 2024-11-07 PROCEDURE — 80048 BASIC METABOLIC PNL TOTAL CA: CPT | Performed by: INTERNAL MEDICINE

## 2024-11-07 PROCEDURE — 99214 OFFICE O/P EST MOD 30 MIN: CPT | Performed by: INTERNAL MEDICINE

## 2024-11-07 RX ORDER — SPIRONOLACTONE 25 MG/1
25 TABLET ORAL DAILY
Status: DISCONTINUED | OUTPATIENT
Start: 2024-11-07 | End: 2024-11-08

## 2024-11-07 RX ORDER — FUROSEMIDE 40 MG/1
40 TABLET ORAL DAILY
Status: DISCONTINUED | OUTPATIENT
Start: 2024-11-07 | End: 2024-11-09 | Stop reason: HOSPADM

## 2024-11-07 RX ORDER — VALSARTAN 40 MG/1
40 TABLET ORAL
Status: DISCONTINUED | OUTPATIENT
Start: 2024-11-07 | End: 2024-11-09 | Stop reason: HOSPADM

## 2024-11-07 RX ADMIN — SPIRONOLACTONE 25 MG: 25 TABLET ORAL at 16:11

## 2024-11-07 RX ADMIN — FUROSEMIDE 40 MG: 40 TABLET ORAL at 12:50

## 2024-11-07 RX ADMIN — TAMSULOSIN HYDROCHLORIDE 0.4 MG: 0.4 CAPSULE ORAL at 20:33

## 2024-11-07 RX ADMIN — LORAZEPAM 0.5 MG: 0.5 TABLET ORAL at 20:33

## 2024-11-07 RX ADMIN — EMPAGLIFLOZIN 5 MG: 10 TABLET, FILM COATED ORAL at 09:10

## 2024-11-07 RX ADMIN — Medication 10 ML: at 20:33

## 2024-11-07 RX ADMIN — Medication 10 ML: at 09:11

## 2024-11-07 RX ADMIN — APIXABAN 2.5 MG: 2.5 TABLET, FILM COATED ORAL at 20:33

## 2024-11-07 RX ADMIN — APIXABAN 2.5 MG: 2.5 TABLET, FILM COATED ORAL at 09:10

## 2024-11-07 RX ADMIN — Medication 10 MG: at 21:45

## 2024-11-07 RX ADMIN — Medication 10 MG: at 00:03

## 2024-11-07 RX ADMIN — VALSARTAN 40 MG: 40 TABLET ORAL at 09:10

## 2024-11-07 RX ADMIN — LORAZEPAM 0.5 MG: 0.5 TABLET ORAL at 04:34

## 2024-11-07 NOTE — PROGRESS NOTES
"Cardiology Consult Note  [facility] CARE UNIT          Patient Identification:  Javi Martínez      [id number]  84 y.o.        male  1940           Reason for Consultation: CHF    PCP: Rani Lopez APRN    History of Present Illness:     Patient is an 84-year-old with a previous history of have wrath chronic, essential hypertension, COPD, complete block with dual-chamber pacemaker, persistent atrial fibrillation, who presented with increasing shortness of breath and leg swelling that had been ongoing for last few days.  The patient had a previous episode of Decon CHF this year as well he reported that the shortness of breath was worse yesterday with a vague chest pressure sensation and came in for evaluation.  He had not had any fever chills or cough.  He did have her port any jorge chest discomfort problems    Past History:  Past Medical History:   Diagnosis Date    Aneurysm 2013    Arthritis     left knee     Atrial fibrillation, persistent     CHB (complete heart block) 06/07/2023    CHF exacerbation 04/24/2024    Chronic obstructive pulmonary disease 09/01/2023    pt denies having this    Difficulty walking 2023    Numbness in right foot    Essential hypertension 05/08/2019    GERD (gastroesophageal reflux disease)     HFrEF (heart failure with reduced ejection fraction) 08/11/2021    Hyperlipidemia 08/11/2021    Paroxysmal atrial fibrillation     Polyneuropathy 06/19/2023    Prostate cancer 2010    with seeds implant     Septic joint 11/26/2021     Past Surgical History:   Procedure Laterality Date    ABLATION OF DYSRHYTHMIC FOCUS  2013    APPENDECTOMY      ARTERIAL BYPASS SURGERY      CARDIAC ABLATION      x2  \"years ago\"     CARDIAC ELECTROPHYSIOLOGY PROCEDURE N/A 11/01/2021    Procedure: PPM battery change;  Surgeon: Ge Whelan MD;  Location: [facility] INVASIVE LOCATION;  Service: Cardiovascular;  Laterality: N/A;    CARDIAC SURGERY  2006    cabg 3v    CORONARY ARTERY " BYPASS GRAFT      CYSTOSCOPY URETHROTOMY VISUAL INTERNAL N/A 08/15/2024    Procedure: CYSTOSCOPY, URETHRAL DILATION, CATHETER PLACEMENT, RETROGRADE URETHROGRAM;  Surgeon: August Myers MD;  Location: [facility] MAIN OR;  Service: Urology;  Laterality: N/A;    CYSTOSCOPY URETHROTOMY VISUAL INTERNAL N/A 10/24/2024    Procedure: CYSTOSCOPY URETHROTOMY;  Surgeon: Mally Bernal MD;  Location: [facility] MAIN OR;  Service: Urology;  Laterality: N/A;    ENDOSCOPY      with dilation     KNEE ARTHROSCOPY Left     TOTAL KNEE ARTHROPLASTY Left 2021    Procedure: TOTAL KNEE ARTHROPLASTY WITH DORA NAVIGATION WITH BIOMET;  Surgeon: Carlitos Zhao MD;  Location: [facility] MAIN OR;  Service: Orthopedics;  Laterality: Left;    VENTRICULAR CARDIAC PACEMAKER INSERTION      medtronic      No Known Allergies  Social History     Socioeconomic History    Marital status:    Tobacco Use    Smoking status: Former     Current packs/day: 0.00     Average packs/day: 0.5 packs/day for 33.6 years (16.8 ttl pk-yrs)     Types: Cigarettes     Start date: 1962     Quit date: 1995     Years since quittin.3     Passive exposure: Never    Smokeless tobacco: Never   Vaping Use    Vaping status: Never Used   Substance and Sexual Activity    Alcohol use: Never    Drug use: Never    Sexual activity: Not Currently     Partners: Female     Family History   Problem Relation Age of Onset    Heart attack Mother     No Known Problems Father     No Known Problems Sister     No Known Problems Brother     No Known Problems Maternal Aunt     No Known Problems Maternal Uncle     No Known Problems Paternal Aunt     No Known Problems Paternal Uncle     No Known Problems Maternal Grandmother     No Known Problems Maternal Grandfather     No Known Problems Paternal Grandmother     No Known Problems Paternal Grandfather     No Known Problems Other        Medications:  Prior to Admission medications    Medication Sig Start Date End Date  Taking? Authorizing Provider   allopurinol (ZYLOPRIM) 100 MG tablet Take 1 tablet by mouth Daily. 3/11/22  Yes Ely Srinivasan MD   Eliquis 2.5 MG tablet tablet Take 1 tablet by mouth Every 12 (Twelve) Hours. 9/3/24  Yes Ely Srinivasan MD   furosemide (LASIX) 40 MG tablet Take 1 tablet by mouth Daily As Needed (for swelling).   Yes Ely Srinivasan MD   Jardiance 10 MG tablet tablet Take 0.5 tablets by mouth Daily. 9/3/24  Yes Ely Srinivasan MD   LORazepam (ATIVAN) 0.5 MG tablet Take 1 tablet by mouth 2 (Two) Times a Day As Needed for Anxiety. 4/16/24  Yes Ely Srinivasan MD   MELATONIN PO Take 1 tablet by mouth At Night As Needed (sleep).   Yes Ely Srinivasan MD   omeprazole (priLOSEC) 20 MG capsule Take 1 capsule by mouth Daily.   Yes Ely Srinivasan MD   tamsulosin (FLOMAX) 0.4 MG capsule 24 hr capsule Take 1 capsule by mouth Daily. 3/15/24  Yes Ely Srinivasan MD      Current medications:  apixaban, 2.5 mg, Oral, Q12H  sodium chloride, 10 mL, Intravenous, Q12H  tamsulosin, 0.4 mg, Oral, Nightly      Current IV drips:       Review of Systems   Constitutional: Negative for chills, fever and weight loss.   HENT:  Negative for congestion and nosebleeds.    Cardiovascular:  Positive for leg swelling. Negative for orthopnea and paroxysmal nocturnal dyspnea.   Respiratory:  Positive for shortness of breath. Negative for cough.    Endocrine: Negative for cold intolerance and heat intolerance.   Skin:  Negative for rash.   Musculoskeletal:  Negative for back pain and muscle weakness.   Gastrointestinal:  Negative for abdominal pain, nausea and vomiting.   Genitourinary:  Negative for dysuria and nocturia.   Neurological:  Negative for dizziness and light-headedness.   Psychiatric/Behavioral:  Negative for altered mental status and hallucinations.          Physical Exam    /71 (BP Location: Left arm, Patient Position: Lying)   Pulse 71   Temp 98.1 °F (36.7 °C)  "(Oral)   Resp 16   Ht 193 cm (76\")   Wt 85.9 kg (189 lb 6 oz)   SpO2 95%   BMI 23.05 kg/m²  Body mass index is 23.05 kg/m².   Oxygen saturation   @FLOWAN(10::1)@ SpO2  Min: 94 %  Max: 100 %    General Appearance:   no acute distress  Alert and oriented x3  HENT:   lips not cyanotic  Atraumatic  Neck:  thyroid not enlarged  supple  Respiratory:  no respiratory distress  normal breath sounds  no rales  Cardiovascular:  no jugular venous distention  regular rhythm  apical impulse normal  S1 normal, S2 normal  no S3, no S4   no murmur  no rub, no thrill  no carotid bruit  pedal pulses normal  lower extremity edema: none    Gastrointestinal:   bowel sounds normal  non-tender  no hepatomegaly, no splenomegaly  Musculoskeletal:  no clubbing of fingers.   normocephalic, head atraumatic  Skin:   warm, dry  No rashes  Neuro/Psychiatric:  normal mood and affect  judgement and insight appropriate      Cardiographics:     ECG  (personally reviewed)          Telemetry:  (personally reviewed) paced rhythm   Results for orders placed during the hospital encounter of 07/30/24    Adult Transthoracic Echo Complete w/ Color, Spectral and Contrast if necessary per protocol    Interpretation Summary    Left ventricular systolic function is severely decreased. Estimated left ventricular EF = 20%    The left ventricular cavity is moderate to severely dilated.    Left ventricular diastolic function is consistent with (grade II w/high LAP) pseudonormalization.    The left atrial cavity is severely dilated.    The right atrial cavity is moderate to severely  dilated.     Results for orders placed during the hospital encounter of 04/24/24    Stress Test With Myocardial Perfusion One Day    Interpretation Summary    Left ventricular ejection fraction is severely reduced (Calculated EF = 18%).  May be inaccurate due to irregularities in gating from underlying rhythm    EKG portion was nondiagnostic due to paced rhythm    Moderate sized " "fixed moderate intensity inferior lateral defect consistent with prior infarct no significant ischemic changes      No results found for this or any previous visit.     Cardiolite (Tc-99m Sestamibi) stress test              Results for orders placed during the hospital encounter of 04/24/24    Stress Test With Myocardial Perfusion One Day    Interpretation Summary    Left ventricular ejection fraction is severely reduced (Calculated EF = 18%).  May be inaccurate due to irregularities in gating from underlying rhythm    EKG portion was nondiagnostic due to paced rhythm    Moderate sized fixed moderate intensity inferior lateral defect consistent with prior infarct no significant ischemic changes      Lab Review:       CBC          8/15/2024    23:10 11/6/2024    15:49 11/7/2024    04:28   CBC   WBC 9.90  8.97  8.28    RBC 5.00  4.98  4.52    Hemoglobin 14.7  14.3  13.2    Hematocrit 46.0  46.1  41.8    MCV 92.0  92.6  92.5    MCH 29.4  28.7  29.2    MCHC 32.0  31.0  31.6    RDW 15.4  16.0  15.9    Platelets 205  231  202        CMP          8/15/2024    23:10 11/6/2024    15:49 11/7/2024    04:28   CMP   Glucose 114  95  96    BUN 35  24  27    Creatinine 1.84  1.53  1.63    EGFR 36.2  45.1  41.8    Sodium 139  139  138    Potassium 4.0  4.6  3.9    Chloride 100  101  103    Calcium 8.7  9.1  9.3    Total Protein 6.6  6.9     Albumin 3.2  3.6     Globulin 3.4  3.3     Total Bilirubin 0.7  1.5     Alkaline Phosphatase 117  115     AST (SGOT) 33  25     ALT (SGPT) 24  14     Albumin/Globulin Ratio 0.9  1.1     BUN/Creatinine Ratio 19.0  15.7  16.6    Anion Gap 11.9  13.1  8.5         CARDIAC LABS:      Lab 11/06/24  1743 11/06/24  1549   PROBNP  --  10,013.0*   HSTROP T 48* 48*      No results found for: \"DIGOXIN\"   Lab Results   Component Value Date    TSH 2.080 04/24/2024           Invalid input(s): \"LDLCALC\"  No results found for: \"POCTROP\"  No results found for: \"DDIMERQUAN\"  Lab Results   Component Value Date    " MG 2.0 11/06/2024             CARDIAC LABS:      Lab 11/06/24  1743 11/06/24  1549   PROBNP  --  10,013.0*   HSTROP T 48* 48*        Imaging:  CXR    Impression: Impression: 1. Cardiomegaly and mild pulmonary venous hypertension. 2. Shifting pattern of bilateral mid and lower lung disease, with mildly increased right upper lung disease, and improved aeration of...         Assessment:    Chest pain       HFrEF acute on chronic b patient with severely reduced ejection fraction evidence of decompensation but this is the major cause for his symptoms he does not appear to be taking Coreg unsure why this was discontinued cannot find documentation of this we will need to investigate the time being agree with IV Lasix today 60 mg increased to twice daily in addition we will continue on Jardiance 5 once a day and try to add low-dose ARB valsartan 40 mg once a day if his blood pressure and kidney function will tolerate.  Patient would also benefit from close follow-up in heart failure clinic on discharge    Elevated troponin type II MI secondary to decompensated CHF patient without any anginal chest discomfort issues    Plan:  1.  Increase Lasix to 60 twice daily  2.  Jardiance 5 mg daily  3.  Add valsartan 40 mg once a day  4.  Repeat BMP in a.m.      Thank you for allowing us to share in Javi patel.            Ge Whelan MD   11/7/2024    08:44 EST

## 2024-11-07 NOTE — PROGRESS NOTES
[facility]   Hospitalist Progress Note  Date: 2024  Patient Name: Javi Martínez  : 1940  MRN: [id number]  Date of admission: 2024      Subjective   Subjective     Chief Complaint: Shortness of air    Summary: Patient is a 84-year-old male past medical history significant for systolic congestive heart failure ejection fraction around 20% who presents emergency department with worsening shortness of air evaluated emergency department had elevated BNP signs of volume overload on chest x-ray has been admitted to the hospitalist service cardiology consulted.    Interval follow-up: Patient seen and examined resting comfortably remains somewhat short of air edema appears to be improved received IV Lasix yesterday given improvement in volume status and respiratory status will start oral Lasix today additionally will start Aldactone Jardiance and ARB unsure if patient was on beta-blocker at home      Objective   Objective     Vitals:   Temp:  [97.3 °F (36.3 °C)-98.1 °F (36.7 °C)] 98.1 °F (36.7 °C)  Heart Rate:  [70-74] 70  Resp:  [16-21] 16  BP: ()/(64-96) 99/64    Physical Exam   Constitutional: Awake alert oriented no acute distress  Respiratory: Crackles  Cardiovascular: RRR  GI: Abdomen soft nontender bowel sounds present  Extremities trace edema    Result Review    Result Review:  I have personally reviewed the results from the time of this admission to 2024 12:31 EST and agree with these findings:  [x]  Laboratory  CBC          8/15/2024    23:10 2024    15:49 2024    04:28   CBC   WBC 9.90  8.97  8.28    RBC 5.00  4.98  4.52    Hemoglobin 14.7  14.3  13.2    Hematocrit 46.0  46.1  41.8    MCV 92.0  92.6  92.5    MCH 29.4  28.7  29.2    MCHC 32.0  31.0  31.6    RDW 15.4  16.0  15.9    Platelets 205  231  202      .[id number]    []  Microbiology  []  Radiology  []  EKG/Telemetry   []  Cardiology/Vascular   []  Pathology  []  Old records  []  Other:    Assessment & Plan    Assessment / Plan     Assessment:    Acute on chronic systolic congestive heart failure  Persistent atrial fibrillation  History of prostate cancer   urinary retention with suprapubic catheter    Plan:    Transition to oral Lasix  Add Aldactone  Continue with Jardiance  Continue with ARB  Not currently on beta-blocker  Continue with Flomax  Repeat labs in a.m.  Discussed with RN     Discussed plan with RN.    VTE Prophylaxis:  Pharmacologic & mechanical VTE prophylaxis orders are present.        CODE STATUS:          Electronically signed by DOLORES Burt, 11/07/24, 12:31 PM EST.      Attending Documentation:  Patient independently seen and evaluated, above documentation reflects plan put forth during bedside rounds.  More than 51% of the time of this patient encounter was performed by me. I discussed the care plan with ERIC Hall PA-C, I agree with his findings and plan as documented, what I have added to the care plan and modified is as follows in my documentation and my medical decision making; very pleasant 84-year-old female with HFrEF, here with shortness of breath, appears to be consistent with exacerbation of his CHF.  He has responded well and quickly to diuresis.  Continue with Lasix, transition to oral, add Aldactone.  Anticipate discharge tomorrow if doing well.  Electronically signed by Ge Carmona MD, 11/07/24, 5:07 PM EST.

## 2024-11-07 NOTE — H&P
[facility]  Date: [facility]   Patient Name: Javi Martínez  : 1940  MRN: [id number]  Primary Care Physician:  Rani Lopez APRN  Date of admission: 2024    Subjective   Subjective     Chief Complaint: Shortness of breath and chest discomfort    HPI:    Javi Martínez is a 84 y.o. male with known CHF presents with a few days of increasing swelling, chest discomfort and pressure.  He said he woke up a few days ago and thought \"this was it\" but the feeling went away.  He said he did not really notice that his fluid was returning but when he did he began taking his Lasix.  Swelling continued to the point that he got short of breath and sought medical attention in the ED today.  In the ER there is initial concern for MI but repeat EKG argued against that.  He was admitted to the medicine service at the recommendation of cardiology.  He got IV Lasix 60 mg x 1 in the ED.  He is already feeling much better.      Personal History     Past Medical History:  Past Medical History:   Diagnosis Date    Aneurysm 2013    Arthritis     left knee     Atrial fibrillation, persistent     CHB (complete heart block) 2023    CHF exacerbation 2024    Chronic obstructive pulmonary disease 2023    pt denies having this    Difficulty walking     Numbness in right foot    Essential hypertension 2019    GERD (gastroesophageal reflux disease)     HFrEF (heart failure with reduced ejection fraction) 2021    Hyperlipidemia 2021    Paroxysmal atrial fibrillation     Polyneuropathy 2023    Prostate cancer 2010    with seeds implant     Septic joint 2021       Past Surgical History:  Past Surgical History:   Procedure Laterality Date    ABLATION OF DYSRHYTHMIC FOCUS  2013    APPENDECTOMY      ARTERIAL BYPASS SURGERY      CARDIAC ABLATION      x2  \"years ago\"     CARDIAC ELECTROPHYSIOLOGY PROCEDURE N/A 2021    Procedure: PPM battery " [Patient Optimized for Surgery] : Patient optimized for surgery change;  Surgeon: Ge Whelan MD;  Location: [facility] CATH INVASIVE LOCATION;  Service: Cardiovascular;  Laterality: N/A;    CARDIAC SURGERY  2006    cabg 3v    CORONARY ARTERY BYPASS GRAFT      CYSTOSCOPY URETHROTOMY VISUAL INTERNAL N/A 08/15/2024    Procedure: CYSTOSCOPY, URETHRAL DILATION, CATHETER PLACEMENT, RETROGRADE URETHROGRAM;  Surgeon: August Myers MD;  Location: [facility] MAIN OR;  Service: Urology;  Laterality: N/A;    CYSTOSCOPY URETHROTOMY VISUAL INTERNAL N/A 10/24/2024    Procedure: CYSTOSCOPY URETHROTOMY;  Surgeon: Mally Bernal MD;  Location: [facility] MAIN OR;  Service: Urology;  Laterality: N/A;    ENDOSCOPY      with dilation     KNEE ARTHROSCOPY Left     TOTAL KNEE ARTHROPLASTY Left 2021    Procedure: TOTAL KNEE ARTHROPLASTY WITH DORA NAVIGATION WITH BIOMET;  Surgeon: Carlitos Zhao MD;  Location: [facility] MAIN OR;  Service: Orthopedics;  Laterality: Left;    VENTRICULAR CARDIAC PACEMAKER INSERTION      medtronic        Family History:   Family History   Problem Relation Age of Onset    Heart attack Mother     No Known Problems Father     No Known Problems Sister     No Known Problems Brother     No Known Problems Maternal Aunt     No Known Problems Maternal Uncle     No Known Problems Paternal Aunt     No Known Problems Paternal Uncle     No Known Problems Maternal Grandmother     No Known Problems Maternal Grandfather     No Known Problems Paternal Grandmother     No Known Problems Paternal Grandfather     No Known Problems Other        Social History:   Social History     Socioeconomic History    Marital status:    Tobacco Use    Smoking status: Former     Current packs/day: 0.00     Average packs/day: 0.5 packs/day for 33.6 years (16.8 ttl pk-yrs)     Types: Cigarettes     Start date: 1962     Quit date: 1995     Years since quittin.3     Passive exposure: Never    Smokeless tobacco: Never   Vaping Use    Vaping status: Never Used   Substance and Sexual  Activity    Alcohol use: Never    Drug use: Never    Sexual activity: Not Currently     Partners: Female       Home Medications:  LORazepam, Melatonin, allopurinol, apixaban, empagliflozin, furosemide, omeprazole, and tamsulosin    Allergies:  No Known Allergies    Review of Systems   All systems were reviewed and negative except for what is outlined above    Objective   Objective     Vitals:   Temp:  [97.8 °F (36.6 °C)] 97.8 °F (36.6 °C)  Heart Rate:  [70-72] 72  Resp:  [19-21] 21  BP: (113-117)/(71-96) 117/83    Physical Exam    Constitutional: Awake, alert, no acute distress   Eyes: Pupils equal, sclerae anicteric, no conjunctival injection   HENT: NCAT, mucous membranes moist   Neck: Supple, no thyromegaly, no lymphadenopathy, trachea midline   Respiratory: Clear to auscultation bilaterally, nonlabored respirations    Cardiovascular: RRR, no murmurs, rubs, or gallops, palpable pedal pulses bilaterally   Gastrointestinal: Positive bowel sounds, soft, nontender, nondistended   Musculoskeletal: No bilateral ankle edema, no clubbing or cyanosis to extremities   : catheter bag bull of yellow urine   Psychiatric: Appropriate affect, cooperative   Neurologic: Oriented x 3   Skin: No rashes     Result Review    Result Review:  I have reviewed the following:  [x]  Laboratory  CBC          8/2/2024    04:32 8/15/2024    23:10 11/6/2024    15:49   CBC   WBC 10.81  9.90  8.97    RBC 4.97  5.00  4.98    Hemoglobin 14.3  14.7  14.3    Hematocrit 44.6  46.0  46.1    MCV 89.7  92.0  92.6    MCH 28.8  29.4  28.7    MCHC 32.1  32.0  31.0    RDW 16.1  15.4  16.0    Platelets 209  205  231      CMP          8/2/2024    04:32 8/15/2024    23:10 11/6/2024    15:49   CMP   Glucose 103  114  95    BUN 34  35  24    Creatinine 1.92  1.84  1.53    EGFR 34.4  36.2  45.1    Sodium 139  139  139    Potassium 3.7  4.0  4.6    Chloride 99  100  101    Calcium 8.7  8.7  9.1    Total Protein  6.6  6.9    Albumin 3.2  3.2  3.6    Globulin  3.4  [No Further Testing Recommended] : no further testing recommended  3.3    Total Bilirubin  0.7  1.5    Alkaline Phosphatase  117  115    AST (SGOT)  33  25    ALT (SGPT)  24  14    Albumin/Globulin Ratio  0.9  1.1    BUN/Creatinine Ratio 17.7  19.0  15.7    Anion Gap 8.8  11.9  13.1        []  Microbiology  []  Radiology  []  EKG/Telemetry   []  Cardiology/Vascular   []  Pathology  []  Old records  []  Other:      Assessment & Plan   Assessment / Plan     Assessment:   Acute on chronic systolic heart failure  Chest pain  Persistent atrial fibrillation  History of prostate cancer  Urinary retention with suprapubic catheter    Plan:  Will place in observation to Medicine Service  Feels better after Lasix IV 60 mg x 1 in the ED  I suspect his symptoms are more related to his CHF  Continue IV diuresis  Cardiology has been consulted in the ED  Defer further cardiac workup to them    VTE Prophylaxis:  Mechanical VTE prophylaxis orders are signed & held.          CODE STATUS:         Admission Status:  I believe this patient meets observation status.    Electronically signed by Ge Carmona MD, 11/06/24, 8:37 PM EST.             [Modify anti-platelet treatment prior to procedure] : Modify anti-platelet treatment prior to procedure [Modify medications prior to procedure] : Modify medications prior to procedure [FreeTextEntry6] : Hold Aspirin  [FreeTextEntry7] : Hold MVI hold Metformin on AM day of surgery

## 2024-11-07 NOTE — CONSULTS
"  Cardiology Consult Note  [facility] CARE UNIT          Patient Identification:  Javi Martínez      [id number]  84 y.o.        male  1940           Reason for Consultation: CHF    PCP: Rani Lopez APRN    History of Present Illness:     Patient is an 84-year-old with a previous history of have wrath chronic, essential hypertension, COPD, CAD status post CABG, complete block with single chamber pacemaker, persistent atrial fibrillation, HFrEF who presented with increasing shortness of breath and leg swelling that had been ongoing for last few days.  The patient had a previous episode of Decon CHF this year as well he reported that the shortness of breath was worse yesterday with a vague chest pressure sensation and came in for evaluation.  He had not had any fever chills or cough.  He did have her port any jorge chest discomfort problems    Past History:  Past Medical History:   Diagnosis Date    Aneurysm 2013    Arthritis     left knee     Atrial fibrillation, persistent     CHB (complete heart block) 06/07/2023    CHF exacerbation 04/24/2024    Chronic obstructive pulmonary disease 09/01/2023    pt denies having this    Difficulty walking 2023    Numbness in right foot    Essential hypertension 05/08/2019    GERD (gastroesophageal reflux disease)     HFrEF (heart failure with reduced ejection fraction) 08/11/2021    Hyperlipidemia 08/11/2021    Paroxysmal atrial fibrillation     Polyneuropathy 06/19/2023    Prostate cancer 2010    with seeds implant     Septic joint 11/26/2021     Past Surgical History:   Procedure Laterality Date    ABLATION OF DYSRHYTHMIC FOCUS  2013    APPENDECTOMY      ARTERIAL BYPASS SURGERY      CARDIAC ABLATION      x2  \"years ago\"     CARDIAC ELECTROPHYSIOLOGY PROCEDURE N/A 11/01/2021    Procedure: PPM battery change;  Surgeon: Ge Whelan MD;  Location: [facility] INVASIVE LOCATION;  Service: Cardiovascular;  Laterality: N/A;    CARDIAC SURGERY  2006    " cabg 3v    CORONARY ARTERY BYPASS GRAFT      CYSTOSCOPY URETHROTOMY VISUAL INTERNAL N/A 08/15/2024    Procedure: CYSTOSCOPY, URETHRAL DILATION, CATHETER PLACEMENT, RETROGRADE URETHROGRAM;  Surgeon: August Myers MD;  Location: [facility];  Service: Urology;  Laterality: N/A;    CYSTOSCOPY URETHROTOMY VISUAL INTERNAL N/A 10/24/2024    Procedure: CYSTOSCOPY URETHROTOMY;  Surgeon: Mally Bernal MD;  Location: [facility] OR;  Service: Urology;  Laterality: N/A;    ENDOSCOPY      with dilation     KNEE ARTHROSCOPY Left     TOTAL KNEE ARTHROPLASTY Left 2021    Procedure: TOTAL KNEE ARTHROPLASTY WITH DORA NAVIGATION WITH BIOMET;  Surgeon: Carlitos Zhao MD;  Location: [facility];  Service: Orthopedics;  Laterality: Left;    VENTRICULAR CARDIAC PACEMAKER INSERTION      medtronic      No Known Allergies  Social History     Socioeconomic History    Marital status:    Tobacco Use    Smoking status: Former     Current packs/day: 0.00     Average packs/day: 0.5 packs/day for 33.6 years (16.8 ttl pk-yrs)     Types: Cigarettes     Start date: 1962     Quit date: 1995     Years since quittin.3     Passive exposure: Never    Smokeless tobacco: Never   Vaping Use    Vaping status: Never Used   Substance and Sexual Activity    Alcohol use: Never    Drug use: Never    Sexual activity: Not Currently     Partners: Female     Family History   Problem Relation Age of Onset    Heart attack Mother     No Known Problems Father     No Known Problems Sister     No Known Problems Brother     No Known Problems Maternal Aunt     No Known Problems Maternal Uncle     No Known Problems Paternal Aunt     No Known Problems Paternal Uncle     No Known Problems Maternal Grandmother     No Known Problems Maternal Grandfather     No Known Problems Paternal Grandmother     No Known Problems Paternal Grandfather     No Known Problems Other        Medications:  Prior to Admission medications    Medication  Sig Start Date End Date Taking? Authorizing Provider   allopurinol (ZYLOPRIM) 100 MG tablet Take 1 tablet by mouth Daily. 3/11/22  Yes Ely Srinivasan MD   Eliquis 2.5 MG tablet tablet Take 1 tablet by mouth Every 12 (Twelve) Hours. 9/3/24  Yes Ely Srinivasan MD   furosemide (LASIX) 40 MG tablet Take 1 tablet by mouth Daily As Needed (for swelling).   Yes Ely Srinivasan MD   Jardiance 10 MG tablet tablet Take 0.5 tablets by mouth Daily. 9/3/24  Yes Ely Srinivasan MD   LORazepam (ATIVAN) 0.5 MG tablet Take 1 tablet by mouth 2 (Two) Times a Day As Needed for Anxiety. 4/16/24  Yes Ely Srinivasan MD   MELATONIN PO Take 1 tablet by mouth At Night As Needed (sleep).   Yes Ely Srinivasan MD   omeprazole (priLOSEC) 20 MG capsule Take 1 capsule by mouth Daily.   Yes Ely Srinivasan MD   tamsulosin (FLOMAX) 0.4 MG capsule 24 hr capsule Take 1 capsule by mouth Daily. 3/15/24  Yes Ely Srinivasan MD      Current medications:  apixaban, 2.5 mg, Oral, Q12H  sodium chloride, 10 mL, Intravenous, Q12H  tamsulosin, 0.4 mg, Oral, Nightly      Current IV drips:       Review of Systems   Constitutional: Negative for chills, fever and weight loss.   HENT:  Negative for congestion and nosebleeds.    Cardiovascular:  Positive for leg swelling. Negative for orthopnea and paroxysmal nocturnal dyspnea.   Respiratory:  Positive for shortness of breath. Negative for cough.    Endocrine: Negative for cold intolerance and heat intolerance.   Skin:  Negative for rash.   Musculoskeletal:  Negative for back pain and muscle weakness.   Gastrointestinal:  Negative for abdominal pain, nausea and vomiting.   Genitourinary:  Negative for dysuria and nocturia.   Neurological:  Negative for dizziness and light-headedness.   Psychiatric/Behavioral:  Negative for altered mental status and hallucinations.          Physical Exam    /71 (BP Location: Left arm, Patient Position: Lying)   Pulse 71    "Temp 98.1 °F (36.7 °C) (Oral)   Resp 16   Ht 193 cm (76\")   Wt 85.9 kg (189 lb 6 oz)   SpO2 95%   BMI 23.05 kg/m²  Body mass index is 23.05 kg/m².   Oxygen saturation   @FLOWAN(10::1)@ SpO2  Min: 94 %  Max: 100 %    General Appearance:   no acute distress  Alert and oriented x3  HENT:   lips not cyanotic  Atraumatic  Neck:  thyroid not enlarged  supple  Respiratory:  no respiratory distress  normal breath sounds  no rales  Cardiovascular:  no jugular venous distention  regular rhythm  apical impulse normal  S1 normal, S2 normal  no S3, no S4   no murmur  no rub, no thrill  no carotid bruit  pedal pulses normal  lower extremity edema: none    Gastrointestinal:   bowel sounds normal  non-tender  no hepatomegaly, no splenomegaly  Musculoskeletal:  no clubbing of fingers.   normocephalic, head atraumatic  Skin:   warm, dry  No rashes  Neuro/Psychiatric:  normal mood and affect  judgement and insight appropriate      Cardiographics:     ECG  (personally reviewed)          Telemetry:  (personally reviewed) paced rhythm   Results for orders placed during the hospital encounter of 07/30/24    Adult Transthoracic Echo Complete w/ Color, Spectral and Contrast if necessary per protocol    Interpretation Summary    Left ventricular systolic function is severely decreased. Estimated left ventricular EF = 20%    The left ventricular cavity is moderate to severely dilated.    Left ventricular diastolic function is consistent with (grade II w/high LAP) pseudonormalization.    The left atrial cavity is severely dilated.    The right atrial cavity is moderate to severely  dilated.     Results for orders placed during the hospital encounter of 04/24/24    Stress Test With Myocardial Perfusion One Day    Interpretation Summary    Left ventricular ejection fraction is severely reduced (Calculated EF = 18%).  May be inaccurate due to irregularities in gating from underlying rhythm    EKG portion was nondiagnostic due to paced " "rhythm    Moderate sized fixed moderate intensity inferior lateral defect consistent with prior infarct no significant ischemic changes      No results found for this or any previous visit.     Cardiolite (Tc-99m Sestamibi) stress test              Results for orders placed during the hospital encounter of 04/24/24    Stress Test With Myocardial Perfusion One Day    Interpretation Summary    Left ventricular ejection fraction is severely reduced (Calculated EF = 18%).  May be inaccurate due to irregularities in gating from underlying rhythm    EKG portion was nondiagnostic due to paced rhythm    Moderate sized fixed moderate intensity inferior lateral defect consistent with prior infarct no significant ischemic changes      Lab Review:       CBC          8/15/2024    23:10 11/6/2024    15:49 11/7/2024    04:28   CBC   WBC 9.90  8.97  8.28    RBC 5.00  4.98  4.52    Hemoglobin 14.7  14.3  13.2    Hematocrit 46.0  46.1  41.8    MCV 92.0  92.6  92.5    MCH 29.4  28.7  29.2    MCHC 32.0  31.0  31.6    RDW 15.4  16.0  15.9    Platelets 205  231  202        CMP          8/15/2024    23:10 11/6/2024    15:49 11/7/2024    04:28   CMP   Glucose 114  95  96    BUN 35  24  27    Creatinine 1.84  1.53  1.63    EGFR 36.2  45.1  41.8    Sodium 139  139  138    Potassium 4.0  4.6  3.9    Chloride 100  101  103    Calcium 8.7  9.1  9.3    Total Protein 6.6  6.9     Albumin 3.2  3.6     Globulin 3.4  3.3     Total Bilirubin 0.7  1.5     Alkaline Phosphatase 117  115     AST (SGOT) 33  25     ALT (SGPT) 24  14     Albumin/Globulin Ratio 0.9  1.1     BUN/Creatinine Ratio 19.0  15.7  16.6    Anion Gap 11.9  13.1  8.5         CARDIAC LABS:      Lab 11/06/24  1743 11/06/24  1549   PROBNP  --  10,013.0*   HSTROP T 48* 48*      No results found for: \"DIGOXIN\"   Lab Results   Component Value Date    TSH 2.080 04/24/2024           Invalid input(s): \"LDLCALC\"  No results found for: \"POCTROP\"  No results found for: \"DDIMERQUAN\"  Lab Results "   Component Value Date    MG 2.0 11/06/2024             CARDIAC LABS:      Lab 11/06/24  1743 11/06/24  1549   PROBNP  --  10,013.0*   HSTROP T 48* 48*        Imaging:  CXR    Impression: Impression: 1. Cardiomegaly and mild pulmonary venous hypertension. 2. Shifting pattern of bilateral mid and lower lung disease, with mildly increased right upper lung disease, and improved aeration of...         Assessment:    Chest pain       HFrEF acute on chronic b patient with severely reduced ejection fraction evidence of decompensation but this is the major cause for his symptoms he does not appear to be taking Coreg unsure why this was discontinued cannot find documentation of this we will need to investigate the time being agree with IV Lasix today 60 mg increased to twice daily in addition we will continue on Jardiance 5 once a day and try to add low-dose ARB valsartan 40 mg once a day if his blood pressure and kidney function will tolerate.  Patient would also benefit from close follow-up in heart failure clinic on discharge    Elevated troponin type II MI secondary to decompensated CHF patient without any anginal chest discomfort issues    Plan:  1.  Increase Lasix to 60 twice daily  2.  Jardiance 5 mg daily  3.  Add valsartan 40 mg once a day  4.  Repeat BMP in a.m.      Thank you for allowing us to share in Javi patel.            Ge Whelan MD   11/7/2024    08:44 EST

## 2024-11-07 NOTE — PLAN OF CARE
Goal Outcome Evaluation:  Plan of Care Reviewed With: patient        Progress: no change  Outcome Evaluation: Patient new admit from ER this shift. Patient arrived with loyd for chronic retention, cleansed with CHG and MD notified and orders placed to continue. No c/o pain or discomfort. Bed alarm set, call light within reach.

## 2024-11-07 NOTE — PLAN OF CARE
Goal Outcome Evaluation:   Pt. Rested intermittently throughout shift.  No complaints of pain . VSS.

## 2024-11-08 ENCOUNTER — APPOINTMENT (OUTPATIENT)
Dept: GENERAL RADIOLOGY | Facility: HOSPITAL | Age: 84
End: 2024-11-08
Payer: MEDICARE

## 2024-11-08 LAB
ALBUMIN SERPL-MCNC: 3.2 G/DL (ref 3.5–5.2)
ANION GAP SERPL CALCULATED.3IONS-SCNC: 9.1 MMOL/L (ref 5–15)
BASOPHILS # BLD AUTO: 0.09 10*3/MM3 (ref 0–0.2)
BASOPHILS NFR BLD AUTO: 1.1 % (ref 0–1.5)
BUN SERPL-MCNC: 29 MG/DL (ref 8–23)
BUN/CREAT SERPL: 18.6 (ref 7–25)
CALCIUM SPEC-SCNC: 9 MG/DL (ref 8.6–10.5)
CHLORIDE SERPL-SCNC: 103 MMOL/L (ref 98–107)
CO2 SERPL-SCNC: 22.9 MMOL/L (ref 22–29)
CREAT SERPL-MCNC: 1.56 MG/DL (ref 0.76–1.27)
DEPRECATED RDW RBC AUTO: 53.5 FL (ref 37–54)
EGFRCR SERPLBLD CKD-EPI 2021: 44.1 ML/MIN/1.73
EOSINOPHIL # BLD AUTO: 0.19 10*3/MM3 (ref 0–0.4)
EOSINOPHIL NFR BLD AUTO: 2.3 % (ref 0.3–6.2)
ERYTHROCYTE [DISTWIDTH] IN BLOOD BY AUTOMATED COUNT: 15.8 % (ref 12.3–15.4)
GLUCOSE SERPL-MCNC: 102 MG/DL (ref 65–99)
HCT VFR BLD AUTO: 43.3 % (ref 37.5–51)
HGB BLD-MCNC: 13.5 G/DL (ref 13–17.7)
IMM GRANULOCYTES # BLD AUTO: 0.02 10*3/MM3 (ref 0–0.05)
IMM GRANULOCYTES NFR BLD AUTO: 0.2 % (ref 0–0.5)
LYMPHOCYTES # BLD AUTO: 1.78 10*3/MM3 (ref 0.7–3.1)
LYMPHOCYTES NFR BLD AUTO: 21.8 % (ref 19.6–45.3)
MCH RBC QN AUTO: 29.5 PG (ref 26.6–33)
MCHC RBC AUTO-ENTMCNC: 31.2 G/DL (ref 31.5–35.7)
MCV RBC AUTO: 94.5 FL (ref 79–97)
MONOCYTES # BLD AUTO: 0.9 10*3/MM3 (ref 0.1–0.9)
MONOCYTES NFR BLD AUTO: 11 % (ref 5–12)
NEUTROPHILS NFR BLD AUTO: 5.17 10*3/MM3 (ref 1.7–7)
NEUTROPHILS NFR BLD AUTO: 63.6 % (ref 42.7–76)
NRBC BLD AUTO-RTO: 0 /100 WBC (ref 0–0.2)
PHOSPHATE SERPL-MCNC: 3.5 MG/DL (ref 2.5–4.5)
PLATELET # BLD AUTO: 197 10*3/MM3 (ref 140–450)
PMV BLD AUTO: 9.9 FL (ref 6–12)
POTASSIUM SERPL-SCNC: 3.6 MMOL/L (ref 3.5–5.2)
RBC # BLD AUTO: 4.58 10*6/MM3 (ref 4.14–5.8)
SODIUM SERPL-SCNC: 135 MMOL/L (ref 136–145)
URATE SERPL-MCNC: 7 MG/DL (ref 3.4–7)
WBC NRBC COR # BLD AUTO: 8.15 10*3/MM3 (ref 3.4–10.8)

## 2024-11-08 PROCEDURE — 99214 OFFICE O/P EST MOD 30 MIN: CPT | Performed by: INTERNAL MEDICINE

## 2024-11-08 PROCEDURE — G0378 HOSPITAL OBSERVATION PER HR: HCPCS

## 2024-11-08 PROCEDURE — 97165 OT EVAL LOW COMPLEX 30 MIN: CPT

## 2024-11-08 PROCEDURE — 85025 COMPLETE CBC W/AUTO DIFF WBC: CPT | Performed by: INTERNAL MEDICINE

## 2024-11-08 PROCEDURE — 80069 RENAL FUNCTION PANEL: CPT | Performed by: INTERNAL MEDICINE

## 2024-11-08 PROCEDURE — 99233 SBSQ HOSP IP/OBS HIGH 50: CPT | Performed by: INTERNAL MEDICINE

## 2024-11-08 PROCEDURE — 97161 PT EVAL LOW COMPLEX 20 MIN: CPT

## 2024-11-08 PROCEDURE — 73521 X-RAY EXAM HIPS BI 2 VIEWS: CPT

## 2024-11-08 PROCEDURE — 84550 ASSAY OF BLOOD/URIC ACID: CPT | Performed by: PHYSICIAN ASSISTANT

## 2024-11-08 PROCEDURE — 63710000001 PREDNISONE PER 1 MG: Performed by: INTERNAL MEDICINE

## 2024-11-08 RX ORDER — PREDNISONE 20 MG/1
20 TABLET ORAL DAILY
Status: DISCONTINUED | OUTPATIENT
Start: 2024-11-08 | End: 2024-11-09 | Stop reason: HOSPADM

## 2024-11-08 RX ORDER — POTASSIUM CHLORIDE 1.5 G/1.58G
20 POWDER, FOR SOLUTION ORAL 2 TIMES DAILY
Status: DISCONTINUED | OUTPATIENT
Start: 2024-11-08 | End: 2024-11-09

## 2024-11-08 RX ORDER — ACETAMINOPHEN 325 MG/1
650 TABLET ORAL ONCE
Status: COMPLETED | OUTPATIENT
Start: 2024-11-08 | End: 2024-11-08

## 2024-11-08 RX ORDER — POTASSIUM CHLORIDE 750 MG/1
40 CAPSULE, EXTENDED RELEASE ORAL ONCE
Status: COMPLETED | OUTPATIENT
Start: 2024-11-08 | End: 2024-11-08

## 2024-11-08 RX ADMIN — VALSARTAN 40 MG: 40 TABLET ORAL at 08:39

## 2024-11-08 RX ADMIN — POTASSIUM CHLORIDE 20 MEQ: 1.5 POWDER, FOR SOLUTION ORAL at 08:38

## 2024-11-08 RX ADMIN — Medication 10 ML: at 08:38

## 2024-11-08 RX ADMIN — ACETAMINOPHEN 650 MG: 325 TABLET ORAL at 10:03

## 2024-11-08 RX ADMIN — Medication 10 ML: at 20:45

## 2024-11-08 RX ADMIN — FUROSEMIDE 40 MG: 40 TABLET ORAL at 08:39

## 2024-11-08 RX ADMIN — POTASSIUM CHLORIDE 40 MEQ: 750 CAPSULE, EXTENDED RELEASE ORAL at 08:38

## 2024-11-08 RX ADMIN — POTASSIUM CHLORIDE 20 MEQ: 1.5 POWDER, FOR SOLUTION ORAL at 20:42

## 2024-11-08 RX ADMIN — APIXABAN 2.5 MG: 2.5 TABLET, FILM COATED ORAL at 08:38

## 2024-11-08 RX ADMIN — PREDNISONE 20 MG: 20 TABLET ORAL at 16:03

## 2024-11-08 RX ADMIN — EMPAGLIFLOZIN 5 MG: 10 TABLET, FILM COATED ORAL at 08:38

## 2024-11-08 RX ADMIN — APIXABAN 2.5 MG: 2.5 TABLET, FILM COATED ORAL at 20:43

## 2024-11-08 RX ADMIN — TAMSULOSIN HYDROCHLORIDE 0.4 MG: 0.4 CAPSULE ORAL at 20:43

## 2024-11-08 NOTE — PROGRESS NOTES
[facility]   Hospitalist Progress Note  Date: 2024  Patient Name: Javi Martínez  : 1940  MRN: [id number]  Date of admission: 2024      Subjective   Subjective     Chief Complaint: Shortness of air    Summary: Patient is a 84-year-old male past medical history significant for systolic congestive heart failure ejection fraction around 20% who presents emergency department with worsening shortness of air evaluated emergency department had elevated BNP signs of volume overload on chest x-ray has been admitted to the hospitalist service cardiology consulted.    Interval follow-up:  patient seen and examined resting comfortably respiratory status stable no chest pain no palpitations complaining of some right hip pain today will get x-rays of right hip checking uric acid as well otherwise stable for discharge      Objective   Objective     Vitals:   Temp:  [97.7 °F (36.5 °C)-98.5 °F (36.9 °C)] 97.9 °F (36.6 °C)  Heart Rate:  [70-73] 73  Resp:  [16-20] 18  BP: ()/(62-84) 103/68    Physical Exam   Constitutional: Awake alert oriented no acute distress  Respiratory: Crackles  Cardiovascular: RRR  GI: Abdomen soft nontender bowel sounds present  Extremities trace edema    Result Review    Result Review:  I have personally reviewed the results from the time of this admission to 2024 14:06 EST and agree with these findings:  [x]  Laboratory  CBC          2024    15:49 2024    04:28 2024    04:32   CBC   WBC 8.97  8.28  8.15    RBC 4.98  4.52  4.58    Hemoglobin 14.3  13.2  13.5    Hematocrit 46.1  41.8  43.3    MCV 92.6  92.5  94.5    MCH 28.7  29.2  29.5    MCHC 31.0  31.6  31.2    RDW 16.0  15.9  15.8    Platelets 231  202  197      CMP          2024    15:49 2024    04:28 2024    04:32   CMP   Glucose 95  96  102    BUN 24  27  29    Creatinine 1.53  1.63  1.56    EGFR 45.1  41.8  44.1    Sodium 139  138  135    Potassium 4.6  3.9  3.6    Chloride 101  103  103     Calcium 9.1  9.3  9.0    Total Protein 6.9      Albumin 3.6   3.2    Globulin 3.3      Total Bilirubin 1.5      Alkaline Phosphatase 115      AST (SGOT) 25      ALT (SGPT) 14      Albumin/Globulin Ratio 1.1      BUN/Creatinine Ratio 15.7  16.6  18.6    Anion Gap 13.1  8.5  9.1          []  Microbiology  []  Radiology  []  EKG/Telemetry   []  Cardiology/Vascular   []  Pathology  []  Old records  []  Other:    Assessment & Plan   Assessment / Plan     Assessment:    Acute on chronic systolic congestive heart failure  Persistent atrial fibrillation  History of prostate cancer   urinary retention with suprapubic catheter    Plan:    Continue with Eliquis, Jardiance, Lasix, Flomax, valsartan   Further titration and addition of goal-directed medications for heart failure to be completed in outpatient setting as blood pressures allow has had issues with hypotension in the past  Not currently on beta-blocker  Continue with Flomax  Repeat labs in a.m.  Discussed with RN     Discussed plan with RN.    VTE Prophylaxis:  Pharmacologic & mechanical VTE prophylaxis orders are present.        CODE STATUS:   Code Status (Patient has no pulse and is not breathing): CPR (Attempt to Resuscitate)  Medical Interventions (Patient has pulse or is breathing): Full Support    Electronically signed by DOLORES Burt, 11/08/24, 2:08 PM EST.        Attending Documentation:  Patient independently seen and evaluated, above documentation reflects plan put forth during bedside rounds.  More than 51% of the time of this patient encounter was performed by me. I discussed the care plan with ERIC Hall PA-C, I agree with his findings and plan as documented, what I have added to the care plan and modified is as follows in my documentation and my medical decision making; very pleasant 84-year-old female with HFrEF, here with shortness of breath, appears to be consistent with exacerbation of his CHF.  He has responded well and quickly to  diuresis.  Patient doing well, was planning to discharge him today, however he says he is having right groin pain consistent with gout.  Discussed that is an unusual location for gout, his uric acid is unremarkable.  Patient says it feels just like his previous episodes of gout.  Requesting indomethacin but I would not recommend that in the setting of his renal failure.  I will do short course of prednisone instead.  Electronically signed by Ge Carmona MD, 11/08/24, 6:03 PM EST.

## 2024-11-08 NOTE — THERAPY EVALUATION
Patient Name: Javi Martínez  : 1940    MRN: [id number]                              Today's Date: 2024       Admit Date: 2024    Visit Dx:     ICD-10-CM ICD-9-CM   1. Chest pain, unspecified type  R07.9 786.50   2. Difficulty in walking  R26.2 719.7   3. Decreased activities of daily living (ADL)  Z78.9 V49.89     Patient Active Problem List   Diagnosis    Primary osteoarthritis of left knee    S/P TKR (total knee replacement)    Essential hypertension    CAD with CABG    Hyperlipidemia    Presence of cardiac pacemaker single chamber    Anxiety    Gastroesophageal reflux disease    Gout    History of malignant neoplasm of prostate    Testicular hypofunction    Polyneuropathy    Chronic obstructive pulmonary disease    Benign prostatic hyperplasia without lower urinary tract symptoms    Chest pain    CHF exacerbation    Atrial fibrillation, chronic    Stage 3b chronic kidney disease    Postprocedural membranous urethral stricture    Acute on chronic HFrEF (heart failure with reduced ejection fraction)    Suprapubic catheter dysfunction    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    Urethral stricture in male    Elevated troponin level not due myocardial infarction     Past Medical History:   Diagnosis Date    Aneurysm 2013    Arthritis     left knee     Atrial fibrillation, persistent     CHB (complete heart block) 2023    CHF exacerbation 2024    Chronic obstructive pulmonary disease 2023    pt denies having this    Difficulty walking     Numbness in right foot    Essential hypertension 2019    GERD (gastroesophageal reflux disease)     HFrEF (heart failure with reduced ejection fraction) 2021    Hyperlipidemia 2021    Paroxysmal atrial fibrillation     Polyneuropathy 2023    Prostate cancer 2010    with seeds implant     Septic joint 2021     Past Surgical History:   Procedure Laterality Date    ABLATION OF DYSRHYTHMIC FOCUS  2013    APPENDECTOMY    "   ARTERIAL BYPASS SURGERY      CARDIAC ABLATION      x2  \"years ago\"     CARDIAC ELECTROPHYSIOLOGY PROCEDURE N/A 11/01/2021    Procedure: PPM battery change;  Surgeon: Ge Whelan MD;  Location: [facility] CATH INVASIVE LOCATION;  Service: Cardiovascular;  Laterality: N/A;    CARDIAC SURGERY  2006    cabg 3v    CORONARY ARTERY BYPASS GRAFT  2006    CYSTOSCOPY URETHROTOMY VISUAL INTERNAL N/A 08/15/2024    Procedure: CYSTOSCOPY, URETHRAL DILATION, CATHETER PLACEMENT, RETROGRADE URETHROGRAM;  Surgeon: August Myers MD;  Location: [facility] MAIN OR;  Service: Urology;  Laterality: N/A;    CYSTOSCOPY URETHROTOMY VISUAL INTERNAL N/A 10/24/2024    Procedure: CYSTOSCOPY URETHROTOMY;  Surgeon: Mally Bernal MD;  Location: [facility] MAIN OR;  Service: Urology;  Laterality: N/A;    ENDOSCOPY      with dilation     KNEE ARTHROSCOPY Left     TOTAL KNEE ARTHROPLASTY Left 07/23/2021    Procedure: TOTAL KNEE ARTHROPLASTY WITH DORA NAVIGATION WITH BIOMET;  Surgeon: Carlitos Zhao MD;  Location: [facility] OR;  Service: Orthopedics;  Laterality: Left;    VENTRICULAR CARDIAC PACEMAKER INSERTION      Lumatix       General Information       Row Name 11/08/24 1338          OT Time and Intention    Subjective Information complains of;pain  R groin pain  -ES     Document Type evaluation  -ES     Mode of Treatment individual therapy;occupational therapy  -ES     Patient Effort good  -ES       Row Name 11/08/24 9545          General Information    Patient Profile Reviewed yes  -ES     Prior Level of Function independent:;ADL's;all household mobility;community mobility  Patient independent with B and IADLs at baseline. Has RW for household mobility and cane for community mobility. Patient is able to drive. Walk in shower or tub/shower option for bathing. Elevated commodes. No home O2 use. Denies recent falls.  -ES     Existing Precautions/Restrictions fall  -ES     Barriers to Rehab none identified  -ES       Row Name 11/08/24 1330 "          Occupational Profile    Reason for Services/Referral (Occupational Profile) Patient is 84 yr old male admitted to [facility] on 11/6/2024 with reports of chest pain and increased LE swelling. OT evaluation and treatment ordered d/t recent decline in ADLs/transfer ability and discharge planning recommendations. No previous OT services for current condition.  -ES       Row Name 11/08/24 1338          Living Environment    People in Home alone  -ES       Row Name 11/08/24 1338          Home Main Entrance    Number of Stairs, Main Entrance two  -ES       Row Name 11/08/24 1338          Stairs Within Home, Primary    Number of Stairs, Within Home, Primary none  -ES       Row Name 11/08/24 1338          Cognition    Orientation Status (Cognition) oriented x 3  patient pleasant and cooperative, agreeable to therapy evaluation.  -ES       Row Name 11/08/24 1338          Safety Issues/Impairments Affecting Functional Mobility    Impairments Affecting Function (Mobility) balance;pain;strength  -               User Key  (r) = Recorded By, (t) = Taken By, (c) = Cosigned By      Initials Name Provider Type    Abena Steen, OTR/L, CSRS Occupational Therapist                     Mobility/ADL's       Row Name 11/08/24 1350          Bed Mobility    Bed Mobility supine-sit;sit-supine  -ES     Supine-Sit [place] (Bed Mobility) minimum assist (75% patient effort);1 person assist  -ES     Sit-Supine [place] (Bed Mobility) minimum assist (75% patient effort);1 person assist  -       Row Name 11/08/24 1350          Transfers    Comment, (Transfers) patient declined further transfers secondary  to reports of right groin pain  -       Row Name 11/08/24 1350          Activities of Daily Living    BADL Assessment/Intervention bathing;upper body dressing;lower body dressing;grooming;feeding;toileting  -       Row Name 11/08/24 1350          Bathing Assessment/Intervention    [place] Level  (Bathing) bathing skills;minimum assist (75% patient effort)  -ES       Row Name 11/08/24 1350          Upper Body Dressing Assessment/Training    [place] Level (Upper Body Dressing) upper body dressing skills;set up;standby assist  -ES       Row Name 11/08/24 1350          Lower Body Dressing Assessment/Training    [place] Level (Lower Body Dressing) lower body dressing skills;minimum assist (75% patient effort)  -ES       Row Name 11/08/24 1350          Grooming Assessment/Training    [place] Level (Grooming) grooming skills;set up;standby assist  -ES       Row Name 11/08/24 1350          Self-Feeding Assessment/Training    [place] Level (Feeding) feeding skills;set up  -ES       Row Name 11/08/24 1350          Toileting Assessment/Training    [place] Level (Toileting) toileting skills;maximum assist (25% patient effort)  -ES     Comment, (Toileting) loyd cath in place at time of assessment  -ES               User Key  (r) = Recorded By, (t) = Taken By, (c) = Cosigned By      Initials Name Provider Type    ES Abena Patiño, OTR/L, CSRS Occupational Therapist                   Obj/Interventions       Row Name 11/08/24 1351          Vision Assessment/Intervention    Visual Impairment/Limitations WFL;corrective lenses full-time  -ES       Row Name 11/08/24 1351          Range of Motion Comprehensive    General Range of Motion bilateral upper extremity ROM WNL  -ES       Row Name 11/08/24 1351          Strength Comprehensive (MMT)    Comment, General Manual Muscle Testing (MMT) Assessment BUEs 4/5  -ES       Row Name 11/08/24 1351          Motor Skills    Motor Skills functional endurance  -ES     Functional Endurance fair minus. Limited secondary to pain  -ES       Row Name 11/08/24 1351          Balance    Balance Assessment sitting dynamic balance  -ES     Dynamic Sitting Balance standby assist  -ES     Position, Sitting Balance unsupported;sitting edge of bed  -ES               User Key   (r) = Recorded By, (t) = Taken By, (c) = Cosigned By      Initials Name Provider Type    Abena Steen, OTR/L, CSRS Occupational Therapist                   Goals/Plan       Row Name 11/08/24 1354          Bed Mobility Goal 1 (OT)    Activity/Assistive Device (Bed Mobility Goal 1, OT) bed mobility activities, all  -ES     [place] Level/Cues Needed (Bed Mobility Goal 1, OT) independent  -ES     Time Frame (Bed Mobility Goal 1, OT) long term goal (LTG);10 days  -ES       Row Name 11/08/24 1354          Transfer Goal 1 (OT)    Activity/Assistive Device (Transfer Goal 1, OT) transfers, all  -ES     [place] Level/Cues Needed (Transfer Goal 1, OT) independent  -ES     Time Frame (Transfer Goal 1, OT) long term goal (LTG);10 days  -ES       Row Name 11/08/24 1354          Bathing Goal 1 (OT)    Activity/Device (Bathing Goal 1, OT) bathing skills, all  -ES     [place] Level/Cues Needed (Bathing Goal 1, OT) independent  -ES     Time Frame (Bathing Goal 1, OT) long term goal (LTG);10 days  -ES       Row Name 11/08/24 1354          Dressing Goal 1 (OT)    Activity/Device (Dressing Goal 1, OT) dressing skills, all  -ES     [place]/Cues Needed (Dressing Goal 1, OT) independent  -ES     Time Frame (Dressing Goal 1, OT) long term goal (LTG);10 days  -ES       Row Name 11/08/24 1354          Toileting Goal 1 (OT)    Activity/Device (Toileting Goal 1, OT) toileting skills, all  -ES     [place] Level/Cues Needed (Toileting Goal 1, OT) independent  -ES     Time Frame (Toileting Goal 1, OT) long term goal (LTG);10 days  -ES       Row Name 11/08/24 1354          Grooming Goal 1 (OT)    Activity/Device (Grooming Goal 1, OT) grooming skills, all  -ES     [place] (Grooming Goal 1, OT) independent  -ES     Time Frame (Grooming Goal 1, OT) long term goal (LTG);10 days  -ES       Row Name 11/08/24 1354          Problem Specific Goal 1 (OT)    Problem Specific Goal 1 (OT) Patient will demonstrate good graded  dynamic standing balance in preperation for independent ADL routine completion at time of discharge  -ES     Time Frame (Problem Specific Goal 1, OT) long term goal (LTG);10 days  -ES       Row Name 11/08/24 8642          Therapy Assessment/Plan (OT)    Planned Therapy Interventions (OT) activity tolerance training;BADL retraining;functional balance retraining;occupation/activity based interventions;patient/caregiver education/training;strengthening exercise;transfer/mobility retraining  -ES               User Key  (r) = Recorded By, (t) = Taken By, (c) = Cosigned By      Initials Name Provider Type    ES Abena Patiño, OTR/L, CSRS Occupational Therapist                   Clinical Impression       Row Name 11/08/24 135          Pain Assessment    Pain Location groin  -ES     Pain Side/Orientation right  -ES     Pre/Posttreatment Pain Comment patient with generalized complaints of right groin pain. RN notified.  -ES       Row Name 11/08/24 1356          Plan of Care Review    Plan of Care Reviewed With patient  -ES     Outcome Evaluation Patient has experienced decline in function from baseline status, presenting with deficits related to balance, strength, tolerance, transfers and mobility that impede patient independence with activities of daily living.  Patient would benefit from skilled Occupational Therapy intervention to maximize patient safety, and promote return to baseline independence.  -ES       Row Name 11/08/24 1359          Therapy Assessment/Plan (OT)    Rehab Potential (OT) good  -ES     Criteria for Skilled Therapeutic Interventions Met (OT) yes;meets criteria;skilled treatment is necessary  -ES     Therapy Frequency (OT) 5 times/wk  -ES       Row Name 11/08/24 1356          Therapy Plan Review/Discharge Plan (OT)    Anticipated Discharge Disposition (OT) inpatient rehabilitation facility  -ES       Row Name 11/08/24 8435          Positioning and Restraints    Pre-Treatment Position in bed  -ES      Post Treatment Position bed  -ES               User Key  (r) = Recorded By, (t) = Taken By, (c) = Cosigned By      Initials Name Provider Type    Abena Steen OTR/L, CSRS Occupational Therapist                   Outcome Measures       Row Name 11/08/24 1355          How much help from another is currently needed...    Putting on and taking off regular lower body clothing? 3  -ES     Bathing (including washing, rinsing, and drying) 3  -ES     Toileting (which includes using toilet bed pan or urinal) 3  -ES     Putting on and taking off regular upper body clothing 4  -ES     Taking care of personal grooming (such as brushing teeth) 4  -ES     Eating meals 4  -ES     AM-PAC 6 Clicks Score (OT) 21  -ES       Row Name 11/08/24 1100 11/08/24 0731       How much help from another person do you currently need...    Turning from your back to your side while in flat bed without using bedrails? 4  -SALMA 4  -MS    Moving from lying on back to sitting on the side of a flat bed without bedrails? 3  -SALMA 4  -MS    Moving to and from a bed to a chair (including a wheelchair)? 3  -SALMA 4  -MS    Standing up from a chair using your arms (e.g., wheelchair, bedside chair)? 3  -SALMA 4  -MS    Climbing 3-5 steps with a railing? 3  -SALMA 3  -MS    To walk in hospital room? 3  -SALMA 3  -MS    AM-PAC 6 Clicks Score (PT) 19  -SALMA 22  -MS    Highest Level of Mobility Goal 6 --> Walk 10 steps or more  -SALMA 7 --> Walk 25 feet or more  -MS      Row Name 11/08/24 1355 11/08/24 1100       Functional Assessment    Outcome Measure Options AM-PAC 6 Clicks Daily Activity (OT)  -ES AM-PAC 6 Clicks Basic Mobility (PT)  -SALMA              User Key  (r) = Recorded By, (t) = Taken By, (c) = Cosigned By      Initials Name Provider Type    Alka Paula, RN Registered Nurse    Deo Luis, PT Physical Therapist    Abena Steen, DARRENR/L, CSRS Occupational Therapist                    Occupational Therapy Education        No education to display                   OT Recommendation and Plan  Planned Therapy Interventions (OT): activity tolerance training, BADL retraining, functional balance retraining, occupation/activity based interventions, patient/caregiver education/training, strengthening exercise, transfer/mobility retraining  Therapy Frequency (OT): 5 times/wk  Plan of Care Review  Plan of Care Reviewed With: patient  Outcome Evaluation: Patient has experienced decline in function from baseline status, presenting with deficits related to balance, strength, tolerance, transfers and mobility that impede patient independence with activities of daily living.  Patient would benefit from skilled Occupational Therapy intervention to maximize patient safety, and promote return to baseline independence.     Time Calculation:   Evaluation Complexity (OT)  Review Occupational Profile/Medical/Therapy History Complexity: brief/low complexity  Assessment, Occupational Performance/Identification of Deficit Complexity: 3-5 performance deficits  Clinical Decision Making Complexity (OT): problem focused assessment/low complexity  Overall Complexity of Evaluation (OT): low complexity     Time Calculation- OT       Row Name 11/08/24 1358             Time Calculation- OT    OT Received On 11/08/24  -ES      OT Goal Re-Cert Due Date 11/17/24  -ES         Untimed Charges    OT Eval/Re-eval Minutes 32  -ES         Total Minutes    Untimed Charges Total Minutes 32  -ES       Total Minutes 32  -ES                User Key  (r) = Recorded By, (t) = Taken By, (c) = Cosigned By      Initials Name Provider Type    ES Abena Patiño OTR/L, CSRS Occupational Therapist                  Therapy Charges for Today       Code Description Service Date Service Provider Modifiers Qty    90224281134 HC OT EVAL LOW COMPLEXITY 3 11/8/2024 Abena Patiño OTR/L, CSRS GO 1                 MICHAEL Ansari/L, CSRS  11/8/2024

## 2024-11-08 NOTE — PLAN OF CARE
Goal Outcome Evaluation:  Plan of Care Reviewed With: patient           Outcome Evaluation: Patient has experienced decline in function from baseline status, presenting with deficits related to balance, strength, tolerance, transfers and mobility that impede patient independence with activities of daily living.  Patient would benefit from skilled Occupational Therapy intervention to maximize patient safety, and promote return to baseline independence.    Anticipated Discharge Disposition (OT): inpatient rehabilitation facility

## 2024-11-08 NOTE — THERAPY EVALUATION
Acute Care - Physical Therapy Initial Evaluation  [facility]     Patient Name: Javi Martínez  : 1940  MRN: [id number]  Today's Date: 2024      Admit date: 2024     Referring Physician: Ge Carmona MD     Surgery Date:* No surgery found *            Visit Dx:     ICD-10-CM ICD-9-CM   1. Chest pain, unspecified type  R07.9 786.50   2. Difficulty in walking  R26.2 719.7     Patient Active Problem List   Diagnosis    Primary osteoarthritis of left knee    S/P TKR (total knee replacement)    Essential hypertension    CAD with CABG    Hyperlipidemia    Presence of cardiac pacemaker single chamber    Anxiety    Gastroesophageal reflux disease    Gout    History of malignant neoplasm of prostate    Testicular hypofunction    Polyneuropathy    Chronic obstructive pulmonary disease    Benign prostatic hyperplasia without lower urinary tract symptoms    Chest pain    CHF exacerbation    Atrial fibrillation, chronic    Stage 3b chronic kidney disease    Postprocedural membranous urethral stricture    Acute on chronic HFrEF (heart failure with reduced ejection fraction)    Suprapubic catheter dysfunction    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    Urethral stricture in male    Elevated troponin level not due myocardial infarction     Past Medical History:   Diagnosis Date    Aneurysm 2013    Arthritis     left knee     Atrial fibrillation, persistent     CHB (complete heart block) 2023    CHF exacerbation 2024    Chronic obstructive pulmonary disease 2023    pt denies having this    Difficulty walking     Numbness in right foot    Essential hypertension 2019    GERD (gastroesophageal reflux disease)     HFrEF (heart failure with reduced ejection fraction) 2021    Hyperlipidemia 2021    Paroxysmal atrial fibrillation     Polyneuropathy 2023    Prostate cancer 2010    with seeds implant     Septic joint 2021     Past Surgical History:   Procedure Laterality  "Date    ABLATION OF DYSRHYTHMIC FOCUS  2013    APPENDECTOMY      ARTERIAL BYPASS SURGERY      CARDIAC ABLATION      x2  \"years ago\"     CARDIAC ELECTROPHYSIOLOGY PROCEDURE N/A 11/01/2021    Procedure: PPM battery change;  Surgeon: Ge Whelan MD;  Location: [facility] CATH INVASIVE LOCATION;  Service: Cardiovascular;  Laterality: N/A;    CARDIAC SURGERY  2006    cabg 3v    CORONARY ARTERY BYPASS GRAFT  2006    CYSTOSCOPY URETHROTOMY VISUAL INTERNAL N/A 08/15/2024    Procedure: CYSTOSCOPY, URETHRAL DILATION, CATHETER PLACEMENT, RETROGRADE URETHROGRAM;  Surgeon: August Myers MD;  Location: [facility] MAIN OR;  Service: Urology;  Laterality: N/A;    CYSTOSCOPY URETHROTOMY VISUAL INTERNAL N/A 10/24/2024    Procedure: CYSTOSCOPY URETHROTOMY;  Surgeon: Mally Bernal MD;  Location: [facility] MAIN OR;  Service: Urology;  Laterality: N/A;    ENDOSCOPY      with dilation     KNEE ARTHROSCOPY Left     TOTAL KNEE ARTHROPLASTY Left 07/23/2021    Procedure: TOTAL KNEE ARTHROPLASTY WITH DORA NAVIGATION WITH BIOMET;  Surgeon: Carlitos Zhao MD;  Location: [facility] OR;  Service: Orthopedics;  Laterality: Left;    VENTRICULAR CARDIAC PACEMAKER INSERTION      [employer]      PT Assessment (Last 12 Hours)       PT Evaluation and Treatment       Row Name 11/08/24 1100          Physical Therapy Time and Intention    Subjective Information no complaints;pain  -SALMA     Document Type evaluation  -SALMA     Mode of Treatment individual therapy;physical therapy  -SALMA     Patient Effort good  -SALMA       Row Name 11/08/24 1100          General Information    Patient Observations alert;cooperative;agree to therapy  -SALMA     Prior Level of Function independent:;all household mobility;community mobility  -SALMA     Equipment Currently Used at Home cane, straight  -SALAM     Existing Precautions/Restrictions fall  -SALMA     Barriers to Rehab none identified  -SALMA       Row Name 11/08/24 1100          Living Environment    Current Living Arrangements home  " -SALMA     People in Home alone  -SALMA       Row Name 11/08/24 1100          Range of Motion (ROM)    Range of Motion ROM is WFL  -SALMA       Row Name 11/08/24 1100          Strength (Manual Muscle Testing)    Strength (Manual Muscle Testing) bilateral lower extremities  Unable to MMT due to c/o significant LE pain specifically R groin  -SALMA       Row Name 11/08/24 1100          Bed Mobility    Bed Mobility bed mobility (all) activities;supine-sit  -SALMA     All Activities, [place] (Bed Mobility) minimum assist (75% patient effort)  -SALMA     Supine-Sit [place] (Bed Mobility) minimum assist (75% patient effort)  -SALMA       Row Name 11/08/24 1100          Transfers    Transfers sit-stand transfer  -SALMA       Row Name 11/08/24 1100          Sit-Stand Transfer    Sit-Stand [place] (Transfers) --  attempted standing pt unable due to significant c/o pain in his R Hip  -SALMA       Row Name 11/08/24 1100          Safety Issues/Impairments Affecting Functional Mobility    Impairments Affecting Function (Mobility) balance;pain;strength  -SALMA       Row Name 11/08/24 1100          Balance    Balance Assessment sitting static balance  -SALMA     Static Sitting Balance standby assist  -SALMA       Row Name 11/08/24 1100          Plan of Care Review    Plan of Care Reviewed With patient  -SALMA     Outcome Evaluation Patient presents with decreased strength, transfers and ambulation mostly limited due to significant pain in his right hip.  Patient wpuld not be safe to return home today based on evaluation findings.  Would recommend rehab placement as patient is unable to transfer to stand or ambulate at this given time.  -SALMA       Row Name 11/08/24 1100          Therapy Assessment/Plan (PT)    Rehab Potential (PT) good  -SALMA     Criteria for Skilled Interventions Met (PT) skilled treatment is necessary  -SALMA     Therapy Frequency (PT) daily  -SALMA     Predicted Duration of Therapy Intervention (PT) 10 days  -SALMA     Problem List (PT) problems  related to;balance;mobility;pain;strength  -SALMA     Activity Limitations Related to Problem List (PT) unable to transfer safely;unable to ambulate safely  -SALMA       Row Name 11/08/24 1100          PT Evaluation Complexity    History, PT Evaluation Complexity no personal factors and/or comorbidities  -SALMA     Examination of Body Systems (PT Eval Complexity) total of 4 or more elements  -SALMA     Clinical Presentation (PT Evaluation Complexity) stable  -SALMA     Clinical Decision Making (PT Evaluation Complexity) low complexity  -SALMA     Overall Complexity (PT Evaluation Complexity) low complexity  -SALMA       Row Name 11/08/24 1100          Therapy Plan Review/Discharge Plan (PT)    Therapy Plan Review (PT) evaluation/treatment results reviewed;participants voiced agreement with care plan;participants included;patient  -SALMA       Row Name 11/08/24 1100          Physical Therapy Goals    Transfer Goal Selection (PT) transfer, PT goal 1  -SALMA     Gait Training Goal Selection (PT) gait training, PT goal 1  -SALMA       Row Name 11/08/24 1100          Transfer Goal 1 (PT)    Activity/Assistive Device (Transfer Goal 1, PT) transfers, all  -SALMA     [place] Level/Cues Needed (Transfer Goal 1, PT) independent  -SALMA     Time Frame (Transfer Goal 1, PT) long term goal (LTG);10 days  -SALMA       Row Name 11/08/24 1100          Gait Training Goal 1 (PT)    Activity/Assistive Device (Gait Training Goal 1, PT) gait (walking locomotion);assistive device use;walker, rolling  -SALMA     [place] Level (Gait Training Goal 1, PT) independent  -SALMA     Distance (Gait Training Goal 1, PT) 300  -SALMA     Time Frame (Gait Training Goal 1, PT) long term goal (LTG);10 days  -SALMA               User Key  (r) = Recorded By, (t) = Taken By, (c) = Cosigned By      Initials Name Provider Type    SALMA Deo Juarez, PT Physical Therapist                    Physical Therapy Education        No education to display                  PT Recommendation and  Plan  Anticipated Discharge Disposition (PT): inpatient rehabilitation facility  Planned Therapy Interventions (PT): balance training, bed mobility training, gait training, home exercise program, stair training, strengthening, transfer training  Therapy Frequency (PT): daily  Plan of Care Reviewed With: patient  Outcome Evaluation: Patient presents with decreased strength, transfers and ambulation mostly limited due to significant pain in his right hip.  Patient wpuld not be safe to return home today based on evaluation findings.  Would recommend rehab placement as patient is unable to transfer to stand or ambulate at this given time.   Outcome Measures       Row Name 11/08/24 1100             How much help from another person do you currently need...    Turning from your back to your side while in flat bed without using bedrails? 4  -SALMA      Moving from lying on back to sitting on the side of a flat bed without bedrails? 3  -SALMA      Moving to and from a bed to a chair (including a wheelchair)? 3  -SALMA      Standing up from a chair using your arms (e.g., wheelchair, bedside chair)? 3  -SALMA      Climbing 3-5 steps with a railing? 3  -SALMA      To walk in hospital room? 3  -SALMA      AM-PAC 6 Clicks Score (PT) 19  -SALMA         Functional Assessment    Outcome Measure Options AM-PAC 6 Clicks Basic Mobility (PT)  -SALMA                User Key  (r) = Recorded By, (t) = Taken By, (c) = Cosigned By      Initials Name Provider Type    Deo Luis PT Physical Therapist                     Time Calculation:    PT Charges       Row Name 11/08/24 1132             Time Calculation    PT Received On 11/08/24  -SALMA      PT Goal Re-Cert Due Date 11/17/24  -SALMA         Untimed Charges    PT Eval/Re-eval Minutes 30  -SALMA         Total Minutes    Untimed Charges Total Minutes 30  -SALMA       Total Minutes 30  -SALMA                User Key  (r) = Recorded By, (t) = Taken By, (c) = Cosigned By      Initials Name Provider Type    SALMA Juarez  Deo GELLER, PT Physical Therapist                      PT G-Codes  Outcome Measure Options: AM-PAC 6 Clicks Basic Mobility (PT)  AM-PAC 6 Clicks Score (PT): 19    Deo Juarez, PT  11/8/2024

## 2024-11-08 NOTE — PLAN OF CARE
Goal Outcome Evaluation:              Outcome Evaluation: Pt reported Right groin pain. Physician aware, tylenol given x1. VSS.

## 2024-11-08 NOTE — PROGRESS NOTES
CARDIOLOGY  INPATIENT PROGRESS NOTE                [facility] UNIT    11/8/2024      PATIENT IDENTIFICATION:   Name:  Javi Martínez      MRN:  [id number]     84 y.o.  male             Chief Complaint   Patient presents with    Chest Pain     CP on and off since Saturday        SUBJECTIVE:   Patient feeling better from a respiratory standpoint is having some discomfort in his groin area possibly related to muscle spasm  OBJECTIVE:  Vitals:    11/07/24 2346 11/08/24 0409 11/08/24 0808 11/08/24 1108   BP: 108/64 116/72 128/84 103/68   BP Location: Left arm Left arm Left arm Left arm   Patient Position: Lying Lying Lying Lying   Pulse:   70 73   Resp: 16 16 20 18   Temp: 97.7 °F (36.5 °C) 98.5 °F (36.9 °C) 97.7 °F (36.5 °C) 97.9 °F (36.6 °C)   TempSrc: Oral Oral Oral Oral   SpO2: 92% 96% 91% 91%   Weight:       Height:               Body mass index is 23.05 kg/m².    Intake/Output Summary (Last 24 hours) at 11/8/2024 1231  Last data filed at 11/8/2024 0900  Gross per 24 hour   Intake 840 ml   Output 2350 ml   Net -1510 ml       Telemetry: Paced ventricular rhythm    Physical Exam  General Appearance:   no acute distress  Alert and oriented x3  HENT:   lips not cyanotic  Atraumatic  Neck:  No jvd   supple  Respiratory:  no respiratory distress  normal breath sounds  no rales  Cardiovascular:    regular rhythm  no S3, no S4   no murmur  no rub  lower extremity edema: Mild  Skin:   warm, dry  No rashes      No Known Allergies  Scheduled meds:  apixaban, 2.5 mg, Oral, Q12H  empagliflozin, 5 mg, Oral, Daily  furosemide, 40 mg, Oral, Daily  potassium chloride, 20 mEq, Oral, BID  sodium chloride, 10 mL, Intravenous, Q12H  tamsulosin, 0.4 mg, Oral, Nightly  valsartan, 40 mg, Oral, Q24H      IV meds:                         Data Review:  CBC          11/6/2024    15:49 11/7/2024    04:28 11/8/2024    04:32   CBC   WBC 8.97  8.28  8.15    RBC 4.98  4.52  4.58    Hemoglobin 14.3  13.2  13.5   "  Hematocrit 46.1  41.8  43.3    MCV 92.6  92.5  94.5    MCH 28.7  29.2  29.5    MCHC 31.0  31.6  31.2    RDW 16.0  15.9  15.8    Platelets 231  202  197      CMP          11/6/2024    15:49 11/7/2024    04:28 11/8/2024    04:32   CMP   Glucose 95  96  102    BUN 24  27  29    Creatinine 1.53  1.63  1.56    EGFR 45.1  41.8  44.1    Sodium 139  138  135    Potassium 4.6  3.9  3.6    Chloride 101  103  103    Calcium 9.1  9.3  9.0    Total Protein 6.9      Albumin 3.6   3.2    Globulin 3.3      Total Bilirubin 1.5      Alkaline Phosphatase 115      AST (SGOT) 25      ALT (SGPT) 14      Albumin/Globulin Ratio 1.1      BUN/Creatinine Ratio 15.7  16.6  18.6    Anion Gap 13.1  8.5  9.1       CARDIAC LABS:      Lab 11/06/24  1743 11/06/24  1549   PROBNP  --  10,013.0*   HSTROP T 48* 48*        No results found for: \"DIGOXIN\"   Lab Results   Component Value Date    TSH 2.080 04/24/2024           Invalid input(s): \"LDLCALC\"  No results found for: \"POCTROP\"  Lab Results   Component Value Date    TROPONINT 48 (H) 11/06/2024   (  Lab Results   Component Value Date    MG 2.0 11/06/2024     Results for orders placed during the hospital encounter of 07/30/24    Adult Transthoracic Echo Complete w/ Color, Spectral and Contrast if necessary per protocol    Interpretation Summary    Left ventricular systolic function is severely decreased. Estimated left ventricular EF = 20%    The left ventricular cavity is moderate to severely dilated.    Left ventricular diastolic function is consistent with (grade II w/high LAP) pseudonormalization.    The left atrial cavity is severely dilated.    The right atrial cavity is moderate to severely  dilated.         Results for orders placed during the hospital encounter of 04/24/24    Stress Test With Myocardial Perfusion One Day    Interpretation Summary    Left ventricular ejection fraction is severely reduced (Calculated EF = 18%).  May be inaccurate due to irregularities in gating from " underlying rhythm    EKG portion was nondiagnostic due to paced rhythm    Moderate sized fixed moderate intensity inferior lateral defect consistent with prior infarct no significant ischemic changes    ASSESSMENT:    Chest pain    CAD with CABG    Acute on chronic HFrEF (heart failure with reduced ejection fraction)    Essential hypertension    Atrial fibrillation, chronic    Elevated troponin level not due myocardial infarction        PLAN:  1.  Patient with a good response to IV diuretics yesterday change over to Lasix at 40 p.o. once a day we will go ahead and discharge on this dose.  Would also add potassium 10 mill equivalents once a day and plan on repeating BMP next week  2.  Valsartan 40 mg once a day  3.  Would hold off on any further GDMT treatment at this point given patient's recurrent issues with hypotensive episodes and have close follow-up in heart failure clinic next week for further titration slowly.  Patient if able to ambulate today and stable would be okay for discharge from cardiac standpoint.           Ge Whelan MD  11/8/2024    12:31 EST            ambulatory

## 2024-11-08 NOTE — PLAN OF CARE
Goal Outcome Evaluation:  Plan of Care Reviewed With: patient           Outcome Evaluation: Patient presents with decreased strength, transfers and ambulation mostly limited due to significant pain in his right hip.  Patient wpuld not be safe to return home today based on evaluation findings.  Would recommend rehab placement as patient is unable to transfer to stand or ambulate at this given time.    Anticipated Discharge Disposition (PT): inpatient rehabilitation facility

## 2024-11-08 NOTE — PLAN OF CARE
Goal Outcome Evaluation:  Plan of Care Reviewed With: patient        Progress: no change  Outcome Evaluation: VSS. No c/o pain or discomfort. No acute changes.

## 2024-11-09 ENCOUNTER — READMISSION MANAGEMENT (OUTPATIENT)
Dept: CALL CENTER | Facility: HOSPITAL | Age: 84
End: 2024-11-09
Payer: MEDICARE

## 2024-11-09 VITALS
BODY MASS INDEX: 23.06 KG/M2 | WEIGHT: 189.38 LBS | DIASTOLIC BLOOD PRESSURE: 62 MMHG | OXYGEN SATURATION: 95 % | SYSTOLIC BLOOD PRESSURE: 91 MMHG | HEART RATE: 71 BPM | TEMPERATURE: 97.9 F | HEIGHT: 76 IN | RESPIRATION RATE: 18 BRPM

## 2024-11-09 LAB
ALBUMIN SERPL-MCNC: 3.4 G/DL (ref 3.5–5.2)
ANION GAP SERPL CALCULATED.3IONS-SCNC: 11.1 MMOL/L (ref 5–15)
BUN SERPL-MCNC: 30 MG/DL (ref 8–23)
BUN/CREAT SERPL: 17.6 (ref 7–25)
CALCIUM SPEC-SCNC: 9.3 MG/DL (ref 8.6–10.5)
CHLORIDE SERPL-SCNC: 102 MMOL/L (ref 98–107)
CO2 SERPL-SCNC: 23.9 MMOL/L (ref 22–29)
CREAT SERPL-MCNC: 1.7 MG/DL (ref 0.76–1.27)
DEPRECATED RDW RBC AUTO: 50.4 FL (ref 37–54)
EGFRCR SERPLBLD CKD-EPI 2021: 39.8 ML/MIN/1.73
ERYTHROCYTE [DISTWIDTH] IN BLOOD BY AUTOMATED COUNT: 15.5 % (ref 12.3–15.4)
GLUCOSE SERPL-MCNC: 131 MG/DL (ref 65–99)
HCT VFR BLD AUTO: 48 % (ref 37.5–51)
HGB BLD-MCNC: 15 G/DL (ref 13–17.7)
MAGNESIUM SERPL-MCNC: 2.1 MG/DL (ref 1.6–2.4)
MCH RBC QN AUTO: 28.3 PG (ref 26.6–33)
MCHC RBC AUTO-ENTMCNC: 31.3 G/DL (ref 31.5–35.7)
MCV RBC AUTO: 90.6 FL (ref 79–97)
PHOSPHATE SERPL-MCNC: 5.2 MG/DL (ref 2.5–4.5)
PLATELET # BLD AUTO: 224 10*3/MM3 (ref 140–450)
PMV BLD AUTO: 9.7 FL (ref 6–12)
POTASSIUM SERPL-SCNC: 4.9 MMOL/L (ref 3.5–5.2)
RBC # BLD AUTO: 5.3 10*6/MM3 (ref 4.14–5.8)
SODIUM SERPL-SCNC: 137 MMOL/L (ref 136–145)
WBC NRBC COR # BLD AUTO: 9.48 10*3/MM3 (ref 3.4–10.8)

## 2024-11-09 PROCEDURE — 80069 RENAL FUNCTION PANEL: CPT | Performed by: PHYSICIAN ASSISTANT

## 2024-11-09 PROCEDURE — 83735 ASSAY OF MAGNESIUM: CPT | Performed by: PHYSICIAN ASSISTANT

## 2024-11-09 PROCEDURE — 85027 COMPLETE CBC AUTOMATED: CPT | Performed by: PHYSICIAN ASSISTANT

## 2024-11-09 PROCEDURE — 63710000001 PREDNISONE PER 1 MG: Performed by: INTERNAL MEDICINE

## 2024-11-09 PROCEDURE — 99239 HOSP IP/OBS DSCHRG MGMT >30: CPT | Performed by: INTERNAL MEDICINE

## 2024-11-09 PROCEDURE — G0378 HOSPITAL OBSERVATION PER HR: HCPCS

## 2024-11-09 RX ORDER — VALSARTAN 40 MG/1
40 TABLET ORAL
Qty: 30 TABLET | Refills: 0 | Status: SHIPPED | OUTPATIENT
Start: 2024-11-09 | End: 2024-12-09

## 2024-11-09 RX ORDER — POTASSIUM CHLORIDE 750 MG/1
10 CAPSULE, EXTENDED RELEASE ORAL DAILY
Qty: 30 CAPSULE | Refills: 0 | Status: SHIPPED | OUTPATIENT
Start: 2024-11-09 | End: 2024-12-09

## 2024-11-09 RX ADMIN — POTASSIUM CHLORIDE 20 MEQ: 1.5 POWDER, FOR SOLUTION ORAL at 08:49

## 2024-11-09 RX ADMIN — PREDNISONE 20 MG: 20 TABLET ORAL at 08:50

## 2024-11-09 RX ADMIN — APIXABAN 2.5 MG: 2.5 TABLET, FILM COATED ORAL at 08:50

## 2024-11-09 RX ADMIN — FUROSEMIDE 40 MG: 40 TABLET ORAL at 08:50

## 2024-11-09 RX ADMIN — VALSARTAN 40 MG: 40 TABLET ORAL at 08:49

## 2024-11-09 RX ADMIN — EMPAGLIFLOZIN 5 MG: 10 TABLET, FILM COATED ORAL at 08:49

## 2024-11-09 NOTE — DISCHARGE SUMMARY
[facility]         HOSPITALIST  DISCHARGE SUMMARY    Patient Name: Javi Martínez  : 1940  MRN: [id number]    Date of Admission: 2024  Date of Discharge: 2024  Primary Care Physician: Rani Lopez APRN  Reason for admission:  Shortness of air chest discomfort    Final diagnosis:  Acute on chronic systolic congestive heart failure  Persistent atrial fibrillation  History of prostate cancer   urinary retention with suprapubic catheter      Consults       Date and Time Order Name Status Description    2024  8:47 PM Inpatient Cardiology Consult      2024  8:43 PM Inpatient Cardiology Consult      2024  4:27 PM Inpatient Hospitalist Consult              Active and Resolved Hospital Problems:  Active Hospital Problems    Diagnosis POA    **Chest pain [R07.9] Yes    Elevated troponin level not due myocardial infarction [R79.89] Unknown    Acute on chronic HFrEF (heart failure with reduced ejection fraction) [I50.23] Yes    Atrial fibrillation, chronic [I48.20] Yes    CAD with CABG [I25.10] Yes    Essential hypertension [I10] Yes      Resolved Hospital Problems   No resolved problems to display.       Hospital Course     Hospital Course:  Patient is a 84-year-old male past medical history significant for systolic congestive heart failure ejection fraction around 20% who presents emergency department with worsening shortness of air evaluated emergency department had elevated BNP signs of volume overload on chest x-ray has been admitted to the hospitalist service cardiology consulted.  Patient started on IV diuretics transition to oral diuretics when volume status and respiratory status improved initiated on goal-directed medical therapy currently on Jardiance Lasix and valsartan beta-blocker and Aldactone on hold given issues with hypotension patient will follow-up outpatient with cardiology for further titration and addition of other goal-directed medications.  Patient  seen and examined on 11/9/2024 hemodynamically stable for discharge patient to follow-up with his primary care provider and cardiologist patient should return to ED for worsening symptoms.          DISCHARGE Follow Up Recommendations for labs and diagnostics: As above      Day of Discharge     Vital Signs:  Temp:  [97.2 °F (36.2 °C)-98.1 °F (36.7 °C)] 97.2 °F (36.2 °C)  Heart Rate:  [69-74] 74  Resp:  [16-18] 18  BP: ()/(68-90) 99/74  Physical Exam:   Constitutional: Awake alert  Respiratory: Clear  Cardiovascular: RRR  GI: Abdomen soft nontender      Discharge Details        Discharge Medications        ASK your doctor about these medications        Instructions Start Date   allopurinol 100 MG tablet  Commonly known as: ZYLOPRIM   100 mg, Oral, Daily      Eliquis 2.5 MG tablet tablet  Generic drug: apixaban   2.5 mg, Oral, Every 12 Hours Scheduled      furosemide 40 MG tablet  Commonly known as: LASIX   40 mg, Oral, Daily PRN      Jardiance 10 MG tablet tablet  Generic drug: empagliflozin   5 mg, Oral, Daily      LORazepam 0.5 MG tablet  Commonly known as: ATIVAN   0.5 mg, Oral, 2 Times Daily PRN      MELATONIN PO   1 tablet, Oral, Nightly PRN      omeprazole 20 MG capsule  Commonly known as: priLOSEC   20 mg, Oral, Daily      tamsulosin 0.4 MG capsule 24 hr capsule  Commonly known as: FLOMAX   1 capsule, Oral, Daily               No Known Allergies    Discharge Disposition:  Home-Health Care Sv    Diet:  Hospital:  Diet Order   Procedures    Diet: Cardiac; Low Sodium (2g); Fluid Consistency: Thin (IDDSI 0)       Discharge Activity:       CODE STATUS:  Code Status and Medical Interventions: CPR (Attempt to Resuscitate); Full Support   Ordered at: 11/08/24 0839     Code Status (Patient has no pulse and is not breathing):    CPR (Attempt to Resuscitate)     Medical Interventions (Patient has pulse or is breathing):    Full Support         Future Appointments   Date Time Provider Department Center   11/18/2024   1:30 PM Mally Bernal MD [facility]   3/24/2025 10:45 AM Mally Bernal MD [facility]           Pertinent  and/or Most Recent Results         LAB RESULTS:      Lab 11/09/24 0424 11/08/24 0432 11/07/24 0428 11/06/24  1549   WBC 9.48 8.15 8.28 8.97   HEMOGLOBIN 15.0 13.5 13.2 14.3   HEMATOCRIT 48.0 43.3 41.8 46.1   PLATELETS 224 197 202 231   NEUTROS ABS  --  5.17 4.99 6.28   IMMATURE GRANS (ABS)  --  0.02 0.03 0.02   LYMPHS ABS  --  1.78 1.96 1.66   MONOS ABS  --  0.90 1.00* 0.84   EOS ABS  --  0.19 0.23 0.12   MCV 90.6 94.5 92.5 92.6         Lab 11/09/24 0424 11/08/24 0432 11/07/24 0428 11/06/24  1549   SODIUM 137 135* 138 139   POTASSIUM 4.9 3.6 3.9 4.6   CHLORIDE 102 103 103 101   CO2 23.9 22.9 26.5 24.9   ANION GAP 11.1 9.1 8.5 13.1   BUN 30* 29* 27* 24*   CREATININE 1.70* 1.56* 1.63* 1.53*   EGFR 39.8* 44.1* 41.8* 45.1*   GLUCOSE 131* 102* 96 95   CALCIUM 9.3 9.0 9.3 9.1   MAGNESIUM 2.1  --   --  2.0   PHOSPHORUS 5.2* 3.5  --   --          Lab 11/09/24 0424 11/08/24 0432 11/06/24  1549   TOTAL PROTEIN  --   --  6.9   ALBUMIN 3.4* 3.2* 3.6   GLOBULIN  --   --  3.3   ALT (SGPT)  --   --  14   AST (SGOT)  --   --  25   BILIRUBIN  --   --  1.5*   ALK PHOS  --   --  115   LIPASE  --   --  37         Lab 11/06/24  1743 11/06/24  1549   PROBNP  --  10,013.0*   HSTROP T 48* 48*                 Brief Urine Lab Results  (Last result in the past 365 days)        Color   Clarity   Blood   Leuk Est   Nitrite   Protein   CREAT   Urine HCG        09/23/24 1100 Yellow   Clear   Trace   Moderate (2+)   Negative   Negative                 Microbiology Results (last 10 days)       ** No results found for the last 240 hours. **            XR Hips Bilateral With or Without Pelvis 2 View    Result Date: 11/8/2024  Impression: Impression: 1. Advanced osteoarthritis of the right hip, mild osteoarthritis of the left hip. 2. Multilevel degenerative disc disease. Electronically Signed: Ge Martino MD   11/8/2024 2:01 PM EST  Workstation ID: [id number]    XR Chest 1 View    Result Date: 11/6/2024  Impression: Impression: 1. Cardiomegaly and mild pulmonary venous hypertension. 2. Shifting pattern of bilateral mid and lower lung disease, with mildly increased right upper lung disease, and improved aeration of the right lung base. Stable left basilar atelectasis and small effusions. Electronically Signed: Jose Hooks MD  11/6/2024 4:23 PM EST  Workstation ID: [id number]             Results for orders placed during the hospital encounter of 07/30/24    Adult Transthoracic Echo Complete w/ Color, Spectral and Contrast if necessary per protocol    Interpretation Summary    Left ventricular systolic function is severely decreased. Estimated left ventricular EF = 20%    The left ventricular cavity is moderate to severely dilated.    Left ventricular diastolic function is consistent with (grade II w/high LAP) pseudonormalization.    The left atrial cavity is severely dilated.    The right atrial cavity is moderate to severely  dilated.      Labs Pending at Discharge:        Time spent on Discharge including face to face service: 35 minutes    Electronically signed by DOLORES Burt, 11/09/24, 9:55 AM EST.    Attending Documentation:  Patient independently seen and evaluated, above documentation reflects plan put forth during bedside rounds.  More than 51% of the time of this patient encounter was performed by me. I discussed the care plan with ERIC Hall PA-C, I agree with his findings and plan as documented, what I have added to the care plan and modified is as follows in my documentation and my medical decision making; 84-year-old male with CHF here with volume overload, diuresed with IV Lasix, responded quickly.  Had some groin pain yesterday but that resolved.  Stable for discharge home today.  Initially rehab was recommended but the patient says that was just due to having pain in his groin, now that that is  resolved he is ambulating well and does not wish to pursue rehab.  Electronically signed by Ge Carmona MD, 11/09/24, 3:16 PM EST.

## 2024-11-09 NOTE — PLAN OF CARE
Goal Outcome Evaluation:           Progress: no change  Outcome Evaluation: VSS. No acute changes this shift. No complaints noted this shift.

## 2024-11-10 NOTE — OUTREACH NOTE
Prep Survey      Flowsheet Row Responses   [facility] facility patient discharged from? [facility]   Is LACE score < 7 ? No   Eligibility Readm Mgmt   Discharge diagnosis Chest pain   Does the patient have one of the following disease processes/diagnoses(primary or secondary)? Other   Does the patient have Home health ordered? Yes   What is the Home health agency?  HH referral pending   Is there a DME ordered? No   Prep survey completed? Yes            Lenore BECKHAM - Registered Nurse

## 2024-11-18 ENCOUNTER — OFFICE VISIT (OUTPATIENT)
Dept: UROLOGY | Age: 84
End: 2024-11-18
Payer: MEDICARE

## 2024-11-18 VITALS
HEIGHT: 76 IN | DIASTOLIC BLOOD PRESSURE: 80 MMHG | SYSTOLIC BLOOD PRESSURE: 128 MMHG | BODY MASS INDEX: 23.02 KG/M2 | WEIGHT: 189 LBS

## 2024-11-18 DIAGNOSIS — N99.114 POSTPROCEDURAL MALE URETHRAL STRICTURE: Primary | ICD-10-CM

## 2024-11-18 DIAGNOSIS — N40.0 BENIGN PROSTATIC HYPERPLASIA WITHOUT LOWER URINARY TRACT SYMPTOMS: ICD-10-CM

## 2024-11-18 PROCEDURE — 3079F DIAST BP 80-89 MM HG: CPT | Performed by: UROLOGY

## 2024-11-18 PROCEDURE — 1160F RVW MEDS BY RX/DR IN RCRD: CPT | Performed by: UROLOGY

## 2024-11-18 PROCEDURE — 1159F MED LIST DOCD IN RCRD: CPT | Performed by: UROLOGY

## 2024-11-18 PROCEDURE — 3074F SYST BP LT 130 MM HG: CPT | Performed by: UROLOGY

## 2024-11-18 PROCEDURE — 99213 OFFICE O/P EST LOW 20 MIN: CPT | Performed by: UROLOGY

## 2024-11-18 NOTE — PROGRESS NOTES
"Chief Complaint  History of malignant neoplasm of prostate    Subjective          Javi Martínez presents to [facility]    History of Present Illness  Patient is here after DVIU in the operating room.  He did have a stricture in the bulbar urethra which I incised into place of this.  He has Diamond catheter in place.  No other complaints.    He is getting ready to go on a prolonged trip to visit his daughter in [place].  He is not sure whether he like the catheter removed prior to the trip or afterwards.      Objective   Vital Signs:   /80   Ht 193 cm (76\")   Wt 85.7 kg (189 lb)   BMI 23.01 kg/m²       Physical Exam  Vitals and nursing note reviewed.   Constitutional:       Appearance: Normal appearance. He is well-developed.   Pulmonary:      Effort: Pulmonary effort is normal.      Breath sounds: Normal air entry.   Neurological:      Mental Status: He is alert and oriented to person, place, and time.      Motor: Motor function is intact.   Psychiatric:         Mood and Affect: Mood normal.         Behavior: Behavior normal.          Result Review :   The following data was reviewed by: Mally Bernal MD on 11/18/2024:    Results for orders placed or performed during the hospital encounter of 11/06/24   ECG 12 Lead ED Triage Standing Order; Chest Pain    Collection Time: 11/06/24  3:37 PM   Result Value Ref Range    QT Interval 520 ms    QTC Interval 567 ms   High Sensitivity Troponin T    Collection Time: 11/06/24  3:49 PM    Specimen: Blood   Result Value Ref Range    HS Troponin T 48 (H) <22 ng/L   Comprehensive Metabolic Panel    Collection Time: 11/06/24  3:49 PM    Specimen: Blood   Result Value Ref Range    Glucose 95 65 - 99 mg/dL    BUN 24 (H) 8 - 23 mg/dL    Creatinine 1.53 (H) 0.76 - 1.27 mg/dL    Sodium 139 136 - 145 mmol/L    Potassium 4.6 3.5 - 5.2 mmol/L    Chloride 101 98 - 107 mmol/L    CO2 24.9 22.0 - 29.0 mmol/L    Calcium 9.1 8.6 - 10.5 mg/dL    Total " Protein 6.9 6.0 - 8.5 g/dL    Albumin 3.6 3.5 - 5.2 g/dL    ALT (SGPT) 14 1 - 41 U/L    AST (SGOT) 25 1 - 40 U/L    Alkaline Phosphatase 115 39 - 117 U/L    Total Bilirubin 1.5 (H) 0.0 - 1.2 mg/dL    Globulin 3.3 gm/dL    A/G Ratio 1.1 g/dL    BUN/Creatinine Ratio 15.7 7.0 - 25.0    Anion Gap 13.1 5.0 - 15.0 mmol/L    eGFR 45.1 (L) >60.0 mL/min/1.73   Lipase    Collection Time: 11/06/24  3:49 PM    Specimen: Blood   Result Value Ref Range    Lipase 37 13 - 60 U/L   BNP    Collection Time: 11/06/24  3:49 PM    Specimen: Blood   Result Value Ref Range    proBNP 10,013.0 (H) 0.0 - 1,800.0 pg/mL   Magnesium    Collection Time: 11/06/24  3:49 PM    Specimen: Blood   Result Value Ref Range    Magnesium 2.0 1.6 - 2.4 mg/dL   CBC Auto Differential    Collection Time: 11/06/24  3:49 PM    Specimen: Blood   Result Value Ref Range    WBC 8.97 3.40 - 10.80 10*3/mm3    RBC 4.98 4.14 - 5.80 10*6/mm3    Hemoglobin 14.3 13.0 - 17.7 g/dL    Hematocrit 46.1 37.5 - 51.0 %    MCV 92.6 79.0 - 97.0 fL    MCH 28.7 26.6 - 33.0 pg    MCHC 31.0 (L) 31.5 - 35.7 g/dL    RDW 16.0 (H) 12.3 - 15.4 %    RDW-SD 54.4 (H) 37.0 - 54.0 fl    MPV 9.5 6.0 - 12.0 fL    Platelets 231 140 - 450 10*3/mm3    Neutrophil % 70.0 42.7 - 76.0 %    Lymphocyte % 18.5 (L) 19.6 - 45.3 %    Monocyte % 9.4 5.0 - 12.0 %    Eosinophil % 1.3 0.3 - 6.2 %    Basophil % 0.6 0.0 - 1.5 %    Immature Grans % 0.2 0.0 - 0.5 %    Neutrophils, Absolute 6.28 1.70 - 7.00 10*3/mm3    Lymphocytes, Absolute 1.66 0.70 - 3.10 10*3/mm3    Monocytes, Absolute 0.84 0.10 - 0.90 10*3/mm3    Eosinophils, Absolute 0.12 0.00 - 0.40 10*3/mm3    Basophils, Absolute 0.05 0.00 - 0.20 10*3/mm3    Immature Grans, Absolute 0.02 0.00 - 0.05 10*3/mm3    nRBC 0.0 0.0 - 0.2 /100 WBC   Green Top (Gel)    Collection Time: 11/06/24  3:49 PM   Result Value Ref Range    Extra Tube Hold for add-ons.    Lavender Top    Collection Time: 11/06/24  3:49 PM   Result Value Ref Range    Extra Tube hold for add-on    Gold  Top - SST    Collection Time: 11/06/24  3:49 PM   Result Value Ref Range    Extra Tube Hold for add-ons.    Light Blue Top    Collection Time: 11/06/24  3:49 PM   Result Value Ref Range    Extra Tube Hold for add-ons.    High Sensitivity Troponin T 2Hr    Collection Time: 11/06/24  5:43 PM    Specimen: Blood   Result Value Ref Range    HS Troponin T 48 (H) <22 ng/L    Troponin T Delta 0 >=-4 - <+4 ng/L   ECG 12 Lead ED Triage Standing Order; Chest Pain    Collection Time: 11/06/24  5:54 PM   Result Value Ref Range    QT Interval 526 ms    QTC Interval 567 ms   Basic Metabolic Panel    Collection Time: 11/07/24  4:28 AM    Specimen: Arm, Right; Blood   Result Value Ref Range    Glucose 96 65 - 99 mg/dL    BUN 27 (H) 8 - 23 mg/dL    Creatinine 1.63 (H) 0.76 - 1.27 mg/dL    Sodium 138 136 - 145 mmol/L    Potassium 3.9 3.5 - 5.2 mmol/L    Chloride 103 98 - 107 mmol/L    CO2 26.5 22.0 - 29.0 mmol/L    Calcium 9.3 8.6 - 10.5 mg/dL    BUN/Creatinine Ratio 16.6 7.0 - 25.0    Anion Gap 8.5 5.0 - 15.0 mmol/L    eGFR 41.8 (L) >60.0 mL/min/1.73   CBC Auto Differential    Collection Time: 11/07/24  4:28 AM    Specimen: Arm, Right; Blood   Result Value Ref Range    WBC 8.28 3.40 - 10.80 10*3/mm3    RBC 4.52 4.14 - 5.80 10*6/mm3    Hemoglobin 13.2 13.0 - 17.7 g/dL    Hematocrit 41.8 37.5 - 51.0 %    MCV 92.5 79.0 - 97.0 fL    MCH 29.2 26.6 - 33.0 pg    MCHC 31.6 31.5 - 35.7 g/dL    RDW 15.9 (H) 12.3 - 15.4 %    RDW-SD 53.2 37.0 - 54.0 fl    MPV 9.7 6.0 - 12.0 fL    Platelets 202 140 - 450 10*3/mm3    Neutrophil % 60.2 42.7 - 76.0 %    Lymphocyte % 23.7 19.6 - 45.3 %    Monocyte % 12.1 (H) 5.0 - 12.0 %    Eosinophil % 2.8 0.3 - 6.2 %    Basophil % 0.8 0.0 - 1.5 %    Immature Grans % 0.4 0.0 - 0.5 %    Neutrophils, Absolute 4.99 1.70 - 7.00 10*3/mm3    Lymphocytes, Absolute 1.96 0.70 - 3.10 10*3/mm3    Monocytes, Absolute 1.00 (H) 0.10 - 0.90 10*3/mm3    Eosinophils, Absolute 0.23 0.00 - 0.40 10*3/mm3    Basophils, Absolute 0.07  0.00 - 0.20 10*3/mm3    Immature Grans, Absolute 0.03 0.00 - 0.05 10*3/mm3    nRBC 0.0 0.0 - 0.2 /100 WBC   Renal Function Panel    Collection Time: 11/08/24  4:32 AM    Specimen: Arm, Left; Blood   Result Value Ref Range    Glucose 102 (H) 65 - 99 mg/dL    BUN 29 (H) 8 - 23 mg/dL    Creatinine 1.56 (H) 0.76 - 1.27 mg/dL    Sodium 135 (L) 136 - 145 mmol/L    Potassium 3.6 3.5 - 5.2 mmol/L    Chloride 103 98 - 107 mmol/L    CO2 22.9 22.0 - 29.0 mmol/L    Calcium 9.0 8.6 - 10.5 mg/dL    Albumin 3.2 (L) 3.5 - 5.2 g/dL    Phosphorus 3.5 2.5 - 4.5 mg/dL    Anion Gap 9.1 5.0 - 15.0 mmol/L    BUN/Creatinine Ratio 18.6 7.0 - 25.0    eGFR 44.1 (L) >60.0 mL/min/1.73   CBC Auto Differential    Collection Time: 11/08/24  4:32 AM    Specimen: Arm, Left; Blood   Result Value Ref Range    WBC 8.15 3.40 - 10.80 10*3/mm3    RBC 4.58 4.14 - 5.80 10*6/mm3    Hemoglobin 13.5 13.0 - 17.7 g/dL    Hematocrit 43.3 37.5 - 51.0 %    MCV 94.5 79.0 - 97.0 fL    MCH 29.5 26.6 - 33.0 pg    MCHC 31.2 (L) 31.5 - 35.7 g/dL    RDW 15.8 (H) 12.3 - 15.4 %    RDW-SD 53.5 37.0 - 54.0 fl    MPV 9.9 6.0 - 12.0 fL    Platelets 197 140 - 450 10*3/mm3    Neutrophil % 63.6 42.7 - 76.0 %    Lymphocyte % 21.8 19.6 - 45.3 %    Monocyte % 11.0 5.0 - 12.0 %    Eosinophil % 2.3 0.3 - 6.2 %    Basophil % 1.1 0.0 - 1.5 %    Immature Grans % 0.2 0.0 - 0.5 %    Neutrophils, Absolute 5.17 1.70 - 7.00 10*3/mm3    Lymphocytes, Absolute 1.78 0.70 - 3.10 10*3/mm3    Monocytes, Absolute 0.90 0.10 - 0.90 10*3/mm3    Eosinophils, Absolute 0.19 0.00 - 0.40 10*3/mm3    Basophils, Absolute 0.09 0.00 - 0.20 10*3/mm3    Immature Grans, Absolute 0.02 0.00 - 0.05 10*3/mm3    nRBC 0.0 0.0 - 0.2 /100 WBC   Uric Acid    Collection Time: 11/08/24  4:32 AM    Specimen: Arm, Left; Blood   Result Value Ref Range    Uric Acid 7.0 3.4 - 7.0 mg/dL   Renal Function Panel    Collection Time: 11/09/24  4:24 AM    Specimen: Arm, Right; Blood   Result Value Ref Range    Glucose 131 (H) 65 - 99  mg/dL    BUN 30 (H) 8 - 23 mg/dL    Creatinine 1.70 (H) 0.76 - 1.27 mg/dL    Sodium 137 136 - 145 mmol/L    Potassium 4.9 3.5 - 5.2 mmol/L    Chloride 102 98 - 107 mmol/L    CO2 23.9 22.0 - 29.0 mmol/L    Calcium 9.3 8.6 - 10.5 mg/dL    Albumin 3.4 (L) 3.5 - 5.2 g/dL    Phosphorus 5.2 (H) 2.5 - 4.5 mg/dL    Anion Gap 11.1 5.0 - 15.0 mmol/L    BUN/Creatinine Ratio 17.6 7.0 - 25.0    eGFR 39.8 (L) >60.0 mL/min/1.73   CBC (No Diff)    Collection Time: 11/09/24  4:24 AM    Specimen: Arm, Right; Blood   Result Value Ref Range    WBC 9.48 3.40 - 10.80 10*3/mm3    RBC 5.30 4.14 - 5.80 10*6/mm3    Hemoglobin 15.0 13.0 - 17.7 g/dL    Hematocrit 48.0 37.5 - 51.0 %    MCV 90.6 79.0 - 97.0 fL    MCH 28.3 26.6 - 33.0 pg    MCHC 31.3 (L) 31.5 - 35.7 g/dL    RDW 15.5 (H) 12.3 - 15.4 %    RDW-SD 50.4 37.0 - 54.0 fl    MPV 9.7 6.0 - 12.0 fL    Platelets 224 140 - 450 10*3/mm3   Magnesium    Collection Time: 11/09/24  4:24 AM    Specimen: Arm, Right; Blood   Result Value Ref Range    Magnesium 2.1 1.6 - 2.4 mg/dL              Assessment and Plan    Diagnoses and all orders for this visit:    1. 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 Benign prostatic hyperplasia without lower urinary tract symptoms    He will keep his catheter in.  I will see him back after he returns from his trip for a void trial.        Follow Up       No follow-ups on file.  Patient was given instructions and counseling regarding his condition or for health maintenance advice. Please see specific information pulled into the AVS if appropriate.

## 2024-11-19 ENCOUNTER — LAB (OUTPATIENT)
Facility: HOSPITAL | Age: 84
End: 2024-11-19
Payer: MEDICARE

## 2024-11-19 ENCOUNTER — OFFICE VISIT (OUTPATIENT)
Dept: CARDIOLOGY | Facility: CLINIC | Age: 84
End: 2024-11-19
Payer: MEDICARE

## 2024-11-19 VITALS
HEIGHT: 76 IN | WEIGHT: 174.2 LBS | BODY MASS INDEX: 21.21 KG/M2 | HEART RATE: 77 BPM | SYSTOLIC BLOOD PRESSURE: 102 MMHG | DIASTOLIC BLOOD PRESSURE: 63 MMHG

## 2024-11-19 DIAGNOSIS — I50.23 ACUTE ON CHRONIC HFREF (HEART FAILURE WITH REDUCED EJECTION FRACTION): Primary | ICD-10-CM

## 2024-11-19 DIAGNOSIS — I50.23 ACUTE ON CHRONIC HFREF (HEART FAILURE WITH REDUCED EJECTION FRACTION): ICD-10-CM

## 2024-11-19 PROBLEM — R07.9 CHEST PAIN: Status: RESOLVED | Noted: 2024-04-24 | Resolved: 2024-11-19

## 2024-11-19 PROBLEM — I50.9 CHF EXACERBATION: Status: RESOLVED | Noted: 2024-04-24 | Resolved: 2024-11-19

## 2024-11-19 PROBLEM — R79.89 ELEVATED TROPONIN LEVEL NOT DUE MYOCARDIAL INFARCTION: Status: RESOLVED | Noted: 2024-11-07 | Resolved: 2024-11-19

## 2024-11-19 PROBLEM — A69.20 LYME DISEASE: Status: ACTIVE | Noted: 2023-10-19

## 2024-11-19 LAB
ANION GAP SERPL CALCULATED.3IONS-SCNC: 9.8 MMOL/L (ref 5–15)
BUN SERPL-MCNC: 24 MG/DL (ref 8–23)
BUN/CREAT SERPL: 15.4 (ref 7–25)
CALCIUM SPEC-SCNC: 9.4 MG/DL (ref 8.6–10.5)
CHLORIDE SERPL-SCNC: 105 MMOL/L (ref 98–107)
CO2 SERPL-SCNC: 24.2 MMOL/L (ref 22–29)
CREAT SERPL-MCNC: 1.56 MG/DL (ref 0.76–1.27)
EGFRCR SERPLBLD CKD-EPI 2021: 44.1 ML/MIN/1.73
GLUCOSE SERPL-MCNC: 87 MG/DL (ref 65–99)
POTASSIUM SERPL-SCNC: 4.6 MMOL/L (ref 3.5–5.2)
SODIUM SERPL-SCNC: 139 MMOL/L (ref 136–145)

## 2024-11-19 PROCEDURE — 80048 BASIC METABOLIC PNL TOTAL CA: CPT

## 2024-11-19 PROCEDURE — 3074F SYST BP LT 130 MM HG: CPT | Performed by: NURSE PRACTITIONER

## 2024-11-19 PROCEDURE — 1159F MED LIST DOCD IN RCRD: CPT | Performed by: NURSE PRACTITIONER

## 2024-11-19 PROCEDURE — 99213 OFFICE O/P EST LOW 20 MIN: CPT | Performed by: NURSE PRACTITIONER

## 2024-11-19 PROCEDURE — 3078F DIAST BP <80 MM HG: CPT | Performed by: NURSE PRACTITIONER

## 2024-11-19 PROCEDURE — 1160F RVW MEDS BY RX/DR IN RCRD: CPT | Performed by: NURSE PRACTITIONER

## 2024-11-19 PROCEDURE — 36415 COLL VENOUS BLD VENIPUNCTURE: CPT

## 2024-11-19 RX ORDER — VALSARTAN 40 MG/1
40 TABLET ORAL
Qty: 90 TABLET | Refills: 3 | Status: SHIPPED | OUTPATIENT
Start: 2024-11-19

## 2024-11-19 RX ORDER — POTASSIUM CHLORIDE 750 MG/1
10 CAPSULE, EXTENDED RELEASE ORAL DAILY
Qty: 90 CAPSULE | Refills: 3 | Status: SHIPPED | OUTPATIENT
Start: 2024-11-19

## 2024-11-19 NOTE — PROGRESS NOTES
"Chief Complaint  Follow-up, Atrial Fibrillation, Coronary Artery Disease, and Hyperlipidemia    Subjective            History of Present Illness  aJvi Martínez is an 84-year-old male patient who presents to the office today for hospital follow up. He was admitted to [facility] from 11/6/24 to 11/9/24 for shortness of breath and chest discomfort. He had evidence of volume overload on chest xray. He was given diuretics with improved symptoms. Today he reports only having to take lasix on every other week basis. He denies any new or worsening cardiac symptoms today.    PMH  Past Medical History:   Diagnosis Date    Aneurysm 2013    Arthritis     left knee     Atrial fibrillation, persistent     CHB (complete heart block) 06/07/2023    CHF exacerbation 04/24/2024    Chronic obstructive pulmonary disease 09/01/2023    pt denies having this    Difficulty walking 2023    Numbness in right foot    Essential hypertension 05/08/2019    GERD (gastroesophageal reflux disease)     HFrEF (heart failure with reduced ejection fraction) 08/11/2021    Hyperlipidemia 08/11/2021    Paroxysmal atrial fibrillation     Polyneuropathy 06/19/2023    Prostate cancer 2010    with seeds implant     Septic joint 11/26/2021         ALLERGY  No Known Allergies       SURGICALHX  Past Surgical History:   Procedure Laterality Date    ABLATION OF DYSRHYTHMIC FOCUS  2013    APPENDECTOMY      ARTERIAL BYPASS SURGERY      CARDIAC ABLATION      x2  \"years ago\"     CARDIAC ELECTROPHYSIOLOGY PROCEDURE N/A 11/01/2021    Procedure: PPM battery change;  Surgeon: Ge Whelan MD;  Location: [facility] CATH INVASIVE LOCATION;  Service: Cardiovascular;  Laterality: N/A;    CARDIAC SURGERY  2006    cabg 3v    CORONARY ARTERY BYPASS GRAFT  2006    CYSTOSCOPY URETHROTOMY VISUAL INTERNAL N/A 08/15/2024    Procedure: CYSTOSCOPY, URETHRAL DILATION, CATHETER PLACEMENT, RETROGRADE URETHROGRAM;  Surgeon: August Myers MD;  Location: [facility] OR;  Service: Urology;  " Laterality: N/A;    CYSTOSCOPY URETHROTOMY VISUAL INTERNAL N/A 10/24/2024    Procedure: CYSTOSCOPY URETHROTOMY;  Surgeon: Mally Bernal MD;  Location: [facility] MAIN OR;  Service: Urology;  Laterality: N/A;    ENDOSCOPY      with dilation     KNEE ARTHROSCOPY Left     TOTAL KNEE ARTHROPLASTY Left 2021    Procedure: TOTAL KNEE ARTHROPLASTY WITH DORA NAVIGATION WITH BIOMET;  Surgeon: Carlitos Zhao MD;  Location: [facility] MAIN OR;  Service: Orthopedics;  Laterality: Left;    VENTRICULAR CARDIAC PACEMAKER INSERTION      medtronic           SOC  Social History     Socioeconomic History    Marital status:    Tobacco Use    Smoking status: Former     Current packs/day: 0.00     Average packs/day: 0.5 packs/day for 33.6 years (16.8 ttl pk-yrs)     Types: Cigarettes     Start date: 1962     Quit date: 1995     Years since quittin.3     Passive exposure: Never    Smokeless tobacco: Never   Vaping Use    Vaping status: Never Used   Substance and Sexual Activity    Alcohol use: Never    Drug use: Never    Sexual activity: Not Currently     Partners: Female         FAMHX  Family History   Problem Relation Age of Onset    Heart attack Mother     No Known Problems Father     No Known Problems Sister     No Known Problems Brother     No Known Problems Maternal Aunt     No Known Problems Maternal Uncle     No Known Problems Paternal Aunt     No Known Problems Paternal Uncle     No Known Problems Maternal Grandmother     No Known Problems Maternal Grandfather     No Known Problems Paternal Grandmother     No Known Problems Paternal Grandfather     No Known Problems Other           MEDSIGONLY  Current Outpatient Medications on File Prior to Visit   Medication Sig    allopurinol (ZYLOPRIM) 100 MG tablet Take 1 tablet by mouth Daily.    Eliquis 2.5 MG tablet tablet Take 1 tablet by mouth Every 12 (Twelve) Hours.    furosemide (LASIX) 40 MG tablet Take 1 tablet by mouth Daily As Needed (for swelling).     "Jardiance 10 MG tablet tablet Take 0.5 tablets by mouth Daily.    LORazepam (ATIVAN) 0.5 MG tablet Take 1 tablet by mouth 2 (Two) Times a Day As Needed for Anxiety.    MELATONIN PO Take 1 tablet by mouth At Night As Needed (sleep).    omeprazole (priLOSEC) 20 MG capsule Take 1 capsule by mouth Daily.    potassium chloride (MICRO-K) 10 MEQ CR capsule Take 1 capsule by mouth Daily for 30 days.    tamsulosin (FLOMAX) 0.4 MG capsule 24 hr capsule Take 1 capsule by mouth Daily.    valsartan (DIOVAN) 40 MG tablet Take 1 tablet by mouth Daily for 30 days.     No current facility-administered medications on file prior to visit.         Objective   /63   Pulse 77   Ht 193 cm (75.98\")   Wt 79 kg (174 lb 3.2 oz)   BMI 21.21 kg/m²       Physical Exam  Constitutional:       Appearance: He is normal weight.   HENT:      Head: Normocephalic.   Neck:      Vascular: No carotid bruit.   Cardiovascular:      Rate and Rhythm: Normal rate and regular rhythm.      Pulses: Normal pulses.      Heart sounds: Normal heart sounds. No murmur heard.  Pulmonary:      Effort: Pulmonary effort is normal.      Breath sounds: Normal breath sounds.   Musculoskeletal:      Cervical back: Neck supple.      Right lower leg: No edema.      Left lower leg: No edema.   Skin:     General: Skin is dry.   Neurological:      Mental Status: He is alert and oriented to person, place, and time.   Psychiatric:         Behavior: Behavior normal.       Result Review :   The following data was reviewed by: MEJIA Davidson on 11/19/2024:  proBNP   Date Value Ref Range Status   11/06/2024 10,013.0 (H) 0.0 - 1,800.0 pg/mL Final     CMP          11/8/2024    04:32 11/9/2024    04:24   CMP   Glucose 102  131    BUN 29  30    Creatinine 1.56  1.70    EGFR 44.1  39.8    Sodium 135  137    Potassium 3.6  4.9    Chloride 103  102    Calcium 9.0  9.3    Albumin 3.2  3.4    BUN/Creatinine Ratio 18.6  17.6    Anion Gap 9.1  11.1      CBC w/diff          11/8/2024 " "   04:32 11/9/2024    04:24   CBC w/Diff   WBC 8.15  9.48    RBC 4.58  5.30    Hemoglobin 13.5  15.0    Hematocrit 43.3  48.0    MCV 94.5  90.6    MCH 29.5  28.3    MCHC 31.2  31.3    RDW 15.8  15.5    Platelets 197  224    Neutrophil Rel % 63.6     Immature Granulocyte Rel % 0.2     Lymphocyte Rel % 21.8     Monocyte Rel % 11.0     Eosinophil Rel % 2.3     Basophil Rel % 1.1        Lab Results   Component Value Date    TSH 2.080 04/24/2024      Lab Results   Component Value Date    FREET4 1.38 04/24/2024      No results found for: \"DDIMERQUANT\"  Magnesium   Date Value Ref Range Status   11/09/2024 2.1 1.6 - 2.4 mg/dL Final      No results found for: \"DIGOXIN\"   Lab Results   Component Value Date    TROPONINT 48 (H) 11/06/2024 4/26/2024    05:20   Lipid Panel   Total Cholesterol 122    Triglycerides 82    HDL Cholesterol 32    VLDL Cholesterol 16    LDL Cholesterol  74    LDL/HDL Ratio 2.30      Results for orders placed during the hospital encounter of 07/30/24    Adult Transthoracic Echo Complete w/ Color, Spectral and Contrast if necessary per protocol    Interpretation Summary    Left ventricular systolic function is severely decreased. Estimated left ventricular EF = 20%    The left ventricular cavity is moderate to severely dilated.    Left ventricular diastolic function is consistent with (grade II w/high LAP) pseudonormalization.    The left atrial cavity is severely dilated.    The right atrial cavity is moderate to severely  dilated.           Assessment and Plan    Diagnoses and all orders for this visit:    1. Acute on chronic HFrEF (heart failure with reduced ejection fraction) (Primary)  We discussed fluid and sodium restriction, wearing compression socks, and daily weight. He can continue taking lasix on an as needed basis. He is euvolemic on exam today. Blood pressure is on lower end of normal range. Continue valsartan 40 mg daily.   -     Basic Metabolic Panel; Future    Other " orders  -     valsartan (DIOVAN) 40 MG tablet; Take 1 tablet by mouth Daily.  Dispense: 90 tablet; Refill: 3  -     potassium chloride (MICRO-K) 10 MEQ CR capsule; Take 1 capsule by mouth Daily.  Dispense: 90 capsule; Refill: 3            Follow Up   Return in about 4 months (around 3/19/2025) for Follow up with Dr Whelan with device check.    Patient was given instructions and counseling regarding his condition or for health maintenance advice. Please see specific information pulled into the AVS if appropriate.     Javi Martínez  reports that he quit smoking about 29 years ago. His smoking use included cigarettes. He started smoking about 62 years ago. He has a 16.8 pack-year smoking history. He has never been exposed to tobacco smoke. He has never used smokeless tobacco.         Isabel Navarrete, MEJIA  11/19/24  13:09 EST    Dictated Utilizing Dragon Dictation

## 2024-11-20 ENCOUNTER — READMISSION MANAGEMENT (OUTPATIENT)
Dept: CALL CENTER | Facility: HOSPITAL | Age: 84
End: 2024-11-20
Payer: MEDICARE

## 2024-11-20 NOTE — OUTREACH NOTE
Medical Week 2 Survey      Flowsheet Row Responses   [facility] facility patient discharged from? [facility]   Does the patient have one of the following disease processes/diagnoses(primary or secondary)? Other   Week 2 attempt successful? No   Unsuccessful attempts Attempt 1            Evelyn Tom Registered Nurse

## 2024-11-26 ENCOUNTER — READMISSION MANAGEMENT (OUTPATIENT)
Dept: CALL CENTER | Facility: HOSPITAL | Age: 84
End: 2024-11-26
Payer: MEDICARE

## 2024-11-26 NOTE — OUTREACH NOTE
Medical Week 3 Survey      Flowsheet Row Responses   [facility] facility patient discharged from? [facility]   Does the patient have one of the following disease processes/diagnoses(primary or secondary)? Other   Week 3 attempt successful? No   Unsuccessful attempts Attempt 1            Tessie  - Registered Nurse

## 2024-12-02 ENCOUNTER — READMISSION MANAGEMENT (OUTPATIENT)
Dept: CALL CENTER | Facility: HOSPITAL | Age: 84
End: 2024-12-02
Payer: MEDICARE

## 2024-12-02 NOTE — OUTREACH NOTE
Medical Week 3 Survey      Flowsheet Row Responses   [facility] facility patient discharged from? [facility]   Does the patient have one of the following disease processes/diagnoses(primary or secondary)? Other   Week 3 attempt successful? No   Unsuccessful attempts Attempt 2            Helen BECKHAM - Registered Nurse

## 2025-01-01 ENCOUNTER — APPOINTMENT (OUTPATIENT)
Dept: GENERAL RADIOLOGY | Facility: HOSPITAL | Age: 85
End: 2025-01-01
Payer: MEDICARE

## 2025-01-01 ENCOUNTER — APPOINTMENT (OUTPATIENT)
Dept: CT IMAGING | Facility: HOSPITAL | Age: 85
End: 2025-01-01
Payer: MEDICARE

## 2025-01-01 ENCOUNTER — HOSPITAL ENCOUNTER (INPATIENT)
Facility: HOSPITAL | Age: 85
LOS: 1 days | End: 2025-05-02
Attending: HOSPITALIST | Admitting: HOSPITALIST
Payer: COMMERCIAL

## 2025-01-01 ENCOUNTER — APPOINTMENT (OUTPATIENT)
Facility: HOSPITAL | Age: 85
End: 2025-01-01
Payer: MEDICARE

## 2025-01-01 ENCOUNTER — HOSPITAL ENCOUNTER (INPATIENT)
Facility: HOSPITAL | Age: 85
LOS: 16 days | End: 2025-05-01
Attending: EMERGENCY MEDICINE
Payer: MEDICARE

## 2025-01-01 VITALS
DIASTOLIC BLOOD PRESSURE: 86 MMHG | SYSTOLIC BLOOD PRESSURE: 107 MMHG | OXYGEN SATURATION: 92 % | BODY MASS INDEX: 23.68 KG/M2 | TEMPERATURE: 97.5 F | HEART RATE: 73 BPM | HEIGHT: 76 IN | WEIGHT: 194.45 LBS | RESPIRATION RATE: 16 BRPM

## 2025-01-01 VITALS
OXYGEN SATURATION: 88 % | TEMPERATURE: 98.1 F | SYSTOLIC BLOOD PRESSURE: 88 MMHG | WEIGHT: 194.45 LBS | BODY MASS INDEX: 22.96 KG/M2 | HEIGHT: 77 IN | RESPIRATION RATE: 8 BRPM | HEART RATE: 70 BPM | DIASTOLIC BLOOD PRESSURE: 58 MMHG

## 2025-01-01 DIAGNOSIS — R13.12 DYSPHAGIA, OROPHARYNGEAL: ICD-10-CM

## 2025-01-01 DIAGNOSIS — R26.2 DIFFICULTY WALKING: ICD-10-CM

## 2025-01-01 DIAGNOSIS — R53.1 GENERALIZED WEAKNESS: Primary | ICD-10-CM

## 2025-01-01 LAB
027 TOXIN: ABNORMAL
ALBUMIN SERPL-MCNC: 2.9 G/DL (ref 3.5–5.2)
ALBUMIN SERPL-MCNC: 3 G/DL (ref 3.5–5.2)
ALBUMIN SERPL-MCNC: 3 G/DL (ref 3.5–5.2)
ALBUMIN SERPL-MCNC: 3.2 G/DL (ref 3.5–5.2)
ALBUMIN SERPL-MCNC: 3.3 G/DL (ref 3.5–5.2)
ALBUMIN SERPL-MCNC: 3.3 G/DL (ref 3.5–5.2)
ALBUMIN/GLOB SERPL: 0.9 G/DL
ALBUMIN/GLOB SERPL: 1 G/DL
ALP SERPL-CCNC: 100 U/L (ref 39–117)
ALP SERPL-CCNC: 87 U/L (ref 39–117)
ALT SERPL W P-5'-P-CCNC: 8 U/L (ref 1–41)
ALT SERPL W P-5'-P-CCNC: 9 U/L (ref 1–41)
ANION GAP SERPL CALCULATED.3IONS-SCNC: 11.1 MMOL/L (ref 5–15)
ANION GAP SERPL CALCULATED.3IONS-SCNC: 11.6 MMOL/L (ref 5–15)
ANION GAP SERPL CALCULATED.3IONS-SCNC: 11.7 MMOL/L (ref 5–15)
ANION GAP SERPL CALCULATED.3IONS-SCNC: 11.8 MMOL/L (ref 5–15)
ANION GAP SERPL CALCULATED.3IONS-SCNC: 12.3 MMOL/L (ref 5–15)
ANION GAP SERPL CALCULATED.3IONS-SCNC: 12.5 MMOL/L (ref 5–15)
ANION GAP SERPL CALCULATED.3IONS-SCNC: 12.9 MMOL/L (ref 5–15)
ANION GAP SERPL CALCULATED.3IONS-SCNC: 14.7 MMOL/L (ref 5–15)
ANION GAP SERPL CALCULATED.3IONS-SCNC: 14.9 MMOL/L (ref 5–15)
ANION GAP SERPL CALCULATED.3IONS-SCNC: 16.3 MMOL/L (ref 5–15)
ANION GAP SERPL CALCULATED.3IONS-SCNC: 16.9 MMOL/L (ref 5–15)
ANION GAP SERPL CALCULATED.3IONS-SCNC: 17.6 MMOL/L (ref 5–15)
ANION GAP SERPL CALCULATED.3IONS-SCNC: 17.6 MMOL/L (ref 5–15)
ANION GAP SERPL CALCULATED.3IONS-SCNC: 20.9 MMOL/L (ref 5–15)
ARTERIAL PATENCY WRIST A: ABNORMAL
ARTERIAL PATENCY WRIST A: ABNORMAL
AST SERPL-CCNC: 16 U/L (ref 1–40)
AST SERPL-CCNC: 16 U/L (ref 1–40)
ATMOSPHERIC PRESS: 741.9 MMHG
ATMOSPHERIC PRESS: 744.7 MMHG
BACTERIA SPEC AEROBE CULT: ABNORMAL
BACTERIA SPEC AEROBE CULT: NORMAL
BACTERIA SPEC AEROBE CULT: NORMAL
BACTERIA UR QL AUTO: ABNORMAL /HPF
BASE EXCESS BLDA CALC-SCNC: -0.3 MMOL/L (ref -2–2)
BASE EXCESS BLDA CALC-SCNC: 4.8 MMOL/L (ref -2–2)
BASOPHILS # BLD AUTO: 0.05 10*3/MM3 (ref 0–0.2)
BASOPHILS # BLD AUTO: 0.06 10*3/MM3 (ref 0–0.2)
BASOPHILS # BLD AUTO: 0.07 10*3/MM3 (ref 0–0.2)
BASOPHILS # BLD AUTO: 0.08 10*3/MM3 (ref 0–0.2)
BASOPHILS # BLD AUTO: 0.09 10*3/MM3 (ref 0–0.2)
BASOPHILS # BLD AUTO: 0.1 10*3/MM3 (ref 0–0.2)
BASOPHILS NFR BLD AUTO: 0.5 % (ref 0–1.5)
BASOPHILS NFR BLD AUTO: 0.6 % (ref 0–1.5)
BASOPHILS NFR BLD AUTO: 0.6 % (ref 0–1.5)
BASOPHILS NFR BLD AUTO: 0.8 % (ref 0–1.5)
BASOPHILS NFR BLD AUTO: 0.8 % (ref 0–1.5)
BASOPHILS NFR BLD AUTO: 1 % (ref 0–1.5)
BASOPHILS NFR BLD AUTO: 1.2 % (ref 0–1.5)
BDY SITE: ABNORMAL
BDY SITE: ABNORMAL
BH CV UPPER VENOUS LEFT AXILLARY AUGMENT: NORMAL
BH CV UPPER VENOUS LEFT AXILLARY COMPRESS: NORMAL
BH CV UPPER VENOUS LEFT AXILLARY PHASIC: NORMAL
BH CV UPPER VENOUS LEFT AXILLARY SPONT: NORMAL
BH CV UPPER VENOUS LEFT BASILIC FOREARM COMPRESS: NORMAL
BH CV UPPER VENOUS LEFT BASILIC UPPER COMPRESS: NORMAL
BH CV UPPER VENOUS LEFT BRACHIAL COMPRESS: NORMAL
BH CV UPPER VENOUS LEFT CEPHALIC FOREARM COMPRESS: NORMAL
BH CV UPPER VENOUS LEFT CEPHALIC UPPER COMPRESS: NORMAL
BH CV UPPER VENOUS LEFT INTERNAL JUGULAR COMPRESS: NORMAL
BH CV UPPER VENOUS LEFT INTERNAL JUGULAR PHASIC: NORMAL
BH CV UPPER VENOUS LEFT INTERNAL JUGULAR SPONT: NORMAL
BH CV UPPER VENOUS LEFT RADIAL COMPRESS: NORMAL
BH CV UPPER VENOUS LEFT SUBCLAVIAN AUGMENT: NORMAL
BH CV UPPER VENOUS LEFT SUBCLAVIAN COMPRESS: NORMAL
BH CV UPPER VENOUS LEFT SUBCLAVIAN PHASIC: NORMAL
BH CV UPPER VENOUS LEFT SUBCLAVIAN SPONT: NORMAL
BH CV UPPER VENOUS LEFT ULNAR COMPRESS: NORMAL
BH CV UPPER VENOUS RIGHT INTERNAL JUGULAR COMPRESS: NORMAL
BH CV UPPER VENOUS RIGHT INTERNAL JUGULAR PHASIC: NORMAL
BH CV UPPER VENOUS RIGHT INTERNAL JUGULAR SPONT: NORMAL
BH CV UPPER VENOUS RIGHT SUBCLAVIAN AUGMENT: NORMAL
BH CV UPPER VENOUS RIGHT SUBCLAVIAN COMPRESS: NORMAL
BH CV UPPER VENOUS RIGHT SUBCLAVIAN PHASIC: NORMAL
BH CV UPPER VENOUS RIGHT SUBCLAVIAN SPONT: NORMAL
BILIRUB SERPL-MCNC: 1.2 MG/DL (ref 0–1.2)
BILIRUB SERPL-MCNC: 1.9 MG/DL (ref 0–1.2)
BILIRUB UR QL STRIP: NEGATIVE
BUN SERPL-MCNC: 23 MG/DL (ref 8–23)
BUN SERPL-MCNC: 24 MG/DL (ref 8–23)
BUN SERPL-MCNC: 25 MG/DL (ref 8–23)
BUN SERPL-MCNC: 26 MG/DL (ref 8–23)
BUN SERPL-MCNC: 27 MG/DL (ref 8–23)
BUN SERPL-MCNC: 29 MG/DL (ref 8–23)
BUN SERPL-MCNC: 30 MG/DL (ref 8–23)
BUN SERPL-MCNC: 32 MG/DL (ref 8–23)
BUN SERPL-MCNC: 36 MG/DL (ref 8–23)
BUN SERPL-MCNC: 41 MG/DL (ref 8–23)
BUN SERPL-MCNC: 44 MG/DL (ref 8–23)
BUN SERPL-MCNC: 50 MG/DL (ref 8–23)
BUN/CREAT SERPL: 14.5 (ref 7–25)
BUN/CREAT SERPL: 14.6 (ref 7–25)
BUN/CREAT SERPL: 15 (ref 7–25)
BUN/CREAT SERPL: 15.2 (ref 7–25)
BUN/CREAT SERPL: 16 (ref 7–25)
BUN/CREAT SERPL: 16.1 (ref 7–25)
BUN/CREAT SERPL: 16.3 (ref 7–25)
BUN/CREAT SERPL: 16.4 (ref 7–25)
BUN/CREAT SERPL: 16.5 (ref 7–25)
BUN/CREAT SERPL: 16.5 (ref 7–25)
BUN/CREAT SERPL: 17.4 (ref 7–25)
BUN/CREAT SERPL: 17.5 (ref 7–25)
BUN/CREAT SERPL: 18.5 (ref 7–25)
BUN/CREAT SERPL: 20.3 (ref 7–25)
C COLI+JEJ+UPSA DNA STL QL NAA+NON-PROBE: NOT DETECTED
C DIFF GDH + TOXINS A+B STL QL IA.RAPID: NEGATIVE
C DIFF TOX GENS STL QL NAA+PROBE: POSITIVE
CA-I BLDA-SCNC: 1.06 MMOL/L (ref 1.13–1.32)
CALCIUM SPEC-SCNC: 8.4 MG/DL (ref 8.6–10.5)
CALCIUM SPEC-SCNC: 8.4 MG/DL (ref 8.6–10.5)
CALCIUM SPEC-SCNC: 8.8 MG/DL (ref 8.6–10.5)
CALCIUM SPEC-SCNC: 8.8 MG/DL (ref 8.6–10.5)
CALCIUM SPEC-SCNC: 8.9 MG/DL (ref 8.6–10.5)
CALCIUM SPEC-SCNC: 9 MG/DL (ref 8.6–10.5)
CALCIUM SPEC-SCNC: 9.2 MG/DL (ref 8.6–10.5)
CALCIUM SPEC-SCNC: 9.2 MG/DL (ref 8.6–10.5)
CALCIUM SPEC-SCNC: 9.3 MG/DL (ref 8.6–10.5)
CALCIUM SPEC-SCNC: 9.4 MG/DL (ref 8.6–10.5)
CALCIUM SPEC-SCNC: 9.4 MG/DL (ref 8.6–10.5)
CALCIUM SPEC-SCNC: 9.5 MG/DL (ref 8.6–10.5)
CHLORIDE BLDA-SCNC: 100 MMOL/L (ref 98–107)
CHLORIDE SERPL-SCNC: 100 MMOL/L (ref 98–107)
CHLORIDE SERPL-SCNC: 101 MMOL/L (ref 98–107)
CHLORIDE SERPL-SCNC: 101 MMOL/L (ref 98–107)
CHLORIDE SERPL-SCNC: 102 MMOL/L (ref 98–107)
CHLORIDE SERPL-SCNC: 102 MMOL/L (ref 98–107)
CHLORIDE SERPL-SCNC: 95 MMOL/L (ref 98–107)
CHLORIDE SERPL-SCNC: 96 MMOL/L (ref 98–107)
CHLORIDE SERPL-SCNC: 99 MMOL/L (ref 98–107)
CLARITY UR: CLEAR
CO2 SERPL-SCNC: 20.1 MMOL/L (ref 22–29)
CO2 SERPL-SCNC: 21.1 MMOL/L (ref 22–29)
CO2 SERPL-SCNC: 22.1 MMOL/L (ref 22–29)
CO2 SERPL-SCNC: 22.4 MMOL/L (ref 22–29)
CO2 SERPL-SCNC: 22.4 MMOL/L (ref 22–29)
CO2 SERPL-SCNC: 22.5 MMOL/L (ref 22–29)
CO2 SERPL-SCNC: 23.2 MMOL/L (ref 22–29)
CO2 SERPL-SCNC: 23.7 MMOL/L (ref 22–29)
CO2 SERPL-SCNC: 23.7 MMOL/L (ref 22–29)
CO2 SERPL-SCNC: 24.1 MMOL/L (ref 22–29)
CO2 SERPL-SCNC: 24.3 MMOL/L (ref 22–29)
CO2 SERPL-SCNC: 26.4 MMOL/L (ref 22–29)
CO2 SERPL-SCNC: 28.3 MMOL/L (ref 22–29)
CO2 SERPL-SCNC: 29.9 MMOL/L (ref 22–29)
COLOR UR: YELLOW
CREAT SERPL-MCNC: 1.37 MG/DL (ref 0.76–1.27)
CREAT SERPL-MCNC: 1.39 MG/DL (ref 0.76–1.27)
CREAT SERPL-MCNC: 1.43 MG/DL (ref 0.76–1.27)
CREAT SERPL-MCNC: 1.5 MG/DL (ref 0.76–1.27)
CREAT SERPL-MCNC: 1.59 MG/DL (ref 0.76–1.27)
CREAT SERPL-MCNC: 1.6 MG/DL (ref 0.76–1.27)
CREAT SERPL-MCNC: 1.62 MG/DL (ref 0.76–1.27)
CREAT SERPL-MCNC: 1.68 MG/DL (ref 0.76–1.27)
CREAT SERPL-MCNC: 1.72 MG/DL (ref 0.76–1.27)
CREAT SERPL-MCNC: 2.11 MG/DL (ref 0.76–1.27)
CREAT SERPL-MCNC: 2.46 MG/DL (ref 0.76–1.27)
CREAT SERPL-MCNC: 2.48 MG/DL (ref 0.76–1.27)
CREAT SERPL-MCNC: 2.53 MG/DL (ref 0.76–1.27)
CREAT SERPL-MCNC: 3.06 MG/DL (ref 0.76–1.27)
CRYPTOSP DNA STL QL NAA+NON-PROBE: NOT DETECTED
D-LACTATE SERPL-SCNC: 1.2 MMOL/L
D-LACTATE SERPL-SCNC: 1.7 MMOL/L (ref 0.5–2)
D-LACTATE SERPL-SCNC: 2.2 MMOL/L (ref 0.5–2)
D-LACTATE SERPL-SCNC: 2.3 MMOL/L (ref 0.5–2)
D-LACTATE SERPL-SCNC: 2.5 MMOL/L (ref 0.5–2)
D-LACTATE SERPL-SCNC: 3.1 MMOL/L (ref 0.5–2)
D-LACTATE SERPL-SCNC: 3.4 MMOL/L (ref 0.5–2)
D-LACTATE SERPL-SCNC: 3.8 MMOL/L (ref 0.5–2)
DEPRECATED RDW RBC AUTO: 59.3 FL (ref 37–54)
DEPRECATED RDW RBC AUTO: 59.3 FL (ref 37–54)
DEPRECATED RDW RBC AUTO: 59.5 FL (ref 37–54)
DEPRECATED RDW RBC AUTO: 59.7 FL (ref 37–54)
DEPRECATED RDW RBC AUTO: 59.7 FL (ref 37–54)
DEPRECATED RDW RBC AUTO: 59.8 FL (ref 37–54)
DEPRECATED RDW RBC AUTO: 59.8 FL (ref 37–54)
DEPRECATED RDW RBC AUTO: 60.1 FL (ref 37–54)
DEPRECATED RDW RBC AUTO: 60.5 FL (ref 37–54)
DEPRECATED RDW RBC AUTO: 61.4 FL (ref 37–54)
DEPRECATED RDW RBC AUTO: 61.6 FL (ref 37–54)
DEPRECATED RDW RBC AUTO: 62.9 FL (ref 37–54)
DEPRECATED RDW RBC AUTO: 63.9 FL (ref 37–54)
E HISTOLYT DNA STL QL NAA+NON-PROBE: NOT DETECTED
EC STX1+STX2 GENES STL QL NAA+NON-PROBE: NOT DETECTED
EGFRCR SERPLBLD CKD-EPI 2021: 19.6 ML/MIN/1.73
EGFRCR SERPLBLD CKD-EPI 2021: 24.7 ML/MIN/1.73
EGFRCR SERPLBLD CKD-EPI 2021: 25.3 ML/MIN/1.73
EGFRCR SERPLBLD CKD-EPI 2021: 25.5 ML/MIN/1.73
EGFRCR SERPLBLD CKD-EPI 2021: 30.7 ML/MIN/1.73
EGFRCR SERPLBLD CKD-EPI 2021: 39.2 ML/MIN/1.73
EGFRCR SERPLBLD CKD-EPI 2021: 40.3 ML/MIN/1.73
EGFRCR SERPLBLD CKD-EPI 2021: 42.1 ML/MIN/1.73
EGFRCR SERPLBLD CKD-EPI 2021: 42.8 ML/MIN/1.73
EGFRCR SERPLBLD CKD-EPI 2021: 43.1 ML/MIN/1.73
EGFRCR SERPLBLD CKD-EPI 2021: 46.2 ML/MIN/1.73
EGFRCR SERPLBLD CKD-EPI 2021: 48.9 ML/MIN/1.73
EGFRCR SERPLBLD CKD-EPI 2021: 50.6 ML/MIN/1.73
EGFRCR SERPLBLD CKD-EPI 2021: 51.5 ML/MIN/1.73
EOSINOPHIL # BLD AUTO: 0.05 10*3/MM3 (ref 0–0.4)
EOSINOPHIL # BLD AUTO: 0.05 10*3/MM3 (ref 0–0.4)
EOSINOPHIL # BLD AUTO: 0.07 10*3/MM3 (ref 0–0.4)
EOSINOPHIL # BLD AUTO: 0.09 10*3/MM3 (ref 0–0.4)
EOSINOPHIL # BLD AUTO: 0.1 10*3/MM3 (ref 0–0.4)
EOSINOPHIL # BLD AUTO: 0.13 10*3/MM3 (ref 0–0.4)
EOSINOPHIL # BLD AUTO: 0.13 10*3/MM3 (ref 0–0.4)
EOSINOPHIL # BLD AUTO: 0.15 10*3/MM3 (ref 0–0.4)
EOSINOPHIL NFR BLD AUTO: 0.5 % (ref 0.3–6.2)
EOSINOPHIL NFR BLD AUTO: 0.6 % (ref 0.3–6.2)
EOSINOPHIL NFR BLD AUTO: 0.8 % (ref 0.3–6.2)
EOSINOPHIL NFR BLD AUTO: 1 % (ref 0.3–6.2)
EOSINOPHIL NFR BLD AUTO: 1.2 % (ref 0.3–6.2)
EOSINOPHIL NFR BLD AUTO: 1.2 % (ref 0.3–6.2)
EOSINOPHIL NFR BLD AUTO: 1.3 % (ref 0.3–6.2)
EOSINOPHIL NFR BLD AUTO: 1.5 % (ref 0.3–6.2)
EOSINOPHIL NFR BLD AUTO: 1.6 % (ref 0.3–6.2)
EOSINOPHIL NFR BLD AUTO: 1.9 % (ref 0.3–6.2)
ERYTHROCYTE [DISTWIDTH] IN BLOOD BY AUTOMATED COUNT: 17.9 % (ref 12.3–15.4)
ERYTHROCYTE [DISTWIDTH] IN BLOOD BY AUTOMATED COUNT: 18.2 % (ref 12.3–15.4)
ERYTHROCYTE [DISTWIDTH] IN BLOOD BY AUTOMATED COUNT: 18.2 % (ref 12.3–15.4)
ERYTHROCYTE [DISTWIDTH] IN BLOOD BY AUTOMATED COUNT: 18.5 % (ref 12.3–15.4)
ERYTHROCYTE [DISTWIDTH] IN BLOOD BY AUTOMATED COUNT: 18.6 % (ref 12.3–15.4)
ERYTHROCYTE [DISTWIDTH] IN BLOOD BY AUTOMATED COUNT: 18.7 % (ref 12.3–15.4)
ERYTHROCYTE [DISTWIDTH] IN BLOOD BY AUTOMATED COUNT: 18.9 % (ref 12.3–15.4)
ERYTHROCYTE [DISTWIDTH] IN BLOOD BY AUTOMATED COUNT: 19 % (ref 12.3–15.4)
ERYTHROCYTE [DISTWIDTH] IN BLOOD BY AUTOMATED COUNT: 19.1 % (ref 12.3–15.4)
FLUAV RNA RESP QL NAA+PROBE: NOT DETECTED
FLUBV RNA RESP QL NAA+PROBE: NOT DETECTED
G LAMBLIA DNA STL QL NAA+NON-PROBE: NOT DETECTED
GEN 5 1HR TROPONIN T REFLEX: 65 NG/L
GLOBULIN UR ELPH-MCNC: 3.4 GM/DL
GLOBULIN UR ELPH-MCNC: 3.4 GM/DL
GLUCOSE BLDC GLUCOMTR-MCNC: 108 MG/DL (ref 70–99)
GLUCOSE BLDC GLUCOMTR-MCNC: 78 MG/DL (ref 70–99)
GLUCOSE SERPL-MCNC: 101 MG/DL (ref 65–99)
GLUCOSE SERPL-MCNC: 102 MG/DL (ref 65–99)
GLUCOSE SERPL-MCNC: 103 MG/DL (ref 65–99)
GLUCOSE SERPL-MCNC: 107 MG/DL (ref 65–99)
GLUCOSE SERPL-MCNC: 108 MG/DL (ref 65–99)
GLUCOSE SERPL-MCNC: 108 MG/DL (ref 65–99)
GLUCOSE SERPL-MCNC: 110 MG/DL (ref 65–99)
GLUCOSE SERPL-MCNC: 118 MG/DL (ref 65–99)
GLUCOSE SERPL-MCNC: 71 MG/DL (ref 65–99)
GLUCOSE SERPL-MCNC: 87 MG/DL (ref 65–99)
GLUCOSE SERPL-MCNC: 91 MG/DL (ref 65–99)
GLUCOSE SERPL-MCNC: 92 MG/DL (ref 65–99)
GLUCOSE SERPL-MCNC: 93 MG/DL (ref 65–99)
GLUCOSE SERPL-MCNC: 96 MG/DL (ref 65–99)
GLUCOSE UR STRIP-MCNC: ABNORMAL MG/DL
GRAM STN SPEC: ABNORMAL
GRAM STN SPEC: ABNORMAL
HCO3 BLDA-SCNC: 24.7 MMOL/L (ref 22–26)
HCO3 BLDA-SCNC: 27.6 MMOL/L (ref 22–26)
HCT VFR BLD AUTO: 37.4 % (ref 37.5–51)
HCT VFR BLD AUTO: 41 % (ref 37.5–51)
HCT VFR BLD AUTO: 41.2 % (ref 37.5–51)
HCT VFR BLD AUTO: 41.3 % (ref 37.5–51)
HCT VFR BLD AUTO: 41.8 % (ref 37.5–51)
HCT VFR BLD AUTO: 42.3 % (ref 37.5–51)
HCT VFR BLD AUTO: 43.1 % (ref 37.5–51)
HCT VFR BLD AUTO: 43.4 % (ref 37.5–51)
HCT VFR BLD AUTO: 43.5 % (ref 37.5–51)
HCT VFR BLD AUTO: 43.7 % (ref 37.5–51)
HCT VFR BLD AUTO: 43.9 % (ref 37.5–51)
HCT VFR BLD AUTO: 45.1 % (ref 37.5–51)
HCT VFR BLD AUTO: 46.3 % (ref 37.5–51)
HCT VFR BLD CALC: 43 % (ref 38–51)
HCT VFR BLD CALC: 47 % (ref 38–51)
HEMODILUTION: NO
HEMODILUTION: NO
HGB BLD-MCNC: 12.6 G/DL (ref 13–17.7)
HGB BLD-MCNC: 13.2 G/DL (ref 13–17.7)
HGB BLD-MCNC: 13.4 G/DL (ref 13–17.7)
HGB BLD-MCNC: 13.6 G/DL (ref 13–17.7)
HGB BLD-MCNC: 13.8 G/DL (ref 13–17.7)
HGB BLD-MCNC: 13.9 G/DL (ref 13–17.7)
HGB BLD-MCNC: 14 G/DL (ref 13–17.7)
HGB BLD-MCNC: 14 G/DL (ref 13–17.7)
HGB BLD-MCNC: 15 G/DL (ref 13–17.7)
HGB BLDA-MCNC: 14.8 G/DL (ref 12–18)
HGB BLDA-MCNC: 16 G/DL (ref 12–18)
HGB UR QL STRIP.AUTO: ABNORMAL
HOLD SPECIMEN: NORMAL
HYALINE CASTS UR QL AUTO: ABNORMAL /LPF
IMM GRANULOCYTES # BLD AUTO: 0.02 10*3/MM3 (ref 0–0.05)
IMM GRANULOCYTES # BLD AUTO: 0.03 10*3/MM3 (ref 0–0.05)
IMM GRANULOCYTES # BLD AUTO: 0.04 10*3/MM3 (ref 0–0.05)
IMM GRANULOCYTES NFR BLD AUTO: 0.2 % (ref 0–0.5)
IMM GRANULOCYTES NFR BLD AUTO: 0.3 % (ref 0–0.5)
IMM GRANULOCYTES NFR BLD AUTO: 0.4 % (ref 0–0.5)
IMM GRANULOCYTES NFR BLD AUTO: 0.5 % (ref 0–0.5)
INHALED O2 CONCENTRATION: 21 %
KETONES UR QL STRIP: NEGATIVE
LEUKOCYTE ESTERASE UR QL STRIP.AUTO: ABNORMAL
LYMPHOCYTES # BLD AUTO: 1.31 10*3/MM3 (ref 0.7–3.1)
LYMPHOCYTES # BLD AUTO: 1.36 10*3/MM3 (ref 0.7–3.1)
LYMPHOCYTES # BLD AUTO: 1.36 10*3/MM3 (ref 0.7–3.1)
LYMPHOCYTES # BLD AUTO: 1.46 10*3/MM3 (ref 0.7–3.1)
LYMPHOCYTES # BLD AUTO: 1.47 10*3/MM3 (ref 0.7–3.1)
LYMPHOCYTES # BLD AUTO: 1.62 10*3/MM3 (ref 0.7–3.1)
LYMPHOCYTES # BLD AUTO: 1.64 10*3/MM3 (ref 0.7–3.1)
LYMPHOCYTES # BLD AUTO: 1.7 10*3/MM3 (ref 0.7–3.1)
LYMPHOCYTES # BLD AUTO: 1.72 10*3/MM3 (ref 0.7–3.1)
LYMPHOCYTES # BLD AUTO: 2.09 10*3/MM3 (ref 0.7–3.1)
LYMPHOCYTES NFR BLD AUTO: 12.9 % (ref 19.6–45.3)
LYMPHOCYTES NFR BLD AUTO: 13.9 % (ref 19.6–45.3)
LYMPHOCYTES NFR BLD AUTO: 16.9 % (ref 19.6–45.3)
LYMPHOCYTES NFR BLD AUTO: 18.8 % (ref 19.6–45.3)
LYMPHOCYTES NFR BLD AUTO: 19.1 % (ref 19.6–45.3)
LYMPHOCYTES NFR BLD AUTO: 19.8 % (ref 19.6–45.3)
LYMPHOCYTES NFR BLD AUTO: 20.3 % (ref 19.6–45.3)
LYMPHOCYTES NFR BLD AUTO: 20.7 % (ref 19.6–45.3)
LYMPHOCYTES NFR BLD AUTO: 23.2 % (ref 19.6–45.3)
LYMPHOCYTES NFR BLD AUTO: 25.3 % (ref 19.6–45.3)
MAGNESIUM SERPL-MCNC: 2.2 MG/DL (ref 1.6–2.4)
MAGNESIUM SERPL-MCNC: 2.3 MG/DL (ref 1.6–2.4)
MAGNESIUM SERPL-MCNC: 2.4 MG/DL (ref 1.6–2.4)
MAGNESIUM SERPL-MCNC: 2.6 MG/DL (ref 1.6–2.4)
MCH RBC QN AUTO: 28.8 PG (ref 26.6–33)
MCH RBC QN AUTO: 29 PG (ref 26.6–33)
MCH RBC QN AUTO: 29.2 PG (ref 26.6–33)
MCH RBC QN AUTO: 29.9 PG (ref 26.6–33)
MCH RBC QN AUTO: 30.1 PG (ref 26.6–33)
MCH RBC QN AUTO: 30.2 PG (ref 26.6–33)
MCH RBC QN AUTO: 30.9 PG (ref 26.6–33)
MCH RBC QN AUTO: 31.2 PG (ref 26.6–33)
MCH RBC QN AUTO: 31.4 PG (ref 26.6–33)
MCH RBC QN AUTO: 31.7 PG (ref 26.6–33)
MCH RBC QN AUTO: 31.7 PG (ref 26.6–33)
MCH RBC QN AUTO: 31.9 PG (ref 26.6–33)
MCH RBC QN AUTO: 32 PG (ref 26.6–33)
MCHC RBC AUTO-ENTMCNC: 30.8 G/DL (ref 31.5–35.7)
MCHC RBC AUTO-ENTMCNC: 31.1 G/DL (ref 31.5–35.7)
MCHC RBC AUTO-ENTMCNC: 31.2 G/DL (ref 31.5–35.7)
MCHC RBC AUTO-ENTMCNC: 31.7 G/DL (ref 31.5–35.7)
MCHC RBC AUTO-ENTMCNC: 31.9 G/DL (ref 31.5–35.7)
MCHC RBC AUTO-ENTMCNC: 32 G/DL (ref 31.5–35.7)
MCHC RBC AUTO-ENTMCNC: 32 G/DL (ref 31.5–35.7)
MCHC RBC AUTO-ENTMCNC: 32.4 G/DL (ref 31.5–35.7)
MCHC RBC AUTO-ENTMCNC: 32.5 G/DL (ref 31.5–35.7)
MCHC RBC AUTO-ENTMCNC: 33.5 G/DL (ref 31.5–35.7)
MCHC RBC AUTO-ENTMCNC: 33.7 G/DL (ref 31.5–35.7)
MCV RBC AUTO: 100 FL (ref 79–97)
MCV RBC AUTO: 92.2 FL (ref 79–97)
MCV RBC AUTO: 92.6 FL (ref 79–97)
MCV RBC AUTO: 93.7 FL (ref 79–97)
MCV RBC AUTO: 93.8 FL (ref 79–97)
MCV RBC AUTO: 94.1 FL (ref 79–97)
MCV RBC AUTO: 94.2 FL (ref 79–97)
MCV RBC AUTO: 94.2 FL (ref 79–97)
MCV RBC AUTO: 94.3 FL (ref 79–97)
MCV RBC AUTO: 94.7 FL (ref 79–97)
MCV RBC AUTO: 95.2 FL (ref 79–97)
MCV RBC AUTO: 95.2 FL (ref 79–97)
MCV RBC AUTO: 97.9 FL (ref 79–97)
MODALITY: ABNORMAL
MODALITY: ABNORMAL
MONOCYTES # BLD AUTO: 0.74 10*3/MM3 (ref 0.1–0.9)
MONOCYTES # BLD AUTO: 0.78 10*3/MM3 (ref 0.1–0.9)
MONOCYTES # BLD AUTO: 0.82 10*3/MM3 (ref 0.1–0.9)
MONOCYTES # BLD AUTO: 0.84 10*3/MM3 (ref 0.1–0.9)
MONOCYTES # BLD AUTO: 0.86 10*3/MM3 (ref 0.1–0.9)
MONOCYTES # BLD AUTO: 0.87 10*3/MM3 (ref 0.1–0.9)
MONOCYTES # BLD AUTO: 0.92 10*3/MM3 (ref 0.1–0.9)
MONOCYTES # BLD AUTO: 0.95 10*3/MM3 (ref 0.1–0.9)
MONOCYTES # BLD AUTO: 0.96 10*3/MM3 (ref 0.1–0.9)
MONOCYTES # BLD AUTO: 1.35 10*3/MM3 (ref 0.1–0.9)
MONOCYTES NFR BLD AUTO: 10 % (ref 5–12)
MONOCYTES NFR BLD AUTO: 10.6 % (ref 5–12)
MONOCYTES NFR BLD AUTO: 10.8 % (ref 5–12)
MONOCYTES NFR BLD AUTO: 11 % (ref 5–12)
MONOCYTES NFR BLD AUTO: 11 % (ref 5–12)
MONOCYTES NFR BLD AUTO: 11.1 % (ref 5–12)
MONOCYTES NFR BLD AUTO: 12.9 % (ref 5–12)
MONOCYTES NFR BLD AUTO: 13.8 % (ref 5–12)
MONOCYTES NFR BLD AUTO: 8.5 % (ref 5–12)
MONOCYTES NFR BLD AUTO: 9.2 % (ref 5–12)
MRSA DNA SPEC QL NAA+PROBE: ABNORMAL
NEUTROPHILS NFR BLD AUTO: 3.92 10*3/MM3 (ref 1.7–7)
NEUTROPHILS NFR BLD AUTO: 4.81 10*3/MM3 (ref 1.7–7)
NEUTROPHILS NFR BLD AUTO: 5.28 10*3/MM3 (ref 1.7–7)
NEUTROPHILS NFR BLD AUTO: 5.45 10*3/MM3 (ref 1.7–7)
NEUTROPHILS NFR BLD AUTO: 5.52 10*3/MM3 (ref 1.7–7)
NEUTROPHILS NFR BLD AUTO: 5.53 10*3/MM3 (ref 1.7–7)
NEUTROPHILS NFR BLD AUTO: 5.77 10*3/MM3 (ref 1.7–7)
NEUTROPHILS NFR BLD AUTO: 58.4 % (ref 42.7–76)
NEUTROPHILS NFR BLD AUTO: 6.1 10*3/MM3 (ref 1.7–7)
NEUTROPHILS NFR BLD AUTO: 6.8 10*3/MM3 (ref 1.7–7)
NEUTROPHILS NFR BLD AUTO: 64.1 % (ref 42.7–76)
NEUTROPHILS NFR BLD AUTO: 66.3 % (ref 42.7–76)
NEUTROPHILS NFR BLD AUTO: 67.4 % (ref 42.7–76)
NEUTROPHILS NFR BLD AUTO: 67.5 % (ref 42.7–76)
NEUTROPHILS NFR BLD AUTO: 67.5 % (ref 42.7–76)
NEUTROPHILS NFR BLD AUTO: 68.2 % (ref 42.7–76)
NEUTROPHILS NFR BLD AUTO: 69.6 % (ref 42.7–76)
NEUTROPHILS NFR BLD AUTO: 7.82 10*3/MM3 (ref 1.7–7)
NEUTROPHILS NFR BLD AUTO: 70.2 % (ref 42.7–76)
NEUTROPHILS NFR BLD AUTO: 77.3 % (ref 42.7–76)
NITRITE UR QL STRIP: NEGATIVE
NRBC BLD AUTO-RTO: 0 /100 WBC (ref 0–0.2)
NT-PROBNP SERPL-MCNC: ABNORMAL PG/ML (ref 0–1800)
PCO2 BLDA: 34.7 MM HG (ref 35–45)
PCO2 BLDA: 40.8 MM HG (ref 35–45)
PH BLDA: 7.39 PH UNITS (ref 7.35–7.45)
PH BLDA: 7.51 PH UNITS (ref 7.35–7.45)
PH UR STRIP.AUTO: 8 [PH] (ref 5–8)
PHOSPHATE SERPL-MCNC: 2.8 MG/DL (ref 2.5–4.5)
PHOSPHATE SERPL-MCNC: 2.9 MG/DL (ref 2.5–4.5)
PHOSPHATE SERPL-MCNC: 3.1 MG/DL (ref 2.5–4.5)
PHOSPHATE SERPL-MCNC: 3.2 MG/DL (ref 2.5–4.5)
PHOSPHATE SERPL-MCNC: 3.3 MG/DL (ref 2.5–4.5)
PHOSPHATE SERPL-MCNC: 4.5 MG/DL (ref 2.5–4.5)
PHOSPHATE SERPL-MCNC: 4.6 MG/DL (ref 2.5–4.5)
PHOSPHATE SERPL-MCNC: 4.9 MG/DL (ref 2.5–4.5)
PHOSPHATE SERPL-MCNC: 5.4 MG/DL (ref 2.5–4.5)
PLATELET # BLD AUTO: 178 10*3/MM3 (ref 140–450)
PLATELET # BLD AUTO: 210 10*3/MM3 (ref 140–450)
PLATELET # BLD AUTO: 213 10*3/MM3 (ref 140–450)
PLATELET # BLD AUTO: 214 10*3/MM3 (ref 140–450)
PLATELET # BLD AUTO: 219 10*3/MM3 (ref 140–450)
PLATELET # BLD AUTO: 220 10*3/MM3 (ref 140–450)
PLATELET # BLD AUTO: 222 10*3/MM3 (ref 140–450)
PLATELET # BLD AUTO: 224 10*3/MM3 (ref 140–450)
PLATELET # BLD AUTO: 225 10*3/MM3 (ref 140–450)
PLATELET # BLD AUTO: 226 10*3/MM3 (ref 140–450)
PLATELET # BLD AUTO: 227 10*3/MM3 (ref 140–450)
PLATELET # BLD AUTO: 232 10*3/MM3 (ref 140–450)
PLATELET # BLD AUTO: 249 10*3/MM3 (ref 140–450)
PMV BLD AUTO: 10 FL (ref 6–12)
PMV BLD AUTO: 10 FL (ref 6–12)
PMV BLD AUTO: 10.1 FL (ref 6–12)
PMV BLD AUTO: 10.2 FL (ref 6–12)
PMV BLD AUTO: 10.2 FL (ref 6–12)
PMV BLD AUTO: 10.3 FL (ref 6–12)
PMV BLD AUTO: 10.3 FL (ref 6–12)
PMV BLD AUTO: 10.5 FL (ref 6–12)
PMV BLD AUTO: 9.7 FL (ref 6–12)
PMV BLD AUTO: 9.9 FL (ref 6–12)
PMV BLD AUTO: 9.9 FL (ref 6–12)
PO2 BLD: 243 MM[HG] (ref 0–500)
PO2 BLDA: 51.1 MM HG (ref 80–100)
PO2 BLDA: 62.8 MM HG (ref 80–100)
POTASSIUM BLDA-SCNC: 3.2 MMOL/L (ref 3.5–5)
POTASSIUM SERPL-SCNC: 3.5 MMOL/L (ref 3.5–5.2)
POTASSIUM SERPL-SCNC: 3.6 MMOL/L (ref 3.5–5.2)
POTASSIUM SERPL-SCNC: 3.6 MMOL/L (ref 3.5–5.2)
POTASSIUM SERPL-SCNC: 3.7 MMOL/L (ref 3.5–5.2)
POTASSIUM SERPL-SCNC: 3.9 MMOL/L (ref 3.5–5.2)
POTASSIUM SERPL-SCNC: 3.9 MMOL/L (ref 3.5–5.2)
POTASSIUM SERPL-SCNC: 4 MMOL/L (ref 3.5–5.2)
POTASSIUM SERPL-SCNC: 4 MMOL/L (ref 3.5–5.2)
POTASSIUM SERPL-SCNC: 4.1 MMOL/L (ref 3.5–5.2)
PROT SERPL-MCNC: 6.6 G/DL (ref 6–8.5)
PROT SERPL-MCNC: 6.7 G/DL (ref 6–8.5)
PROT UR QL STRIP: NEGATIVE
QT INTERVAL: 526 MS
QTC INTERVAL: 694 MS
RBC # BLD AUTO: 3.97 10*6/MM3 (ref 4.14–5.8)
RBC # BLD AUTO: 4.23 10*6/MM3 (ref 4.14–5.8)
RBC # BLD AUTO: 4.33 10*6/MM3 (ref 4.14–5.8)
RBC # BLD AUTO: 4.33 10*6/MM3 (ref 4.14–5.8)
RBC # BLD AUTO: 4.34 10*6/MM3 (ref 4.14–5.8)
RBC # BLD AUTO: 4.46 10*6/MM3 (ref 4.14–5.8)
RBC # BLD AUTO: 4.58 10*6/MM3 (ref 4.14–5.8)
RBC # BLD AUTO: 4.6 10*6/MM3 (ref 4.14–5.8)
RBC # BLD AUTO: 4.68 10*6/MM3 (ref 4.14–5.8)
RBC # BLD AUTO: 4.72 10*6/MM3 (ref 4.14–5.8)
RBC # BLD AUTO: 4.72 10*6/MM3 (ref 4.14–5.8)
RBC # BLD AUTO: 4.73 10*6/MM3 (ref 4.14–5.8)
RBC # BLD AUTO: 4.79 10*6/MM3 (ref 4.14–5.8)
RBC # UR STRIP: ABNORMAL /HPF
REF LAB TEST METHOD: ABNORMAL
RSV RNA RESP QL NAA+PROBE: NOT DETECTED
S ENT+BONG DNA STL QL NAA+NON-PROBE: NOT DETECTED
SAO2 % BLDCOA: 85.4 % (ref 95–99)
SAO2 % BLDCOA: 93.9 % (ref 95–99)
SARS-COV-2 RNA RESP QL NAA+PROBE: NOT DETECTED
SHIGELLA SP+EIEC IPAH ST NAA+NON-PROBE: NOT DETECTED
SODIUM BLD-SCNC: 140 MMOL/L (ref 131–143)
SODIUM SERPL-SCNC: 134 MMOL/L (ref 136–145)
SODIUM SERPL-SCNC: 135 MMOL/L (ref 136–145)
SODIUM SERPL-SCNC: 136 MMOL/L (ref 136–145)
SODIUM SERPL-SCNC: 136 MMOL/L (ref 136–145)
SODIUM SERPL-SCNC: 137 MMOL/L (ref 136–145)
SODIUM SERPL-SCNC: 139 MMOL/L (ref 136–145)
SODIUM SERPL-SCNC: 140 MMOL/L (ref 136–145)
SODIUM SERPL-SCNC: 141 MMOL/L (ref 136–145)
SODIUM SERPL-SCNC: 141 MMOL/L (ref 136–145)
SP GR UR STRIP: 1.01 (ref 1–1.03)
SQUAMOUS #/AREA URNS HPF: ABNORMAL /HPF
TROPONIN T % DELTA: 3
TROPONIN T NUMERIC DELTA: 2 NG/L
TROPONIN T SERPL HS-MCNC: 63 NG/L
UROBILINOGEN UR QL STRIP: ABNORMAL
VANCOMYCIN SERPL-MCNC: 13.4 MCG/ML (ref 5–40)
VANCOMYCIN SERPL-MCNC: 17.6 MCG/ML (ref 5–40)
WBC # UR STRIP: ABNORMAL /HPF
WBC NRBC COR # BLD AUTO: 10.12 10*3/MM3 (ref 3.4–10.8)
WBC NRBC COR # BLD AUTO: 6.72 10*3/MM3 (ref 3.4–10.8)
WBC NRBC COR # BLD AUTO: 7.12 10*3/MM3 (ref 3.4–10.8)
WBC NRBC COR # BLD AUTO: 7.74 10*3/MM3 (ref 3.4–10.8)
WBC NRBC COR # BLD AUTO: 7.75 10*3/MM3 (ref 3.4–10.8)
WBC NRBC COR # BLD AUTO: 7.94 10*3/MM3 (ref 3.4–10.8)
WBC NRBC COR # BLD AUTO: 8.08 10*3/MM3 (ref 3.4–10.8)
WBC NRBC COR # BLD AUTO: 8.19 10*3/MM3 (ref 3.4–10.8)
WBC NRBC COR # BLD AUTO: 8.32 10*3/MM3 (ref 3.4–10.8)
WBC NRBC COR # BLD AUTO: 8.4 10*3/MM3 (ref 3.4–10.8)
WBC NRBC COR # BLD AUTO: 8.7 10*3/MM3 (ref 3.4–10.8)
WBC NRBC COR # BLD AUTO: 9 10*3/MM3 (ref 3.4–10.8)
WBC NRBC COR # BLD AUTO: 9.78 10*3/MM3 (ref 3.4–10.8)
WHOLE BLOOD HOLD COAG: NORMAL
WHOLE BLOOD HOLD SPECIMEN: NORMAL

## 2025-01-01 PROCEDURE — 97110 THERAPEUTIC EXERCISES: CPT

## 2025-01-01 PROCEDURE — 99239 HOSP IP/OBS DSCHRG MGMT >30: CPT | Performed by: HOSPITALIST

## 2025-01-01 PROCEDURE — 99233 SBSQ HOSP IP/OBS HIGH 50: CPT | Performed by: FAMILY MEDICINE

## 2025-01-01 PROCEDURE — 25010000002 FUROSEMIDE PER 20 MG: Performed by: EMERGENCY MEDICINE

## 2025-01-01 PROCEDURE — 97168 OT RE-EVAL EST PLAN CARE: CPT

## 2025-01-01 PROCEDURE — 25010000002 FUROSEMIDE PER 20 MG: Performed by: FAMILY MEDICINE

## 2025-01-01 PROCEDURE — 99232 SBSQ HOSP IP/OBS MODERATE 35: CPT | Performed by: HOSPITALIST

## 2025-01-01 PROCEDURE — 84100 ASSAY OF PHOSPHORUS: CPT | Performed by: STUDENT IN AN ORGANIZED HEALTH CARE EDUCATION/TRAINING PROGRAM

## 2025-01-01 PROCEDURE — 25810000003 SODIUM CHLORIDE 0.9 % SOLUTION 250 ML FLEX CONT: Performed by: STUDENT IN AN ORGANIZED HEALTH CARE EDUCATION/TRAINING PROGRAM

## 2025-01-01 PROCEDURE — 83880 ASSAY OF NATRIURETIC PEPTIDE: CPT | Performed by: FAMILY MEDICINE

## 2025-01-01 PROCEDURE — 25810000003 LACTATED RINGERS PER 1000 ML: Performed by: STUDENT IN AN ORGANIZED HEALTH CARE EDUCATION/TRAINING PROGRAM

## 2025-01-01 PROCEDURE — 87493 C DIFF AMPLIFIED PROBE: CPT | Performed by: STUDENT IN AN ORGANIZED HEALTH CARE EDUCATION/TRAINING PROGRAM

## 2025-01-01 PROCEDURE — 99222 1ST HOSP IP/OBS MODERATE 55: CPT | Performed by: INTERNAL MEDICINE

## 2025-01-01 PROCEDURE — 85025 COMPLETE CBC W/AUTO DIFF WBC: CPT | Performed by: STUDENT IN AN ORGANIZED HEALTH CARE EDUCATION/TRAINING PROGRAM

## 2025-01-01 PROCEDURE — 80048 BASIC METABOLIC PNL TOTAL CA: CPT | Performed by: FAMILY MEDICINE

## 2025-01-01 PROCEDURE — 93971 EXTREMITY STUDY: CPT | Performed by: SURGERY

## 2025-01-01 PROCEDURE — 25010000002 PIPERACILLIN SOD-TAZOBACTAM PER 1 G: Performed by: STUDENT IN AN ORGANIZED HEALTH CARE EDUCATION/TRAINING PROGRAM

## 2025-01-01 PROCEDURE — 83880 ASSAY OF NATRIURETIC PEPTIDE: CPT | Performed by: EMERGENCY MEDICINE

## 2025-01-01 PROCEDURE — 36600 WITHDRAWAL OF ARTERIAL BLOOD: CPT | Performed by: FAMILY MEDICINE

## 2025-01-01 PROCEDURE — 97530 THERAPEUTIC ACTIVITIES: CPT

## 2025-01-01 PROCEDURE — 87070 CULTURE OTHR SPECIMN AEROBIC: CPT | Performed by: STUDENT IN AN ORGANIZED HEALTH CARE EDUCATION/TRAINING PROGRAM

## 2025-01-01 PROCEDURE — 83735 ASSAY OF MAGNESIUM: CPT | Performed by: STUDENT IN AN ORGANIZED HEALTH CARE EDUCATION/TRAINING PROGRAM

## 2025-01-01 PROCEDURE — 80202 ASSAY OF VANCOMYCIN: CPT | Performed by: STUDENT IN AN ORGANIZED HEALTH CARE EDUCATION/TRAINING PROGRAM

## 2025-01-01 PROCEDURE — 71045 X-RAY EXAM CHEST 1 VIEW: CPT

## 2025-01-01 PROCEDURE — 25810000003 SODIUM CHLORIDE 0.9 % SOLUTION: Performed by: STUDENT IN AN ORGANIZED HEALTH CARE EDUCATION/TRAINING PROGRAM

## 2025-01-01 PROCEDURE — 99232 SBSQ HOSP IP/OBS MODERATE 35: CPT | Performed by: STUDENT IN AN ORGANIZED HEALTH CARE EDUCATION/TRAINING PROGRAM

## 2025-01-01 PROCEDURE — 99221 1ST HOSP IP/OBS SF/LOW 40: CPT | Performed by: UROLOGY

## 2025-01-01 PROCEDURE — 87086 URINE CULTURE/COLONY COUNT: CPT | Performed by: STUDENT IN AN ORGANIZED HEALTH CARE EDUCATION/TRAINING PROGRAM

## 2025-01-01 PROCEDURE — 36415 COLL VENOUS BLD VENIPUNCTURE: CPT

## 2025-01-01 PROCEDURE — 25010000002 VANCOMYCIN 1 G RECONSTITUTED SOLUTION 1 EACH VIAL: Performed by: STUDENT IN AN ORGANIZED HEALTH CARE EDUCATION/TRAINING PROGRAM

## 2025-01-01 PROCEDURE — 87449 NOS EACH ORGANISM AG IA: CPT | Performed by: STUDENT IN AN ORGANIZED HEALTH CARE EDUCATION/TRAINING PROGRAM

## 2025-01-01 PROCEDURE — 80053 COMPREHEN METABOLIC PANEL: CPT | Performed by: FAMILY MEDICINE

## 2025-01-01 PROCEDURE — 82803 BLOOD GASES ANY COMBINATION: CPT | Performed by: STUDENT IN AN ORGANIZED HEALTH CARE EDUCATION/TRAINING PROGRAM

## 2025-01-01 PROCEDURE — 25010000002 FUROSEMIDE PER 20 MG: Performed by: INTERNAL MEDICINE

## 2025-01-01 PROCEDURE — P9047 ALBUMIN (HUMAN), 25%, 50ML: HCPCS | Performed by: INTERNAL MEDICINE

## 2025-01-01 PROCEDURE — 36600 WITHDRAWAL OF ARTERIAL BLOOD: CPT | Performed by: STUDENT IN AN ORGANIZED HEALTH CARE EDUCATION/TRAINING PROGRAM

## 2025-01-01 PROCEDURE — 87040 BLOOD CULTURE FOR BACTERIA: CPT | Performed by: EMERGENCY MEDICINE

## 2025-01-01 PROCEDURE — 87186 SC STD MICRODIL/AGAR DIL: CPT | Performed by: STUDENT IN AN ORGANIZED HEALTH CARE EDUCATION/TRAINING PROGRAM

## 2025-01-01 PROCEDURE — 83605 ASSAY OF LACTIC ACID: CPT

## 2025-01-01 PROCEDURE — 82948 REAGENT STRIP/BLOOD GLUCOSE: CPT

## 2025-01-01 PROCEDURE — 83605 ASSAY OF LACTIC ACID: CPT | Performed by: FAMILY MEDICINE

## 2025-01-01 PROCEDURE — 25010000002 HYDROMORPHONE 1 MG/ML SOLUTION: Performed by: STUDENT IN AN ORGANIZED HEALTH CARE EDUCATION/TRAINING PROGRAM

## 2025-01-01 PROCEDURE — 83605 ASSAY OF LACTIC ACID: CPT | Performed by: EMERGENCY MEDICINE

## 2025-01-01 PROCEDURE — 83735 ASSAY OF MAGNESIUM: CPT | Performed by: FAMILY MEDICINE

## 2025-01-01 PROCEDURE — 82330 ASSAY OF CALCIUM: CPT

## 2025-01-01 PROCEDURE — 80069 RENAL FUNCTION PANEL: CPT | Performed by: FAMILY MEDICINE

## 2025-01-01 PROCEDURE — 97161 PT EVAL LOW COMPLEX 20 MIN: CPT

## 2025-01-01 PROCEDURE — 25010000002 ALBUMIN HUMAN 25% PER 50 ML: Performed by: INTERNAL MEDICINE

## 2025-01-01 PROCEDURE — 85025 COMPLETE CBC W/AUTO DIFF WBC: CPT | Performed by: EMERGENCY MEDICINE

## 2025-01-01 PROCEDURE — 25010000002 CEFTRIAXONE PER 250 MG: Performed by: EMERGENCY MEDICINE

## 2025-01-01 PROCEDURE — 80048 BASIC METABOLIC PNL TOTAL CA: CPT | Performed by: STUDENT IN AN ORGANIZED HEALTH CARE EDUCATION/TRAINING PROGRAM

## 2025-01-01 PROCEDURE — 80053 COMPREHEN METABOLIC PANEL: CPT | Performed by: EMERGENCY MEDICINE

## 2025-01-01 PROCEDURE — 25010000002 ONDANSETRON PER 1 MG: Performed by: FAMILY MEDICINE

## 2025-01-01 PROCEDURE — 74176 CT ABD & PELVIS W/O CONTRAST: CPT

## 2025-01-01 PROCEDURE — 87205 SMEAR GRAM STAIN: CPT | Performed by: STUDENT IN AN ORGANIZED HEALTH CARE EDUCATION/TRAINING PROGRAM

## 2025-01-01 PROCEDURE — 25810000003 SODIUM CHLORIDE 0.9 % SOLUTION: Performed by: HOSPITALIST

## 2025-01-01 PROCEDURE — 85027 COMPLETE CBC AUTOMATED: CPT | Performed by: FAMILY MEDICINE

## 2025-01-01 PROCEDURE — 99232 SBSQ HOSP IP/OBS MODERATE 35: CPT | Performed by: INTERNAL MEDICINE

## 2025-01-01 PROCEDURE — 81001 URINALYSIS AUTO W/SCOPE: CPT | Performed by: EMERGENCY MEDICINE

## 2025-01-01 PROCEDURE — 99285 EMERGENCY DEPT VISIT HI MDM: CPT

## 2025-01-01 PROCEDURE — 83605 ASSAY OF LACTIC ACID: CPT | Performed by: STUDENT IN AN ORGANIZED HEALTH CARE EDUCATION/TRAINING PROGRAM

## 2025-01-01 PROCEDURE — 82803 BLOOD GASES ANY COMBINATION: CPT | Performed by: FAMILY MEDICINE

## 2025-01-01 PROCEDURE — 84484 ASSAY OF TROPONIN QUANT: CPT | Performed by: EMERGENCY MEDICINE

## 2025-01-01 PROCEDURE — 99232 SBSQ HOSP IP/OBS MODERATE 35: CPT | Performed by: FAMILY MEDICINE

## 2025-01-01 PROCEDURE — 92610 EVALUATE SWALLOWING FUNCTION: CPT

## 2025-01-01 PROCEDURE — 87506 IADNA-DNA/RNA PROBE TQ 6-11: CPT | Performed by: STUDENT IN AN ORGANIZED HEALTH CARE EDUCATION/TRAINING PROGRAM

## 2025-01-01 PROCEDURE — 25010000002 MORPHINE PER 10 MG: Performed by: STUDENT IN AN ORGANIZED HEALTH CARE EDUCATION/TRAINING PROGRAM

## 2025-01-01 PROCEDURE — 93010 ELECTROCARDIOGRAM REPORT: CPT | Performed by: INTERNAL MEDICINE

## 2025-01-01 PROCEDURE — 93971 EXTREMITY STUDY: CPT

## 2025-01-01 PROCEDURE — 87077 CULTURE AEROBIC IDENTIFY: CPT | Performed by: STUDENT IN AN ORGANIZED HEALTH CARE EDUCATION/TRAINING PROGRAM

## 2025-01-01 PROCEDURE — 25010000002 HYDROMORPHONE 1 MG/ML SOLUTION: Performed by: HOSPITALIST

## 2025-01-01 PROCEDURE — 87637 SARSCOV2&INF A&B&RSV AMP PRB: CPT | Performed by: EMERGENCY MEDICINE

## 2025-01-01 PROCEDURE — 83880 ASSAY OF NATRIURETIC PEPTIDE: CPT | Performed by: STUDENT IN AN ORGANIZED HEALTH CARE EDUCATION/TRAINING PROGRAM

## 2025-01-01 PROCEDURE — 93005 ELECTROCARDIOGRAM TRACING: CPT | Performed by: EMERGENCY MEDICINE

## 2025-01-01 PROCEDURE — 99221 1ST HOSP IP/OBS SF/LOW 40: CPT | Performed by: HOSPITALIST

## 2025-01-01 PROCEDURE — 99223 1ST HOSP IP/OBS HIGH 75: CPT | Performed by: FAMILY MEDICINE

## 2025-01-01 PROCEDURE — 97164 PT RE-EVAL EST PLAN CARE: CPT

## 2025-01-01 PROCEDURE — 94799 UNLISTED PULMONARY SVC/PX: CPT

## 2025-01-01 PROCEDURE — 99231 SBSQ HOSP IP/OBS SF/LOW 25: CPT | Performed by: UROLOGY

## 2025-01-01 PROCEDURE — 25010000002 BUMETANIDE PER 0.5 MG: Performed by: INTERNAL MEDICINE

## 2025-01-01 PROCEDURE — 70450 CT HEAD/BRAIN W/O DYE: CPT

## 2025-01-01 PROCEDURE — 80051 ELECTROLYTE PANEL: CPT

## 2025-01-01 PROCEDURE — 97165 OT EVAL LOW COMPLEX 30 MIN: CPT

## 2025-01-01 PROCEDURE — 87147 CULTURE TYPE IMMUNOLOGIC: CPT | Performed by: STUDENT IN AN ORGANIZED HEALTH CARE EDUCATION/TRAINING PROGRAM

## 2025-01-01 PROCEDURE — 87641 MR-STAPH DNA AMP PROBE: CPT | Performed by: STUDENT IN AN ORGANIZED HEALTH CARE EDUCATION/TRAINING PROGRAM

## 2025-01-01 PROCEDURE — 25810000003 SODIUM CHLORIDE 0.9 % SOLUTION: Performed by: INTERNAL MEDICINE

## 2025-01-01 RX ORDER — LORAZEPAM 0.5 MG/1
0.5 TABLET ORAL EVERY 12 HOURS PRN
COMMUNITY

## 2025-01-01 RX ORDER — SODIUM CHLORIDE 0.9 % (FLUSH) 0.9 %
10 SYRINGE (ML) INJECTION AS NEEDED
Status: DISCONTINUED | OUTPATIENT
Start: 2025-01-01 | End: 2025-05-02 | Stop reason: HOSPADM

## 2025-01-01 RX ORDER — HALOPERIDOL 2 MG/ML
0.5 SOLUTION ORAL
Status: DISCONTINUED | OUTPATIENT
Start: 2025-01-01 | End: 2025-05-02 | Stop reason: HOSPADM

## 2025-01-01 RX ORDER — NITROGLYCERIN 0.4 MG/1
0.4 TABLET SUBLINGUAL
Status: DISCONTINUED | OUTPATIENT
Start: 2025-01-01 | End: 2025-01-01

## 2025-01-01 RX ORDER — LORAZEPAM 2 MG/ML
1 CONCENTRATE ORAL
Status: DISCONTINUED | OUTPATIENT
Start: 2025-01-01 | End: 2025-05-02 | Stop reason: HOSPADM

## 2025-01-01 RX ORDER — DICYCLOMINE HYDROCHLORIDE 10 MG/1
10 CAPSULE ORAL 4 TIMES DAILY
Status: DISCONTINUED | OUTPATIENT
Start: 2025-01-01 | End: 2025-01-01

## 2025-01-01 RX ORDER — MIDODRINE HYDROCHLORIDE 10 MG/1
10 TABLET ORAL EVERY 8 HOURS SCHEDULED
Status: DISCONTINUED | OUTPATIENT
Start: 2025-01-01 | End: 2025-01-01

## 2025-01-01 RX ORDER — DIAZEPAM 10 MG/2ML
5 INJECTION, SOLUTION INTRAMUSCULAR; INTRAVENOUS
Status: DISCONTINUED | OUTPATIENT
Start: 2025-01-01 | End: 2025-01-01

## 2025-01-01 RX ORDER — FUROSEMIDE 40 MG/1
40 TABLET ORAL DAILY
Status: DISCONTINUED | OUTPATIENT
Start: 2025-01-01 | End: 2025-01-01

## 2025-01-01 RX ORDER — LORAZEPAM 0.5 MG/1
0.5 TABLET ORAL NIGHTLY
Status: DISCONTINUED | OUTPATIENT
Start: 2025-01-01 | End: 2025-05-02 | Stop reason: HOSPADM

## 2025-01-01 RX ORDER — HYDROXYZINE HYDROCHLORIDE 25 MG/1
25 TABLET, FILM COATED ORAL ONCE AS NEEDED
Status: COMPLETED | OUTPATIENT
Start: 2025-01-01 | End: 2025-01-01

## 2025-01-01 RX ORDER — BISACODYL 10 MG
10 SUPPOSITORY, RECTAL RECTAL DAILY PRN
Status: DISCONTINUED | OUTPATIENT
Start: 2025-01-01 | End: 2025-01-01

## 2025-01-01 RX ORDER — BENZONATATE 200 MG/1
200 CAPSULE ORAL 3 TIMES DAILY PRN
COMMUNITY

## 2025-01-01 RX ORDER — ONDANSETRON 2 MG/ML
4 INJECTION INTRAMUSCULAR; INTRAVENOUS EVERY 6 HOURS PRN
Status: DISCONTINUED | OUTPATIENT
Start: 2025-01-01 | End: 2025-05-02 | Stop reason: HOSPADM

## 2025-01-01 RX ORDER — FUROSEMIDE 10 MG/ML
20 INJECTION INTRAMUSCULAR; INTRAVENOUS ONCE
Status: COMPLETED | OUTPATIENT
Start: 2025-01-01 | End: 2025-01-01

## 2025-01-01 RX ORDER — LORAZEPAM 0.5 MG/1
0.5 TABLET ORAL NIGHTLY
Status: DISCONTINUED | OUTPATIENT
Start: 2025-01-01 | End: 2025-01-01

## 2025-01-01 RX ORDER — MORPHINE SULFATE 2 MG/ML
2 INJECTION, SOLUTION INTRAMUSCULAR; INTRAVENOUS
Status: CANCELLED | OUTPATIENT
Start: 2025-01-01 | End: 2025-05-05

## 2025-01-01 RX ORDER — ALBUMIN (HUMAN) 12.5 G/50ML
25 SOLUTION INTRAVENOUS ONCE
Status: DISCONTINUED | OUTPATIENT
Start: 2025-01-01 | End: 2025-01-01

## 2025-01-01 RX ORDER — ATROPINE SULFATE 10 MG/ML
2 SOLUTION/ DROPS OPHTHALMIC
Status: DISCONTINUED | OUTPATIENT
Start: 2025-01-01 | End: 2025-01-01 | Stop reason: HOSPADM

## 2025-01-01 RX ORDER — SODIUM CHLORIDE 0.9 % (FLUSH) 0.9 %
10 SYRINGE (ML) INJECTION EVERY 12 HOURS SCHEDULED
Status: DISCONTINUED | OUTPATIENT
Start: 2025-01-01 | End: 2025-01-01 | Stop reason: HOSPADM

## 2025-01-01 RX ORDER — ALLOPURINOL 100 MG/1
100 TABLET ORAL DAILY
Status: DISCONTINUED | OUTPATIENT
Start: 2025-01-01 | End: 2025-01-01

## 2025-01-01 RX ORDER — SODIUM CHLORIDE 0.9 % (FLUSH) 0.9 %
10 SYRINGE (ML) INJECTION EVERY 12 HOURS SCHEDULED
Status: DISCONTINUED | OUTPATIENT
Start: 2025-01-01 | End: 2025-05-02 | Stop reason: HOSPADM

## 2025-01-01 RX ORDER — POTASSIUM CHLORIDE 750 MG/1
40 CAPSULE, EXTENDED RELEASE ORAL ONCE
Status: COMPLETED | OUTPATIENT
Start: 2025-01-01 | End: 2025-01-01

## 2025-01-01 RX ORDER — MIDODRINE HYDROCHLORIDE 5 MG/1
2.5 TABLET ORAL
Status: DISCONTINUED | OUTPATIENT
Start: 2025-01-01 | End: 2025-01-01

## 2025-01-01 RX ORDER — MORPHINE SULFATE 2 MG/ML
2 INJECTION, SOLUTION INTRAMUSCULAR; INTRAVENOUS
Status: DISCONTINUED | OUTPATIENT
Start: 2025-01-01 | End: 2025-01-01 | Stop reason: HOSPADM

## 2025-01-01 RX ORDER — ONDANSETRON 2 MG/ML
4 INJECTION INTRAMUSCULAR; INTRAVENOUS EVERY 6 HOURS PRN
Status: DISCONTINUED | OUTPATIENT
Start: 2025-01-01 | End: 2025-01-01 | Stop reason: HOSPADM

## 2025-01-01 RX ORDER — ATROPINE SULFATE 10 MG/ML
2 SOLUTION/ DROPS OPHTHALMIC
Status: CANCELLED | OUTPATIENT
Start: 2025-01-01

## 2025-01-01 RX ORDER — BUMETANIDE 1 MG/1
2 TABLET ORAL
Status: DISCONTINUED | OUTPATIENT
Start: 2025-01-01 | End: 2025-01-01

## 2025-01-01 RX ORDER — LORAZEPAM 0.5 MG/1
0.5 TABLET ORAL NIGHTLY
Status: CANCELLED | OUTPATIENT
Start: 2025-01-01

## 2025-01-01 RX ORDER — BUMETANIDE 0.25 MG/ML
2 INJECTION, SOLUTION INTRAMUSCULAR; INTRAVENOUS ONCE
Status: COMPLETED | OUTPATIENT
Start: 2025-01-01 | End: 2025-01-01

## 2025-01-01 RX ORDER — AMOXICILLIN 250 MG
2 CAPSULE ORAL 2 TIMES DAILY PRN
Status: DISCONTINUED | OUTPATIENT
Start: 2025-01-01 | End: 2025-01-01

## 2025-01-01 RX ORDER — MORPHINE SULFATE 2 MG/ML
2 INJECTION, SOLUTION INTRAMUSCULAR; INTRAVENOUS
Status: DISCONTINUED | OUTPATIENT
Start: 2025-01-01 | End: 2025-05-02 | Stop reason: HOSPADM

## 2025-01-01 RX ORDER — HALOPERIDOL 5 MG/ML
0.5 INJECTION INTRAMUSCULAR
Status: DISCONTINUED | OUTPATIENT
Start: 2025-01-01 | End: 2025-01-01

## 2025-01-01 RX ORDER — MORPHINE SULFATE 20 MG/ML
5 SOLUTION ORAL
Refills: 0 | Status: DISCONTINUED | OUTPATIENT
Start: 2025-01-01 | End: 2025-01-01 | Stop reason: HOSPADM

## 2025-01-01 RX ORDER — LORAZEPAM 2 MG/ML
1 CONCENTRATE ORAL
Status: DISCONTINUED | OUTPATIENT
Start: 2025-01-01 | End: 2025-01-01 | Stop reason: HOSPADM

## 2025-01-01 RX ORDER — SODIUM CHLORIDE, SODIUM LACTATE, POTASSIUM CHLORIDE, CALCIUM CHLORIDE 600; 310; 30; 20 MG/100ML; MG/100ML; MG/100ML; MG/100ML
50 INJECTION, SOLUTION INTRAVENOUS CONTINUOUS
Status: DISCONTINUED | OUTPATIENT
Start: 2025-01-01 | End: 2025-01-01

## 2025-01-01 RX ORDER — SODIUM CHLORIDE 0.9 % (FLUSH) 0.9 %
10 SYRINGE (ML) INJECTION AS NEEDED
Status: DISCONTINUED | OUTPATIENT
Start: 2025-01-01 | End: 2025-01-01 | Stop reason: HOSPADM

## 2025-01-01 RX ORDER — POLYETHYLENE GLYCOL 3350 17 G/17G
17 POWDER, FOR SOLUTION ORAL DAILY PRN
Status: DISCONTINUED | OUTPATIENT
Start: 2025-01-01 | End: 2025-01-01

## 2025-01-01 RX ORDER — DIAZEPAM 10 MG/2ML
5 INJECTION, SOLUTION INTRAMUSCULAR; INTRAVENOUS
Status: CANCELLED | OUTPATIENT
Start: 2025-01-01 | End: 2025-05-05

## 2025-01-01 RX ORDER — LORAZEPAM 0.5 MG/1
1 TABLET ORAL ONCE
Status: COMPLETED | OUTPATIENT
Start: 2025-01-01 | End: 2025-01-01

## 2025-01-01 RX ORDER — DIAZEPAM 10 MG/2ML
5 INJECTION, SOLUTION INTRAMUSCULAR; INTRAVENOUS
Status: DISCONTINUED | OUTPATIENT
Start: 2025-01-01 | End: 2025-05-02 | Stop reason: HOSPADM

## 2025-01-01 RX ORDER — HALOPERIDOL 5 MG/ML
0.5 INJECTION INTRAMUSCULAR
Status: CANCELLED | OUTPATIENT
Start: 2025-01-01

## 2025-01-01 RX ORDER — LORAZEPAM 2 MG/ML
1 CONCENTRATE ORAL
Status: CANCELLED | OUTPATIENT
Start: 2025-01-01 | End: 2025-05-05

## 2025-01-01 RX ORDER — HALOPERIDOL 0.5 MG/1
0.5 TABLET ORAL
Status: DISCONTINUED | OUTPATIENT
Start: 2025-01-01 | End: 2025-01-01

## 2025-01-01 RX ORDER — PANTOPRAZOLE SODIUM 40 MG/1
40 TABLET, DELAYED RELEASE ORAL
Status: DISCONTINUED | OUTPATIENT
Start: 2025-01-01 | End: 2025-01-01

## 2025-01-01 RX ORDER — MIDODRINE HYDROCHLORIDE 10 MG/1
10 TABLET ORAL
Status: DISCONTINUED | OUTPATIENT
Start: 2025-01-01 | End: 2025-01-01

## 2025-01-01 RX ORDER — SODIUM CHLORIDE 0.9 % (FLUSH) 0.9 %
10 SYRINGE (ML) INJECTION AS NEEDED
Status: CANCELLED | OUTPATIENT
Start: 2025-01-01

## 2025-01-01 RX ORDER — TAMSULOSIN HYDROCHLORIDE 0.4 MG/1
0.4 CAPSULE ORAL DAILY
Status: DISCONTINUED | OUTPATIENT
Start: 2025-01-01 | End: 2025-01-01

## 2025-01-01 RX ORDER — ALBUMIN (HUMAN) 12.5 G/50ML
25 SOLUTION INTRAVENOUS ONCE
Status: COMPLETED | OUTPATIENT
Start: 2025-01-01 | End: 2025-01-01

## 2025-01-01 RX ORDER — BISACODYL 5 MG/1
5 TABLET, DELAYED RELEASE ORAL DAILY PRN
Status: DISCONTINUED | OUTPATIENT
Start: 2025-01-01 | End: 2025-01-01

## 2025-01-01 RX ORDER — FUROSEMIDE 10 MG/ML
60 INJECTION INTRAMUSCULAR; INTRAVENOUS ONCE
Status: COMPLETED | OUTPATIENT
Start: 2025-01-01 | End: 2025-01-01

## 2025-01-01 RX ORDER — ATROPINE SULFATE 10 MG/ML
2 SOLUTION/ DROPS OPHTHALMIC
Status: DISCONTINUED | OUTPATIENT
Start: 2025-01-01 | End: 2025-05-02 | Stop reason: HOSPADM

## 2025-01-01 RX ORDER — CITALOPRAM HYDROBROMIDE 20 MG/1
10 TABLET ORAL DAILY
Status: DISCONTINUED | OUTPATIENT
Start: 2025-01-01 | End: 2025-01-01

## 2025-01-01 RX ORDER — FAMOTIDINE 20 MG/1
20 TABLET, FILM COATED ORAL
Status: DISCONTINUED | OUTPATIENT
Start: 2025-01-01 | End: 2025-01-01

## 2025-01-01 RX ORDER — HALOPERIDOL 5 MG/ML
0.5 INJECTION INTRAMUSCULAR
Status: DISCONTINUED | OUTPATIENT
Start: 2025-01-01 | End: 2025-05-02 | Stop reason: HOSPADM

## 2025-01-01 RX ORDER — ONDANSETRON 2 MG/ML
4 INJECTION INTRAMUSCULAR; INTRAVENOUS EVERY 6 HOURS PRN
Status: CANCELLED | OUTPATIENT
Start: 2025-01-01

## 2025-01-01 RX ORDER — DIAZEPAM 10 MG/2ML
5 INJECTION, SOLUTION INTRAMUSCULAR; INTRAVENOUS
Status: DISCONTINUED | OUTPATIENT
Start: 2025-01-01 | End: 2025-01-01 | Stop reason: HOSPADM

## 2025-01-01 RX ORDER — MORPHINE SULFATE 20 MG/ML
5 SOLUTION ORAL
Status: DISCONTINUED | OUTPATIENT
Start: 2025-01-01 | End: 2025-05-02 | Stop reason: HOSPADM

## 2025-01-01 RX ORDER — HYDROCODONE BITARTRATE AND ACETAMINOPHEN 5; 325 MG/1; MG/1
1 TABLET ORAL EVERY 6 HOURS PRN
COMMUNITY

## 2025-01-01 RX ORDER — HYDROCODONE BITARTRATE AND ACETAMINOPHEN 5; 325 MG/1; MG/1
1 TABLET ORAL EVERY 6 HOURS PRN
Refills: 0 | Status: DISPENSED | OUTPATIENT
Start: 2025-01-01 | End: 2025-01-01

## 2025-01-01 RX ORDER — FUROSEMIDE 20 MG/1
20 TABLET ORAL
Status: DISCONTINUED | OUTPATIENT
Start: 2025-01-01 | End: 2025-01-01

## 2025-01-01 RX ORDER — HALOPERIDOL 2 MG/ML
0.5 SOLUTION ORAL
Status: DISCONTINUED | OUTPATIENT
Start: 2025-01-01 | End: 2025-01-01 | Stop reason: HOSPADM

## 2025-01-01 RX ORDER — HALOPERIDOL 2 MG/ML
0.5 SOLUTION ORAL
Status: CANCELLED | OUTPATIENT
Start: 2025-01-01

## 2025-01-01 RX ORDER — MORPHINE SULFATE 20 MG/ML
5 SOLUTION ORAL
Refills: 0 | Status: CANCELLED | OUTPATIENT
Start: 2025-01-01 | End: 2025-05-05

## 2025-01-01 RX ORDER — LOPERAMIDE HYDROCHLORIDE 2 MG/1
2 CAPSULE ORAL 4 TIMES DAILY PRN
Status: DISCONTINUED | OUTPATIENT
Start: 2025-01-01 | End: 2025-01-01

## 2025-01-01 RX ORDER — MIDODRINE HYDROCHLORIDE 5 MG/1
5 TABLET ORAL EVERY 8 HOURS SCHEDULED
Status: DISCONTINUED | OUTPATIENT
Start: 2025-01-01 | End: 2025-01-01

## 2025-01-01 RX ORDER — LORAZEPAM 0.5 MG/1
0.5 TABLET ORAL 3 TIMES DAILY PRN
Status: DISCONTINUED | OUTPATIENT
Start: 2025-01-01 | End: 2025-01-01

## 2025-01-01 RX ORDER — HALOPERIDOL 2 MG/ML
0.5 SOLUTION ORAL
Status: DISCONTINUED | OUTPATIENT
Start: 2025-01-01 | End: 2025-01-01

## 2025-01-01 RX ORDER — SODIUM CHLORIDE 9 MG/ML
40 INJECTION, SOLUTION INTRAVENOUS AS NEEDED
Status: DISCONTINUED | OUTPATIENT
Start: 2025-01-01 | End: 2025-01-01

## 2025-01-01 RX ORDER — FUROSEMIDE 10 MG/ML
40 INJECTION INTRAMUSCULAR; INTRAVENOUS
Status: DISCONTINUED | OUTPATIENT
Start: 2025-01-01 | End: 2025-01-01

## 2025-01-01 RX ORDER — SODIUM CHLORIDE 0.9 % (FLUSH) 0.9 %
10 SYRINGE (ML) INJECTION EVERY 12 HOURS SCHEDULED
Status: CANCELLED | OUTPATIENT
Start: 2025-01-01

## 2025-01-01 RX ORDER — LORAZEPAM 0.5 MG/1
0.5 TABLET ORAL NIGHTLY
Status: DISCONTINUED | OUTPATIENT
Start: 2025-01-01 | End: 2025-01-01 | Stop reason: HOSPADM

## 2025-01-01 RX ORDER — SODIUM CHLORIDE 9 MG/ML
50 INJECTION, SOLUTION INTRAVENOUS CONTINUOUS
Status: DISCONTINUED | OUTPATIENT
Start: 2025-01-01 | End: 2025-01-01

## 2025-01-01 RX ORDER — SODIUM CHLORIDE 9 MG/ML
75 INJECTION, SOLUTION INTRAVENOUS CONTINUOUS
Status: DISCONTINUED | OUTPATIENT
Start: 2025-01-01 | End: 2025-01-01

## 2025-01-01 RX ORDER — HYDROCODONE BITARTRATE AND ACETAMINOPHEN 5; 325 MG/1; MG/1
1 TABLET ORAL EVERY 4 HOURS PRN
Status: DISPENSED | OUTPATIENT
Start: 2025-01-01 | End: 2025-01-01

## 2025-01-01 RX ORDER — LORAZEPAM 0.5 MG/1
1 TABLET ORAL
Status: DISCONTINUED | OUTPATIENT
Start: 2025-01-01 | End: 2025-01-01

## 2025-01-01 RX ORDER — FUROSEMIDE 10 MG/ML
40 INJECTION INTRAMUSCULAR; INTRAVENOUS EVERY 12 HOURS
Status: DISCONTINUED | OUTPATIENT
Start: 2025-01-01 | End: 2025-01-01

## 2025-01-01 RX ORDER — HALOPERIDOL 5 MG/ML
0.5 INJECTION INTRAMUSCULAR
Status: DISCONTINUED | OUTPATIENT
Start: 2025-01-01 | End: 2025-01-01 | Stop reason: HOSPADM

## 2025-01-01 RX ORDER — TRAZODONE HYDROCHLORIDE 50 MG/1
50 TABLET ORAL NIGHTLY PRN
Status: DISCONTINUED | OUTPATIENT
Start: 2025-01-01 | End: 2025-01-01

## 2025-01-01 RX ORDER — BUMETANIDE 0.25 MG/ML
2 INJECTION, SOLUTION INTRAMUSCULAR; INTRAVENOUS ONCE
Status: DISCONTINUED | OUTPATIENT
Start: 2025-01-01 | End: 2025-01-01

## 2025-01-01 RX ADMIN — ALLOPURINOL 100 MG: 100 TABLET ORAL at 10:48

## 2025-01-01 RX ADMIN — APIXABAN 2.5 MG: 2.5 TABLET, FILM COATED ORAL at 20:27

## 2025-01-01 RX ADMIN — DICYCLOMINE HYDROCHLORIDE 10 MG: 10 CAPSULE ORAL at 09:11

## 2025-01-01 RX ADMIN — SERTRALINE HYDROCHLORIDE 50 MG: 50 TABLET ORAL at 10:36

## 2025-01-01 RX ADMIN — LORAZEPAM 0.5 MG: 0.5 TABLET ORAL at 21:36

## 2025-01-01 RX ADMIN — DICYCLOMINE HYDROCHLORIDE 10 MG: 10 CAPSULE ORAL at 14:13

## 2025-01-01 RX ADMIN — DICYCLOMINE HYDROCHLORIDE 10 MG: 10 CAPSULE ORAL at 21:12

## 2025-01-01 RX ADMIN — SERTRALINE HYDROCHLORIDE 50 MG: 50 TABLET ORAL at 08:34

## 2025-01-01 RX ADMIN — ALLOPURINOL 100 MG: 100 TABLET ORAL at 08:34

## 2025-01-01 RX ADMIN — DICYCLOMINE HYDROCHLORIDE 10 MG: 10 CAPSULE ORAL at 08:20

## 2025-01-01 RX ADMIN — CEFTRIAXONE SODIUM 2000 MG: 2 INJECTION, POWDER, FOR SOLUTION INTRAMUSCULAR; INTRAVENOUS at 21:52

## 2025-01-01 RX ADMIN — CITALOPRAM HYDROBROMIDE 10 MG: 20 TABLET ORAL at 08:18

## 2025-01-01 RX ADMIN — APIXABAN 2.5 MG: 2.5 TABLET, FILM COATED ORAL at 08:39

## 2025-01-01 RX ADMIN — DICYCLOMINE HYDROCHLORIDE 10 MG: 10 CAPSULE ORAL at 13:18

## 2025-01-01 RX ADMIN — Medication 10 ML: at 20:45

## 2025-01-01 RX ADMIN — APIXABAN 2.5 MG: 2.5 TABLET, FILM COATED ORAL at 08:07

## 2025-01-01 RX ADMIN — DICYCLOMINE HYDROCHLORIDE 10 MG: 10 CAPSULE ORAL at 08:12

## 2025-01-01 RX ADMIN — MIDODRINE HYDROCHLORIDE 15 MG: 10 TABLET ORAL at 06:32

## 2025-01-01 RX ADMIN — PIPERACILLIN AND TAZOBACTAM 3.38 G: 3; .375 INJECTION, POWDER, FOR SOLUTION INTRAVENOUS at 15:20

## 2025-01-01 RX ADMIN — ALLOPURINOL 100 MG: 100 TABLET ORAL at 09:01

## 2025-01-01 RX ADMIN — TAMSULOSIN HYDROCHLORIDE 0.4 MG: 0.4 CAPSULE ORAL at 08:18

## 2025-01-01 RX ADMIN — DICYCLOMINE HYDROCHLORIDE 10 MG: 10 CAPSULE ORAL at 21:23

## 2025-01-01 RX ADMIN — LORAZEPAM 0.5 MG: 0.5 TABLET ORAL at 20:44

## 2025-01-01 RX ADMIN — VANCOMYCIN HYDROCHLORIDE 1000 MG: 1 INJECTION, POWDER, LYOPHILIZED, FOR SOLUTION INTRAVENOUS at 13:44

## 2025-01-01 RX ADMIN — DICYCLOMINE HYDROCHLORIDE 10 MG: 10 CAPSULE ORAL at 17:42

## 2025-01-01 RX ADMIN — APIXABAN 2.5 MG: 2.5 TABLET, FILM COATED ORAL at 21:13

## 2025-01-01 RX ADMIN — APIXABAN 2.5 MG: 2.5 TABLET, FILM COATED ORAL at 08:58

## 2025-01-01 RX ADMIN — Medication 10 ML: at 08:50

## 2025-01-01 RX ADMIN — Medication 10 ML: at 09:40

## 2025-01-01 RX ADMIN — PANTOPRAZOLE SODIUM 40 MG: 40 TABLET, DELAYED RELEASE ORAL at 09:45

## 2025-01-01 RX ADMIN — ALLOPURINOL 100 MG: 100 TABLET ORAL at 08:39

## 2025-01-01 RX ADMIN — DICYCLOMINE HYDROCHLORIDE 10 MG: 10 CAPSULE ORAL at 18:03

## 2025-01-01 RX ADMIN — Medication 10 ML: at 22:45

## 2025-01-01 RX ADMIN — TRAZODONE HYDROCHLORIDE 50 MG: 50 TABLET ORAL at 22:06

## 2025-01-01 RX ADMIN — MIDODRINE HYDROCHLORIDE 15 MG: 10 TABLET ORAL at 13:18

## 2025-01-01 RX ADMIN — Medication 10 ML: at 21:36

## 2025-01-01 RX ADMIN — SERTRALINE HYDROCHLORIDE 50 MG: 50 TABLET ORAL at 08:20

## 2025-01-01 RX ADMIN — HYDROXYZINE HYDROCHLORIDE 25 MG: 25 TABLET, FILM COATED ORAL at 21:08

## 2025-01-01 RX ADMIN — Medication 10 ML: at 08:12

## 2025-01-01 RX ADMIN — ALLOPURINOL 100 MG: 100 TABLET ORAL at 08:17

## 2025-01-01 RX ADMIN — FAMOTIDINE 20 MG: 20 TABLET, FILM COATED ORAL at 17:07

## 2025-01-01 RX ADMIN — FUROSEMIDE 40 MG: 40 TABLET ORAL at 09:39

## 2025-01-01 RX ADMIN — HYDROMORPHONE HYDROCHLORIDE 0.5 MG: 1 INJECTION, SOLUTION INTRAMUSCULAR; INTRAVENOUS; SUBCUTANEOUS at 19:09

## 2025-01-01 RX ADMIN — LORAZEPAM 0.5 MG: 0.5 TABLET ORAL at 20:27

## 2025-01-01 RX ADMIN — Medication 10 ML: at 08:58

## 2025-01-01 RX ADMIN — PIPERACILLIN AND TAZOBACTAM 3.38 G: 3; .375 INJECTION, POWDER, FOR SOLUTION INTRAVENOUS at 15:08

## 2025-01-01 RX ADMIN — APIXABAN 2.5 MG: 2.5 TABLET, FILM COATED ORAL at 21:01

## 2025-01-01 RX ADMIN — DICYCLOMINE HYDROCHLORIDE 10 MG: 10 CAPSULE ORAL at 20:40

## 2025-01-01 RX ADMIN — ONDANSETRON 4 MG: 2 INJECTION INTRAMUSCULAR; INTRAVENOUS at 21:35

## 2025-01-01 RX ADMIN — APIXABAN 2.5 MG: 2.5 TABLET, FILM COATED ORAL at 21:47

## 2025-01-01 RX ADMIN — PIPERACILLIN AND TAZOBACTAM 3.38 G: 3; .375 INJECTION, POWDER, FOR SOLUTION INTRAVENOUS at 14:12

## 2025-01-01 RX ADMIN — VANCOMYCIN HYDROCHLORIDE 1000 MG: 1 INJECTION, POWDER, LYOPHILIZED, FOR SOLUTION INTRAVENOUS at 15:00

## 2025-01-01 RX ADMIN — DICYCLOMINE HYDROCHLORIDE 10 MG: 10 CAPSULE ORAL at 22:00

## 2025-01-01 RX ADMIN — MORPHINE SULFATE 2 MG: 2 INJECTION, SOLUTION INTRAMUSCULAR; INTRAVENOUS at 02:27

## 2025-01-01 RX ADMIN — Medication 10 ML: at 21:02

## 2025-01-01 RX ADMIN — DICYCLOMINE HYDROCHLORIDE 10 MG: 10 CAPSULE ORAL at 17:44

## 2025-01-01 RX ADMIN — DICYCLOMINE HYDROCHLORIDE 10 MG: 10 CAPSULE ORAL at 11:37

## 2025-01-01 RX ADMIN — HYDROCODONE BITARTRATE AND ACETAMINOPHEN 1 TABLET: 5; 325 TABLET ORAL at 10:30

## 2025-01-01 RX ADMIN — PIPERACILLIN AND TAZOBACTAM 3.38 G: 3; .375 INJECTION, POWDER, FOR SOLUTION INTRAVENOUS at 06:07

## 2025-01-01 RX ADMIN — Medication 10 ML: at 20:41

## 2025-01-01 RX ADMIN — APIXABAN 2.5 MG: 2.5 TABLET, FILM COATED ORAL at 21:55

## 2025-01-01 RX ADMIN — ONDANSETRON 4 MG: 2 INJECTION INTRAMUSCULAR; INTRAVENOUS at 13:07

## 2025-01-01 RX ADMIN — FAMOTIDINE 20 MG: 20 TABLET, FILM COATED ORAL at 17:46

## 2025-01-01 RX ADMIN — APIXABAN 2.5 MG: 2.5 TABLET, FILM COATED ORAL at 09:12

## 2025-01-01 RX ADMIN — APIXABAN 2.5 MG: 2.5 TABLET, FILM COATED ORAL at 09:48

## 2025-01-01 RX ADMIN — FUROSEMIDE 40 MG: 40 TABLET ORAL at 08:58

## 2025-01-01 RX ADMIN — ALLOPURINOL 100 MG: 100 TABLET ORAL at 08:49

## 2025-01-01 RX ADMIN — PANTOPRAZOLE SODIUM 40 MG: 40 TABLET, DELAYED RELEASE ORAL at 05:23

## 2025-01-01 RX ADMIN — PANTOPRAZOLE SODIUM 40 MG: 40 TABLET, DELAYED RELEASE ORAL at 05:18

## 2025-01-01 RX ADMIN — FAMOTIDINE 20 MG: 20 TABLET, FILM COATED ORAL at 10:45

## 2025-01-01 RX ADMIN — FAMOTIDINE 20 MG: 20 TABLET, FILM COATED ORAL at 06:32

## 2025-01-01 RX ADMIN — VANCOMYCIN HYDROCHLORIDE 1000 MG: 1 INJECTION, POWDER, LYOPHILIZED, FOR SOLUTION INTRAVENOUS at 16:40

## 2025-01-01 RX ADMIN — DICYCLOMINE HYDROCHLORIDE 10 MG: 10 CAPSULE ORAL at 09:12

## 2025-01-01 RX ADMIN — SODIUM CHLORIDE, POTASSIUM CHLORIDE, SODIUM LACTATE AND CALCIUM CHLORIDE 100 ML/HR: 600; 310; 30; 20 INJECTION, SOLUTION INTRAVENOUS at 08:15

## 2025-01-01 RX ADMIN — FAMOTIDINE 20 MG: 20 TABLET, FILM COATED ORAL at 18:36

## 2025-01-01 RX ADMIN — TRAZODONE HYDROCHLORIDE 50 MG: 50 TABLET ORAL at 21:34

## 2025-01-01 RX ADMIN — DICYCLOMINE HYDROCHLORIDE 10 MG: 10 CAPSULE ORAL at 20:44

## 2025-01-01 RX ADMIN — PIPERACILLIN AND TAZOBACTAM 3.38 G: 3; .375 INJECTION, POWDER, FOR SOLUTION INTRAVENOUS at 15:00

## 2025-01-01 RX ADMIN — PANTOPRAZOLE SODIUM 40 MG: 40 TABLET, DELAYED RELEASE ORAL at 06:08

## 2025-01-01 RX ADMIN — PIPERACILLIN AND TAZOBACTAM 3.38 G: 3; .375 INJECTION, POWDER, FOR SOLUTION INTRAVENOUS at 21:07

## 2025-01-01 RX ADMIN — DICYCLOMINE HYDROCHLORIDE 10 MG: 10 CAPSULE ORAL at 17:07

## 2025-01-01 RX ADMIN — PIPERACILLIN AND TAZOBACTAM 3.38 G: 3; .375 INJECTION, POWDER, FOR SOLUTION INTRAVENOUS at 23:32

## 2025-01-01 RX ADMIN — SERTRALINE HYDROCHLORIDE 50 MG: 50 TABLET ORAL at 14:23

## 2025-01-01 RX ADMIN — DICYCLOMINE HYDROCHLORIDE 10 MG: 10 CAPSULE ORAL at 10:36

## 2025-01-01 RX ADMIN — PIPERACILLIN AND TAZOBACTAM 3.38 G: 3; .375 INJECTION, POWDER, FOR SOLUTION INTRAVENOUS at 23:38

## 2025-01-01 RX ADMIN — PANTOPRAZOLE SODIUM 40 MG: 40 TABLET, DELAYED RELEASE ORAL at 06:01

## 2025-01-01 RX ADMIN — LORAZEPAM 0.5 MG: 0.5 TABLET ORAL at 11:10

## 2025-01-01 RX ADMIN — CITALOPRAM HYDROBROMIDE 10 MG: 20 TABLET ORAL at 09:01

## 2025-01-01 RX ADMIN — APIXABAN 2.5 MG: 2.5 TABLET, FILM COATED ORAL at 09:39

## 2025-01-01 RX ADMIN — PIPERACILLIN AND TAZOBACTAM 3.38 G: 3; .375 INJECTION, POWDER, FOR SOLUTION INTRAVENOUS at 05:54

## 2025-01-01 RX ADMIN — ALLOPURINOL 100 MG: 100 TABLET ORAL at 08:58

## 2025-01-01 RX ADMIN — ALLOPURINOL 100 MG: 100 TABLET ORAL at 09:39

## 2025-01-01 RX ADMIN — PIPERACILLIN AND TAZOBACTAM 3.38 G: 3; .375 INJECTION, POWDER, FOR SOLUTION INTRAVENOUS at 16:19

## 2025-01-01 RX ADMIN — DICYCLOMINE HYDROCHLORIDE 10 MG: 10 CAPSULE ORAL at 13:00

## 2025-01-01 RX ADMIN — LORAZEPAM 0.5 MG: 0.5 TABLET ORAL at 22:01

## 2025-01-01 RX ADMIN — HYDROMORPHONE HYDROCHLORIDE 0.5 MG: 1 INJECTION, SOLUTION INTRAMUSCULAR; INTRAVENOUS; SUBCUTANEOUS at 10:40

## 2025-01-01 RX ADMIN — LORAZEPAM 0.5 MG: 0.5 TABLET ORAL at 20:40

## 2025-01-01 RX ADMIN — LORAZEPAM 0.5 MG: 0.5 TABLET ORAL at 21:55

## 2025-01-01 RX ADMIN — LORAZEPAM 0.5 MG: 0.5 TABLET ORAL at 21:22

## 2025-01-01 RX ADMIN — DICYCLOMINE HYDROCHLORIDE 10 MG: 10 CAPSULE ORAL at 18:11

## 2025-01-01 RX ADMIN — DICYCLOMINE HYDROCHLORIDE 10 MG: 10 CAPSULE ORAL at 18:36

## 2025-01-01 RX ADMIN — MIDODRINE HYDROCHLORIDE 5 MG: 5 TABLET ORAL at 14:08

## 2025-01-01 RX ADMIN — Medication 10 ML: at 08:20

## 2025-01-01 RX ADMIN — DICYCLOMINE HYDROCHLORIDE 10 MG: 10 CAPSULE ORAL at 11:42

## 2025-01-01 RX ADMIN — LORAZEPAM 0.5 MG: 0.5 TABLET ORAL at 21:14

## 2025-01-01 RX ADMIN — PIPERACILLIN AND TAZOBACTAM 3.38 G: 3; .375 INJECTION, POWDER, FOR SOLUTION INTRAVENOUS at 06:08

## 2025-01-01 RX ADMIN — HYDROMORPHONE HYDROCHLORIDE 0.5 MG: 1 INJECTION, SOLUTION INTRAMUSCULAR; INTRAVENOUS; SUBCUTANEOUS at 21:50

## 2025-01-01 RX ADMIN — ALLOPURINOL 100 MG: 100 TABLET ORAL at 09:11

## 2025-01-01 RX ADMIN — DICYCLOMINE HYDROCHLORIDE 10 MG: 10 CAPSULE ORAL at 08:34

## 2025-01-01 RX ADMIN — Medication 10 ML: at 08:43

## 2025-01-01 RX ADMIN — PIPERACILLIN AND TAZOBACTAM 3.38 G: 3; .375 INJECTION, POWDER, FOR SOLUTION INTRAVENOUS at 15:31

## 2025-01-01 RX ADMIN — APIXABAN 2.5 MG: 2.5 TABLET, FILM COATED ORAL at 08:17

## 2025-01-01 RX ADMIN — DICYCLOMINE HYDROCHLORIDE 10 MG: 10 CAPSULE ORAL at 21:33

## 2025-01-01 RX ADMIN — SERTRALINE HYDROCHLORIDE 50 MG: 50 TABLET ORAL at 08:08

## 2025-01-01 RX ADMIN — PIPERACILLIN AND TAZOBACTAM 3.38 G: 3; .375 INJECTION, POWDER, FOR SOLUTION INTRAVENOUS at 16:55

## 2025-01-01 RX ADMIN — LORAZEPAM 0.5 MG: 0.5 TABLET ORAL at 21:12

## 2025-01-01 RX ADMIN — PIPERACILLIN AND TAZOBACTAM 3.38 G: 3; .375 INJECTION, POWDER, FOR SOLUTION INTRAVENOUS at 06:01

## 2025-01-01 RX ADMIN — Medication 10 ML: at 22:00

## 2025-01-01 RX ADMIN — LORAZEPAM 0.5 MG: 0.5 TABLET ORAL at 10:30

## 2025-01-01 RX ADMIN — FAMOTIDINE 20 MG: 20 TABLET, FILM COATED ORAL at 18:03

## 2025-01-01 RX ADMIN — DICYCLOMINE HYDROCHLORIDE 10 MG: 10 CAPSULE ORAL at 09:48

## 2025-01-01 RX ADMIN — FAMOTIDINE 20 MG: 20 TABLET, FILM COATED ORAL at 17:42

## 2025-01-01 RX ADMIN — SERTRALINE HYDROCHLORIDE 50 MG: 50 TABLET ORAL at 08:58

## 2025-01-01 RX ADMIN — SERTRALINE HYDROCHLORIDE 50 MG: 50 TABLET ORAL at 08:12

## 2025-01-01 RX ADMIN — APIXABAN 2.5 MG: 2.5 TABLET, FILM COATED ORAL at 09:01

## 2025-01-01 RX ADMIN — PIPERACILLIN AND TAZOBACTAM 3.38 G: 3; .375 INJECTION, POWDER, FOR SOLUTION INTRAVENOUS at 04:13

## 2025-01-01 RX ADMIN — ALLOPURINOL 100 MG: 100 TABLET ORAL at 08:07

## 2025-01-01 RX ADMIN — Medication 10 ML: at 08:30

## 2025-01-01 RX ADMIN — MIDODRINE HYDROCHLORIDE 15 MG: 10 TABLET ORAL at 14:13

## 2025-01-01 RX ADMIN — MORPHINE SULFATE 2 MG: 2 INJECTION, SOLUTION INTRAMUSCULAR; INTRAVENOUS at 09:13

## 2025-01-01 RX ADMIN — PANTOPRAZOLE SODIUM 40 MG: 40 TABLET, DELAYED RELEASE ORAL at 05:15

## 2025-01-01 RX ADMIN — HYDROCODONE BITARTRATE AND ACETAMINOPHEN 1 TABLET: 5; 325 TABLET ORAL at 03:55

## 2025-01-01 RX ADMIN — PIPERACILLIN AND TAZOBACTAM 3.38 G: 3; .375 INJECTION, POWDER, FOR SOLUTION INTRAVENOUS at 21:55

## 2025-01-01 RX ADMIN — SERTRALINE HYDROCHLORIDE 50 MG: 50 TABLET ORAL at 08:30

## 2025-01-01 RX ADMIN — Medication 10 ML: at 21:14

## 2025-01-01 RX ADMIN — DICYCLOMINE HYDROCHLORIDE 10 MG: 10 CAPSULE ORAL at 22:44

## 2025-01-01 RX ADMIN — MIDODRINE HYDROCHLORIDE 2.5 MG: 10 TABLET ORAL at 08:50

## 2025-01-01 RX ADMIN — DICYCLOMINE HYDROCHLORIDE 10 MG: 10 CAPSULE ORAL at 11:55

## 2025-01-01 RX ADMIN — FUROSEMIDE 40 MG: 10 INJECTION, SOLUTION INTRAMUSCULAR; INTRAVENOUS at 16:39

## 2025-01-01 RX ADMIN — ALBUMIN (HUMAN) 25 G: 0.25 INJECTION, SOLUTION INTRAVENOUS at 20:28

## 2025-01-01 RX ADMIN — MIDODRINE HYDROCHLORIDE 2.5 MG: 10 TABLET ORAL at 18:36

## 2025-01-01 RX ADMIN — Medication 10 ML: at 10:37

## 2025-01-01 RX ADMIN — FUROSEMIDE 60 MG: 10 INJECTION, SOLUTION INTRAMUSCULAR; INTRAVENOUS at 14:58

## 2025-01-01 RX ADMIN — SODIUM CHLORIDE 75 ML/HR: 9 INJECTION, SOLUTION INTRAVENOUS at 19:08

## 2025-01-01 RX ADMIN — ONDANSETRON 4 MG: 2 INJECTION INTRAMUSCULAR; INTRAVENOUS at 11:26

## 2025-01-01 RX ADMIN — SERTRALINE HYDROCHLORIDE 50 MG: 50 TABLET ORAL at 09:11

## 2025-01-01 RX ADMIN — HYDROCODONE BITARTRATE AND ACETAMINOPHEN 1 TABLET: 5; 325 TABLET ORAL at 09:48

## 2025-01-01 RX ADMIN — PIPERACILLIN AND TAZOBACTAM 3.38 G: 3; .375 INJECTION, POWDER, FOR SOLUTION INTRAVENOUS at 22:35

## 2025-01-01 RX ADMIN — APIXABAN 2.5 MG: 2.5 TABLET, FILM COATED ORAL at 08:30

## 2025-01-01 RX ADMIN — Medication 10 ML: at 21:13

## 2025-01-01 RX ADMIN — ONDANSETRON 4 MG: 2 INJECTION INTRAMUSCULAR; INTRAVENOUS at 14:40

## 2025-01-01 RX ADMIN — ALLOPURINOL 100 MG: 100 TABLET ORAL at 08:20

## 2025-01-01 RX ADMIN — DICYCLOMINE HYDROCHLORIDE 10 MG: 10 CAPSULE ORAL at 08:30

## 2025-01-01 RX ADMIN — Medication 10 ML: at 09:49

## 2025-01-01 RX ADMIN — ALLOPURINOL 100 MG: 100 TABLET ORAL at 09:49

## 2025-01-01 RX ADMIN — ONDANSETRON 4 MG: 2 INJECTION INTRAMUSCULAR; INTRAVENOUS at 21:32

## 2025-01-01 RX ADMIN — MIDODRINE HYDROCHLORIDE 5 MG: 5 TABLET ORAL at 05:37

## 2025-01-01 RX ADMIN — LORAZEPAM 0.5 MG: 0.5 TABLET ORAL at 22:44

## 2025-01-01 RX ADMIN — SERTRALINE HYDROCHLORIDE 50 MG: 50 TABLET ORAL at 09:48

## 2025-01-01 RX ADMIN — Medication 10 ML: at 21:35

## 2025-01-01 RX ADMIN — Medication 10 ML: at 21:10

## 2025-01-01 RX ADMIN — FUROSEMIDE 40 MG: 10 INJECTION, SOLUTION INTRAMUSCULAR; INTRAVENOUS at 17:45

## 2025-01-01 RX ADMIN — FUROSEMIDE 20 MG: 10 INJECTION, SOLUTION INTRAMUSCULAR; INTRAVENOUS at 14:00

## 2025-01-01 RX ADMIN — HYDROCODONE BITARTRATE AND ACETAMINOPHEN 1 TABLET: 5; 325 TABLET ORAL at 21:33

## 2025-01-01 RX ADMIN — APIXABAN 2.5 MG: 2.5 TABLET, FILM COATED ORAL at 08:34

## 2025-01-01 RX ADMIN — BUMETANIDE 2 MG: 0.25 INJECTION INTRAMUSCULAR; INTRAVENOUS at 21:46

## 2025-01-01 RX ADMIN — Medication 10 MG: at 21:37

## 2025-01-01 RX ADMIN — Medication 10 MG: at 22:34

## 2025-01-01 RX ADMIN — Medication 10 ML: at 08:18

## 2025-01-01 RX ADMIN — DICYCLOMINE HYDROCHLORIDE 10 MG: 10 CAPSULE ORAL at 11:26

## 2025-01-01 RX ADMIN — DICYCLOMINE HYDROCHLORIDE 10 MG: 10 CAPSULE ORAL at 17:33

## 2025-01-01 RX ADMIN — DICYCLOMINE HYDROCHLORIDE 10 MG: 10 CAPSULE ORAL at 14:08

## 2025-01-01 RX ADMIN — MIDODRINE HYDROCHLORIDE 10 MG: 10 TABLET ORAL at 13:17

## 2025-01-01 RX ADMIN — Medication 10 ML: at 09:02

## 2025-01-01 RX ADMIN — Medication 10 ML: at 20:27

## 2025-01-01 RX ADMIN — MIDODRINE HYDROCHLORIDE 15 MG: 10 TABLET ORAL at 06:34

## 2025-01-01 RX ADMIN — HYDROMORPHONE HYDROCHLORIDE 0.5 MG: 1 INJECTION, SOLUTION INTRAMUSCULAR; INTRAVENOUS; SUBCUTANEOUS at 06:39

## 2025-01-01 RX ADMIN — MIDODRINE HYDROCHLORIDE 15 MG: 10 TABLET ORAL at 21:36

## 2025-01-01 RX ADMIN — CITALOPRAM HYDROBROMIDE 10 MG: 20 TABLET ORAL at 08:39

## 2025-01-01 RX ADMIN — LORAZEPAM 0.5 MG: 0.5 TABLET ORAL at 21:02

## 2025-01-01 RX ADMIN — FUROSEMIDE 40 MG: 40 TABLET ORAL at 16:40

## 2025-01-01 RX ADMIN — LORAZEPAM 0.5 MG: 0.5 TABLET ORAL at 16:03

## 2025-01-01 RX ADMIN — PIPERACILLIN AND TAZOBACTAM 3.38 G: 3; .375 INJECTION, POWDER, FOR SOLUTION INTRAVENOUS at 06:18

## 2025-01-01 RX ADMIN — Medication 10 ML: at 08:09

## 2025-01-01 RX ADMIN — SERTRALINE HYDROCHLORIDE 50 MG: 50 TABLET ORAL at 09:39

## 2025-01-01 RX ADMIN — APIXABAN 2.5 MG: 2.5 TABLET, FILM COATED ORAL at 20:56

## 2025-01-01 RX ADMIN — PANTOPRAZOLE SODIUM 40 MG: 40 TABLET, DELAYED RELEASE ORAL at 05:00

## 2025-01-01 RX ADMIN — SODIUM CHLORIDE 1000 ML: 9 INJECTION, SOLUTION INTRAVENOUS at 06:25

## 2025-01-01 RX ADMIN — APIXABAN 2.5 MG: 2.5 TABLET, FILM COATED ORAL at 21:36

## 2025-01-01 RX ADMIN — SERTRALINE HYDROCHLORIDE 50 MG: 50 TABLET ORAL at 08:49

## 2025-01-01 RX ADMIN — FAMOTIDINE 20 MG: 20 TABLET, FILM COATED ORAL at 10:50

## 2025-01-01 RX ADMIN — Medication 10 ML: at 22:01

## 2025-01-01 RX ADMIN — DICYCLOMINE HYDROCHLORIDE 10 MG: 10 CAPSULE ORAL at 17:27

## 2025-01-01 RX ADMIN — PIPERACILLIN AND TAZOBACTAM 3.38 G: 3; .375 INJECTION, POWDER, FOR SOLUTION INTRAVENOUS at 22:01

## 2025-01-01 RX ADMIN — HYDROMORPHONE HYDROCHLORIDE 0.5 MG: 1 INJECTION, SOLUTION INTRAMUSCULAR; INTRAVENOUS; SUBCUTANEOUS at 18:10

## 2025-01-01 RX ADMIN — HYDROCODONE BITARTRATE AND ACETAMINOPHEN 1 TABLET: 5; 325 TABLET ORAL at 13:59

## 2025-01-01 RX ADMIN — DICYCLOMINE HYDROCHLORIDE 10 MG: 10 CAPSULE ORAL at 17:46

## 2025-01-01 RX ADMIN — ALLOPURINOL 100 MG: 100 TABLET ORAL at 08:12

## 2025-01-01 RX ADMIN — Medication 10 ML: at 21:52

## 2025-01-01 RX ADMIN — Medication 10 ML: at 09:12

## 2025-01-01 RX ADMIN — APIXABAN 2.5 MG: 2.5 TABLET, FILM COATED ORAL at 21:23

## 2025-01-01 RX ADMIN — MIDODRINE HYDROCHLORIDE 15 MG: 10 TABLET ORAL at 21:47

## 2025-01-01 RX ADMIN — APIXABAN 2.5 MG: 2.5 TABLET, FILM COATED ORAL at 21:08

## 2025-01-01 RX ADMIN — Medication 10 ML: at 21:55

## 2025-01-01 RX ADMIN — LORAZEPAM 0.5 MG: 0.5 TABLET ORAL at 20:56

## 2025-01-01 RX ADMIN — VANCOMYCIN HYDROCHLORIDE 1000 MG: 1 INJECTION, POWDER, LYOPHILIZED, FOR SOLUTION INTRAVENOUS at 13:09

## 2025-01-01 RX ADMIN — VANCOMYCIN HYDROCHLORIDE 1000 MG: 1 INJECTION, POWDER, LYOPHILIZED, FOR SOLUTION INTRAVENOUS at 14:15

## 2025-01-01 RX ADMIN — APIXABAN 2.5 MG: 2.5 TABLET, FILM COATED ORAL at 20:40

## 2025-01-01 RX ADMIN — FAMOTIDINE 20 MG: 20 TABLET, FILM COATED ORAL at 09:48

## 2025-01-01 RX ADMIN — SODIUM CHLORIDE 50 ML/HR: 9 INJECTION, SOLUTION INTRAVENOUS at 08:29

## 2025-01-01 RX ADMIN — FAMOTIDINE 20 MG: 20 TABLET, FILM COATED ORAL at 08:12

## 2025-01-01 RX ADMIN — PIPERACILLIN AND TAZOBACTAM 3.38 G: 3; .375 INJECTION, POWDER, FOR SOLUTION INTRAVENOUS at 22:37

## 2025-01-01 RX ADMIN — LORAZEPAM 1 MG: 0.5 TABLET ORAL at 11:38

## 2025-01-01 RX ADMIN — Medication 10 ML: at 21:09

## 2025-01-01 RX ADMIN — DICYCLOMINE HYDROCHLORIDE 10 MG: 10 CAPSULE ORAL at 21:47

## 2025-01-01 RX ADMIN — MIDODRINE HYDROCHLORIDE 5 MG: 5 TABLET ORAL at 21:00

## 2025-01-01 RX ADMIN — MORPHINE SULFATE 2 MG: 2 INJECTION, SOLUTION INTRAMUSCULAR; INTRAVENOUS at 05:38

## 2025-01-01 RX ADMIN — Medication 10 ML: at 08:35

## 2025-01-01 RX ADMIN — APIXABAN 2.5 MG: 2.5 TABLET, FILM COATED ORAL at 09:11

## 2025-01-01 RX ADMIN — FUROSEMIDE 40 MG: 40 TABLET ORAL at 08:17

## 2025-01-01 RX ADMIN — Medication 10 ML: at 08:05

## 2025-01-01 RX ADMIN — DICYCLOMINE HYDROCHLORIDE 10 MG: 10 CAPSULE ORAL at 19:30

## 2025-01-01 RX ADMIN — PIPERACILLIN AND TAZOBACTAM 3.38 G: 3; .375 INJECTION, POWDER, FOR SOLUTION INTRAVENOUS at 21:50

## 2025-01-01 RX ADMIN — DICYCLOMINE HYDROCHLORIDE 10 MG: 10 CAPSULE ORAL at 21:36

## 2025-01-01 RX ADMIN — PIPERACILLIN AND TAZOBACTAM 3.38 G: 3; .375 INJECTION, POWDER, FOR SOLUTION INTRAVENOUS at 14:15

## 2025-01-01 RX ADMIN — POTASSIUM CHLORIDE 40 MEQ: 750 CAPSULE, EXTENDED RELEASE ORAL at 15:31

## 2025-01-01 RX ADMIN — Medication 10 ML: at 21:47

## 2025-01-01 RX ADMIN — ALLOPURINOL 100 MG: 100 TABLET ORAL at 09:12

## 2025-01-01 RX ADMIN — APIXABAN 2.5 MG: 2.5 TABLET, FILM COATED ORAL at 21:51

## 2025-01-01 RX ADMIN — VANCOMYCIN HYDROCHLORIDE 1000 MG: 1 INJECTION, POWDER, LYOPHILIZED, FOR SOLUTION INTRAVENOUS at 14:23

## 2025-01-01 RX ADMIN — FAMOTIDINE 20 MG: 20 TABLET, FILM COATED ORAL at 06:34

## 2025-01-01 RX ADMIN — FUROSEMIDE 20 MG: 20 TABLET ORAL at 08:50

## 2025-01-01 RX ADMIN — Medication 10 ML: at 18:10

## 2025-01-01 RX ADMIN — FAMOTIDINE 20 MG: 20 TABLET, FILM COATED ORAL at 08:49

## 2025-01-01 RX ADMIN — Medication 10 ML: at 09:20

## 2025-01-01 RX ADMIN — DICYCLOMINE HYDROCHLORIDE 10 MG: 10 CAPSULE ORAL at 08:07

## 2025-01-01 RX ADMIN — LORAZEPAM 0.5 MG: 0.5 TABLET ORAL at 21:47

## 2025-01-01 RX ADMIN — APIXABAN 2.5 MG: 2.5 TABLET, FILM COATED ORAL at 21:35

## 2025-01-01 RX ADMIN — ONDANSETRON 4 MG: 2 INJECTION INTRAMUSCULAR; INTRAVENOUS at 22:04

## 2025-01-01 RX ADMIN — ALLOPURINOL 100 MG: 100 TABLET ORAL at 08:30

## 2025-01-01 RX ADMIN — HYDROCODONE BITARTRATE AND ACETAMINOPHEN 1 TABLET: 5; 325 TABLET ORAL at 22:43

## 2025-01-01 RX ADMIN — DICYCLOMINE HYDROCHLORIDE 10 MG: 10 CAPSULE ORAL at 11:34

## 2025-01-01 RX ADMIN — APIXABAN 2.5 MG: 2.5 TABLET, FILM COATED ORAL at 08:20

## 2025-01-01 RX ADMIN — PIPERACILLIN AND TAZOBACTAM 3.38 G: 3; .375 INJECTION, POWDER, FOR SOLUTION INTRAVENOUS at 05:57

## 2025-01-01 RX ADMIN — Medication 10 ML: at 21:23

## 2025-01-01 RX ADMIN — SERTRALINE HYDROCHLORIDE 50 MG: 50 TABLET ORAL at 09:12

## 2025-01-01 RX ADMIN — DICYCLOMINE HYDROCHLORIDE 10 MG: 10 CAPSULE ORAL at 08:50

## 2025-01-18 ENCOUNTER — HOSPITAL ENCOUNTER (INPATIENT)
Facility: HOSPITAL | Age: 85
LOS: 5 days | Discharge: HOME | DRG: 291 | End: 2025-01-23
Attending: EMERGENCY MEDICINE | Admitting: INTERNAL MEDICINE
Payer: MEDICARE

## 2025-01-18 ENCOUNTER — APPOINTMENT (OUTPATIENT)
Dept: GENERAL RADIOLOGY | Facility: HOSPITAL | Age: 85
DRG: 291 | End: 2025-01-18
Payer: MEDICARE

## 2025-01-18 ENCOUNTER — APPOINTMENT (OUTPATIENT)
Dept: CARDIOLOGY | Facility: HOSPITAL | Age: 85
DRG: 291 | End: 2025-01-18
Payer: MEDICARE

## 2025-01-18 DIAGNOSIS — R26.2 DIFFICULTY WALKING: ICD-10-CM

## 2025-01-18 DIAGNOSIS — R13.19 OTHER DYSPHAGIA: ICD-10-CM

## 2025-01-18 DIAGNOSIS — Z78.9 DECREASED ACTIVITIES OF DAILY LIVING (ADL): ICD-10-CM

## 2025-01-18 DIAGNOSIS — I50.23 ACUTE ON CHRONIC HFREF (HEART FAILURE WITH REDUCED EJECTION FRACTION): Primary | ICD-10-CM

## 2025-01-18 DIAGNOSIS — N39.0 ACUTE UTI: ICD-10-CM

## 2025-01-18 PROBLEM — I50.9 ACUTE EXACERBATION OF CHF (CONGESTIVE HEART FAILURE): Status: ACTIVE | Noted: 2025-01-18

## 2025-01-18 LAB
ALBUMIN SERPL-MCNC: 3.8 G/DL (ref 3.5–5.2)
ALBUMIN/GLOB SERPL: 1 G/DL
ALP SERPL-CCNC: 137 U/L (ref 39–117)
ALT SERPL W P-5'-P-CCNC: 20 U/L (ref 1–41)
ANION GAP SERPL CALCULATED.3IONS-SCNC: 14.4 MMOL/L (ref 5–15)
AST SERPL-CCNC: 23 U/L (ref 1–40)
BACTERIA UR QL AUTO: ABNORMAL /HPF
BASOPHILS # BLD AUTO: 0.08 10*3/MM3 (ref 0–0.2)
BASOPHILS NFR BLD AUTO: 0.9 % (ref 0–1.5)
BILIRUB SERPL-MCNC: 1.7 MG/DL (ref 0–1.2)
BILIRUB UR QL STRIP: ABNORMAL
BUN SERPL-MCNC: 32 MG/DL (ref 8–23)
BUN/CREAT SERPL: 17 (ref 7–25)
CALCIUM SPEC-SCNC: 9.8 MG/DL (ref 8.6–10.5)
CHLORIDE SERPL-SCNC: 99 MMOL/L (ref 98–107)
CLARITY UR: ABNORMAL
CO2 SERPL-SCNC: 26.6 MMOL/L (ref 22–29)
COLOR UR: YELLOW
CREAT SERPL-MCNC: 1.88 MG/DL (ref 0.76–1.27)
D-LACTATE SERPL-SCNC: 2 MMOL/L (ref 0.5–2)
DEPRECATED RDW RBC AUTO: 51.5 FL (ref 37–54)
EGFRCR SERPLBLD CKD-EPI 2021: 35.2 ML/MIN/1.73
EOSINOPHIL # BLD AUTO: 0.08 10*3/MM3 (ref 0–0.4)
EOSINOPHIL NFR BLD AUTO: 0.9 % (ref 0.3–6.2)
ERYTHROCYTE [DISTWIDTH] IN BLOOD BY AUTOMATED COUNT: 16.4 % (ref 12.3–15.4)
FLUAV SUBTYP SPEC NAA+PROBE: NOT DETECTED
FLUBV RNA ISLT QL NAA+PROBE: NOT DETECTED
GEN 5 1HR TROPONIN T REFLEX: 49 NG/L
GLOBULIN UR ELPH-MCNC: 3.8 GM/DL
GLUCOSE SERPL-MCNC: 136 MG/DL (ref 65–99)
GLUCOSE UR STRIP-MCNC: ABNORMAL MG/DL
HCT VFR BLD AUTO: 48.3 % (ref 37.5–51)
HGB BLD-MCNC: 15.1 G/DL (ref 13–17.7)
HGB UR QL STRIP.AUTO: ABNORMAL
HOLD SPECIMEN: NORMAL
HOLD SPECIMEN: NORMAL
HYALINE CASTS UR QL AUTO: ABNORMAL /LPF
IMM GRANULOCYTES # BLD AUTO: 0.03 10*3/MM3 (ref 0–0.05)
IMM GRANULOCYTES NFR BLD AUTO: 0.3 % (ref 0–0.5)
KETONES UR QL STRIP: NEGATIVE
LEUKOCYTE ESTERASE UR QL STRIP.AUTO: ABNORMAL
LYMPHOCYTES # BLD AUTO: 2.05 10*3/MM3 (ref 0.7–3.1)
LYMPHOCYTES NFR BLD AUTO: 22.4 % (ref 19.6–45.3)
MAGNESIUM SERPL-MCNC: 2.3 MG/DL (ref 1.6–2.4)
MCH RBC QN AUTO: 28.2 PG (ref 26.6–33)
MCHC RBC AUTO-ENTMCNC: 31.3 G/DL (ref 31.5–35.7)
MCV RBC AUTO: 90.1 FL (ref 79–97)
MONOCYTES # BLD AUTO: 0.76 10*3/MM3 (ref 0.1–0.9)
MONOCYTES NFR BLD AUTO: 8.3 % (ref 5–12)
NEUTROPHILS NFR BLD AUTO: 6.15 10*3/MM3 (ref 1.7–7)
NEUTROPHILS NFR BLD AUTO: 67.2 % (ref 42.7–76)
NITRITE UR QL STRIP: POSITIVE
NT-PROBNP SERPL-MCNC: ABNORMAL PG/ML (ref 0–1800)
PH UR STRIP.AUTO: 5.5 [PH] (ref 5–8)
PLATELET # BLD AUTO: 223 10*3/MM3 (ref 140–450)
PMV BLD AUTO: 10.7 FL (ref 6–12)
POTASSIUM SERPL-SCNC: 4.3 MMOL/L (ref 3.5–5.2)
PROCALCITONIN SERPL-MCNC: 0.14 NG/ML (ref 0–0.25)
PROT SERPL-MCNC: 7.6 G/DL (ref 6–8.5)
PROT UR QL STRIP: ABNORMAL
QT INTERVAL: 529 MS
QTC INTERVAL: 580 MS
RBC # BLD AUTO: 5.36 10*6/MM3 (ref 4.14–5.8)
RBC # UR STRIP: ABNORMAL /HPF
REF LAB TEST METHOD: ABNORMAL
RSV RNA NPH QL NAA+NON-PROBE: NOT DETECTED
SARS-COV-2 RNA RESP QL NAA+PROBE: NOT DETECTED
SODIUM SERPL-SCNC: 140 MMOL/L (ref 136–145)
SP GR UR STRIP: 1.02 (ref 1–1.03)
SQUAMOUS #/AREA URNS HPF: ABNORMAL /HPF
TROPONIN T % DELTA: -17
TROPONIN T NUMERIC DELTA: -10 NG/L
TROPONIN T SERPL HS-MCNC: 59 NG/L
UROBILINOGEN UR QL STRIP: ABNORMAL
WBC # UR STRIP: ABNORMAL /HPF
WBC NRBC COR # BLD AUTO: 9.15 10*3/MM3 (ref 3.4–10.8)
WHOLE BLOOD HOLD COAG: NORMAL
WHOLE BLOOD HOLD SPECIMEN: NORMAL

## 2025-01-18 PROCEDURE — 87077 CULTURE AEROBIC IDENTIFY: CPT | Performed by: EMERGENCY MEDICINE

## 2025-01-18 PROCEDURE — 81001 URINALYSIS AUTO W/SCOPE: CPT | Performed by: EMERGENCY MEDICINE

## 2025-01-18 PROCEDURE — 99285 EMERGENCY DEPT VISIT HI MDM: CPT

## 2025-01-18 PROCEDURE — 85025 COMPLETE CBC W/AUTO DIFF WBC: CPT | Performed by: EMERGENCY MEDICINE

## 2025-01-18 PROCEDURE — 93005 ELECTROCARDIOGRAM TRACING: CPT

## 2025-01-18 PROCEDURE — 87086 URINE CULTURE/COLONY COUNT: CPT | Performed by: EMERGENCY MEDICINE

## 2025-01-18 PROCEDURE — 71045 X-RAY EXAM CHEST 1 VIEW: CPT

## 2025-01-18 PROCEDURE — 83735 ASSAY OF MAGNESIUM: CPT | Performed by: EMERGENCY MEDICINE

## 2025-01-18 PROCEDURE — 87088 URINE BACTERIA CULTURE: CPT | Performed by: EMERGENCY MEDICINE

## 2025-01-18 PROCEDURE — 87186 SC STD MICRODIL/AGAR DIL: CPT | Performed by: EMERGENCY MEDICINE

## 2025-01-18 PROCEDURE — 25010000002 PROCHLORPERAZINE 10 MG/2ML SOLUTION: Performed by: INTERNAL MEDICINE

## 2025-01-18 PROCEDURE — 93005 ELECTROCARDIOGRAM TRACING: CPT | Performed by: EMERGENCY MEDICINE

## 2025-01-18 PROCEDURE — 25010000002 CEFTRIAXONE PER 250 MG: Performed by: EMERGENCY MEDICINE

## 2025-01-18 PROCEDURE — 84145 PROCALCITONIN (PCT): CPT | Performed by: INTERNAL MEDICINE

## 2025-01-18 PROCEDURE — 93306 TTE W/DOPPLER COMPLETE: CPT

## 2025-01-18 PROCEDURE — 84484 ASSAY OF TROPONIN QUANT: CPT | Performed by: EMERGENCY MEDICINE

## 2025-01-18 PROCEDURE — 87637 SARSCOV2&INF A&B&RSV AMP PRB: CPT | Performed by: INTERNAL MEDICINE

## 2025-01-18 PROCEDURE — 36415 COLL VENOUS BLD VENIPUNCTURE: CPT

## 2025-01-18 PROCEDURE — 83880 ASSAY OF NATRIURETIC PEPTIDE: CPT | Performed by: EMERGENCY MEDICINE

## 2025-01-18 PROCEDURE — 93306 TTE W/DOPPLER COMPLETE: CPT | Performed by: INTERNAL MEDICINE

## 2025-01-18 PROCEDURE — 99223 1ST HOSP IP/OBS HIGH 75: CPT | Performed by: INTERNAL MEDICINE

## 2025-01-18 PROCEDURE — 80053 COMPREHEN METABOLIC PANEL: CPT | Performed by: EMERGENCY MEDICINE

## 2025-01-18 PROCEDURE — 83605 ASSAY OF LACTIC ACID: CPT | Performed by: EMERGENCY MEDICINE

## 2025-01-18 PROCEDURE — 87040 BLOOD CULTURE FOR BACTERIA: CPT | Performed by: INTERNAL MEDICINE

## 2025-01-18 PROCEDURE — 25010000002 FUROSEMIDE PER 20 MG: Performed by: INTERNAL MEDICINE

## 2025-01-18 RX ORDER — ACETAMINOPHEN 325 MG/1
650 TABLET ORAL EVERY 6 HOURS PRN
Status: DISCONTINUED | OUTPATIENT
Start: 2025-01-18 | End: 2025-01-23 | Stop reason: HOSPADM

## 2025-01-18 RX ORDER — PROCHLORPERAZINE EDISYLATE 5 MG/ML
5 INJECTION INTRAMUSCULAR; INTRAVENOUS EVERY 6 HOURS PRN
Status: DISCONTINUED | OUTPATIENT
Start: 2025-01-18 | End: 2025-01-23 | Stop reason: HOSPADM

## 2025-01-18 RX ORDER — POTASSIUM CHLORIDE 750 MG/1
20 CAPSULE, EXTENDED RELEASE ORAL ONCE
Status: COMPLETED | OUTPATIENT
Start: 2025-01-18 | End: 2025-01-18

## 2025-01-18 RX ORDER — ALUMINA, MAGNESIA, AND SIMETHICONE 2400; 2400; 240 MG/30ML; MG/30ML; MG/30ML
15 SUSPENSION ORAL EVERY 6 HOURS PRN
Status: DISCONTINUED | OUTPATIENT
Start: 2025-01-18 | End: 2025-01-23 | Stop reason: HOSPADM

## 2025-01-18 RX ORDER — SODIUM CHLORIDE 9 MG/ML
40 INJECTION, SOLUTION INTRAVENOUS AS NEEDED
Status: DISCONTINUED | OUTPATIENT
Start: 2025-01-18 | End: 2025-01-23 | Stop reason: HOSPADM

## 2025-01-18 RX ORDER — SODIUM CHLORIDE 0.9 % (FLUSH) 0.9 %
10 SYRINGE (ML) INJECTION AS NEEDED
Status: DISCONTINUED | OUTPATIENT
Start: 2025-01-18 | End: 2025-01-23 | Stop reason: HOSPADM

## 2025-01-18 RX ORDER — HYDROXYZINE HYDROCHLORIDE 25 MG/1
25 TABLET, FILM COATED ORAL 3 TIMES DAILY PRN
Status: DISCONTINUED | OUTPATIENT
Start: 2025-01-18 | End: 2025-01-23 | Stop reason: HOSPADM

## 2025-01-18 RX ORDER — SODIUM CHLORIDE 0.9 % (FLUSH) 0.9 %
10 SYRINGE (ML) INJECTION EVERY 12 HOURS SCHEDULED
Status: DISCONTINUED | OUTPATIENT
Start: 2025-01-18 | End: 2025-01-23 | Stop reason: HOSPADM

## 2025-01-18 RX ORDER — ONDANSETRON 2 MG/ML
4 INJECTION INTRAMUSCULAR; INTRAVENOUS EVERY 4 HOURS PRN
Status: DISCONTINUED | OUTPATIENT
Start: 2025-01-18 | End: 2025-01-23 | Stop reason: HOSPADM

## 2025-01-18 RX ORDER — FUROSEMIDE 10 MG/ML
40 INJECTION INTRAMUSCULAR; INTRAVENOUS ONCE
Status: COMPLETED | OUTPATIENT
Start: 2025-01-18 | End: 2025-01-18

## 2025-01-18 RX ORDER — TRAZODONE HYDROCHLORIDE 50 MG/1
50 TABLET, FILM COATED ORAL NIGHTLY PRN
COMMUNITY

## 2025-01-18 RX ORDER — LIDOCAINE 4 G/G
1 PATCH TOPICAL DAILY PRN
Status: DISCONTINUED | OUTPATIENT
Start: 2025-01-18 | End: 2025-01-23 | Stop reason: HOSPADM

## 2025-01-18 RX ORDER — POLYETHYLENE GLYCOL 3350 17 G/17G
17 POWDER, FOR SOLUTION ORAL 2 TIMES DAILY PRN
Status: DISCONTINUED | OUTPATIENT
Start: 2025-01-18 | End: 2025-01-23 | Stop reason: HOSPADM

## 2025-01-18 RX ORDER — PANTOPRAZOLE SODIUM 40 MG/10ML
40 INJECTION, POWDER, LYOPHILIZED, FOR SOLUTION INTRAVENOUS EVERY 12 HOURS SCHEDULED
Status: DISCONTINUED | OUTPATIENT
Start: 2025-01-18 | End: 2025-01-21

## 2025-01-18 RX ADMIN — Medication 10 ML: at 16:14

## 2025-01-18 RX ADMIN — POTASSIUM CHLORIDE 20 MEQ: 750 CAPSULE, EXTENDED RELEASE ORAL at 16:13

## 2025-01-18 RX ADMIN — PANTOPRAZOLE SODIUM 40 MG: 40 INJECTION, POWDER, FOR SOLUTION INTRAVENOUS at 16:14

## 2025-01-18 RX ADMIN — Medication 10 ML: at 20:42

## 2025-01-18 RX ADMIN — SODIUM CHLORIDE 1000 MG: 9 INJECTION INTRAMUSCULAR; INTRAVENOUS; SUBCUTANEOUS at 11:52

## 2025-01-18 RX ADMIN — FUROSEMIDE 40 MG: 10 INJECTION, SOLUTION INTRAMUSCULAR; INTRAVENOUS at 16:13

## 2025-01-18 RX ADMIN — PROCHLORPERAZINE EDISYLATE 5 MG: 5 INJECTION, SOLUTION INTRAMUSCULAR; INTRAVENOUS at 20:41

## 2025-01-18 NOTE — PLAN OF CARE
Goal Outcome Evaluation:  Plan of Care Reviewed With: patient        Progress: no change  Outcome Evaluation: a&ox4, ambulated to bathroom, no complains of pain, reports some SOA with ambulation, tolerating full liquid.

## 2025-01-18 NOTE — ED PROVIDER NOTES
"Time: 2:57 PM EST  Date of encounter:  1/18/2025  Independent Historian/Clinical History and Information was obtained by:   Patient    History is limited by: N/A    Chief Complaint: Weakness      History of Present Illness:  Patient is a 84 y.o. year old male who presents to the emergency department for evaluation of weakness and shortness of breath is gotten worse.  Patient has no fever or chills.  Patient denies cough.  Patient has no nausea or vomiting.  Patient denies diarrhea.  Patient denies leg pain and swelling.      Patient Care Team  Primary Care Provider: Rani Lopez APRN    Past Medical History:     No Known Allergies  Past Medical History:   Diagnosis Date    Aneurysm 2013    Arthritis     left knee     Atrial fibrillation, persistent     CHB (complete heart block) 06/07/2023    CHF exacerbation 04/24/2024    Chronic obstructive pulmonary disease 09/01/2023    pt denies having this    Difficulty walking 2023    Numbness in right foot    Essential hypertension 05/08/2019    GERD (gastroesophageal reflux disease)     HFrEF (heart failure with reduced ejection fraction) 08/11/2021    Hyperlipidemia 08/11/2021    Paroxysmal atrial fibrillation     Polyneuropathy 06/19/2023    Prostate cancer 2010    with seeds implant     Septic joint 11/26/2021     Past Surgical History:   Procedure Laterality Date    ABLATION OF DYSRHYTHMIC FOCUS  2013    APPENDECTOMY      ARTERIAL BYPASS SURGERY      CARDIAC ABLATION      x2  \"years ago\"     CARDIAC ELECTROPHYSIOLOGY PROCEDURE N/A 11/01/2021    Procedure: PPM battery change;  Surgeon: Ge Whelan MD;  Location: [facility] INVASIVE LOCATION;  Service: Cardiovascular;  Laterality: N/A;    CARDIAC SURGERY  2006    cabg 3v    CORONARY ARTERY BYPASS GRAFT  2006    CYSTOSCOPY URETHROTOMY VISUAL INTERNAL N/A 08/15/2024    Procedure: CYSTOSCOPY, URETHRAL DILATION, CATHETER PLACEMENT, RETROGRADE URETHROGRAM;  Surgeon: August Myers MD;  Location: [facility] MAIN OR;  " Service: Urology;  Laterality: N/A;    CYSTOSCOPY URETHROTOMY VISUAL INTERNAL N/A 10/24/2024    Procedure: CYSTOSCOPY URETHROTOMY;  Surgeon: Mally Bernal MD;  Location: [facility] MAIN OR;  Service: Urology;  Laterality: N/A;    ENDOSCOPY      with dilation     KNEE ARTHROSCOPY Left     TOTAL KNEE ARTHROPLASTY Left 07/23/2021    Procedure: TOTAL KNEE ARTHROPLASTY WITH DORA NAVIGATION WITH BIOMET;  Surgeon: Carlitos Zhao MD;  Location: [facility] MAIN OR;  Service: Orthopedics;  Laterality: Left;    VENTRICULAR CARDIAC PACEMAKER INSERTION      medtronic      Family History   Problem Relation Age of Onset    Heart attack Mother     No Known Problems Father     No Known Problems Sister     No Known Problems Brother     No Known Problems Maternal Aunt     No Known Problems Maternal Uncle     No Known Problems Paternal Aunt     No Known Problems Paternal Uncle     No Known Problems Maternal Grandmother     No Known Problems Maternal Grandfather     No Known Problems Paternal Grandmother     No Known Problems Paternal Grandfather     No Known Problems Other        Home Medications:  Prior to Admission medications    Medication Sig Start Date End Date Taking? Authorizing Provider   allopurinol (ZYLOPRIM) 100 MG tablet Take 1 tablet by mouth Daily. 3/11/22   Ely Srinivasan MD   Eliquis 2.5 MG tablet tablet Take 1 tablet by mouth Every 12 (Twelve) Hours. 9/3/24   Ely Srinivasan MD   furosemide (LASIX) 40 MG tablet Take 1 tablet by mouth Daily As Needed (for swelling).    Ely Srinivasan MD   Jardiance 10 MG tablet tablet Take 0.5 tablets by mouth Daily. 9/3/24   Ely Srinivasan MD   LORazepam (ATIVAN) 0.5 MG tablet Take 1 tablet by mouth 2 (Two) Times a Day As Needed for Anxiety. 4/16/24   Ely Srinivasan MD   MELATONIN PO Take 1 tablet by mouth At Night As Needed (sleep).    Ely Srinivasan MD   omeprazole (priLOSEC) 20 MG capsule Take 1 capsule by mouth Daily.    Provider  "MD Ely   potassium chloride (MICRO-K) 10 MEQ CR capsule Take 1 capsule by mouth Daily. 24   Isabel Navarrete APRN   tamsulosin (FLOMAX) 0.4 MG capsule 24 hr capsule Take 1 capsule by mouth Daily. 3/15/24   Provider, MD Ely   valsartan (DIOVAN) 40 MG tablet Take 1 tablet by mouth Daily. 24   Isabel Navarrete APRN        Social History:   Social History     Tobacco Use    Smoking status: Former     Current packs/day: 0.00     Average packs/day: 0.5 packs/day for 33.6 years (16.8 ttl pk-yrs)     Types: Cigarettes     Start date: 1962     Quit date: 1995     Years since quittin.5     Passive exposure: Never    Smokeless tobacco: Never   Vaping Use    Vaping status: Never Used   Substance Use Topics    Alcohol use: Never    Drug use: Never         Review of Systems:  Review of Systems   Constitutional:  Negative for chills and fever.   HENT:  Negative for congestion, rhinorrhea and sore throat.    Eyes:  Negative for pain and visual disturbance.   Respiratory:  Negative for apnea, cough, chest tightness and shortness of breath.    Cardiovascular:  Negative for chest pain and palpitations.   Gastrointestinal:  Negative for abdominal pain, diarrhea, nausea and vomiting.   Genitourinary:  Negative for difficulty urinating and dysuria.   Musculoskeletal:  Negative for joint swelling and myalgias.   Skin:  Negative for color change.   Neurological:  Positive for weakness. Negative for seizures and headaches.   Psychiatric/Behavioral: Negative.     All other systems reviewed and are negative.       Physical Exam:  /81   Pulse 76   Temp 97.7 °F (36.5 °C)   Resp 20   Ht 193 cm (76\")   Wt 80.2 kg (176 lb 12.9 oz)   SpO2 95%   BMI 21.52 kg/m²     Physical Exam  Vitals and nursing note reviewed.   Constitutional:       General: He is not in acute distress.     Appearance: Normal appearance. He is not toxic-appearing.   HENT:      Head: Normocephalic and atraumatic.    "   Jaw: There is normal jaw occlusion.   Eyes:      General: Lids are normal.      Extraocular Movements: Extraocular movements intact.      Conjunctiva/sclera: Conjunctivae normal.      Pupils: Pupils are equal, round, and reactive to light.   Cardiovascular:      Rate and Rhythm: Normal rate and regular rhythm.      Pulses: Normal pulses.      Heart sounds: Normal heart sounds.   Pulmonary:      Effort: Pulmonary effort is normal. No respiratory distress.      Breath sounds: Normal breath sounds. No wheezing or rhonchi.   Abdominal:      General: Abdomen is flat.      Palpations: Abdomen is soft.      Tenderness: There is no abdominal tenderness. There is no guarding or rebound.   Musculoskeletal:         General: Normal range of motion.      Cervical back: Normal range of motion and neck supple.      Right lower leg: No edema.      Left lower leg: No edema.   Skin:     General: Skin is warm and dry.   Neurological:      Mental Status: He is alert and oriented to person, place, and time. Mental status is at baseline.   Psychiatric:         Mood and Affect: Mood normal.                    Medical Decision Making:      Comorbidities that affect care:    Atrial fibrillation    External Notes reviewed:    Hospital Discharge Summary: Patient was last admitted for A-fib      The following orders were placed and all results were independently analyzed by me:  Orders Placed This Encounter   Procedures    Blood Culture - Blood,    Blood Culture - Blood,    Urine Culture - Urine,    COVID-19, FLU A/B, RSV PCR 1 HR TAT - Swab, Nasopharynx    XR Chest 1 View    [place] Draw    Comprehensive Metabolic Panel    High Sensitivity Troponin T    Magnesium    Urinalysis With Microscopic If Indicated (No Culture) - Urine, Clean Catch    CBC Auto Differential    Urinalysis, Microscopic Only - Urine, Clean Catch    High Sensitivity Troponin T 1Hr    Lactic Acid, Plasma    BNP    Procalcitonin    Basic Metabolic Panel    Magnesium     Phosphorus    Procalcitonin    Diet: Liquid; Full Liquid; Fluid Consistency: Thin (IDDSI 0)    Undress & Gown    Continuous Pulse Oximetry    Orthostatic Blood Pressure    Vital Signs    Notify Provider (With Default Parameters)    Activity - Ad Gabby    Ambulate Patient    Up in Chair    Intake & Output    Weigh Patient    Oral Care    Saline Lock & Maintain IV Access    Place Sequential Compression Device    Maintain Sequential Compression Device    Strict Intake & Output    Daily Weights    Compression - Not to be Used for VTE Prophylaxis    Code Status and Medical Interventions: No CPR (Do Not Attempt to Resuscitate); Limited Support; No intubation (DNI), No cardioversion    Inpatient Hospitalist Consult    Inpatient Gastroenterology Consult    Inpatient Urology Consult    OT Consult: Eval & Treat ADL Performance Below Baseline    PT Consult: Eval & Treat Discharge Placement Assessment    ECG 12 Lead ED Triage Standing Order; Weak / Dizzy / AMS    Adult Transthoracic Echo Complete W/ Cont if Necessary Per Protocol    Insert Peripheral IV    Insert Peripheral IV    Inpatient Admission    CBC & Differential    Green Top (Gel)    Lavender Top    Gold Top - SST    Light Blue Top    CBC & Differential       Medications Given in the Emergency Department:  Medications   furosemide (LASIX) injection 40 mg (has no administration in time range)   pantoprazole (PROTONIX) injection 40 mg (has no administration in time range)   melatonin tablet 5 mg (has no administration in time range)   aluminum-magnesium hydroxide-simethicone (MAALOX MAX) 400-400-40 MG/5ML suspension 15 mL (has no administration in time range)   hydrOXYzine (ATARAX) tablet 25 mg (has no administration in time range)   ondansetron (ZOFRAN) injection 4 mg (has no administration in time range)   polyethylene glycol (MIRALAX) packet 17 g (has no administration in time range)   acetaminophen (TYLENOL) tablet 650 mg (has no administration in time range)    Lidocaine 4 % 1 patch (has no administration in time range)   prochlorperazine (COMPAZINE) injection 5 mg (has no administration in time range)   sodium chloride 0.9 % flush 10 mL (has no administration in time range)   sodium chloride 0.9 % flush 10 mL (has no administration in time range)   sodium chloride 0.9 % infusion 40 mL (has no administration in time range)   potassium chloride (MICRO-K/KLOR-CON) CR capsule (has no administration in time range)   cefTRIAXone (ROCEPHIN) in NS 1 gram/10ml IV PUSH syringe (has no administration in time range)   cefTRIAXone (ROCEPHIN) in NS 1 gram/10ml IV PUSH syringe (1,000 mg Intravenous Given 1/18/25 1152)        ED Course:         Labs:    Lab Results (last 24 hours)       Procedure Component Value Units Date/Time    CBC & Differential [[id number]]  (Abnormal) Collected: 01/18/25 1035    Specimen: Blood Updated: 01/18/25 1102    Narrative:      The following orders were created for panel order CBC & Differential.  Procedure                               Abnormality         Status                     ---------                               -----------         ------                     CBC Auto Differential[[id number]]        Abnormal            Final result                 Please view results for these tests on the individual orders.    Comprehensive Metabolic Panel [[id number]]  (Abnormal) Collected: 01/18/25 1035    Specimen: Blood Updated: 01/18/25 1109     Glucose 136 mg/dL      BUN 32 mg/dL      Creatinine 1.88 mg/dL      Sodium 140 mmol/L      Potassium 4.3 mmol/L      Chloride 99 mmol/L      CO2 26.6 mmol/L      Calcium 9.8 mg/dL      Total Protein 7.6 g/dL      Albumin 3.8 g/dL      ALT (SGPT) 20 U/L      AST (SGOT) 23 U/L      Alkaline Phosphatase 137 U/L      Total Bilirubin 1.7 mg/dL      Globulin 3.8 gm/dL      A/G Ratio 1.0 g/dL      BUN/Creatinine Ratio 17.0     Anion Gap 14.4 mmol/L      eGFR 35.2 mL/min/1.73     Narrative:      GFR Categories in Chronic  Kidney Disease (CKD)      GFR Category          GFR (mL/min/1.73)    Interpretation  G1                     90 or greater         Normal or high (1)  G2                      60-89                Mild decrease (1)  G3a                   45-59                Mild to moderate decrease  G3b                   30-44                Moderate to severe decrease  G4                    15-29                Severe decrease  G5                    14 or less           Kidney failure          (1)In the absence of evidence of kidney disease, neither GFR category G1 or G2 fulfill the criteria for CKD.    eGFR calculation 2021 CKD-EPI creatinine equation, which does not include race as a factor    High Sensitivity Troponin T [[id number]]  (Abnormal) Collected: 01/18/25 1035    Specimen: Blood Updated: 01/18/25 1125     HS Troponin T 59 ng/L     Narrative:      High Sensitive Troponin T Reference Range:  <14.0 ng/L- Negative Female for AMI  <22.0 ng/L- Negative Male for AMI  >=14 - Abnormal Female indicating possible myocardial injury.  >=22 - Abnormal Male indicating possible myocardial injury.   Clinicians would have to utilize clinical acumen, EKG, Troponin, and serial changes to determine if it is an Acute Myocardial Infarction or myocardial injury due to an underlying chronic condition.         Magnesium [[id number]]  (Normal) Collected: 01/18/25 1035    Specimen: Blood Updated: 01/18/25 1109     Magnesium 2.3 mg/dL     CBC Auto Differential [[id number]]  (Abnormal) Collected: 01/18/25 1035    Specimen: Blood Updated: 01/18/25 1102     WBC 9.15 10*3/mm3      RBC 5.36 10*6/mm3      Hemoglobin 15.1 g/dL      Hematocrit 48.3 %      MCV 90.1 fL      MCH 28.2 pg      MCHC 31.3 g/dL      RDW 16.4 %      RDW-SD 51.5 fl      MPV 10.7 fL      Platelets 223 10*3/mm3      Neutrophil % 67.2 %      Lymphocyte % 22.4 %      Monocyte % 8.3 %      Eosinophil % 0.9 %      Basophil % 0.9 %      Immature Grans % 0.3 %      Neutrophils, Absolute 6.15  "10*3/mm3      Lymphocytes, Absolute 2.05 10*3/mm3      Monocytes, Absolute 0.76 10*3/mm3      Eosinophils, Absolute 0.08 10*3/mm3      Basophils, Absolute 0.08 10*3/mm3      Immature Grans, Absolute 0.03 10*3/mm3     Procalcitonin [[id number]]  (Normal) Collected: 01/18/25 1035    Specimen: Blood Updated: 01/18/25 1332     Procalcitonin 0.14 ng/mL     Narrative:      As a Marker for Sepsis (Non-Neonates):    1. <0.5 ng/mL represents a low risk of severe sepsis and/or septic shock.  2. >2 ng/mL represents a high risk of severe sepsis and/or septic shock.    As a Marker for Lower Respiratory Tract Infections that require antibiotic therapy:    PCT on Admission    Antibiotic Therapy       6-12 Hrs later    >0.5                Strongly Recommended  >0.25 - <0.5        Recommended  0.1 - 0.25          Discouraged              Remeasure/reassess PCT  <0.1                Strongly Discouraged     Remeasure/reassess PCT    As 28 day mortality risk marker: \"Change in Procalcitonin Result\" (>80% or <=80%) if Day 0 (or Day 1) and Day 4 values are available. Refer to http://[email or web address]    Change in PCT <=80%  A decrease of PCT levels below or equal to 80% defines a positive change in PCT test result representing a higher risk for 28-day all-cause mortality of patients diagnosed with severe sepsis for septic shock.    Change in PCT >80%  A decrease of PCT levels of more than 80% defines a negative change in PCT result representing a lower risk for 28-day all-cause mortality of patients diagnosed with severe sepsis or septic shock.    This test is Prognostic not Diagnostic, if elevated correlate with clinical findings before administering antibiotic treatment.        Urinalysis With Microscopic If Indicated (No Culture) - Urine, Clean Catch [[id number]]  (Abnormal) Collected: 01/18/25 1036    Specimen: Urine, Clean Catch Updated: 01/18/25 1058     Color, UA Yellow     Appearance, UA Cloudy     pH, UA 5.5     " Specific Gravity, UA 1.025     Glucose,  mg/dL (1+)     Ketones, UA Negative     Bilirubin, UA Small (1+)     Blood, UA Large (3+)     Protein,  mg/dL (2+)     Leuk Esterase, UA Moderate (2+)     Nitrite, UA Positive     Urobilinogen, UA 1.0 E.U./dL    Urinalysis, Microscopic Only - Urine, Clean Catch [[id number]]  (Abnormal) Collected: 01/18/25 1036    Specimen: Urine, Clean Catch Updated: 01/18/25 1058     RBC, UA Too Numerous to Count /HPF      WBC, UA Too Numerous to Count /HPF      Bacteria, UA 4+ /HPF      Squamous Epithelial Cells, UA 3-6 /HPF      Hyaline Casts, UA       Unable to determine due to loaded field     /LPF     Methodology Manual Light Microscopy    Urine Culture - Urine, Urine, Clean Catch [[id number]] Collected: 01/18/25 1036    Specimen: Urine, Clean Catch Updated: 01/18/25 1256    High Sensitivity Troponin T 1Hr [[id number]]  (Abnormal) Collected: 01/18/25 1156    Specimen: Blood Updated: 01/18/25 1223     HS Troponin T 49 ng/L      Troponin T Numeric Delta -10 ng/L      Troponin T % Delta -17    Narrative:      High Sensitive Troponin T Reference Range:  <14.0 ng/L- Negative Female for AMI  <22.0 ng/L- Negative Male for AMI  >=14 - Abnormal Female indicating possible myocardial injury.  >=22 - Abnormal Male indicating possible myocardial injury.   Clinicians would have to utilize clinical acumen, EKG, Troponin, and serial changes to determine if it is an Acute Myocardial Infarction or myocardial injury due to an underlying chronic condition.         Lactic Acid, Plasma [[id number]]  (Normal) Collected: 01/18/25 1156    Specimen: Blood Updated: 01/18/25 1221     Lactate 2.0 mmol/L     BNP [[id number]]  (Abnormal) Collected: 01/18/25 1156    Specimen: Blood Updated: 01/18/25 1305     proBNP 14,204.0 pg/mL     Narrative:      This assay is used as an aid in the diagnosis of individuals suspected of having heart failure. It can be used as an aid in the diagnosis of acute decompensated  heart failure (ADHF) in patients presenting with signs and symptoms of ADHF to the emergency department (ED). In addition, NT-proBNP of <300 pg/mL indicates ADHF is not likely.    Age Range Result Interpretation  NT-proBNP Concentration (pg/mL:      <50             Positive            >450                   Gray                 300-450                    Negative             <300    50-75           Positive            >900                  Gray                300-900                  Negative            <300      >75             Positive            >1800                  Gray                300-1800                  Negative            <300    Blood Culture - Blood, Arm, Left [[id number]] Collected: 01/18/25 1330    Specimen: Blood from Arm, Left Updated: 01/18/25 1344    Blood Culture - Blood, Arm, Right [[id number]] Collected: 01/18/25 1330    Specimen: Blood from Arm, Right Updated: 01/18/25 1344             Imaging:    XR Chest 1 View    Result Date: 1/18/2025  XR CHEST 1 VW Date of Exam: 1/18/2025 10:46 AM EST Indication: Weak/Dizzy/AMS triage protocol Comparison: November 6, 2024 Findings: Median sternotomy wires appear intact. A left subclavian pacemaker is in place. There are Bibasilar opacities with small bilateral pleural effusions. The heart is enlarged. There is pulmonary vascular congestion.     Impression: 1.Bibasilar opacities, which could reflect atelectasis or pneumonia. 2.Cardiomegaly with pulmonary vascular congestion and small bilateral pleural effusions. Electronically Signed: Javi Galvez MD  1/18/2025 11:10 AM EST  Workstation ID: [id number]       Differential Diagnosis and Discussion:    Dyspnea: Differential diagnosis includes but is not limited to metabolic acidosis, neurological disorders, psychogenic, asthma, pneumothorax, upper airway obstruction, COPD, pneumonia, noncardiogenic pulmonary edema, interstitial lung disease, anemia, congestive heart failure, and pulmonary  embolism    PROCEDURES:    Labs were collected in the emergency department and all labs were reviewed and interpreted by me.  X-ray were performed in the emergency department and all X-ray impressions were independently interpreted by me.  An EKG was performed and the EKG was interpreted by me.    ECG 12 Lead ED Triage Standing Order; Weak / Dizzy / AMS   Preliminary Result   HEART RATE=70  bpm   RR Kakazihh=474  ms   KS Interval=  ms   P Horizontal Axis=  deg   P Front Axis=  deg   QRSD Xlnfiezy=559  ms   QT Zmwajxvj=403  ms   LEeF=811  ms   QRS Axis=-77  deg   T Wave Axis=105  deg   - ABNORMAL ECG -   Afib/flut and V-paced complexes   No further analysis attempted due to paced rhythm   Date and Time of Study:2025-01-18 10:31:43          Procedures    MDM       The patient´s CBC that was reviewed and interpreted by me shows no abnormalities of critical concern. Of note, there is no anemia requiring a blood transfusion and the platelet count is acceptable.  The patient´s CMP that was reviewed and interpretted by me shows no abnormalities of critical concern. Of note, the patient´s sodium and potassium are acceptable. The patient´s liver enzymes are unremarkable. The patient´s renal function (creatinine) is elevated. The patient has a normal anion gap.  Urinalysis is consistent with bacteriuria.  BNP is elevated.  Chest x-ray shows some edema.              Patient Care Considerations:    PERC: I used the PERC score to risk stratify the patient for PE and a CT of the chest was considered but ultimately not indicated in today's visit.      Consultants/Shared Management Plan:    Case was discussed with Dr. Hernandez who agrees with admission.    Social Determinants of Health:    Patient is independent, reliable, and has access to care.       Disposition and Care Coordination:    Admit:   Through independent evaluation of the patient's history, physical, and imperical data, the patient meets criteria for inpatient admission  to the hospital.        Final diagnoses:   Acute UTI        ED Disposition       ED Disposition   Decision to Admit    Condition   --    Comment   Level of Care: Telemetry [5]   Diagnosis: Acute exacerbation of CHF (congestive heart failure) [889891]   Admitting Physician: J LUIS ESCOTO [[id number]]   Attending Physician: J LUIS ESCOTO [[id number]]   Certification: I Certify That Inpatient Hospital Services Are Medically Necessary For Greater Than 2 Midnights                 This medical record created using voice recognition software.             Ashley Hudson MD  01/18/25 [id number]

## 2025-01-18 NOTE — H&P
[facility]    Date of admission: 1/18/2025  Patient Name: Javi Martínez   1940  Primary Care Physician:  Rani Lopez APRN    Subjective     Chief Complaint   Patient presents with    Weakness - Generalized     Weakness for the past 3 days    Nausea        HPI:  84 y.o. with systolic CHF history of EF 20%, A-fib on Eliquis, CKD 3, history of urethral stricture and chronic Diamond catheter and prior prostate cancer, who presented with worsening shortness of air, weakness in addition to several other complaints    Has had orthopnea, PND, worsening shortness of air and dyspnea on exertion.  Notes maybe some chill type symptoms this morning but otherwise no fevers.  No cough or productive sputum.  Had only previously had Lasix as as needed but lately has been taking about 20 daily.  Noted some increased urine output with Lasix p.o. at home but still a persistent symptoms.  Main reason for presenting to the ER was due to progressive shortness of air symptoms and associated weakness.  No chest pain pressure jaw pain numbness or tingling in his arm (does note chronic feet neuropathy/numbness but no acute change there)     Patient does also note having dark/black stools is unable to tell me if they are tarry or sticky as he did not pay too much attention to them, does not take iron, no history of GI bleed, does still take Eliquis, had a normal colonoscopy a few years ago, denies ever having an EGD.  Does have past few weeks at least of trouble with swallowing sometimes even with water feeling like it is catching in the middle of his chest.  Does have some GERD symptoms but reasonably well-controlled with his home Prilosec.  Notes he has had dry heaving in the morning with some nausea symptoms for the past few weeks.  Denies any diarrhea or constipation.    Status indwelling Diamond catheter for the past about 2-1/2 months since it was last placed in the OR given difficulty with  stricture and placement, denies any suprapubic pain.      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    Polyneuropathy 06/19/2023    Chronic obstructive pulmonary disease 09/01/2023    Benign prostatic hyperplasia without lower urinary tract symptoms 09/07/2023    Atrial fibrillation, chronic 05/29/2024    Stage 3b chronic kidney disease 05/29/2024    Postprocedural membranous urethral stricture 07/22/2024    Acute on chronic HFrEF (heart failure with reduced ejection fraction) 07/30/2024    Suprapubic catheter dysfunction 08/15/2024    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 08/28/2024    Urethral stricture in male 10/24/2024    Lyme disease 10/19/2023     Resolved Ambulatory Problems     Diagnosis Date Noted    Arthritis of knee, left 07/14/2021    Left knee pain 08/06/2021    Hypertensive heart disease without congestive heart failure 08/11/2021    HFrEF (heart failure with reduced ejection fraction) 08/11/2021    Chronic atrial fibrillation     Aftercare following surgery of the left TKA and left knee manipulation 10/18/2021    Septic joint 11/26/2021    CHB (complete heart block) 06/07/2023    Unstable angina 09/02/2023    Chest pain 04/24/2024    CHF exacerbation 04/24/2024    Elevated troponin level not due myocardial infarction 11/07/2024     Past Medical History:   Diagnosis Date    Aneurysm 2013    Arthritis     Difficulty walking 2023    GERD (gastroesophageal reflux disease)     Paroxysmal atrial fibrillation     Prostate cancer 2010       Past Surgical  "History:   Procedure Laterality Date    ABLATION OF DYSRHYTHMIC FOCUS  2013    APPENDECTOMY      ARTERIAL BYPASS SURGERY      CARDIAC ABLATION      x2  \"years ago\"     CARDIAC ELECTROPHYSIOLOGY PROCEDURE N/A 11/01/2021    Procedure: PPM battery change;  Surgeon: Ge Whelan MD;  Location: [facility] CATH INVASIVE LOCATION;  Service: Cardiovascular;  Laterality: N/A;    CARDIAC SURGERY  2006    cabg 3v    CORONARY ARTERY BYPASS GRAFT  2006    CYSTOSCOPY URETHROTOMY VISUAL INTERNAL N/A 08/15/2024    Procedure: CYSTOSCOPY, URETHRAL DILATION, CATHETER PLACEMENT, RETROGRADE URETHROGRAM;  Surgeon: August Myers MD;  Location: [facility] MAIN OR;  Service: Urology;  Laterality: N/A;    CYSTOSCOPY URETHROTOMY VISUAL INTERNAL N/A 10/24/2024    Procedure: CYSTOSCOPY URETHROTOMY;  Surgeon: Mally Bernal MD;  Location: [facility] OR;  Service: Urology;  Laterality: N/A;    ENDOSCOPY      with dilation     KNEE ARTHROSCOPY Left     TOTAL KNEE ARTHROPLASTY Left 07/23/2021    Procedure: TOTAL KNEE ARTHROPLASTY WITH DORA NAVIGATION WITH BIOMET;  Surgeon: Carlitos Zhao MD;  Location: [facility] OR;  Service: Orthopedics;  Laterality: Left;    VENTRICULAR CARDIAC PACEMAKER INSERTION      medtronic         Family History   Problem Relation Age of Onset    Heart attack Mother     No Known Problems Father     No Known Problems Sister     No Known Problems Brother     No Known Problems Maternal Aunt     No Known Problems Maternal Uncle     No Known Problems Paternal Aunt     No Known Problems Paternal Uncle     No Known Problems Maternal Grandmother     No Known Problems Maternal Grandfather     No Known Problems Paternal Grandmother     No Known Problems Paternal Grandfather     No Known Problems Other        Social History     Socioeconomic History    Marital status:    Tobacco Use    Smoking status: Former     Current packs/day: 0.00     Average packs/day: 0.5 packs/day for 33.6 years (16.8 ttl pk-yrs)     Types: " Cigarettes     Start date: 1962     Quit date: 1995     Years since quittin.5     Passive exposure: Never    Smokeless tobacco: Never   Vaping Use    Vaping status: Never Used   Substance and Sexual Activity    Alcohol use: Never    Drug use: Never    Sexual activity: Not Currently     Partners: Female         Objective     Vitals:   Temp:  [97.7 °F (36.5 °C)] 97.7 °F (36.5 °C)  Heart Rate:  [73-76] 76  Resp:  [20] 20  BP: (112-119)/(72-95) 117/81    Physical Exam   Awake resting in bed, thin, conversant  Diminished in bilateral lower lung fields, some pain crackles, no wheezing, does have some dyspnea with conversation, positive JVD  Irregular irregular normal rate, trace ankle edema  Abdomen soft nontender nondistended positive bowel sounds  Generalized weakness        Result Review    Vital signs, labs and any relevant imaging reviewed.     Assessment / Plan     Acute on chronic systolic CHF history in November -last echo was 20% 2024 with grade 2 diastolic dysfunction  Possible developing pna, question viral  Question CAUTI vs chronic colonization   Black stool and dysphagia question UGIB  A-fib on Eliquis  CKD 3 creatinine variable ~1.6-1.8  History of prostate cancer, BPH  Hx of HTN  Urinary retention/stricture with chronic indwelling catheter, was pending urology f/u   Gout   GERD    Chest x-ray personally reviewed, bilateral bibasilar opacities right greater than left, possible developing pneumonia, may be small pleural effusion as well right greater than left    Give IV Lasix strict LES's monitor renal function, BNP notably elevated, troponin downtrend on repeat no chest pain or pressure suspect some troponinemia in setting this renal dysfunction and fluid overload from CHF  Repeat echo as last 1 was only in July and baseline has poor EF  Check Pro-Allen and additional infectious studies with flu COVID RSV, white count within normal limits, x-ray findings may be more solely related to volume  overload will continue antibiotics with ceftriaxone for now follow-up additional infectious studies. Blood. Urine culture  Hold apixaban given possible ugib symptoms, hb 15 wnl on admission but only recently developed symptoms, has sensation of food/liquids sticking in his chest as well, GI consulted for egd evaluation, will place on iv ppi bid for now as well and monitor hb   Urology consult for possible cauti and possible need for OR catheter exchange (vs discussion of suprapubic catheter placement or otherwise)  Hold home valsartan given softer bp  Hold flomax unless plans for voiding trial would discontinue this given chronic catheter need  Cont allopurinol  Cont other medications as appropriate  Check am cbc, bmp, mg, phos , hb trend,   discussed initial management and workup with ED provider  based on the above problems patient warrants hospitalization for continued evaluation, treatment and monitoring.  Given multiple issues/symptoms suspect will be here at least two midnights.  Might need placement given debilty.  Pt/ot consult    VTE Prophylaxis:  Mechanical VTE prophylaxis orders are present.      Code: dnr/dni  Diet: npo for now, if no acute surgical plans will start 2g low sodium dysphagia diet/soft foods as tolerated.      CBC          11/8/2024    04:32 11/9/2024    04:24 1/18/2025    10:35   CBC   WBC 8.15  9.48  9.15    RBC 4.58  5.30  5.36    Hemoglobin 13.5  15.0  15.1    Hematocrit 43.3  48.0  48.3    MCV 94.5  90.6  90.1    MCH 29.5  28.3  28.2    MCHC 31.2  31.3  31.3    RDW 15.8  15.5  16.4    Platelets 197  224  223        CMP          11/9/2024    04:24 11/19/2024    13:47 1/18/2025    10:35   CMP   Glucose 131  87  136    BUN 30  24  32    Creatinine 1.70  1.56  1.88    EGFR 39.8  44.1  35.2    Sodium 137  139  140    Potassium 4.9  4.6  4.3    Chloride 102  105  99    Calcium 9.3  9.4  9.8    Total Protein   7.6    Albumin 3.4   3.8    Globulin   3.8    Total Bilirubin   1.7    Alkaline  Phosphatase   137    AST (SGOT)   23    ALT (SGPT)   20    Albumin/Globulin Ratio   1.0    BUN/Creatinine Ratio 17.6  15.4  17.0    Anion Gap 11.1  9.8  14.4

## 2025-01-19 PROBLEM — R13.19 OTHER DYSPHAGIA: Status: ACTIVE | Noted: 2025-01-18

## 2025-01-19 LAB
ANION GAP SERPL CALCULATED.3IONS-SCNC: 11.6 MMOL/L (ref 5–15)
AV MEAN PRESS GRAD SYS DOP V1V2: 1 MMHG
AV VMAX SYS DOP: 77.7 CM/SEC
BASOPHILS # BLD AUTO: 0.05 10*3/MM3 (ref 0–0.2)
BASOPHILS NFR BLD AUTO: 0.5 % (ref 0–1.5)
BH CV ECHO MEAS - AI P1/2T: 809 MSEC
BH CV ECHO MEAS - AO MAX PG: 2.41 MMHG
BH CV ECHO MEAS - AO ROOT DIAM: 4.1 CM
BH CV ECHO MEAS - AO V2 VTI: 13.2 CM
BH CV ECHO MEAS - AVA(I,D): 2.16 CM2
BH CV ECHO MEAS - EDV(CUBED): 478.2 ML
BH CV ECHO MEAS - EDV(MOD-SP2): 261 ML
BH CV ECHO MEAS - EDV(MOD-SP4): 191 ML
BH CV ECHO MEAS - EF(MOD-SP2): 22.6 %
BH CV ECHO MEAS - EF(MOD-SP4): 18.8 %
BH CV ECHO MEAS - ESV(CUBED): 327.1 ML
BH CV ECHO MEAS - ESV(MOD-SP2): 202 ML
BH CV ECHO MEAS - ESV(MOD-SP4): 155 ML
BH CV ECHO MEAS - FS: 11.9 %
BH CV ECHO MEAS - IVS/LVPW: 0.82 CM
BH CV ECHO MEAS - IVSD: 0.8 CM
BH CV ECHO MEAS - LA DIMENSION: 5.7 CM
BH CV ECHO MEAS - LAT PEAK E' VEL: 10.1 CM/SEC
BH CV ECHO MEAS - LV DIASTOLIC VOL/BSA (35-75): 91 CM2
BH CV ECHO MEAS - LV MASS(C)D: 336.5 GRAMS
BH CV ECHO MEAS - LV MAX PG: 0.94 MMHG
BH CV ECHO MEAS - LV MEAN PG: 0 MMHG
BH CV ECHO MEAS - LV SYSTOLIC VOL/BSA (12-30): 73.9 CM2
BH CV ECHO MEAS - LV V1 MAX: 48.5 CM/SEC
BH CV ECHO MEAS - LV V1 VTI: 8.3 CM
BH CV ECHO MEAS - LVIDD: 7.8 CM
BH CV ECHO MEAS - LVIDS: 6.9 CM
BH CV ECHO MEAS - LVOT AREA: 3.5 CM2
BH CV ECHO MEAS - LVOT DIAM: 2.1 CM
BH CV ECHO MEAS - LVPWD: 0.97 CM
BH CV ECHO MEAS - MED PEAK E' VEL: 3.8 CM/SEC
BH CV ECHO MEAS - MV DEC TIME: 0.24 SEC
BH CV ECHO MEAS - MV MEAN PG: 1 MMHG
BH CV ECHO MEAS - MV V2 VTI: 17.1 CM
BH CV ECHO MEAS - MVA(VTI): 1.67 CM2
BH CV ECHO MEAS - RVDD: 3.7 CM
BH CV ECHO MEAS - SV(LVOT): 28.6 ML
BH CV ECHO MEAS - SV(MOD-SP2): 59 ML
BH CV ECHO MEAS - SV(MOD-SP4): 36 ML
BH CV ECHO MEAS - SVI(LVOT): 13.6 ML/M2
BH CV ECHO MEAS - SVI(MOD-SP2): 28.1 ML/M2
BH CV ECHO MEAS - SVI(MOD-SP4): 17.2 ML/M2
BH CV ECHO MEAS - TAPSE (>1.6): 1.63 CM
BH CV ECHO MEAS - TR MAX PG: 40.2 MMHG
BH CV ECHO MEAS - TR MAX VEL: 317 CM/SEC
BUN SERPL-MCNC: 30 MG/DL (ref 8–23)
BUN/CREAT SERPL: 17 (ref 7–25)
CALCIUM SPEC-SCNC: 8.9 MG/DL (ref 8.6–10.5)
CHLORIDE SERPL-SCNC: 98 MMOL/L (ref 98–107)
CO2 SERPL-SCNC: 25.4 MMOL/L (ref 22–29)
CREAT SERPL-MCNC: 1.76 MG/DL (ref 0.76–1.27)
DEPRECATED RDW RBC AUTO: 50.6 FL (ref 37–54)
EGFRCR SERPLBLD CKD-EPI 2021: 38.1 ML/MIN/1.73
EOSINOPHIL # BLD AUTO: 0.16 10*3/MM3 (ref 0–0.4)
EOSINOPHIL NFR BLD AUTO: 1.7 % (ref 0.3–6.2)
ERYTHROCYTE [DISTWIDTH] IN BLOOD BY AUTOMATED COUNT: 15.5 % (ref 12.3–15.4)
GLUCOSE SERPL-MCNC: 96 MG/DL (ref 65–99)
HCT VFR BLD AUTO: 44.5 % (ref 37.5–51)
HGB BLD-MCNC: 13.7 G/DL (ref 13–17.7)
IMM GRANULOCYTES # BLD AUTO: 0.04 10*3/MM3 (ref 0–0.05)
IMM GRANULOCYTES NFR BLD AUTO: 0.4 % (ref 0–0.5)
LEFT ATRIUM VOLUME INDEX: 121 ML/M2
LV EF BIPLANE MOD: 19 %
LYMPHOCYTES # BLD AUTO: 2.21 10*3/MM3 (ref 0.7–3.1)
LYMPHOCYTES NFR BLD AUTO: 23.4 % (ref 19.6–45.3)
MAGNESIUM SERPL-MCNC: 2.2 MG/DL (ref 1.6–2.4)
MCH RBC QN AUTO: 28 PG (ref 26.6–33)
MCHC RBC AUTO-ENTMCNC: 30.8 G/DL (ref 31.5–35.7)
MCV RBC AUTO: 91 FL (ref 79–97)
MONOCYTES # BLD AUTO: 0.97 10*3/MM3 (ref 0.1–0.9)
MONOCYTES NFR BLD AUTO: 10.3 % (ref 5–12)
NEUTROPHILS NFR BLD AUTO: 6.03 10*3/MM3 (ref 1.7–7)
NEUTROPHILS NFR BLD AUTO: 63.7 % (ref 42.7–76)
NRBC BLD AUTO-RTO: 0 /100 WBC (ref 0–0.2)
PHOSPHATE SERPL-MCNC: 3.5 MG/DL (ref 2.5–4.5)
PLATELET # BLD AUTO: 185 10*3/MM3 (ref 140–450)
PMV BLD AUTO: 10 FL (ref 6–12)
POTASSIUM SERPL-SCNC: 3.9 MMOL/L (ref 3.5–5.2)
PROCALCITONIN SERPL-MCNC: 0.13 NG/ML (ref 0–0.25)
RBC # BLD AUTO: 4.89 10*6/MM3 (ref 4.14–5.8)
SODIUM SERPL-SCNC: 135 MMOL/L (ref 136–145)
WBC NRBC COR # BLD AUTO: 9.46 10*3/MM3 (ref 3.4–10.8)

## 2025-01-19 PROCEDURE — 25010000002 FUROSEMIDE PER 20 MG: Performed by: INTERNAL MEDICINE

## 2025-01-19 PROCEDURE — 99221 1ST HOSP IP/OBS SF/LOW 40: CPT | Performed by: INTERNAL MEDICINE

## 2025-01-19 PROCEDURE — 84145 PROCALCITONIN (PCT): CPT | Performed by: INTERNAL MEDICINE

## 2025-01-19 PROCEDURE — 84100 ASSAY OF PHOSPHORUS: CPT | Performed by: INTERNAL MEDICINE

## 2025-01-19 PROCEDURE — 25010000002 CEFTRIAXONE PER 250 MG: Performed by: INTERNAL MEDICINE

## 2025-01-19 PROCEDURE — 99233 SBSQ HOSP IP/OBS HIGH 50: CPT | Performed by: INTERNAL MEDICINE

## 2025-01-19 PROCEDURE — 36415 COLL VENOUS BLD VENIPUNCTURE: CPT | Performed by: INTERNAL MEDICINE

## 2025-01-19 PROCEDURE — 83735 ASSAY OF MAGNESIUM: CPT | Performed by: INTERNAL MEDICINE

## 2025-01-19 PROCEDURE — 80048 BASIC METABOLIC PNL TOTAL CA: CPT | Performed by: INTERNAL MEDICINE

## 2025-01-19 PROCEDURE — 85025 COMPLETE CBC W/AUTO DIFF WBC: CPT | Performed by: INTERNAL MEDICINE

## 2025-01-19 RX ORDER — MINERAL OIL/HYDROPHIL PETROLAT
1 OINTMENT (GRAM) TOPICAL 2 TIMES DAILY
Status: DISCONTINUED | OUTPATIENT
Start: 2025-01-19 | End: 2025-01-23 | Stop reason: HOSPADM

## 2025-01-19 RX ORDER — FUROSEMIDE 10 MG/ML
40 INJECTION INTRAMUSCULAR; INTRAVENOUS ONCE
Status: COMPLETED | OUTPATIENT
Start: 2025-01-19 | End: 2025-01-19

## 2025-01-19 RX ORDER — POTASSIUM CHLORIDE 750 MG/1
10 CAPSULE, EXTENDED RELEASE ORAL ONCE
Status: COMPLETED | OUTPATIENT
Start: 2025-01-19 | End: 2025-01-19

## 2025-01-19 RX ADMIN — HYDROXYZINE HYDROCHLORIDE 25 MG: 25 TABLET ORAL at 08:28

## 2025-01-19 RX ADMIN — Medication 10 ML: at 20:55

## 2025-01-19 RX ADMIN — WHITE PETROLATUM 1 APPLICATION: 1.75 OINTMENT TOPICAL at 20:55

## 2025-01-19 RX ADMIN — Medication 10 ML: at 08:30

## 2025-01-19 RX ADMIN — PANTOPRAZOLE SODIUM 40 MG: 40 INJECTION, POWDER, FOR SOLUTION INTRAVENOUS at 08:29

## 2025-01-19 RX ADMIN — WHITE PETROLATUM 1 APPLICATION: 1.75 OINTMENT TOPICAL at 15:25

## 2025-01-19 RX ADMIN — PANTOPRAZOLE SODIUM 40 MG: 40 INJECTION, POWDER, FOR SOLUTION INTRAVENOUS at 20:55

## 2025-01-19 RX ADMIN — SODIUM CHLORIDE 1000 MG: 9 INJECTION INTRAMUSCULAR; INTRAVENOUS; SUBCUTANEOUS at 08:28

## 2025-01-19 RX ADMIN — FUROSEMIDE 40 MG: 10 INJECTION, SOLUTION INTRAMUSCULAR; INTRAVENOUS at 10:47

## 2025-01-19 RX ADMIN — POTASSIUM CHLORIDE 10 MEQ: 750 CAPSULE, EXTENDED RELEASE ORAL at 10:47

## 2025-01-19 NOTE — PROGRESS NOTES
[facility]     Progress Note    Patient Name: Javi Martínez  : 1940  MRN: [id number]  Primary Care Physician:  Rani Lopez APRN  Date of admission: 2025    Subjective   Subjective     Chief Complaint: Fatigue and shortness of breath    Weakness - Generalized  Symptoms include nausea.    Nausea  Symptoms include nausea.      Patient Reports patient came to the emergency room with fatigue and shortness of breath.  He has an indwelling Diamond catheter that had not been changed for 2-1/2 months.  He states that he was taken to the operating room by Dr. Walker who proceeded with visual internal urethrotomy to get a Diamond catheter in him.  There is a history of brachytherapy and external beam radiation therapy for prostate cancer.  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    Review of Systems   Gastrointestinal:  Positive for nausea.       Objective   Objective     Vitals:   Temp:  [97.2 °F (36.2 °C)-98.6 °F (37 °C)] 97.5 °F (36.4 °C)  Heart Rate:  [70-89] 75  Resp:  [16-20] 16  BP: ()/(70-95) 104/82    Physical Exam   Awake alert oriented  Afebrile vital signs are stable  Abdomen is soft and benign  Result Review    Result Review:  I have personally reviewed the results from the time of this admission to 2025 10:08 EST and agree with these findings:  [x]  Laboratory list / accordion  []  Microbiology  []  Radiology  []  EKG/Telemetry   []  Cardiology/Vascular   []  Pathology  []  Old records  []  Other:  Most notable findings include: Creatinine is 1.76 white blood cell count is 9.4      Assessment & Plan   Assessment / Plan     Brief Patient Summary:  Javi Martínez is a 84 y.o. male who urethral stricture with chronic indwelling Diamond catheter    Active Hospital Problems:  Active Hospital Problems    Diagnosis     **Acute exacerbation of CHF (congestive heart failure)     Other dysphagia      Plan:   The catheter was changed yesterday and he is doing well he did have severe urinary  tract infection that is being treated with IV antibiotics continue with medical management no intervention needed at this point.    VTE Prophylaxis:  Mechanical VTE prophylaxis orders are present.        CODE STATUS:    Medical Intervention Limits: No intubation (DNI); No cardioversion  Level Of Support Discussed With: Patient  Code Status (Patient has no pulse and is not breathing): No CPR (Do Not Attempt to Resuscitate)  Medical Interventions (Patient has pulse or is breathing): Limited Support    Disposition:  I expect patient to be discharged home.    Donn Garcia MD

## 2025-01-19 NOTE — PLAN OF CARE
Goal Outcome Evaluation:  Plan of Care Reviewed With: patient        Progress: no change  Outcome Evaluation: VSS.  V-paced on telemetry.  medicated for c/o nausea x1 with compazine IV.  O2 sats 95 and above on room air.  pt seems to be sad, depressed.

## 2025-01-19 NOTE — CONSULTS
"[facility]  Initial Inpatient Consult Note    Referring Provider: Dr. Hernandez    Reason for Consultation: dark stools, dysphagia    Subjective     History of present illness:    84 y.o. male with history of CHF (EF 20%), A-fib (on Eliquis), CKD 3, chronic Diamond catheter, and prostate cancer who presented with complaint of worsening shortness of breath.  Symptoms worse on exertion.  Patient had also reported dark stool day prior to presentation.  Patient also reports chronic dysphagia to solids and liquids.  He reports previous history of EGD with esophageal dilatation.  No nausea, vomiting, abdominal pain.  He reports no significant reflux symptoms.  Hemoglobin 13.7.  Patient is on Eliquis with last dose yesterday.    Past Medical History:  Past Medical History:   Diagnosis Date    Aneurysm 2013    Arthritis     left knee     Atrial fibrillation, persistent     CHB (complete heart block) 06/07/2023    CHF exacerbation 04/24/2024    Chronic obstructive pulmonary disease 09/01/2023    pt denies having this    Difficulty walking 2023    Numbness in right foot    Essential hypertension 05/08/2019    GERD (gastroesophageal reflux disease)     HFrEF (heart failure with reduced ejection fraction) 08/11/2021    Hyperlipidemia 08/11/2021    Paroxysmal atrial fibrillation     Polyneuropathy 06/19/2023    Prostate cancer 2010    with seeds implant     Septic joint 11/26/2021     Past Surgical History:  Past Surgical History:   Procedure Laterality Date    ABLATION OF DYSRHYTHMIC FOCUS  2013    APPENDECTOMY      ARTERIAL BYPASS SURGERY      CARDIAC ABLATION      x2  \"years ago\"     CARDIAC ELECTROPHYSIOLOGY PROCEDURE N/A 11/01/2021    Procedure: PPM battery change;  Surgeon: Ge Whelan MD;  Location: [facility] INVASIVE LOCATION;  Service: Cardiovascular;  Laterality: N/A;    CARDIAC SURGERY  2006    cabg 3v    CORONARY ARTERY BYPASS GRAFT  2006    CYSTOSCOPY URETHROTOMY VISUAL INTERNAL N/A " 08/15/2024    Procedure: CYSTOSCOPY, URETHRAL DILATION, CATHETER PLACEMENT, RETROGRADE URETHROGRAM;  Surgeon: August Myers MD;  Location: [facility] MAIN OR;  Service: Urology;  Laterality: N/A;    CYSTOSCOPY URETHROTOMY VISUAL INTERNAL N/A 10/24/2024    Procedure: CYSTOSCOPY URETHROTOMY;  Surgeon: Mally Bernal MD;  Location: [facility] MAIN OR;  Service: Urology;  Laterality: N/A;    ENDOSCOPY      with dilation     KNEE ARTHROSCOPY Left     TOTAL KNEE ARTHROPLASTY Left 2021    Procedure: TOTAL KNEE ARTHROPLASTY WITH DORA NAVIGATION WITH BIOMET;  Surgeon: Carlitos Zhao MD;  Location: [facility] MAIN OR;  Service: Orthopedics;  Laterality: Left;    VENTRICULAR CARDIAC PACEMAKER INSERTION      medtronic       Social History:   Social History     Tobacco Use    Smoking status: Former     Current packs/day: 0.00     Average packs/day: 0.5 packs/day for 33.6 years (16.8 ttl pk-yrs)     Types: Cigarettes     Start date: 1962     Quit date: 1995     Years since quittin.5     Passive exposure: Never    Smokeless tobacco: Never   Substance Use Topics    Alcohol use: Never      Family History:  Family History   Problem Relation Age of Onset    Heart attack Mother     No Known Problems Father     No Known Problems Sister     No Known Problems Brother     No Known Problems Maternal Aunt     No Known Problems Maternal Uncle     No Known Problems Paternal Aunt     No Known Problems Paternal Uncle     No Known Problems Maternal Grandmother     No Known Problems Maternal Grandfather     No Known Problems Paternal Grandmother     No Known Problems Paternal Grandfather     No Known Problems Other        Home Meds:  Medications Prior to Admission   Medication Sig Dispense Refill Last Dose/Taking    allopurinol (ZYLOPRIM) 100 MG tablet Take 1 tablet by mouth Daily.   2025    Eliquis 2.5 MG tablet tablet Take 1 tablet by mouth Every 12 (Twelve) Hours.   2025    furosemide (LASIX) 40 MG tablet Take 1  tablet by mouth Daily As Needed (for swelling).   1/17/2025    Jardiance 10 MG tablet tablet Take 0.5 tablets by mouth Daily.   1/17/2025    LORazepam (ATIVAN) 0.5 MG tablet Take 1 tablet by mouth 2 (Two) Times a Day As Needed for Anxiety.   1/17/2025    tamsulosin (FLOMAX) 0.4 MG capsule 24 hr capsule Take 1 capsule by mouth Daily.   1/17/2025    valsartan (DIOVAN) 40 MG tablet Take 1 tablet by mouth Daily. 90 tablet 3 1/17/2025    melatonin 3 MG tablet Take 1 tablet by mouth At Night As Needed for Sleep.   Unknown    traZODone (DESYREL) 50 MG tablet Take 1 tablet by mouth At Night As Needed for Sleep.   Unknown     Current Meds:   cefTRIAXone, 1,000 mg, Intravenous, Q24H  pantoprazole, 40 mg, Intravenous, Q12H  sodium chloride, 10 mL, Intravenous, Q12H      Allergies:  No Known Allergies  Review of Systems  Pertinent items are noted in HPI, all other systems reviewed and negative     Objective     Vital Signs  Temp:  [97.2 °F (36.2 °C)-98.6 °F (37 °C)] 98.1 °F (36.7 °C)  Heart Rate:  [70-89] 85  Resp:  [16-20] 16  BP: ()/(70-95) 103/70  Physical Exam:  General Appearance:    Alert, cooperative, in no acute distress   Head:    Normocephalic, without obvious abnormality, atraumatic   Eyes:          conjunctivae and sclerae normal, no icterus   Throat:   no thrush, oral mucosa moist       Lungs:     Breathing unlabored    Heart:    No chest tenderness    Chest Wall:    No abnormalities observed   Abdomen:     Soft, nondistended, nontender       Skin:   No bruising or rash   Psychiatric:  normal mood and insight     Results Review:   I reviewed the patient's new clinical results.    Results from last 7 days   Lab Units 01/19/25  0322 01/18/25  1035   WBC 10*3/mm3 9.46 9.15   HEMOGLOBIN g/dL 13.7 15.1   HEMATOCRIT % 44.5 48.3   PLATELETS 10*3/mm3 185 223     Results from last 7 days   Lab Units 01/19/25  0322 01/18/25  1035   SODIUM mmol/L 135* 140   POTASSIUM mmol/L 3.9 4.3   CHLORIDE mmol/L 98 99   CO2 mmol/L  25.4 26.6   BUN mg/dL 30* 32*   CREATININE mg/dL 1.76* 1.88*   CALCIUM mg/dL 8.9 9.8   BILIRUBIN mg/dL  --  1.7*   ALK PHOS U/L  --  137*   ALT (SGPT) U/L  --  20   AST (SGOT) U/L  --  23   GLUCOSE mg/dL 96 136*         Lab Results   Lab Value Date/Time    LIPASE 37 11/06/2024 1549    LIPASE 29 07/22/2024 1220    LIPASE 27 04/24/2024 1135    LIPASE 29 09/02/2023 0220         Assessment & Plan     Acute exacerbation of CHF (congestive heart failure)       Assessment:  Dysphagia  Dark stools    Plan:  Hgb normal; no overt signs of bleeding since admission  Discussed EGD but would rec to perform after Eliquis held x 2 days.  Can do EGD Tuesday or can do as outpatient if patient discharged prior.  Benefits versus risks of procedure were discussed with patient; risks include but are not limited to bleeding, infection, perforation, and risk of sedation  Patient understands risks and agrees to proceed      I discussed the patients findings and my recommendations with patient and primary care team.    Simona Garcia MD

## 2025-01-19 NOTE — PROGRESS NOTES
[facility]   Hospitalist Progress Note    Date of admission: 1/18/2025  Patient Name: Javi Martínez  1940  Date: 1/19/2025      Subjective     Chief Complaint   Patient presents with    Weakness - Generalized     Weakness for the past 3 days    Nausea       Interval Followup: Less short of air has been diuresing well.  Chest pain or pressure.  Was able to have his Diamond exchanged at bedside yesterday had notable foul-smelling/purulence on exchange noted.  Has new catheter currently.  Had a bowel movement that he did not think had any obvious black tarriness or bleeding but did not really pay much attention I encouraged him to keep track for any further ones to let us know.  He is okay with EGD on Tuesday minimally for dysphagia symptoms and possible stricture.      Objective     Vitals:   Temp:  [97.2 °F (36.2 °C)-98.6 °F (37 °C)] 97.3 °F (36.3 °C)  Heart Rate:  [70-89] 72  Resp:  [16-18] 16  BP: ()/(70-89) 112/76    Physical Exam  Awake conversant thin frail in bed,  CTAB improving aeration on lung fields no wheezing  Irregular irregular regular normal rate no lower extremity pitting edema  Abdomen soft  Diamond with yellow urine output  Dry skin on feet    Result Review:  Vital signs, labs and recent relevant imaging reviewed.      CBC          11/9/2024    04:24 1/18/2025    10:35 1/19/2025    03:22   CBC   WBC 9.48  9.15  9.46    RBC 5.30  5.36  4.89    Hemoglobin 15.0  15.1  13.7    Hematocrit 48.0  48.3  44.5    MCV 90.6  90.1  91.0    MCH 28.3  28.2  28.0    MCHC 31.3  31.3  30.8    RDW 15.5  16.4  15.5    Platelets 224  223  185      CMP          11/19/2024    13:47 1/18/2025    10:35 1/19/2025    03:22   CMP   Glucose 87  136  96    BUN 24  32  30    Creatinine 1.56  1.88  1.76    EGFR 44.1  35.2  38.1    Sodium 139  140  135    Potassium 4.6  4.3  3.9    Chloride 105  99  98    Calcium 9.4  9.8  8.9    Total Protein  7.6     Albumin  3.8     Globulin  3.8     Total Bilirubin  1.7      Alkaline Phosphatase  137     AST (SGOT)  23     ALT (SGPT)  20     Albumin/Globulin Ratio  1.0     BUN/Creatinine Ratio 15.4  17.0  17.0    Anion Gap 9.8  14.4  11.6          acetaminophen    aluminum-magnesium hydroxide-simethicone    hydrOXYzine    Lidocaine    melatonin    ondansetron    polyethylene glycol    prochlorperazine    sodium chloride    sodium chloride    cefTRIAXone, 1,000 mg, Intravenous, Q24H  mineral oil-hydrophilic petrolatum, 1 Application, Topical, BID  pantoprazole, 40 mg, Intravenous, Q12H  sodium chloride, 10 mL, Intravenous, Q12H        XR Chest 1 View    Result Date: 1/18/2025  Impression: 1.Bibasilar opacities, which could reflect atelectasis or pneumonia. 2.Cardiomegaly with pulmonary vascular congestion and small bilateral pleural effusions. Electronically Signed: Javi Galvez MD  1/18/2025 11:10 AM EST  Workstation ID: [id number]     Assessment / Plan     Summary: 84 y.o. 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  Admitted for CHF exacerbation, CAUTI (urology assisted with loyd replacement was able to do at bedside)     Assessment/Plan (clinically significant if listed here)  Acute on chronic systolic CHF history in November -last echo was 20% 7/2024 with grade 2 diastolic dysfunction  CAUTI, present on admission  Black stool and dysphagia question UGIB and possible stricture   A-fib on Eliquis  CKD 3 creatinine variable ~1.6-1.8  History of prostate cancer, BPH  Hx of HTN  Urinary retention/stricture with chronic indwelling catheter, was pending urology f/u   Gout   GERD    Give additional IV Lasix, monitor I's and O's, creatinine, BNP notably elevated on admission, shortness of air improving with diuresis   Procal neg x2, suspect cxr findings 2/2 fluid overload/atelectasis, monitor resp  status  F/u repeat echo, additional gdmt as able but bp limiting  Holding apixaban, iv ppi bid, hb decreased today but still wnl, d/w GI - EGD on Tuesday planned, will cont to optimize resp status   Cont aspiration precautions, diet as able for now   Cont iv ceftriaxone, f/u urine culture, notable infectious signs during catheter exchange noted per d/w urology - f/u Dr Bernal outpt for further loyd monitoring/stricture monitiroing  Cont to hold home valsartan given softer bp  Holding flomax for now given bp and chronic loyd need, will have resume prior to urology visit in case of voiding trial but otherwise no need to continue at this time as will be discharging with chronic loyd  Cont allopurinol  Add aquaphor for dry skin on feet/legs, monitor   PT/OT - has baseline debility, uses cane,   Check a.m. CBC, BMP, magnesium, phosphorus  Continue hospitalization at current level of care    Dispo: would benefit from rehab likely but wants to go home at time of dc, once medically stable.   D/w SW    VTE Prophylaxis:  Mechanical VTE prophylaxis orders are present.      Medical Intervention Limits: No intubation (DNI); No cardioversion  Level Of Support Discussed With: Patient  Code Status (Patient has no pulse and is not breathing): No CPR (Do Not Attempt to Resuscitate)  Medical Interventions (Patient has pulse or is breathing): Limited Support

## 2025-01-19 NOTE — PAYOR COMM NOTE
"Isabella Martínez (84 y.o. Male)       Date of Birth   1940    Social Security Number       Address   [address] [place], [place] [address]    Home Phone   [phone]    MRN   [id number]       [facility]    Marital Status                               Admission Date   1/18/25    Admission Type   Emergency    Admitting Provider   Brady Hernandez MD    Attending Provider   Brady Hernandez MD    Department, Room/Bed   [facility] 5TH FLOOR SURGICAL TELEMETRY UNIT, 512/1       Discharge Date       Discharge Disposition       Discharge Destination                                 Attending Provider: Brady Hernandez MD    Allergies: No Known Allergies    Isolation: None   Infection: None   Code Status: No CPR    Ht: 193 cm (76\")   Wt: 80.2 kg (176 lb 12.9 oz)    Admission Cmt: None   Principal Problem: Acute exacerbation of CHF (congestive heart failure) [I50.9]                   Active Insurance as of 1/18/2025       Primary Coverage       Payor Plan Insurance Group Employer/Plan Group    ANTHEM MEDICARE REPLACEMENT ANTHEM MED ADV HMO [id number]       Payor Plan Address Payor Plan Phone Number Payor Plan Fax Number Effective Dates    [address] [phone]  1/1/2025 - None Entered    [facility] [id number]         Subscriber Name Subscriber Birth Date Member ID       ISABELLA MARTÍNEZ 1940 [id number]                     Emergency Contacts        (Rel.) Home Phone Work Phone Mobile Phone    PER BOWEN (Daughter) -- -- [phone]    KEO CASPER (Relative) [phone] -- [phone]                 History & Physical        Brady Hernandez MD at 01/18/25 [address] [place], [place] [address]IST HISTORY AND PHYSICAL    Date of admission: 1/18/2025  Patient Name: Isabella Martínez   1940  Primary Care Physician:  Rani Lopez APRN    Subjective     Chief Complaint   Patient presents with    Weakness - Generalized     Weakness for the past 3 days    Nausea    "     HPI:  84 y.o. with systolic CHF history of EF 20%, A-fib on Eliquis, CKD 3, history of urethral stricture and chronic Diamond catheter and prior prostate cancer, who presented with worsening shortness of air, weakness in addition to several other complaints    Has had orthopnea, PND, worsening shortness of air and dyspnea on exertion.  Notes maybe some chill type symptoms this morning but otherwise no fevers.  No cough or productive sputum.  Had only previously had Lasix as as needed but lately has been taking about 20 daily.  Noted some increased urine output with Lasix p.o. at home but still a persistent symptoms.  Main reason for presenting to the ER was due to progressive shortness of air symptoms and associated weakness.  No chest pain pressure jaw pain numbness or tingling in his arm (does note chronic feet neuropathy/numbness but no acute change there)     Patient does also note having dark/black stools is unable to tell me if they are tarry or sticky as he did not pay too much attention to them, does not take iron, no history of GI bleed, does still take Eliquis, had a normal colonoscopy a few years ago, denies ever having an EGD.  Does have past few weeks at least of trouble with swallowing sometimes even with water feeling like it is catching in the middle of his chest.  Does have some GERD symptoms but reasonably well-controlled with his home Prilosec.  Notes he has had dry heaving in the morning with some nausea symptoms for the past few weeks.  Denies any diarrhea or constipation.    Status indwelling Diamond catheter for the past about 2-1/2 months since it was last placed in the OR given difficulty with stricture and placement, denies any suprapubic pain.      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    Polyneuropathy 06/19/2023    Chronic obstructive pulmonary disease 09/01/2023    Benign prostatic hyperplasia without lower urinary tract symptoms 09/07/2023    Atrial fibrillation, chronic 05/29/2024    Stage 3b chronic kidney disease 05/29/2024    Postprocedural membranous urethral stricture 07/22/2024    Acute on chronic HFrEF (heart failure with reduced ejection fraction) 07/30/2024    Suprapubic catheter dysfunction 08/15/2024    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 08/28/2024    Urethral stricture in male 10/24/2024    Lyme disease 10/19/2023     Resolved Ambulatory Problems     Diagnosis Date Noted    Arthritis of knee, left 07/14/2021    Left knee pain 08/06/2021    Hypertensive heart disease without congestive heart failure 08/11/2021    HFrEF (heart failure with reduced ejection fraction) 08/11/2021    Chronic atrial fibrillation     Aftercare following surgery of the left TKA and left knee manipulation 10/18/2021    Septic joint 11/26/2021    CHB (complete heart block) 06/07/2023    Unstable angina 09/02/2023    Chest pain 04/24/2024    CHF exacerbation 04/24/2024    Elevated troponin level not due myocardial infarction 11/07/2024     Past Medical History:   Diagnosis Date    Aneurysm 2013    Arthritis     Difficulty walking 2023    GERD (gastroesophageal reflux disease)     Paroxysmal atrial fibrillation     Prostate cancer 2010       Past Surgical History:   Procedure Laterality Date    ABLATION OF DYSRHYTHMIC FOCUS  2013    APPENDECTOMY      ARTERIAL BYPASS SURGERY      CARDIAC ABLATION      x2  \"years ago\"     CARDIAC ELECTROPHYSIOLOGY PROCEDURE N/A 11/01/2021    Procedure: PPM battery change;  Surgeon: Ge Whelan MD;  Location: [facility]" [facility] CATH INVASIVE LOCATION;  Service: Cardiovascular;  Laterality: N/A;    CARDIAC SURGERY  2006    cabg 3v    CORONARY ARTERY BYPASS GRAFT      CYSTOSCOPY URETHROTOMY VISUAL INTERNAL N/A 08/15/2024    Procedure: CYSTOSCOPY, URETHRAL DILATION, CATHETER PLACEMENT, RETROGRADE URETHROGRAM;  Surgeon: August Myers MD;  Location: [facility];  Service: Urology;  Laterality: N/A;    CYSTOSCOPY URETHROTOMY VISUAL INTERNAL N/A 10/24/2024    Procedure: CYSTOSCOPY URETHROTOMY;  Surgeon: Mally Bernal MD;  Location: [facility] OR;  Service: Urology;  Laterality: N/A;    ENDOSCOPY      with dilation     KNEE ARTHROSCOPY Left     TOTAL KNEE ARTHROPLASTY Left 2021    Procedure: TOTAL KNEE ARTHROPLASTY WITH DORA NAVIGATION WITH BIOMET;  Surgeon: Carlitos Zhao MD;  Location: [facility];  Service: Orthopedics;  Laterality: Left;    VENTRICULAR CARDIAC PACEMAKER INSERTION      medtronic         Family History   Problem Relation Age of Onset    Heart attack Mother     No Known Problems Father     No Known Problems Sister     No Known Problems Brother     No Known Problems Maternal Aunt     No Known Problems Maternal Uncle     No Known Problems Paternal Aunt     No Known Problems Paternal Uncle     No Known Problems Maternal Grandmother     No Known Problems Maternal Grandfather     No Known Problems Paternal Grandmother     No Known Problems Paternal Grandfather     No Known Problems Other        Social History     Socioeconomic History    Marital status:    Tobacco Use    Smoking status: Former     Current packs/day: 0.00     Average packs/day: 0.5 packs/day for 33.6 years (16.8 ttl pk-yrs)     Types: Cigarettes     Start date: 1962     Quit date: 1995     Years since quittin.5     Passive exposure: Never    Smokeless tobacco: Never   Vaping Use    Vaping status: Never Used   Substance and Sexual Activity    Alcohol use: Never    Drug use: Never    Sexual activity: Not Currently      Partners: Female         Objective     Vitals:   Temp:  [97.7 °F (36.5 °C)] 97.7 °F (36.5 °C)  Heart Rate:  [73-76] 76  Resp:  [20] 20  BP: (112-119)/(72-95) 117/81    Physical Exam   Awake resting in bed, thin, conversant  Diminished in bilateral lower lung fields, some pain crackles, no wheezing, does have some dyspnea with conversation, positive JVD  Irregular irregular normal rate, trace ankle edema  Abdomen soft nontender nondistended positive bowel sounds  Generalized weakness        Result Review   Vital signs, labs and any relevant imaging reviewed.     Assessment / Plan     Acute on chronic systolic CHF history in November -last echo was 20% 7/2024 with grade 2 diastolic dysfunction  Possible developing pna, question viral  Question CAUTI vs chronic colonization   Black stool and dysphagia question UGIB  A-fib on Eliquis  CKD 3 creatinine variable ~1.6-1.8  History of prostate cancer, BPH  Hx of HTN  Urinary retention/stricture with chronic indwelling catheter, was pending urology f/u   Gout   GERD    Chest x-ray personally reviewed, bilateral bibasilar opacities right greater than left, possible developing pneumonia, may be small pleural effusion as well right greater than left    Give IV Lasix strict LES's monitor renal function, BNP notably elevated, troponin downtrend on repeat no chest pain or pressure suspect some troponinemia in setting this renal dysfunction and fluid overload from CHF  Repeat echo as last 1 was only in July and baseline has poor EF  Check Pro-Allen and additional infectious studies with flu COVID RSV, white count within normal limits, x-ray findings may be more solely related to volume overload will continue antibiotics with ceftriaxone for now follow-up additional infectious studies. Blood. Urine culture  Hold apixaban given possible ugib symptoms, hb 15 wnl on admission but only recently developed symptoms, has sensation of food/liquids sticking in his chest as well, GI  consulted for egd evaluation, will place on iv ppi bid for now as well and monitor hb   Urology consult for possible cauti and possible need for OR catheter exchange (vs discussion of suprapubic catheter placement or otherwise)  Hold home valsartan given softer bp  Hold flomax unless plans for voiding trial would discontinue this given chronic catheter need  Cont allopurinol  Cont other medications as appropriate  Check am cbc, bmp, mg, phos , hb trend,   discussed initial management and workup with ED provider  based on the above problems patient warrants hospitalization for continued evaluation, treatment and monitoring.  Given multiple issues/symptoms suspect will be here at least two midnights.  Might need placement given debilty.  Pt/ot consult    VTE Prophylaxis:  Mechanical VTE prophylaxis orders are present.      Code: dnr/dni  Diet: npo for now, if no acute surgical plans will start 2g low sodium dysphagia diet/soft foods as tolerated.      CBC          11/8/2024    04:32 11/9/2024    04:24 1/18/2025    10:35   CBC   WBC 8.15  9.48  9.15    RBC 4.58  5.30  5.36    Hemoglobin 13.5  15.0  15.1    Hematocrit 43.3  48.0  48.3    MCV 94.5  90.6  90.1    MCH 29.5  28.3  28.2    MCHC 31.2  31.3  31.3    RDW 15.8  15.5  16.4    Platelets 197  224  223        CMP          11/9/2024    04:24 11/19/2024    13:47 1/18/2025    10:35   CMP   Glucose 131  87  136    BUN 30  24  32    Creatinine 1.70  1.56  1.88    EGFR 39.8  44.1  35.2    Sodium 137  139  140    Potassium 4.9  4.6  4.3    Chloride 102  105  99    Calcium 9.3  9.4  9.8    Total Protein   7.6    Albumin 3.4   3.8    Globulin   3.8    Total Bilirubin   1.7    Alkaline Phosphatase   137    AST (SGOT)   23    ALT (SGPT)   20    Albumin/Globulin Ratio   1.0    BUN/Creatinine Ratio 17.6  15.4  17.0    Anion Gap 11.1  9.8  14.4        Electronically signed by Brady Hernandez MD at 01/18/25 1359          Emergency Department Notes        Ashley Hudson MD at  "01/18/25 1457          Time: 2:57 PM EST  Date of encounter:  1/18/2025  Independent Historian/Clinical History and Information was obtained by:   Patient    History is limited by: N/A    Chief Complaint: Weakness      History of Present Illness:  Patient is a 84 y.o. year old male who presents to the emergency department for evaluation of weakness and shortness of breath is gotten worse.  Patient has no fever or chills.  Patient denies cough.  Patient has no nausea or vomiting.  Patient denies diarrhea.  Patient denies leg pain and swelling.      Patient Care Team  Primary Care Provider: Rani Lopez APRN    Past Medical History:     No Known Allergies  Past Medical History:   Diagnosis Date    Aneurysm 2013    Arthritis     left knee     Atrial fibrillation, persistent     CHB (complete heart block) 06/07/2023    CHF exacerbation 04/24/2024    Chronic obstructive pulmonary disease 09/01/2023    pt denies having this    Difficulty walking 2023    Numbness in right foot    Essential hypertension 05/08/2019    GERD (gastroesophageal reflux disease)     HFrEF (heart failure with reduced ejection fraction) 08/11/2021    Hyperlipidemia 08/11/2021    Paroxysmal atrial fibrillation     Polyneuropathy 06/19/2023    Prostate cancer 2010    with seeds implant     Septic joint 11/26/2021     Past Surgical History:   Procedure Laterality Date    ABLATION OF DYSRHYTHMIC FOCUS  2013    APPENDECTOMY      ARTERIAL BYPASS SURGERY      CARDIAC ABLATION      x2  \"years ago\"     CARDIAC ELECTROPHYSIOLOGY PROCEDURE N/A 11/01/2021    Procedure: PPM battery change;  Surgeon: Ge Whelan MD;  Location: [facility] INVASIVE LOCATION;  Service: Cardiovascular;  Laterality: N/A;    CARDIAC SURGERY  2006    cabg 3v    CORONARY ARTERY BYPASS GRAFT  2006    CYSTOSCOPY URETHROTOMY VISUAL INTERNAL N/A 08/15/2024    Procedure: CYSTOSCOPY, URETHRAL DILATION, CATHETER PLACEMENT, RETROGRADE URETHROGRAM;  Surgeon: August Myers MD;  " Location: [facility] MAIN OR;  Service: Urology;  Laterality: N/A;    CYSTOSCOPY URETHROTOMY VISUAL INTERNAL N/A 10/24/2024    Procedure: CYSTOSCOPY URETHROTOMY;  Surgeon: Mally Bernal MD;  Location: [facility] MAIN OR;  Service: Urology;  Laterality: N/A;    ENDOSCOPY      with dilation     KNEE ARTHROSCOPY Left     TOTAL KNEE ARTHROPLASTY Left 07/23/2021    Procedure: TOTAL KNEE ARTHROPLASTY WITH DORA NAVIGATION WITH BIOMET;  Surgeon: Carlitos Zhao MD;  Location: [facility] MAIN OR;  Service: Orthopedics;  Laterality: Left;    VENTRICULAR CARDIAC PACEMAKER INSERTION      medtronic      Family History   Problem Relation Age of Onset    Heart attack Mother     No Known Problems Father     No Known Problems Sister     No Known Problems Brother     No Known Problems Maternal Aunt     No Known Problems Maternal Uncle     No Known Problems Paternal Aunt     No Known Problems Paternal Uncle     No Known Problems Maternal Grandmother     No Known Problems Maternal Grandfather     No Known Problems Paternal Grandmother     No Known Problems Paternal Grandfather     No Known Problems Other        Home Medications:  Prior to Admission medications    Medication Sig Start Date End Date Taking? Authorizing Provider   allopurinol (ZYLOPRIM) 100 MG tablet Take 1 tablet by mouth Daily. 3/11/22   Ely Srinivasan MD   Eliquis 2.5 MG tablet tablet Take 1 tablet by mouth Every 12 (Twelve) Hours. 9/3/24   Ely Srinivasan MD   furosemide (LASIX) 40 MG tablet Take 1 tablet by mouth Daily As Needed (for swelling).    Ely Srinivasan MD   Jardiance 10 MG tablet tablet Take 0.5 tablets by mouth Daily. 9/3/24   Ely Srinivasan MD   LORazepam (ATIVAN) 0.5 MG tablet Take 1 tablet by mouth 2 (Two) Times a Day As Needed for Anxiety. 4/16/24   Ely Srinivasan MD   MELATONIN PO Take 1 tablet by mouth At Night As Needed (sleep).    Ely Srinivasan MD   omeprazole (priLOSEC) 20 MG capsule Take 1 capsule by mouth  "Daily.    Provider, MD Ely   potassium chloride (MICRO-K) 10 MEQ CR capsule Take 1 capsule by mouth Daily. 24   Isabel Navarrete APRN   tamsulosin (FLOMAX) 0.4 MG capsule 24 hr capsule Take 1 capsule by mouth Daily. 3/15/24   Ely Srinivasan MD   valsartan (DIOVAN) 40 MG tablet Take 1 tablet by mouth Daily. 24   Isabel Navarrete APRN        Social History:   Social History     Tobacco Use    Smoking status: Former     Current packs/day: 0.00     Average packs/day: 0.5 packs/day for 33.6 years (16.8 ttl pk-yrs)     Types: Cigarettes     Start date: 1962     Quit date: 1995     Years since quittin.5     Passive exposure: Never    Smokeless tobacco: Never   Vaping Use    Vaping status: Never Used   Substance Use Topics    Alcohol use: Never    Drug use: Never         Review of Systems:  Review of Systems   Constitutional:  Negative for chills and fever.   HENT:  Negative for congestion, rhinorrhea and sore throat.    Eyes:  Negative for pain and visual disturbance.   Respiratory:  Negative for apnea, cough, chest tightness and shortness of breath.    Cardiovascular:  Negative for chest pain and palpitations.   Gastrointestinal:  Negative for abdominal pain, diarrhea, nausea and vomiting.   Genitourinary:  Negative for difficulty urinating and dysuria.   Musculoskeletal:  Negative for joint swelling and myalgias.   Skin:  Negative for color change.   Neurological:  Positive for weakness. Negative for seizures and headaches.   Psychiatric/Behavioral: Negative.     All other systems reviewed and are negative.       Physical Exam:  /81   Pulse 76   Temp 97.7 °F (36.5 °C)   Resp 20   Ht 193 cm (76\")   Wt 80.2 kg (176 lb 12.9 oz)   SpO2 95%   BMI 21.52 kg/m²     Physical Exam  Vitals and nursing note reviewed.   Constitutional:       General: He is not in acute distress.     Appearance: Normal appearance. He is not toxic-appearing.   HENT:      Head: " Normocephalic and atraumatic.      Jaw: There is normal jaw occlusion.   Eyes:      General: Lids are normal.      Extraocular Movements: Extraocular movements intact.      Conjunctiva/sclera: Conjunctivae normal.      Pupils: Pupils are equal, round, and reactive to light.   Cardiovascular:      Rate and Rhythm: Normal rate and regular rhythm.      Pulses: Normal pulses.      Heart sounds: Normal heart sounds.   Pulmonary:      Effort: Pulmonary effort is normal. No respiratory distress.      Breath sounds: Normal breath sounds. No wheezing or rhonchi.   Abdominal:      General: Abdomen is flat.      Palpations: Abdomen is soft.      Tenderness: There is no abdominal tenderness. There is no guarding or rebound.   Musculoskeletal:         General: Normal range of motion.      Cervical back: Normal range of motion and neck supple.      Right lower leg: No edema.      Left lower leg: No edema.   Skin:     General: Skin is warm and dry.   Neurological:      Mental Status: He is alert and oriented to person, place, and time. Mental status is at baseline.   Psychiatric:         Mood and Affect: Mood normal.                    Medical Decision Making:      Comorbidities that affect care:    Atrial fibrillation    External Notes reviewed:    Hospital Discharge Summary: Patient was last admitted for A-fib      The following orders were placed and all results were independently analyzed by me:  Orders Placed This Encounter   Procedures    Blood Culture - Blood,    Blood Culture - Blood,    Urine Culture - Urine,    COVID-19, FLU A/B, RSV PCR 1 HR TAT - Swab, Nasopharynx    XR Chest 1 View    [place] Draw    Comprehensive Metabolic Panel    High Sensitivity Troponin T    Magnesium    Urinalysis With Microscopic If Indicated (No Culture) - Urine, Clean Catch    CBC Auto Differential    Urinalysis, Microscopic Only - Urine, Clean Catch    High Sensitivity Troponin T 1Hr    Lactic Acid, Plasma    BNP    Procalcitonin    Basic  Metabolic Panel    Magnesium    Phosphorus    Procalcitonin    Diet: Liquid; Full Liquid; Fluid Consistency: Thin (IDDSI 0)    Undress & Gown    Continuous Pulse Oximetry    Orthostatic Blood Pressure    Vital Signs    Notify Provider (With Default Parameters)    Activity - Ad Gabby    Ambulate Patient    Up in Chair    Intake & Output    Weigh Patient    Oral Care    Saline Lock & Maintain IV Access    Place Sequential Compression Device    Maintain Sequential Compression Device    Strict Intake & Output    Daily Weights    Compression - Not to be Used for VTE Prophylaxis    Code Status and Medical Interventions: No CPR (Do Not Attempt to Resuscitate); Limited Support; No intubation (DNI), No cardioversion    Inpatient Hospitalist Consult    Inpatient Gastroenterology Consult    Inpatient Urology Consult    OT Consult: Eval & Treat ADL Performance Below Baseline    PT Consult: Eval & Treat Discharge Placement Assessment    ECG 12 Lead ED Triage Standing Order; Weak / Dizzy / AMS    Adult Transthoracic Echo Complete W/ Cont if Necessary Per Protocol    Insert Peripheral IV    Insert Peripheral IV    Inpatient Admission    CBC & Differential    Green Top (Gel)    Lavender Top    Gold Top - SST    Light Blue Top    CBC & Differential       Medications Given in the Emergency Department:  Medications   furosemide (LASIX) injection 40 mg (has no administration in time range)   pantoprazole (PROTONIX) injection 40 mg (has no administration in time range)   melatonin tablet 5 mg (has no administration in time range)   aluminum-magnesium hydroxide-simethicone (MAALOX MAX) 400-400-40 MG/5ML suspension 15 mL (has no administration in time range)   hydrOXYzine (ATARAX) tablet 25 mg (has no administration in time range)   ondansetron (ZOFRAN) injection 4 mg (has no administration in time range)   polyethylene glycol (MIRALAX) packet 17 g (has no administration in time range)   acetaminophen (TYLENOL) tablet 650 mg (has no  administration in time range)   Lidocaine 4 % 1 patch (has no administration in time range)   prochlorperazine (COMPAZINE) injection 5 mg (has no administration in time range)   sodium chloride 0.9 % flush 10 mL (has no administration in time range)   sodium chloride 0.9 % flush 10 mL (has no administration in time range)   sodium chloride 0.9 % infusion 40 mL (has no administration in time range)   potassium chloride (MICRO-K/KLOR-CON) CR capsule (has no administration in time range)   cefTRIAXone (ROCEPHIN) in NS 1 gram/10ml IV PUSH syringe (has no administration in time range)   cefTRIAXone (ROCEPHIN) in NS 1 gram/10ml IV PUSH syringe (1,000 mg Intravenous Given 1/18/25 1152)        ED Course:         Labs:    Lab Results (last 24 hours)       Procedure Component Value Units Date/Time    CBC & Differential [[id number]]  (Abnormal) Collected: 01/18/25 1035    Specimen: Blood Updated: 01/18/25 1102    Narrative:      The following orders were created for panel order CBC & Differential.  Procedure                               Abnormality         Status                     ---------                               -----------         ------                     CBC Auto Differential[[id number]]        Abnormal            Final result                 Please view results for these tests on the individual orders.    Comprehensive Metabolic Panel [[id number]]  (Abnormal) Collected: 01/18/25 1035    Specimen: Blood Updated: 01/18/25 1109     Glucose 136 mg/dL      BUN 32 mg/dL      Creatinine 1.88 mg/dL      Sodium 140 mmol/L      Potassium 4.3 mmol/L      Chloride 99 mmol/L      CO2 26.6 mmol/L      Calcium 9.8 mg/dL      Total Protein 7.6 g/dL      Albumin 3.8 g/dL      ALT (SGPT) 20 U/L      AST (SGOT) 23 U/L      Alkaline Phosphatase 137 U/L      Total Bilirubin 1.7 mg/dL      Globulin 3.8 gm/dL      A/G Ratio 1.0 g/dL      BUN/Creatinine Ratio 17.0     Anion Gap 14.4 mmol/L      eGFR 35.2 mL/min/1.73     Narrative:       GFR Categories in Chronic Kidney Disease (CKD)      GFR Category          GFR (mL/min/1.73)    Interpretation  G1                     90 or greater         Normal or high (1)  G2                      60-89                Mild decrease (1)  G3a                   45-59                Mild to moderate decrease  G3b                   30-44                Moderate to severe decrease  G4                    15-29                Severe decrease  G5                    14 or less           Kidney failure          (1)In the absence of evidence of kidney disease, neither GFR category G1 or G2 fulfill the criteria for CKD.    eGFR calculation 2021 CKD-EPI creatinine equation, which does not include race as a factor    High Sensitivity Troponin T [[id number]]  (Abnormal) Collected: 01/18/25 1035    Specimen: Blood Updated: 01/18/25 1125     HS Troponin T 59 ng/L     Narrative:      High Sensitive Troponin T Reference Range:  <14.0 ng/L- Negative Female for AMI  <22.0 ng/L- Negative Male for AMI  >=14 - Abnormal Female indicating possible myocardial injury.  >=22 - Abnormal Male indicating possible myocardial injury.   Clinicians would have to utilize clinical acumen, EKG, Troponin, and serial changes to determine if it is an Acute Myocardial Infarction or myocardial injury due to an underlying chronic condition.         Magnesium [[id number]]  (Normal) Collected: 01/18/25 1035    Specimen: Blood Updated: 01/18/25 1109     Magnesium 2.3 mg/dL     CBC Auto Differential [[id number]]  (Abnormal) Collected: 01/18/25 1035    Specimen: Blood Updated: 01/18/25 1102     WBC 9.15 10*3/mm3      RBC 5.36 10*6/mm3      Hemoglobin 15.1 g/dL      Hematocrit 48.3 %      MCV 90.1 fL      MCH 28.2 pg      MCHC 31.3 g/dL      RDW 16.4 %      RDW-SD 51.5 fl      MPV 10.7 fL      Platelets 223 10*3/mm3      Neutrophil % 67.2 %      Lymphocyte % 22.4 %      Monocyte % 8.3 %      Eosinophil % 0.9 %      Basophil % 0.9 %      Immature Grans % 0.3 %       "Neutrophils, Absolute 6.15 10*3/mm3      Lymphocytes, Absolute 2.05 10*3/mm3      Monocytes, Absolute 0.76 10*3/mm3      Eosinophils, Absolute 0.08 10*3/mm3      Basophils, Absolute 0.08 10*3/mm3      Immature Grans, Absolute 0.03 10*3/mm3     Procalcitonin [[id number]]  (Normal) Collected: 01/18/25 1035    Specimen: Blood Updated: 01/18/25 1332     Procalcitonin 0.14 ng/mL     Narrative:      As a Marker for Sepsis (Non-Neonates):    1. <0.5 ng/mL represents a low risk of severe sepsis and/or septic shock.  2. >2 ng/mL represents a high risk of severe sepsis and/or septic shock.    As a Marker for Lower Respiratory Tract Infections that require antibiotic therapy:    PCT on Admission    Antibiotic Therapy       6-12 Hrs later    >0.5                Strongly Recommended  >0.25 - <0.5        Recommended  0.1 - 0.25          Discouraged              Remeasure/reassess PCT  <0.1                Strongly Discouraged     Remeasure/reassess PCT    As 28 day mortality risk marker: \"Change in Procalcitonin Result\" (>80% or <=80%) if Day 0 (or Day 1) and Day 4 values are available. Refer to [email or web address]    Change in PCT <=80%  A decrease of PCT levels below or equal to 80% defines a positive change in PCT test result representing a higher risk for 28-day all-cause mortality of patients diagnosed with severe sepsis for septic shock.    Change in PCT >80%  A decrease of PCT levels of more than 80% defines a negative change in PCT result representing a lower risk for 28-day all-cause mortality of patients diagnosed with severe sepsis or septic shock.    This test is Prognostic not Diagnostic, if elevated correlate with clinical findings before administering antibiotic treatment.        Urinalysis With Microscopic If Indicated (No Culture) - Urine, Clean Catch [[id number]]  (Abnormal) Collected: 01/18/25 1036    Specimen: Urine, Clean Catch Updated: 01/18/25 1058     Color, UA Yellow     Appearance, UA " Cloudy     pH, UA 5.5     Specific Gravity, UA 1.025     Glucose,  mg/dL (1+)     Ketones, UA Negative     Bilirubin, UA Small (1+)     Blood, UA Large (3+)     Protein,  mg/dL (2+)     Leuk Esterase, UA Moderate (2+)     Nitrite, UA Positive     Urobilinogen, UA 1.0 E.U./dL    Urinalysis, Microscopic Only - Urine, Clean Catch [[id number]]  (Abnormal) Collected: 01/18/25 1036    Specimen: Urine, Clean Catch Updated: 01/18/25 1058     RBC, UA Too Numerous to Count /HPF      WBC, UA Too Numerous to Count /HPF      Bacteria, UA 4+ /HPF      Squamous Epithelial Cells, UA 3-6 /HPF      Hyaline Casts, UA       Unable to determine due to loaded field     /LPF     Methodology Manual Light Microscopy    Urine Culture - Urine, Urine, Clean Catch [[id number]] Collected: 01/18/25 1036    Specimen: Urine, Clean Catch Updated: 01/18/25 1256    High Sensitivity Troponin T 1Hr [[id number]]  (Abnormal) Collected: 01/18/25 1156    Specimen: Blood Updated: 01/18/25 1223     HS Troponin T 49 ng/L      Troponin T Numeric Delta -10 ng/L      Troponin T % Delta -17    Narrative:      High Sensitive Troponin T Reference Range:  <14.0 ng/L- Negative Female for AMI  <22.0 ng/L- Negative Male for AMI  >=14 - Abnormal Female indicating possible myocardial injury.  >=22 - Abnormal Male indicating possible myocardial injury.   Clinicians would have to utilize clinical acumen, EKG, Troponin, and serial changes to determine if it is an Acute Myocardial Infarction or myocardial injury due to an underlying chronic condition.         Lactic Acid, Plasma [[id number]]  (Normal) Collected: 01/18/25 1156    Specimen: Blood Updated: 01/18/25 1221     Lactate 2.0 mmol/L     BNP [[id number]]  (Abnormal) Collected: 01/18/25 1156    Specimen: Blood Updated: 01/18/25 1305     proBNP 14,204.0 pg/mL     Narrative:      This assay is used as an aid in the diagnosis of individuals suspected of having heart failure. It can be used as an aid in the diagnosis  of acute decompensated heart failure (ADHF) in patients presenting with signs and symptoms of ADHF to the emergency department (ED). In addition, NT-proBNP of <300 pg/mL indicates ADHF is not likely.    Age Range Result Interpretation  NT-proBNP Concentration (pg/mL:      <50             Positive            >450                   Gray                 300-450                    Negative             <300    50-75           Positive            >900                  Gray                300-900                  Negative            <300      >75             Positive            >1800                  Gray                300-1800                  Negative            <300    Blood Culture - Blood, Arm, Left [[id number]] Collected: 01/18/25 1330    Specimen: Blood from Arm, Left Updated: 01/18/25 1344    Blood Culture - Blood, Arm, Right [[id number]] Collected: 01/18/25 1330    Specimen: Blood from Arm, Right Updated: 01/18/25 1344             Imaging:    XR Chest 1 View    Result Date: 1/18/2025  XR CHEST 1 VW Date of Exam: 1/18/2025 10:46 AM EST Indication: Weak/Dizzy/AMS triage protocol Comparison: November 6, 2024 Findings: Median sternotomy wires appear intact. A left subclavian pacemaker is in place. There are Bibasilar opacities with small bilateral pleural effusions. The heart is enlarged. There is pulmonary vascular congestion.     Impression: 1.Bibasilar opacities, which could reflect atelectasis or pneumonia. 2.Cardiomegaly with pulmonary vascular congestion and small bilateral pleural effusions. Electronically Signed: Javi Galvez MD  1/18/2025 11:10 AM EST  Workstation ID: [id number]       Differential Diagnosis and Discussion:    Dyspnea: Differential diagnosis includes but is not limited to metabolic acidosis, neurological disorders, psychogenic, asthma, pneumothorax, upper airway obstruction, COPD, pneumonia, noncardiogenic pulmonary edema, interstitial lung disease, anemia, congestive heart failure, and  pulmonary embolism    PROCEDURES:    Labs were collected in the emergency department and all labs were reviewed and interpreted by me.  X-ray were performed in the emergency department and all X-ray impressions were independently interpreted by me.  An EKG was performed and the EKG was interpreted by me.    ECG 12 Lead ED Triage Standing Order; Weak / Dizzy / AMS   Preliminary Result   HEART RATE=70  bpm   RR Typtwhxb=903  ms   MI Interval=  ms   P Horizontal Axis=  deg   P Front Axis=  deg   QRSD Emrjarqx=352  ms   QT Cevzslar=714  ms   UJnV=430  ms   QRS Axis=-77  deg   T Wave Axis=105  deg   - ABNORMAL ECG -   Afib/flut and V-paced complexes   No further analysis attempted due to paced rhythm   Date and Time of Study:2025-01-18 10:31:43          Procedures    MDM       The patient´s CBC that was reviewed and interpreted by me shows no abnormalities of critical concern. Of note, there is no anemia requiring a blood transfusion and the platelet count is acceptable.  The patient´s CMP that was reviewed and interpretted by me shows no abnormalities of critical concern. Of note, the patient´s sodium and potassium are acceptable. The patient´s liver enzymes are unremarkable. The patient´s renal function (creatinine) is elevated. The patient has a normal anion gap.  Urinalysis is consistent with bacteriuria.  BNP is elevated.  Chest x-ray shows some edema.              Patient Care Considerations:    PERC: I used the PERC score to risk stratify the patient for PE and a CT of the chest was considered but ultimately not indicated in today's visit.      Consultants/Shared Management Plan:    Case was discussed with Dr. Hernandez who agrees with admission.    Social Determinants of Health:    Patient is independent, reliable, and has access to care.       Disposition and Care Coordination:    Admit:   Through independent evaluation of the patient's history, physical, and imperical data, the patient meets criteria for inpatient  admission to the hospital.        Final diagnoses:   Acute UTI        ED Disposition       ED Disposition   Decision to Admit    Condition   --    Comment   Level of Care: Telemetry [5]   Diagnosis: Acute exacerbation of CHF (congestive heart failure) [287000]   Admitting Physician: J LUIS HERNANDEZ [[id number]]   Attending Physician: J LUIS HERNANDEZ [[id number]]   Certification: I Certify That Inpatient Hospital Services Are Medically Necessary For Greater Than 2 Midnights                 This medical record created using voice recognition software.             Ashley Hudson MD  01/18/25 1459      Electronically signed by Ashley Hudson MD at 01/18/25 1459           Facility-Administered Medications as of 1/18/2025   Medication Dose Route Frequency Provider Last Rate Last Admin    acetaminophen (TYLENOL) tablet 650 mg  650 mg Oral Q6H PRN J Luis Hernandez MD        aluminum-magnesium hydroxide-simethicone (MAALOX MAX) 400-400-40 MG/5ML suspension 15 mL  15 mL Oral Q6H PRN J Luis Hernandez MD        [COMPLETED] cefTRIAXone (ROCEPHIN) in NS 1 gram/10ml IV PUSH syringe  1,000 mg Intravenous Once Ashley Hudson MD   1,000 mg at 01/18/25 1152    [START ON 1/19/2025] cefTRIAXone (ROCEPHIN) in NS 1 gram/10ml IV PUSH syringe  1,000 mg Intravenous Q24H J Luis Hernandez MD        [COMPLETED] furosemide (LASIX) injection 40 mg  40 mg Intravenous Once J Luis Hernandez MD   40 mg at 01/18/25 1613    hydrOXYzine (ATARAX) tablet 25 mg  25 mg Oral TID PRN J Luis Hernandez MD        Lidocaine 4 % 1 patch  1 patch Transdermal Daily PRN J Luis Hernandez MD        melatonin tablet 5 mg  5 mg Oral Nightly PRN J Luis Hernandez MD        ondansetron (ZOFRAN) injection 4 mg  4 mg Intravenous Q4H PRN J Luis Hernandez MD        pantoprazole (PROTONIX) injection 40 mg  40 mg Intravenous Q12H J Luis Hernandez MD   40 mg at 01/18/25 1614    polyethylene glycol (MIRALAX) packet 17 g  17 g Oral BID PRN J Luis Hernandez MD        [COMPLETED] potassium  chloride (MICRO-K/KLOR-CON) CR capsule  20 mEq Oral Once Brady Hernandez MD   20 mEq at 01/18/25 1613    prochlorperazine (COMPAZINE) injection 5 mg  5 mg Intravenous Q6H PRN Brady Hernandez MD        sodium chloride 0.9 % flush 10 mL  10 mL Intravenous Q12H Brady Hernandez MD   10 mL at 01/18/25 1614    sodium chloride 0.9 % flush 10 mL  10 mL Intravenous PRN Brady Hernandez MD        sodium chloride 0.9 % infusion 40 mL  40 mL Intravenous PRN Brady Hernandez MD         Orders (active)        Start     Ordered    01/19/25 0900  cefTRIAXone (ROCEPHIN) in NS 1 gram/10ml IV PUSH syringe  Every 24 Hours         01/18/25 1340    01/19/25 0600  Basic Metabolic Panel  Daily       01/18/25 1333    01/19/25 0600  CBC & Differential  Daily       01/18/25 1333    01/19/25 0600  Magnesium  Daily       01/18/25 1333    01/19/25 0600  Phosphorus  Daily       01/18/25 1333    01/19/25 0600  Procalcitonin  Morning Draw         01/18/25 1340    01/18/25 1800  Up in Chair  3 Times Daily        Comments: With meals as able    01/18/25 1333    01/18/25 1800  Oral Care  2 Times Daily       01/18/25 1333    01/18/25 1600  Vital Signs  Every 4 Hours       01/18/25 1333    01/18/25 1426  Diet: Liquid; Full Liquid; Fluid Consistency: Thin (IDDSI 0)  Diet Effective Now         01/18/25 1425    01/18/25 1400  sodium chloride 0.9 % flush 10 mL  Every 12 Hours Scheduled         01/18/25 1333    01/18/25 1400  Strict Intake & Output  Every Hour       01/18/25 1333    01/18/25 1345  pantoprazole (PROTONIX) injection 40 mg  Every 12 Hours Scheduled         01/18/25 1321    01/18/25 1334  Daily Weights  Daily       01/18/25 1333    01/18/25 1334  Inpatient Urology Consult  Once        Specialty:  Urology  Provider:  Donn Garcia MD    01/18/25 1333    01/18/25 1330  Adult Transthoracic Echo Complete W/ Cont if Necessary Per Protocol  Once         01/18/25 1333    01/18/25 1329  Notify Provider (With Default Parameters)  Until Discontinued          01/18/25 1333    01/18/25 1329  Activity - Ad Gabby  Until Discontinued         01/18/25 1333    01/18/25 1329  Intake & Output  Every Shift       01/18/25 1333    01/18/25 1329  Weigh Patient  Once         01/18/25 1333    01/18/25 1329  OT Consult: Eval & Treat ADL Performance Below Baseline  Once         01/18/25 1333    01/18/25 1329  PT Consult: Eval & Treat Discharge Placement Assessment  Once         01/18/25 1333    01/18/25 1329  Insert Peripheral IV  Once         01/18/25 1333    01/18/25 1329  Saline Lock & Maintain IV Access  Continuous         01/18/25 1333    01/18/25 1329  Code Status and Medical Interventions: No CPR (Do Not Attempt to Resuscitate); Limited Support; No intubation (DNI), No cardioversion  Continuous         01/18/25 1333    01/18/25 1329  Place Sequential Compression Device  Once         01/18/25 1333    01/18/25 1329  Maintain Sequential Compression Device  Continuous         01/18/25 1333    01/18/25 1328  sodium chloride 0.9 % flush 10 mL  As Needed         01/18/25 1333    01/18/25 1328  sodium chloride 0.9 % infusion 40 mL  As Needed         01/18/25 1333    01/18/25 1328  melatonin tablet 5 mg  Nightly PRN         01/18/25 1333    01/18/25 1328  aluminum-magnesium hydroxide-simethicone (MAALOX MAX) 400-400-40 MG/5ML suspension 15 mL  Every 6 Hours PRN         01/18/25 1333    01/18/25 1328  hydrOXYzine (ATARAX) tablet 25 mg  3 Times Daily PRN         01/18/25 1333    01/18/25 1328  ondansetron (ZOFRAN) injection 4 mg  Every 4 Hours PRN         01/18/25 1333    01/18/25 1328  polyethylene glycol (MIRALAX) packet 17 g  2 Times Daily PRN         01/18/25 1333    01/18/25 1328  acetaminophen (TYLENOL) tablet 650 mg  Every 6 Hours PRN         01/18/25 1333    01/18/25 1328  Lidocaine 4 % 1 patch  Daily PRN         01/18/25 1333    01/18/25 1328  prochlorperazine (COMPAZINE) injection 5 mg  Every 6 Hours PRN         01/18/25 1333    01/18/25 1323  Inpatient Gastroenterology  "Consult  Once        Specialty:  Gastroenterology  Provider:  Simona Garcia MD    25 1323    25 1257  Urine Culture - Urine, Urine, Clean Catch  Once         25 1256    25 1256  Blood Culture - Blood, Arm, Left  Once        Placed in \"And\" Linked Group    25 1256    25 1256  Blood Culture - Blood, Arm, Right  Once        Comments: From a different site than #1.     Placed in \"And\" Linked Group    25 1256    25 1218  Inpatient Hospitalist Consult  Once        Specialty:  Hospitalist  Provider:  Brady Hernandez MD    25 1217    25 1017  Cardiac Monitoring  Continuous        Comments: Follow Standing Orders As Outlined in Process Instructions (Open Order Report to View Full Instructions)    25 1017    25 1017  Insert Peripheral IV  Once         25 1017    Unscheduled  Compression - Not to be Used for VTE Prophylaxis  As Needed       25 1333                     Consult Notes (last 24 hours)        Donn Garcia MD at 25 1415          [facility]   Consult Note    Patient Name: Javi Martínez  : 1940  MRN: [id number]  Primary Care Physician:  Rani Lopez APRN  Referring Physician: No ref. provider found  Date of admission: 2025    Consults  Subjective   Subjective     Reason for Consult/ Chief Complaint: Urinary tract infection.  Patient complains of weakness fatigue shortness of breath.    Weakness - Generalized  Symptoms include nausea.    Nausea  Symptoms include nausea.      Javi Martínez is a 84 y.o. male patient comes to the emergency room with complaints of weakness fatigue shortness of breath.  He has an indwelling Diamond catheter that has not been changed for 2-1/2 months.  He states that he was taken to the operating room by Dr. Walker who proceeded with visual internal urethrotomy to get a Diamond catheter in him.  He has a history of brachytherapy for prostate cancer and external beam " "radiation therapy as well.  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 Walker's note.    Review of Systems   Gastrointestinal:  Positive for nausea.   Negative    Personal History     Past Medical History:   Diagnosis Date    Aneurysm 2013    Arthritis     left knee     Atrial fibrillation, persistent     CHB (complete heart block) 06/07/2023    CHF exacerbation 04/24/2024    Chronic obstructive pulmonary disease 09/01/2023    pt denies having this    Difficulty walking 2023    Numbness in right foot    Essential hypertension 05/08/2019    GERD (gastroesophageal reflux disease)     HFrEF (heart failure with reduced ejection fraction) 08/11/2021    Hyperlipidemia 08/11/2021    Paroxysmal atrial fibrillation     Polyneuropathy 06/19/2023    Prostate cancer 2010    with seeds implant     Septic joint 11/26/2021       Past Surgical History:   Procedure Laterality Date    ABLATION OF DYSRHYTHMIC FOCUS  2013    APPENDECTOMY      ARTERIAL BYPASS SURGERY      CARDIAC ABLATION      x2  \"years ago\"     CARDIAC ELECTROPHYSIOLOGY PROCEDURE N/A 11/01/2021    Procedure: PPM battery change;  Surgeon: Ge Whelan MD;  Location: [facility] CATH INVASIVE LOCATION;  Service: Cardiovascular;  Laterality: N/A;    CARDIAC SURGERY  2006    cabg 3v    CORONARY ARTERY BYPASS GRAFT  2006    CYSTOSCOPY URETHROTOMY VISUAL INTERNAL N/A 08/15/2024    Procedure: CYSTOSCOPY, URETHRAL DILATION, CATHETER PLACEMENT, RETROGRADE URETHROGRAM;  Surgeon: August Myers MD;  Location: [facility] OR;  Service: Urology;  Laterality: N/A;    CYSTOSCOPY URETHROTOMY VISUAL INTERNAL N/A 10/24/2024    Procedure: CYSTOSCOPY URETHROTOMY;  Surgeon: Mally Bernal MD;  Location: [facility] OR;  Service: Urology;  Laterality: N/A;    ENDOSCOPY      with dilation     KNEE ARTHROSCOPY Left     TOTAL KNEE ARTHROPLASTY Left 07/23/2021    Procedure: TOTAL KNEE ARTHROPLASTY WITH DORA NAVIGATION WITH BIOMET;  Surgeon: Carlitos Zhao MD;  " Location: [facility] MAIN OR;  Service: Orthopedics;  Laterality: Left;    VENTRICULAR CARDIAC PACEMAKER INSERTION      medtronic        Family History: family history includes Heart attack in his mother; No Known Problems in his brother, father, maternal aunt, maternal grandfather, maternal grandmother, maternal uncle, paternal aunt, paternal grandfather, paternal grandmother, paternal uncle, sister, and another family member. Otherwise pertinent FHx was reviewed and not pertinent to current issue.    Social History:  reports that he quit smoking about 29 years ago. His smoking use included cigarettes. He started smoking about 63 years ago. He has a 16.8 pack-year smoking history. He has never been exposed to tobacco smoke. He has never used smokeless tobacco. He reports that he does not drink alcohol and does not use drugs.    Home Medications:   LORazepam, Melatonin, allopurinol, apixaban, empagliflozin, furosemide, omeprazole, potassium chloride, tamsulosin, and valsartan    Allergies:  No Known Allergies    Objective    Objective     Vitals:  Temp:  [97.7 °F (36.5 °C)] 97.7 °F (36.5 °C)  Heart Rate:  [73-76] 76  Resp:  [20] 20  BP: (112-119)/(72-95) 117/81    Physical Exam  Awake alert oriented  Afebrile vital signs are stable  Abdomen soft nontender nondistended no rebound guarding or rigidity noted  Normal penis normal scrotum   Normal perineum no abnormalities noted  Extremities no clubbing cyanosis or edema  Result Review    Result Review:  I have personally reviewed the results from the time of this admission to 1/18/2025 14:15 EST and agree with these findings:  [x]  Laboratory list / accordion  []  Microbiology  []  Radiology  []  EKG/Telemetry   []  Cardiology/Vascular   []  Pathology  []  Old records  []  Other:  Most notable findings include: Creatinine is 1.88 white blood cell count is 9.15      Assessment & Plan   Assessment / Plan     Brief Patient Summary:  Javi Martínez is a 84 y.o. male who patient  with indwelling Diamond catheter that has not been changed for 2-1/2 months he comes in with urinary tract infection.    Active Hospital Problems:  Active Hospital Problems    Diagnosis     **Acute exacerbation of CHF (congestive heart failure)      Plan:   In the ER I removed the old Diamond catheter there was a foul purulent odor that was coming from the catheter and his urethra I then went ahead and placed a 16 Tamazight coudé Diamond catheter into the bladder and the irrigated with saline and it irrigated fine.  My recommendation is to treat his a urinary tract infection and that he can follow-up with Dr. Walker for evaluation of his urethral stricture disease.    Donn Garcia MD      Electronically signed by Donn Garcia MD at 01/18/25 9070

## 2025-01-20 ENCOUNTER — ANESTHESIA EVENT (OUTPATIENT)
Dept: GASTROENTEROLOGY | Facility: HOSPITAL | Age: 85
End: 2025-01-20
Payer: MEDICARE

## 2025-01-20 LAB
ANION GAP SERPL CALCULATED.3IONS-SCNC: 11 MMOL/L (ref 5–15)
BASOPHILS # BLD AUTO: 0.07 10*3/MM3 (ref 0–0.2)
BASOPHILS NFR BLD AUTO: 0.7 % (ref 0–1.5)
BUN SERPL-MCNC: 32 MG/DL (ref 8–23)
BUN/CREAT SERPL: 18.5 (ref 7–25)
CALCIUM SPEC-SCNC: 9 MG/DL (ref 8.6–10.5)
CHLORIDE SERPL-SCNC: 102 MMOL/L (ref 98–107)
CO2 SERPL-SCNC: 26 MMOL/L (ref 22–29)
CREAT SERPL-MCNC: 1.73 MG/DL (ref 0.76–1.27)
DEPRECATED RDW RBC AUTO: 51.3 FL (ref 37–54)
EGFRCR SERPLBLD CKD-EPI 2021: 38.9 ML/MIN/1.73
EOSINOPHIL # BLD AUTO: 0.14 10*3/MM3 (ref 0–0.4)
EOSINOPHIL NFR BLD AUTO: 1.4 % (ref 0.3–6.2)
ERYTHROCYTE [DISTWIDTH] IN BLOOD BY AUTOMATED COUNT: 15.9 % (ref 12.3–15.4)
GLUCOSE SERPL-MCNC: 116 MG/DL (ref 65–99)
HCT VFR BLD AUTO: 42.9 % (ref 37.5–51)
HGB BLD-MCNC: 13.6 G/DL (ref 13–17.7)
IMM GRANULOCYTES # BLD AUTO: 0.03 10*3/MM3 (ref 0–0.05)
IMM GRANULOCYTES NFR BLD AUTO: 0.3 % (ref 0–0.5)
LYMPHOCYTES # BLD AUTO: 1.98 10*3/MM3 (ref 0.7–3.1)
LYMPHOCYTES NFR BLD AUTO: 19.4 % (ref 19.6–45.3)
MAGNESIUM SERPL-MCNC: 2.2 MG/DL (ref 1.6–2.4)
MCH RBC QN AUTO: 28.7 PG (ref 26.6–33)
MCHC RBC AUTO-ENTMCNC: 31.7 G/DL (ref 31.5–35.7)
MCV RBC AUTO: 90.5 FL (ref 79–97)
MONOCYTES # BLD AUTO: 1.23 10*3/MM3 (ref 0.1–0.9)
MONOCYTES NFR BLD AUTO: 12 % (ref 5–12)
NEUTROPHILS NFR BLD AUTO: 6.78 10*3/MM3 (ref 1.7–7)
NEUTROPHILS NFR BLD AUTO: 66.2 % (ref 42.7–76)
NRBC BLD AUTO-RTO: 0 /100 WBC (ref 0–0.2)
PHOSPHATE SERPL-MCNC: 3.9 MG/DL (ref 2.5–4.5)
PLATELET # BLD AUTO: 202 10*3/MM3 (ref 140–450)
PMV BLD AUTO: 10.4 FL (ref 6–12)
POTASSIUM SERPL-SCNC: 4.1 MMOL/L (ref 3.5–5.2)
RBC # BLD AUTO: 4.74 10*6/MM3 (ref 4.14–5.8)
SODIUM SERPL-SCNC: 139 MMOL/L (ref 136–145)
WBC NRBC COR # BLD AUTO: 10.23 10*3/MM3 (ref 3.4–10.8)

## 2025-01-20 PROCEDURE — 97161 PT EVAL LOW COMPLEX 20 MIN: CPT

## 2025-01-20 PROCEDURE — 25010000002 FUROSEMIDE PER 20 MG: Performed by: INTERNAL MEDICINE

## 2025-01-20 PROCEDURE — 97165 OT EVAL LOW COMPLEX 30 MIN: CPT

## 2025-01-20 PROCEDURE — 99233 SBSQ HOSP IP/OBS HIGH 50: CPT | Performed by: INTERNAL MEDICINE

## 2025-01-20 PROCEDURE — 84100 ASSAY OF PHOSPHORUS: CPT | Performed by: INTERNAL MEDICINE

## 2025-01-20 PROCEDURE — 63710000001 DIPHENHYDRAMINE PER 50 MG: Performed by: FAMILY MEDICINE

## 2025-01-20 PROCEDURE — 83735 ASSAY OF MAGNESIUM: CPT | Performed by: INTERNAL MEDICINE

## 2025-01-20 PROCEDURE — 25010000002 CEFTRIAXONE PER 250 MG: Performed by: INTERNAL MEDICINE

## 2025-01-20 PROCEDURE — 36415 COLL VENOUS BLD VENIPUNCTURE: CPT | Performed by: INTERNAL MEDICINE

## 2025-01-20 PROCEDURE — 80048 BASIC METABOLIC PNL TOTAL CA: CPT | Performed by: INTERNAL MEDICINE

## 2025-01-20 PROCEDURE — 85025 COMPLETE CBC W/AUTO DIFF WBC: CPT | Performed by: INTERNAL MEDICINE

## 2025-01-20 PROCEDURE — 25010000002 LORAZEPAM PER 2 MG: Performed by: FAMILY MEDICINE

## 2025-01-20 RX ORDER — ALLOPURINOL 100 MG/1
100 TABLET ORAL DAILY
Status: DISCONTINUED | OUTPATIENT
Start: 2025-01-20 | End: 2025-01-23 | Stop reason: HOSPADM

## 2025-01-20 RX ORDER — FUROSEMIDE 10 MG/ML
40 INJECTION INTRAMUSCULAR; INTRAVENOUS ONCE
Status: COMPLETED | OUTPATIENT
Start: 2025-01-20 | End: 2025-01-20

## 2025-01-20 RX ORDER — DIPHENHYDRAMINE HCL 25 MG
25 CAPSULE ORAL ONCE
Status: COMPLETED | OUTPATIENT
Start: 2025-01-20 | End: 2025-01-20

## 2025-01-20 RX ORDER — LORAZEPAM 2 MG/ML
0.5 INJECTION INTRAMUSCULAR ONCE
Status: COMPLETED | OUTPATIENT
Start: 2025-01-20 | End: 2025-01-20

## 2025-01-20 RX ORDER — LORAZEPAM 0.5 MG/1
0.5 TABLET ORAL 2 TIMES DAILY PRN
Status: DISCONTINUED | OUTPATIENT
Start: 2025-01-20 | End: 2025-01-23 | Stop reason: HOSPADM

## 2025-01-20 RX ORDER — VALSARTAN 40 MG/1
40 TABLET ORAL
Status: DISCONTINUED | OUTPATIENT
Start: 2025-01-20 | End: 2025-01-22

## 2025-01-20 RX ORDER — TRAZODONE HYDROCHLORIDE 50 MG/1
50 TABLET, FILM COATED ORAL NIGHTLY PRN
Status: DISCONTINUED | OUTPATIENT
Start: 2025-01-20 | End: 2025-01-23 | Stop reason: HOSPADM

## 2025-01-20 RX ORDER — TRAZODONE HYDROCHLORIDE 50 MG/1
50 TABLET, FILM COATED ORAL NIGHTLY
Status: DISCONTINUED | OUTPATIENT
Start: 2025-01-20 | End: 2025-01-23 | Stop reason: HOSPADM

## 2025-01-20 RX ADMIN — Medication 10 ML: at 20:19

## 2025-01-20 RX ADMIN — FUROSEMIDE 40 MG: 10 INJECTION, SOLUTION INTRAMUSCULAR; INTRAVENOUS at 10:17

## 2025-01-20 RX ADMIN — ALLOPURINOL 100 MG: 100 TABLET ORAL at 10:17

## 2025-01-20 RX ADMIN — LORAZEPAM 0.5 MG: 0.5 TABLET ORAL at 21:52

## 2025-01-20 RX ADMIN — Medication 10 ML: at 10:18

## 2025-01-20 RX ADMIN — PANTOPRAZOLE SODIUM 40 MG: 40 INJECTION, POWDER, FOR SOLUTION INTRAVENOUS at 10:18

## 2025-01-20 RX ADMIN — DIPHENHYDRAMINE HYDROCHLORIDE 25 MG: 25 CAPSULE ORAL at 02:32

## 2025-01-20 RX ADMIN — Medication 5 MG: at 01:27

## 2025-01-20 RX ADMIN — SODIUM CHLORIDE 1000 MG: 9 INJECTION INTRAMUSCULAR; INTRAVENOUS; SUBCUTANEOUS at 10:17

## 2025-01-20 RX ADMIN — LORAZEPAM 0.5 MG: 0.5 TABLET ORAL at 10:30

## 2025-01-20 RX ADMIN — PANTOPRAZOLE SODIUM 40 MG: 40 INJECTION, POWDER, FOR SOLUTION INTRAVENOUS at 20:19

## 2025-01-20 RX ADMIN — TRAZODONE HYDROCHLORIDE 50 MG: 50 TABLET ORAL at 20:19

## 2025-01-20 RX ADMIN — Medication 5 MG: at 20:19

## 2025-01-20 RX ADMIN — LORAZEPAM 0.5 MG: 2 INJECTION INTRAMUSCULAR; INTRAVENOUS at 04:54

## 2025-01-20 RX ADMIN — HYDROXYZINE HYDROCHLORIDE 25 MG: 25 TABLET ORAL at 01:27

## 2025-01-20 NOTE — PROGRESS NOTES
[facility]     Progress Note    Patient Name: Javi Martínez  : 1940  MRN: [id number]  Primary Care Physician:  Rani Lopez APRN  Date of admission: 2025    Subjective   Subjective     Chief Complaint: Patient has no complaints today    Weakness - Generalized  Symptoms include nausea.    Nausea  Symptoms include nausea.      Patient Reports patient came to the emergency room because of shortness of breath and fatigue.  He was noted to have a significant urinary tract infection.  He has a loyd that was placed 2-1/2 months ago and has not been changed.  He had direct visual internal urethrotomy with Loyd placement by Dr. Walker in the recent past.  He has had brachytherapy and external beam radiation therapy for prostate cancer.  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    Review of Systems   Gastrointestinal:  Positive for nausea.       Objective   Objective     Vitals:   Temp:  [97.2 °F (36.2 °C)-98.1 °F (36.7 °C)] 97.9 °F (36.6 °C)  Heart Rate:  [70-76] 76  Resp:  [16-20] 18  BP: (104-114)/(66-87) 113/83    Physical Exam   Afebrile vital signs are stable  Awake alert oriented x 3   Loyd catheter is in place it is draining clear yellow urine.    Result Review    Result Review:  I have personally reviewed the results from the time of this admission to 2025 09:53 EST and agree with these findings:  []  Laboratory list / accordion  []  Microbiology  []  Radiology  []  EKG/Telemetry   []  Cardiology/Vascular   []  Pathology  []  Old records  []  Other:  Most notable findings include: Stable      Assessment & Plan   Assessment / Plan     Brief Patient Summary:  Javi Martínez is a 84 y.o. male who urinary retention suprapubic catheter.    Active Hospital Problems:  Active Hospital Problems    Diagnosis     **Acute exacerbation of CHF (congestive heart failure)     Other dysphagia      Plan:   Patient has indwelling chronic Loyd catheter that was changed 2 days ago it is draining well.   Plans are to follow-up with Dr. Walker to be evaluated for surgery for the urethral stricture disease that she has.    VTE Prophylaxis:  Mechanical VTE prophylaxis orders are present.        CODE STATUS:    Medical Intervention Limits: No intubation (DNI); No cardioversion  Level Of Support Discussed With: Patient  Code Status (Patient has no pulse and is not breathing): No CPR (Do Not Attempt to Resuscitate)  Medical Interventions (Patient has pulse or is breathing): Limited Support    Disposition:  I expect patient to be discharged home.    Donn Garcia MD

## 2025-01-20 NOTE — PLAN OF CARE
Goal Outcome Evaluation:  Plan of Care Reviewed With: patient        Progress: no change  Outcome Evaluation: Patient does not present with any significant decline in functional mobility requiring inpatient PT services. He is safe to return home once medically able. He is in agreement and will be discharged from PT caseload at this time.    Anticipated Discharge Disposition (PT): home

## 2025-01-20 NOTE — THERAPY EVALUATION
Acute Care - Physical Therapy Initial Evaluation  [facility]     Patient Name: Javi Martínez  : 1940  MRN: [id number]  Today's Date: 2025      Visit Dx:     ICD-10-CM ICD-9-CM   1. Acute UTI  N39.0 599.0   2. Other dysphagia  R13.19 787.29   3. Decreased activities of daily living (ADL)  Z78.9 V49.89   4. Difficulty walking  R26.2 719.7     Patient Active Problem List   Diagnosis    Primary osteoarthritis of left knee    S/P TKR (total knee replacement)    Essential hypertension    CAD with CABG    Hyperlipidemia    Presence of cardiac pacemaker single chamber    Anxiety    Gastroesophageal reflux disease    Gout    History of malignant neoplasm of prostate    Testicular hypofunction    Polyneuropathy    Chronic obstructive pulmonary disease    Benign prostatic hyperplasia without lower urinary tract symptoms    Atrial fibrillation, chronic    Stage 3b chronic kidney disease    Postprocedural membranous urethral stricture    Acute on chronic HFrEF (heart failure with reduced ejection fraction)    Suprapubic catheter dysfunction    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    Urethral stricture in male    Lyme disease    Acute exacerbation of CHF (congestive heart failure)    Other dysphagia     Past Medical History:   Diagnosis Date    Aneurysm 2013    Arthritis     left knee     Atrial fibrillation, persistent     CHB (complete heart block) 2023    CHF exacerbation 2024    Chronic obstructive pulmonary disease 2023    pt denies having this    Difficulty walking     Numbness in right foot    Essential hypertension 2019    GERD (gastroesophageal reflux disease)     HFrEF (heart failure with reduced ejection fraction) 2021    Hyperlipidemia 2021    Paroxysmal atrial fibrillation     Polyneuropathy 2023    Prostate cancer 2010    with seeds implant     Septic joint 2021     Past Surgical History:   Procedure Laterality Date    ABLATION OF DYSRHYTHMIC FOCUS   "2013    APPENDECTOMY      ARTERIAL BYPASS SURGERY      CARDIAC ABLATION      x2  \"years ago\"     CARDIAC ELECTROPHYSIOLOGY PROCEDURE N/A 11/01/2021    Procedure: PPM battery change;  Surgeon: Ge Whelan MD;  Location: [facility] CATH INVASIVE LOCATION;  Service: Cardiovascular;  Laterality: N/A;    CARDIAC SURGERY  2006    cabg 3v    CORONARY ARTERY BYPASS GRAFT  2006    CYSTOSCOPY URETHROTOMY VISUAL INTERNAL N/A 08/15/2024    Procedure: CYSTOSCOPY, URETHRAL DILATION, CATHETER PLACEMENT, RETROGRADE URETHROGRAM;  Surgeon: August Myers MD;  Location: [facility] MAIN OR;  Service: Urology;  Laterality: N/A;    CYSTOSCOPY URETHROTOMY VISUAL INTERNAL N/A 10/24/2024    Procedure: CYSTOSCOPY URETHROTOMY;  Surgeon: Mally Bernal MD;  Location: [facility] MAIN OR;  Service: Urology;  Laterality: N/A;    ENDOSCOPY      with dilation     KNEE ARTHROSCOPY Left     TOTAL KNEE ARTHROPLASTY Left 07/23/2021    Procedure: TOTAL KNEE ARTHROPLASTY WITH DORA NAVIGATION WITH BIOMET;  Surgeon: Carlitos Zhao MD;  Location: [facility] OR;  Service: Orthopedics;  Laterality: Left;    VENTRICULAR CARDIAC PACEMAKER INSERTION      medtronic      PT Assessment (Last 12 Hours)       PT Evaluation and Treatment       Row Name 01/20/25 1500          Physical Therapy Time and Intention    Subjective Information no complaints  -AV     Document Type evaluation  -AV     Mode of Treatment individual therapy;physical therapy  -AV       Row Name 01/20/25 1500          General Information    Patient Profile Reviewed yes  -AV     Patient Observations alert;cooperative  -AV     Prior Level of Function independent:;all household mobility;gait;transfer;ADL's  Ambulated with rollator for household mobility, STC for community mobility. No home O2. Chronic indwelling loyd catheter.  -AV     Equipment Currently Used at Home cane, straight;rollator  -AV     Existing Precautions/Restrictions fall  -AV       Row Name 01/20/25 1500          Living " Environment    Current Living Arrangements home  -AV     Home Accessibility stairs to enter home  -AV     People in Home alone  Family and friends live close and can assist if needed  -AV       Row Name 01/20/25 1500          Home Main Entrance    Number of Stairs, Main Entrance two  -AV     Stair Railings, Main Entrance railings on both sides of stairs  -AV       Row Name 01/20/25 1500          Cognition    Orientation Status (Cognition) oriented x 3  -AV       Row Name 01/20/25 1500          Range of Motion (ROM)    Range of Motion bilateral lower extremities;ROM is WFL  -AV       Row Name 01/20/25 1500          Strength (Manual Muscle Testing)    Strength (Manual Muscle Testing) bilateral lower extremities;strength is WFL  -AV       Row Name 01/20/25 1500          Bed Mobility    Bed Mobility supine-sit;sit-supine  -AV     Supine-Sit [place] (Bed Mobility) supervision  -AV     Sit-Supine [place] (Bed Mobility) supervision  -AV       Row Name 01/20/25 1500          Transfers    Transfers sit-stand transfer;stand-sit transfer  -AV       Row Name 01/20/25 1500          Sit-Stand Transfer    Sit-Stand [place] (Transfers) supervision  -AV     Assistive Device (Sit-Stand Transfers) cane, straight  -AV       Row Name 01/20/25 1500          Stand-Sit Transfer    Stand-Sit [place] (Transfers) supervision  -AV     Assistive Device (Stand-Sit Transfers) cane, straight  -AV       Row Name 01/20/25 1500          Gait/Stairs (Locomotion)    Gait/Stairs Locomotion gait/ambulation independence;gait/ambulation assistive device;distance ambulated  -AV     [place] Level (Gait) supervision  -AV     Assistive Device (Gait) cane, straight  -AV     Distance in Feet (Gait) 50  -AV     Pattern (Gait) step-through  -AV       Row Name 01/20/25 1500          Balance    Balance Assessment standing dynamic balance  -AV     Dynamic Standing Balance supervision  -AV     Position/Device Used, Standing Balance  supported;cane, straight  -AV       Row Name 01/20/25 1500          Plan of Care Review    Plan of Care Reviewed With patient  -AV     Progress no change  -AV     Outcome Evaluation Patient does not present with any significant decline in functional mobility requiring inpatient PT services. He is safe to return home once medically able. He is in agreement and will be discharged from PT caseload at this time.  -AV       Row Name 01/20/25 1500          Positioning and Restraints    Pre-Treatment Position in bed  -AV     Post Treatment Position bed  -AV     In Bed fowlers;call light within reach;encouraged to call for assist  No alarms active upon entry  -AV       Row Name 01/20/25 1500          Therapy Assessment/Plan (PT)    Criteria for Skilled Interventions Met (PT) no problems identified which require skilled intervention  -AV     Therapy Frequency (PT) evaluation only  -AV       Row Name 01/20/25 1500          PT Evaluation Complexity    History, PT Evaluation Complexity 1-2 personal factors and/or comorbidities  -AV     Examination of Body Systems (PT Eval Complexity) total of 4 or more elements  -AV     Clinical Presentation (PT Evaluation Complexity) stable  -AV     Clinical Decision Making (PT Evaluation Complexity) low complexity  -AV     Overall Complexity (PT Evaluation Complexity) low complexity  -AV       Row Name 01/20/25 1500          Therapy Plan Review/Discharge Plan (PT)    Therapy Plan Review (PT) evaluation/treatment results reviewed;patient  -AV       Row Name 01/20/25 1500          Physical Therapy Goals    Problem Specific Goal Selection (PT) problem specific goal 1, PT  -AV       Row Name 01/20/25 1500          Problem Specific Goal 1 (PT)    Problem Specific Goal 1 (PT) Complete PT evaluation  -AV     Time Frame (Problem Specific Goal 1, PT) 1 day  -AV     Progress/Outcome (Problem Specific Goal 1, PT) goal met  -AV               User Key  (r) = Recorded By, (t) = Taken By, (c) = Cosigned By       Initials Name Provider Type    AV Regan Fenton, PT Physical Therapist                    Physical Therapy Education       Title: PT OT SLP Therapies (In Progress)       Topic: Physical Therapy (In Progress)       Point: Mobility training (Done)       Learning Progress Summary            Patient Acceptance, E,TB, VU by AV at 1/20/2025 1534                      Point: Home exercise program (Not Started)       Learner Progress:  Not documented in this visit.              Point: Body mechanics (Done)       Learning Progress Summary            Patient Acceptance, E,TB, VU by AV at 1/20/2025 1534                      Point: Precautions (Done)       Learning Progress Summary            Patient Acceptance, E,TB, VU by AV at 1/20/2025 1534                                      User Key       Initials Effective Dates Name Provider Type Discipline    AV 06/11/21 -  Regan Fenton, OMAR Physical Therapist PT                  PT Recommendation and Plan  Anticipated Discharge Disposition (PT): home  Therapy Frequency (PT): evaluation only  Plan of Care Reviewed With: patient  Progress: no change  Outcome Evaluation: Patient does not present with any significant decline in functional mobility requiring inpatient PT services. He is safe to return home once medically able. He is in agreement and will be discharged from PT caseload at this time.   Outcome Measures       Row Name 01/20/25 1500             How much help from another person do you currently need...    Turning from your back to your side while in flat bed without using bedrails? 4  -AV      Moving from lying on back to sitting on the side of a flat bed without bedrails? 4  -AV      Moving to and from a bed to a chair (including a wheelchair)? 4  -AV      Standing up from a chair using your arms (e.g., wheelchair, bedside chair)? 4  -AV      Climbing 3-5 steps with a railing? 3  -AV      To walk in hospital room? 4  -AV      AM-PAC 6 Clicks Score (PT) 23  -AV          Functional Assessment    Outcome Measure Options AM-PAC 6 Clicks Basic Mobility (PT)  -AV                User Key  (r) = Recorded By, (t) = Taken By, (c) = Cosigned By      Initials Name Provider Type    Regan Rush, PT Physical Therapist                     Time Calculation:    PT Charges       Row Name 01/20/25 1534             Time Calculation    PT Received On 01/20/25  -AV         Untimed Charges    PT Eval/Re-eval Minutes 25  -AV         Total Minutes    Untimed Charges Total Minutes 25  -AV       Total Minutes 25  -AV                User Key  (r) = Recorded By, (t) = Taken By, (c) = Cosigned By      Initials Name Provider Type    Regan Rush, PT Physical Therapist                  Therapy Charges for Today       Code Description Service Date Service Provider Modifiers Qty    30638026888 HC PT EVAL LOW COMPLEXITY 2 1/20/2025 Regan Fenton, PT GP 1            PT G-Codes  Outcome Measure Options: AM-PAC 6 Clicks Basic Mobility (PT)  AM-PAC 6 Clicks Score (PT): 23  AM-PAC 6 Clicks Score (OT): 24    Regan Fenton PT  1/20/2025

## 2025-01-20 NOTE — PROGRESS NOTES
[facility]   Hospitalist Progress Note    Date of admission: 1/18/2025  Patient Name: Javi Martínez  1940  Date: 1/20/2025      Subjective     Chief Complaint   Patient presents with    Weakness - Generalized     Weakness for the past 3 days    Nausea       Interval Followup: Anxiety and trouble sleeping last night.  Ultimately did better with trazodone and Ativan.  Breathing feels like doing better today.  No dysuria no fevers.  No bleeding reported    Pending EGD tomorrow.  Patient still very focused on going home does not want rehab.  Has baseline debility I encouraged him to work with physical therapy/nursing today Mumtaz is getting around and to reconsider at least home health, inpatient rehab which I suspect he would greatly benefit from.        Objective     Vitals:   Temp:  [97.2 °F (36.2 °C)-98.1 °F (36.7 °C)] 97.7 °F (36.5 °C)  Heart Rate:  [70-76] 73  Resp:  [16-22] 16  BP: (108-114)/(61-87) 108/61    Physical Exam  Awake conversant thin frail in bed, no SI or HI, is pleasant and interactive but does have intermittent depressed affect especially when reflecting on prior death of his wife which still weighs heavily on  CTAB no crackles no wheezing on room air  Irregular irregular regular normal rate no lower extremity pitting edema  Abdomen soft  Diamond with yellow urine output  Dry skin on feet doing better today with Aquaphor/lotion    Result Review:  Vital signs, labs and recent relevant imaging reviewed.      CBC          1/18/2025    10:35 1/19/2025    03:22 1/20/2025    03:25   CBC   WBC 9.15  9.46  10.23    RBC 5.36  4.89  4.74    Hemoglobin 15.1  13.7  13.6    Hematocrit 48.3  44.5  42.9    MCV 90.1  91.0  90.5    MCH 28.2  28.0  28.7    MCHC 31.3  30.8  31.7    RDW 16.4  15.5  15.9    Platelets 223  185  202      CMP          1/18/2025    10:35 1/19/2025    03:22 1/20/2025    03:25   CMP   Glucose 136  96  116    BUN 32  30  32    Creatinine 1.88  1.76  1.73    EGFR 35.2  38.1  38.9     Sodium 140  135  139    Potassium 4.3  3.9  4.1    Chloride 99  98  102    Calcium 9.8  8.9  9.0    Total Protein 7.6      Albumin 3.8      Globulin 3.8      Total Bilirubin 1.7      Alkaline Phosphatase 137      AST (SGOT) 23      ALT (SGPT) 20      Albumin/Globulin Ratio 1.0      BUN/Creatinine Ratio 17.0  17.0  18.5    Anion Gap 14.4  11.6  11.0          acetaminophen    aluminum-magnesium hydroxide-simethicone    hydrOXYzine    Lidocaine    LORazepam    ondansetron    polyethylene glycol    prochlorperazine    sodium chloride    sodium chloride    traZODone    allopurinol, 100 mg, Oral, Daily  cefTRIAXone, 1,000 mg, Intravenous, Q24H  melatonin, 5 mg, Oral, Nightly  mineral oil-hydrophilic petrolatum, 1 Application, Topical, BID  pantoprazole, 40 mg, Intravenous, Q12H  sodium chloride, 10 mL, Intravenous, Q12H  [Held by provider] valsartan, 40 mg, Oral, Q24H        XR Chest 1 View    Result Date: 1/18/2025  Impression: 1.Bibasilar opacities, which could reflect atelectasis or pneumonia. 2.Cardiomegaly with pulmonary vascular congestion and small bilateral pleural effusions. Electronically Signed: Javi Galvez MD  1/18/2025 11:10 AM EST  Workstation ID: [id number]     Assessment / Plan     Summary: 84 y.o. 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  Admitted for CHF exacerbation, CAUTI (urology assisted with loyd replacement was able to do at bedside) and concerns for possible upper GI bleed and stricture.  Pending EGD on 1/21.     Assessment/Plan (clinically significant if listed here)  Acute on chronic systolic CHF history in November -last echo was 20% 7/2024 with grade 2 diastolic dysfunction  CAUTI, present on admission, secondary to e coli   Black stool and dysphagia question UGIB and possible stricture   A-fib on  Eliquis  CKD 3 creatinine variable ~1.6-1.8  History of prostate cancer, BPH  Hx of HTN  Urinary retention/stricture with chronic indwelling catheter, was pending urology f/u outpt with Dr Bernal  Gout   GERD    Give additional dose of Lasix today IV, monitor I's and O's and renal function closely, daily weights. Redose in the morning based on continued response and renal function.  BNP notably elevated on admission, shortness of air improving with diuresis, even when he was initially in the ER did not have any obvious lower extremity edema but did have notable volume overload otherwise.  Follow-up echo additional GDMT as able but low blood pressure likely limiting  1/18 ceftriaxone IV continue for E. coli UTI, follow-up final sensitivities, catheter was exchanged on admission needs follow-up with urology outpatient with Dr. Bernal for further monitoring for stricture and management of Loyd catheter  Count did increase slightly today, need to make sure E. coli sensitive, if worsening leukocytosis tomorrow may warrant broadening antibiotics.  Appreciate urology assistance recommendations reviewed  No bleeding, hemoglobin slight decrease on admission but otherwise stabilizing near 13.6, EGD pending for tomorrow for evaluation, continue to hold apixaban  Continue IV PPI twice daily  Discussed with GI appreciate assistance, may have stricture based on initial symptoms as well continue aspiration precautions and monitoring  Cont to hold home valsartan given softer bp  Holding flomax for now given bp and chronic loyd need, will have resume prior to urology visit in case of voiding trial but otherwise no need to continue at this time as will be discharging with chronic loyd  Scheduled trazodone at night for sleep, does take this outpatient, might if it switching to mirtazapine instead to help with weight, did recommend patient to take scheduled as outpatient instead of as needed and hopefully this would also help with  his depression some.  Could probably titrate the dose up other further as well depending on response.  Cont allopurinol  Continue aquaphor for dry skin on feet/legs, monitor   PT/OT - has baseline debility, uses cane,   Check a.m. CBC, BMP, magnesium, phosphorus  Continue hospitalization at current level of care    Dispo: would benefit from rehab likely but wants to go home at time of dc, once medically stable.   D/w SW    VTE Prophylaxis:  Mechanical VTE prophylaxis orders are present.      Medical Intervention Limits: No intubation (DNI); No cardioversion  Level Of Support Discussed With: Patient  Code Status (Patient has no pulse and is not breathing): No CPR (Do Not Attempt to Resuscitate)  Medical Interventions (Patient has pulse or is breathing): Limited Support

## 2025-01-20 NOTE — PLAN OF CARE
Goal Outcome Evaluation:  Plan of Care Reviewed With: patient        Progress: no change  Outcome Evaluation: Patient does not present with any significant decline in function and does not require inpatient occupational therapy, therefore they will be discharged from services at this time. It is recommended he discharge home when medically able to do so. Patient is aware and agreeable with treatment plan at this time.    Anticipated Discharge Disposition (OT): home

## 2025-01-20 NOTE — THERAPY EVALUATION
Patient Name: Javi Martínez  : 1940    MRN: [id number]                              Today's Date: 2025       Admit Date: 2025    Visit Dx:     ICD-10-CM ICD-9-CM   1. Acute UTI  N39.0 599.0   2. Other dysphagia  R13.19 787.29   3. Decreased activities of daily living (ADL)  Z78.9 V49.89     Patient Active Problem List   Diagnosis    Primary osteoarthritis of left knee    S/P TKR (total knee replacement)    Essential hypertension    CAD with CABG    Hyperlipidemia    Presence of cardiac pacemaker single chamber    Anxiety    Gastroesophageal reflux disease    Gout    History of malignant neoplasm of prostate    Testicular hypofunction    Polyneuropathy    Chronic obstructive pulmonary disease    Benign prostatic hyperplasia without lower urinary tract symptoms    Atrial fibrillation, chronic    Stage 3b chronic kidney disease    Postprocedural membranous urethral stricture    Acute on chronic HFrEF (heart failure with reduced ejection fraction)    Suprapubic catheter dysfunction    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    Urethral stricture in male    Lyme disease    Acute exacerbation of CHF (congestive heart failure)    Other dysphagia     Past Medical History:   Diagnosis Date    Aneurysm 2013    Arthritis     left knee     Atrial fibrillation, persistent     CHB (complete heart block) 2023    CHF exacerbation 2024    Chronic obstructive pulmonary disease 2023    pt denies having this    Difficulty walking     Numbness in right foot    Essential hypertension 2019    GERD (gastroesophageal reflux disease)     HFrEF (heart failure with reduced ejection fraction) 2021    Hyperlipidemia 2021    Paroxysmal atrial fibrillation     Polyneuropathy 2023    Prostate cancer 2010    with seeds implant     Septic joint 2021     Past Surgical History:   Procedure Laterality Date    ABLATION OF DYSRHYTHMIC FOCUS  2013    APPENDECTOMY      ARTERIAL BYPASS  "SURGERY      CARDIAC ABLATION      x2  \"years ago\"     CARDIAC ELECTROPHYSIOLOGY PROCEDURE N/A 11/01/2021    Procedure: PPM battery change;  Surgeon: Ge Whelan MD;  Location: [facility] CATH INVASIVE LOCATION;  Service: Cardiovascular;  Laterality: N/A;    CARDIAC SURGERY  2006    cabg 3v    CORONARY ARTERY BYPASS GRAFT  2006    CYSTOSCOPY URETHROTOMY VISUAL INTERNAL N/A 08/15/2024    Procedure: CYSTOSCOPY, URETHRAL DILATION, CATHETER PLACEMENT, RETROGRADE URETHROGRAM;  Surgeon: August Myers MD;  Location: [facility] MAIN OR;  Service: Urology;  Laterality: N/A;    CYSTOSCOPY URETHROTOMY VISUAL INTERNAL N/A 10/24/2024    Procedure: CYSTOSCOPY URETHROTOMY;  Surgeon: Mally Bernal MD;  Location: [facility] MAIN OR;  Service: Urology;  Laterality: N/A;    ENDOSCOPY      with dilation     KNEE ARTHROSCOPY Left     TOTAL KNEE ARTHROPLASTY Left 07/23/2021    Procedure: TOTAL KNEE ARTHROPLASTY WITH DORA NAVIGATION WITH BIOMET;  Surgeon: Carlitos Zhao MD;  Location: [facility] MAIN OR;  Service: Orthopedics;  Laterality: Left;    VENTRICULAR CARDIAC PACEMAKER INSERTION      [employer]       General Information       Row Name 01/20/25 1419          OT Time and Intention    Document Type evaluation  -LF     Mode of Treatment individual therapy;occupational therapy  -LF       Row Name 01/20/25 1419          General Information    Patient Profile Reviewed yes  -LF     Prior Level of Function --  (I) with ADLs, ambulated with a RW in the home, cane in the community, has a walk-in shower where he stand to shower, elevated commode, stands to groom, drives, no home O2, and chronic indwelling loyd catheter.  -LF     Existing Precautions/Restrictions fall  -LF     Barriers to Rehab none identified  -LF       Row Name 01/20/25 1419          Occupational Profile    Reason for Services/Referral (Occupational Profile) Patient is an 84 year old male admitted to [facility] for generalized weakness and nausea on January " 18th, 2025. Occupational therapy consulted due to recent decline in ADLs/functional transfers. No previous occupational therapy services for current condition.  -       Row Name 01/20/25 1419          Living Environment    People in Home alone  -       Row Name 01/20/25 1419          Home Main Entrance    Number of Stairs, Main Entrance two  -       Row Name 01/20/25 1419          Cognition    Orientation Status (Cognition) oriented x 3  -       Row Name 01/20/25 1419          Safety Issues/Impairments Affecting Functional Mobility    Impairments Affecting Function (Mobility) other (see comments)  N/A  -               User Key  (r) = Recorded By, (t) = Taken By, (c) = Cosigned By      Initials Name Provider Type     Dang Muñoz OT Occupational Therapist                     Mobility/ADL's       Row Name 01/20/25 1421          Bed Mobility    Bed Mobility supine-sit;sit-supine  -     Supine-Sit [place] (Bed Mobility) supervision  -     Sit-Supine [place] (Bed Mobility) supervision  -     Assistive Device (Bed Mobility) head of bed elevated  -       Row Name 01/20/25 1421          Transfers    Transfers sit-stand transfer;stand-sit transfer  -       Row Name 01/20/25 1421          Sit-Stand Transfer    Sit-Stand [place] (Transfers) supervision  -     Assistive Device (Sit-Stand Transfers) cane, straight  -       Row Name 01/20/25 1421          Stand-Sit Transfer    Stand-Sit [place] (Transfers) supervision  -     Assistive Device (Stand-Sit Transfers) cane, straight  -[facility] Name 01/20/25 1421          Activities of Daily Living    BADL Assessment/Intervention bathing;upper body dressing;grooming;lower body dressing;feeding;toileting  -       Row Name 01/20/25 1421          Bathing Assessment/Intervention    [place] Level (Bathing) bathing skills;upper body;lower body;independent  -[facility] Name 01/20/25 1421          Upper Body Dressing  Assessment/Training    [place] Level (Upper Body Dressing) upper body dressing skills;independent  -[facility] Name 01/20/25 1421          Grooming Assessment/Training    [place] Level (Grooming) grooming skills;independent  -[facility] Name 01/20/25 1421          Lower Body Dressing Assessment/Training    [place] Level (Lower Body Dressing) lower body dressing skills;independent  -LF       Row Name 01/20/25 1421          Self-Feeding Assessment/Training    [place] Level (Feeding) feeding skills;independent  -LF       Row Name 01/20/25 1421          Toileting Assessment/Training    [place] Level (Toileting) toileting skills;independent  [facility]               User Key  (r) = Recorded By, (t) = Taken By, (c) = Cosigned By      Initials Name Provider Type     Dang Muñoz OT Occupational Therapist                   Obj/Interventions       [facility] Name 01/20/25 1422          Sensory Assessment (Somatosensory)    Sensory Assessment (Somatosensory) UE sensation intact  -LF       Row Name 01/20/25 1422          Vision Assessment/Intervention    Visual Impairment/Limitations WFL;corrective lenses full-time  -[facility] Name 01/20/25 1422          Range of Motion Comprehensive    General Range of Motion bilateral upper extremity ROM WFL  -LF       Row Name 01/20/25 1422          Strength Comprehensive (MMT)    Comment, General Manual Muscle Testing (MMT) Assessment 4+/5 BUEs  -[facility] Name 01/20/25 1422          Motor Skills    Motor Skills coordination;functional endurance  -     Coordination bilateral;upper extremity;WFL  -     Functional Endurance Fair+/good-  -LF       Row Name 01/20/25 1422          Balance    Balance Assessment sitting dynamic balance;standing dynamic balance  -     Dynamic Sitting Balance supervision  -     Position, Sitting Balance unsupported;sitting edge of bed  -     Dynamic Standing Balance supervision  -     Position/Device Used, Standing Balance  supported;cane, straight  -               User Key  (r) = Recorded By, (t) = Taken By, (c) = Cosigned By      Initials Name Provider Type    LF Dang Muñoz, OT Occupational Therapist                   Goals/Plan    No documentation.                  Clinical Impression       Row Name 01/20/25 1423          Pain Assessment    Additional Documentation Pain Scale: FACES Pre/Post-Treatment (Group)  -[facility] Name 01/20/25 1423          Pain Scale: FACES Pre/Post-Treatment    Pain: FACES Scale, Pretreatment 0-->no hurt  -LF     Posttreatment Pain Rating 0-->no hurt  -LF       [facility] Name 01/20/25 1423          Plan of Care Review    Plan of Care Reviewed With patient  -     Progress no change  -     Outcome Evaluation Patient does not present with any significant decline in function and does not require inpatient occupational therapy, therefore they will be discharged from services at this time. It is recommended he discharge home when medically able to do so. Patient is aware and agreeable with treatment plan at this time.  -       Row Name 01/20/25 1423          Therapy Assessment/Plan (OT)    Patient/Family Therapy Goal Statement (OT) None reported  -     Rehab Potential (OT) other (see comments)  N/A  -     Criteria for Skilled Therapeutic Interventions Met (OT) no problems identified which require skilled intervention  -     Therapy Frequency (OT) evaluation only  -       Row Name 01/20/25 1423          Therapy Plan Review/Discharge Plan (OT)    Anticipated Discharge Disposition (OT) home  -LF       Row Name 01/20/25 1423          Vital Signs    O2 Delivery Pre Treatment room air  -     O2 Delivery Intra Treatment room air  -     O2 Delivery Post Treatment room air  -[facility] Name 01/20/25 1423          Positioning and Restraints    Pre-Treatment Position in bed  no alarm active on arrival  -LF     Post Treatment Position bed  -LF     In Bed fowlers;call light within reach;encouraged to  call for assist  -               User Key  (r) = Recorded By, (t) = Taken By, (c) = Cosigned By      Initials Name Provider Type    Dang Ross OT Occupational Therapist                   Outcome Measures       Row Name 01/20/25 1424          How much help from another is currently needed...    Putting on and taking off regular lower body clothing? 4  -LF     Bathing (including washing, rinsing, and drying) 4  -LF     Toileting (which includes using toilet bed pan or urinal) 4  -LF     Putting on and taking off regular upper body clothing 4  -LF     Taking care of personal grooming (such as brushing teeth) 4  -LF     Eating meals 4  -LF     AM-PAC 6 Clicks Score (OT) 24  -LF       Row Name 01/20/25 1424          Functional Assessment    Outcome Measure Options AM-PAC 6 Clicks Daily Activity (OT);Optimal Instrument  -LF       Row Name 01/20/25 1424          Optimal Instrument    Optimal Instrument Optimal - 3  -LF     Bending/Stooping 1  -LF     Standing 1  -LF     Reaching 1  -LF     From the list, choose the 3 activities you would most like to be able to do without any difficulty Bending/stooping;Standing;Reaching  -LF     Total Score Optimal - 3 3  -LF               User Key  (r) = Recorded By, (t) = Taken By, (c) = Cosigned By      Initials Name Provider Type    Dang Ross OT Occupational Therapist                    Occupational Therapy Education       Title: PT OT SLP Therapies (Done)       Topic: Occupational Therapy (Done)       Point: ADL training (Done)       Description:   Instruct learner(s) on proper safety adaptation and remediation techniques during self care or transfers.   Instruct in proper use of assistive devices.                  Learning Progress Summary            Patient Acceptance, E,TB, VU by  at 1/20/2025 1424                      Point: Precautions (Done)       Description:   Instruct learner(s) on prescribed precautions during self-care and functional transfers.                   Learning Progress Summary            Patient Acceptance, E,TB, VU by  at 1/20/2025 1424                      Point: Body mechanics (Done)       Description:   Instruct learner(s) on proper positioning and spine alignment during self-care, functional mobility activities and/or exercises.                  Learning Progress Summary            Patient Acceptance, E,TB, VU by  at 1/20/2025 1424                                      User Key       Initials Effective Dates Name Provider Type [facility] 06/16/21 -  Dang Muñoz OT Occupational Therapist OT                  OT Recommendation and Plan  Therapy Frequency (OT): evaluation only  Plan of Care Review  Plan of Care Reviewed With: patient  Progress: no change  Outcome Evaluation: Patient does not present with any significant decline in function and does not require inpatient occupational therapy, therefore they will be discharged from services at this time. It is recommended he discharge home when medically able to do so. Patient is aware and agreeable with treatment plan at this time.     Time Calculation:   Evaluation Complexity (OT)  Review Occupational Profile/Medical/Therapy History Complexity: brief/low complexity  Assessment, Occupational Performance/Identification of Deficit Complexity: 1-3 performance deficits  Clinical Decision Making Complexity (OT): problem focused assessment/low complexity  Overall Complexity of Evaluation (OT): low complexity     Time Calculation- OT       Row Name 01/20/25 1424             Time Calculation- OT    OT Received On 01/20/25  -LF         Untimed Charges    OT Eval/Re-eval Minutes 25  -LF         Total Minutes    Untimed Charges Total Minutes 25  -LF       Total Minutes 25  -LF                User Key  (r) = Recorded By, (t) = Taken By, (c) = Cosigned By      Initials Name Provider Type     Dang Muñoz OT Occupational Therapist                  Therapy Charges for Today       Code  Description Service Date Service Provider Modifiers Qty    99682719162 HC OT EVAL LOW COMPLEXITY 2 1/20/2025 Dang Muñoz, DARREN GO 1                 Dang Muñoz OT  1/20/2025

## 2025-01-20 NOTE — PLAN OF CARE
Goal Outcome Evaluation:  Plan of Care Reviewed With: patient        Progress: improving  Outcome Evaluation: no complaints of SOA this shift. Patient states he was unable to sleep and is very anxious. PRN meds given. NARESH loyd remains in place.

## 2025-01-21 ENCOUNTER — ANESTHESIA (OUTPATIENT)
Dept: GASTROENTEROLOGY | Facility: HOSPITAL | Age: 85
End: 2025-01-21
Payer: MEDICARE

## 2025-01-21 LAB
ANION GAP SERPL CALCULATED.3IONS-SCNC: 11.7 MMOL/L (ref 5–15)
BASOPHILS # BLD AUTO: 0.08 10*3/MM3 (ref 0–0.2)
BASOPHILS NFR BLD AUTO: 0.9 % (ref 0–1.5)
BUN SERPL-MCNC: 32 MG/DL (ref 8–23)
BUN/CREAT SERPL: 17.9 (ref 7–25)
CALCIUM SPEC-SCNC: 8.9 MG/DL (ref 8.6–10.5)
CHLORIDE SERPL-SCNC: 104 MMOL/L (ref 98–107)
CO2 SERPL-SCNC: 25.3 MMOL/L (ref 22–29)
CREAT SERPL-MCNC: 1.79 MG/DL (ref 0.76–1.27)
DEPRECATED RDW RBC AUTO: 51.4 FL (ref 37–54)
EGFRCR SERPLBLD CKD-EPI 2021: 37.4 ML/MIN/1.73
EOSINOPHIL # BLD AUTO: 0.26 10*3/MM3 (ref 0–0.4)
EOSINOPHIL NFR BLD AUTO: 3.1 % (ref 0.3–6.2)
ERYTHROCYTE [DISTWIDTH] IN BLOOD BY AUTOMATED COUNT: 15.9 % (ref 12.3–15.4)
GLUCOSE SERPL-MCNC: 99 MG/DL (ref 65–99)
HCT VFR BLD AUTO: 42.3 % (ref 37.5–51)
HGB BLD-MCNC: 13.1 G/DL (ref 13–17.7)
IMM GRANULOCYTES # BLD AUTO: 0.02 10*3/MM3 (ref 0–0.05)
IMM GRANULOCYTES NFR BLD AUTO: 0.2 % (ref 0–0.5)
LYMPHOCYTES # BLD AUTO: 1.79 10*3/MM3 (ref 0.7–3.1)
LYMPHOCYTES NFR BLD AUTO: 21.2 % (ref 19.6–45.3)
MAGNESIUM SERPL-MCNC: 2.1 MG/DL (ref 1.6–2.4)
MCH RBC QN AUTO: 28.2 PG (ref 26.6–33)
MCHC RBC AUTO-ENTMCNC: 31 G/DL (ref 31.5–35.7)
MCV RBC AUTO: 91 FL (ref 79–97)
MONOCYTES # BLD AUTO: 0.94 10*3/MM3 (ref 0.1–0.9)
MONOCYTES NFR BLD AUTO: 11.1 % (ref 5–12)
NEUTROPHILS NFR BLD AUTO: 5.36 10*3/MM3 (ref 1.7–7)
NEUTROPHILS NFR BLD AUTO: 63.5 % (ref 42.7–76)
NRBC BLD AUTO-RTO: 0 /100 WBC (ref 0–0.2)
PHOSPHATE SERPL-MCNC: 3.6 MG/DL (ref 2.5–4.5)
PLATELET # BLD AUTO: 189 10*3/MM3 (ref 140–450)
PMV BLD AUTO: 10.2 FL (ref 6–12)
POTASSIUM SERPL-SCNC: 3.6 MMOL/L (ref 3.5–5.2)
RBC # BLD AUTO: 4.65 10*6/MM3 (ref 4.14–5.8)
SODIUM SERPL-SCNC: 141 MMOL/L (ref 136–145)
WBC NRBC COR # BLD AUTO: 8.45 10*3/MM3 (ref 3.4–10.8)

## 2025-01-21 PROCEDURE — 85025 COMPLETE CBC W/AUTO DIFF WBC: CPT | Performed by: INTERNAL MEDICINE

## 2025-01-21 PROCEDURE — 0DB68ZX EXCISION OF STOMACH, VIA NATURAL OR ARTIFICIAL OPENING ENDOSCOPIC, DIAGNOSTIC: ICD-10-PCS | Performed by: INTERNAL MEDICINE

## 2025-01-21 PROCEDURE — 25010000002 LIDOCAINE HCL (PF) 4 % SOLUTION: Performed by: INTERNAL MEDICINE

## 2025-01-21 PROCEDURE — 43249 ESOPH EGD DILATION <30 MM: CPT | Performed by: INTERNAL MEDICINE

## 2025-01-21 PROCEDURE — 25010000002 LIDOCAINE PF 2% 2 % SOLUTION: Performed by: NURSE ANESTHETIST, CERTIFIED REGISTERED

## 2025-01-21 PROCEDURE — 83735 ASSAY OF MAGNESIUM: CPT | Performed by: INTERNAL MEDICINE

## 2025-01-21 PROCEDURE — 43239 EGD BIOPSY SINGLE/MULTIPLE: CPT | Performed by: INTERNAL MEDICINE

## 2025-01-21 PROCEDURE — 84100 ASSAY OF PHOSPHORUS: CPT | Performed by: INTERNAL MEDICINE

## 2025-01-21 PROCEDURE — 99232 SBSQ HOSP IP/OBS MODERATE 35: CPT | Performed by: INTERNAL MEDICINE

## 2025-01-21 PROCEDURE — 25010000002 PROPOFOL 10 MG/ML EMULSION: Performed by: NURSE ANESTHETIST, CERTIFIED REGISTERED

## 2025-01-21 PROCEDURE — 36415 COLL VENOUS BLD VENIPUNCTURE: CPT | Performed by: INTERNAL MEDICINE

## 2025-01-21 PROCEDURE — 25010000002 CEFTRIAXONE PER 250 MG: Performed by: INTERNAL MEDICINE

## 2025-01-21 PROCEDURE — C1726 CATH, BAL DIL, NON-VASCULAR: HCPCS | Performed by: INTERNAL MEDICINE

## 2025-01-21 PROCEDURE — 88305 TISSUE EXAM BY PATHOLOGIST: CPT | Performed by: INTERNAL MEDICINE

## 2025-01-21 PROCEDURE — 25810000003 SODIUM CHLORIDE 0.9 % SOLUTION: Performed by: NURSE ANESTHETIST, CERTIFIED REGISTERED

## 2025-01-21 PROCEDURE — 80048 BASIC METABOLIC PNL TOTAL CA: CPT | Performed by: INTERNAL MEDICINE

## 2025-01-21 RX ORDER — LIDOCAINE HYDROCHLORIDE 20 MG/ML
INJECTION, SOLUTION EPIDURAL; INFILTRATION; INTRACAUDAL; PERINEURAL AS NEEDED
Status: DISCONTINUED | OUTPATIENT
Start: 2025-01-21 | End: 2025-01-21 | Stop reason: SURG

## 2025-01-21 RX ORDER — PHENYLEPHRINE HCL IN 0.9% NACL 1 MG/10 ML
SYRINGE (ML) INTRAVENOUS AS NEEDED
Status: DISCONTINUED | OUTPATIENT
Start: 2025-01-21 | End: 2025-01-21 | Stop reason: SURG

## 2025-01-21 RX ORDER — SODIUM CHLORIDE 9 MG/ML
INJECTION, SOLUTION INTRAVENOUS CONTINUOUS PRN
Status: DISCONTINUED | OUTPATIENT
Start: 2025-01-21 | End: 2025-01-21 | Stop reason: SURG

## 2025-01-21 RX ORDER — KETAMINE HYDROCHLORIDE 10 MG/ML
INJECTION, SOLUTION INTRAMUSCULAR; INTRAVENOUS AS NEEDED
Status: DISCONTINUED | OUTPATIENT
Start: 2025-01-21 | End: 2025-01-21 | Stop reason: SURG

## 2025-01-21 RX ORDER — LIDOCAINE HYDROCHLORIDE 40 MG/ML
INJECTION, SOLUTION RETROBULBAR AS NEEDED
Status: DISCONTINUED | OUTPATIENT
Start: 2025-01-21 | End: 2025-01-21 | Stop reason: HOSPADM

## 2025-01-21 RX ORDER — PROPOFOL 10 MG/ML
VIAL (ML) INTRAVENOUS AS NEEDED
Status: DISCONTINUED | OUTPATIENT
Start: 2025-01-21 | End: 2025-01-21 | Stop reason: SURG

## 2025-01-21 RX ADMIN — LORAZEPAM 0.5 MG: 0.5 TABLET ORAL at 18:59

## 2025-01-21 RX ADMIN — Medication 100 MCG: at 11:07

## 2025-01-21 RX ADMIN — PANTOPRAZOLE SODIUM 40 MG: 40 INJECTION, POWDER, FOR SOLUTION INTRAVENOUS at 09:29

## 2025-01-21 RX ADMIN — PROPOFOL 20 MG: 10 INJECTION, EMULSION INTRAVENOUS at 11:07

## 2025-01-21 RX ADMIN — TRAZODONE HYDROCHLORIDE 50 MG: 50 TABLET ORAL at 21:22

## 2025-01-21 RX ADMIN — Medication 10 ML: at 09:29

## 2025-01-21 RX ADMIN — ALLOPURINOL 100 MG: 100 TABLET ORAL at 14:12

## 2025-01-21 RX ADMIN — Medication 5 MG: at 21:22

## 2025-01-21 RX ADMIN — TRAZODONE HYDROCHLORIDE 50 MG: 50 TABLET ORAL at 22:59

## 2025-01-21 RX ADMIN — KETAMINE HYDROCHLORIDE 5 MG: 10 INJECTION INTRAMUSCULAR; INTRAVENOUS at 11:10

## 2025-01-21 RX ADMIN — SODIUM CHLORIDE: 9 INJECTION, SOLUTION INTRAVENOUS at 10:58

## 2025-01-21 RX ADMIN — KETAMINE HYDROCHLORIDE 5 MG: 10 INJECTION INTRAMUSCULAR; INTRAVENOUS at 11:08

## 2025-01-21 RX ADMIN — Medication 10 ML: at 21:22

## 2025-01-21 RX ADMIN — ACETAMINOPHEN 650 MG: 325 TABLET ORAL at 15:06

## 2025-01-21 RX ADMIN — SODIUM CHLORIDE 1000 MG: 9 INJECTION INTRAMUSCULAR; INTRAVENOUS; SUBCUTANEOUS at 09:29

## 2025-01-21 RX ADMIN — LIDOCAINE HYDROCHLORIDE 40 MG: 20 INJECTION, SOLUTION INTRAVENOUS at 11:07

## 2025-01-21 RX ADMIN — KETAMINE HYDROCHLORIDE 5 MG: 10 INJECTION INTRAMUSCULAR; INTRAVENOUS at 11:07

## 2025-01-21 NOTE — ANESTHESIA POSTPROCEDURE EVALUATION
Patient: Javi Martínez    Procedure Summary       Date: 01/21/25 Room / Location: [facility] ENDOSCOPY 3 / [facility] ENDOSCOPY    Anesthesia Start: 1058 Anesthesia Stop: 1122    Procedure: ESOPHAGOGASTRODUODENOSCOPY WITH BIOPSIES, POLYPECTOMY, BALLOON DILATION 18-20mm Diagnosis:       Other dysphagia      (Other dysphagia [R13.19])    Surgeons: Simona Garcia MD Provider: Brooke Little CRNA    Anesthesia Type: general ASA Status: 4            Anesthesia Type: general    Vitals  Vitals Value Taken Time   /79 01/21/25 1141   Temp 36.4 °C (97.6 °F) 01/21/25 1120   Pulse 71 01/21/25 1144   Resp 22 01/21/25 1135   SpO2 90 % 01/21/25 1144   Vitals shown include unfiled device data.        Post Anesthesia Care and Evaluation    Post-procedure mental status: acceptable.  Pain management: satisfactory to patient    Airway patency: patent  Anesthetic complications: No anesthetic complications    Cardiovascular status: acceptable  Respiratory status: acceptable, spontaneous ventilation and room air    Comments: Per chart review

## 2025-01-21 NOTE — PLAN OF CARE
Goal Outcome Evaluation:  Plan of Care Reviewed With: patient        Progress: no change  Outcome Evaluation: VSS, remains on room air. C/o SOA once this shift, oxygen sats >94% on room air. patient stated he felt anxious at this time, orders for PRN ativan BID placed and given with improvement. Encouraged up to chair and ambulation in halls, patient refused this shift, states he is tired and feeling unrested from previous night. Trazadone added for sleep. NPO midnight for EGD 1/21/25. Will CTM.

## 2025-01-21 NOTE — PROGRESS NOTES
[facility]   Hospitalist Progress Note  Date: 2025  Patient Name: Javi Martínez  : 1940  MRN: [id number]  Date of admission: 2025      Subjective   Subjective     Chief Complaint: Follow up for shortness of breath    Summary: 84 y.o. 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  Admitted for CHF exacerbation, CAUTI (urology assisted with loyd replacement was able to do at bedside) and concerns for possible upper GI bleed and stricture.  Pending EGD on .     Interval Followup:   Seen following EGD.  No acute issues  Blood pressure soft but stable  No lightheadedness or dizziness  No respiratory complaint  No chest pain or palpitation    Objective   Objective     Vitals:   Temp:  [97.2 °F (36.2 °C)-97.9 °F (36.6 °C)] 97.9 °F (36.6 °C)  Heart Rate:  [73-77] 77  Resp:  [16-22] 18  BP: (108-118)/(61-78) 110/72  Physical Exam    Constitutional: conversant, NAD   Respiratory:  nonlabored respirations    Cardiovascular:  RRR, no edema   Gastrointestinal: soft, nondistended   Neurologic: Alert, speech clear   Skin: Extremities warm    Result Review    Result Review:  I have personally reviewed the following over the last 24 hours (07:00 to 07:00) and agree with the following findings  [x]  Laboratory  CBC          2025    03:22 2025    03:25 2025    03:34   CBC   WBC 9.46  10.23  8.45    RBC 4.89  4.74  4.65    Hemoglobin 13.7  13.6  13.1    Hematocrit 44.5  42.9  42.3    MCV 91.0  90.5  91.0    MCH 28.0  28.7  28.2    MCHC 30.8  31.7  31.0    RDW 15.5  15.9  15.9    Platelets 185  202  189      BMP          2025    03:22 2025    03:25 2025    03:34   BMP   BUN 30  32  32    Creatinine 1.76  1.73  1.79    Sodium 135  139  141    Potassium 3.9  4.1  3.6    Chloride 98  102  104     CO2 25.4  26.0  25.3    Calcium 8.9  9.0  8.9      [x]  Microbiology  [x]  Radiology   [x]  EKG/Telemetry monitor personally reviewed and independently interpreted: V paced  [x]  Cardiology/Vascular   TTE  Left Ventricle Left ventricular ejection fraction appears to be 21 - 25%.     Normal left ventricular wall thickness noted. The left ventricular cavity is severely dilated. There is left ventricular global hypokinesis noted. Left ventricular diastolic function was indeterminate.   Right Ventricle Normal right ventricular cavity size, wall thickness and systolic function noted. Electronic lead present in the ventricle.   Left Atrium The left atrial cavity is moderate to severely dilated.   Right Atrium The right atrial cavity is mildly dilated.  The inferior vena cava is dilated.   Aortic Valve The aortic valve is abnormal in structure. The aortic valve exhibits sclerosis. Mild aortic valve regurgitation is present. No hemodynamically significant aortic valve stenosis is present.   Mitral Valve The mitral valve is grossly normal in structure. Moderate mitral valve regurgitation is present. No significant mitral valve stenosis is present.   Tricuspid Valve The tricuspid valve is grossly normal in structure. Mild to moderate tricuspid valve regurgitation is present. Estimated right ventricular systolic pressure from tricuspid regurgitation is moderately elevated (45-55 mmHg). No evidence of significant tricuspid valve stenosis is present.   Pulmonic Valve The pulmonic valve is not well visualized. There is mild pulmonic valve regurgitation present. There is no pulmonic valve stenosis present.   Pericardium There is no evidence of pericardial effusion. .     []  Pathology  []  Old records  [x]  Other:    Intake/Output Summary (Last 24 hours) at 1/21/2025 1230  Last data filed at 1/21/2025 0201  Gross per 24 hour   Intake 480 ml   Output 1650 ml   Net -1170 ml         Assessment & Plan   Assessment / Plan      Assessment/Plan:  Dysphagia secondary to Schatzki ring  Gastric polyp  Acute on chronic systolic CHF   CAUTI, present on admission, secondary to e coli   Urinary retention/stricture with chronic indwelling catheter  History of prostate cancer, BPH  Black stool, question UGIB   A-fib on Eliquis  CKD 3 creatinine variable ~1.6-1.8  Hx of HTN  Gout   GERD        Appreciate gastroenterology assistance  Status post EGD which showed hiatal hernia Schatzki ring s/p dilatation.    GI to arrange outpatient follow-up for motility study if dysphagia persist  Pathology from gastric polyp biopsy pending  Stop IV Protonix  Hemoglobin 13.1.Okay to restart Eliquis tomorrow from GI standpoint  Creatinine at baseline.  Urology recommendations reviewed.  Maintain indwelling Diamond catheter  (exchanged on admission) until urology follow-up.   Appears euvolemic.  No respiratory complaints.  On room air. Systolic blood pressures staying in the 100-120; ARB on hold  Restart Jardiance  Urine culture results noted.  Susceptibilities pending.  Blood cultures no growth.  Continue Rocephin to complete 7 days  Continue allopurinol  CBC, BMP in a.m.    Discussed plan with RN.    VTE Prophylaxis:  Mechanical VTE prophylaxis orders are present.        CODE STATUS:   Medical Intervention Limits: No intubation (DNI); No cardioversion  Level Of Support Discussed With: Patient  Code Status (Patient has no pulse and is not breathing): No CPR (Do Not Attempt to Resuscitate)  Medical Interventions (Patient has pulse or is breathing): Limited Support    Electronically signed by Farhad Gomez DO, 01/21/25, 12:34 PM EST.

## 2025-01-21 NOTE — PROGRESS NOTES
[facility]  Inpatient Progress Note    Reason for Follow Up:  dysphagia    Subjective     Interval History:   Pt presents to endoscopy for further evaluation of dysphagia.  He denies any issues over night.    Current Facility-Administered Medications:     acetaminophen (TYLENOL) tablet 650 mg, 650 mg, Oral, Q6H PRN, Brady Hernandez MD    allopurinol (ZYLOPRIM) tablet 100 mg, 100 mg, Oral, Daily, Brady Hernandez MD, 100 mg at 01/20/25 1017    aluminum-magnesium hydroxide-simethicone (MAALOX MAX) 400-400-40 MG/5ML suspension 15 mL, 15 mL, Oral, Q6H PRN, Brady Hernandez MD    cefTRIAXone (ROCEPHIN) in NS 1 gram/10ml IV PUSH syringe, 1,000 mg, Intravenous, Q24H, Brady Hernandez MD, 1,000 mg at 01/21/25 0929    hydrOXYzine (ATARAX) tablet 25 mg, 25 mg, Oral, TID PRN, Brady Hernandez MD, 25 mg at 01/20/25 0127    Lidocaine 4 % 1 patch, 1 patch, Transdermal, Daily PRN, Brady Hernandez MD    LORazepam (ATIVAN) tablet 0.5 mg, 0.5 mg, Oral, BID PRN, Brady Hernandez MD, 0.5 mg at 01/20/25 2152    melatonin tablet 5 mg, 5 mg, Oral, Nightly, Brady Hernandez MD, 5 mg at 01/20/25 2019    mineral oil-hydrophilic petrolatum (AQUAPHOR) ointment 1 Application, 1 Application, Topical, BID, Brady Hernandez MD, 1 Application at 01/19/25 2055    ondansetron (ZOFRAN) injection 4 mg, 4 mg, Intravenous, Q4H PRN, Brady Hernandez MD    pantoprazole (PROTONIX) injection 40 mg, 40 mg, Intravenous, Q12H, Brady Hernandez MD, 40 mg at 01/21/25 0929    polyethylene glycol (MIRALAX) packet 17 g, 17 g, Oral, BID PRN, Brady Hernandez MD    prochlorperazine (COMPAZINE) injection 5 mg, 5 mg, Intravenous, Q6H PRN, Brady Hernandez MD, 5 mg at 01/18/25 2041    sodium chloride 0.9 % flush 10 mL, 10 mL, Intravenous, Q12H, Brady Hernandez MD, 10 mL at 01/21/25 0929    sodium chloride 0.9 % flush 10 mL, 10 mL, Intravenous, PRN, Brady Hernandez MD    sodium chloride 0.9 % infusion 40 mL, 40 mL, Intravenous, PRN, Brady Hernandez MD     traZODone (DESYREL) tablet 50 mg, 50 mg, Oral, Nightly PRN, Brady Hernandez MD    traZODone (DESYREL) tablet 50 mg, 50 mg, Oral, Nightly, Brady Hernandez MD, 50 mg at 01/20/25 2019    [Held by provider] valsartan (DIOVAN) tablet 40 mg, 40 mg, Oral, Q24H, Brady Hernandez MD  Review of Systems:    The following systems were reviewed and negative;  constitution, respiratory, and cardiovascular    Objective     Vital Signs  Temp:  [97.2 °F (36.2 °C)-97.9 °F (36.6 °C)] 97.2 °F (36.2 °C)  Heart Rate:  [73-77] 73  Resp:  [16-20] 20  BP: (108-118)/(61-86) 116/86  Body mass index is 20.8 kg/m².    Intake/Output Summary (Last 24 hours) at 1/21/2025 1049  Last data filed at 1/21/2025 0201  Gross per 24 hour   Intake 480 ml   Output 1650 ml   Net -1170 ml     No intake/output data recorded.     Physical Exam:   General: awake, alert and in no acute distress   Eyes: eyes move symmetrical in all directions, no scleral icterus   Skin: warm and dry, not jaundiced   Cardiovascular: no chest tenderness   Pulm: breathing unlabored   Abdomen: soft, nontender, nondistended   Psychiatric: mental status within normal limits     Results Review:     I reviewed the patient's new clinical results.    Results from last 7 days   Lab Units 01/21/25 0334 01/20/25 0325 01/19/25  0322   WBC 10*3/mm3 8.45 10.23 9.46   HEMOGLOBIN g/dL 13.1 13.6 13.7   HEMATOCRIT % 42.3 42.9 44.5   PLATELETS 10*3/mm3 189 202 185     Results from last 7 days   Lab Units 01/21/25  0334 01/20/25  0325 01/19/25  0322 01/18/25  1035   SODIUM mmol/L 141 139 135* 140   POTASSIUM mmol/L 3.6 4.1 3.9 4.3   CHLORIDE mmol/L 104 102 98 99   CO2 mmol/L 25.3 26.0 25.4 26.6   BUN mg/dL 32* 32* 30* 32*   CREATININE mg/dL 1.79* 1.73* 1.76* 1.88*   CALCIUM mg/dL 8.9 9.0 8.9 9.8   BILIRUBIN mg/dL  --   --   --  1.7*   ALK PHOS U/L  --   --   --  137*   ALT (SGPT) U/L  --   --   --  20   AST (SGOT) U/L  --   --   --  23   GLUCOSE mg/dL 99 116* 96 136*         Lab Results   Lab Value  Date/Time    LIPASE 37 11/06/2024 1549    LIPASE 29 07/22/2024 1220    LIPASE 27 04/24/2024 1135    LIPASE 29 09/02/2023 0220         Assessment & Plan   Assessment:     Dysphagia    Plan:     -Will proceed with EGD as planned  -Benefits versus risks of procedure were discussed with patient; risks include but are not limited to bleeding, infection, perforation, and risk of sedation  -Patient understands risks and agrees to proceed  -Patient understands and agrees DNR will temporarily be suspended during time of procedure and immediate period after.  DNR will subsequently be reinstated after recovery completed.    I discussed the patients findings and my recommendations with patient.         Simona Garcia M.D.  [place] [address]  [place]  [id number]  Office: [phone]

## 2025-01-21 NOTE — PLAN OF CARE
Goal Outcome Evaluation:  Plan of Care Reviewed With: patient        Progress: improving  Outcome Evaluation: PRN anxiety medicine given x 1. patient was able to rest throughout shift. no complaints of SOA. NPO for EGD today.

## 2025-01-21 NOTE — ANESTHESIA PREPROCEDURE EVALUATION
Anesthesia Evaluation                  Airway   Mallampati: I  TM distance: >3 FB  No difficulty expected  Dental - normal exam     Pulmonary     breath sounds clear to auscultation  (+) ,decreased breath sounds  Cardiovascular     PT is on anticoagulation therapy  Rhythm: irregular  Rate: normal    (+) hypertension, CABG (triple bypass 2006) >6 Months, dysrhythmias Atrial Fib, CHF       Neuro/Psych  GI/Hepatic/Renal/Endo    (+) renal disease- CRI    Musculoskeletal     Abdominal    Substance History      OB/GYN          Other      History of cancer: prostate CA.  ROS/Med Hx Other: ECHO Jan 2025  ·  Left ventricular ejection fraction appears to be 21 - 25%.  ·  The left ventricular cavity is severely dilated.  ·  Left ventricular diastolic function was indeterminate.  ·  The left atrial cavity is moderate to severely dilated.  ·  The right atrial cavity is mildly  dilated.  ·  Moderate mitral valve regurgitation is present.  ·  Estimated right ventricular systolic pressure from tricuspid regurgitation is moderately elevated (45-55 mmHg).  ·  Aortic root measures 4.1 cm.  Ascending aorta measures 4.8 cm.  ·  Pleural effusion noted.  EKG   ABNORMAL ECG -  Afib/flut and V-paced complexes  No further analysis attempted due to paced rhythm  Date and Time of Study:2025-01-18 10:31:43      ·  No significant change from echo done 7/2024.                      Anesthesia Plan    ASA 4     general     (Total IV Anesthesia    Patient understands anesthesia not responsible for dental damage.  )  intravenous induction     Anesthetic plan, risks, benefits, and alternatives have been provided, discussed and informed consent has been obtained with: patient.  Pre-procedure education provided  Use of blood products discussed with patient  Consented to blood products.    Plan discussed with CRNA.        CODE STATUS:    Medical Intervention Limits: No intubation (DNI); No cardioversion  Level Of Support Discussed With: Patient  Code  Status (Patient has no pulse and is not breathing): No CPR (Do Not Attempt to Resuscitate)  Medical Interventions (Patient has pulse or is breathing): Limited Support

## 2025-01-21 NOTE — PROGRESS NOTES
[facility]     Progress Note    Patient Name: Javi Martínez  : 1940  MRN: [id number]  Primary Care Physician:  Rani Lopez APRN  Date of admission: 2025    Subjective   Subjective     Chief Complaint: Patient is sleeping soundly    Weakness - Generalized  Symptoms include nausea.    Nausea  Symptoms include nausea.      Patient Reports patient was admitted to the emergency room because of shortness of breath and fatigue he was noted to have significant urinary tract infection and a Diamond catheter that was in place for over 2-1/2 months.  He had had DVIU by Dr. Walker in the past for bulbar urethral stricture.  He has a history of brachytherapy and external beam radiation therapy for prostate cancer.  I changed his Diamond catheter in the emergency room at the time of admission and today is draining well.    Review of Systems   Gastrointestinal:  Positive for nausea.       Objective   Objective     Vitals:   Temp:  [97.2 °F (36.2 °C)-97.9 °F (36.6 °C)] 97.9 °F (36.6 °C)  Heart Rate:  [73-77] 77  Resp:  [16-22] 18  BP: (108-118)/(61-78) 110/72    Physical Exam   Afebrile vital signs are stable  Urine is clear yellow  Result Review    Result Review:  I have personally reviewed the results from the time of this admission to 2025 08:41 EST and agree with these findings:  [x]  Laboratory list / accordion  []  Microbiology  []  Radiology  []  EKG/Telemetry   []  Cardiology/Vascular   []  Pathology  []  Old records  []  Other:  Most notable findings include: Stable      Assessment & Plan   Assessment / Plan     Brief Patient Summary:  Javi Martínez is a 84 y.o. male who urinary tract infection    Active Hospital Problems:  Active Hospital Problems    Diagnosis     **Acute exacerbation of CHF (congestive heart failure)     Other dysphagia      Plan:   Patient has a Diamond catheter that is draining very well continue with Diamond catheter follow-up with Dr. Walker as outpatient.    VTE  Prophylaxis:  Mechanical VTE prophylaxis orders are present.        CODE STATUS:    Medical Intervention Limits: No intubation (DNI); No cardioversion  Level Of Support Discussed With: Patient  Code Status (Patient has no pulse and is not breathing): No CPR (Do Not Attempt to Resuscitate)  Medical Interventions (Patient has pulse or is breathing): Limited Support    Disposition:  I expect patient to be discharged home.    Donn Garcia MD

## 2025-01-21 NOTE — PLAN OF CARE
Goal Outcome Evaluation:  Plan of Care Reviewed With: patient        Progress: improving  Outcome Evaluation: Pt pleasant and compliant with care. Pt's VSS. Pt had EGD done today. Pt started on diet this afternoon, pt has tolerated it well. Pt's pain treated per MAR. Pt can ambulate short distances with use of cane and x1 assist. Pt's loyd catheter patent and draining, clear yellow urine noted. Pt alert and able to make needs known. Call light in reach and bed in lowest locked position. Pt has had no additional complaints so far.

## 2025-01-22 ENCOUNTER — APPOINTMENT (OUTPATIENT)
Dept: GENERAL RADIOLOGY | Facility: HOSPITAL | Age: 85
DRG: 291 | End: 2025-01-22
Payer: MEDICARE

## 2025-01-22 LAB
ANION GAP SERPL CALCULATED.3IONS-SCNC: 9.7 MMOL/L (ref 5–15)
BACTERIA SPEC AEROBE CULT: ABNORMAL
BUN SERPL-MCNC: 32 MG/DL (ref 8–23)
BUN/CREAT SERPL: 19.6 (ref 7–25)
CALCIUM SPEC-SCNC: 8.9 MG/DL (ref 8.6–10.5)
CHLORIDE SERPL-SCNC: 103 MMOL/L (ref 98–107)
CO2 SERPL-SCNC: 25.3 MMOL/L (ref 22–29)
CREAT SERPL-MCNC: 1.63 MG/DL (ref 0.76–1.27)
CYTO UR: NORMAL
DEPRECATED RDW RBC AUTO: 51.3 FL (ref 37–54)
EGFRCR SERPLBLD CKD-EPI 2021: 41.8 ML/MIN/1.73
ERYTHROCYTE [DISTWIDTH] IN BLOOD BY AUTOMATED COUNT: 15.9 % (ref 12.3–15.4)
GLUCOSE SERPL-MCNC: 139 MG/DL (ref 65–99)
HCT VFR BLD AUTO: 45.9 % (ref 37.5–51)
HGB BLD-MCNC: 14.3 G/DL (ref 13–17.7)
LAB AP CASE REPORT: NORMAL
LAB AP CLINICAL INFORMATION: NORMAL
MCH RBC QN AUTO: 28.7 PG (ref 26.6–33)
MCHC RBC AUTO-ENTMCNC: 31.2 G/DL (ref 31.5–35.7)
MCV RBC AUTO: 92 FL (ref 79–97)
PATH REPORT.FINAL DX SPEC: NORMAL
PATH REPORT.GROSS SPEC: NORMAL
PLATELET # BLD AUTO: 176 10*3/MM3 (ref 140–450)
PMV BLD AUTO: 10.3 FL (ref 6–12)
POTASSIUM SERPL-SCNC: 4.1 MMOL/L (ref 3.5–5.2)
RBC # BLD AUTO: 4.99 10*6/MM3 (ref 4.14–5.8)
SODIUM SERPL-SCNC: 138 MMOL/L (ref 136–145)
WBC NRBC COR # BLD AUTO: 9.46 10*3/MM3 (ref 3.4–10.8)

## 2025-01-22 PROCEDURE — 80048 BASIC METABOLIC PNL TOTAL CA: CPT | Performed by: INTERNAL MEDICINE

## 2025-01-22 PROCEDURE — 25010000002 CEFTRIAXONE PER 250 MG: Performed by: INTERNAL MEDICINE

## 2025-01-22 PROCEDURE — 71046 X-RAY EXAM CHEST 2 VIEWS: CPT

## 2025-01-22 PROCEDURE — 85027 COMPLETE CBC AUTOMATED: CPT | Performed by: INTERNAL MEDICINE

## 2025-01-22 PROCEDURE — 25010000002 FUROSEMIDE PER 20 MG: Performed by: INTERNAL MEDICINE

## 2025-01-22 PROCEDURE — 99233 SBSQ HOSP IP/OBS HIGH 50: CPT | Performed by: INTERNAL MEDICINE

## 2025-01-22 PROCEDURE — 36415 COLL VENOUS BLD VENIPUNCTURE: CPT | Performed by: INTERNAL MEDICINE

## 2025-01-22 RX ORDER — FUROSEMIDE 10 MG/ML
40 INJECTION INTRAMUSCULAR; INTRAVENOUS ONCE
Status: COMPLETED | OUTPATIENT
Start: 2025-01-22 | End: 2025-01-22

## 2025-01-22 RX ADMIN — EMPAGLIFLOZIN 10 MG: 10 TABLET, FILM COATED ORAL at 08:12

## 2025-01-22 RX ADMIN — APIXABAN 2.5 MG: 2.5 TABLET, FILM COATED ORAL at 08:38

## 2025-01-22 RX ADMIN — LORAZEPAM 0.5 MG: 0.5 TABLET ORAL at 00:22

## 2025-01-22 RX ADMIN — HYDROXYZINE HYDROCHLORIDE 25 MG: 25 TABLET ORAL at 08:12

## 2025-01-22 RX ADMIN — APIXABAN 2.5 MG: 2.5 TABLET, FILM COATED ORAL at 20:43

## 2025-01-22 RX ADMIN — LORAZEPAM 0.5 MG: 0.5 TABLET ORAL at 12:50

## 2025-01-22 RX ADMIN — LORAZEPAM 0.5 MG: 0.5 TABLET ORAL at 20:56

## 2025-01-22 RX ADMIN — TRAZODONE HYDROCHLORIDE 50 MG: 50 TABLET ORAL at 20:43

## 2025-01-22 RX ADMIN — SODIUM CHLORIDE 1000 MG: 9 INJECTION INTRAMUSCULAR; INTRAVENOUS; SUBCUTANEOUS at 08:11

## 2025-01-22 RX ADMIN — Medication 10 ML: at 20:43

## 2025-01-22 RX ADMIN — Medication 5 MG: at 20:43

## 2025-01-22 RX ADMIN — Medication 10 ML: at 08:12

## 2025-01-22 RX ADMIN — FUROSEMIDE 40 MG: 10 INJECTION, SOLUTION INTRAMUSCULAR; INTRAVENOUS at 12:46

## 2025-01-22 RX ADMIN — ALLOPURINOL 100 MG: 100 TABLET ORAL at 08:12

## 2025-01-22 NOTE — PLAN OF CARE
Goal Outcome Evaluation:           Progress: no change  Outcome Evaluation: No acute events overnight, VSS, medicated for sleep and anxiety per MAR. Diamond intact with clear yellow urine. Possible DC home today.Evelin Lipscomb RN

## 2025-01-22 NOTE — PROGRESS NOTES
[facility]   Hospitalist Progress Note  Date: 2025  Patient Name: Javi Martínez  : 1940  MRN: [id number]  Date of admission: 2025      Subjective   Subjective     Chief Complaint: Follow up for shortness of breath    Summary: 84 y.o. 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  Admitted for CHF exacerbation, CAUTI (urology assisted with loyd replacement was able to do at bedside) and concerns for possible upper GI bleed and stricture.  S/p EGD on  showed hiatal hernia and Schatzki ring s/p dilatation.      Interval Followup:   Afebrile.  Blood pressure 107/68 prior to morning meds  Felt very short of breath walking to the breath  Did have conversational dyspnea when I saw  No chest pain or palpitation  Remained on room air    Objective   Objective     Vitals:   Temp:  [97.3 °F (36.3 °C)-98.1 °F (36.7 °C)] 97.3 °F (36.3 °C)  Heart Rate:  [70-78] 70  Resp:  [16-24] 24  BP: (105-115)/(67-78) 108/78  Physical Exam    Constitutional: conversant, in mild distress   Respiratory: On room air.  Increased work of breathing   Cardiovascular:  RRR, no edema   Gastrointestinal: soft, nondistended   Neurologic: Alert, speech clear   Skin: Extremities warm    Result Review    Result Review:  I have personally reviewed the following over the last 24 hours (07:00 to 07:00) and agree with the following findings  [x]  Laboratory  CBC          2025    03:25 2025    03:34 2025    05:40   CBC   WBC 10.23  8.45  9.46    RBC 4.74  4.65  4.99    Hemoglobin 13.6  13.1  14.3    Hematocrit 42.9  42.3  45.9    MCV 90.5  91.0  92.0    MCH 28.7  28.2  28.7    MCHC 31.7  31.0  31.2    RDW 15.9  15.9  15.9    Platelets 202  189  176      BMP          2025    03:25 2025    03:34 2025    05:40   BMP   BUN  32  32  32    Creatinine 1.73  1.79  1.63    Sodium 139  141  138    Potassium 4.1  3.6  4.1    Chloride 102  104  103    CO2 26.0  25.3  25.3    Calcium 9.0  8.9  8.9      [x]  Microbiology  [x]  Radiology   [x]  EKG/Telemetry monitor personally reviewed and independently interpreted: V paced  [x]  Cardiology/Vascular   TTE  Left Ventricle Left ventricular ejection fraction appears to be 21 - 25%.     Normal left ventricular wall thickness noted. The left ventricular cavity is severely dilated. There is left ventricular global hypokinesis noted. Left ventricular diastolic function was indeterminate.   Right Ventricle Normal right ventricular cavity size, wall thickness and systolic function noted. Electronic lead present in the ventricle.   Left Atrium The left atrial cavity is moderate to severely dilated.   Right Atrium The right atrial cavity is mildly dilated.  The inferior vena cava is dilated.   Aortic Valve The aortic valve is abnormal in structure. The aortic valve exhibits sclerosis. Mild aortic valve regurgitation is present. No hemodynamically significant aortic valve stenosis is present.   Mitral Valve The mitral valve is grossly normal in structure. Moderate mitral valve regurgitation is present. No significant mitral valve stenosis is present.   Tricuspid Valve The tricuspid valve is grossly normal in structure. Mild to moderate tricuspid valve regurgitation is present. Estimated right ventricular systolic pressure from tricuspid regurgitation is moderately elevated (45-55 mmHg). No evidence of significant tricuspid valve stenosis is present.   Pulmonic Valve The pulmonic valve is not well visualized. There is mild pulmonic valve regurgitation present. There is no pulmonic valve stenosis present.   Pericardium There is no evidence of pericardial effusion. .     []  Pathology  []  Old records  [x]  Other:    Intake/Output Summary (Last 24 hours) at 1/22/2025 1232  Last data filed at 1/22/2025  0153  Gross per 24 hour   Intake 660 ml   Output 700 ml   Net -40 ml         Assessment & Plan   Assessment / Plan     Assessment/Plan:  Dysphagia secondary to Schatzki ring  Gastric polyp  Acute on chronic systolic CHF   Therapeutic drug monitoring  Bilateral pleural effusion  CAUTI, present on admission, secondary to Ecoli  Urinary retention/stricture with chronic indwelling catheter  History of prostate cancer, BPH  Black stool, question UGIB   A-fib on Eliquis  CKD 3 creatinine variable ~1.6-1.8  Hx of HTN  Gout   GERD        Appreciate gastroenterology assistance. Status post EGD which showed hiatal hernia Schatzki ring s/p dilatation.   Pathology from gastric polyp biopsy negative for metaplasia, dysplasia or malignancy.  No H. Pylori seen  Tolerating regular diet. GI to arrange outpatient follow-up for motility study if dysphagia persist  Tachypneic, dyspneic with exertion.  On room air.  Recent proBNP 14,000, two-view chest x-ray reviewed findings of pulmonary edema/bilateral pleural effusion. Give IV Lasix 40 mg x 1. Trend renal function and electrolytes  Remains on room air, monitor spot SpO2, goal >90%  Blood pressure soft, holding ARB  Continue Jardiance  Hemoglobin 14.3. Restart Eliquis 2.5 mg every 12 hours for stroke prevention  Urology recommendations reviewed.  Maintain indwelling Diamond catheter  (exchanged on admission) until urology follow-up.   Urine culture results noted.  Susceptibilities pending.  Blood cultures no growth.  Afebrile, white count normal.  Initial Pro-Allen 0.14 with repeat 0.13.  On Rocephin to complete 7 days  Continue allopurinol  CBC, BMP in a.m.    Discussed plan with RN.    VTE Prophylaxis:  Pharmacologic & mechanical VTE prophylaxis orders are present.      CODE STATUS:   Medical Intervention Limits: No intubation (DNI); No cardioversion  Level Of Support Discussed With: Patient  Code Status (Patient has no pulse and is not breathing): No CPR (Do Not Attempt to  Resuscitate)  Medical Interventions (Patient has pulse or is breathing): Limited Support    Electronically signed by Farhad Gomez DO, 01/22/25, 12:40 PM EST.

## 2025-01-23 ENCOUNTER — READMISSION MANAGEMENT (OUTPATIENT)
Dept: CALL CENTER | Facility: HOSPITAL | Age: 85
End: 2025-01-23
Payer: MEDICARE

## 2025-01-23 VITALS
HEART RATE: 70 BPM | DIASTOLIC BLOOD PRESSURE: 69 MMHG | HEIGHT: 76 IN | OXYGEN SATURATION: 91 % | RESPIRATION RATE: 18 BRPM | TEMPERATURE: 97.1 F | BODY MASS INDEX: 20.16 KG/M2 | SYSTOLIC BLOOD PRESSURE: 102 MMHG | WEIGHT: 165.57 LBS

## 2025-01-23 LAB
ANION GAP SERPL CALCULATED.3IONS-SCNC: 14 MMOL/L (ref 5–15)
BACTERIA SPEC AEROBE CULT: NORMAL
BACTERIA SPEC AEROBE CULT: NORMAL
BUN SERPL-MCNC: 31 MG/DL (ref 8–23)
BUN/CREAT SERPL: 20 (ref 7–25)
CALCIUM SPEC-SCNC: 9 MG/DL (ref 8.6–10.5)
CHLORIDE SERPL-SCNC: 103 MMOL/L (ref 98–107)
CO2 SERPL-SCNC: 24 MMOL/L (ref 22–29)
CREAT SERPL-MCNC: 1.55 MG/DL (ref 0.76–1.27)
EGFRCR SERPLBLD CKD-EPI 2021: 44.4 ML/MIN/1.73
GLUCOSE SERPL-MCNC: 111 MG/DL (ref 65–99)
POTASSIUM SERPL-SCNC: 4 MMOL/L (ref 3.5–5.2)
SODIUM SERPL-SCNC: 141 MMOL/L (ref 136–145)
WHOLE BLOOD HOLD SPECIMEN: NORMAL

## 2025-01-23 PROCEDURE — 99239 HOSP IP/OBS DSCHRG MGMT >30: CPT | Performed by: INTERNAL MEDICINE

## 2025-01-23 PROCEDURE — 80048 BASIC METABOLIC PNL TOTAL CA: CPT | Performed by: INTERNAL MEDICINE

## 2025-01-23 PROCEDURE — 25010000002 ONDANSETRON PER 1 MG: Performed by: INTERNAL MEDICINE

## 2025-01-23 RX ORDER — FUROSEMIDE 40 MG/1
40 TABLET ORAL DAILY
Start: 2025-01-23

## 2025-01-23 RX ORDER — FUROSEMIDE 40 MG/1
20 TABLET ORAL DAILY
Start: 2025-01-23 | End: 2025-01-23

## 2025-01-23 RX ORDER — FUROSEMIDE 40 MG/1
40 TABLET ORAL DAILY
Status: DISCONTINUED | OUTPATIENT
Start: 2025-01-23 | End: 2025-01-23 | Stop reason: HOSPADM

## 2025-01-23 RX ORDER — TAMSULOSIN HYDROCHLORIDE 0.4 MG/1
1 CAPSULE ORAL DAILY
Start: 2025-01-25

## 2025-01-23 RX ORDER — EMPAGLIFLOZIN 10 MG/1
10 TABLET, FILM COATED ORAL DAILY
Start: 2025-01-23

## 2025-01-23 RX ADMIN — EMPAGLIFLOZIN 10 MG: 10 TABLET, FILM COATED ORAL at 08:49

## 2025-01-23 RX ADMIN — ALLOPURINOL 100 MG: 100 TABLET ORAL at 08:49

## 2025-01-23 RX ADMIN — LORAZEPAM 0.5 MG: 0.5 TABLET ORAL at 08:49

## 2025-01-23 RX ADMIN — ONDANSETRON 4 MG: 2 INJECTION INTRAMUSCULAR; INTRAVENOUS at 07:50

## 2025-01-23 RX ADMIN — APIXABAN 2.5 MG: 2.5 TABLET, FILM COATED ORAL at 08:49

## 2025-01-23 RX ADMIN — FUROSEMIDE 40 MG: 40 TABLET ORAL at 08:49

## 2025-01-23 RX ADMIN — Medication 10 ML: at 08:49

## 2025-01-23 NOTE — PLAN OF CARE
Goal Outcome Evaluation:           Progress: no change  Outcome Evaluation: VSS, rach with good output, medicated for anxiety and sleep per mar, plan to DC home today.Evelin Lipscomb RN

## 2025-01-23 NOTE — PLAN OF CARE
Goal Outcome Evaluation:              VSS. UOP; d/c home with loyd in place. No c/o pain. Discharge home today self care.

## 2025-01-24 ENCOUNTER — TELEPHONE (OUTPATIENT)
Dept: GASTROENTEROLOGY | Facility: CLINIC | Age: 85
End: 2025-01-24
Payer: MEDICARE

## 2025-01-24 NOTE — OUTREACH NOTE
Prep Survey      Flowsheet Row Responses   [facility] patient discharged from? [facility]   Is LACE score < 7 ? No   Eligibility Readm Mgmt   Discharge diagnosis Acute exacerbation of CHF   Does the patient have one of the following disease processes/diagnoses(primary or secondary)? CHF   Does the patient have Home health ordered? No   Is there a DME ordered? No   Prep survey completed? Yes            MAC FREEDMAN - Registered Nurse

## 2025-01-24 NOTE — TELEPHONE ENCOUNTER
----- Message from Simona Garcia sent at 1/23/2025  3:00 PM EST -----  Normal esophagus biopsies.  No H.pylori infection.    Please arrange f/u with NP for dysphagia.

## 2025-01-27 ENCOUNTER — APPOINTMENT (OUTPATIENT)
Dept: GENERAL RADIOLOGY | Facility: HOSPITAL | Age: 85
End: 2025-01-27
Payer: MEDICARE

## 2025-01-27 ENCOUNTER — TELEPHONE (OUTPATIENT)
Dept: UROLOGY | Age: 85
End: 2025-01-27

## 2025-01-27 ENCOUNTER — HOSPITAL ENCOUNTER (EMERGENCY)
Facility: HOSPITAL | Age: 85
Discharge: HOME | End: 2025-01-27
Attending: EMERGENCY MEDICINE | Admitting: EMERGENCY MEDICINE
Payer: MEDICARE

## 2025-01-27 VITALS
WEIGHT: 175.49 LBS | HEART RATE: 73 BPM | TEMPERATURE: 98.2 F | SYSTOLIC BLOOD PRESSURE: 108 MMHG | DIASTOLIC BLOOD PRESSURE: 91 MMHG | RESPIRATION RATE: 16 BRPM | OXYGEN SATURATION: 92 % | HEIGHT: 76 IN | BODY MASS INDEX: 21.37 KG/M2

## 2025-01-27 DIAGNOSIS — I50.9 ACUTE ON CHRONIC CONGESTIVE HEART FAILURE, UNSPECIFIED HEART FAILURE TYPE: ICD-10-CM

## 2025-01-27 DIAGNOSIS — F45.8 ANXIETY HYPERVENTILATION: Primary | ICD-10-CM

## 2025-01-27 DIAGNOSIS — F41.9 ANXIETY HYPERVENTILATION: Primary | ICD-10-CM

## 2025-01-27 LAB
ALBUMIN SERPL-MCNC: 3.5 G/DL (ref 3.5–5.2)
ALBUMIN/GLOB SERPL: 0.9 G/DL
ALP SERPL-CCNC: 125 U/L (ref 39–117)
ALT SERPL W P-5'-P-CCNC: 16 U/L (ref 1–41)
ANION GAP SERPL CALCULATED.3IONS-SCNC: 12 MMOL/L (ref 5–15)
AST SERPL-CCNC: 22 U/L (ref 1–40)
BASOPHILS # BLD AUTO: 0.1 10*3/MM3 (ref 0–0.2)
BASOPHILS NFR BLD AUTO: 1.4 % (ref 0–1.5)
BILIRUB SERPL-MCNC: 1.1 MG/DL (ref 0–1.2)
BUN SERPL-MCNC: 42 MG/DL (ref 8–23)
BUN/CREAT SERPL: 24.6 (ref 7–25)
CALCIUM SPEC-SCNC: 9.4 MG/DL (ref 8.6–10.5)
CHLORIDE SERPL-SCNC: 97 MMOL/L (ref 98–107)
CO2 SERPL-SCNC: 26 MMOL/L (ref 22–29)
CREAT SERPL-MCNC: 1.71 MG/DL (ref 0.76–1.27)
DEPRECATED RDW RBC AUTO: 51.6 FL (ref 37–54)
EGFRCR SERPLBLD CKD-EPI 2021: 39.5 ML/MIN/1.73
EOSINOPHIL # BLD AUTO: 0.12 10*3/MM3 (ref 0–0.4)
EOSINOPHIL NFR BLD AUTO: 1.6 % (ref 0.3–6.2)
ERYTHROCYTE [DISTWIDTH] IN BLOOD BY AUTOMATED COUNT: 15.9 % (ref 12.3–15.4)
GEN 5 1HR TROPONIN T REFLEX: 123 NG/L
GLOBULIN UR ELPH-MCNC: 3.8 GM/DL
GLUCOSE SERPL-MCNC: 119 MG/DL (ref 65–99)
HCT VFR BLD AUTO: 44.2 % (ref 37.5–51)
HGB BLD-MCNC: 13.8 G/DL (ref 13–17.7)
HOLD SPECIMEN: NORMAL
HOLD SPECIMEN: NORMAL
IMM GRANULOCYTES # BLD AUTO: 0.02 10*3/MM3 (ref 0–0.05)
IMM GRANULOCYTES NFR BLD AUTO: 0.3 % (ref 0–0.5)
LYMPHOCYTES # BLD AUTO: 2.09 10*3/MM3 (ref 0.7–3.1)
LYMPHOCYTES NFR BLD AUTO: 28.4 % (ref 19.6–45.3)
MCH RBC QN AUTO: 28.5 PG (ref 26.6–33)
MCHC RBC AUTO-ENTMCNC: 31.2 G/DL (ref 31.5–35.7)
MCV RBC AUTO: 91.3 FL (ref 79–97)
MONOCYTES # BLD AUTO: 0.55 10*3/MM3 (ref 0.1–0.9)
MONOCYTES NFR BLD AUTO: 7.5 % (ref 5–12)
NEUTROPHILS NFR BLD AUTO: 4.49 10*3/MM3 (ref 1.7–7)
NEUTROPHILS NFR BLD AUTO: 60.8 % (ref 42.7–76)
NRBC BLD AUTO-RTO: 0 /100 WBC (ref 0–0.2)
NT-PROBNP SERPL-MCNC: ABNORMAL PG/ML (ref 0–1800)
PLATELET # BLD AUTO: 229 10*3/MM3 (ref 140–450)
PMV BLD AUTO: 10.2 FL (ref 6–12)
POTASSIUM SERPL-SCNC: 4.6 MMOL/L (ref 3.5–5.2)
PROT SERPL-MCNC: 7.3 G/DL (ref 6–8.5)
QT INTERVAL: 548 MS
QTC INTERVAL: 603 MS
RBC # BLD AUTO: 4.84 10*6/MM3 (ref 4.14–5.8)
SODIUM SERPL-SCNC: 135 MMOL/L (ref 136–145)
TROPONIN T % DELTA: -10
TROPONIN T NUMERIC DELTA: -13 NG/L
TROPONIN T SERPL HS-MCNC: 136 NG/L
WBC NRBC COR # BLD AUTO: 7.37 10*3/MM3 (ref 3.4–10.8)
WHOLE BLOOD HOLD COAG: NORMAL
WHOLE BLOOD HOLD SPECIMEN: NORMAL

## 2025-01-27 PROCEDURE — 25010000002 LORAZEPAM PER 2 MG: Performed by: EMERGENCY MEDICINE

## 2025-01-27 PROCEDURE — 96374 THER/PROPH/DIAG INJ IV PUSH: CPT

## 2025-01-27 PROCEDURE — 80053 COMPREHEN METABOLIC PANEL: CPT | Performed by: EMERGENCY MEDICINE

## 2025-01-27 PROCEDURE — 25010000002 FUROSEMIDE PER 20 MG: Performed by: EMERGENCY MEDICINE

## 2025-01-27 PROCEDURE — 84484 ASSAY OF TROPONIN QUANT: CPT | Performed by: EMERGENCY MEDICINE

## 2025-01-27 PROCEDURE — 71045 X-RAY EXAM CHEST 1 VIEW: CPT

## 2025-01-27 PROCEDURE — 93005 ELECTROCARDIOGRAM TRACING: CPT | Performed by: EMERGENCY MEDICINE

## 2025-01-27 PROCEDURE — 83880 ASSAY OF NATRIURETIC PEPTIDE: CPT | Performed by: EMERGENCY MEDICINE

## 2025-01-27 PROCEDURE — 93005 ELECTROCARDIOGRAM TRACING: CPT

## 2025-01-27 PROCEDURE — 96375 TX/PRO/DX INJ NEW DRUG ADDON: CPT

## 2025-01-27 PROCEDURE — 99284 EMERGENCY DEPT VISIT MOD MDM: CPT

## 2025-01-27 PROCEDURE — 85025 COMPLETE CBC W/AUTO DIFF WBC: CPT

## 2025-01-27 PROCEDURE — 36415 COLL VENOUS BLD VENIPUNCTURE: CPT

## 2025-01-27 RX ORDER — SODIUM CHLORIDE 0.9 % (FLUSH) 0.9 %
10 SYRINGE (ML) INJECTION AS NEEDED
Status: DISCONTINUED | OUTPATIENT
Start: 2025-01-27 | End: 2025-01-27 | Stop reason: HOSPADM

## 2025-01-27 RX ORDER — LORAZEPAM 2 MG/ML
0.5 INJECTION INTRAMUSCULAR ONCE
Status: COMPLETED | OUTPATIENT
Start: 2025-01-27 | End: 2025-01-27

## 2025-01-27 RX ORDER — FUROSEMIDE 10 MG/ML
60 INJECTION INTRAMUSCULAR; INTRAVENOUS ONCE
Status: COMPLETED | OUTPATIENT
Start: 2025-01-27 | End: 2025-01-27

## 2025-01-27 RX ADMIN — LORAZEPAM 0.5 MG: 2 INJECTION INTRAMUSCULAR; INTRAVENOUS at 11:11

## 2025-01-27 RX ADMIN — FUROSEMIDE 60 MG: 10 INJECTION, SOLUTION INTRAMUSCULAR; INTRAVENOUS at 11:46

## 2025-01-27 NOTE — TELEPHONE ENCOUNTER
Spoke to Dr. Kerns in the ER and informed per Dr. Bernal that if patient gets admitted to have hospital staff do a void trial to remove catheter once patient is medically stable if patient gets admitted. If patient gets admitted, we will reschedule void trial for office.

## 2025-01-27 NOTE — ED PROVIDER NOTES
Time: 10:24 AM EST  Date of encounter:  1/27/2025  Independent Historian/Clinical History and Information was obtained by:   Patient and Nursing Staff    History is limited by: N/A    Chief Complaint: Numbness of breath all night      History of Present Illness:  Patient is a 84 y.o. year old male history of CHF on Lasix and history of A-fib on Eliquis who presents to the emergency department for evaluation of shortness of breath and unable to sleep due to dyspnea last night.    He states he was unable to find a comfortable position in which to breathe and occasionally had to take deep breaths when he felt short of air last night.    His wife recently passed away and he has been on some as needed lorazepam but he took 0.5 mg lorazepam last night and it did not help much.    He also has CHF and he has been compliant with his Lasix, and denies any significant lower extremity swelling or abdominal swelling or cough with clear sputum.      He denies any chest pain whatsoever.  No recent illness or fevers or productive cough or wheezing.      Patient Care Team  Primary Care Provider: Rani Lopez APRN    Past Medical History:     No Known Allergies  Past Medical History:   Diagnosis Date    Aneurysm 2013    Arthritis     left knee     Atrial fibrillation, persistent     CHB (complete heart block) 06/07/2023    CHF exacerbation 04/24/2024    Chronic obstructive pulmonary disease 09/01/2023    pt denies having this    Difficulty walking 2023    Numbness in right foot    Essential hypertension 05/08/2019    GERD (gastroesophageal reflux disease)     HFrEF (heart failure with reduced ejection fraction) 08/11/2021    Hyperlipidemia 08/11/2021    Paroxysmal atrial fibrillation     Polyneuropathy 06/19/2023    Prostate cancer 2010    with seeds implant     Septic joint 11/26/2021    Sleep apnea      Past Surgical History:   Procedure Laterality Date    ABLATION OF DYSRHYTHMIC FOCUS  2013    APPENDECTOMY      ARTERIAL BYPASS  "SURGERY      CARDIAC ABLATION      x2  \"years ago\"     CARDIAC ELECTROPHYSIOLOGY PROCEDURE N/A 11/01/2021    Procedure: PPM battery change;  Surgeon: Ge Whelan MD;  Location: [facility] CATH INVASIVE LOCATION;  Service: Cardiovascular;  Laterality: N/A;    CARDIAC SURGERY  2006    cabg 3v    COLONOSCOPY      CORONARY ARTERY BYPASS GRAFT  2006    CYSTOSCOPY URETHROTOMY VISUAL INTERNAL N/A 08/15/2024    Procedure: CYSTOSCOPY, URETHRAL DILATION, CATHETER PLACEMENT, RETROGRADE URETHROGRAM;  Surgeon: August Myers MD;  Location: [facility] MAIN OR;  Service: Urology;  Laterality: N/A;    CYSTOSCOPY URETHROTOMY VISUAL INTERNAL N/A 10/24/2024    Procedure: CYSTOSCOPY URETHROTOMY;  Surgeon: Mally Bernal MD;  Location: [facility] MAIN OR;  Service: Urology;  Laterality: N/A;    ENDOSCOPY      with dilation     ENDOSCOPY N/A 1/21/2025    Procedure: ESOPHAGOGASTRODUODENOSCOPY WITH BIOPSIES, POLYPECTOMY, BALLOON DILATION 18-20mm;  Surgeon: Simona Garcia MD;  Location: [facility] ENDOSCOPY;  Service: Gastroenterology;  Laterality: N/A;  GASTRIC POLYPS, SCHATZKI'S RING, HIATAL HERNIA    KNEE ARTHROSCOPY Left     TOTAL KNEE ARTHROPLASTY Left 07/23/2021    Procedure: TOTAL KNEE ARTHROPLASTY WITH DORA NAVIGATION WITH BIOMET;  Surgeon: Carlitos Zhao MD;  Location: [facility] OR;  Service: Orthopedics;  Laterality: Left;    VENTRICULAR CARDIAC PACEMAKER INSERTION      medtronic      Family History   Problem Relation Age of Onset    Heart attack Mother     Heart attack Father     No Known Problems Sister     No Known Problems Brother     No Known Problems Maternal Aunt     No Known Problems Maternal Uncle     No Known Problems Paternal Aunt     No Known Problems Paternal Uncle     No Known Problems Maternal Grandmother     No Known Problems Maternal Grandfather     No Known Problems Paternal Grandmother     No Known Problems Paternal Grandfather     No Known Problems Other        Home Medications:  Prior to Admission " "medications    Medication Sig Start Date End Date Taking? Authorizing Provider   allopurinol (ZYLOPRIM) 100 MG tablet Take 1 tablet by mouth Daily. 3/11/22   Ely Srinivasan MD   Eliquis 2.5 MG tablet tablet Take 1 tablet by mouth Every 12 (Twelve) Hours. 9/3/24   lEy Srinivasan MD   furosemide (LASIX) 40 MG tablet Take 1 tablet by mouth Daily. 25   Farhad Gomez DO   Jardiance 10 MG tablet tablet Take 1 tablet by mouth Daily. 25   Farhad Gomez DO   LORazepam (ATIVAN) 0.5 MG tablet Take 1 tablet by mouth 2 (Two) Times a Day As Needed for Anxiety. 24   Ely Srinivasan MD   melatonin 3 MG tablet Take 1 tablet by mouth At Night As Needed for Sleep.    Ely Srinivasan MD   tamsulosin (FLOMAX) 0.4 MG capsule 24 hr capsule Take 1 capsule by mouth Daily. 25   Farhad Gomez DO   traZODone (DESYREL) 50 MG tablet Take 1 tablet by mouth At Night As Needed for Sleep.    Ely Srinivasan MD   valsartan (DIOVAN) 40 MG tablet Take 1 tablet by mouth Daily. 24   Isabel Navarrete APRN        Social History:   Social History     Tobacco Use    Smoking status: Former     Current packs/day: 0.00     Average packs/day: 0.5 packs/day for 33.6 years (16.8 ttl pk-yrs)     Types: Cigarettes     Start date: 1962     Quit date: 1995     Years since quittin.5     Passive exposure: Never    Smokeless tobacco: Never   Vaping Use    Vaping status: Never Used   Substance Use Topics    Alcohol use: Never    Drug use: Never         Review of Systems:  Review of Systems   I performed a 10 point review of systems which was all negative, except for the positives found in the HPI above.  Physical Exam:  /91 (BP Location: Left arm, Patient Position: Lying)   Pulse 73   Temp 98.2 °F (36.8 °C) (Oral)   Resp 16   Ht 193 cm (76\")   Wt 79.6 kg (175 lb 7.8 oz)   SpO2 92%   BMI 21.36 kg/m²     Physical Exam   General: Awake alert and in mild respiratory distress, seems " somewhat anxious, possible hyperventilation anxiety    HEENT: Head normocephalic atraumatic, eyes PERRLA EOMI, nose normal, oropharynx normal.    Neck: Supple full range of motion, no meningismus, no lymphadenopathy, no JVD    Heart: Regular rate and rhythm, no murmurs or rubs, 2+ radial pulses bilaterally    Lungs: Tachypnea noted at times, Clear to auscultation bilaterally without wheezes or crackles, mild respiratory distress and tachypnea noted    Abdomen: Soft, nontender, nondistended, no rebound or guarding    Skin: Warm, dry, no rash    Musculoskeletal: Normal range of motion, no lower extremity edema    Neurologic: Oriented x3, no motor deficits no sensory deficits    Psychiatric: Mood appears anxious, somewhat tearful, no psychosis            Medical Decision Making:      Comorbidities that affect care:    Congestive Heart Failure    External Notes reviewed:    None      The following orders were placed and all results were independently analyzed by me:  Orders Placed This Encounter   Procedures    XR Chest 1 View    [place] Draw    Comprehensive Metabolic Panel    BNP    High Sensitivity Troponin T    CBC Auto Differential    High Sensitivity Troponin T 1Hr    Undress & Gown    Continuous Pulse Oximetry    Vital Signs    ECG 12 Lead ED Triage Standing Order; SOA    CBC & Differential    Green Top (Gel)    Lavender Top    Gold Top - SST    Light Blue Top       Medications Given in the Emergency Department:  Medications   furosemide (LASIX) injection 60 mg (60 mg Intravenous Given 1/27/25 1146)   LORazepam (ATIVAN) injection 0.5 mg (0.5 mg Intravenous Given 1/27/25 1111)        ED Course:    ED Course as of 01/27/25 1540   Mon Jan 27, 2025   0958 EKG: I interpreted his twelve-lead EKG as ventricular paced rhythm with PVCs noted, no STEMI. [VS]      ED Course User Index  [VS] Sarbjit Kerns MD       Labs:    Lab Results (last 24 hours)       Procedure Component Value Units Date/Time    CBC & Differential  [[id number]]  (Abnormal) Collected: 01/27/25 0902    Specimen: Blood Updated: 01/27/25 0912    Narrative:      The following orders were created for panel order CBC & Differential.  Procedure                               Abnormality         Status                     ---------                               -----------         ------                     CBC Auto Differential[[id number]]        Abnormal            Final result                 Please view results for these tests on the individual orders.    Comprehensive Metabolic Panel [[id number]]  (Abnormal) Collected: 01/27/25 0902    Specimen: Blood Updated: 01/27/25 0928     Glucose 119 mg/dL      BUN 42 mg/dL      Creatinine 1.71 mg/dL      Sodium 135 mmol/L      Potassium 4.6 mmol/L      Chloride 97 mmol/L      CO2 26.0 mmol/L      Calcium 9.4 mg/dL      Total Protein 7.3 g/dL      Albumin 3.5 g/dL      ALT (SGPT) 16 U/L      AST (SGOT) 22 U/L      Alkaline Phosphatase 125 U/L      Total Bilirubin 1.1 mg/dL      Globulin 3.8 gm/dL      A/G Ratio 0.9 g/dL      BUN/Creatinine Ratio 24.6     Anion Gap 12.0 mmol/L      eGFR 39.5 mL/min/1.73     Narrative:      GFR Categories in Chronic Kidney Disease (CKD)      GFR Category          GFR (mL/min/1.73)    Interpretation  G1                     90 or greater         Normal or high (1)  G2                      60-89                Mild decrease (1)  G3a                   45-59                Mild to moderate decrease  G3b                   30-44                Moderate to severe decrease  G4                    15-29                Severe decrease  G5                    14 or less           Kidney failure          (1)In the absence of evidence of kidney disease, neither GFR category G1 or G2 fulfill the criteria for CKD.    eGFR calculation 2021 CKD-EPI creatinine equation, which does not include race as a factor    BNP [[id number]]  (Abnormal) Collected: 01/27/25 0902    Specimen: Blood Updated: 01/27/25 0926     proBNP  13,492.0 pg/mL     Narrative:      This assay is used as an aid in the diagnosis of individuals suspected of having heart failure. It can be used as an aid in the diagnosis of acute decompensated heart failure (ADHF) in patients presenting with signs and symptoms of ADHF to the emergency department (ED). In addition, NT-proBNP of <300 pg/mL indicates ADHF is not likely.    Age Range Result Interpretation  NT-proBNP Concentration (pg/mL:      <50             Positive            >450                   Gray                 300-450                    Negative             <300    50-75           Positive            >900                  Gray                300-900                  Negative            <300      >75             Positive            >1800                  Gray                300-1800                  Negative            <300    High Sensitivity Troponin T [[id number]]  (Abnormal) Collected: 01/27/25 0902    Specimen: Blood Updated: 01/27/25 0941     HS Troponin T 136 ng/L     Narrative:      High Sensitive Troponin T Reference Range:  <14.0 ng/L- Negative Female for AMI  <22.0 ng/L- Negative Male for AMI  >=14 - Abnormal Female indicating possible myocardial injury.  >=22 - Abnormal Male indicating possible myocardial injury.   Clinicians would have to utilize clinical acumen, EKG, Troponin, and serial changes to determine if it is an Acute Myocardial Infarction or myocardial injury due to an underlying chronic condition.         CBC Auto Differential [[id number]]  (Abnormal) Collected: 01/27/25 0902    Specimen: Blood Updated: 01/27/25 0912     WBC 7.37 10*3/mm3      RBC 4.84 10*6/mm3      Hemoglobin 13.8 g/dL      Hematocrit 44.2 %      MCV 91.3 fL      MCH 28.5 pg      MCHC 31.2 g/dL      RDW 15.9 %      RDW-SD 51.6 fl      MPV 10.2 fL      Platelets 229 10*3/mm3      Neutrophil % 60.8 %      Lymphocyte % 28.4 %      Monocyte % 7.5 %      Eosinophil % 1.6 %      Basophil % 1.4 %      Immature Grans % 0.3 %       Neutrophils, Absolute 4.49 10*3/mm3      Lymphocytes, Absolute 2.09 10*3/mm3      Monocytes, Absolute 0.55 10*3/mm3      Eosinophils, Absolute 0.12 10*3/mm3      Basophils, Absolute 0.10 10*3/mm3      Immature Grans, Absolute 0.02 10*3/mm3      nRBC 0.0 /100 WBC     High Sensitivity Troponin T 1Hr [[id number]]  (Abnormal) Collected: 01/27/25 1029    Specimen: Blood Updated: 01/27/25 1111     HS Troponin T 123 ng/L      Troponin T Numeric Delta -13 ng/L      Troponin T % Delta -10    Narrative:      High Sensitive Troponin T Reference Range:  <14.0 ng/L- Negative Female for AMI  <22.0 ng/L- Negative Male for AMI  >=14 - Abnormal Female indicating possible myocardial injury.  >=22 - Abnormal Male indicating possible myocardial injury.   Clinicians would have to utilize clinical acumen, EKG, Troponin, and serial changes to determine if it is an Acute Myocardial Infarction or myocardial injury due to an underlying chronic condition.                  Imaging:    XR Chest 1 View    Result Date: 1/27/2025  XR CHEST 1 VW Date of Exam: 1/27/2025 8:55 AM EST Indication: SOA Triage Protocol Comparison: 1/22/2025 Findings: Cardiomediastinal silhouette is enlarged, unchanged. There are small bilateral pleural effusions. No significant airspace disease no pneumothorax. No acute osseous abnormality identified.     Impression: Mild CHF features Electronically Signed: Neville De Oliveira MD  1/27/2025 9:10 AM EST  Workstation ID: [id number]       Differential Diagnosis and Discussion:    Dyspnea: Differential diagnosis includes but is not limited to metabolic acidosis, neurological disorders, psychogenic, asthma, pneumothorax, upper airway obstruction, COPD, pneumonia, noncardiogenic pulmonary edema, interstitial lung disease, anemia, congestive heart failure, and pulmonary embolism    PROCEDURES:    Labs were collected in the emergency department and all labs were reviewed and interpreted by me.  X-ray were performed in the emergency  department and all X-ray impressions were independently interpreted by me.  An EKG was performed and the EKG was interpreted by me.    ECG 12 Lead ED Triage Standing Order; SOA   Preliminary Result   HEART RATE=73  bpm   RR Gydjthcz=492  ms   DC Interval=  ms   P Horizontal Axis=242  deg   P Front Axis=  deg   QRSD Iuxdgfkg=194  ms   QT Tbqyuroz=857  ms   YEaK=415  ms   QRS Axis=-69  deg   T Wave Axis=105  deg   - ABNORMAL ECG -   Afib/flut and V-paced complexes   No further analysis attempted due to paced rhythm   Date and Time of Study:2025-01-27 09:05:51          Procedures    MDM           This patient is a 84-year-old male with CHF presenting for shortness of breath all night.    He is oxygenating well at 95% on room air and lung fields sound generally clear and no signs of volume overload on physical exam including no crackles on the lung exam or lower extremity edema or JVD seen.    Blood pressure is not elevated.    He is chest pain-free and EKG is nondiagnostic with ventricular paced rhythm.    His proBNP is elevated but at his baseline level and chest x-ray shows chronic cardiomegaly and trace bilateral pleural effusions but I do not see any significant overt pulmonary edema.    The only thing of note on his workup is his troponin is more elevated than usual today but he has CKD and CHF and we will simply repeat a 1 hour troponin as he is completely chest pain-free.  Second troponin came back flat, negative for any acute NSTEMI.    I suspect he is mostly having acute anxiety as his wife recently passed away and I am giving him some IV Ativan in addition to a dose of Lasix and we will reassess.        On reassessment, he looks significantly improved after Ativan and feels ready to go home and I will give him outpatient resources for bereavement and grief counseling and anxiety, and he already has some lorazepam at home to use as needed and I will follow-up with his doctor.                Patient Care  Considerations:          Consultants/Shared Management Plan:        Social Determinants of Health:    Patient is independent, reliable, and has access to care.       Disposition and Care Coordination:    Discharged: I considered escalation of care by admitting this patient to the hospital, however he looks improved after IV meds.    I have explained the patient´s condition, diagnoses and treatment plan based on the information available to me at this time. I have answered questions and addressed any concerns. The patient has a good  understanding of the patient´s diagnosis, condition, and treatment plan as can be expected at this point. The vital signs have been stable. The patient´s condition is stable and appropriate for discharge from the emergency department.      The patient will pursue further outpatient evaluation with the primary care physician or other designated or consulting physician as outlined in the discharge instructions. They are agreeable to this plan of care and follow-up instructions have been explained in detail. The patient has received these instructions in written format and has expressed an understanding of the discharge instructions. The patient is aware that any significant change in condition or worsening of symptoms should prompt an immediate return to this or the closest emergency department or call to 911.  I have explained discharge medications and the need for follow up with the patient/caretakers. This was also printed in the discharge instructions. Patient was discharged with the following medications and follow up:      Medication List      No changes were made to your prescriptions during this visit.      Rani Lopez, APRN  [address] [facility] [address]  [place] [address] [phone]    Call in 1 day  for a follow-up appointment       Final diagnoses:   Anxiety hyperventilation   Acute on chronic congestive heart failure, unspecified heart failure type        ED Disposition        ED Disposition   Discharge    Condition   Stable    Comment   --               This medical record created using voice recognition software.             Sarbjit Kerns MD  01/27/25 5891

## 2025-01-27 NOTE — TELEPHONE ENCOUNTER
Hub staff attempted to follow warm transfer process and was unsuccessful      Caller: SYD PITTMAN     Relationship to patient: SELF     Best call back number: [phone]     Patient is needing: INCOMING CALL FROM PT. RETURNING CALL TO LORRIE. HE SAID LATE MORNING/EARLY AFTERNOON IS THE BEST TIME TO CONTACT HIM.

## 2025-01-27 NOTE — DISCHARGE INSTRUCTIONS
You can take 1 or 2 of your lorazepam an hour before bed to help with sleep, follow-up with your doctor or talk about outpatient therapy or counseling if you are not showing any improvement.

## 2025-01-27 NOTE — TELEPHONE ENCOUNTER
Hub staff attempted to follow warm transfer process and was unsuccessful     Caller: Javi Martínez    Relationship to patient: Self    Best call back number: [phone] (home)       Patient is needing: RESCHEDULE 2 MO F/U APPT- PT WENT TO ER FOR SHORTNESS OF BREATH. 1.27.25. (HUB APPTS SHOW AUG 2025)      HE HAS A CATHETER AND MENTIONS IT HAS BEEN IN FOR QUITE SOME TIME AND IS CONCERNED ABOUT THE LENGTH OF TIME IT HAS BEEN IN PLACE.

## 2025-01-27 NOTE — TELEPHONE ENCOUNTER
Outgong call   06/17/2024    [facility] left message for patient to return my call.     Other housing applications were mailed out today.     [employer]  [address] [place] [id number]- [phone]  Application mail out on 06/18/2024    [employer] [facility] [address] [address]  [phone]  Application mail out on 06/18/2024    [address] [address]  [phone]   Application given on 05/28/2024. Pt is # [phone] on the waitlist. The wait can be 5 to 6 years.     Next outreach is schedule for 06/24/2024     PT CALLED BACK ON OTHER T/E CALLED PT BACK, NO ANSWER, LMOM

## 2025-01-27 NOTE — TELEPHONE ENCOUNTER
Hub staff attempted to follow warm transfer process and was unsuccessful     Caller: SYD PITTMAN    Relationship to patient: SELF    Best call back number: [phone]    Patient is needing: INCOMING CALL FROM PT. RETURNING CALL TO [facility].

## 2025-01-28 ENCOUNTER — READMISSION MANAGEMENT (OUTPATIENT)
Dept: CALL CENTER | Facility: HOSPITAL | Age: 85
End: 2025-01-28
Payer: MEDICARE

## 2025-01-28 NOTE — OUTREACH NOTE
CHF Week 1 Survey      Flowsheet Row Responses   [facility] patient discharged from? [facility]   Does the patient have one of the following disease processes/diagnoses(primary or secondary)? CHF   CHF Week 1 attempt successful? Yes   Call start time 0948   Call end time 0950   Discharge diagnosis Acute exacerbation of CHF   Person spoke with today (if not patient) and relationship Patient   Meds reviewed with patient/caregiver? Yes   Does the patient have a primary care provider?  Yes   Comments regarding PCP Patient to call pcp today to schedule fu.   Has home health visited the patient within 72 hours of discharge? N/A   Psychosocial issues? No   Did the patient receive a copy of their discharge instructions? Yes   Nursing interventions Reviewed instructions with patient   What is the patient's perception of their health status since discharge? Improving   Nursing interventions Nurse provided patient education   Is the patient/caregiver able to teach back the hierarchy of who to call/visit for symptoms/problems? PCP, Specialist, Home health nurse, Urgent Care, ED, 911 Yes   Is the patient able to teach back Heart Failure Zones? Yes   CHF Zone this Call Green Zone   Green Zone Patient reports doing well, No new or worsening shortness of breath, Physical activity level is normal for you, No new swelling -  feet, ankles and legs look normal for you, No chest pain   Green Zone Interventions Daily weight check, Meds as directed, Low sodium diet, Follow up visits planned    CHF Week 1 call completed? Yes   Is the patient interested in additional calls from an ambulatory ? No   Would this patient benefit from a Referral to [facility] Social Work? No   Wrap up additional comments Patient states doing well. States he does not do daily wt- educated the importance of daily wt. Patient reports he understands and will call pcp to schedule fu appt. No other concerns or questions noted.   Call end time 0950             Yasemin LIGHT - Registered Nurse

## 2025-01-29 ENCOUNTER — OFFICE VISIT (OUTPATIENT)
Dept: UROLOGY | Age: 85
End: 2025-01-29
Payer: MEDICARE

## 2025-01-29 VITALS — BODY MASS INDEX: 21.31 KG/M2 | HEIGHT: 76 IN | WEIGHT: 175 LBS

## 2025-01-29 DIAGNOSIS — N40.0 BENIGN PROSTATIC HYPERPLASIA WITHOUT LOWER URINARY TRACT SYMPTOMS: Primary | ICD-10-CM

## 2025-01-29 DIAGNOSIS — Z85.46 HISTORY OF MALIGNANT NEOPLASM OF PROSTATE: ICD-10-CM

## 2025-01-29 DIAGNOSIS — N99.114 POSTPROCEDURAL MALE URETHRAL STRICTURE: ICD-10-CM

## 2025-01-29 NOTE — PROGRESS NOTES
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 Oliver was here to discuss possible removal of his Diamond catheter.  He does have a urethral stricture and is status post OIU.  He has had a catheter in for quite some time and was changed out in the ER without difficulty a couple of weeks ago.      Objective   Vital Signs:   Ht 193 cm (76\")   Wt 79.4 kg (175 lb)   BMI 21.30 kg/m²       Physical Exam  Vitals and nursing note reviewed.   Constitutional:       Appearance: Normal appearance. He is well-developed.   Pulmonary:      Effort: Pulmonary effort is normal.      Breath sounds: Normal air entry.   Neurological:      Mental Status: He is alert and oriented to person, place, and time.      Motor: Motor function is intact.   Psychiatric:         Mood and Affect: Mood normal.         Behavior: Behavior normal.          Result Review :   The following data was reviewed by: Mally Bernal MD on 01/29/2025:    Results for orders placed or performed during the hospital encounter of 01/27/25   Comprehensive Metabolic Panel    Collection Time: 01/27/25  9:02 AM    Specimen: Blood   Result Value Ref Range    Glucose 119 (H) 65 - 99 mg/dL    BUN 42 (H) 8 - 23 mg/dL    Creatinine 1.71 (H) 0.76 - 1.27 mg/dL    Sodium 135 (L) 136 - 145 mmol/L    Potassium 4.6 3.5 - 5.2 mmol/L    Chloride 97 (L) 98 - 107 mmol/L    CO2 26.0 22.0 - 29.0 mmol/L    Calcium 9.4 8.6 - 10.5 mg/dL    Total Protein 7.3 6.0 - 8.5 g/dL    Albumin 3.5 3.5 - 5.2 g/dL    ALT (SGPT) 16 1 - 41 U/L    AST (SGOT) 22 1 - 40 U/L    Alkaline Phosphatase 125 (H) 39 - 117 U/L    Total Bilirubin 1.1 0.0 - 1.2 mg/dL    Globulin 3.8 gm/dL    A/G Ratio 0.9 g/dL    BUN/Creatinine Ratio 24.6 7.0 - 25.0    Anion Gap 12.0 5.0 - 15.0 mmol/L    eGFR 39.5 (L) >60.0 mL/min/1.73   BNP    Collection Time: 01/27/25  9:02 AM    Specimen: Blood "   Result Value Ref Range    proBNP 13,492.0 (H) 0.0 - 1,800.0 pg/mL   High Sensitivity Troponin T    Collection Time: 01/27/25  9:02 AM    Specimen: Blood   Result Value Ref Range    HS Troponin T 136 (C) <22 ng/L   CBC Auto Differential    Collection Time: 01/27/25  9:02 AM    Specimen: Blood   Result Value Ref Range    WBC 7.37 3.40 - 10.80 10*3/mm3    RBC 4.84 4.14 - 5.80 10*6/mm3    Hemoglobin 13.8 13.0 - 17.7 g/dL    Hematocrit 44.2 37.5 - 51.0 %    MCV 91.3 79.0 - 97.0 fL    MCH 28.5 26.6 - 33.0 pg    MCHC 31.2 (L) 31.5 - 35.7 g/dL    RDW 15.9 (H) 12.3 - 15.4 %    RDW-SD 51.6 37.0 - 54.0 fl    MPV 10.2 6.0 - 12.0 fL    Platelets 229 140 - 450 10*3/mm3    Neutrophil % 60.8 42.7 - 76.0 %    Lymphocyte % 28.4 19.6 - 45.3 %    Monocyte % 7.5 5.0 - 12.0 %    Eosinophil % 1.6 0.3 - 6.2 %    Basophil % 1.4 0.0 - 1.5 %    Immature Grans % 0.3 0.0 - 0.5 %    Neutrophils, Absolute 4.49 1.70 - 7.00 10*3/mm3    Lymphocytes, Absolute 2.09 0.70 - 3.10 10*3/mm3    Monocytes, Absolute 0.55 0.10 - 0.90 10*3/mm3    Eosinophils, Absolute 0.12 0.00 - 0.40 10*3/mm3    Basophils, Absolute 0.10 0.00 - 0.20 10*3/mm3    Immature Grans, Absolute 0.02 0.00 - 0.05 10*3/mm3    nRBC 0.0 0.0 - 0.2 /100 WBC   Green Top (Gel)    Collection Time: 01/27/25  9:02 AM   Result Value Ref Range    Extra Tube Hold for add-ons.    Lavender Top    Collection Time: 01/27/25  9:02 AM   Result Value Ref Range    Extra Tube hold for add-on    Gold Top - SST    Collection Time: 01/27/25  9:02 AM   Result Value Ref Range    Extra Tube Hold for add-ons.    Light Blue Top    Collection Time: 01/27/25  9:02 AM   Result Value Ref Range    Extra Tube Hold for add-ons.    ECG 12 Lead ED Triage Standing Order; SOA    Collection Time: 01/27/25  9:05 AM   Result Value Ref Range    QT Interval 548 ms    QTC Interval 603 ms   High Sensitivity Troponin T 1Hr    Collection Time: 01/27/25 10:29 AM    Specimen: Blood   Result Value Ref Range    HS Troponin T 123 (C) <22  ng/L    Troponin T Numeric Delta -13 ng/L    Troponin T % Delta -10 Abnormal if >/= 20%            Assessment and Plan    Diagnoses and all orders for this visit:    1. Benign prostatic hyperplasia without lower urinary tract symptoms (Primary)    2. History of malignant neoplasm of prostate    3. 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  He will call sooner if he has any other issues.        Follow Up       Return in about 2 weeks (around 2/12/2025) for Next scheduled follow up for void trial.  Patient was given instructions and counseling regarding his condition or for health maintenance advice. Please see specific information pulled into the AVS if appropriate.

## 2025-02-08 LAB
QT INTERVAL: 548 MS
QTC INTERVAL: 603 MS

## 2025-02-12 ENCOUNTER — OFFICE VISIT (OUTPATIENT)
Dept: UROLOGY | Age: 85
End: 2025-02-12
Payer: MEDICARE

## 2025-02-12 VITALS — RESPIRATION RATE: 14 BRPM | BODY MASS INDEX: 21.32 KG/M2 | HEIGHT: 76 IN | WEIGHT: 175.04 LBS

## 2025-02-12 DIAGNOSIS — N99.114 POSTPROCEDURAL MALE URETHRAL STRICTURE: Primary | ICD-10-CM

## 2025-02-12 NOTE — PROGRESS NOTES
"Chief Complaint  Benign Prostatic Hypertrophy    Subjective            Javi Martínez presents to [facility] UROLOGY    History of Present Illness  The patient presents for evaluation of urinary retention.    He has been managing his urinary retention with a catheter, which he is considering removing to assess his ability to urinate independently. He expresses concern about the potential need for an ambulance call at 3:00 AM if the catheter does not function as expected. He recalls a previous discussion with the physician regarding a 2-week waiting period, but he is uncertain about the rationale behind this decision. He also mentions a past experience of a severe infection during a trip to [place], which he attributes to prolonged catheter use.    Supplemental Information  He expresses concern about his congestive heart failure. He takes a pill whenever he experiences swelling or increased difficulty breathing.       Objective   Vital Signs:   Resp 14   Ht 193 cm (75.98\")   Wt 79.4 kg (175 lb 0.7 oz)   BMI 21.32 kg/m²       Physical Exam  Vitals and nursing note reviewed.   Constitutional:       Appearance: Normal appearance. He is well-developed.   Pulmonary:      Effort: Pulmonary effort is normal.      Breath sounds: Normal air entry.   Neurological:      Mental Status: He is alert and oriented to person, place, and time.      Motor: Motor function is intact.   Psychiatric:         Mood and Affect: Mood normal.         Behavior: Behavior normal.          Result Review :   The following data was reviewed by: Malyl Bernal MD on 02/12/2025:    Results for orders placed or performed during the hospital encounter of 01/27/25   Comprehensive Metabolic Panel    Collection Time: 01/27/25  9:02 AM    Specimen: Blood   Result Value Ref Range    Glucose 119 (H) 65 - 99 mg/dL    BUN 42 (H) 8 - 23 mg/dL    Creatinine 1.71 (H) 0.76 - 1.27 mg/dL    Sodium 135 (L) 136 - 145 mmol/L    Potassium 4.6 " 3.5 - 5.2 mmol/L    Chloride 97 (L) 98 - 107 mmol/L    CO2 26.0 22.0 - 29.0 mmol/L    Calcium 9.4 8.6 - 10.5 mg/dL    Total Protein 7.3 6.0 - 8.5 g/dL    Albumin 3.5 3.5 - 5.2 g/dL    ALT (SGPT) 16 1 - 41 U/L    AST (SGOT) 22 1 - 40 U/L    Alkaline Phosphatase 125 (H) 39 - 117 U/L    Total Bilirubin 1.1 0.0 - 1.2 mg/dL    Globulin 3.8 gm/dL    A/G Ratio 0.9 g/dL    BUN/Creatinine Ratio 24.6 7.0 - 25.0    Anion Gap 12.0 5.0 - 15.0 mmol/L    eGFR 39.5 (L) >60.0 mL/min/1.73   BNP    Collection Time: 01/27/25  9:02 AM    Specimen: Blood   Result Value Ref Range    proBNP 13,492.0 (H) 0.0 - 1,800.0 pg/mL   High Sensitivity Troponin T    Collection Time: 01/27/25  9:02 AM    Specimen: Blood   Result Value Ref Range    HS Troponin T 136 (C) <22 ng/L   CBC Auto Differential    Collection Time: 01/27/25  9:02 AM    Specimen: Blood   Result Value Ref Range    WBC 7.37 3.40 - 10.80 10*3/mm3    RBC 4.84 4.14 - 5.80 10*6/mm3    Hemoglobin 13.8 13.0 - 17.7 g/dL    Hematocrit 44.2 37.5 - 51.0 %    MCV 91.3 79.0 - 97.0 fL    MCH 28.5 26.6 - 33.0 pg    MCHC 31.2 (L) 31.5 - 35.7 g/dL    RDW 15.9 (H) 12.3 - 15.4 %    RDW-SD 51.6 37.0 - 54.0 fl    MPV 10.2 6.0 - 12.0 fL    Platelets 229 140 - 450 10*3/mm3    Neutrophil % 60.8 42.7 - 76.0 %    Lymphocyte % 28.4 19.6 - 45.3 %    Monocyte % 7.5 5.0 - 12.0 %    Eosinophil % 1.6 0.3 - 6.2 %    Basophil % 1.4 0.0 - 1.5 %    Immature Grans % 0.3 0.0 - 0.5 %    Neutrophils, Absolute 4.49 1.70 - 7.00 10*3/mm3    Lymphocytes, Absolute 2.09 0.70 - 3.10 10*3/mm3    Monocytes, Absolute 0.55 0.10 - 0.90 10*3/mm3    Eosinophils, Absolute 0.12 0.00 - 0.40 10*3/mm3    Basophils, Absolute 0.10 0.00 - 0.20 10*3/mm3    Immature Grans, Absolute 0.02 0.00 - 0.05 10*3/mm3    nRBC 0.0 0.0 - 0.2 /100 WBC   Green Top (Gel)    Collection Time: 01/27/25  9:02 AM   Result Value Ref Range    Extra Tube Hold for add-ons.    Lavender Top    Collection Time: 01/27/25  9:02 AM   Result Value Ref Range    Extra Tube  hold for add-on    Gold Top - SST    Collection Time: 01/27/25  9:02 AM   Result Value Ref Range    Extra Tube Hold for add-ons.    Light Blue Top    Collection Time: 01/27/25  9:02 AM   Result Value Ref Range    Extra Tube Hold for add-ons.    ECG 12 Lead ED Triage Standing Order; SOA    Collection Time: 01/27/25  9:05 AM   Result Value Ref Range    QT Interval 548 ms    QTC Interval 603 ms   High Sensitivity Troponin T 1Hr    Collection Time: 01/27/25 10:29 AM    Specimen: Blood   Result Value Ref Range    HS Troponin T 123 (C) <22 ng/L    Troponin T Numeric Delta -13 ng/L    Troponin T % Delta -10 Abnormal if >/= 20%       Results            Assessment and Plan    Diagnoses and all orders for this visit:    1. 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 Urinary retention.  The patient's urinary retention is likely due to benign prostatic hyperplasia (BPH), which has resulted in bladder decompensation. He has been managing this condition with a catheter for several months. A trial without catheter (TWOC) will be conducted today to assess his ability to urinate independently. If he is unable to urinate post-TWOC, he will be advised to abstain from food and drink, and a suprapubic tube will be inserted the following day.    Follow-up  The patient will follow up next week for a bladder scan to evaluate post-void residual volume.          Follow Up       No follow-ups on file.  Patient was given instructions and counseling regarding his condition or for health maintenance advice. Please see specific information pulled into the AVS if appropriate.     Transcribed from ambient dictation for Mally Bernal MD by Mally Bernal MD.  02/12/25   13:31 EST    Patient or patient representative verbalized consent for the use of Ambient Listening during the visit with  Mally Bernal MD for chart documentation. 2/12/2025  13:31 EST

## 2025-02-13 ENCOUNTER — TELEPHONE (OUTPATIENT)
Dept: UROLOGY | Age: 85
End: 2025-02-13
Payer: MEDICARE

## 2025-02-13 NOTE — TELEPHONE ENCOUNTER
Patient reports bladder spasms and voiding a lot before making it to the bathroom. I discussed that placing on oxybutynin at patient's age would likely result in retention but that as long as patient is able to pee that it is ok. I explained that patient could purchase AZO over the counter to see if that helps but that symptoms could persist for sometime and that putting a catheter in is not a fix for the situation as it would likely happen again when removed. We did schedule a follow up with Dr. Bernal on 2/19/25 at 1:00 as patient neglected to schedule at appointment yesterday.

## 2025-02-14 LAB
QT INTERVAL: 529 MS
QTC INTERVAL: 580 MS

## 2025-02-19 ENCOUNTER — OFFICE VISIT (OUTPATIENT)
Dept: UROLOGY | Age: 85
End: 2025-02-19
Payer: MEDICARE

## 2025-02-19 VITALS — WEIGHT: 175 LBS | RESPIRATION RATE: 20 BRPM | BODY MASS INDEX: 21.31 KG/M2 | HEIGHT: 76 IN

## 2025-02-19 DIAGNOSIS — N40.0 BENIGN PROSTATIC HYPERPLASIA WITHOUT LOWER URINARY TRACT SYMPTOMS: Primary | ICD-10-CM

## 2025-02-19 DIAGNOSIS — N30.00 ACUTE CYSTITIS WITHOUT HEMATURIA: ICD-10-CM

## 2025-02-19 LAB
BILIRUB BLD-MCNC: NEGATIVE MG/DL
CLARITY, POC: CLEAR
COLOR UR: YELLOW
EXPIRATION DATE: ABNORMAL
GLUCOSE UR STRIP-MCNC: ABNORMAL MG/DL
KETONES UR QL: NEGATIVE
LEUKOCYTE EST, POC: NEGATIVE
Lab: ABNORMAL
NITRITE UR-MCNC: POSITIVE MG/ML
PH UR: 6 [PH] (ref 5–8)
PROT UR STRIP-MCNC: ABNORMAL MG/DL
RBC # UR STRIP: ABNORMAL /UL
SP GR UR: 1.02 (ref 1–1.03)
URINE VOLUME: NORMAL
UROBILINOGEN UR QL: ABNORMAL

## 2025-02-19 PROCEDURE — 87088 URINE BACTERIA CULTURE: CPT | Performed by: UROLOGY

## 2025-02-19 PROCEDURE — 87086 URINE CULTURE/COLONY COUNT: CPT | Performed by: UROLOGY

## 2025-02-19 PROCEDURE — 87186 SC STD MICRODIL/AGAR DIL: CPT | Performed by: UROLOGY

## 2025-02-19 NOTE — PROGRESS NOTES
"Chief Complaint  f/u urethral stricture    Subjective            Javi Martínez presents to [facility] UROLOGY    History of Present Illness  The patient presents for evaluation of urinary retention.    He reports satisfactory urinary function, although not at the level of his youth. He experiences a persistent urge to urinate, which is promptly followed by micturition. He expresses concern about potential changes in his urinary pattern, particularly the possibility of urinary retention.       Objective   Vital Signs:   Resp 20   Ht 193 cm (75.98\")   Wt 79.4 kg (175 lb)   BMI 21.31 kg/m²       Physical Exam  Vitals and nursing note reviewed.   Constitutional:       Appearance: Normal appearance. He is well-developed.   Pulmonary:      Effort: Pulmonary effort is normal.      Breath sounds: Normal air entry.   Neurological:      Mental Status: He is alert and oriented to person, place, and time.      Motor: Motor function is intact.   Psychiatric:         Mood and Affect: Mood normal.         Behavior: Behavior normal.          Result Review :   The following data was reviewed by: Mally Bernal MD on 02/19/2025:    Results for orders placed or performed in visit on 02/19/25   Bladder Scan    Collection Time: 02/19/25  1:32 PM   Result Value Ref Range    Urine Volume 12ML    POC Urinalysis Dipstick, Automated    Collection Time: 02/19/25  1:33 PM    Specimen: Urine   Result Value Ref Range    Color Yellow Yellow, Straw, Dark Yellow, Tessie    Clarity, UA Clear Clear    Specific Gravity  1.025 1.005 - 1.030    pH, Urine 6.0 5.0 - 8.0    Leukocytes Negative Negative    Nitrite, UA Positive (A) Negative    Protein, POC 30 mg/dL (A) Negative mg/dL    Glucose,  mg/dL (A) Negative mg/dL    Ketones, UA Negative Negative    Urobilinogen, UA 0.2 E.U./dL Normal, 0.2 E.U./dL    Bilirubin Negative Negative    Blood, UA Small (A) Negative    Lot Number [id number]     Expiration Date 122,025  "       Results  Testing  Post-void residual (PVR) check showed only 12 left in the bladder.       Bladder Scan interpretation 02/19/2025    Estimation of residual urine via BVI 3000 [employer] Bladder Scan  MA/nurse performing: Eladio Omalley LPN  Residual Urine: 12 ml  Indication: Benign prostatic hyperplasia without lower urinary tract symptoms   Position: Supine  Examination: Incremental scanning of the suprapubic area using 2.0 MHz transducer using copious amounts of acoustic gel.   Findings: An anechoic area was demonstrated which represented the bladder, with measurement of residual urine as noted. I inspected this myself. In that the residual urine was stable or insignificant, refer to plan for treatment and plan necessary at this time.                Assessment and Plan    Diagnoses and all orders for this visit:    1. Benign prostatic hyperplasia without lower urinary tract symptoms (Primary)  -     Bladder Scan  -     POC Urinalysis Dipstick, Automated      Assessment & Plan  1. Urinary retention.  His post-void residual (PVR) volume today was 12 mL, which is significantly below the threshold of 200 mL, indicating effective bladder emptying. He has been advised to report any changes in his urinary pattern, such as difficulty in urination, a decrease in the speed of the urinary stream, increased effort required for urination, or an increase in the frequency of urination.    Follow-up  The patient will follow up in 3 months.          Follow Up       No follow-ups on file.  Patient was given instructions and counseling regarding his condition or for health maintenance advice. Please see specific information pulled into the AVS if appropriate.     Transcribed from ambient dictation for Mally Bernal MD by Mally Bernal MD.  02/19/25   14:00 EST    Patient or patient representative verbalized consent for the use of Ambient Listening during the visit with  Mally Bernal MD for chart  documentation. 2/19/2025  15:45 EST

## 2025-02-21 ENCOUNTER — TELEPHONE (OUTPATIENT)
Dept: UROLOGY | Age: 85
End: 2025-02-21
Payer: MEDICARE

## 2025-02-21 DIAGNOSIS — N30.00 ACUTE CYSTITIS WITHOUT HEMATURIA: Primary | ICD-10-CM

## 2025-02-21 LAB — BACTERIA SPEC AEROBE CULT: ABNORMAL

## 2025-02-21 RX ORDER — NITROFURANTOIN 25; 75 MG/1; MG/1
100 CAPSULE ORAL 2 TIMES DAILY
Qty: 20 CAPSULE | Refills: 0 | Status: SHIPPED | OUTPATIENT
Start: 2025-02-21 | End: 2025-02-21

## 2025-02-21 RX ORDER — SULFAMETHOXAZOLE AND TRIMETHOPRIM 800; 160 MG/1; MG/1
1 TABLET ORAL 2 TIMES DAILY
Qty: 14 TABLET | Refills: 0 | Status: SHIPPED | OUTPATIENT
Start: 2025-02-21 | End: 2025-02-28

## 2025-02-25 ENCOUNTER — APPOINTMENT (OUTPATIENT)
Dept: GENERAL RADIOLOGY | Facility: HOSPITAL | Age: 85
DRG: 291 | End: 2025-02-25
Payer: MEDICARE

## 2025-02-25 ENCOUNTER — HOSPITAL ENCOUNTER (INPATIENT)
Facility: HOSPITAL | Age: 85
LOS: 4 days | Discharge: HOME | DRG: 291 | End: 2025-03-02
Attending: EMERGENCY MEDICINE | Admitting: STUDENT IN AN ORGANIZED HEALTH CARE EDUCATION/TRAINING PROGRAM
Payer: MEDICARE

## 2025-02-25 DIAGNOSIS — N18.9 CHRONIC KIDNEY DISEASE, UNSPECIFIED CKD STAGE: ICD-10-CM

## 2025-02-25 DIAGNOSIS — R79.89 ELEVATED TROPONIN: ICD-10-CM

## 2025-02-25 DIAGNOSIS — J81.0 ACUTE PULMONARY EDEMA: Primary | ICD-10-CM

## 2025-02-25 DIAGNOSIS — I50.9 ACUTE ON CHRONIC CONGESTIVE HEART FAILURE, UNSPECIFIED HEART FAILURE TYPE: ICD-10-CM

## 2025-02-25 DIAGNOSIS — J90 PLEURAL EFFUSION: ICD-10-CM

## 2025-02-25 DIAGNOSIS — N39.0 URINARY TRACT INFECTION WITHOUT HEMATURIA, SITE UNSPECIFIED: ICD-10-CM

## 2025-02-25 DIAGNOSIS — I50.9 CONGESTIVE HEART FAILURE, UNSPECIFIED HF CHRONICITY, UNSPECIFIED HEART FAILURE TYPE: ICD-10-CM

## 2025-02-25 LAB
ALBUMIN SERPL-MCNC: 3.5 G/DL (ref 3.5–5.2)
ALBUMIN/GLOB SERPL: 1.1 G/DL
ALP SERPL-CCNC: 114 U/L (ref 39–117)
ALT SERPL W P-5'-P-CCNC: 13 U/L (ref 1–41)
ANION GAP SERPL CALCULATED.3IONS-SCNC: 13 MMOL/L (ref 5–15)
AST SERPL-CCNC: 20 U/L (ref 1–40)
BACTERIA UR QL AUTO: ABNORMAL /HPF
BASOPHILS # BLD AUTO: 0.05 10*3/MM3 (ref 0–0.2)
BASOPHILS NFR BLD AUTO: 0.7 % (ref 0–1.5)
BILIRUB SERPL-MCNC: 0.8 MG/DL (ref 0–1.2)
BILIRUB UR QL STRIP: NEGATIVE
BUN SERPL-MCNC: 31 MG/DL (ref 8–23)
BUN/CREAT SERPL: 17.9 (ref 7–25)
CALCIUM SPEC-SCNC: 9.2 MG/DL (ref 8.6–10.5)
CHLORIDE SERPL-SCNC: 101 MMOL/L (ref 98–107)
CLARITY UR: CLEAR
CO2 SERPL-SCNC: 25 MMOL/L (ref 22–29)
COLOR UR: YELLOW
CREAT SERPL-MCNC: 1.73 MG/DL (ref 0.76–1.27)
DEPRECATED RDW RBC AUTO: 55.2 FL (ref 37–54)
EGFRCR SERPLBLD CKD-EPI 2021: 38.9 ML/MIN/1.73
EOSINOPHIL # BLD AUTO: 0.04 10*3/MM3 (ref 0–0.4)
EOSINOPHIL NFR BLD AUTO: 0.6 % (ref 0.3–6.2)
ERYTHROCYTE [DISTWIDTH] IN BLOOD BY AUTOMATED COUNT: 16.5 % (ref 12.3–15.4)
FLUAV SUBTYP SPEC NAA+PROBE: NOT DETECTED
FLUBV RNA ISLT QL NAA+PROBE: NOT DETECTED
GEN 5 1HR TROPONIN T REFLEX: 94 NG/L
GLOBULIN UR ELPH-MCNC: 3.3 GM/DL
GLUCOSE SERPL-MCNC: 82 MG/DL (ref 65–99)
GLUCOSE UR STRIP-MCNC: ABNORMAL MG/DL
HCT VFR BLD AUTO: 43.7 % (ref 37.5–51)
HGB BLD-MCNC: 13.7 G/DL (ref 13–17.7)
HGB UR QL STRIP.AUTO: NEGATIVE
HOLD SPECIMEN: NORMAL
HOLD SPECIMEN: NORMAL
HYALINE CASTS UR QL AUTO: ABNORMAL /LPF
IMM GRANULOCYTES # BLD AUTO: 0.03 10*3/MM3 (ref 0–0.05)
IMM GRANULOCYTES NFR BLD AUTO: 0.4 % (ref 0–0.5)
KETONES UR QL STRIP: NEGATIVE
LEUKOCYTE ESTERASE UR QL STRIP.AUTO: ABNORMAL
LYMPHOCYTES # BLD AUTO: 1.54 10*3/MM3 (ref 0.7–3.1)
LYMPHOCYTES NFR BLD AUTO: 21.2 % (ref 19.6–45.3)
MAGNESIUM SERPL-MCNC: 2.5 MG/DL (ref 1.6–2.4)
MCH RBC QN AUTO: 28.7 PG (ref 26.6–33)
MCHC RBC AUTO-ENTMCNC: 31.4 G/DL (ref 31.5–35.7)
MCV RBC AUTO: 91.4 FL (ref 79–97)
MONOCYTES # BLD AUTO: 0.76 10*3/MM3 (ref 0.1–0.9)
MONOCYTES NFR BLD AUTO: 10.5 % (ref 5–12)
NEUTROPHILS NFR BLD AUTO: 4.83 10*3/MM3 (ref 1.7–7)
NEUTROPHILS NFR BLD AUTO: 66.6 % (ref 42.7–76)
NITRITE UR QL STRIP: NEGATIVE
NRBC BLD AUTO-RTO: 0 /100 WBC (ref 0–0.2)
NT-PROBNP SERPL-MCNC: ABNORMAL PG/ML (ref 0–1800)
PH UR STRIP.AUTO: 7.5 [PH] (ref 5–8)
PLATELET # BLD AUTO: 200 10*3/MM3 (ref 140–450)
PMV BLD AUTO: 9.8 FL (ref 6–12)
POTASSIUM SERPL-SCNC: 5.4 MMOL/L (ref 3.5–5.2)
PROT SERPL-MCNC: 6.8 G/DL (ref 6–8.5)
PROT UR QL STRIP: ABNORMAL
QT INTERVAL: 519 MS
QTC INTERVAL: 573 MS
RBC # BLD AUTO: 4.78 10*6/MM3 (ref 4.14–5.8)
RBC # UR STRIP: ABNORMAL /HPF
REF LAB TEST METHOD: ABNORMAL
RSV RNA NPH QL NAA+NON-PROBE: NOT DETECTED
SARS-COV-2 RNA RESP QL NAA+PROBE: NOT DETECTED
SODIUM SERPL-SCNC: 139 MMOL/L (ref 136–145)
SP GR UR STRIP: 1.02 (ref 1–1.03)
SQUAMOUS #/AREA URNS HPF: ABNORMAL /HPF
T4 FREE SERPL-MCNC: 1.46 NG/DL (ref 0.92–1.68)
TROPONIN T % DELTA: 6
TROPONIN T NUMERIC DELTA: 5 NG/L
TROPONIN T SERPL HS-MCNC: 89 NG/L
TSH SERPL DL<=0.05 MIU/L-ACNC: 3.74 UIU/ML (ref 0.27–4.2)
UROBILINOGEN UR QL STRIP: ABNORMAL
WBC # UR STRIP: ABNORMAL /HPF
WBC NRBC COR # BLD AUTO: 7.25 10*3/MM3 (ref 3.4–10.8)
WHOLE BLOOD HOLD COAG: NORMAL
WHOLE BLOOD HOLD SPECIMEN: NORMAL

## 2025-02-25 PROCEDURE — 93005 ELECTROCARDIOGRAM TRACING: CPT

## 2025-02-25 PROCEDURE — 81001 URINALYSIS AUTO W/SCOPE: CPT | Performed by: EMERGENCY MEDICINE

## 2025-02-25 PROCEDURE — 36415 COLL VENOUS BLD VENIPUNCTURE: CPT

## 2025-02-25 PROCEDURE — 84443 ASSAY THYROID STIM HORMONE: CPT | Performed by: EMERGENCY MEDICINE

## 2025-02-25 PROCEDURE — 99285 EMERGENCY DEPT VISIT HI MDM: CPT

## 2025-02-25 PROCEDURE — 25010000002 CEFTRIAXONE PER 250 MG: Performed by: EMERGENCY MEDICINE

## 2025-02-25 PROCEDURE — 83735 ASSAY OF MAGNESIUM: CPT | Performed by: EMERGENCY MEDICINE

## 2025-02-25 PROCEDURE — 25010000002 FUROSEMIDE PER 20 MG: Performed by: EMERGENCY MEDICINE

## 2025-02-25 PROCEDURE — 84439 ASSAY OF FREE THYROXINE: CPT | Performed by: EMERGENCY MEDICINE

## 2025-02-25 PROCEDURE — 84484 ASSAY OF TROPONIN QUANT: CPT

## 2025-02-25 PROCEDURE — 87086 URINE CULTURE/COLONY COUNT: CPT | Performed by: EMERGENCY MEDICINE

## 2025-02-25 PROCEDURE — 85025 COMPLETE CBC W/AUTO DIFF WBC: CPT

## 2025-02-25 PROCEDURE — 93005 ELECTROCARDIOGRAM TRACING: CPT | Performed by: EMERGENCY MEDICINE

## 2025-02-25 PROCEDURE — 83880 ASSAY OF NATRIURETIC PEPTIDE: CPT

## 2025-02-25 PROCEDURE — 71045 X-RAY EXAM CHEST 1 VIEW: CPT

## 2025-02-25 PROCEDURE — 84484 ASSAY OF TROPONIN QUANT: CPT | Performed by: EMERGENCY MEDICINE

## 2025-02-25 PROCEDURE — 80053 COMPREHEN METABOLIC PANEL: CPT

## 2025-02-25 PROCEDURE — 87637 SARSCOV2&INF A&B&RSV AMP PRB: CPT

## 2025-02-25 RX ORDER — FUROSEMIDE 10 MG/ML
80 INJECTION INTRAMUSCULAR; INTRAVENOUS ONCE
Status: COMPLETED | OUTPATIENT
Start: 2025-02-25 | End: 2025-02-25

## 2025-02-25 RX ORDER — SODIUM CHLORIDE 0.9 % (FLUSH) 0.9 %
10 SYRINGE (ML) INJECTION AS NEEDED
Status: DISCONTINUED | OUTPATIENT
Start: 2025-02-25 | End: 2025-03-02 | Stop reason: HOSPADM

## 2025-02-25 RX ADMIN — SODIUM CHLORIDE 1000 MG: 9 INJECTION INTRAMUSCULAR; INTRAVENOUS; SUBCUTANEOUS at 23:34

## 2025-02-25 RX ADMIN — FUROSEMIDE 80 MG: 10 INJECTION, SOLUTION INTRAMUSCULAR; INTRAVENOUS at 21:19

## 2025-02-25 NOTE — ED PROVIDER NOTES
Time: 5:08 PM EST  Date of encounter:  2/25/2025  Independent Historian/Clinical History and Information was obtained by:   Patient    History is limited by: N/A      Chief complaint: Shortness of breath and weakness        History of Present Illness:  Patient is a 84 y.o. year old male who presents to the emergency department for evaluation of shortness of breath that began over the past day.  Patient has history of congestive heart failure, and COPD.  The patient notes that he has had worsening shortness of breath over 3 to 4 days.  However, was worse today.  He notes shortness of breath with exertion relieved by rest.  He also notes progressive weakness which he believes is due from the shortness of breath.  He denies any fever rigors or myalgias.  The patient has no cough.  The patient has no nausea or vomiting.  Patient notes increased swelling in his legs today.  The patient had no chest pain chest pressure, jaw pain or back pain.  Denies any abdominal pain.  He has had no hematochezia melena or diarrhea.  Patient denies any urinary frequency urgency or dysuria.  Patient states that he does have congestive heart failure.  The patient states that he supposed to take Lasix but he does not take it routinely.  Patient also notes that he is currently being treated for UTI by his urologist.  The patient notes that he has frequent UTIs      Patient Care Team  Primary Care Provider: Rani Lopez APRN    Past Medical History:     No Known Allergies  Past Medical History:   Diagnosis Date    Aneurysm 2013    Arthritis     left knee     Atrial fibrillation, persistent     CHB (complete heart block) 06/07/2023    CHF exacerbation 04/24/2024    Chronic obstructive pulmonary disease 09/01/2023    pt denies having this    Difficulty walking 2023    Numbness in right foot    Essential hypertension 05/08/2019    GERD (gastroesophageal reflux disease)     HFrEF (heart failure with reduced ejection fraction) 08/11/2021     "Hyperlipidemia 08/11/2021    Paroxysmal atrial fibrillation     Polyneuropathy 06/19/2023    Prostate cancer 2010    with seeds implant     Septic joint 11/26/2021    Sleep apnea      Past Surgical History:   Procedure Laterality Date    ABLATION OF DYSRHYTHMIC FOCUS  2013    APPENDECTOMY      ARTERIAL BYPASS SURGERY      CARDIAC ABLATION      x2  \"years ago\"     CARDIAC ELECTROPHYSIOLOGY PROCEDURE N/A 11/01/2021    Procedure: PPM battery change;  Surgeon: Ge Whelan MD;  Location: [facility] CATH INVASIVE LOCATION;  Service: Cardiovascular;  Laterality: N/A;    CARDIAC SURGERY  2006    cabg 3v    COLONOSCOPY      CORONARY ARTERY BYPASS GRAFT  2006    CYSTOSCOPY URETHROTOMY VISUAL INTERNAL N/A 08/15/2024    Procedure: CYSTOSCOPY, URETHRAL DILATION, CATHETER PLACEMENT, RETROGRADE URETHROGRAM;  Surgeon: August Myers MD;  Location: [facility] MAIN OR;  Service: Urology;  Laterality: N/A;    CYSTOSCOPY URETHROTOMY VISUAL INTERNAL N/A 10/24/2024    Procedure: CYSTOSCOPY URETHROTOMY;  Surgeon: Mally Bernal MD;  Location: [facility] MAIN OR;  Service: Urology;  Laterality: N/A;    ENDOSCOPY      with dilation     ENDOSCOPY N/A 1/21/2025    Procedure: ESOPHAGOGASTRODUODENOSCOPY WITH BIOPSIES, POLYPECTOMY, BALLOON DILATION 18-20mm;  Surgeon: Simona Garcia MD;  Location: [facility] ENDOSCOPY;  Service: Gastroenterology;  Laterality: N/A;  GASTRIC POLYPS, SCHATZKI'S RING, HIATAL HERNIA    KNEE ARTHROSCOPY Left     TOTAL KNEE ARTHROPLASTY Left 07/23/2021    Procedure: TOTAL KNEE ARTHROPLASTY WITH DORA NAVIGATION WITH BIOMET;  Surgeon: Carlitos Zhao MD;  Location: [facility] MAIN OR;  Service: Orthopedics;  Laterality: Left;    VENTRICULAR CARDIAC PACEMAKER INSERTION      medtronic      Family History   Problem Relation Age of Onset    Heart attack Mother     Heart attack Father     No Known Problems Sister     No Known Problems Brother     No Known Problems Maternal Aunt     No Known Problems Maternal Uncle     No " Known Problems Paternal Aunt     No Known Problems Paternal Uncle     No Known Problems Maternal Grandmother     No Known Problems Maternal Grandfather     No Known Problems Paternal Grandmother     No Known Problems Paternal Grandfather     No Known Problems Other        Home Medications:  Prior to Admission medications    Medication Sig Start Date End Date Taking? Authorizing Provider   allopurinol (ZYLOPRIM) 100 MG tablet Take 1 tablet by mouth Daily. 3/11/22   Ely Srinivasan MD   Eliquis 2.5 MG tablet tablet Take 1 tablet by mouth Every 12 (Twelve) Hours. 9/3/24   lEy Srinivasan MD   furosemide (LASIX) 40 MG tablet Take 1 tablet by mouth Daily. 25   Farhad Gomez DO   Jardiance 10 MG tablet tablet Take 1 tablet by mouth Daily. 25   Farhad Gomez DO   LORazepam (ATIVAN) 0.5 MG tablet Take 1 tablet by mouth 2 (Two) Times a Day As Needed for Anxiety. 24   Ely Srinivasan MD   melatonin 3 MG tablet Take 1 tablet by mouth At Night As Needed for Sleep.    Ely Srinivasan MD   sulfamethoxazole-trimethoprim (Bactrim DS) 800-160 MG per tablet Take 1 tablet by mouth 2 (Two) Times a Day for 7 days. 25  Abena Gonzalez APRN   tamsulosin (FLOMAX) 0.4 MG capsule 24 hr capsule Take 1 capsule by mouth Daily. 25   Farhad Gomez DO   traZODone (DESYREL) 50 MG tablet Take 1 tablet by mouth At Night As Needed for Sleep.    Ely Srinivasan MD   valsartan (DIOVAN) 40 MG tablet Take 1 tablet by mouth Daily. 24   Isabel Navarrete APRN        Social History:   Social History     Tobacco Use    Smoking status: Former     Current packs/day: 0.00     Average packs/day: 0.5 packs/day for 33.6 years (16.8 ttl pk-yrs)     Types: Cigarettes     Start date: 1962     Quit date: 1995     Years since quittin.6     Passive exposure: Never    Smokeless tobacco: Never   Vaping Use    Vaping status: Never Used   Substance Use Topics    Alcohol use: Never  "   Drug use: Never         Review of Systems:  Review of Systems   Constitutional:  Positive for fatigue. Negative for chills, diaphoresis and fever.   HENT:  Negative for congestion, postnasal drip, rhinorrhea and sore throat.    Eyes:  Negative for photophobia.   Respiratory:  Positive for shortness of breath. Negative for cough and chest tightness.    Cardiovascular:  Positive for leg swelling. Negative for chest pain and palpitations.   Gastrointestinal:  Negative for abdominal pain, diarrhea, nausea and vomiting.   Genitourinary:  Negative for difficulty urinating, dysuria, flank pain, frequency, hematuria and urgency.   Musculoskeletal:  Negative for neck pain and neck stiffness.   Skin:  Negative for pallor and rash.   Neurological:  Positive for weakness. Negative for dizziness, syncope, numbness and headaches.   Hematological:  Negative for adenopathy. Does not bruise/bleed easily.   Psychiatric/Behavioral: Negative.          Physical Exam:  /74 (Patient Position: Lying)   Pulse 77   Temp 97.7 °F (36.5 °C) (Oral)   Resp 23   Ht 193 cm (76\")   Wt 87 kg (191 lb 12.8 oz)   SpO2 92%   BMI 23.35 kg/m²         Physical Exam  Vitals and nursing note reviewed.   Constitutional:       General: He is not in acute distress.     Appearance: Normal appearance. He is not ill-appearing, toxic-appearing or diaphoretic.   HENT:      Head: Normocephalic and atraumatic.      Mouth/Throat:      Mouth: Mucous membranes are moist.   Eyes:      Extraocular Movements: Extraocular movements intact.      Conjunctiva/sclera: Conjunctivae normal.      Pupils: Pupils are equal, round, and reactive to light.   Cardiovascular:      Rate and Rhythm: Normal rate. Rhythm irregular.      Pulses: Normal pulses.           Carotid pulses are 2+ on the right side and 2+ on the left side.       Radial pulses are 2+ on the right side and 2+ on the left side.        Femoral pulses are 2+ on the right side and 2+ on the left side.       " Popliteal pulses are 2+ on the right side and 2+ on the left side.        Dorsalis pedis pulses are 2+ on the right side and 2+ on the left side.        Posterior tibial pulses are 2+ on the right side and 2+ on the left side.      Heart sounds: Normal heart sounds. No murmur heard.  Pulmonary:      Effort: Pulmonary effort is normal. No accessory muscle usage, respiratory distress or retractions.      Breath sounds: Examination of the right-lower field reveals rales. Examination of the left-lower field reveals rales. Rales present. No wheezing or rhonchi.   Chest:      Chest wall: No mass or tenderness.      Comments: Patient appears to have a pacemaker/defibrillator located in the left anterior chest wall  Abdominal:      General: Abdomen is flat. There is no distension.      Palpations: Abdomen is soft. There is no mass or pulsatile mass.      Tenderness: There is no abdominal tenderness. There is no right CVA tenderness, left CVA tenderness, guarding or rebound.      Comments: No rigidity   Musculoskeletal:         General: No swelling, tenderness or deformity.      Cervical back: Neck supple. No tenderness.      Right lower leg: No tenderness. Edema present.      Left lower leg: No tenderness. Edema present.      Comments: Patient has some edema in the feet and ankles bilaterally that is symmetric.   Skin:     General: Skin is warm and dry.      Capillary Refill: Capillary refill takes less than 2 seconds.      Coloration: Skin is not cyanotic, jaundiced or pale.      Findings: No erythema.   Neurological:      General: No focal deficit present.      Mental Status: He is alert and oriented to person, place, and time. Mental status is at baseline.      Cranial Nerves: Cranial nerves 2-12 are intact. No cranial nerve deficit.      Sensory: Sensation is intact. No sensory deficit.      Motor: Motor function is intact. No weakness or pronator drift.      Coordination: Coordination is intact. Coordination normal.    Psychiatric:         Attention and Perception: Attention and perception normal.         Mood and Affect: Mood normal.         Behavior: Behavior normal.                            Medical Decision Making:      Comorbidities that affect care:    GERD, hypertension, atrial fibrillation, CHF, COPD, hyperlipidemia, complete heart block    External Notes reviewed:    None      The following orders were placed and all results were independently analyzed by me:  Orders Placed This Encounter   Procedures    COVID-19, FLU A/B, RSV PCR 1 HR TAT - Swab, Nasopharynx    Urine Culture - Urine,    XR Chest 1 View    [place] Draw    Comprehensive Metabolic Panel    BNP    High Sensitivity Troponin T    CBC Auto Differential    High Sensitivity Troponin T 1Hr    Magnesium    T4, Free    TSH    Urinalysis With Microscopic If Indicated (No Culture) - Urine, Clean Catch    Urinalysis, Microscopic Only - Urine, Clean Catch    NPO Diet NPO Type: Strict NPO    Undress & Gown    Continuous Pulse Oximetry    Vital Signs    Inpatient Hospitalist Consult    Oxygen Therapy- Nasal Cannula; Titrate 1-6 LPM Per SpO2; 90 - 95%    ECG 12 Lead ED Triage Standing Order; SOA    Insert Peripheral IV    Initiate Observation Status    CBC & Differential    Green Top (Gel)    Lavender Top    Gold Top - SST    Light Blue Top       Medications Given in the Emergency Department:  Medications   sodium chloride 0.9 % flush 10 mL (has no administration in time range)   furosemide (LASIX) injection 80 mg (80 mg Intravenous Given 2/25/25 2119)   cefTRIAXone (ROCEPHIN) in NS 1 gram/10ml IV PUSH syringe (1,000 mg Intravenous Given 2/25/25 2334)        ED Course:    The patient was initially evaluated in the triage area where orders were placed. The patient was later dispositioned by Timothy Bailey DO.      The patient was advised to stay for completion of workup which includes but is not limited to communication of labs and radiological results, reassessment and  plan. The patient was advised that leaving prior to disposition by a provider could result in critical findings that are not communicated to the patient.     ED Course as of 02/25/25 2339   Tue Feb 25, 2025 1710 PROVIDER IN TRIAGE  Patient was evaluated by me in triage, Bailey Seaver, APRN, GORD[facility].  Orders were placed and patient is currently awaiting final results and disposition.   [AS]   1714 EKG:    Rhythm: Ventricular paced rhythm  Rate: 73  No other analysis made due to the ventricular paced rhythm    EKG Comparison: EKG is unchanged from EKG performed January 27, 2025    Interpreted by me   [SD]      ED Course User Index  [AS] Seaver, Alyce B, APRN  [SD] Timothy Bailey DO       Labs:    Lab Results (last 24 hours)       Procedure Component Value Units Date/Time    COVID-19, FLU A/B, RSV PCR 1 HR TAT - Swab, Nasopharynx [[id number]]  (Normal) Collected: 02/25/25 1712    Specimen: Swab from Nasopharynx Updated: 02/25/25 1752     COVID19 Not Detected     Influenza A PCR Not Detected     Influenza B PCR Not Detected     RSV, PCR Not Detected    Narrative:      Fact sheet for providers: https://www.fda.gov/media/987028/download    Fact sheet for patients: https://www.fda.gov/media/314270/download    Test performed by PCR.    CBC & Differential [[id number]]  (Abnormal) Collected: 02/25/25 1717    Specimen: Blood from Arm, Right Updated: 02/25/25 1728    Narrative:      The following orders were created for panel order CBC & Differential.  Procedure                               Abnormality         Status                     ---------                               -----------         ------                     CBC Auto Differential[[id number]]        Abnormal            Final result                 Please view results for these tests on the individual orders.    Comprehensive Metabolic Panel [[id number]]  (Abnormal) Collected: 02/25/25 1717    Specimen: Blood from Arm, Right Updated: 02/25/25 1745     Glucose 82 mg/dL       BUN 31 mg/dL      Creatinine 1.73 mg/dL      Sodium 139 mmol/L      Potassium 5.4 mmol/L      Chloride 101 mmol/L      CO2 25.0 mmol/L      Calcium 9.2 mg/dL      Total Protein 6.8 g/dL      Albumin 3.5 g/dL      ALT (SGPT) 13 U/L      AST (SGOT) 20 U/L      Alkaline Phosphatase 114 U/L      Total Bilirubin 0.8 mg/dL      Globulin 3.3 gm/dL      A/G Ratio 1.1 g/dL      BUN/Creatinine Ratio 17.9     Anion Gap 13.0 mmol/L      eGFR 38.9 mL/min/1.73     Narrative:      GFR Categories in Chronic Kidney Disease (CKD)      GFR Category          GFR (mL/min/1.73)    Interpretation  G1                     90 or greater         Normal or high (1)  G2                      60-89                Mild decrease (1)  G3a                   45-59                Mild to moderate decrease  G3b                   30-44                Moderate to severe decrease  G4                    15-29                Severe decrease  G5                    14 or less           Kidney failure          (1)In the absence of evidence of kidney disease, neither GFR category G1 or G2 fulfill the criteria for CKD.    eGFR calculation 2021 CKD-EPI creatinine equation, which does not include race as a factor    BNP [[id number]]  (Abnormal) Collected: 02/25/25 1717    Specimen: Blood from Arm, Right Updated: 02/25/25 1742     proBNP 11,320.0 pg/mL     Narrative:      This assay is used as an aid in the diagnosis of individuals suspected of having heart failure. It can be used as an aid in the diagnosis of acute decompensated heart failure (ADHF) in patients presenting with signs and symptoms of ADHF to the emergency department (ED). In addition, NT-proBNP of <300 pg/mL indicates ADHF is not likely.    Age Range Result Interpretation  NT-proBNP Concentration (pg/mL:      <50             Positive            >450                   Gray                 300-450                    Negative             <300    50-75           Positive            >900                   [place]                300-900                  Negative            <300      >75             Positive            >1800                  Gray                300-1800                  Negative            <300    High Sensitivity Troponin T [[id number]]  (Abnormal) Collected: 02/25/25 1717    Specimen: Blood from Arm, Right Updated: 02/25/25 1759     HS Troponin T 89 ng/L     Narrative:      High Sensitive Troponin T Reference Range:  <14.0 ng/L- Negative Female for AMI  <22.0 ng/L- Negative Male for AMI  >=14 - Abnormal Female indicating possible myocardial injury.  >=22 - Abnormal Male indicating possible myocardial injury.   Clinicians would have to utilize clinical acumen, EKG, Troponin, and serial changes to determine if it is an Acute Myocardial Infarction or myocardial injury due to an underlying chronic condition.         CBC Auto Differential [[id number]]  (Abnormal) Collected: 02/25/25 1717    Specimen: Blood from Arm, Right Updated: 02/25/25 1728     WBC 7.25 10*3/mm3      RBC 4.78 10*6/mm3      Hemoglobin 13.7 g/dL      Hematocrit 43.7 %      MCV 91.4 fL      MCH 28.7 pg      MCHC 31.4 g/dL      RDW 16.5 %      RDW-SD 55.2 fl      MPV 9.8 fL      Platelets 200 10*3/mm3      Neutrophil % 66.6 %      Lymphocyte % 21.2 %      Monocyte % 10.5 %      Eosinophil % 0.6 %      Basophil % 0.7 %      Immature Grans % 0.4 %      Neutrophils, Absolute 4.83 10*3/mm3      Lymphocytes, Absolute 1.54 10*3/mm3      Monocytes, Absolute 0.76 10*3/mm3      Eosinophils, Absolute 0.04 10*3/mm3      Basophils, Absolute 0.05 10*3/mm3      Immature Grans, Absolute 0.03 10*3/mm3      nRBC 0.0 /100 WBC     High Sensitivity Troponin T 1Hr [[id number]]  (Abnormal) Collected: 02/25/25 1904    Specimen: Blood from Arm, Right Updated: 02/25/25 1949     HS Troponin T 94 ng/L      Troponin T Numeric Delta 5 ng/L      Troponin T % Delta 6    Narrative:      High Sensitive Troponin T Reference Range:  <14.0 ng/L- Negative Female for AMI  <22.0  ng/L- Negative Male for AMI  >=14 - Abnormal Female indicating possible myocardial injury.  >=22 - Abnormal Male indicating possible myocardial injury.   Clinicians would have to utilize clinical acumen, EKG, Troponin, and serial changes to determine if it is an Acute Myocardial Infarction or myocardial injury due to an underlying chronic condition.         Magnesium [[id number]]  (Abnormal) Collected: 02/25/25 1904    Specimen: Blood from Arm, Right Updated: 02/25/25 2039     Magnesium 2.5 mg/dL     T4, Free [[id number]]  (Normal) Collected: 02/25/25 1904    Specimen: Blood from Arm, Right Updated: 02/25/25 2116     Free T4 1.46 ng/dL     TSH [[id number]]  (Normal) Collected: 02/25/25 1904    Specimen: Blood from Arm, Right Updated: 02/25/25 2116     TSH 3.740 uIU/mL     Urinalysis With Microscopic If Indicated (No Culture) - Urine, Clean Catch [[id number]]  (Abnormal) Collected: 02/25/25 2119    Specimen: Urine, Clean Catch Updated: 02/25/25 2142     Color, UA Yellow     Appearance, UA Clear     pH, UA 7.5     Specific Gravity, UA 1.024     Glucose,  mg/dL (1+)     Ketones, UA Negative     Bilirubin, UA Negative     Blood, UA Negative     Protein, UA Trace     Leuk Esterase, UA Moderate (2+)     Nitrite, UA Negative     Urobilinogen, UA 1.0 E.U./dL    Urinalysis, Microscopic Only - Urine, Clean Catch [[id number]]  (Abnormal) Collected: 02/25/25 2119    Specimen: Urine, Clean Catch Updated: 02/25/25 2211     RBC, UA 3-5 /HPF      WBC, UA 6-10 /HPF      Bacteria, UA 1+ /HPF      Squamous Epithelial Cells, UA 0-2 /HPF      Hyaline Casts, UA None Seen /LPF      Methodology Manual Light Microscopy    Urine Culture - Urine, Urine, Clean Catch [[id number]] Collected: 02/25/25 2119    Specimen: Urine, Clean Catch Updated: 02/25/25 2238             Imaging:    XR Chest 1 View    Result Date: 2/25/2025  XR CHEST 1 VW Date of Exam: 2/25/2025 5:52 PM EST Indication: SOA Triage Protocol Comparison: Chest radiograph 1/27/2025  Findings: Left-sided pacemaker appears in stable position. Mediastinum: Cardiac silhouette appears unchanged including cardiomegaly and postoperative changes. Lungs: Mild prominence of central pulmonary vasculature without overt pulmonary edema. Bibasal patchy opacities. Pleura: Similar small bilateral pleural effusions. Bones and soft tissues: No acute, displaced fracture seen. Median sternotomy     Impression: Similar small bilateral pleural effusions with bibasal opacities likely reflecting atelectasis. Superimposed infection to be excluded clinically. Electronically Signed: Shaq Cee  2/25/2025 6:51 PM EST  Workstation ID: [id number]       Differential Diagnosis and Discussion:      Dyspnea: Differential diagnosis includes but is not limited to metabolic acidosis, neurological disorders, psychogenic, asthma, pneumothorax, upper airway obstruction, COPD, pneumonia, noncardiogenic pulmonary edema, interstitial lung disease, anemia, congestive heart failure, and pulmonary embolism    PROCEDURES:    Labs were collected in the emergency department and all labs were reviewed and interpreted by me.  X-ray were performed in the emergency department and all X-ray impressions were independently interpreted by me.  An EKG was performed and the EKG was interpreted by me.    ECG 12 Lead ED Triage Standing Order; SOA   Preliminary Result   HEART RATE=73  bpm   RR Kdgyzoey=257  ms   DC Interval=75  ms   P Horizontal Axis=  deg   P Front Axis=-25  deg   QRSD Pptwhicd=050  ms   QT Ygekqrej=256  ms   HMzA=150  ms   QRS Axis=-75  deg   T Wave Axis=111  deg   - ABNORMAL ECG -   Ventricular-paced rhythm   No further analysis attempted due to paced rhythm   Date and Time of Study:2025-02-25 17:13:28           Procedures    MDM  Number of Diagnoses or Management Options  Acute on chronic congestive heart failure, unspecified heart failure type  Acute pulmonary edema  Chronic kidney disease, unspecified CKD stage  Elevated  troponin  Pleural effusion  Urinary tract infection without hematuria, site unspecified  Diagnosis management comments: Sepsis was not present in the emergency department or on arrival. This is supported as the patient does not either meet two out of the four SIRS criteria or has an obvious bacterial infection.      SIRS criteria considered:   1.                     Temperature > 100.4 or <98.6    2.                     Heart Rate > 90    3.                     Respiratory Rate > 22    4.                     WBC > 12K or <4K.     The patient's TSH and free T4 were normal.  I do not feel that the patient had thyrotoxicosis or thyroid storm and thus not the cause of the patient's symptoms    Magnesium is elevated 2.5    The patient's CBC was reviewed and shows no abnormalities of critical concern.  Of note, there is no anemia requiring a blood transfusion and the platelet count is acceptable    The patient's CMP was reviewed and shows no abnormalities of critical concern.  Of note, the patient's sodium and potassium are acceptable.  The patient's liver enzymes are unremarkable.   The patient has a normal anion gap.  Patient's BUN is 31 the patient's creatinine is 1.73.  In comparison to old labs the patient has chronic kidney disease.  The patient appears to be at her baseline    Patient's first high send troponin was 89.  His repeat an hour later returned at 94.  This is a delta of 5.  The delta is considered clinically insignificant.  Comparison to old troponins a month ago, the troponin is actually improved.  May be elevated related to the patient's heart failure and chronic kidney disease    Patient's BNP is 11,000 2120 which is elevated which is consistent with the patient's known heart failure and consistent with the patient's chest x-ray    Patient's chest x-ray demonstrates pulmonary edema and a right pleural effusion    The patient's Covid swab was negative   The patient's Influenza swab was negative   The  patient's RSV swab was negative    Urinalysis demonstrates 1+ bacteria and 6-10 WBCs.  The patient is on Bactrim.  I will perform a urine culture.  Will start the patient on Rocephin.    Patient was given 80 of Lasix for the acute pulmonary edema.    I went and reevaluate the patient.  The patient states that he is feeling better.  However, the patient states that he does not feel comfortable to go home as he lives alone and he still is weak and slightly feels short of breath.  The patient is requesting admission.        The patient is stable at the time of admission.  The patient's vital signs are stable.  The patient is in no distress and appears to be resting comfortably.  The patient is at their baseline mental status       Amount and/or Complexity of Data Reviewed  Clinical lab tests: reviewed and ordered  Tests in the radiology section of CPT®: reviewed and ordered  Tests in the medicine section of CPT®: ordered and reviewed  Decide to obtain previous medical records or to obtain history from someone other than the patient: yes  Discuss the patient with other providers: yes (23:39 EST  I discussed the case with the hospitalist, Dr. Malik.  We have discussed the patient's presenting symptoms, laboratory values, imaging and condition at the time of admission.  They will evaluate the patient in the emergency room and admit the patient to the hospital)           Social Determinants of Health:    Patient is independent, reliable, and has access to care.       Disposition and Care Coordination:    Admit:   Through independent evaluation of the patient's history, physical, and imperical data, the patient meets criteria for inpatient admission to the hospital.        Final diagnoses:   Acute pulmonary edema   Pleural effusion   Acute on chronic congestive heart failure, unspecified heart failure type   Urinary tract infection without hematuria, site unspecified   Chronic kidney disease, unspecified CKD stage    Elevated troponin        ED Disposition       ED Disposition   Decision to Admit    Condition   --    Comment   Level of Care: Remote Telemetry [26]   Diagnosis: CHF (congestive heart failure) [756171]                 This medical record created using voice recognition software.             Timothy Bailey DO  03/03/25 [id number]

## 2025-02-26 PROBLEM — J96.01 ACUTE HYPOXIC RESPIRATORY FAILURE: Status: ACTIVE | Noted: 2025-02-26

## 2025-02-26 LAB
ANION GAP SERPL CALCULATED.3IONS-SCNC: 12.1 MMOL/L (ref 5–15)
BASOPHILS # BLD AUTO: 0.07 10*3/MM3 (ref 0–0.2)
BASOPHILS NFR BLD AUTO: 0.9 % (ref 0–1.5)
BUN SERPL-MCNC: 32 MG/DL (ref 8–23)
BUN/CREAT SERPL: 16.3 (ref 7–25)
CALCIUM SPEC-SCNC: 9 MG/DL (ref 8.6–10.5)
CHLORIDE SERPL-SCNC: 99 MMOL/L (ref 98–107)
CO2 SERPL-SCNC: 24.9 MMOL/L (ref 22–29)
CREAT SERPL-MCNC: 1.96 MG/DL (ref 0.76–1.27)
DEPRECATED RDW RBC AUTO: 54.8 FL (ref 37–54)
EGFRCR SERPLBLD CKD-EPI 2021: 33.5 ML/MIN/1.73
EOSINOPHIL # BLD AUTO: 0.04 10*3/MM3 (ref 0–0.4)
EOSINOPHIL NFR BLD AUTO: 0.5 % (ref 0.3–6.2)
ERYTHROCYTE [DISTWIDTH] IN BLOOD BY AUTOMATED COUNT: 16.6 % (ref 12.3–15.4)
GLUCOSE SERPL-MCNC: 72 MG/DL (ref 65–99)
HCT VFR BLD AUTO: 39.9 % (ref 37.5–51)
HGB BLD-MCNC: 13.2 G/DL (ref 13–17.7)
IMM GRANULOCYTES # BLD AUTO: 0.03 10*3/MM3 (ref 0–0.05)
IMM GRANULOCYTES NFR BLD AUTO: 0.4 % (ref 0–0.5)
LYMPHOCYTES # BLD AUTO: 1.64 10*3/MM3 (ref 0.7–3.1)
LYMPHOCYTES NFR BLD AUTO: 21 % (ref 19.6–45.3)
MCH RBC QN AUTO: 30.4 PG (ref 26.6–33)
MCHC RBC AUTO-ENTMCNC: 33.1 G/DL (ref 31.5–35.7)
MCV RBC AUTO: 91.9 FL (ref 79–97)
MONOCYTES # BLD AUTO: 0.89 10*3/MM3 (ref 0.1–0.9)
MONOCYTES NFR BLD AUTO: 11.4 % (ref 5–12)
NEUTROPHILS NFR BLD AUTO: 5.13 10*3/MM3 (ref 1.7–7)
NEUTROPHILS NFR BLD AUTO: 65.8 % (ref 42.7–76)
NRBC BLD AUTO-RTO: 0 /100 WBC (ref 0–0.2)
PLATELET # BLD AUTO: 195 10*3/MM3 (ref 140–450)
PMV BLD AUTO: 10.4 FL (ref 6–12)
POTASSIUM SERPL-SCNC: 4.3 MMOL/L (ref 3.5–5.2)
RBC # BLD AUTO: 4.34 10*6/MM3 (ref 4.14–5.8)
SODIUM SERPL-SCNC: 136 MMOL/L (ref 136–145)
WBC NRBC COR # BLD AUTO: 7.8 10*3/MM3 (ref 3.4–10.8)

## 2025-02-26 PROCEDURE — 80048 BASIC METABOLIC PNL TOTAL CA: CPT | Performed by: STUDENT IN AN ORGANIZED HEALTH CARE EDUCATION/TRAINING PROGRAM

## 2025-02-26 PROCEDURE — 25010000002 FUROSEMIDE PER 20 MG: Performed by: STUDENT IN AN ORGANIZED HEALTH CARE EDUCATION/TRAINING PROGRAM

## 2025-02-26 PROCEDURE — 25010000002 CEFTRIAXONE PER 250 MG: Performed by: STUDENT IN AN ORGANIZED HEALTH CARE EDUCATION/TRAINING PROGRAM

## 2025-02-26 PROCEDURE — 85025 COMPLETE CBC W/AUTO DIFF WBC: CPT | Performed by: STUDENT IN AN ORGANIZED HEALTH CARE EDUCATION/TRAINING PROGRAM

## 2025-02-26 PROCEDURE — 99223 1ST HOSP IP/OBS HIGH 75: CPT | Performed by: STUDENT IN AN ORGANIZED HEALTH CARE EDUCATION/TRAINING PROGRAM

## 2025-02-26 PROCEDURE — 25010000002 ONDANSETRON PER 1 MG: Performed by: STUDENT IN AN ORGANIZED HEALTH CARE EDUCATION/TRAINING PROGRAM

## 2025-02-26 RX ORDER — OMEPRAZOLE 20 MG/1
20 CAPSULE, DELAYED RELEASE ORAL DAILY
Status: ON HOLD | COMMUNITY

## 2025-02-26 RX ORDER — TAMSULOSIN HYDROCHLORIDE 0.4 MG/1
0.4 CAPSULE ORAL DAILY
Status: DISCONTINUED | OUTPATIENT
Start: 2025-02-26 | End: 2025-03-02 | Stop reason: HOSPADM

## 2025-02-26 RX ORDER — ONDANSETRON 4 MG/1
4 TABLET, ORALLY DISINTEGRATING ORAL EVERY 6 HOURS PRN
Status: DISCONTINUED | OUTPATIENT
Start: 2025-02-26 | End: 2025-03-02 | Stop reason: HOSPADM

## 2025-02-26 RX ORDER — CITALOPRAM HYDROBROMIDE 10 MG/1
10 TABLET ORAL DAILY
Status: ON HOLD | COMMUNITY
Start: 2025-02-24

## 2025-02-26 RX ORDER — CITALOPRAM HYDROBROMIDE 20 MG/1
10 TABLET ORAL DAILY
Status: DISCONTINUED | OUTPATIENT
Start: 2025-02-26 | End: 2025-03-02 | Stop reason: HOSPADM

## 2025-02-26 RX ORDER — POTASSIUM CHLORIDE 750 MG/1
10 CAPSULE, EXTENDED RELEASE ORAL DAILY
Status: ON HOLD | COMMUNITY
Start: 2025-02-24

## 2025-02-26 RX ORDER — VALSARTAN 80 MG/1
40 TABLET ORAL
Status: DISCONTINUED | OUTPATIENT
Start: 2025-02-26 | End: 2025-03-02 | Stop reason: HOSPADM

## 2025-02-26 RX ORDER — SODIUM CHLORIDE 0.9 % (FLUSH) 0.9 %
10 SYRINGE (ML) INJECTION EVERY 12 HOURS SCHEDULED
Status: DISCONTINUED | OUTPATIENT
Start: 2025-02-26 | End: 2025-03-02 | Stop reason: HOSPADM

## 2025-02-26 RX ORDER — BISACODYL 10 MG
10 SUPPOSITORY, RECTAL RECTAL DAILY PRN
Status: DISCONTINUED | OUTPATIENT
Start: 2025-02-26 | End: 2025-03-02 | Stop reason: HOSPADM

## 2025-02-26 RX ORDER — ONDANSETRON 2 MG/ML
4 INJECTION INTRAMUSCULAR; INTRAVENOUS EVERY 6 HOURS PRN
Status: DISCONTINUED | OUTPATIENT
Start: 2025-02-26 | End: 2025-03-02 | Stop reason: HOSPADM

## 2025-02-26 RX ORDER — ACETAMINOPHEN 650 MG/1
650 SUPPOSITORY RECTAL EVERY 4 HOURS PRN
Status: DISCONTINUED | OUTPATIENT
Start: 2025-02-26 | End: 2025-03-02 | Stop reason: HOSPADM

## 2025-02-26 RX ORDER — SODIUM CHLORIDE 0.9 % (FLUSH) 0.9 %
10 SYRINGE (ML) INJECTION AS NEEDED
Status: DISCONTINUED | OUTPATIENT
Start: 2025-02-26 | End: 2025-03-02 | Stop reason: HOSPADM

## 2025-02-26 RX ORDER — ACETAMINOPHEN 325 MG/1
650 TABLET ORAL EVERY 4 HOURS PRN
Status: DISCONTINUED | OUTPATIENT
Start: 2025-02-26 | End: 2025-03-02 | Stop reason: HOSPADM

## 2025-02-26 RX ORDER — ALLOPURINOL 100 MG/1
100 TABLET ORAL DAILY
Status: DISCONTINUED | OUTPATIENT
Start: 2025-02-26 | End: 2025-03-02 | Stop reason: HOSPADM

## 2025-02-26 RX ORDER — FUROSEMIDE 10 MG/ML
80 INJECTION INTRAMUSCULAR; INTRAVENOUS 2 TIMES DAILY
Status: DISCONTINUED | OUTPATIENT
Start: 2025-02-26 | End: 2025-03-02 | Stop reason: HOSPADM

## 2025-02-26 RX ORDER — ACETAMINOPHEN 160 MG/5ML
650 SOLUTION ORAL EVERY 4 HOURS PRN
Status: DISCONTINUED | OUTPATIENT
Start: 2025-02-26 | End: 2025-03-02 | Stop reason: HOSPADM

## 2025-02-26 RX ORDER — NITROGLYCERIN 0.4 MG/1
0.4 TABLET SUBLINGUAL
Status: DISCONTINUED | OUTPATIENT
Start: 2025-02-26 | End: 2025-03-02 | Stop reason: HOSPADM

## 2025-02-26 RX ORDER — BISACODYL 5 MG/1
5 TABLET, DELAYED RELEASE ORAL DAILY PRN
Status: DISCONTINUED | OUTPATIENT
Start: 2025-02-26 | End: 2025-03-02 | Stop reason: HOSPADM

## 2025-02-26 RX ORDER — HYDROXYZINE HYDROCHLORIDE 25 MG/1
25 TABLET, FILM COATED ORAL 3 TIMES DAILY PRN
Status: DISCONTINUED | OUTPATIENT
Start: 2025-02-26 | End: 2025-03-02 | Stop reason: HOSPADM

## 2025-02-26 RX ORDER — POLYETHYLENE GLYCOL 3350 17 G/17G
17 POWDER, FOR SOLUTION ORAL DAILY PRN
Status: DISCONTINUED | OUTPATIENT
Start: 2025-02-26 | End: 2025-03-02 | Stop reason: HOSPADM

## 2025-02-26 RX ORDER — LORAZEPAM 0.5 MG/1
0.5 TABLET ORAL NIGHTLY
Status: DISCONTINUED | OUTPATIENT
Start: 2025-02-26 | End: 2025-03-02 | Stop reason: HOSPADM

## 2025-02-26 RX ORDER — SODIUM CHLORIDE 9 MG/ML
40 INJECTION, SOLUTION INTRAVENOUS AS NEEDED
Status: DISCONTINUED | OUTPATIENT
Start: 2025-02-26 | End: 2025-03-02 | Stop reason: HOSPADM

## 2025-02-26 RX ORDER — AMOXICILLIN 250 MG
2 CAPSULE ORAL 2 TIMES DAILY PRN
Status: DISCONTINUED | OUTPATIENT
Start: 2025-02-26 | End: 2025-03-02 | Stop reason: HOSPADM

## 2025-02-26 RX ORDER — TRAZODONE HYDROCHLORIDE 50 MG/1
50 TABLET ORAL NIGHTLY
Status: DISCONTINUED | OUTPATIENT
Start: 2025-02-26 | End: 2025-03-02 | Stop reason: HOSPADM

## 2025-02-26 RX ADMIN — APIXABAN 2.5 MG: 2.5 TABLET, FILM COATED ORAL at 15:32

## 2025-02-26 RX ADMIN — VALSARTAN 40 MG: 80 TABLET, FILM COATED ORAL at 15:32

## 2025-02-26 RX ADMIN — ONDANSETRON 4 MG: 2 INJECTION INTRAMUSCULAR; INTRAVENOUS at 15:33

## 2025-02-26 RX ADMIN — TRAZODONE HYDROCHLORIDE 50 MG: 50 TABLET ORAL at 21:06

## 2025-02-26 RX ADMIN — Medication 10 ML: at 21:07

## 2025-02-26 RX ADMIN — SODIUM CHLORIDE 1000 MG: 9 INJECTION INTRAMUSCULAR; INTRAVENOUS; SUBCUTANEOUS at 21:05

## 2025-02-26 RX ADMIN — Medication 10 ML: at 08:44

## 2025-02-26 RX ADMIN — LORAZEPAM 0.5 MG: 0.5 TABLET ORAL at 21:06

## 2025-02-26 RX ADMIN — HYDROXYZINE HYDROCHLORIDE 25 MG: 25 TABLET, FILM COATED ORAL at 08:43

## 2025-02-26 RX ADMIN — FUROSEMIDE 80 MG: 10 INJECTION, SOLUTION INTRAMUSCULAR; INTRAVENOUS at 21:06

## 2025-02-26 RX ADMIN — Medication 10 ML: at 02:56

## 2025-02-26 RX ADMIN — TAMSULOSIN HYDROCHLORIDE 0.4 MG: 0.4 CAPSULE ORAL at 15:33

## 2025-02-26 RX ADMIN — CITALOPRAM HYDROBROMIDE 10 MG: 20 TABLET ORAL at 15:32

## 2025-02-26 RX ADMIN — ALLOPURINOL 100 MG: 100 TABLET ORAL at 15:33

## 2025-02-26 RX ADMIN — FUROSEMIDE 80 MG: 10 INJECTION, SOLUTION INTRAMUSCULAR; INTRAVENOUS at 08:43

## 2025-02-26 NOTE — PLAN OF CARE
Goal Outcome Evaluation:  Plan of Care Reviewed With: patient        Progress: improving  Outcome Evaluation: Patient continues to have frequency due to lasix administration in ED. Continue plan of care.

## 2025-02-26 NOTE — H&P
Patient Care Team:  Rani Lopez APRN as PCP - General (Nurse Practitioner)  Mally Bernal MD as Consulting Physician (Urology)    Chief complaint shortness of breath    Subjective     Patient is a 84 y.o. male with history of COPD and CHF presents with worsening shortness of breath.  This has been going on for the past 3 to 4 days however significantly worsened to where he wanted to seek medical attention.  His shortness of breath is worsened with exertion and relieved by rest.  He has also had progressive weakness and inability to ambulate.  He has had increased swelling in his legs.  Otherwise, he denies chest pain, jaw pain, back pain, palpitations.  Patient is supposed to be on Lasix however he does not take it routinely.  Patient otherwise is currently being treated for UTI by his urologist and he gets frequent UTIs.  When he gets UTIs he does have increased generalized weakness    In the emergency department, patient was found to be in congestive heart failure as evidenced by x-ray showing bilateral pleural effusions, elevated troponin as well as elevated proBNP at 11,320    Review of Systems   Pertinent items are noted in HPI    History  Past Medical History:   Diagnosis Date    Aneurysm 2013    Arthritis     left knee     Atrial fibrillation, persistent     CHB (complete heart block) 06/07/2023    CHF exacerbation 04/24/2024    Chronic obstructive pulmonary disease 09/01/2023    pt denies having this    Difficulty walking 2023    Numbness in right foot    Essential hypertension 05/08/2019    GERD (gastroesophageal reflux disease)     HFrEF (heart failure with reduced ejection fraction) 08/11/2021    Hyperlipidemia 08/11/2021    Paroxysmal atrial fibrillation     Polyneuropathy 06/19/2023    Prostate cancer 2010    with seeds implant     Septic joint 11/26/2021    Sleep apnea      Past Surgical History:   Procedure Laterality Date    ABLATION OF DYSRHYTHMIC FOCUS  2013    APPENDECTOMY       "ARTERIAL BYPASS SURGERY      CARDIAC ABLATION      x2  \"years ago\"     CARDIAC ELECTROPHYSIOLOGY PROCEDURE N/A 11/01/2021    Procedure: PPM battery change;  Surgeon: Ge Whelan MD;  Location: [facility] CATH INVASIVE LOCATION;  Service: Cardiovascular;  Laterality: N/A;    CARDIAC SURGERY  2006    cabg 3v    COLONOSCOPY      CORONARY ARTERY BYPASS GRAFT  2006    CYSTOSCOPY URETHROTOMY VISUAL INTERNAL N/A 08/15/2024    Procedure: CYSTOSCOPY, URETHRAL DILATION, CATHETER PLACEMENT, RETROGRADE URETHROGRAM;  Surgeon: August Myers MD;  Location: [facility] OR;  Service: Urology;  Laterality: N/A;    CYSTOSCOPY URETHROTOMY VISUAL INTERNAL N/A 10/24/2024    Procedure: CYSTOSCOPY URETHROTOMY;  Surgeon: Mally Bernal MD;  Location: [facility] OR;  Service: Urology;  Laterality: N/A;    ENDOSCOPY      with dilation     ENDOSCOPY N/A 1/21/2025    Procedure: ESOPHAGOGASTRODUODENOSCOPY WITH BIOPSIES, POLYPECTOMY, BALLOON DILATION 18-20mm;  Surgeon: Simona Garcia MD;  Location: [facility] ENDOSCOPY;  Service: Gastroenterology;  Laterality: N/A;  GASTRIC POLYPS, SCHATZKI'S RING, HIATAL HERNIA    KNEE ARTHROSCOPY Left     TOTAL KNEE ARTHROPLASTY Left 07/23/2021    Procedure: TOTAL KNEE ARTHROPLASTY WITH DORA NAVIGATION WITH BIOMET;  Surgeon: Carlitos Zhao MD;  Location: [facility];  Service: Orthopedics;  Laterality: Left;    VENTRICULAR CARDIAC PACEMAKER INSERTION      medtronic      Family History   Problem Relation Age of Onset    Heart attack Mother     Heart attack Father     No Known Problems Sister     No Known Problems Brother     No Known Problems Maternal Aunt     No Known Problems Maternal Uncle     No Known Problems Paternal Aunt     No Known Problems Paternal Uncle     No Known Problems Maternal Grandmother     No Known Problems Maternal Grandfather     No Known Problems Paternal Grandmother     No Known Problems Paternal Grandfather     No Known Problems Other      Social History     Tobacco " Use    Smoking status: Former     Current packs/day: 0.00     Average packs/day: 0.5 packs/day for 33.6 years (16.8 ttl pk-yrs)     Types: Cigarettes     Start date: 1962     Quit date: 1995     Years since quittin.6     Passive exposure: Never    Smokeless tobacco: Never   Vaping Use    Vaping status: Never Used   Substance Use Topics    Alcohol use: Never    Drug use: Never     Medications Prior to Admission   Medication Sig Dispense Refill Last Dose/Taking    allopurinol (ZYLOPRIM) 100 MG tablet Take 1 tablet by mouth Daily.   2025 Morning    Eliquis 2.5 MG tablet tablet Take 1 tablet by mouth Every 12 (Twelve) Hours.   2025 Bedtime    furosemide (LASIX) 40 MG tablet Take 1 tablet by mouth Daily.   Past Week    Jardiance 10 MG tablet tablet Take 1 tablet by mouth Daily.   2025 Morning    LORazepam (ATIVAN) 0.5 MG tablet Take 1 tablet by mouth 2 (Two) Times a Day As Needed for Anxiety.   2025 Bedtime    melatonin 3 MG tablet Take 1 tablet by mouth At Night As Needed for Sleep.   Past Month Bedtime    sulfamethoxazole-trimethoprim (Bactrim DS) 800-160 MG per tablet Take 1 tablet by mouth 2 (Two) Times a Day for 7 days. 14 tablet 0 2025 Evening    tamsulosin (FLOMAX) 0.4 MG capsule 24 hr capsule Take 1 capsule by mouth Daily.   2025 Morning    traZODone (DESYREL) 50 MG tablet Take 1 tablet by mouth At Night As Needed for Sleep.   2025 Bedtime    valsartan (DIOVAN) 40 MG tablet Take 1 tablet by mouth Daily. 90 tablet 3 2025 Morning     Allergies:  Patient has no known allergies.    Objective     Vital Signs  Temp:  [97.3 °F (36.3 °C)-97.7 °F (36.5 °C)] 97.3 °F (36.3 °C)  Heart Rate:  [70-77] 77  Resp:  [18-23] 20  BP: ()/(60-74) 92/72    Physical Exam:      General Appearance:  Alert, cooperative, in no acute distress   Head:  Normocephalic, without obvious abnormality, atraumatic   Eyes:  Lids and lashes normal, conjunctivae and sclerae normal, no icterus,  no pallor, corneas clear, PERRLA   Ears:  Ears appear intact with no abnormalities noted   Throat:  No oral lesions, no thrush, oral mucosa moist   Neck:  No adenopathy, supple, trachea midline, no thyromegaly, no carotid bruit, no JVD   Back:  No kyphosis present, no scoliosis present, no skin lesions, erythema or scars, no tenderness to percussion or palpation, range of motion normal   Lungs:  Clear to auscultation, respirations regular, even and unlabored    Heart:  Regular rhythm and normal rate, normal S1 and S2, no murmur, no gallop, no rub, no click   Chest Wall:  No abnormalities observed   Abdomen:  Normal bowel sounds, no masses, no organomegaly, soft non-tender, non-distended, no guarding, no rebound tenderness   Rectal:  Deferred   Extremities:  Moves all extremities well, no edema, no cyanosis, no redness   Pulses:  Pulses palpable and equal bilaterally   Skin:  No bleeding, bruising or rash   Lymph nodes:  No palpable adenopathy   Neurologic:  Cranial nerves 2 - 12 grossly intact, sensation intact, DTR present and equal bilaterally     Results Review:    I reviewed the patient's new clinical results.  I reviewed the patient's new imaging results and agree with the interpretation.  I reviewed the patient's other test results and agree with the interpretation  I personally viewed and interpreted the patient's EKG/Telemetry data    Assessment & Plan       Acute on chronic HFrEF (heart failure with reduced ejection fraction)    Essential hypertension    CAD with CABG    Chronic obstructive pulmonary disease    Atrial fibrillation, chronic    Stage 3b chronic kidney disease    Acute hypoxic respiratory failure      Admit to hospitalist service  IV diuresis  Strict I's and O's  Remote telemetry  Supplemental oxygen  Supportive care  Restart home medications once list is profiled  Heart healthy diet  Full code      Ge Silva MD  02/26/25  04:04 EST

## 2025-02-26 NOTE — PROGRESS NOTES
[facility]   Hospitalist Progress Note  Date: 2025  Patient Name: Javi Martínez  : 1940  MRN: [id number]  Date of admission: 2025  Room/Bed: [facility]      Subjective   Subjective     Chief Complaint: Shortness of breath    Summary:Javi Martínez is a 84 y.o. male with heart failure with reduced ejection fraction (EF 21 to 25%), COPD, A-fib, coronary artery disease status post CABG, hypertension, stage IIIb CKD who presents to the hospital with shortness of breath.  Admitted for acute decompensated heart failure.  Undergoing diuresis    Interval Followup:   Patient says that his breathing is somewhat improved and that his swelling is somewhat improved.    All systems reviewed and negative except for what is outlined above.      Objective   Objective     Vitals:   Temp:  [97.3 °F (36.3 °C)-97.7 °F (36.5 °C)] 97.3 °F (36.3 °C)  Heart Rate:  [70-89] 89  Resp:  [18-23] 20  BP: ()/(60-82) 95/82  Flow (L/min) (Oxygen Therapy):  [2] 2    Physical Exam   General: No acute distress  Cardiovascular: RRR, no murmurs   Pulmonary: Increased work of breathing, bibasilar crackles  Gastrointestinal: Abdomen is soft and nontender  Musculoskeletal: 2+ bilateral ankle edema    Result Review    Result Review:  I have personally reviewed these results:  [x]  Laboratory      Lab 25  0656 25  1717   WBC 7.80 7.25   HEMOGLOBIN 13.2 13.7   HEMATOCRIT 39.9 43.7   PLATELETS 195 200   NEUTROS ABS 5.13 4.83   IMMATURE GRANS (ABS) 0.03 0.03   LYMPHS ABS 1.64 1.54   MONOS ABS 0.89 0.76   EOS ABS 0.04 0.04   MCV 91.9 91.4         Lab 25  0656 25  1904 25  1717   SODIUM 136  --  139   POTASSIUM 4.3  --  5.4*   CHLORIDE 99  --  101   CO2 24.9  --  25.0   ANION GAP 12.1  --  13.0   BUN 32*  --  31*   CREATININE 1.96*  --  1.73*   EGFR 33.5*  --  38.9*   GLUCOSE 72  --  82   CALCIUM 9.0  --  9.2   MAGNESIUM  --  2.5*  --    TSH  --  3.740  --          Lab 25  1717   TOTAL PROTEIN 6.8    ALBUMIN 3.5   GLOBULIN 3.3   ALT (SGPT) 13   AST (SGOT) 20   BILIRUBIN 0.8   ALK PHOS 114         Lab 02/25/25  1904 02/25/25  1717   PROBNP  --  11,320.0*   HSTROP T 94* 89*                 Brief Urine Lab Results  (Last result in the past 365 days)        Color   Clarity   Blood   Leuk Est   Nitrite   Protein   CREAT   Urine HCG        02/25/25 2119 Yellow   Clear   Negative   Moderate (2+)   Negative   Trace                 [x]  Microbiology   Microbiology Results (last 10 days)       Procedure Component Value - Date/Time    COVID-19, FLU A/B, RSV PCR 1 HR TAT - Swab, Nasopharynx [[id number]]  (Normal) Collected: 02/25/25 1712    Lab Status: Final result Specimen: Swab from Nasopharynx Updated: 02/25/25 1752     COVID19 Not Detected     Influenza A PCR Not Detected     Influenza B PCR Not Detected     RSV, PCR Not Detected    Narrative:      Fact sheet for providers: https://www.fda.gov/media/631959/download    Fact sheet for patients: https://www.fda.gov/media/299422/download    Test performed by PCR.    Urine Culture - Urine, Urine, Clean Catch [[id number]]  (Abnormal)  (Susceptibility) Collected: 02/19/25 1618    Lab Status: Final result Specimen: Urine, Clean Catch Updated: 02/21/25 1034     Urine Culture >100,000 CFU/mL Escherichia coli ESBL    Narrative:      Colonization of the urinary tract without infection is common. Treatment is discouraged unless the patient is symptomatic, pregnant, or undergoing an invasive urologic procedure.  Recent outcomes data supports the use of pip/tazo in the treatment of susceptible ESBL infections for uncomplicated UTI. Consider use of pip/tazo as a carbapenem-sparing regimen in applicable patients.    Susceptibility        Escherichia coli ESBL      SHINE      Ciprofloxacin Resistant      Ertapenem Susceptible      Gentamicin Susceptible      Levofloxacin Resistant      Meropenem Susceptible      Nitrofurantoin Susceptible      Piperacillin + Tazobactam Susceptible       Trimethoprim + Sulfamethoxazole Susceptible                                 [x]  Radiology  XR Chest 1 View    Result Date: 2/25/2025  Impression: Similar small bilateral pleural effusions with bibasal opacities likely reflecting atelectasis. Superimposed infection to be excluded clinically. Electronically Signed: Shaq Cee  2/25/2025 6:51 PM EST  Workstation ID: [id number]   []  EKG/Telemetry   []  Cardiology/Vascular   []  Pathology  []  Old records  []  Other:    Assessment & Plan        Assessment and Plan:    #Acute decompensated heart failure  #Heart failure with reduced ejection fraction (EF 21 to 25%)  Continue Lasix  Continue valsartan  Daily weights  Strict I's and O's  K greater than 4, mag greater than 2    #A-fib  Continue apixaban    #UTI  Continue ceftriaxone    #Anxiety  Continue Celexa  Continue lorazepam  Continue trazodone    #BPH  Continue Flomax     Discussed with RN.    VTE Prophylaxis:  Mechanical VTE prophylaxis orders are present.        CODE STATUS:   Code Status (Patient has no pulse and is not breathing): CPR (Attempt to Resuscitate)  Medical Interventions (Patient has pulse or is breathing): Full Support      Electronically signed by Rodger Maradiaga MD, 2/26/2025, 14:51 EST.

## 2025-02-26 NOTE — PLAN OF CARE
Problem: Adult Inpatient Plan of Care  Goal: Plan of Care Review  Outcome: Progressing  Flowsheets (Taken 2/26/2025 1753)  Progress: no change  Outcome Evaluation: vss. Gave PRN atarax and zofran as ordered. Elevated feet educated pt this am to keep feet up to help with swelling. This afternoon swelling has went down. no new changes at this time.  Plan of Care Reviewed With: patient  Goal: Patient-Specific Goal (Individualized)  Outcome: Progressing  Goal: Absence of Hospital-Acquired Illness or Injury  Outcome: Progressing  Intervention: Identify and Manage Fall Risk  Recent Flowsheet Documentation  Taken 2/26/2025 1753 by Braden Nuñez RN  Safety Promotion/Fall Prevention:   clutter free environment maintained   assistive device/personal items within reach   room organization consistent   safety round/check completed  Taken 2/26/2025 1530 by Braden Nuñez RN  Safety Promotion/Fall Prevention:   clutter free environment maintained   assistive device/personal items within reach   room organization consistent   safety round/check completed  Taken 2/26/2025 1300 by Braden Nuñez RN  Safety Promotion/Fall Prevention: safety round/check completed  Taken 2/26/2025 1105 by Braden Nuñez RN  Safety Promotion/Fall Prevention: safety round/check completed  Taken 2/26/2025 0925 by Braden Nuñez RN  Safety Promotion/Fall Prevention: safety round/check completed  Taken 2/26/2025 0843 by Braden Nuñez RN  Safety Promotion/Fall Prevention: safety round/check completed  Intervention: Prevent Skin Injury  Recent Flowsheet Documentation  Taken 2/26/2025 0843 by Braden Nuñez RN  Body Position:   foot of bed elevated   legs elevated  Goal: Optimal Comfort and Wellbeing  Outcome: Progressing  Goal: Readiness for Transition of Care  Outcome: Progressing     Problem: Heart Failure  Goal: Optimal Coping  Outcome: Progressing  Goal: Optimal Cardiac Output and Blood Flow  Outcome: Progressing  Goal: Stable Heart Rate and  Rhythm  Outcome: Progressing  Goal: Fluid and Electrolyte Balance  Outcome: Progressing  Goal: Optimal Functional Ability  Outcome: Progressing  Goal: Improved Oral Intake  Outcome: Progressing  Goal: Effective Oxygenation and Ventilation  Outcome: Progressing  Intervention: Promote Airway Secretion Clearance  Recent Flowsheet Documentation  Taken 2/26/2025 0843 by Braden Nuñez RN  Cough And Deep Breathing: done independently per patient  Goal: Effective Breathing Pattern During Sleep  Outcome: Progressing     Problem: Pain Acute  Goal: Optimal Pain Control and Function  Outcome: Progressing     Problem: Infection  Goal: Absence of Infection Signs and Symptoms  Outcome: Progressing   Goal Outcome Evaluation:  Plan of Care Reviewed With: patient        Progress: no change  Outcome Evaluation: vss. Gave PRN atarax and zofran as ordered. Elevated feet educated pt this am to keep feet up to help with swelling. This afternoon swelling has went down. no new changes at this time.

## 2025-02-27 LAB
ANION GAP SERPL CALCULATED.3IONS-SCNC: 11.8 MMOL/L (ref 5–15)
ANION GAP SERPL CALCULATED.3IONS-SCNC: 21.6 MMOL/L (ref 5–15)
BACTERIA SPEC AEROBE CULT: NORMAL
BASOPHILS # BLD AUTO: 0.09 10*3/MM3 (ref 0–0.2)
BASOPHILS NFR BLD AUTO: 1.3 % (ref 0–1.5)
BUN SERPL-MCNC: 39 MG/DL (ref 8–23)
BUN SERPL-MCNC: 39 MG/DL (ref 8–23)
BUN/CREAT SERPL: 17.8 (ref 7–25)
BUN/CREAT SERPL: 18.4 (ref 7–25)
CALCIUM SPEC-SCNC: 8.8 MG/DL (ref 8.6–10.5)
CALCIUM SPEC-SCNC: 9 MG/DL (ref 8.6–10.5)
CHLORIDE SERPL-SCNC: 97 MMOL/L (ref 98–107)
CHLORIDE SERPL-SCNC: 97 MMOL/L (ref 98–107)
CO2 SERPL-SCNC: 17.4 MMOL/L (ref 22–29)
CO2 SERPL-SCNC: 28.2 MMOL/L (ref 22–29)
CREAT SERPL-MCNC: 2.12 MG/DL (ref 0.76–1.27)
CREAT SERPL-MCNC: 2.19 MG/DL (ref 0.76–1.27)
DEPRECATED RDW RBC AUTO: 56.1 FL (ref 37–54)
EGFRCR SERPLBLD CKD-EPI 2021: 29.3 ML/MIN/1.73
EGFRCR SERPLBLD CKD-EPI 2021: 30.5 ML/MIN/1.73
EOSINOPHIL # BLD AUTO: 0.13 10*3/MM3 (ref 0–0.4)
EOSINOPHIL NFR BLD AUTO: 1.8 % (ref 0.3–6.2)
ERYTHROCYTE [DISTWIDTH] IN BLOOD BY AUTOMATED COUNT: 18.2 % (ref 12.3–15.4)
GLUCOSE SERPL-MCNC: 74 MG/DL (ref 65–99)
GLUCOSE SERPL-MCNC: 83 MG/DL (ref 65–99)
HCT VFR BLD AUTO: 43 % (ref 37.5–51)
HGB BLD-MCNC: 13.5 G/DL (ref 13–17.7)
IMM GRANULOCYTES # BLD AUTO: 0.02 10*3/MM3 (ref 0–0.05)
IMM GRANULOCYTES NFR BLD AUTO: 0.3 % (ref 0–0.5)
LYMPHOCYTES # BLD AUTO: 1.51 10*3/MM3 (ref 0.7–3.1)
LYMPHOCYTES NFR BLD AUTO: 21.3 % (ref 19.6–45.3)
MAGNESIUM SERPL-MCNC: 2.4 MG/DL (ref 1.6–2.4)
MCH RBC QN AUTO: 29.5 PG (ref 26.6–33)
MCHC RBC AUTO-ENTMCNC: 31.4 G/DL (ref 31.5–35.7)
MCV RBC AUTO: 93.9 FL (ref 79–97)
MONOCYTES # BLD AUTO: 0.8 10*3/MM3 (ref 0.1–0.9)
MONOCYTES NFR BLD AUTO: 11.3 % (ref 5–12)
NEUTROPHILS NFR BLD AUTO: 4.53 10*3/MM3 (ref 1.7–7)
NEUTROPHILS NFR BLD AUTO: 64 % (ref 42.7–76)
NRBC BLD AUTO-RTO: 0 /100 WBC (ref 0–0.2)
PLATELET # BLD AUTO: 201 10*3/MM3 (ref 140–450)
PMV BLD AUTO: 10.3 FL (ref 6–12)
POTASSIUM SERPL-SCNC: 3.6 MMOL/L (ref 3.5–5.2)
POTASSIUM SERPL-SCNC: 4.2 MMOL/L (ref 3.5–5.2)
RBC # BLD AUTO: 4.58 10*6/MM3 (ref 4.14–5.8)
SODIUM SERPL-SCNC: 136 MMOL/L (ref 136–145)
SODIUM SERPL-SCNC: 137 MMOL/L (ref 136–145)
WBC NRBC COR # BLD AUTO: 7.08 10*3/MM3 (ref 3.4–10.8)

## 2025-02-27 PROCEDURE — 85025 COMPLETE CBC W/AUTO DIFF WBC: CPT | Performed by: STUDENT IN AN ORGANIZED HEALTH CARE EDUCATION/TRAINING PROGRAM

## 2025-02-27 PROCEDURE — 25010000002 CEFTRIAXONE PER 250 MG: Performed by: STUDENT IN AN ORGANIZED HEALTH CARE EDUCATION/TRAINING PROGRAM

## 2025-02-27 PROCEDURE — 83735 ASSAY OF MAGNESIUM: CPT | Performed by: STUDENT IN AN ORGANIZED HEALTH CARE EDUCATION/TRAINING PROGRAM

## 2025-02-27 PROCEDURE — 25010000002 FUROSEMIDE PER 20 MG: Performed by: STUDENT IN AN ORGANIZED HEALTH CARE EDUCATION/TRAINING PROGRAM

## 2025-02-27 PROCEDURE — 80048 BASIC METABOLIC PNL TOTAL CA: CPT | Performed by: STUDENT IN AN ORGANIZED HEALTH CARE EDUCATION/TRAINING PROGRAM

## 2025-02-27 RX ORDER — POTASSIUM CHLORIDE 1.5 G/1.58G
40 POWDER, FOR SOLUTION ORAL ONCE
Status: COMPLETED | OUTPATIENT
Start: 2025-02-27 | End: 2025-02-27

## 2025-02-27 RX ADMIN — CITALOPRAM HYDROBROMIDE 10 MG: 20 TABLET ORAL at 08:09

## 2025-02-27 RX ADMIN — SODIUM CHLORIDE 1000 MG: 9 INJECTION INTRAMUSCULAR; INTRAVENOUS; SUBCUTANEOUS at 21:31

## 2025-02-27 RX ADMIN — Medication 10 ML: at 21:32

## 2025-02-27 RX ADMIN — APIXABAN 2.5 MG: 2.5 TABLET, FILM COATED ORAL at 08:09

## 2025-02-27 RX ADMIN — ALLOPURINOL 100 MG: 100 TABLET ORAL at 08:10

## 2025-02-27 RX ADMIN — TRAZODONE HYDROCHLORIDE 50 MG: 50 TABLET ORAL at 21:31

## 2025-02-27 RX ADMIN — LORAZEPAM 0.5 MG: 0.5 TABLET ORAL at 21:31

## 2025-02-27 RX ADMIN — VALSARTAN 40 MG: 80 TABLET, FILM COATED ORAL at 08:09

## 2025-02-27 RX ADMIN — POTASSIUM CHLORIDE 40 MEQ: 1.5 POWDER, FOR SOLUTION ORAL at 16:26

## 2025-02-27 RX ADMIN — TAMSULOSIN HYDROCHLORIDE 0.4 MG: 0.4 CAPSULE ORAL at 08:10

## 2025-02-27 RX ADMIN — FUROSEMIDE 80 MG: 10 INJECTION, SOLUTION INTRAMUSCULAR; INTRAVENOUS at 08:11

## 2025-02-27 RX ADMIN — SENNOSIDES AND DOCUSATE SODIUM 2 TABLET: 50; 8.6 TABLET ORAL at 21:31

## 2025-02-27 RX ADMIN — APIXABAN 2.5 MG: 2.5 TABLET, FILM COATED ORAL at 21:31

## 2025-02-27 RX ADMIN — Medication 10 ML: at 08:11

## 2025-02-27 NOTE — PLAN OF CARE
Goal Outcome Evaluation:  Plan of Care Reviewed With: patient        Progress: improving  Outcome Evaluation: Pt iv lasix given as ordered. Swelling about the same . Pt keeping legs elevated when in bed.

## 2025-02-28 LAB
ANION GAP SERPL CALCULATED.3IONS-SCNC: 12 MMOL/L (ref 5–15)
BASOPHILS # BLD AUTO: 0.06 10*3/MM3 (ref 0–0.2)
BASOPHILS NFR BLD AUTO: 0.8 % (ref 0–1.5)
BUN SERPL-MCNC: 40 MG/DL (ref 8–23)
BUN/CREAT SERPL: 20.2 (ref 7–25)
CALCIUM SPEC-SCNC: 9.2 MG/DL (ref 8.6–10.5)
CHLORIDE SERPL-SCNC: 101 MMOL/L (ref 98–107)
CO2 SERPL-SCNC: 25 MMOL/L (ref 22–29)
CREAT SERPL-MCNC: 1.98 MG/DL (ref 0.76–1.27)
DEPRECATED RDW RBC AUTO: 55 FL (ref 37–54)
EGFRCR SERPLBLD CKD-EPI 2021: 33.1 ML/MIN/1.73
EOSINOPHIL # BLD AUTO: 0.18 10*3/MM3 (ref 0–0.4)
EOSINOPHIL NFR BLD AUTO: 2.5 % (ref 0.3–6.2)
ERYTHROCYTE [DISTWIDTH] IN BLOOD BY AUTOMATED COUNT: 16.9 % (ref 12.3–15.4)
GLUCOSE SERPL-MCNC: 86 MG/DL (ref 65–99)
HCT VFR BLD AUTO: 39 % (ref 37.5–51)
HGB BLD-MCNC: 13.1 G/DL (ref 13–17.7)
IMM GRANULOCYTES # BLD AUTO: 0.03 10*3/MM3 (ref 0–0.05)
IMM GRANULOCYTES NFR BLD AUTO: 0.4 % (ref 0–0.5)
LYMPHOCYTES # BLD AUTO: 1.52 10*3/MM3 (ref 0.7–3.1)
LYMPHOCYTES NFR BLD AUTO: 21 % (ref 19.6–45.3)
MAGNESIUM SERPL-MCNC: 2.3 MG/DL (ref 1.6–2.4)
MCH RBC QN AUTO: 31.6 PG (ref 26.6–33)
MCHC RBC AUTO-ENTMCNC: 33.6 G/DL (ref 31.5–35.7)
MCV RBC AUTO: 94 FL (ref 79–97)
MONOCYTES # BLD AUTO: 1.01 10*3/MM3 (ref 0.1–0.9)
MONOCYTES NFR BLD AUTO: 14 % (ref 5–12)
NEUTROPHILS NFR BLD AUTO: 4.43 10*3/MM3 (ref 1.7–7)
NEUTROPHILS NFR BLD AUTO: 61.3 % (ref 42.7–76)
NRBC BLD AUTO-RTO: 0 /100 WBC (ref 0–0.2)
PLATELET # BLD AUTO: 182 10*3/MM3 (ref 140–450)
PMV BLD AUTO: 10.2 FL (ref 6–12)
POTASSIUM SERPL-SCNC: 4.1 MMOL/L (ref 3.5–5.2)
RBC # BLD AUTO: 4.15 10*6/MM3 (ref 4.14–5.8)
SODIUM SERPL-SCNC: 138 MMOL/L (ref 136–145)
WBC NRBC COR # BLD AUTO: 7.23 10*3/MM3 (ref 3.4–10.8)

## 2025-02-28 PROCEDURE — 85025 COMPLETE CBC W/AUTO DIFF WBC: CPT | Performed by: STUDENT IN AN ORGANIZED HEALTH CARE EDUCATION/TRAINING PROGRAM

## 2025-02-28 PROCEDURE — 25010000002 FUROSEMIDE PER 20 MG: Performed by: STUDENT IN AN ORGANIZED HEALTH CARE EDUCATION/TRAINING PROGRAM

## 2025-02-28 PROCEDURE — 80048 BASIC METABOLIC PNL TOTAL CA: CPT | Performed by: STUDENT IN AN ORGANIZED HEALTH CARE EDUCATION/TRAINING PROGRAM

## 2025-02-28 PROCEDURE — 83735 ASSAY OF MAGNESIUM: CPT | Performed by: STUDENT IN AN ORGANIZED HEALTH CARE EDUCATION/TRAINING PROGRAM

## 2025-02-28 RX ADMIN — Medication 10 ML: at 21:17

## 2025-02-28 RX ADMIN — CITALOPRAM HYDROBROMIDE 10 MG: 20 TABLET ORAL at 08:00

## 2025-02-28 RX ADMIN — APIXABAN 2.5 MG: 2.5 TABLET, FILM COATED ORAL at 08:01

## 2025-02-28 RX ADMIN — Medication 10 ML: at 08:00

## 2025-02-28 RX ADMIN — TRAZODONE HYDROCHLORIDE 50 MG: 50 TABLET ORAL at 21:16

## 2025-02-28 RX ADMIN — FUROSEMIDE 80 MG: 10 INJECTION, SOLUTION INTRAMUSCULAR; INTRAVENOUS at 21:16

## 2025-02-28 RX ADMIN — FUROSEMIDE 80 MG: 10 INJECTION, SOLUTION INTRAMUSCULAR; INTRAVENOUS at 08:01

## 2025-02-28 RX ADMIN — APIXABAN 2.5 MG: 2.5 TABLET, FILM COATED ORAL at 21:16

## 2025-02-28 RX ADMIN — LORAZEPAM 0.5 MG: 0.5 TABLET ORAL at 21:16

## 2025-02-28 RX ADMIN — TAMSULOSIN HYDROCHLORIDE 0.4 MG: 0.4 CAPSULE ORAL at 08:00

## 2025-02-28 RX ADMIN — ALLOPURINOL 100 MG: 100 TABLET ORAL at 08:00

## 2025-02-28 NOTE — PROGRESS NOTES
[facility]   Hospitalist Progress Note  Date: 2025  Patient Name: Javi Martínez  : 1940  MRN: [id number]  Date of admission: 2025  Room/Bed: [facility] [place]      Subjective   Subjective     Chief Complaint: Shortness of breath    Summary:Javi Martínez is a 84 y.o. male with heart failure with reduced ejection fraction (EF 21 to 25%), COPD, A-fib, coronary artery disease status post CABG, hypertension, stage IIIb CKD who presents to the hospital with shortness of breath.  Admitted for acute decompensated heart failure.  Undergoing diuresis.  Patient's respiratory status is improving with diuresis.  Likely had a lab error on  with labs showing high anion gap.     Interval Followup:   Patient says that his breathing is better.  Still some swelling    All systems reviewed and negative except for what is outlined above.      Objective   Objective     Vitals:   Temp:  [97.5 °F (36.4 °C)-98.7 °F (37.1 °C)] 98.1 °F (36.7 °C)  Heart Rate:  [70-73] 71  Resp:  [16-20] 18  BP: ()/(54-76) 102/76    Physical Exam   General: No acute distress  Cardiovascular: RRR, no murmurs   Pulmonary: Normal work of breathing, bibasilar crackles  Musculoskeletal: 2+ bilateral ankle edema and pretibial edema    Result Review    Result Review:  I have personally reviewed these results:  [x]  Laboratory      Lab 25  0615 25  0535 25  0656   WBC 7.23 7.08 7.80   HEMOGLOBIN 13.1 13.5 13.2   HEMATOCRIT 39.0 43.0 39.9   PLATELETS 182 201 195   NEUTROS ABS 4.43 4.53 5.13   IMMATURE GRANS (ABS) 0.03 0.02 0.03   LYMPHS ABS 1.52 1.51 1.64   MONOS ABS 1.01* 0.80 0.89   EOS ABS 0.18 0.13 0.04   MCV 94.0 93.9 91.9         Lab 25  0615 25  1304 25  0535 25  0656 25  1904   SODIUM 138 137 136   < >  --    POTASSIUM 4.1 3.6 4.2   < >  --    CHLORIDE 101 97* 97*   < >  --    CO2 25.0 28.2 17.4*   < >  --    ANION GAP 12.0 11.8 21.6*   < >  --    BUN 40* 39* 39*   < >  --    CREATININE  1.98* 2.12* 2.19*   < >  --    EGFR 33.1* 30.5* 29.3*   < >  --    GLUCOSE 86 83 74   < >  --    CALCIUM 9.2 8.8 9.0   < >  --    MAGNESIUM 2.3  --  2.4  --  2.5*   TSH  --   --   --   --  3.740    < > = values in this interval not displayed.         Lab 02/25/25  1717   TOTAL PROTEIN 6.8   ALBUMIN 3.5   GLOBULIN 3.3   ALT (SGPT) 13   AST (SGOT) 20   BILIRUBIN 0.8   ALK PHOS 114         Lab 02/25/25  1904 02/25/25 1717   PROBNP  --  11,320.0*   HSTROP T 94* 89*                 Brief Urine Lab Results  (Last result in the past 365 days)        Color   Clarity   Blood   Leuk Est   Nitrite   Protein   CREAT   Urine HCG        02/25/25 2119 Yellow   Clear   Negative   Moderate (2+)   Negative   Trace                 [x]  Microbiology   Microbiology Results (last 10 days)       Procedure Component Value - Date/Time    Urine Culture - Urine, Urine, Clean Catch [[id number]] Collected: 02/25/25 2119    Lab Status: Final result Specimen: Urine, Clean Catch Updated: 02/27/25 0926     Urine Culture 25,000 CFU/mL Normal Urogenital Jerrica    Narrative:      Colonization of the urinary tract without infection is common. Treatment is discouraged unless the patient is symptomatic, pregnant, or undergoing an invasive urologic procedure.    COVID-19, FLU A/B, RSV PCR 1 HR TAT - Swab, Nasopharynx [[id number]]  (Normal) Collected: 02/25/25 1712    Lab Status: Final result Specimen: Swab from Nasopharynx Updated: 02/25/25 1752     COVID19 Not Detected     Influenza A PCR Not Detected     Influenza B PCR Not Detected     RSV, PCR Not Detected    Narrative:      Fact sheet for providers: https://www.fda.gov/media/305682/download    Fact sheet for patients: https://www.fda.gov/media/782890/download    Test performed by PCR.    Urine Culture - Urine, Urine, Clean Catch [[id number]]  (Abnormal)  (Susceptibility) Collected: 02/19/25 1618    Lab Status: Final result Specimen: Urine, Clean Catch Updated: 02/21/25 1034     Urine Culture >100,000  CFU/mL Escherichia coli ESBL    Narrative:      Colonization of the urinary tract without infection is common. Treatment is discouraged unless the patient is symptomatic, pregnant, or undergoing an invasive urologic procedure.  Recent outcomes data supports the use of pip/tazo in the treatment of susceptible ESBL infections for uncomplicated UTI. Consider use of pip/tazo as a carbapenem-sparing regimen in applicable patients.    Susceptibility        Escherichia coli ESBL      SHINE      Ciprofloxacin Resistant      Ertapenem Susceptible      Gentamicin Susceptible      Levofloxacin Resistant      Meropenem Susceptible      Nitrofurantoin Susceptible      Piperacillin + Tazobactam Susceptible      Trimethoprim + Sulfamethoxazole Susceptible                                 [x]  Radiology  XR Chest 1 View    Result Date: 2/25/2025  Impression: Similar small bilateral pleural effusions with bibasal opacities likely reflecting atelectasis. Superimposed infection to be excluded clinically. Electronically Signed: Shaq Cee  2/25/2025 6:51 PM EST  Workstation ID: [id number]   []  EKG/Telemetry   []  Cardiology/Vascular   []  Pathology  []  Old records  []  Other:    Assessment & Plan        Assessment and Plan:    #Acute decompensated heart failure  #Heart failure with reduced ejection fraction (EF 21 to 25%)  Continue Lasix  Hold valsartan  Daily weights  Strict I's and O's  K greater than 4, mag greater than 2  Compression stockings    #A-fib  Continue apixaban    #Anxiety  Continue Celexa  Continue lorazepam  Continue trazodone    #BPH  Continue Flomax    #UTI  Urine culture showed colonization  Stop antibiotics     Discussed with RN.    VTE Prophylaxis:  Pharmacologic & mechanical VTE prophylaxis orders are present.        CODE STATUS:   Code Status (Patient has no pulse and is not breathing): CPR (Attempt to Resuscitate)  Medical Interventions (Patient has pulse or is breathing): Full Support      Electronically  signed by Rodger Maradiaga MD, 2/28/2025, 09:18 EST.

## 2025-02-28 NOTE — PLAN OF CARE
Problem: Adult Inpatient Plan of Care  Goal: Plan of Care Review  Outcome: Progressing  Flowsheets (Taken 2/28/2025 1743)  Progress: no change  Outcome Evaluation: VSS. NO NEW CHANGES AT THIS TIME. SWELLING HAS IMPROVED IN LEGS.  Plan of Care Reviewed With: patient  Goal: Patient-Specific Goal (Individualized)  Outcome: Progressing  Goal: Absence of Hospital-Acquired Illness or Injury  Outcome: Progressing  Intervention: Identify and Manage Fall Risk  Recent Flowsheet Documentation  Taken 2/28/2025 1742 by Braden Nuñez RN  Safety Promotion/Fall Prevention:   clutter free environment maintained   assistive device/personal items within reach   room organization consistent   safety round/check completed  Taken 2/28/2025 1530 by Braden Nuñez RN  Safety Promotion/Fall Prevention:   clutter free environment maintained   assistive device/personal items within reach   room organization consistent   safety round/check completed  Taken 2/28/2025 1326 by Braden Nuñez RN  Safety Promotion/Fall Prevention:   fall prevention program maintained   nonskid shoes/slippers when out of bed   room organization consistent   safety round/check completed   assistive device/personal items within reach   lighting adjusted  Taken 2/28/2025 1220 by Braden Nuñez RN  Safety Promotion/Fall Prevention:   clutter free environment maintained   assistive device/personal items within reach   room organization consistent   safety round/check completed  Taken 2/28/2025 1120 by Braden Nuñez RN  Safety Promotion/Fall Prevention:   clutter free environment maintained   assistive device/personal items within reach   room organization consistent   safety round/check completed  Taken 2/28/2025 0915 by Braden Nuñez RN  Safety Promotion/Fall Prevention:   clutter free environment maintained   assistive device/personal items within reach   room organization consistent   safety round/check completed  Taken 2/28/2025 0800 by Braden Nuñez, MANOLO  Safety  Promotion/Fall Prevention: safety round/check completed  Taken 2/28/2025 0745 by Braden Nuñez, RN  Safety Promotion/Fall Prevention:   clutter free environment maintained   assistive device/personal items within reach   room organization consistent   safety round/check completed  Goal: Optimal Comfort and Wellbeing  Outcome: Progressing  Goal: Readiness for Transition of Care  Outcome: Progressing     Problem: Heart Failure  Goal: Optimal Coping  Outcome: Progressing  Goal: Optimal Cardiac Output and Blood Flow  Outcome: Progressing  Goal: Stable Heart Rate and Rhythm  Outcome: Progressing  Goal: Fluid and Electrolyte Balance  Outcome: Progressing  Goal: Optimal Functional Ability  Outcome: Progressing  Goal: Improved Oral Intake  Outcome: Progressing  Goal: Effective Oxygenation and Ventilation  Outcome: Progressing  Goal: Effective Breathing Pattern During Sleep  Outcome: Progressing     Problem: Pain Acute  Goal: Optimal Pain Control and Function  Outcome: Progressing     Problem: Infection  Goal: Absence of Infection Signs and Symptoms  Outcome: Progressing     Problem: Fall Injury Risk  Goal: Absence of Fall and Fall-Related Injury  Outcome: Progressing  Intervention: Promote Injury-Free Environment  Recent Flowsheet Documentation  Taken 2/28/2025 1742 by Braden Nuñez, RN  Safety Promotion/Fall Prevention:   clutter free environment maintained   assistive device/personal items within reach   room organization consistent   safety round/check completed  Taken 2/28/2025 1530 by Braden Nuñez, RN  Safety Promotion/Fall Prevention:   clutter free environment maintained   assistive device/personal items within reach   room organization consistent   safety round/check completed  Taken 2/28/2025 1326 by Braden Nuñez, RN  Safety Promotion/Fall Prevention:   fall prevention program maintained   nonskid shoes/slippers when out of bed   room organization consistent   safety round/check completed   assistive  device/personal items within reach   lighting adjusted  Taken 2/28/2025 1220 by Braden Nuñez RN  Safety Promotion/Fall Prevention:   clutter free environment maintained   assistive device/personal items within reach   room organization consistent   safety round/check completed  Taken 2/28/2025 1120 by Braden Nuñez RN  Safety Promotion/Fall Prevention:   clutter free environment maintained   assistive device/personal items within reach   room organization consistent   safety round/check completed  Taken 2/28/2025 0915 by Braden Nuñez RN  Safety Promotion/Fall Prevention:   clutter free environment maintained   assistive device/personal items within reach   room organization consistent   safety round/check completed  Taken 2/28/2025 0800 by Braden Nuñez RN  Safety Promotion/Fall Prevention: safety round/check completed  Taken 2/28/2025 0745 by Braden Nuñez RN  Safety Promotion/Fall Prevention:   clutter free environment maintained   assistive device/personal items within reach   room organization consistent   safety round/check completed   Goal Outcome Evaluation:  Plan of Care Reviewed With: patient        Progress: no change  Outcome Evaluation: VSS. NO NEW CHANGES AT THIS TIME. SWELLING HAS IMPROVED IN LEGS.

## 2025-02-28 NOTE — PLAN OF CARE
Goal Outcome Evaluation:  Plan of Care Reviewed With: patient        Progress: improving  Outcome Evaluation: Pt is alert and oriented. Lasix held last pm per pt request. Found in notes to hold lasix.  Having mild soa at rest and on exertion. pt states that this is much improved since coming to the hospital

## 2025-03-01 LAB
ANION GAP SERPL CALCULATED.3IONS-SCNC: 11.7 MMOL/L (ref 5–15)
BASOPHILS # BLD AUTO: 0.09 10*3/MM3 (ref 0–0.2)
BASOPHILS NFR BLD AUTO: 1.2 % (ref 0–1.5)
BUN SERPL-MCNC: 41 MG/DL (ref 8–23)
BUN/CREAT SERPL: 19.8 (ref 7–25)
CALCIUM SPEC-SCNC: 8.7 MG/DL (ref 8.6–10.5)
CHLORIDE SERPL-SCNC: 98 MMOL/L (ref 98–107)
CO2 SERPL-SCNC: 28.3 MMOL/L (ref 22–29)
CREAT SERPL-MCNC: 2.07 MG/DL (ref 0.76–1.27)
DEPRECATED RDW RBC AUTO: 54.4 FL (ref 37–54)
EGFRCR SERPLBLD CKD-EPI 2021: 31.4 ML/MIN/1.73
EOSINOPHIL # BLD AUTO: 0.16 10*3/MM3 (ref 0–0.4)
EOSINOPHIL NFR BLD AUTO: 2.1 % (ref 0.3–6.2)
ERYTHROCYTE [DISTWIDTH] IN BLOOD BY AUTOMATED COUNT: 16.9 % (ref 12.3–15.4)
GLUCOSE SERPL-MCNC: 95 MG/DL (ref 65–99)
HCT VFR BLD AUTO: 41.3 % (ref 37.5–51)
HGB BLD-MCNC: 13.1 G/DL (ref 13–17.7)
IMM GRANULOCYTES # BLD AUTO: 0.04 10*3/MM3 (ref 0–0.05)
IMM GRANULOCYTES NFR BLD AUTO: 0.5 % (ref 0–0.5)
LYMPHOCYTES # BLD AUTO: 1.66 10*3/MM3 (ref 0.7–3.1)
LYMPHOCYTES NFR BLD AUTO: 21.9 % (ref 19.6–45.3)
MAGNESIUM SERPL-MCNC: 2.3 MG/DL (ref 1.6–2.4)
MCH RBC QN AUTO: 28.6 PG (ref 26.6–33)
MCHC RBC AUTO-ENTMCNC: 31.7 G/DL (ref 31.5–35.7)
MCV RBC AUTO: 90.2 FL (ref 79–97)
MONOCYTES # BLD AUTO: 0.92 10*3/MM3 (ref 0.1–0.9)
MONOCYTES NFR BLD AUTO: 12.1 % (ref 5–12)
NEUTROPHILS NFR BLD AUTO: 4.72 10*3/MM3 (ref 1.7–7)
NEUTROPHILS NFR BLD AUTO: 62.2 % (ref 42.7–76)
NRBC BLD AUTO-RTO: 0 /100 WBC (ref 0–0.2)
PLATELET # BLD AUTO: 194 10*3/MM3 (ref 140–450)
PMV BLD AUTO: 10.2 FL (ref 6–12)
POTASSIUM SERPL-SCNC: 3.5 MMOL/L (ref 3.5–5.2)
RBC # BLD AUTO: 4.58 10*6/MM3 (ref 4.14–5.8)
SODIUM SERPL-SCNC: 138 MMOL/L (ref 136–145)
WBC NRBC COR # BLD AUTO: 7.59 10*3/MM3 (ref 3.4–10.8)

## 2025-03-01 PROCEDURE — 80048 BASIC METABOLIC PNL TOTAL CA: CPT | Performed by: STUDENT IN AN ORGANIZED HEALTH CARE EDUCATION/TRAINING PROGRAM

## 2025-03-01 PROCEDURE — 83735 ASSAY OF MAGNESIUM: CPT | Performed by: STUDENT IN AN ORGANIZED HEALTH CARE EDUCATION/TRAINING PROGRAM

## 2025-03-01 PROCEDURE — 25010000002 FUROSEMIDE PER 20 MG: Performed by: STUDENT IN AN ORGANIZED HEALTH CARE EDUCATION/TRAINING PROGRAM

## 2025-03-01 PROCEDURE — 85025 COMPLETE CBC W/AUTO DIFF WBC: CPT | Performed by: STUDENT IN AN ORGANIZED HEALTH CARE EDUCATION/TRAINING PROGRAM

## 2025-03-01 RX ORDER — POTASSIUM CHLORIDE 750 MG/1
40 CAPSULE, EXTENDED RELEASE ORAL 2 TIMES DAILY WITH MEALS
Status: COMPLETED | OUTPATIENT
Start: 2025-03-01 | End: 2025-03-01

## 2025-03-01 RX ORDER — FAMOTIDINE 20 MG/1
20 TABLET, FILM COATED ORAL
Status: DISCONTINUED | OUTPATIENT
Start: 2025-03-01 | End: 2025-03-02 | Stop reason: HOSPADM

## 2025-03-01 RX ADMIN — HYDROXYZINE HYDROCHLORIDE 25 MG: 25 TABLET, FILM COATED ORAL at 17:33

## 2025-03-01 RX ADMIN — FAMOTIDINE 20 MG: 20 TABLET, FILM COATED ORAL at 15:38

## 2025-03-01 RX ADMIN — APIXABAN 2.5 MG: 2.5 TABLET, FILM COATED ORAL at 08:36

## 2025-03-01 RX ADMIN — APIXABAN 2.5 MG: 2.5 TABLET, FILM COATED ORAL at 20:41

## 2025-03-01 RX ADMIN — POTASSIUM CHLORIDE 40 MEQ: 750 CAPSULE, EXTENDED RELEASE ORAL at 10:25

## 2025-03-01 RX ADMIN — TAMSULOSIN HYDROCHLORIDE 0.4 MG: 0.4 CAPSULE ORAL at 08:36

## 2025-03-01 RX ADMIN — TRAZODONE HYDROCHLORIDE 50 MG: 50 TABLET ORAL at 20:41

## 2025-03-01 RX ADMIN — FUROSEMIDE 80 MG: 10 INJECTION, SOLUTION INTRAMUSCULAR; INTRAVENOUS at 20:41

## 2025-03-01 RX ADMIN — ALLOPURINOL 100 MG: 100 TABLET ORAL at 08:36

## 2025-03-01 RX ADMIN — Medication 10 ML: at 20:41

## 2025-03-01 RX ADMIN — CITALOPRAM HYDROBROMIDE 10 MG: 20 TABLET ORAL at 08:36

## 2025-03-01 RX ADMIN — POTASSIUM CHLORIDE 40 MEQ: 750 CAPSULE, EXTENDED RELEASE ORAL at 17:33

## 2025-03-01 RX ADMIN — FUROSEMIDE 80 MG: 10 INJECTION, SOLUTION INTRAMUSCULAR; INTRAVENOUS at 08:36

## 2025-03-01 RX ADMIN — Medication 10 ML: at 08:36

## 2025-03-01 RX ADMIN — LORAZEPAM 0.5 MG: 0.5 TABLET ORAL at 20:41

## 2025-03-01 NOTE — PROGRESS NOTES
[facility]   Hospitalist Progress Note  Date: 3/1/2025  Patient Name: Javi Martínez  : 1940  MRN: [id number]  Date of admission: 2025  Room/Bed: [facility]      Subjective   Subjective     Chief Complaint: Shortness of breath    Summary:Javi Martínez is a 84 y.o. male with heart failure with reduced ejection fraction (EF 21 to 25%), COPD, A-fib, coronary artery disease status post CABG, hypertension, stage IIIb CKD who presents to the hospital with shortness of breath.  Admitted for acute decompensated heart failure.  Undergoing diuresis.  Patient's respiratory status and swelling is improving with diuresis.  Likely had a lab error on  with labs showing high anion gap.  Patient will likely be able to go home tomorrow if he continues to improve.    Interval Followup:   Patient says that his breathing is good.  We discussed his CODE STATUS.  He says that he would not want CPR and he would not want to be intubated.  Counseled patient to get a scale for his home and to weigh himself every night and every morning.     All systems reviewed and negative except for what is outlined above.      Objective   Objective     Vitals:   Temp:  [97.3 °F (36.3 °C)-98.2 °F (36.8 °C)] 97.3 °F (36.3 °C)  Heart Rate:  [71-78] 73  Resp:  [16-18] 18  BP: (100-110)/(66-77) 106/68    Physical Exam   General: No acute distress  Cardiovascular: Normal S1, S2  Pulmonary: No accessory muscle use, faint right lung base crackles  Musculoskeletal: 2+ edema right ankle, 1+ edema left ankle, swelling overall much improved from prior    Result Review    Result Review:  I have personally reviewed these results:  [x]  Laboratory      Lab 25  0608 25  0615 25  0535   WBC 7.59 7.23 7.08   HEMOGLOBIN 13.1 13.1 13.5   HEMATOCRIT 41.3 39.0 43.0   PLATELETS 194 182 201   NEUTROS ABS 4.72 4.43 4.53   IMMATURE GRANS (ABS) 0.04 0.03 0.02   LYMPHS ABS 1.66 1.52 1.51   MONOS ABS 0.92* 1.01* 0.80   EOS ABS 0.16 0.18 0.13   MCV  90.2 94.0 93.9         Lab 03/01/25  0608 02/28/25  0615 02/27/25  1304 02/27/25  0535 02/26/25  0656 02/25/25  1904   SODIUM 138 138 137 136   < >  --    POTASSIUM 3.5 4.1 3.6 4.2   < >  --    CHLORIDE 98 101 97* 97*   < >  --    CO2 28.3 25.0 28.2 17.4*   < >  --    ANION GAP 11.7 12.0 11.8 21.6*   < >  --    BUN 41* 40* 39* 39*   < >  --    CREATININE 2.07* 1.98* 2.12* 2.19*   < >  --    EGFR 31.4* 33.1* 30.5* 29.3*   < >  --    GLUCOSE 95 86 83 74   < >  --    CALCIUM 8.7 9.2 8.8 9.0   < >  --    MAGNESIUM 2.3 2.3  --  2.4  --  2.5*   TSH  --   --   --   --   --  3.740    < > = values in this interval not displayed.         Lab 02/25/25 1717   TOTAL PROTEIN 6.8   ALBUMIN 3.5   GLOBULIN 3.3   ALT (SGPT) 13   AST (SGOT) 20   BILIRUBIN 0.8   ALK PHOS 114         Lab 02/25/25  1904 02/25/25 1717   PROBNP  --  11,320.0*   HSTROP T 94* 89*                 Brief Urine Lab Results  (Last result in the past 365 days)        Color   Clarity   Blood   Leuk Est   Nitrite   Protein   CREAT   Urine HCG        02/25/25 2119 Yellow   Clear   Negative   Moderate (2+)   Negative   Trace                 [x]  Microbiology   Microbiology Results (last 10 days)       Procedure Component Value - Date/Time    Urine Culture - Urine, Urine, Clean Catch [[id number]] Collected: 02/25/25 2119    Lab Status: Final result Specimen: Urine, Clean Catch Updated: 02/27/25 0926     Urine Culture 25,000 CFU/mL Normal Urogenital Jerrica    Narrative:      Colonization of the urinary tract without infection is common. Treatment is discouraged unless the patient is symptomatic, pregnant, or undergoing an invasive urologic procedure.    COVID-19, FLU A/B, RSV PCR 1 HR TAT - Swab, Nasopharynx [[id number]]  (Normal) Collected: 02/25/25 1712    Lab Status: Final result Specimen: Swab from Nasopharynx Updated: 02/25/25 175     COVID19 Not Detected     Influenza A PCR Not Detected     Influenza B PCR Not Detected     RSV, PCR Not Detected    Narrative:       Fact sheet for providers: https://www.fda.gov/media/151300/download    Fact sheet for patients: https://www.fda.gov/media/593002/download    Test performed by PCR.    Urine Culture - Urine, Urine, Clean Catch [[id number]]  (Abnormal)  (Susceptibility) Collected: 02/19/25 1618    Lab Status: Final result Specimen: Urine, Clean Catch Updated: 02/21/25 1034     Urine Culture >100,000 CFU/mL Escherichia coli ESBL    Narrative:      Colonization of the urinary tract without infection is common. Treatment is discouraged unless the patient is symptomatic, pregnant, or undergoing an invasive urologic procedure.  Recent outcomes data supports the use of pip/tazo in the treatment of susceptible ESBL infections for uncomplicated UTI. Consider use of pip/tazo as a carbapenem-sparing regimen in applicable patients.    Susceptibility        Escherichia coli ESBL      SHINE      Ciprofloxacin Resistant      Ertapenem Susceptible      Gentamicin Susceptible      Levofloxacin Resistant      Meropenem Susceptible      Nitrofurantoin Susceptible      Piperacillin + Tazobactam Susceptible      Trimethoprim + Sulfamethoxazole Susceptible                                 [x]  Radiology  XR Chest 1 View    Result Date: 2/25/2025  Impression: Similar small bilateral pleural effusions with bibasal opacities likely reflecting atelectasis. Superimposed infection to be excluded clinically. Electronically Signed: Shaq Cee  2/25/2025 6:51 PM EST  Workstation ID: [id number]   []  EKG/Telemetry   []  Cardiology/Vascular   []  Pathology  []  Old records  []  Other:    Assessment & Plan        Assessment and Plan:    #Acute decompensated heart failure  #Heart failure with reduced ejection fraction (EF 21 to 25%)  Continue Lasix  Hold valsartan  Daily weights  Strict I's and O's  K greater than 4, mag greater than 2    #A-fib  Continue apixaban    #Anxiety  Continue Celexa  Continue lorazepam  Continue trazodone    #BPH  Continue  Flomax    #UTI  Urine culture showed colonization  Stop antibiotics     Discussed with RN.    VTE Prophylaxis:  Pharmacologic & mechanical VTE prophylaxis orders are present.        CODE STATUS:   Medical Intervention Limits: No intubation (DNI)  Code Status (Patient has no pulse and is not breathing): No CPR (Do Not Attempt to Resuscitate)  Medical Interventions (Patient has pulse or is breathing): Limited Support      Electronically signed by Rodger Maradiaga MD, 3/1/2025, 13:56 EST.

## 2025-03-01 NOTE — PLAN OF CARE
Goal Outcome Evaluation:  Plan of Care Reviewed With: patient        Progress: improving  Outcome Evaluation: Patient alert and oriented, edema in BLE has decreased, lasix given as ordered. call light within reach.

## 2025-03-01 NOTE — PLAN OF CARE
Problem: Adult Inpatient Plan of Care  Goal: Plan of Care Review  Outcome: Progressing  Flowsheets (Taken 3/1/2025 1544)  Progress: no change  Outcome Evaluation: VSS. MD ORDERED MEDICINE FOR ACID REFLUX. POSSIBLE DC TOMORROW. NO NEW CHANGES.  Plan of Care Reviewed With: patient  Goal: Patient-Specific Goal (Individualized)  Outcome: Progressing  Goal: Absence of Hospital-Acquired Illness or Injury  Outcome: Progressing  Intervention: Identify and Manage Fall Risk  Recent Flowsheet Documentation  Taken 3/1/2025 1542 by Braden Nuñez RN  Safety Promotion/Fall Prevention:   clutter free environment maintained   assistive device/personal items within reach   room organization consistent   safety round/check completed  Taken 3/1/2025 1320 by Braden Nuñez RN  Safety Promotion/Fall Prevention:   clutter free environment maintained   assistive device/personal items within reach   room organization consistent   safety round/check completed  Taken 3/1/2025 1140 by Braden Nuñez RN  Safety Promotion/Fall Prevention:   clutter free environment maintained   assistive device/personal items within reach   room organization consistent   safety round/check completed  Taken 3/1/2025 1031 by Braden Nuñez RN  Safety Promotion/Fall Prevention:   clutter free environment maintained   assistive device/personal items within reach   room organization consistent   safety round/check completed  Taken 3/1/2025 0930 by Braden Nuñez RN  Safety Promotion/Fall Prevention:   clutter free environment maintained   assistive device/personal items within reach   room organization consistent   safety round/check completed  Taken 3/1/2025 0725 by Braden Nuñez RN  Safety Promotion/Fall Prevention: safety round/check completed  Goal: Optimal Comfort and Wellbeing  Outcome: Progressing  Goal: Readiness for Transition of Care  Outcome: Progressing     Problem: Heart Failure  Goal: Optimal Coping  Outcome: Progressing  Goal: Optimal Cardiac  Output and Blood Flow  Outcome: Progressing  Goal: Stable Heart Rate and Rhythm  Outcome: Progressing  Goal: Fluid and Electrolyte Balance  Outcome: Progressing  Goal: Optimal Functional Ability  Outcome: Progressing  Goal: Improved Oral Intake  Outcome: Progressing  Goal: Effective Oxygenation and Ventilation  Outcome: Progressing  Goal: Effective Breathing Pattern During Sleep  Outcome: Progressing     Problem: Pain Acute  Goal: Optimal Pain Control and Function  Outcome: Progressing     Problem: Infection  Goal: Absence of Infection Signs and Symptoms  Outcome: Progressing  Intervention: Prevent or Manage Infection  Recent Flowsheet Documentation  Taken 3/1/2025 0725 by Braden Nuñez RN  Isolation Precautions:   precautions maintained   contact     Problem: Fall Injury Risk  Goal: Absence of Fall and Fall-Related Injury  Outcome: Progressing  Intervention: Promote Injury-Free Environment  Recent Flowsheet Documentation  Taken 3/1/2025 1542 by Braden Nuñez RN  Safety Promotion/Fall Prevention:   clutter free environment maintained   assistive device/personal items within reach   room organization consistent   safety round/check completed  Taken 3/1/2025 1320 by Braden Nuñez RN  Safety Promotion/Fall Prevention:   clutter free environment maintained   assistive device/personal items within reach   room organization consistent   safety round/check completed  Taken 3/1/2025 1140 by Braden Nuñez RN  Safety Promotion/Fall Prevention:   clutter free environment maintained   assistive device/personal items within reach   room organization consistent   safety round/check completed  Taken 3/1/2025 1031 by Braden Nuñez RN  Safety Promotion/Fall Prevention:   clutter free environment maintained   assistive device/personal items within reach   room organization consistent   safety round/check completed  Taken 3/1/2025 0930 by Braden Nuñez RN  Safety Promotion/Fall Prevention:   clutter free environment maintained    assistive device/personal items within reach   room organization consistent   safety round/check completed  Taken 3/1/2025 4225 by Braden Nuñez RN  Safety Promotion/Fall Prevention: safety round/check completed   Goal Outcome Evaluation:  Plan of Care Reviewed With: patient        Progress: no change  Outcome Evaluation: VSS. MD ORDERED MEDICINE FOR ACID REFLUX. POSSIBLE DC TOMORROW. NO NEW CHANGES.

## 2025-03-02 ENCOUNTER — READMISSION MANAGEMENT (OUTPATIENT)
Dept: CALL CENTER | Facility: HOSPITAL | Age: 85
End: 2025-03-02
Payer: MEDICARE

## 2025-03-02 VITALS
HEART RATE: 71 BPM | BODY MASS INDEX: 21.75 KG/M2 | TEMPERATURE: 98.2 F | HEIGHT: 76 IN | OXYGEN SATURATION: 93 % | RESPIRATION RATE: 18 BRPM | SYSTOLIC BLOOD PRESSURE: 98 MMHG | WEIGHT: 178.57 LBS | DIASTOLIC BLOOD PRESSURE: 69 MMHG

## 2025-03-02 PROBLEM — J96.01 ACUTE HYPOXIC RESPIRATORY FAILURE: Status: RESOLVED | Noted: 2025-02-26 | Resolved: 2025-03-02

## 2025-03-02 LAB
ANION GAP SERPL CALCULATED.3IONS-SCNC: 13.4 MMOL/L (ref 5–15)
BUN SERPL-MCNC: 43 MG/DL (ref 8–23)
BUN/CREAT SERPL: 23.4 (ref 7–25)
CALCIUM SPEC-SCNC: 8.9 MG/DL (ref 8.6–10.5)
CHLORIDE SERPL-SCNC: 99 MMOL/L (ref 98–107)
CO2 SERPL-SCNC: 25.6 MMOL/L (ref 22–29)
CREAT SERPL-MCNC: 1.84 MG/DL (ref 0.76–1.27)
DEPRECATED RDW RBC AUTO: 54 FL (ref 37–54)
EGFRCR SERPLBLD CKD-EPI 2021: 36.2 ML/MIN/1.73
ERYTHROCYTE [DISTWIDTH] IN BLOOD BY AUTOMATED COUNT: 17 % (ref 12.3–15.4)
GLUCOSE SERPL-MCNC: 99 MG/DL (ref 65–99)
HCT VFR BLD AUTO: 41.6 % (ref 37.5–51)
HGB BLD-MCNC: 13.1 G/DL (ref 13–17.7)
MAGNESIUM SERPL-MCNC: 2.2 MG/DL (ref 1.6–2.4)
MCH RBC QN AUTO: 28.4 PG (ref 26.6–33)
MCHC RBC AUTO-ENTMCNC: 31.5 G/DL (ref 31.5–35.7)
MCV RBC AUTO: 90 FL (ref 79–97)
PLATELET # BLD AUTO: 191 10*3/MM3 (ref 140–450)
PMV BLD AUTO: 10.5 FL (ref 6–12)
POTASSIUM SERPL-SCNC: 3.7 MMOL/L (ref 3.5–5.2)
RBC # BLD AUTO: 4.62 10*6/MM3 (ref 4.14–5.8)
SODIUM SERPL-SCNC: 138 MMOL/L (ref 136–145)
WBC NRBC COR # BLD AUTO: 6.97 10*3/MM3 (ref 3.4–10.8)

## 2025-03-02 PROCEDURE — 25010000002 FUROSEMIDE PER 20 MG: Performed by: STUDENT IN AN ORGANIZED HEALTH CARE EDUCATION/TRAINING PROGRAM

## 2025-03-02 PROCEDURE — 63710000001 ONDANSETRON ODT 4 MG TABLET DISPERSIBLE: Performed by: STUDENT IN AN ORGANIZED HEALTH CARE EDUCATION/TRAINING PROGRAM

## 2025-03-02 PROCEDURE — 83735 ASSAY OF MAGNESIUM: CPT | Performed by: STUDENT IN AN ORGANIZED HEALTH CARE EDUCATION/TRAINING PROGRAM

## 2025-03-02 PROCEDURE — 85027 COMPLETE CBC AUTOMATED: CPT | Performed by: STUDENT IN AN ORGANIZED HEALTH CARE EDUCATION/TRAINING PROGRAM

## 2025-03-02 PROCEDURE — 80048 BASIC METABOLIC PNL TOTAL CA: CPT | Performed by: STUDENT IN AN ORGANIZED HEALTH CARE EDUCATION/TRAINING PROGRAM

## 2025-03-02 RX ORDER — POTASSIUM CHLORIDE 750 MG/1
40 CAPSULE, EXTENDED RELEASE ORAL ONCE
Status: COMPLETED | OUTPATIENT
Start: 2025-03-02 | End: 2025-03-02

## 2025-03-02 RX ADMIN — Medication 10 ML: at 08:18

## 2025-03-02 RX ADMIN — APIXABAN 2.5 MG: 2.5 TABLET, FILM COATED ORAL at 08:18

## 2025-03-02 RX ADMIN — POTASSIUM CHLORIDE 40 MEQ: 750 CAPSULE, EXTENDED RELEASE ORAL at 08:18

## 2025-03-02 RX ADMIN — TAMSULOSIN HYDROCHLORIDE 0.4 MG: 0.4 CAPSULE ORAL at 08:18

## 2025-03-02 RX ADMIN — FUROSEMIDE 80 MG: 10 INJECTION, SOLUTION INTRAMUSCULAR; INTRAVENOUS at 08:18

## 2025-03-02 RX ADMIN — ALLOPURINOL 100 MG: 100 TABLET ORAL at 08:18

## 2025-03-02 RX ADMIN — FAMOTIDINE 20 MG: 20 TABLET, FILM COATED ORAL at 08:18

## 2025-03-02 RX ADMIN — ONDANSETRON 4 MG: 4 TABLET, ORALLY DISINTEGRATING ORAL at 11:52

## 2025-03-02 RX ADMIN — CITALOPRAM HYDROBROMIDE 10 MG: 20 TABLET ORAL at 08:18

## 2025-03-02 NOTE — PLAN OF CARE
Goal Outcome Evaluation:  Plan of Care Reviewed With: patient        Progress: improving  Outcome Evaluation: Patient resting, no c/o pain or discomfort, trace edema to BLE, call light within reach, pt ambulates to bathroom.

## 2025-03-02 NOTE — DISCHARGE SUMMARY
[facility]         HOSPITALIST  DISCHARGE SUMMARY    Patient Name: Javi Martínez  : 1940  MRN: [id number]    Date of Admission: 2025  Date of Discharge:  3/2/25  Primary Care Physician: Rani Lopez APRN    Consults       Date and Time Order Name Status Description    2025 11:24 PM Inpatient Hospitalist Consult              Active and Resolved Hospital Problems:  Active Hospital Problems    Diagnosis POA   • **Acute on chronic HFrEF (heart failure with reduced ejection fraction) [I50.23] Yes   • CHF (congestive heart failure) [I50.9] Yes   • Atrial fibrillation, chronic [I48.20] Yes   • Stage 3b chronic kidney disease [N18.32] Yes   • Chronic obstructive pulmonary disease [J44.9] Yes   • CAD with CABG [I25.10] Yes   • Essential hypertension [I10] Yes      Resolved Hospital Problems    Diagnosis POA   • Acute hypoxic respiratory failure [J96.01] Unknown       Hospital Course     Hospital Course:  Javi Martínez is a 84 y.o. male with heart failure with reduced ejection fraction (EF 21 to 25%), COPD, A-fib, coronary artery disease status post CABG, hypertension, stage IIIb CKD who presents to the hospital with shortness of breath.  Admitted for acute decompensated heart failure.  Underwent diuresis with IV Lasix 80 mg twice daily.  Patient swelling is resolved.  His breathing is back to normal.  Patient's dry weight is near 178 pounds.  Patient is medically stable.  He can discharge home.      DISCHARGE Follow Up Recommendations for labs and diagnostics:   -Follow-up with PCP, check BMP, perform volume exam  -Follow-up with cardiology      Day of Discharge     Vital Signs:  Temp:  [97.3 °F (36.3 °C)-98.1 °F (36.7 °C)] 97.5 °F (36.4 °C)  Heart Rate:  [72-76] 76  Resp:  [18] 18  BP: ()/(60-73) 104/72  Physical Exam:   General: No acute distress  Cardiovascular: Regular rate and rhythm  Pulmonary: CTAB  MSK: Trace bilateral ankle edema, no pretibial edema      Discharge  Details        Discharge Medications        Continue These Medications        Instructions Start Date   allopurinol 100 MG tablet  Commonly known as: ZYLOPRIM   100 mg, Daily      citalopram 10 MG tablet  Commonly known as: CeleXA   10 mg, Daily      Eliquis 2.5 MG tablet tablet  Generic drug: apixaban   2.5 mg, Every 12 Hours Scheduled      furosemide 40 MG tablet  Commonly known as: LASIX   40 mg, Oral, Daily      Jardiance 10 MG tablet tablet  Generic drug: empagliflozin   10 mg, Oral, Daily      LORazepam 0.5 MG tablet  Commonly known as: ATIVAN   0.5 mg, 2 Times Daily PRN      omeprazole 20 MG capsule  Commonly known as: priLOSEC   20 mg, Daily      potassium chloride 10 MEQ CR capsule  Commonly known as: MICRO-K   10 mEq, Daily      tamsulosin 0.4 MG capsule 24 hr capsule  Commonly known as: FLOMAX   0.4 mg, Oral, Daily      traZODone 50 MG tablet  Commonly known as: DESYREL   50 mg, Nightly PRN      valsartan 40 MG tablet  Commonly known as: DIOVAN   40 mg, Oral, Every 24 Hours Scheduled             Stop These Medications      sulfamethoxazole-trimethoprim 800-160 MG per tablet  Commonly known as: Bactrim DS              No Known Allergies    Discharge Disposition:  Home or Self Care    Diet:  Hospital:  Diet Order   Procedures   • Diet: Cardiac; Healthy Heart (2-3 Na+); Fluid Consistency: Thin (IDDSI 0)       Discharge Activity:   As tolerated    CODE STATUS:  Code Status and Medical Interventions: No CPR (Do Not Attempt to Resuscitate); Limited Support; No intubation (DNI)   Ordered at: 03/01/25 0944     Medical Intervention Limits:    No intubation (DNI)     Code Status (Patient has no pulse and is not breathing):    No CPR (Do Not Attempt to Resuscitate)     Medical Interventions (Patient has pulse or is breathing):    Limited Support         Future Appointments   Date Time Provider Department Center   3/19/2025 12:45 PM [facility] CARD [facility] DEVICE CHECK [facility] [place]   3/19/2025  1:00 PM [place]helan [place]  [facility] [place]   3/27/2025  3:00 PM Aggie Arreola APRN [facility] [place] [facility]   5/19/2025  1:30 PM Mally Bernal MD [facility] [place] [facility]       Additional Instructions for the Follow-ups that You Need to Schedule       Discharge Follow-up with PCP   As directed       Currently Documented PCP:    Rani Lopez APRN    PCP Phone Number:    [phone]     Follow Up Details: 1 week                Pertinent  and/or Most Recent Results       LAB RESULTS:      Lab 03/02/25  0712 03/01/25  0608 02/28/25  0615 02/27/25  0535 02/26/25  0656 02/25/25  1717   WBC 6.97 7.59 7.23 7.08 7.80 7.25   HEMOGLOBIN 13.1 13.1 13.1 13.5 13.2 13.7   HEMATOCRIT 41.6 41.3 39.0 43.0 39.9 43.7   PLATELETS 191 194 182 201 195 200   NEUTROS ABS  --  4.72 4.43 4.53 5.13 4.83   IMMATURE GRANS (ABS)  --  0.04 0.03 0.02 0.03 0.03   LYMPHS ABS  --  1.66 1.52 1.51 1.64 1.54   MONOS ABS  --  0.92* 1.01* 0.80 0.89 0.76   EOS ABS  --  0.16 0.18 0.13 0.04 0.04   MCV 90.0 90.2 94.0 93.9 91.9 91.4         Lab 03/02/25  0712 03/01/25  0608 02/28/25  0615 02/27/25  1304 02/27/25  0535 02/26/25  0656 02/25/25  1904   SODIUM 138 138 138 137 136   < >  --    POTASSIUM 3.7 3.5 4.1 3.6 4.2   < >  --    CHLORIDE 99 98 101 97* 97*   < >  --    CO2 25.6 28.3 25.0 28.2 17.4*   < >  --    ANION GAP 13.4 11.7 12.0 11.8 21.6*   < >  --    BUN 43* 41* 40* 39* 39*   < >  --    CREATININE 1.84* 2.07* 1.98* 2.12* 2.19*   < >  --    EGFR 36.2* 31.4* 33.1* 30.5* 29.3*   < >  --    GLUCOSE 99 95 86 83 74   < >  --    CALCIUM 8.9 8.7 9.2 8.8 9.0   < >  --    MAGNESIUM 2.2 2.3 2.3  --  2.4  --  2.5*   TSH  --   --   --   --   --   --  3.740    < > = values in this interval not displayed.         Lab 02/25/25  1717   TOTAL PROTEIN 6.8   ALBUMIN 3.5   GLOBULIN 3.3   ALT (SGPT) 13   AST (SGOT) 20   BILIRUBIN 0.8   ALK PHOS 114         Lab 02/25/25  1904 02/25/25  1717   PROBNP  --  11,320.0*   HSTROP T 94* 89*                 Brief Urine Lab Results  (Last result in the past  365 days)        Color   Clarity   Blood   Leuk Est   Nitrite   Protein   CREAT   Urine HCG        02/25/25 2119 Yellow   Clear   Negative   Moderate (2+)   Negative   Trace                 Microbiology Results (last 10 days)       Procedure Component Value - Date/Time    Urine Culture - Urine, Urine, Clean Catch [[id number]] Collected: 02/25/25 2119    Lab Status: Final result Specimen: Urine, Clean Catch Updated: 02/27/25 0926     Urine Culture 25,000 CFU/mL Normal Urogenital Jerrica    Narrative:      Colonization of the urinary tract without infection is common. Treatment is discouraged unless the patient is symptomatic, pregnant, or undergoing an invasive urologic procedure.    COVID-19, FLU A/B, RSV PCR 1 HR TAT - Swab, Nasopharynx [[id number]]  (Normal) Collected: 02/25/25 1712    Lab Status: Final result Specimen: Swab from Nasopharynx Updated: 02/25/25 1752     COVID19 Not Detected     Influenza A PCR Not Detected     Influenza B PCR Not Detected     RSV, PCR Not Detected    Narrative:      Fact sheet for providers: https://www.fda.gov/media/991778/download    Fact sheet for patients: https://www.fda.gov/media/290111/download    Test performed by PCR.            XR Chest 1 View    Result Date: 2/25/2025  Impression: Similar small bilateral pleural effusions with bibasal opacities likely reflecting atelectasis. Superimposed infection to be excluded clinically. Electronically Signed: Shaq Cee  2/25/2025 6:51 PM EST  Workstation ID: [id number]              Results for orders placed during the hospital encounter of 01/18/25    Adult Transthoracic Echo Complete W/ Cont if Necessary Per Protocol    Interpretation Summary  •  Left ventricular ejection fraction appears to be 21 - 25%.  •  The left ventricular cavity is severely dilated.  •  Left ventricular diastolic function was indeterminate.  •  The left atrial cavity is moderate to severely dilated.  •  The right atrial cavity is mildly  dilated.  •  Moderate  mitral valve regurgitation is present.  •  Estimated right ventricular systolic pressure from tricuspid regurgitation is moderately elevated (45-55 mmHg).  •  Aortic root measures 4.1 cm.  Ascending aorta measures 4.8 cm.  •  Pleural effusion noted.  •  No significant change from echo done 7/2024.        Time spent on Discharge including face to face service:  35 minutes    Electronically signed by Rodger Maradiaga MD, 03/02/25, 8:39 AM EST.

## 2025-03-02 NOTE — PLAN OF CARE
Problem: Adult Inpatient Plan of Care  Goal: Plan of Care Review  Outcome: Met  Goal: Patient-Specific Goal (Individualized)  Outcome: Met  Goal: Absence of Hospital-Acquired Illness or Injury  Outcome: Met  Intervention: Identify and Manage Fall Risk  Recent Flowsheet Documentation  Taken 3/2/2025 1159 by Braden Nuñez RN  Safety Promotion/Fall Prevention:   clutter free environment maintained   assistive device/personal items within reach   room organization consistent   safety round/check completed  Taken 3/2/2025 0945 by Braden Nuñez RN  Safety Promotion/Fall Prevention:   clutter free environment maintained   assistive device/personal items within reach   room organization consistent   safety round/check completed  Taken 3/2/2025 0745 by Braden Nuñez RN  Safety Promotion/Fall Prevention: safety round/check completed  Goal: Optimal Comfort and Wellbeing  Outcome: Met  Goal: Readiness for Transition of Care  Outcome: Met     Problem: Heart Failure  Goal: Optimal Coping  Outcome: Met  Goal: Optimal Cardiac Output and Blood Flow  Outcome: Met  Goal: Stable Heart Rate and Rhythm  Outcome: Met  Goal: Fluid and Electrolyte Balance  Outcome: Met  Goal: Optimal Functional Ability  Outcome: Met  Goal: Improved Oral Intake  Outcome: Met  Goal: Effective Oxygenation and Ventilation  Outcome: Met  Goal: Effective Breathing Pattern During Sleep  Outcome: Met     Problem: Pain Acute  Goal: Optimal Pain Control and Function  Outcome: Met     Problem: Infection  Goal: Absence of Infection Signs and Symptoms  Outcome: Met     Problem: Fall Injury Risk  Goal: Absence of Fall and Fall-Related Injury  Outcome: Met  Intervention: Promote Injury-Free Environment  Recent Flowsheet Documentation  Taken 3/2/2025 1159 by Braden Nuñez RN  Safety Promotion/Fall Prevention:   clutter free environment maintained   assistive device/personal items within reach   room organization consistent   safety round/check completed  Taken  3/2/2025 0945 by Braden Nuñez, RN  Safety Promotion/Fall Prevention:   clutter free environment maintained   assistive device/personal items within reach   room organization consistent   safety round/check completed  Taken 3/2/2025 0745 by Braden Nuñez, RN  Safety Promotion/Fall Prevention: safety round/check completed   Goal Outcome Evaluation:  Plan of Care Reviewed With: patient        Progress: no change  Outcome Evaluation: VSS. MD ORDERED MEDICINE FOR ACID REFLUX. POSSIBLE DC TOMORROW. NO NEW CHANGES.

## 2025-03-03 NOTE — OUTREACH NOTE
Prep Survey      Flowsheet Row Responses   [facility] facility patient discharged from? [facility]   Is LACE score < 7 ? No   Eligibility Readm Mgmt   Discharge diagnosis A/C CHF   Does the patient have one of the following disease processes/diagnoses(primary or secondary)? CHF   Does the patient have Home health ordered? No   Is there a DME ordered? No   Prep survey completed? Yes            Lenore BECKHAM - Registered Nurse

## 2025-03-06 ENCOUNTER — READMISSION MANAGEMENT (OUTPATIENT)
Dept: CALL CENTER | Facility: HOSPITAL | Age: 85
End: 2025-03-06
Payer: MEDICARE

## 2025-03-06 ENCOUNTER — HOSPITAL ENCOUNTER (INPATIENT)
Facility: HOSPITAL | Age: 85
LOS: 3 days | Discharge: NURSING, REHAB OR LONG-TERM CARE | DRG: 699 | End: 2025-03-18
Attending: EMERGENCY MEDICINE | Admitting: INTERNAL MEDICINE
Payer: MEDICARE

## 2025-03-06 DIAGNOSIS — Z74.09 IMPAIRED MOBILITY AND ADLS: ICD-10-CM

## 2025-03-06 DIAGNOSIS — G47.33 OBSTRUCTIVE SLEEP APNEA: ICD-10-CM

## 2025-03-06 DIAGNOSIS — R33.9 URINARY RETENTION: Primary | ICD-10-CM

## 2025-03-06 DIAGNOSIS — T83.010A SUPRAPUBIC CATHETER DYSFUNCTION, INITIAL ENCOUNTER: ICD-10-CM

## 2025-03-06 DIAGNOSIS — Z78.9 IMPAIRED MOBILITY AND ADLS: ICD-10-CM

## 2025-03-06 DIAGNOSIS — N35.812 OTHER STRICTURE OF BULBOUS URETHRA IN MALE: ICD-10-CM

## 2025-03-06 DIAGNOSIS — R26.2 DIFFICULTY IN WALKING: ICD-10-CM

## 2025-03-06 DIAGNOSIS — N99.114 POSTPROCEDURAL MALE URETHRAL STRICTURE: ICD-10-CM

## 2025-03-06 LAB
ALBUMIN SERPL-MCNC: 3.4 G/DL (ref 3.5–5.2)
ALBUMIN/GLOB SERPL: 0.9 G/DL
ALP SERPL-CCNC: 106 U/L (ref 39–117)
ALT SERPL W P-5'-P-CCNC: 15 U/L (ref 1–41)
ANION GAP SERPL CALCULATED.3IONS-SCNC: 10.9 MMOL/L (ref 5–15)
ANION GAP SERPL CALCULATED.3IONS-SCNC: 12.7 MMOL/L (ref 5–15)
AST SERPL-CCNC: 26 U/L (ref 1–40)
BACTERIA UR QL AUTO: ABNORMAL /HPF
BACTERIA UR QL AUTO: ABNORMAL /HPF
BASOPHILS # BLD AUTO: 0.03 10*3/MM3 (ref 0–0.2)
BASOPHILS NFR BLD AUTO: 0.5 % (ref 0–1.5)
BILIRUB SERPL-MCNC: 1 MG/DL (ref 0–1.2)
BILIRUB UR QL STRIP: NEGATIVE
BILIRUB UR QL STRIP: NEGATIVE
BUN SERPL-MCNC: 47 MG/DL (ref 8–23)
BUN SERPL-MCNC: 48 MG/DL (ref 8–23)
BUN/CREAT SERPL: 21.7 (ref 7–25)
BUN/CREAT SERPL: 23 (ref 7–25)
CALCIUM SPEC-SCNC: 9 MG/DL (ref 8.6–10.5)
CALCIUM SPEC-SCNC: 9.2 MG/DL (ref 8.6–10.5)
CHLORIDE SERPL-SCNC: 94 MMOL/L (ref 98–107)
CHLORIDE SERPL-SCNC: 96 MMOL/L (ref 98–107)
CLARITY UR: CLEAR
CLARITY UR: CLEAR
CO2 SERPL-SCNC: 28.3 MMOL/L (ref 22–29)
CO2 SERPL-SCNC: 29.1 MMOL/L (ref 22–29)
COLOR UR: YELLOW
COLOR UR: YELLOW
CREAT SERPL-MCNC: 2.04 MG/DL (ref 0.76–1.27)
CREAT SERPL-MCNC: 2.21 MG/DL (ref 0.76–1.27)
D-LACTATE SERPL-SCNC: 1.9 MMOL/L (ref 0.5–2)
D-LACTATE SERPL-SCNC: 2.5 MMOL/L (ref 0.5–2)
D-LACTATE SERPL-SCNC: 3.3 MMOL/L (ref 0.5–2)
DEPRECATED RDW RBC AUTO: 57.1 FL (ref 37–54)
EGFRCR SERPLBLD CKD-EPI 2021: 29 ML/MIN/1.73
EGFRCR SERPLBLD CKD-EPI 2021: 31.9 ML/MIN/1.73
EOSINOPHIL # BLD AUTO: 0.01 10*3/MM3 (ref 0–0.4)
EOSINOPHIL NFR BLD AUTO: 0.2 % (ref 0.3–6.2)
ERYTHROCYTE [DISTWIDTH] IN BLOOD BY AUTOMATED COUNT: 17.2 % (ref 12.3–15.4)
GLOBULIN UR ELPH-MCNC: 3.7 GM/DL
GLUCOSE SERPL-MCNC: 89 MG/DL (ref 65–99)
GLUCOSE SERPL-MCNC: 90 MG/DL (ref 65–99)
GLUCOSE UR STRIP-MCNC: ABNORMAL MG/DL
GLUCOSE UR STRIP-MCNC: ABNORMAL MG/DL
HCT VFR BLD AUTO: 43.9 % (ref 37.5–51)
HEMOCCULT STL QL IA: POSITIVE
HGB BLD-MCNC: 13.9 G/DL (ref 13–17.7)
HGB UR QL STRIP.AUTO: ABNORMAL
HGB UR QL STRIP.AUTO: ABNORMAL
HOLD SPECIMEN: NORMAL
HOLD SPECIMEN: NORMAL
HYALINE CASTS UR QL AUTO: ABNORMAL /LPF
HYALINE CASTS UR QL AUTO: ABNORMAL /LPF
IMM GRANULOCYTES # BLD AUTO: 0.02 10*3/MM3 (ref 0–0.05)
IMM GRANULOCYTES NFR BLD AUTO: 0.3 % (ref 0–0.5)
KETONES UR QL STRIP: NEGATIVE
KETONES UR QL STRIP: NEGATIVE
LEUKOCYTE ESTERASE UR QL STRIP.AUTO: ABNORMAL
LEUKOCYTE ESTERASE UR QL STRIP.AUTO: ABNORMAL
LIPASE SERPL-CCNC: 47 U/L (ref 13–60)
LYMPHOCYTES # BLD AUTO: 1.03 10*3/MM3 (ref 0.7–3.1)
LYMPHOCYTES NFR BLD AUTO: 16.9 % (ref 19.6–45.3)
MCH RBC QN AUTO: 29.1 PG (ref 26.6–33)
MCHC RBC AUTO-ENTMCNC: 31.7 G/DL (ref 31.5–35.7)
MCV RBC AUTO: 91.8 FL (ref 79–97)
MONOCYTES # BLD AUTO: 1.01 10*3/MM3 (ref 0.1–0.9)
MONOCYTES NFR BLD AUTO: 16.6 % (ref 5–12)
NEUTROPHILS NFR BLD AUTO: 3.98 10*3/MM3 (ref 1.7–7)
NEUTROPHILS NFR BLD AUTO: 65.5 % (ref 42.7–76)
NITRITE UR QL STRIP: NEGATIVE
NITRITE UR QL STRIP: NEGATIVE
NRBC BLD AUTO-RTO: 0 /100 WBC (ref 0–0.2)
NT-PROBNP SERPL-MCNC: 9460 PG/ML (ref 0–1800)
PH UR STRIP.AUTO: 7 [PH] (ref 5–8)
PH UR STRIP.AUTO: 7 [PH] (ref 5–8)
PLATELET # BLD AUTO: 188 10*3/MM3 (ref 140–450)
PMV BLD AUTO: 10.2 FL (ref 6–12)
POTASSIUM SERPL-SCNC: 4.8 MMOL/L (ref 3.5–5.2)
POTASSIUM SERPL-SCNC: 5.3 MMOL/L (ref 3.5–5.2)
PROT SERPL-MCNC: 7.1 G/DL (ref 6–8.5)
PROT UR QL STRIP: ABNORMAL
PROT UR QL STRIP: ABNORMAL
RBC # BLD AUTO: 4.78 10*6/MM3 (ref 4.14–5.8)
RBC # UR STRIP: ABNORMAL /HPF
RBC # UR STRIP: ABNORMAL /HPF
REF LAB TEST METHOD: ABNORMAL
REF LAB TEST METHOD: ABNORMAL
SODIUM SERPL-SCNC: 135 MMOL/L (ref 136–145)
SODIUM SERPL-SCNC: 136 MMOL/L (ref 136–145)
SP GR UR STRIP: 1.02 (ref 1–1.03)
SP GR UR STRIP: 1.02 (ref 1–1.03)
SQUAMOUS #/AREA URNS HPF: ABNORMAL /HPF
SQUAMOUS #/AREA URNS HPF: ABNORMAL /HPF
UROBILINOGEN UR QL STRIP: ABNORMAL
UROBILINOGEN UR QL STRIP: ABNORMAL
WBC # UR STRIP: ABNORMAL /HPF
WBC # UR STRIP: ABNORMAL /HPF
WBC NRBC COR # BLD AUTO: 6.08 10*3/MM3 (ref 3.4–10.8)
WHOLE BLOOD HOLD COAG: NORMAL
WHOLE BLOOD HOLD SPECIMEN: NORMAL

## 2025-03-06 PROCEDURE — 63710000001 ACETAMINOPHEN 325 MG TABLET: Performed by: UROLOGY

## 2025-03-06 PROCEDURE — A9270 NON-COVERED ITEM OR SERVICE: HCPCS | Performed by: UROLOGY

## 2025-03-06 PROCEDURE — G0378 HOSPITAL OBSERVATION PER HR: HCPCS

## 2025-03-06 PROCEDURE — 85025 COMPLETE CBC W/AUTO DIFF WBC: CPT | Performed by: EMERGENCY MEDICINE

## 2025-03-06 PROCEDURE — 25810000003 SODIUM CHLORIDE 0.9 % SOLUTION: Performed by: INTERNAL MEDICINE

## 2025-03-06 PROCEDURE — 63710000001 TRAZODONE 50 MG TABLET: Performed by: UROLOGY

## 2025-03-06 PROCEDURE — 25010000002 MORPHINE PER 10 MG: Performed by: UROLOGY

## 2025-03-06 PROCEDURE — 81001 URINALYSIS AUTO W/SCOPE: CPT | Performed by: EMERGENCY MEDICINE

## 2025-03-06 PROCEDURE — 63710000001 APIXABAN 2.5 MG TABLET: Performed by: UROLOGY

## 2025-03-06 PROCEDURE — 63710000001 ONDANSETRON ODT 4 MG TABLET DISPERSIBLE: Performed by: UROLOGY

## 2025-03-06 PROCEDURE — 80053 COMPREHEN METABOLIC PANEL: CPT | Performed by: EMERGENCY MEDICINE

## 2025-03-06 PROCEDURE — 83690 ASSAY OF LIPASE: CPT | Performed by: EMERGENCY MEDICINE

## 2025-03-06 PROCEDURE — 82274 ASSAY TEST FOR BLOOD FECAL: CPT | Performed by: EMERGENCY MEDICINE

## 2025-03-06 PROCEDURE — 83880 ASSAY OF NATRIURETIC PEPTIDE: CPT | Performed by: EMERGENCY MEDICINE

## 2025-03-06 PROCEDURE — 63710000001 ONDANSETRON ODT 4 MG TABLET DISPERSIBLE: Performed by: INTERNAL MEDICINE

## 2025-03-06 PROCEDURE — 99223 1ST HOSP IP/OBS HIGH 75: CPT | Performed by: INTERNAL MEDICINE

## 2025-03-06 PROCEDURE — 93010 ELECTROCARDIOGRAM REPORT: CPT | Performed by: INTERNAL MEDICINE

## 2025-03-06 PROCEDURE — 81001 URINALYSIS AUTO W/SCOPE: CPT | Performed by: INTERNAL MEDICINE

## 2025-03-06 PROCEDURE — 51702 INSERT TEMP BLADDER CATH: CPT

## 2025-03-06 PROCEDURE — 83605 ASSAY OF LACTIC ACID: CPT | Performed by: EMERGENCY MEDICINE

## 2025-03-06 PROCEDURE — 36415 COLL VENOUS BLD VENIPUNCTURE: CPT

## 2025-03-06 PROCEDURE — 93005 ELECTROCARDIOGRAM TRACING: CPT | Performed by: INTERNAL MEDICINE

## 2025-03-06 PROCEDURE — 36415 COLL VENOUS BLD VENIPUNCTURE: CPT | Performed by: EMERGENCY MEDICINE

## 2025-03-06 PROCEDURE — 99213 OFFICE O/P EST LOW 20 MIN: CPT | Performed by: UROLOGY

## 2025-03-06 PROCEDURE — 63710000001 TAMSULOSIN 0.4 MG CAPSULE: Performed by: UROLOGY

## 2025-03-06 PROCEDURE — 99285 EMERGENCY DEPT VISIT HI MDM: CPT

## 2025-03-06 PROCEDURE — 53600 DILATE URETHRA STRICTURE: CPT | Performed by: UROLOGY

## 2025-03-06 RX ORDER — CALCIUM CARBONATE 500 MG/1
1 TABLET, CHEWABLE ORAL 2 TIMES DAILY PRN
Status: DISCONTINUED | OUTPATIENT
Start: 2025-03-06 | End: 2025-03-18 | Stop reason: HOSPADM

## 2025-03-06 RX ORDER — TAMSULOSIN HYDROCHLORIDE 0.4 MG/1
0.4 CAPSULE ORAL DAILY
Status: DISCONTINUED | OUTPATIENT
Start: 2025-03-06 | End: 2025-03-18 | Stop reason: HOSPADM

## 2025-03-06 RX ORDER — SODIUM CHLORIDE 9 MG/ML
40 INJECTION, SOLUTION INTRAVENOUS AS NEEDED
Status: DISCONTINUED | OUTPATIENT
Start: 2025-03-06 | End: 2025-03-18 | Stop reason: HOSPADM

## 2025-03-06 RX ORDER — PANTOPRAZOLE SODIUM 40 MG/1
40 TABLET, DELAYED RELEASE ORAL
Status: DISCONTINUED | OUTPATIENT
Start: 2025-03-07 | End: 2025-03-18 | Stop reason: HOSPADM

## 2025-03-06 RX ORDER — SODIUM CHLORIDE 0.9 % (FLUSH) 0.9 %
10 SYRINGE (ML) INJECTION AS NEEDED
Status: DISCONTINUED | OUTPATIENT
Start: 2025-03-06 | End: 2025-03-18 | Stop reason: HOSPADM

## 2025-03-06 RX ORDER — NITROGLYCERIN 0.4 MG/1
0.4 TABLET SUBLINGUAL
Status: DISCONTINUED | OUTPATIENT
Start: 2025-03-06 | End: 2025-03-18 | Stop reason: HOSPADM

## 2025-03-06 RX ORDER — TRAZODONE HYDROCHLORIDE 50 MG/1
50 TABLET ORAL NIGHTLY PRN
Status: DISCONTINUED | OUTPATIENT
Start: 2025-03-06 | End: 2025-03-18 | Stop reason: HOSPADM

## 2025-03-06 RX ORDER — CITALOPRAM HYDROBROMIDE 20 MG/1
10 TABLET ORAL DAILY
Status: DISCONTINUED | OUTPATIENT
Start: 2025-03-07 | End: 2025-03-18 | Stop reason: HOSPADM

## 2025-03-06 RX ORDER — ACETAMINOPHEN 325 MG/1
650 TABLET ORAL EVERY 4 HOURS PRN
Status: DISCONTINUED | OUTPATIENT
Start: 2025-03-06 | End: 2025-03-18 | Stop reason: HOSPADM

## 2025-03-06 RX ORDER — ONDANSETRON 4 MG/1
4 TABLET, ORALLY DISINTEGRATING ORAL EVERY 6 HOURS PRN
Status: DISCONTINUED | OUTPATIENT
Start: 2025-03-06 | End: 2025-03-18 | Stop reason: HOSPADM

## 2025-03-06 RX ORDER — MORPHINE SULFATE 2 MG/ML
2 INJECTION, SOLUTION INTRAMUSCULAR; INTRAVENOUS ONCE
Status: COMPLETED | OUTPATIENT
Start: 2025-03-06 | End: 2025-03-06

## 2025-03-06 RX ORDER — AMOXICILLIN 250 MG
2 CAPSULE ORAL 2 TIMES DAILY PRN
Status: DISCONTINUED | OUTPATIENT
Start: 2025-03-06 | End: 2025-03-18 | Stop reason: HOSPADM

## 2025-03-06 RX ORDER — SODIUM CHLORIDE 0.9 % (FLUSH) 0.9 %
10 SYRINGE (ML) INJECTION EVERY 12 HOURS SCHEDULED
Status: DISCONTINUED | OUTPATIENT
Start: 2025-03-06 | End: 2025-03-18 | Stop reason: HOSPADM

## 2025-03-06 RX ORDER — BISACODYL 5 MG/1
5 TABLET, DELAYED RELEASE ORAL DAILY PRN
Status: DISCONTINUED | OUTPATIENT
Start: 2025-03-06 | End: 2025-03-18 | Stop reason: HOSPADM

## 2025-03-06 RX ORDER — POLYETHYLENE GLYCOL 3350 17 G/17G
17 POWDER, FOR SOLUTION ORAL DAILY PRN
Status: DISCONTINUED | OUTPATIENT
Start: 2025-03-06 | End: 2025-03-18 | Stop reason: HOSPADM

## 2025-03-06 RX ORDER — BISACODYL 10 MG
10 SUPPOSITORY, RECTAL RECTAL DAILY PRN
Status: DISCONTINUED | OUTPATIENT
Start: 2025-03-06 | End: 2025-03-18 | Stop reason: HOSPADM

## 2025-03-06 RX ADMIN — ONDANSETRON 4 MG: 4 TABLET, ORALLY DISINTEGRATING ORAL at 16:40

## 2025-03-06 RX ADMIN — TAMSULOSIN HYDROCHLORIDE 0.4 MG: 0.4 CAPSULE ORAL at 17:52

## 2025-03-06 RX ADMIN — MORPHINE SULFATE 2 MG: 2 INJECTION, SOLUTION INTRAMUSCULAR; INTRAVENOUS at 14:36

## 2025-03-06 RX ADMIN — Medication 10 ML: at 20:27

## 2025-03-06 RX ADMIN — SODIUM CHLORIDE 500 ML: 9 INJECTION, SOLUTION INTRAVENOUS at 16:59

## 2025-03-06 RX ADMIN — TRAZODONE HYDROCHLORIDE 50 MG: 50 TABLET ORAL at 22:21

## 2025-03-06 RX ADMIN — APIXABAN 2.5 MG: 2.5 TABLET, FILM COATED ORAL at 20:27

## 2025-03-06 RX ADMIN — ACETAMINOPHEN 650 MG: 325 TABLET ORAL at 19:52

## 2025-03-06 NOTE — PLAN OF CARE
Goal Outcome Evaluation:         VSS. Arrived from ED. Catheter in place. Output monitored. Alert and oriented. Call light and table within reach. No concerns per patient at this time.

## 2025-03-06 NOTE — ED NOTES
This RN attempted to place coude catheter at this time per order and was unsuccessful at this time. Pt states it is difficult to place a urinary catheter and is common for him. Admitting MD notified.

## 2025-03-06 NOTE — OUTREACH NOTE
CHF Week 1 Survey      Flowsheet Row Responses   [facility] facility patient discharged from? [facility]   Does the patient have one of the following disease processes/diagnoses(primary or secondary)? CHF   CHF Week 1 attempt successful? No   Unsuccessful attempts Attempt 1  [Patient currently in ED at [facility]]            Shahnaz ALMAZAN - Registered Nurse      Shahnaz ALMAZAN - Registered Nurse

## 2025-03-06 NOTE — ED PROCEDURE NOTE
Preoperative diagnosis      Difficult Diamond placement  Urethral stricture  History of prostate cancer with radiation treatment        Postoperative diagnosis     same      Procedure    Urethral dilation with catheter placement      Anesthesia local -lidocaine jelly      After informed consent patient was laid supine prepped and draped in normal sterile fashion.    Timeout undertaken.  12 [place]ib[place] catheter would not go.  I was able to get a Glidewire into the bladder this was switched out with a Pollick catheter to a stiff wire.    At this point I did go ahead and dilate patient to 16 Djib[place]ian with sequential dilation.  A 12 [place]ib[place]ian catheter was made into a Kake catheter and placed in the bladder without issue.      Patient Toller procedure well.  Clear/yellow urine drained easily from the bladder placed to straight drainage.

## 2025-03-06 NOTE — CONSULTS
"  [facility]   UROLOGY Consult Note    Patient Name: Javi Martínez  : 1940  MRN: [id number]  Primary Care Physician:  Rani Lopez APRN  Referring Physician: No ref. provider found  Date of admission: 3/6/2025    Subjective   Subjective     Chief Complaint:     Urinary retention    HPI:  Javi Martínez is a 84 y.o. male         Urinary retention  History of prostate cancer status post brachytherapy and external beam XRT  Difficult Diamond placement      Came in today with urinary retention    Could not void for the last several hours.    Straining    3/25   2.0, GFR 31         1.9, GFR 34      8/15/2024 SP tube fell out  2024 SP tube placed in clinic 10 F    On Eliquis      On TRT through PCP       prostate CA treated with brachytherapy and external beam XRT          Review of Systems     10 systems reviewed and are negative other than what is listed in the HPI    Personal History     Past Medical History:   Diagnosis Date    Aneurysm     Arthritis     left knee     Atrial fibrillation, persistent     CHB (complete heart block) 2023    CHF exacerbation 2024    Chronic obstructive pulmonary disease 2023    pt denies having this    Difficulty walking     Numbness in right foot    Essential hypertension 2019    GERD (gastroesophageal reflux disease)     HFrEF (heart failure with reduced ejection fraction) 2021    Hyperlipidemia 2021    Paroxysmal atrial fibrillation     Polyneuropathy 2023    Prostate cancer 2010    with seeds implant     Septic joint 2021    Sleep apnea        Past Surgical History:   Procedure Laterality Date    ABLATION OF DYSRHYTHMIC FOCUS      APPENDECTOMY      ARTERIAL BYPASS SURGERY      CARDIAC ABLATION      x2  \"years ago\"     CARDIAC ELECTROPHYSIOLOGY PROCEDURE N/A 2021    Procedure: PPM battery change;  Surgeon: Ge Whelan MD;  Location: [facility] CATH INVASIVE LOCATION;  Service: Cardiovascular;  " Laterality: N/A;    CARDIAC SURGERY  2006    cabg 3v    COLONOSCOPY      CORONARY ARTERY BYPASS GRAFT  2006    CYSTOSCOPY URETHROTOMY VISUAL INTERNAL N/A 08/15/2024    Procedure: CYSTOSCOPY, URETHRAL DILATION, CATHETER PLACEMENT, RETROGRADE URETHROGRAM;  Surgeon: August Myers MD;  Location: [facility] OR;  Service: Urology;  Laterality: N/A;    CYSTOSCOPY URETHROTOMY VISUAL INTERNAL N/A 10/24/2024    Procedure: CYSTOSCOPY URETHROTOMY;  Surgeon: Mally Bernal MD;  Location: [facility] MAIN OR;  Service: Urology;  Laterality: N/A;    ENDOSCOPY      with dilation     ENDOSCOPY N/A 1/21/2025    Procedure: ESOPHAGOGASTRODUODENOSCOPY WITH BIOPSIES, POLYPECTOMY, BALLOON DILATION 18-20mm;  Surgeon: Simona Garcia MD;  Location: [facility] ENDOSCOPY;  Service: Gastroenterology;  Laterality: N/A;  GASTRIC POLYPS, SCHATZKI'S RING, HIATAL HERNIA    KNEE ARTHROSCOPY Left     TOTAL KNEE ARTHROPLASTY Left 07/23/2021    Procedure: TOTAL KNEE ARTHROPLASTY WITH DORA NAVIGATION WITH BIOMET;  Surgeon: Carlitos Zhao MD;  Location: [facility] MAIN OR;  Service: Orthopedics;  Laterality: Left;    VENTRICULAR CARDIAC PACEMAKER INSERTION      medtronic        Family History: family history includes Heart attack in his father and mother; No Known Problems in his brother, maternal aunt, maternal grandfather, maternal grandmother, maternal uncle, paternal aunt, paternal grandfather, paternal grandmother, paternal uncle, sister, and another family member. Otherwise pertinent FHx was reviewed and not pertinent to current issue.    Social History:  reports that he quit smoking about 29 years ago. His smoking use included cigarettes. He started smoking about 63 years ago. He has a 16.8 pack-year smoking history. He has never been exposed to tobacco smoke. He has never used smokeless tobacco. He reports that he does not drink alcohol and does not use drugs.    Home Medications:  LORazepam, allopurinol, apixaban, citalopram,  empagliflozin, furosemide, omeprazole, potassium chloride, tamsulosin, traZODone, and valsartan    Allergies:  No Known Allergies    Objective    Objective     Vitals:   Temp:  [99.1 °F (37.3 °C)] 99.1 °F (37.3 °C)  Heart Rate:  [70-77] 77  Resp:  [16] 16  BP: (102-119)/(49-95) 119/95    Physical Exam:   Constitutional: Awake, alert    Respiratory: Clear to auscultation bilaterally, nonlabored respirations    Cardiovascular: RRR, no murmurs, rubs, or gallops, palpable pedal pulses bilaterally   Gastrointestinal: Positive bowel sounds, soft, nontender, nondistended     Circumcised phallus with normal meatus.  Bilateral descended testicles, normal    Result Review    Result Review:  I have personally reviewed the results from the time of this admission to 3/6/2025 14:44 EST and agree with these findings:  []  Laboratory  []  Microbiology  []  Radiology  []  EKG/Telemetry   []  Cardiology/Vascular   []  Pathology  []  Old records  []  Other:      Assessment & Plan   Assessment / Plan     Brief Patient Summary:  Javi Martínez is a 84 y.o. male     Active Hospital Problems:  Active Hospital Problems    Diagnosis     **Urinary retention        Urinary retention  History of prostate cancer treated with radiation      Called by ED secondary to inability for catheter placement.  See separate procedure note        Patient can be discharged once medically stable through primary service.  Can go with catheter for couple days for voiding trial in [facility]'s clinic        Electronically signed by August Myers MD, 03/06/25, 2:44 PM EST.

## 2025-03-06 NOTE — H&P
[facility]  Date: 3/6/2025   Patient Name: Javi Martínez  : 1940  MRN: [id number]  Primary Care Physician:  Rani Lopez APRN  Date of admission: 3/6/2025    Subjective   Subjective     Chief Complaint:   Urinary retention    HPI:    Javi Martínez is an 84 y.o. male with medical history of heart failure with reduced ejection fraction, atrial fibrillation, CKD stage IIIb, COPD, CAD, hypertension, history of prostate cancer status post brachytherapy and external beam XRT and BPH.  Patient presents to the emergency department, for the past several days patient has been having issues with difficulty urinating.  Patient is on chronic Lasix for his history of heart failure.  Patient endorses suprapubic pain, frequent dribbling.  Intermittently having large-volume urination.  In the emergency department patient found to have urinary retention on bladder scan.  Nursing staff attempted Diamond placement at bedside, however unable to successfully place.  Urology consulted in the emergency department for placement of Diamond catheter.  Patient able to tolerate dilation and placement of 12 Thai catheter.  Patient tolerated procedure well, clear yellow urine drained easily from bladder.  Of note in the emergency department patient's initial potassium 5.3, creatinine 2.2.  This appears slightly above patient's baseline of 1.8-1.9.  Repeat BMP shows resolution, potassium of 4.8.  Creatinine down to 2.04.  Patient's proBNP elevated 9000, however down from previous heart failure exacerbations.  Patient states he does not have any dyspnea at this time, trace lower extremity edema.  Patient's lactate returned back at 2.5.       Personal History     Past Medical History:  Past Medical History:   Diagnosis Date    Aneurysm     Arthritis     left knee     Atrial fibrillation, persistent     CHB (complete heart block) 2023    CHF exacerbation 2024    Chronic obstructive  "pulmonary disease 09/01/2023    pt denies having this    Difficulty walking 2023    Numbness in right foot    Essential hypertension 05/08/2019    GERD (gastroesophageal reflux disease)     HFrEF (heart failure with reduced ejection fraction) 08/11/2021    Hyperlipidemia 08/11/2021    Paroxysmal atrial fibrillation     Polyneuropathy 06/19/2023    Prostate cancer 2010    with seeds implant     Septic joint 11/26/2021    Sleep apnea        Past Surgical History:  Past Surgical History:   Procedure Laterality Date    ABLATION OF DYSRHYTHMIC FOCUS  2013    APPENDECTOMY      ARTERIAL BYPASS SURGERY      CARDIAC ABLATION      x2  \"years ago\"     CARDIAC ELECTROPHYSIOLOGY PROCEDURE N/A 11/01/2021    Procedure: PPM battery change;  Surgeon: Ge Whelan MD;  Location: [facility] CATH INVASIVE LOCATION;  Service: Cardiovascular;  Laterality: N/A;    CARDIAC SURGERY  2006    cabg 3v    COLONOSCOPY      CORONARY ARTERY BYPASS GRAFT  2006    CYSTOSCOPY URETHROTOMY VISUAL INTERNAL N/A 08/15/2024    Procedure: CYSTOSCOPY, URETHRAL DILATION, CATHETER PLACEMENT, RETROGRADE URETHROGRAM;  Surgeon: August Myers MD;  Location: [facility] MAIN OR;  Service: Urology;  Laterality: N/A;    CYSTOSCOPY URETHROTOMY VISUAL INTERNAL N/A 10/24/2024    Procedure: CYSTOSCOPY URETHROTOMY;  Surgeon: Mally Bernal MD;  Location: [facility] MAIN OR;  Service: Urology;  Laterality: N/A;    ENDOSCOPY      with dilation     ENDOSCOPY N/A 1/21/2025    Procedure: ESOPHAGOGASTRODUODENOSCOPY WITH BIOPSIES, POLYPECTOMY, BALLOON DILATION 18-20mm;  Surgeon: Simona Garcia MD;  Location: [facility] ENDOSCOPY;  Service: Gastroenterology;  Laterality: N/A;  GASTRIC POLYPS, SCHATZKI'S RING, HIATAL HERNIA    KNEE ARTHROSCOPY Left     TOTAL KNEE ARTHROPLASTY Left 07/23/2021    Procedure: TOTAL KNEE ARTHROPLASTY WITH DORA NAVIGATION WITH BIOMET;  Surgeon: Carlitos Zhao MD;  Location: [facility] MAIN OR;  Service: Orthopedics;  Laterality: Left;    VENTRICULAR " CARDIAC PACEMAKER INSERTION      medtronic        Family History:   Family History   Problem Relation Age of Onset    Heart attack Mother     Heart attack Father     No Known Problems Sister     No Known Problems Brother     No Known Problems Maternal Aunt     No Known Problems Maternal Uncle     No Known Problems Paternal Aunt     No Known Problems Paternal Uncle     No Known Problems Maternal Grandmother     No Known Problems Maternal Grandfather     No Known Problems Paternal Grandmother     No Known Problems Paternal Grandfather     No Known Problems Other        Social History:   Social History     Socioeconomic History    Marital status:    Tobacco Use    Smoking status: Former     Current packs/day: 0.00     Average packs/day: 0.5 packs/day for 33.6 years (16.8 ttl pk-yrs)     Types: Cigarettes     Start date: 1962     Quit date: 1995     Years since quittin.6     Passive exposure: Never    Smokeless tobacco: Never   Vaping Use    Vaping status: Never Used   Substance and Sexual Activity    Alcohol use: Never    Drug use: Never    Sexual activity: Not Currently     Partners: Female       Home Medications:  LORazepam, allopurinol, apixaban, citalopram, empagliflozin, furosemide, omeprazole, potassium chloride, tamsulosin, traZODone, and valsartan    Allergies:  No Known Allergies      Objective   Objective     Vitals:   Temp:  [99.1 °F (37.3 °C)] 99.1 °F (37.3 °C)  Heart Rate:  [70-77] 71  Resp:  [16-19] 19  BP: (102-119)/(49-95) 115/66    Physical Exam    Constitutional: Awake, alert, no acute distress   Eyes: Pupils equal, sclerae anicteric, no conjunctival injection   HENT: NCAT, mucous membranes moist   Neck: Supple, no thyromegaly, no lymphadenopathy, trachea midline   Respiratory: No crackles, no rhonchi   Cardiovascular: Irregularly irregular, no murmurs, rubs, or gallops, palpable pedal pulses bilaterally   Gastrointestinal: Positive bowel sounds, soft, nontender,  nondistended   Musculoskeletal: Trace to 1+ bilateral ankle edema, no clubbing or cyanosis to extremities   Neurologic: Oriented x 3, strength symmetric in all extremities, Cranial Nerves grossly intact to confrontation, speech clear   : Suprapubic tenderness, fullness    Result Review    Result Review:  I have personally reviewed the results from the time of this admission to 3/6/2025 15:15 EST and agree with these findings:  [x]  Laboratory  []  Microbiology  [x]  Radiology  []  EKG/Telemetry   [x]  Cardiology/Vascular   []  Pathology  []  Old records  []  Other:      Assessment & Plan   Assessment / Plan     Assessment/Plan:   Obstructive uropathy  Hyperkalemia  Lactic acidosis  History of prostate cancer status post brachytherapy and external beam XRT  BPH  CKD stage IIIb  COPD  CAD  Hypertension  Heart failure with reduced ejection fraction  Atrial fibrillation on Eliquis    Plan:  Patient will be admitted to the hospital for further care and management  Urology consulted, appreciate assistance  Patient now with Diamond catheter in place, plan to discharge on Diamnod catheter  Repeat labs show improvement in patient's potassium now down to 4.8, continue to closely monitor  Patient's lactate initially 2.5, repeat 3.3, blood pressures are on the soft end, providing small bolus of IV fluids given patient's heart failure history  Patient's proBNP is 9000.  On exam no crackles, trace bilateral lower extremity edema  Will hold patient's diuretic today, likely resume tomorrow, trend lactate  Hold antihypertensives  Patient's other home medications reviewed and resumed as indicated    Discussed with ED provider, discussed with urology      VTE Prophylaxis:  Pharmacologic & mechanical VTE prophylaxis orders are present.        CODE STATUS:    Medical Intervention Limits: No intubation (DNI)  Code Status (Patient has no pulse and is not breathing): No CPR (Do Not Attempt to Resuscitate)  Medical Interventions (Patient has  pulse or is breathing): Limited Support      Admission Status:  I believe this patient meets observation status.    Electronically signed by Vladimir Parker MD, 03/06/25, 3:15 PM EST.

## 2025-03-06 NOTE — ED PROVIDER NOTES
"Time: 10:26 AM EST  Date of encounter:  3/6/2025  Independent Historian/Clinical History and Information was obtained by:   Patient    History is limited by: N/A    Chief Complaint: Urinary issues      History of Present Illness:  Patient is a 84 y.o. year old male who presents to the emergency department for evaluation of urinary issues.  The patient states that he has had decreased urination followed by episodes in which he pees quite a bit into his diaper.  Patient states that he had 2 episodes of this in the last 2 days.  Patient has no chest pain or shortness of breath.  Patient has no nausea or vomiting.  Patient denies cough hemoptysis.  Patient denies dysuria.      Patient Care Team  Primary Care Provider: Rani Lopez APRN    Past Medical History:     No Known Allergies  Past Medical History:   Diagnosis Date    Aneurysm 2013    Arthritis     left knee     Atrial fibrillation, persistent     CHB (complete heart block) 06/07/2023    CHF exacerbation 04/24/2024    Chronic obstructive pulmonary disease 09/01/2023    pt denies having this    Difficulty walking 2023    Numbness in right foot    Essential hypertension 05/08/2019    GERD (gastroesophageal reflux disease)     HFrEF (heart failure with reduced ejection fraction) 08/11/2021    Hyperlipidemia 08/11/2021    Paroxysmal atrial fibrillation     Polyneuropathy 06/19/2023    Prostate cancer 2010    with seeds implant     Septic joint 11/26/2021    Sleep apnea      Past Surgical History:   Procedure Laterality Date    ABLATION OF DYSRHYTHMIC FOCUS  2013    APPENDECTOMY      ARTERIAL BYPASS SURGERY      CARDIAC ABLATION      x2  \"years ago\"     CARDIAC ELECTROPHYSIOLOGY PROCEDURE N/A 11/01/2021    Procedure: PPM battery change;  Surgeon: Ge Whelan MD;  Location: [facility] INVASIVE LOCATION;  Service: Cardiovascular;  Laterality: N/A;    CARDIAC SURGERY  2006    cabg 3v    COLONOSCOPY      CORONARY ARTERY BYPASS GRAFT  2006    CYSTOSCOPY URETHROTOMY " VISUAL INTERNAL N/A 08/15/2024    Procedure: CYSTOSCOPY, URETHRAL DILATION, CATHETER PLACEMENT, RETROGRADE URETHROGRAM;  Surgeon: August Myers MD;  Location: [facility] MAIN OR;  Service: Urology;  Laterality: N/A;    CYSTOSCOPY URETHROTOMY VISUAL INTERNAL N/A 10/24/2024    Procedure: CYSTOSCOPY URETHROTOMY;  Surgeon: Mally Bernal MD;  Location: [facility] MAIN OR;  Service: Urology;  Laterality: N/A;    ENDOSCOPY      with dilation     ENDOSCOPY N/A 1/21/2025    Procedure: ESOPHAGOGASTRODUODENOSCOPY WITH BIOPSIES, POLYPECTOMY, BALLOON DILATION 18-20mm;  Surgeon: Simona Garcia MD;  Location: [facility] ENDOSCOPY;  Service: Gastroenterology;  Laterality: N/A;  GASTRIC POLYPS, SCHATZKI'S RING, HIATAL HERNIA    KNEE ARTHROSCOPY Left     TOTAL KNEE ARTHROPLASTY Left 07/23/2021    Procedure: TOTAL KNEE ARTHROPLASTY WITH DORA NAVIGATION WITH BIOMET;  Surgeon: Carlitos Zhao MD;  Location: [facility] MAIN OR;  Service: Orthopedics;  Laterality: Left;    VENTRICULAR CARDIAC PACEMAKER INSERTION      medtronic      Family History   Problem Relation Age of Onset    Heart attack Mother     Heart attack Father     No Known Problems Sister     No Known Problems Brother     No Known Problems Maternal Aunt     No Known Problems Maternal Uncle     No Known Problems Paternal Aunt     No Known Problems Paternal Uncle     No Known Problems Maternal Grandmother     No Known Problems Maternal Grandfather     No Known Problems Paternal Grandmother     No Known Problems Paternal Grandfather     No Known Problems Other        Home Medications:  Prior to Admission medications    Medication Sig Start Date End Date Taking? Authorizing Provider   allopurinol (ZYLOPRIM) 100 MG tablet Take 1 tablet by mouth Daily. 3/11/22   Ely Srinivasan MD   citalopram (CeleXA) 10 MG tablet Take 1 tablet by mouth Daily. 2/24/25   Ely Srinivasan MD   Eliquis 2.5 MG tablet tablet Take 1 tablet by mouth Every 12 (Twelve) Hours. 9/3/24    Ely Srinivasan MD   furosemide (LASIX) 40 MG tablet Take 1 tablet by mouth Daily. 25   Farhad Gomez DO   Jardiance 10 MG tablet tablet Take 1 tablet by mouth Daily. 25   Farhad Gomez DO   LORazepam (ATIVAN) 0.5 MG tablet Take 1 tablet by mouth 2 (Two) Times a Day As Needed for Anxiety. 24   Ely Srinivasan MD   omeprazole (priLOSEC) 20 MG capsule Take 1 capsule by mouth Daily.    Ely Srinivasan MD   potassium chloride (MICRO-K) 10 MEQ CR capsule Take 1 capsule by mouth Daily. 25   Ely Srinivasan MD   tamsulosin (FLOMAX) 0.4 MG capsule 24 hr capsule Take 1 capsule by mouth Daily. 25   Farhad Gomez DO   traZODone (DESYREL) 50 MG tablet Take 1 tablet by mouth At Night As Needed for Sleep.    Ely Srinivasan MD   valsartan (DIOVAN) 40 MG tablet Take 1 tablet by mouth Daily. 24   Isabel Navarrete APRN        Social History:   Social History     Tobacco Use    Smoking status: Former     Current packs/day: 0.00     Average packs/day: 0.5 packs/day for 33.6 years (16.8 ttl pk-yrs)     Types: Cigarettes     Start date: 1962     Quit date: 1995     Years since quittin.6     Passive exposure: Never    Smokeless tobacco: Never   Vaping Use    Vaping status: Never Used   Substance Use Topics    Alcohol use: Never    Drug use: Never         Review of Systems:  Review of Systems   Constitutional:  Negative for chills and fever.   HENT:  Negative for congestion, rhinorrhea and sore throat.    Eyes:  Negative for pain and visual disturbance.   Respiratory:  Negative for apnea, cough, chest tightness and shortness of breath.    Cardiovascular:  Negative for chest pain and palpitations.   Gastrointestinal:  Negative for abdominal pain, diarrhea, nausea and vomiting.   Genitourinary:  Negative for difficulty urinating and dysuria.   Musculoskeletal:  Negative for joint swelling and myalgias.   Skin:  Negative for color change.   Neurological:   "Negative for seizures and headaches.   Psychiatric/Behavioral: Negative.     All other systems reviewed and are negative.       Physical Exam:  /66   Pulse 71   Temp 99.1 °F (37.3 °C) (Oral)   Resp 19   Ht 193 cm (76\")   Wt 81 kg (178 lb 9.2 oz)   SpO2 95%   BMI 21.74 kg/m²     Physical Exam  Vitals and nursing note reviewed.   Constitutional:       General: He is not in acute distress.     Appearance: Normal appearance. He is not toxic-appearing.   HENT:      Head: Normocephalic and atraumatic.      Jaw: There is normal jaw occlusion.   Eyes:      General: Lids are normal.      Extraocular Movements: Extraocular movements intact.      Conjunctiva/sclera: Conjunctivae normal.      Pupils: Pupils are equal, round, and reactive to light.   Cardiovascular:      Rate and Rhythm: Normal rate and regular rhythm.      Pulses: Normal pulses.      Heart sounds: Normal heart sounds.   Pulmonary:      Effort: Pulmonary effort is normal. No respiratory distress.      Breath sounds: Normal breath sounds. No wheezing or rhonchi.   Abdominal:      General: Abdomen is flat.      Palpations: Abdomen is soft.      Tenderness: There is no abdominal tenderness. There is no guarding or rebound.   Musculoskeletal:         General: Normal range of motion.      Cervical back: Normal range of motion and neck supple.      Right lower leg: No edema.      Left lower leg: No edema.   Skin:     General: Skin is warm and dry.   Neurological:      Mental Status: He is alert and oriented to person, place, and time. Mental status is at baseline.   Psychiatric:         Mood and Affect: Mood normal.                    Medical Decision Making:      Comorbidities that affect care:    Atrial fibrillation, chronic urinary issues    External Notes reviewed:    Hospital Discharge Summary: Patient was last admitted for acute on chronic congestive heart failure      The following orders were placed and all results were independently analyzed by " me:  Orders Placed This Encounter   Procedures    [facility] Draw    Comprehensive Metabolic Panel    Lipase    Urinalysis With Microscopic If Indicated (No Culture) - Urine, Clean Catch    Lactic Acid, Plasma    CBC Auto Differential    Occult Blood, Fecal By Immunoassay - Stool, Per Rectum    BNP    Urinalysis, Microscopic Only - Urine, Clean Catch    STAT Lactic Acid, Reflex    Basic Metabolic Panel    Basic Metabolic Panel    CBC Auto Differential    Magnesium    STAT Lactic Acid, Reflex    Urinalysis With Microscopic If Indicated (No Culture) - Urine, Clean Catch    NPO Diet NPO Type: Strict NPO    Bladder scan    Undress & Gown    Insert Indwelling Urinary Catheter    Assess Need for Indwelling Urinary Catheter - Follow Removal Protocol    Urinary Catheter Care    Vital Signs    Notify Provider (With Default Parameters)    Intake & Output    Weigh Patient    Oral Care    Tobacco Cessation Education    Place Sequential Compression Device    Maintain Sequential Compression Device    Maintain IV Access    Telemetry - Place Orders & Notify Provider of Results When Patient Experiences Acute Chest Pain, Dysrhythmia or Respiratory Distress    Obtain Informed Consent    Code Status and Medical Interventions: No CPR (Do Not Attempt to Resuscitate); Limited Support; No intubation (DNI)    Inpatient Urology Consult    Inpatient Hospitalist Consult    Inpatient Urology Consult    Inpatient Urology Consult    Incentive Spirometry    ECG 12 Lead Electrolyte Imbalance    Insert Peripheral IV    Insert Peripheral IV    Initiate Observation Status    CBC & Differential    Green Top (Gel)    Lavender Top    Gold Top - SST    Light Blue Top       Medications Given in the Emergency Department:  Medications   sodium chloride 0.9 % flush 10 mL (has no administration in time range)   sodium chloride 0.9 % flush 10 mL (has no administration in time range)   sodium chloride 0.9 % flush 10 mL (has no administration in time range)   sodium  chloride 0.9 % infusion 40 mL (has no administration in time range)   calcium carbonate (TUMS) chewable tablet 500 mg (200 mg elemental) (has no administration in time range)   ondansetron ODT (ZOFRAN-ODT) disintegrating tablet 4 mg (has no administration in time range)   nitroglycerin (NITROSTAT) SL tablet 0.4 mg (has no administration in time range)   sennosides-docusate (PERICOLACE) 8.6-50 MG per tablet 2 tablet (has no administration in time range)     And   polyethylene glycol (MIRALAX) packet 17 g (has no administration in time range)     And   bisacodyl (DULCOLAX) EC tablet 5 mg (has no administration in time range)     And   bisacodyl (DULCOLAX) suppository 10 mg (has no administration in time range)   sodium chloride 0.9 % bolus 500 mL (has no administration in time range)   citalopram (CeleXA) tablet 10 mg (has no administration in time range)   apixaban (ELIQUIS) tablet 2.5 mg (has no administration in time range)   tamsulosin (FLOMAX) 24 hr capsule 0.4 mg (has no administration in time range)   traZODone (DESYREL) tablet 50 mg (has no administration in time range)   morphine injection 2 mg (2 mg Intravenous Given 3/6/25 1436)        ED Course:         Labs:    Lab Results (last 24 hours)       Procedure Component Value Units Date/Time    Urinalysis With Microscopic If Indicated (No Culture) - Urine, Clean Catch [[id number]]  (Abnormal) Collected: 03/06/25 0932    Specimen: Urine, Clean Catch Updated: 03/06/25 0958     Color, UA Yellow     Appearance, UA Clear     pH, UA 7.0     Specific Gravity, UA 1.017     Glucose,  mg/dL (Trace)     Ketones, UA Negative     Bilirubin, UA Negative     Blood, UA Trace     Protein, UA Trace     Leuk Esterase, UA Small (1+)     Nitrite, UA Negative     Urobilinogen, UA 1.0 E.U./dL    Urinalysis, Microscopic Only - Urine, Clean Catch [[id number]]  (Abnormal) Collected: 03/06/25 0932    Specimen: Urine, Clean Catch Updated: 03/06/25 0958     RBC, UA 6-10 /HPF      WBC,  UA 6-10 /HPF      Bacteria, UA None Seen /HPF      Squamous Epithelial Cells, UA 0-2 /HPF      Hyaline Casts, UA None Seen /LPF      Methodology Automated Microscopy    Occult Blood, Fecal By Immunoassay - Stool, Per Rectum [[id number]]  (Abnormal) Collected: 03/06/25 0933    Specimen: Stool from Per Rectum Updated: 03/06/25 0951     Occult Blood, Fecal by Immunoassay Positive    CBC & Differential [[id number]]  (Abnormal) Collected: 03/06/25 0943    Specimen: Blood Updated: 03/06/25 0946    Narrative:      The following orders were created for panel order CBC & Differential.  Procedure                               Abnormality         Status                     ---------                               -----------         ------                     CBC Auto Differential[[id number]]        Abnormal            Final result                 Please view results for these tests on the individual orders.    Comprehensive Metabolic Panel [[id number]]  (Abnormal) Collected: 03/06/25 0943    Specimen: Blood Updated: 03/06/25 1025     Glucose 89 mg/dL      BUN 48 mg/dL      Creatinine 2.21 mg/dL      Sodium 135 mmol/L      Potassium 5.3 mmol/L      Comment: Slight hemolysis detected by analyzer. Result may be falsely elevated.        Chloride 94 mmol/L      CO2 28.3 mmol/L      Calcium 9.2 mg/dL      Total Protein 7.1 g/dL      Albumin 3.4 g/dL      ALT (SGPT) 15 U/L      AST (SGOT) 26 U/L      Alkaline Phosphatase 106 U/L      Total Bilirubin 1.0 mg/dL      Globulin 3.7 gm/dL      A/G Ratio 0.9 g/dL      BUN/Creatinine Ratio 21.7     Anion Gap 12.7 mmol/L      eGFR 29.0 mL/min/1.73     Narrative:      GFR Categories in Chronic Kidney Disease (CKD)      GFR Category          GFR (mL/min/1.73)    Interpretation  G1                     90 or greater         Normal or high (1)  G2                      60-89                Mild decrease (1)  G3a                   45-59                Mild to moderate decrease  G3b                    30-44                Moderate to severe decrease  G4                    15-29                Severe decrease  G5                    14 or less           Kidney failure          (1)In the absence of evidence of kidney disease, neither GFR category G1 or G2 fulfill the criteria for CKD.    eGFR calculation 2021 CKD-EPI creatinine equation, which does not include race as a factor    Lipase [[id number]]  (Normal) Collected: 03/06/25 0943    Specimen: Blood Updated: 03/06/25 1011     Lipase 47 U/L     Lactic Acid, Plasma [[id number]]  (Abnormal) Collected: 03/06/25 0943    Specimen: Blood Updated: 03/06/25 1103     Lactate 2.5 mmol/L     CBC Auto Differential [[id number]]  (Abnormal) Collected: 03/06/25 0943    Specimen: Blood Updated: 03/06/25 0946     WBC 6.08 10*3/mm3      RBC 4.78 10*6/mm3      Hemoglobin 13.9 g/dL      Hematocrit 43.9 %      MCV 91.8 fL      MCH 29.1 pg      MCHC 31.7 g/dL      RDW 17.2 %      RDW-SD 57.1 fl      MPV 10.2 fL      Platelets 188 10*3/mm3      Neutrophil % 65.5 %      Lymphocyte % 16.9 %      Monocyte % 16.6 %      Eosinophil % 0.2 %      Basophil % 0.5 %      Immature Grans % 0.3 %      Neutrophils, Absolute 3.98 10*3/mm3      Lymphocytes, Absolute 1.03 10*3/mm3      Monocytes, Absolute 1.01 10*3/mm3      Eosinophils, Absolute 0.01 10*3/mm3      Basophils, Absolute 0.03 10*3/mm3      Immature Grans, Absolute 0.02 10*3/mm3      nRBC 0.0 /100 WBC     BNP [[id number]]  (Abnormal) Collected: 03/06/25 0943    Specimen: Blood Updated: 03/06/25 1141     proBNP 9,460.0 pg/mL     Narrative:      This assay is used as an aid in the diagnosis of individuals suspected of having heart failure. It can be used as an aid in the diagnosis of acute decompensated heart failure (ADHF) in patients presenting with signs and symptoms of ADHF to the emergency department (ED). In addition, NT-proBNP of <300 pg/mL indicates ADHF is not likely.    Age Range Result Interpretation  NT-proBNP Concentration  (pg/mL:      <50             Positive            >450                   Gray                 300-450                    Negative             <300    50-75           Positive            >900                  Gray                300-900                  Negative            <300      >75             Positive            >1800                  Gray                300-1800                  Negative            <300    Basic Metabolic Panel [[id number]]  (Abnormal) Collected: 03/06/25 1215    Specimen: Blood Updated: 03/06/25 1246     Glucose 90 mg/dL      BUN 47 mg/dL      Creatinine 2.04 mg/dL      Sodium 136 mmol/L      Potassium 4.8 mmol/L      Chloride 96 mmol/L      CO2 29.1 mmol/L      Calcium 9.0 mg/dL      BUN/Creatinine Ratio 23.0     Anion Gap 10.9 mmol/L      eGFR 31.9 mL/min/1.73     Narrative:      GFR Categories in Chronic Kidney Disease (CKD)      GFR Category          GFR (mL/min/1.73)    Interpretation  G1                     90 or greater         Normal or high (1)  G2                      60-89                Mild decrease (1)  G3a                   45-59                Mild to moderate decrease  G3b                   30-44                Moderate to severe decrease  G4                    15-29                Severe decrease  G5                    14 or less           Kidney failure          (1)In the absence of evidence of kidney disease, neither GFR category G1 or G2 fulfill the criteria for CKD.    eGFR calculation 2021 CKD-EPI creatinine equation, which does not include race as a factor    STAT Lactic Acid, Reflex [[id number]]  (Abnormal) Collected: 03/06/25 1352    Specimen: Blood from Arm, Left Updated: 03/06/25 1502     Lactate 3.3 mmol/L     Urinalysis With Microscopic If Indicated (No Culture) - Urine, Clean Catch [[id number]] Collected: 03/06/25 1613    Specimen: Urine, Clean Catch Updated: 03/06/25 1615             Imaging:    No Radiology Exams Resulted Within Past 24 Hours      Differential  Diagnosis and Discussion:    Dysuria: Differential diagnosis includes but is not limited to urethritis, cystitis, pyelonephritis, ureteral calculi, neoplasm, chemical irritant, urethral stricture, and trauma    PROCEDURES:    Labs were collected in the emergency department and all labs were reviewed and interpreted by me.  An EKG was performed and the EKG was interpreted by me.    ECG 12 Lead Electrolyte Imbalance   Preliminary Result   HEART RATE=71  bpm   RR Ypvubfzf=002  ms   NE Xdsenums=593  ms   P Horizontal Axis=  deg   P Front Axis=0  deg   QRSD Tiribdix=979  ms   QT Helcffqt=312  ms   ZSfX=001  ms   QRS Axis=21  deg   T Wave Axis=197  deg   - ABNORMAL ECG -   Ventricular-paced rhythm   No further analysis attempted due to paced rhythm   Date and Time of Study:2025-03-06 15:33:27          Procedures    MDM     Amount and/or Complexity of Data Reviewed  Decide to obtain previous medical records or to obtain history from someone other than the patient: yes         The patient´s CBC that was reviewed and interpreted by me shows no abnormalities of critical concern. Of note, there is no anemia requiring a blood transfusion and the platelet count is acceptable.  CMP shows worsening renal insufficiency and a potassium of 5.3.  We attempted to place a Diamond catheter in the emergency department, however nursing staff was unable to pass 1.              Patient Care Considerations:    CT ABDOMEN AND PELVIS: I considered ordering a CT scan of the abdomen and pelvis however abdomen is soft and nontender.      Consultants/Shared Management Plan:    Case was discussed with Dr. Myers who agrees to consult on the patient.  Case was discussed with Dr. Mercado who agrees to admit the patient.    Social Determinants of Health:    Patient is independent, reliable, and has access to care.       Disposition and Care Coordination:    Admit:   Through independent evaluation of the patient's history, physical, and imperical data,  the patient meets criteria for inpatient admission to the hospital.        Final diagnoses:   Urinary retention        ED Disposition       ED Disposition   Decision to Admit    Condition   --    Comment   Level of Care: Telemetry [5]   Diagnosis: Urinary retention [[id number]]   Admitting Physician: ROMEL DURON [[id number]]   Attending Physician: ROMEL DURON [[id number]]                 This medical record created using voice recognition software.             Ashley Hudson MD  03/06/25 9305

## 2025-03-07 ENCOUNTER — TELEPHONE (OUTPATIENT)
Dept: UROLOGY | Age: 85
End: 2025-03-07
Payer: MEDICARE

## 2025-03-07 ENCOUNTER — PREP FOR SURGERY (OUTPATIENT)
Dept: OTHER | Facility: HOSPITAL | Age: 85
End: 2025-03-07
Payer: MEDICARE

## 2025-03-07 DIAGNOSIS — N35.812 OTHER STRICTURE OF BULBOUS URETHRA IN MALE: ICD-10-CM

## 2025-03-07 DIAGNOSIS — R33.9 URINARY RETENTION: Primary | ICD-10-CM

## 2025-03-07 LAB
ANION GAP SERPL CALCULATED.3IONS-SCNC: 6.4 MMOL/L (ref 5–15)
BASOPHILS # BLD AUTO: 0.03 10*3/MM3 (ref 0–0.2)
BASOPHILS NFR BLD AUTO: 0.7 % (ref 0–1.5)
BUN SERPL-MCNC: 45 MG/DL (ref 8–23)
BUN/CREAT SERPL: 21.7 (ref 7–25)
CALCIUM SPEC-SCNC: 8.8 MG/DL (ref 8.6–10.5)
CHLORIDE SERPL-SCNC: 98 MMOL/L (ref 98–107)
CO2 SERPL-SCNC: 29.6 MMOL/L (ref 22–29)
CREAT SERPL-MCNC: 2.07 MG/DL (ref 0.76–1.27)
DEPRECATED RDW RBC AUTO: 56.9 FL (ref 37–54)
EGFRCR SERPLBLD CKD-EPI 2021: 31.4 ML/MIN/1.73
EOSINOPHIL # BLD AUTO: 0.06 10*3/MM3 (ref 0–0.4)
EOSINOPHIL NFR BLD AUTO: 1.4 % (ref 0.3–6.2)
ERYTHROCYTE [DISTWIDTH] IN BLOOD BY AUTOMATED COUNT: 16.8 % (ref 12.3–15.4)
GLUCOSE SERPL-MCNC: 78 MG/DL (ref 65–99)
HCT VFR BLD AUTO: 39.4 % (ref 37.5–51)
HGB BLD-MCNC: 12.3 G/DL (ref 13–17.7)
IMM GRANULOCYTES # BLD AUTO: 0.03 10*3/MM3 (ref 0–0.05)
IMM GRANULOCYTES NFR BLD AUTO: 0.7 % (ref 0–0.5)
LYMPHOCYTES # BLD AUTO: 1.25 10*3/MM3 (ref 0.7–3.1)
LYMPHOCYTES NFR BLD AUTO: 28.2 % (ref 19.6–45.3)
MAGNESIUM SERPL-MCNC: 2.4 MG/DL (ref 1.6–2.4)
MCH RBC QN AUTO: 29.2 PG (ref 26.6–33)
MCHC RBC AUTO-ENTMCNC: 31.2 G/DL (ref 31.5–35.7)
MCV RBC AUTO: 93.6 FL (ref 79–97)
MONOCYTES # BLD AUTO: 0.69 10*3/MM3 (ref 0.1–0.9)
MONOCYTES NFR BLD AUTO: 15.6 % (ref 5–12)
NEUTROPHILS NFR BLD AUTO: 2.37 10*3/MM3 (ref 1.7–7)
NEUTROPHILS NFR BLD AUTO: 53.4 % (ref 42.7–76)
NRBC BLD AUTO-RTO: 0 /100 WBC (ref 0–0.2)
PLATELET # BLD AUTO: 167 10*3/MM3 (ref 140–450)
PMV BLD AUTO: 10.5 FL (ref 6–12)
POTASSIUM SERPL-SCNC: 4.4 MMOL/L (ref 3.5–5.2)
QT INTERVAL: 524 MS
QTC INTERVAL: 569 MS
RBC # BLD AUTO: 4.21 10*6/MM3 (ref 4.14–5.8)
SODIUM SERPL-SCNC: 134 MMOL/L (ref 136–145)
URATE SERPL-MCNC: 7.7 MG/DL (ref 3.4–7)
WBC NRBC COR # BLD AUTO: 4.43 10*3/MM3 (ref 3.4–10.8)

## 2025-03-07 PROCEDURE — G0378 HOSPITAL OBSERVATION PER HR: HCPCS

## 2025-03-07 PROCEDURE — 63710000001 ALLOPURINOL 100 MG TABLET: Performed by: UROLOGY

## 2025-03-07 PROCEDURE — 63710000001 PANTOPRAZOLE 40 MG TABLET DELAYED-RELEASE: Performed by: UROLOGY

## 2025-03-07 PROCEDURE — A9270 NON-COVERED ITEM OR SERVICE: HCPCS | Performed by: UROLOGY

## 2025-03-07 PROCEDURE — 63710000001 LORAZEPAM 0.5 MG TABLET: Performed by: UROLOGY

## 2025-03-07 PROCEDURE — 80048 BASIC METABOLIC PNL TOTAL CA: CPT | Performed by: INTERNAL MEDICINE

## 2025-03-07 PROCEDURE — 99232 SBSQ HOSP IP/OBS MODERATE 35: CPT | Performed by: INTERNAL MEDICINE

## 2025-03-07 PROCEDURE — 83735 ASSAY OF MAGNESIUM: CPT | Performed by: INTERNAL MEDICINE

## 2025-03-07 PROCEDURE — 84550 ASSAY OF BLOOD/URIC ACID: CPT | Performed by: PHYSICIAN ASSISTANT

## 2025-03-07 PROCEDURE — 85025 COMPLETE CBC W/AUTO DIFF WBC: CPT | Performed by: INTERNAL MEDICINE

## 2025-03-07 PROCEDURE — 63710000001 ONDANSETRON ODT 4 MG TABLET DISPERSIBLE: Performed by: INTERNAL MEDICINE

## 2025-03-07 PROCEDURE — 63710000001 CITALOPRAM 20 MG TABLET: Performed by: UROLOGY

## 2025-03-07 PROCEDURE — 63710000001 FUROSEMIDE 40 MG TABLET: Performed by: UROLOGY

## 2025-03-07 RX ORDER — SODIUM CHLORIDE 9 MG/ML
40 INJECTION, SOLUTION INTRAVENOUS AS NEEDED
OUTPATIENT
Start: 2025-03-07

## 2025-03-07 RX ORDER — ALLOPURINOL 100 MG/1
100 TABLET ORAL DAILY
Status: DISCONTINUED | OUTPATIENT
Start: 2025-03-07 | End: 2025-03-18 | Stop reason: HOSPADM

## 2025-03-07 RX ORDER — SODIUM CHLORIDE 0.9 % (FLUSH) 0.9 %
10 SYRINGE (ML) INJECTION EVERY 12 HOURS SCHEDULED
OUTPATIENT
Start: 2025-03-07

## 2025-03-07 RX ORDER — FUROSEMIDE 40 MG/1
40 TABLET ORAL DAILY
Status: DISCONTINUED | OUTPATIENT
Start: 2025-03-07 | End: 2025-03-12

## 2025-03-07 RX ORDER — LORAZEPAM 0.5 MG/1
0.5 TABLET ORAL 2 TIMES DAILY PRN
Status: DISCONTINUED | OUTPATIENT
Start: 2025-03-07 | End: 2025-03-18 | Stop reason: HOSPADM

## 2025-03-07 RX ORDER — SODIUM CHLORIDE 0.9 % (FLUSH) 0.9 %
10 SYRINGE (ML) INJECTION AS NEEDED
OUTPATIENT
Start: 2025-03-07

## 2025-03-07 RX ADMIN — TRAZODONE HYDROCHLORIDE 50 MG: 50 TABLET ORAL at 22:14

## 2025-03-07 RX ADMIN — ALLOPURINOL 100 MG: 100 TABLET ORAL at 08:45

## 2025-03-07 RX ADMIN — FUROSEMIDE 40 MG: 40 TABLET ORAL at 11:12

## 2025-03-07 RX ADMIN — LORAZEPAM 0.5 MG: 0.5 TABLET ORAL at 07:12

## 2025-03-07 RX ADMIN — TAMSULOSIN HYDROCHLORIDE 0.4 MG: 0.4 CAPSULE ORAL at 08:45

## 2025-03-07 RX ADMIN — CITALOPRAM HYDROBROMIDE 10 MG: 20 TABLET ORAL at 08:45

## 2025-03-07 RX ADMIN — Medication 10 ML: at 08:45

## 2025-03-07 RX ADMIN — ONDANSETRON 4 MG: 4 TABLET, ORALLY DISINTEGRATING ORAL at 19:29

## 2025-03-07 RX ADMIN — APIXABAN 2.5 MG: 2.5 TABLET, FILM COATED ORAL at 08:45

## 2025-03-07 RX ADMIN — APIXABAN 2.5 MG: 2.5 TABLET, FILM COATED ORAL at 22:14

## 2025-03-07 RX ADMIN — Medication 10 ML: at 22:14

## 2025-03-07 RX ADMIN — PANTOPRAZOLE SODIUM 40 MG: 40 TABLET, DELAYED RELEASE ORAL at 06:01

## 2025-03-07 NOTE — H&P
"[facility]   Urology HISTORY AND PHYSICAL    Patient Name: Javi Martínez  : 1940  MRN: [id number]  Primary Care Physician:  Rani Lopez APRN  Date of admission: (Not on file)    Subjective   Subjective       History of Present Illness  Patient has urethral stricture presents for cystoscopy, optical internal urethrotomy and possible suprapubic tube placement      Personal History     Past Medical History:   Diagnosis Date    Aneurysm 2013    Arthritis     left knee     Atrial fibrillation, persistent     CHB (complete heart block) 2023    CHF exacerbation 2024    Chronic obstructive pulmonary disease 2023    pt denies having this    Difficulty walking     Numbness in right foot    Essential hypertension 2019    GERD (gastroesophageal reflux disease)     HFrEF (heart failure with reduced ejection fraction) 2021    Hyperlipidemia 2021    Paroxysmal atrial fibrillation     Polyneuropathy 2023    Prostate cancer     with seeds implant     Septic joint 2021    Sleep apnea        Past Surgical History:   Procedure Laterality Date    ABLATION OF DYSRHYTHMIC FOCUS      APPENDECTOMY      ARTERIAL BYPASS SURGERY      CARDIAC ABLATION      x2  \"years ago\"     CARDIAC ELECTROPHYSIOLOGY PROCEDURE N/A 2021    Procedure: PPM battery change;  Surgeon: Ge Whelan MD;  Location: [facility] CATH INVASIVE LOCATION;  Service: Cardiovascular;  Laterality: N/A;    CARDIAC SURGERY      cabg 3v    COLONOSCOPY      CORONARY ARTERY BYPASS GRAFT      CYSTOSCOPY URETHROTOMY VISUAL INTERNAL N/A 08/15/2024    Procedure: CYSTOSCOPY, URETHRAL DILATION, CATHETER PLACEMENT, RETROGRADE URETHROGRAM;  Surgeon: August Myers MD;  Location: [facility] MAIN OR;  Service: Urology;  Laterality: N/A;    CYSTOSCOPY URETHROTOMY VISUAL INTERNAL N/A 10/24/2024    Procedure: CYSTOSCOPY URETHROTOMY;  Surgeon: Mally Bernal MD;  Location: [facility] MAIN OR;  Service: Urology; "  Laterality: N/A;    ENDOSCOPY      with dilation     ENDOSCOPY N/A 1/21/2025    Procedure: ESOPHAGOGASTRODUODENOSCOPY WITH BIOPSIES, POLYPECTOMY, BALLOON DILATION 18-20mm;  Surgeon: Simona Garcia MD;  Location: [facility] ENDOSCOPY;  Service: Gastroenterology;  Laterality: N/A;  GASTRIC POLYPS, SCHATZKI'S RING, HIATAL HERNIA    KNEE ARTHROSCOPY Left     TOTAL KNEE ARTHROPLASTY Left 07/23/2021    Procedure: TOTAL KNEE ARTHROPLASTY WITH DORA NAVIGATION WITH BIOMET;  Surgeon: Carlitos Zhao MD;  Location: [facility] MAIN OR;  Service: Orthopedics;  Laterality: Left;    VENTRICULAR CARDIAC PACEMAKER INSERTION      medtronic        Family History: family history includes Heart attack in his father and mother; No Known Problems in his brother, maternal aunt, maternal grandfather, maternal grandmother, maternal uncle, paternal aunt, paternal grandfather, paternal grandmother, paternal uncle, sister, and another family member. Otherwise pertinent FHx was reviewed and not pertinent to current issue.    Social History:  reports that he quit smoking about 29 years ago. His smoking use included cigarettes. He started smoking about 63 years ago. He has a 16.8 pack-year smoking history. He has never been exposed to tobacco smoke. He has never used smokeless tobacco. He reports that he does not drink alcohol and does not use drugs.    Home Medications:  LORazepam, allopurinol, apixaban, citalopram, empagliflozin, furosemide, omeprazole, potassium chloride, tamsulosin, traZODone, and valsartan    Allergies:  No Known Allergies    Objective    Objective     Vitals:   Temp:  [97.3 °F (36.3 °C)-98.1 °F (36.7 °C)] 97.3 °F (36.3 °C)  Heart Rate:  [70-93] 70  Resp:  [16-19] 16  BP: ()/(53-74) 113/65    Physical Exam  Constitutional:       Appearance: Normal appearance.   Cardiovascular:      Rate and Rhythm: Normal rate and regular rhythm.   Pulmonary:      Effort: Pulmonary effort is normal.      Breath sounds: Normal  breath sounds.   Neurological:      Mental Status: He is alert. Mental status is at baseline.   Psychiatric:         Mood and Affect: Mood and affect normal.         Speech: Speech normal.         Judgment: Judgment normal.         Result Review    Result Review:  I have personally reviewed the results from the time of this admission to 3/7/2025 12:22 EST and agree with these findings:  [x]  Laboratory  []  Microbiology  [x]  Radiology  []  EKG/Telemetry   []  Cardiology/Vascular   []  Pathology  [x]  Old records  []  Other:      Assessment & Plan   Assessment / Plan       Active Hospital Problems:  There are no active hospital problems to display for this patient.      Plan: cystoscopy, optical internal urethrotomy and possible suprapubic tube placement  Risks and benefits discussed with patient and they are agreeable to proceed.    VTE Prophylaxis:  No VTE prophylaxis order currently exists.        CODE STATUS:           Electronically signed by Mally Bernal MD, 03/07/25, 12:22 PM EST.

## 2025-03-07 NOTE — TELEPHONE ENCOUNTER
Spoke to patient and scheduled procedure for 3/11/25. I went over preop instructions and informed to stop taking eliquis after tomorrow and to restart it after surgery Dr. Bernal spoke with patient and explained surgery. Patient voiced understanding.

## 2025-03-07 NOTE — TELEPHONE ENCOUNTER
[facility]  [address]  [place] [address]  Fax [phone]  Phone [phone]       03/07/25            To Whom It May Concern:        Our records indicate this patient is currently under anticoagulant therapy Javi Martínez 1940 is to be cleared to hold his eliquis  for 2 days prior to Cystoscopy, Internal Urethrotomy possible suprapubic tube placement on 3/11/25 . You may contact our office at [phone] with any questions. I appreciate your prompt response in this matter. Please return this form to our office as soon as possible to [phone]. Thank you for your time and assistance.     [] I approve my patient, Javi Martínez , to stop taking anticoagulant therapy medication 2 days prior to procedure  [] I do NOT approve my patient, Javi Martínez , to stop taking anticoagulant therapy medication at this time.   [] I approve my patient, Javi Mratínez from a cardiac standpoint  [] I do NOT approve my patient, Javi Martínez from a cardiac standpoint      Thank you,          Approving physician name (please print): Ge Whelan MD    Approving Physician signature: _________________________________   Date: _____________________      Sincerely,     Mally Bernal MD

## 2025-03-07 NOTE — TELEPHONE ENCOUNTER
Procedure: Cystoscopy, Internal Urethrotomy possible suprapubic tube placement     Med Directive: Eliquis    PMH: HFrEF, CAD, afib, HLD, HTN    Last Seen: 11/19/2024

## 2025-03-07 NOTE — SIGNIFICANT NOTE
"   03/07/25 1458   OTHER   Discipline occupational therapist   Rehab Time/Intention   Session Not Performed patient/family declined evaluation  (Pt declined stating, \"I think I'm done for today.\")       "

## 2025-03-07 NOTE — SIGNIFICANT NOTE
03/07/25 1100   Physical Therapy Time and Intention   Comment, Session Not Performed Pt refused

## 2025-03-07 NOTE — PROGRESS NOTES
[facility]   Hospitalist Progress Note  Date: 3/7/2025  Patient Name: Javi Martínez  : 1940  MRN: [id number]  Date of admission: 3/6/2025  Room/Bed: [facility]      Subjective   Subjective     Chief Complaint:   Urinary retention    Summary:  Javi Martínez is an 84 y.o. male with medical history of heart failure with reduced ejection fraction, atrial fibrillation, CKD stage IIIb, COPD, CAD, hypertension, history of prostate cancer status post brachytherapy and external beam XRT and BPH.  Patient presents to the emergency department, for the past several days patient has been having issues with difficulty urinating.  Patient is on chronic Lasix for his history of heart failure.  Patient endorses suprapubic pain, frequent dribbling.  Intermittently having large-volume urination.  In the emergency department patient found to have urinary retention on bladder scan.  Nursing staff attempted Diamond placement at bedside, however unable to successfully place.  Urology consulted in the emergency department for placement of Diamond catheter.  Patient able to tolerate dilation and placement of 12 [place] catheter.  Patient tolerated procedure well, clear yellow urine drained easily from bladder.  Of note in the emergency department patient's initial potassium 5.3, creatinine 2.2.  This appears slightly above patient's baseline of 1.8-1.9.  Repeat BMP shows resolution, potassium of 4.8.  Creatinine down to 2.04.  Patient's proBNP elevated 9000, however down from previous heart failure exacerbations.  Patient states he does not have any dyspnea at this time, trace lower extremity edema.  Patient's lactate returned back at 2.5.  Lactate returned to normal.    Interval Followup:   Darkening of urine, slight hematuria, no clots, no jorge blood.  Expected following procedure.  Will continue patient on his current Eliquis.  Discussed with patient the possibility of discharging, however given patient's hematuria will ensure he  does not develop clots.  Patient does have a narrow Diamond in and could easily become obstructed      Objective   Objective     Vitals:   Temp:  [97.3 °F (36.3 °C)-97.8 °F (36.6 °C)] 97.4 °F (36.3 °C)  Heart Rate:  [70] 70  Resp:  [16] 16  BP: ()/(53-65) 110/62    Physical Exam               Constitutional: Awake, alert, no acute distress              Eyes: Pupils equal, sclerae anicteric, no conjunctival injection              HENT: NCAT, mucous membranes moist              Neck: Supple, no thyromegaly, no lymphadenopathy, trachea midline              Respiratory: No crackles, no rhonchi              Cardiovascular: Irregularly irregular, no murmurs, rubs, or gallops, palpable pedal pulses bilaterally              Gastrointestinal: Positive bowel sounds, soft, nontender, nondistended              Musculoskeletal: Trace bilateral ankle edema, no clubbing or cyanosis to extremities              Neurologic: Oriented x 3, strength symmetric in all extremities, Cranial Nerves grossly intact to confrontation, speech clear              : Catheter in place, dark urine    Result Review    Result Review:  I have personally reviewed these results:  [x]  Laboratory      Lab 03/07/25  0434 03/06/25  1643 03/06/25  1352 03/06/25  0943 03/02/25  0712 03/01/25  0608   WBC 4.43  --   --  6.08 6.97 7.59   HEMOGLOBIN 12.3*  --   --  13.9 13.1 13.1   HEMATOCRIT 39.4  --   --  43.9 41.6 41.3   PLATELETS 167  --   --  188 191 194   NEUTROS ABS 2.37  --   --  3.98  --  4.72   IMMATURE GRANS (ABS) 0.03  --   --  0.02  --  0.04   LYMPHS ABS 1.25  --   --  1.03  --  1.66   MONOS ABS 0.69  --   --  1.01*  --  0.92*   EOS ABS 0.06  --   --  0.01  --  0.16   MCV 93.6  --   --  91.8 90.0 90.2   LACTATE  --  1.9 3.3* 2.5*  --   --          Lab 03/07/25  0434 03/06/25  1215 03/06/25  0943 03/02/25  0712 03/01/25  0608   SODIUM 134* 136 135* 138 138   POTASSIUM 4.4 4.8 5.3* 3.7 3.5   CHLORIDE 98 96* 94* 99 98   CO2 29.6* 29.1* 28.3 25.6  28.3   ANION GAP 6.4 10.9 12.7 13.4 11.7   BUN 45* 47* 48* 43* 41*   CREATININE 2.07* 2.04* 2.21* 1.84* 2.07*   EGFR 31.4* 31.9* 29.0* 36.2* 31.4*   GLUCOSE 78 90 89 99 95   CALCIUM 8.8 9.0 9.2 8.9 8.7   MAGNESIUM 2.4  --   --  2.2 2.3         Lab 03/06/25  0943   TOTAL PROTEIN 7.1   ALBUMIN 3.4*   GLOBULIN 3.7   ALT (SGPT) 15   AST (SGOT) 26   BILIRUBIN 1.0   ALK PHOS 106   LIPASE 47         Lab 03/06/25  0943   PROBNP 9,460.0*                 Brief Urine Lab Results  (Last result in the past 365 days)        Color   Clarity   Blood   Leuk Est   Nitrite   Protein   CREAT   Urine HCG        03/06/25 1613 Yellow   Clear   Small (1+)   Small (1+)   Negative   Trace                 [x]  Microbiology   Microbiology Results (last 10 days)       Procedure Component Value - Date/Time    Urine Culture - Urine, Urine, Clean Catch [[id number]] Collected: 02/25/25 2119    Lab Status: Final result Specimen: Urine, Clean Catch Updated: 02/27/25 0926     Urine Culture 25,000 CFU/mL Normal Urogenital Jerirca    Narrative:      Colonization of the urinary tract without infection is common. Treatment is discouraged unless the patient is symptomatic, pregnant, or undergoing an invasive urologic procedure.          [x]  Radiology  No radiology results for the last 7 days  []  EKG/Telemetry   []  Cardiology/Vascular   []  Pathology  []  Old records  []  Other:    Assessment & Plan   Assessment / Plan     Assessment:  Obstructive uropathy status post Diamond catheter placement  Hyperkalemia, resolved  Lactic acidosis, resolved  History of prostate cancer status post brachytherapy and external beam XRT  BPH  CKD stage IIIb  COPD  CAD  Hypertension  Heart failure with reduced ejection fraction  Atrial fibrillation on Eliquis     Plan:  Patient will be admitted to the hospital for further care and management  Urology consulted, appreciate assistance  Patient now with Diamond catheter in place, plan to discharge on Diamond catheter  Potassium and  creatinine appropriate today.  Patient's lactate trended down to normal  Resuming patient on his home oral diuretic  Patient's proBNP is 9000.  On exam no crackles, trace bilateral lower extremity edema  Hold antihypertensives, noted soft pressures  Patient's other home medications reviewed and resumed as indicated  Continue to monitor patient's urinary output, flush as needed, watch for clots  Discussed with urology, plans for patient to undergo cystoscopy early next week as an outpatient     Discussed with RN.  Discussed with urology    VTE Prophylaxis:  Pharmacologic & mechanical VTE prophylaxis orders are present.        CODE STATUS:   Medical Intervention Limits: No intubation (DNI)  Code Status (Patient has no pulse and is not breathing): No CPR (Do Not Attempt to Resuscitate)  Medical Interventions (Patient has pulse or is breathing): Limited Support      Electronically signed by Vladimir Parker MD, 3/7/2025, 17:49 EST.

## 2025-03-07 NOTE — PLAN OF CARE
Goal Outcome Evaluation:A&Ox4, VSS, patient came in for retention, had loyd placed by urology while in Ed. Ad-consuelo with cane is baseline. Poss d/c with loyd today, follow up o/p.

## 2025-03-07 NOTE — H&P (VIEW-ONLY)
"[facility]   Urology HISTORY AND PHYSICAL    Patient Name: Javi Martínez  : 1940  MRN: [id number]  Primary Care Physician:  Rani Lopez APRN  Date of admission: (Not on file)    Subjective   Subjective       History of Present Illness  Patient has urethral stricture presents for cystoscopy, optical internal urethrotomy and possible suprapubic tube placement      Personal History     Past Medical History:   Diagnosis Date    Aneurysm 2013    Arthritis     left knee     Atrial fibrillation, persistent     CHB (complete heart block) 2023    CHF exacerbation 2024    Chronic obstructive pulmonary disease 2023    pt denies having this    Difficulty walking     Numbness in right foot    Essential hypertension 2019    GERD (gastroesophageal reflux disease)     HFrEF (heart failure with reduced ejection fraction) 2021    Hyperlipidemia 2021    Paroxysmal atrial fibrillation     Polyneuropathy 2023    Prostate cancer     with seeds implant     Septic joint 2021    Sleep apnea        Past Surgical History:   Procedure Laterality Date    ABLATION OF DYSRHYTHMIC FOCUS      APPENDECTOMY      ARTERIAL BYPASS SURGERY      CARDIAC ABLATION      x2  \"years ago\"     CARDIAC ELECTROPHYSIOLOGY PROCEDURE N/A 2021    Procedure: PPM battery change;  Surgeon: Ge Whelan MD;  Location: [facility] CATH INVASIVE LOCATION;  Service: Cardiovascular;  Laterality: N/A;    CARDIAC SURGERY      cabg 3v    COLONOSCOPY      CORONARY ARTERY BYPASS GRAFT      CYSTOSCOPY URETHROTOMY VISUAL INTERNAL N/A 08/15/2024    Procedure: CYSTOSCOPY, URETHRAL DILATION, CATHETER PLACEMENT, RETROGRADE URETHROGRAM;  Surgeon: August Myers MD;  Location: [facility] MAIN OR;  Service: Urology;  Laterality: N/A;    CYSTOSCOPY URETHROTOMY VISUAL INTERNAL N/A 10/24/2024    Procedure: CYSTOSCOPY URETHROTOMY;  Surgeon: Mally Bernal MD;  Location: [facility] MAIN OR;  Service: Urology; "  Laterality: N/A;    ENDOSCOPY      with dilation     ENDOSCOPY N/A 1/21/2025    Procedure: ESOPHAGOGASTRODUODENOSCOPY WITH BIOPSIES, POLYPECTOMY, BALLOON DILATION 18-20mm;  Surgeon: Simona Garcia MD;  Location: [facility] ENDOSCOPY;  Service: Gastroenterology;  Laterality: N/A;  GASTRIC POLYPS, SCHATZKI'S RING, HIATAL HERNIA    KNEE ARTHROSCOPY Left     TOTAL KNEE ARTHROPLASTY Left 07/23/2021    Procedure: TOTAL KNEE ARTHROPLASTY WITH DORA NAVIGATION WITH BIOMET;  Surgeon: Carlitos Zhao MD;  Location: [facility] MAIN OR;  Service: Orthopedics;  Laterality: Left;    VENTRICULAR CARDIAC PACEMAKER INSERTION      medtronic        Family History: family history includes Heart attack in his father and mother; No Known Problems in his brother, maternal aunt, maternal grandfather, maternal grandmother, maternal uncle, paternal aunt, paternal grandfather, paternal grandmother, paternal uncle, sister, and another family member. Otherwise pertinent FHx was reviewed and not pertinent to current issue.    Social History:  reports that he quit smoking about 29 years ago. His smoking use included cigarettes. He started smoking about 63 years ago. He has a 16.8 pack-year smoking history. He has never been exposed to tobacco smoke. He has never used smokeless tobacco. He reports that he does not drink alcohol and does not use drugs.    Home Medications:  LORazepam, allopurinol, apixaban, citalopram, empagliflozin, furosemide, omeprazole, potassium chloride, tamsulosin, traZODone, and valsartan    Allergies:  No Known Allergies    Objective    Objective     Vitals:   Temp:  [97.3 °F (36.3 °C)-98.1 °F (36.7 °C)] 97.3 °F (36.3 °C)  Heart Rate:  [70-93] 70  Resp:  [16-19] 16  BP: ()/(53-74) 113/65    Physical Exam  Constitutional:       Appearance: Normal appearance.   Cardiovascular:      Rate and Rhythm: Normal rate and regular rhythm.   Pulmonary:      Effort: Pulmonary effort is normal.      Breath sounds: Normal  breath sounds.   Neurological:      Mental Status: He is alert. Mental status is at baseline.   Psychiatric:         Mood and Affect: Mood and affect normal.         Speech: Speech normal.         Judgment: Judgment normal.         Result Review    Result Review:  I have personally reviewed the results from the time of this admission to 3/7/2025 12:22 EST and agree with these findings:  [x]  Laboratory  []  Microbiology  [x]  Radiology  []  EKG/Telemetry   []  Cardiology/Vascular   []  Pathology  [x]  Old records  []  Other:      Assessment & Plan   Assessment / Plan       Active Hospital Problems:  There are no active hospital problems to display for this patient.      Plan: cystoscopy, optical internal urethrotomy and possible suprapubic tube placement  Risks and benefits discussed with patient and they are agreeable to proceed.    VTE Prophylaxis:  No VTE prophylaxis order currently exists.        CODE STATUS:           Electronically signed by Mally Bernal MD, 03/07/25, 12:22 PM EST.

## 2025-03-07 NOTE — OUTREACH NOTE
CHF Week 1 Survey      Flowsheet Row Responses   [facility] facility patient discharged from? [facility]   Does the patient have one of the following disease processes/diagnoses(primary or secondary)? CHF   CHF Week 1 attempt successful? No   Unsuccessful attempts Attempt 1   Revoke Readmitted            MAC FREEDMAN - Registered Nurse

## 2025-03-08 LAB
ANION GAP SERPL CALCULATED.3IONS-SCNC: 10.5 MMOL/L (ref 5–15)
BUN SERPL-MCNC: 48 MG/DL (ref 8–23)
BUN/CREAT SERPL: 23.2 (ref 7–25)
CALCIUM SPEC-SCNC: 8.8 MG/DL (ref 8.6–10.5)
CHLORIDE SERPL-SCNC: 101 MMOL/L (ref 98–107)
CO2 SERPL-SCNC: 26.5 MMOL/L (ref 22–29)
CREAT SERPL-MCNC: 2.07 MG/DL (ref 0.76–1.27)
DEPRECATED RDW RBC AUTO: 55.4 FL (ref 37–54)
EGFRCR SERPLBLD CKD-EPI 2021: 31.4 ML/MIN/1.73
ERYTHROCYTE [DISTWIDTH] IN BLOOD BY AUTOMATED COUNT: 16.7 % (ref 12.3–15.4)
GLUCOSE SERPL-MCNC: 87 MG/DL (ref 65–99)
HCT VFR BLD AUTO: 37.5 % (ref 37.5–51)
HGB BLD-MCNC: 12.3 G/DL (ref 13–17.7)
MAGNESIUM SERPL-MCNC: 2.4 MG/DL (ref 1.6–2.4)
MCH RBC QN AUTO: 30.4 PG (ref 26.6–33)
MCHC RBC AUTO-ENTMCNC: 32.8 G/DL (ref 31.5–35.7)
MCV RBC AUTO: 92.6 FL (ref 79–97)
PLATELET # BLD AUTO: 166 10*3/MM3 (ref 140–450)
PMV BLD AUTO: 10.7 FL (ref 6–12)
POTASSIUM SERPL-SCNC: 4.5 MMOL/L (ref 3.5–5.2)
RBC # BLD AUTO: 4.05 10*6/MM3 (ref 4.14–5.8)
SODIUM SERPL-SCNC: 138 MMOL/L (ref 136–145)
WBC NRBC COR # BLD AUTO: 3.91 10*3/MM3 (ref 3.4–10.8)

## 2025-03-08 PROCEDURE — 83735 ASSAY OF MAGNESIUM: CPT | Performed by: INTERNAL MEDICINE

## 2025-03-08 PROCEDURE — 80048 BASIC METABOLIC PNL TOTAL CA: CPT | Performed by: INTERNAL MEDICINE

## 2025-03-08 PROCEDURE — 97165 OT EVAL LOW COMPLEX 30 MIN: CPT

## 2025-03-08 PROCEDURE — 85027 COMPLETE CBC AUTOMATED: CPT | Performed by: INTERNAL MEDICINE

## 2025-03-08 PROCEDURE — 99232 SBSQ HOSP IP/OBS MODERATE 35: CPT | Performed by: INTERNAL MEDICINE

## 2025-03-08 PROCEDURE — G0378 HOSPITAL OBSERVATION PER HR: HCPCS

## 2025-03-08 RX ADMIN — TRAZODONE HYDROCHLORIDE 50 MG: 50 TABLET ORAL at 22:00

## 2025-03-08 RX ADMIN — ALLOPURINOL 100 MG: 100 TABLET ORAL at 08:52

## 2025-03-08 RX ADMIN — CITALOPRAM HYDROBROMIDE 10 MG: 20 TABLET ORAL at 08:52

## 2025-03-08 RX ADMIN — PANTOPRAZOLE SODIUM 40 MG: 40 TABLET, DELAYED RELEASE ORAL at 05:15

## 2025-03-08 RX ADMIN — FUROSEMIDE 40 MG: 40 TABLET ORAL at 08:52

## 2025-03-08 RX ADMIN — LORAZEPAM 0.5 MG: 0.5 TABLET ORAL at 06:40

## 2025-03-08 RX ADMIN — Medication 10 ML: at 08:52

## 2025-03-08 RX ADMIN — LORAZEPAM 0.5 MG: 0.5 TABLET ORAL at 22:00

## 2025-03-08 RX ADMIN — Medication 10 ML: at 22:00

## 2025-03-08 RX ADMIN — APIXABAN 2.5 MG: 2.5 TABLET, FILM COATED ORAL at 08:52

## 2025-03-08 RX ADMIN — TAMSULOSIN HYDROCHLORIDE 0.4 MG: 0.4 CAPSULE ORAL at 08:52

## 2025-03-08 RX ADMIN — APIXABAN 2.5 MG: 2.5 TABLET, FILM COATED ORAL at 22:00

## 2025-03-08 NOTE — PLAN OF CARE
Goal Outcome Evaluation:  Plan of Care Reviewed With: patient        Progress: no change  Outcome Evaluation: loyd catheter in place draining urine. Medicated this morning for anxiety. VSS.

## 2025-03-08 NOTE — PROGRESS NOTES
[facility]   Hospitalist Progress Note  Date: 3/8/2025  Patient Name: Javi Martínez  : 1940  MRN: [id number]  Date of admission: 3/6/2025  Room/Bed: [facility]      Subjective   Subjective     Chief Complaint:   Urinary retention    Summary:  Javi Martínez is an 84 y.o. male with medical history of heart failure with reduced ejection fraction, atrial fibrillation, CKD stage IIIb, COPD, CAD, hypertension, history of prostate cancer status post brachytherapy and external beam XRT and BPH.  Patient presents to the emergency department, for the past several days patient has been having issues with difficulty urinating.  Patient is on chronic Lasix for his history of heart failure.  Patient endorses suprapubic pain, frequent dribbling.  Intermittently having large-volume urination.  In the emergency department patient found to have urinary retention on bladder scan.  Nursing staff attempted Diamond placement at bedside, however unable to successfully place.  Urology consulted in the emergency department for placement of Diamond catheter.  Patient able to tolerate dilation and placement of 12 [place] catheter.  Patient tolerated procedure well, clear yellow urine drained easily from bladder.  Of note in the emergency department patient's initial potassium 5.3, creatinine 2.2.  This appears slightly above patient's baseline of 1.8-1.9.  Repeat BMP shows resolution, potassium of 4.8.  Creatinine down to 2.04.  Patient's proBNP elevated 9000, however down from previous heart failure exacerbations.  Patient states he does not have any dyspnea at this time, trace lower extremity edema.  Patient's lactate returned back at 2.5.  Lactate returned to normal.    Interval Followup:   Plan for discharging patient home today.  Patient's blood pressure has been stable low.  However when patient getting ready for discharge, states he got up and felt dizzy, continues to state he does not feel safe going home alone as he does not  have anybody at his house.  Will work towards SNF discharge as patient does not feel safe discharging home.  OT has seen patient, will await PT eval    Objective   Objective     Vitals:   Temp:  [97.2 °F (36.2 °C)-97.5 °F (36.4 °C)] 97.5 °F (36.4 °C)  Heart Rate:  [68-76] 76  Resp:  [18] 18  BP: ()/(56-71) 93/66    Physical Exam               Constitutional: Awake, alert, no acute distress              Eyes: Pupils equal, sclerae anicteric, no conjunctival injection              HENT: NCAT, mucous membranes moist              Neck: Supple, no thyromegaly, no lymphadenopathy, trachea midline              Respiratory: No crackles, no rhonchi              Cardiovascular: Irregularly irregular, no murmurs, rubs, or gallops, palpable pedal pulses bilaterally              Gastrointestinal: Positive bowel sounds, soft, nontender, nondistended              Musculoskeletal: Trace bilateral ankle edema, no clubbing or cyanosis to extremities              Neurologic: Oriented x 3, strength symmetric in all extremities, Cranial Nerves grossly intact to confrontation, speech clear              : Catheter in place, resolving hematuria    Result Review    Result Review:  I have personally reviewed these results:  [x]  Laboratory      Lab 03/08/25  0450 03/07/25  0434 03/06/25  1643 03/06/25  1352 03/06/25  0943   WBC 3.91 4.43  --   --  6.08   HEMOGLOBIN 12.3* 12.3*  --   --  13.9   HEMATOCRIT 37.5 39.4  --   --  43.9   PLATELETS 166 167  --   --  188   NEUTROS ABS  --  2.37  --   --  3.98   IMMATURE GRANS (ABS)  --  0.03  --   --  0.02   LYMPHS ABS  --  1.25  --   --  1.03   MONOS ABS  --  0.69  --   --  1.01*   EOS ABS  --  0.06  --   --  0.01   MCV 92.6 93.6  --   --  91.8   LACTATE  --   --  1.9 3.3* 2.5*         Lab 03/08/25  0450 03/07/25  0434 03/06/25  1215 03/06/25  0943 03/02/25  0712   SODIUM 138 134* 136   < > 138   POTASSIUM 4.5 4.4 4.8   < > 3.7   CHLORIDE 101 98 96*   < > 99   CO2 26.5 29.6* 29.1*   < > 25.6    ANION GAP 10.5 6.4 10.9   < > 13.4   BUN 48* 45* 47*   < > 43*   CREATININE 2.07* 2.07* 2.04*   < > 1.84*   EGFR 31.4* 31.4* 31.9*   < > 36.2*   GLUCOSE 87 78 90   < > 99   CALCIUM 8.8 8.8 9.0   < > 8.9   MAGNESIUM 2.4 2.4  --   --  2.2    < > = values in this interval not displayed.         Lab 03/06/25  0943   TOTAL PROTEIN 7.1   ALBUMIN 3.4*   GLOBULIN 3.7   ALT (SGPT) 15   AST (SGOT) 26   BILIRUBIN 1.0   ALK PHOS 106   LIPASE 47         Lab 03/06/25  0943   PROBNP 9,460.0*                 Brief Urine Lab Results  (Last result in the past 365 days)        Color   Clarity   Blood   Leuk Est   Nitrite   Protein   CREAT   Urine HCG        03/06/25 1613 Yellow   Clear   Small (1+)   Small (1+)   Negative   Trace                 [x]  Microbiology   Microbiology Results (last 10 days)       ** No results found for the last 240 hours. **          [x]  Radiology  No radiology results for the last 7 days  []  EKG/Telemetry   []  Cardiology/Vascular   []  Pathology  []  Old records  []  Other:    Assessment & Plan   Assessment / Plan     Assessment:  Obstructive uropathy status post Diamond catheter placement  Hyperkalemia, resolved  Lactic acidosis, resolved  History of prostate cancer status post brachytherapy and external beam XRT  BPH  CKD stage IIIb  COPD  CAD  Hypertension  Heart failure with reduced ejection fraction  Atrial fibrillation on Eliquis     Plan:  Plan for discharge today.  However patient states he simply does not feel safe at home.  Discussed with case management, will send referrals for SNF discharge for patient.  Patient has been seen by OT, awaiting PT evaluation  Patient now with Diamond catheter in place, plan to discharge on Diamond catheter  Potassium and creatinine remain appropriate.  Patient's lactate trended down to normal  Patient's blood pressure has been soft, complaining of lightheadedness will hold further doses of diuretics.  Will not bolus IV fluids at this time  Hold antihypertensives,  noted soft pressures.  Orthostatics performed by nursing staff with no significant drop  Patient's other home medications reviewed and resumed as indicated  Continue to monitor patient's urinary output, flush as needed, watch for clots  Discussed with urology, plans for patient to undergo cystoscopy early next week as an outpatient     Discussed with RN.  Discussed with case management    VTE Prophylaxis:  Pharmacologic & mechanical VTE prophylaxis orders are present.        CODE STATUS:   Code Status (Patient has no pulse and is not breathing): No CPR (Do Not Attempt to Resuscitate)  Medical Interventions (Patient has pulse or is breathing): Limited Support  Medical Intervention Limits: No intubation (DNI)      Electronically signed by Vladimir Parker MD, 3/8/2025, 15:13 EST.

## 2025-03-08 NOTE — PLAN OF CARE
Goal Outcome Evaluation:  Plan of Care Reviewed With: patient        Progress: improving    Pt reports not feeling well, feeling dizzy when getting dressed to get discharge, voices concern that he does not feel safe going back home since he lives alone, he states he is not ready to discharge.  Orthostatic bp obtained, no significant drop noticed MD notified.  Educated about participating in PT.

## 2025-03-08 NOTE — THERAPY EVALUATION
Patient Name: Javi Martínez  : 1940    MRN: [id number]                              Today's Date: 3/8/2025       Admit Date: 3/6/2025    Visit Dx:     ICD-10-CM ICD-9-CM   1. Urinary retention  R33.9 788.20   2. Impaired mobility and ADLs  Z74.09 V49.89    Z78.9      Patient Active Problem List   Diagnosis    Primary osteoarthritis of left knee    S/P TKR (total knee replacement)    Essential hypertension    CAD with CABG    Hyperlipidemia    Presence of cardiac pacemaker single chamber    Anxiety    Gastroesophageal reflux disease    Gout    History of malignant neoplasm of prostate    Testicular hypofunction    Polyneuropathy    Chronic obstructive pulmonary disease    Benign prostatic hyperplasia without lower urinary tract symptoms    Atrial fibrillation, chronic    Stage 3b chronic kidney disease    Postprocedural membranous urethral stricture    Acute on chronic HFrEF (heart failure with reduced ejection fraction)    Suprapubic catheter dysfunction    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    Urethral stricture in male    Lyme disease    Acute exacerbation of CHF (congestive heart failure)    Other dysphagia    CHF (congestive heart failure)    Urinary retention     Past Medical History:   Diagnosis Date    Aneurysm 2013    Arthritis     left knee     Atrial fibrillation, persistent     CHB (complete heart block) 2023    CHF exacerbation 2024    Chronic obstructive pulmonary disease 2023    pt denies having this    Difficulty walking     Numbness in right foot    Essential hypertension 2019    GERD (gastroesophageal reflux disease)     HFrEF (heart failure with reduced ejection fraction) 2021    Hyperlipidemia 2021    Paroxysmal atrial fibrillation     Polyneuropathy 2023    Prostate cancer 2010    with seeds implant     Septic joint 2021    Sleep apnea      Past Surgical History:   Procedure Laterality Date    ABLATION OF DYSRHYTHMIC FOCUS       "APPENDECTOMY      ARTERIAL BYPASS SURGERY      CARDIAC ABLATION      x2  \"years ago\"     CARDIAC ELECTROPHYSIOLOGY PROCEDURE N/A 11/01/2021    Procedure: PPM battery change;  Surgeon: Ge Whelan MD;  Location: [facility] CATH INVASIVE LOCATION;  Service: Cardiovascular;  Laterality: N/A;    CARDIAC SURGERY  2006    cabg 3v    COLONOSCOPY      CORONARY ARTERY BYPASS GRAFT  2006    CYSTOSCOPY URETHROTOMY VISUAL INTERNAL N/A 08/15/2024    Procedure: CYSTOSCOPY, URETHRAL DILATION, CATHETER PLACEMENT, RETROGRADE URETHROGRAM;  Surgeon: August Myers MD;  Location: [facility] MAIN OR;  Service: Urology;  Laterality: N/A;    CYSTOSCOPY URETHROTOMY VISUAL INTERNAL N/A 10/24/2024    Procedure: CYSTOSCOPY URETHROTOMY;  Surgeon: Mally Bernal MD;  Location: [facility] MAIN OR;  Service: Urology;  Laterality: N/A;    ENDOSCOPY      with dilation     ENDOSCOPY N/A 1/21/2025    Procedure: ESOPHAGOGASTRODUODENOSCOPY WITH BIOPSIES, POLYPECTOMY, BALLOON DILATION 18-20mm;  Surgeon: Simona Garcia MD;  Location: [facility] ENDOSCOPY;  Service: Gastroenterology;  Laterality: N/A;  GASTRIC POLYPS, SCHATZKI'S RING, HIATAL HERNIA    KNEE ARTHROSCOPY Left     TOTAL KNEE ARTHROPLASTY Left 07/23/2021    Procedure: TOTAL KNEE ARTHROPLASTY WITH DORA NAVIGATION WITH BIOMET;  Surgeon: Carlitos Zhao MD;  Location: [facility];  Service: Orthopedics;  Laterality: Left;    VENTRICULAR CARDIAC PACEMAKER INSERTION      [place]       General Information       Row Name 03/08/25 1017          OT Time and Intention    Document Type evaluation  -AC     Mode of Treatment individual therapy;occupational therapy  -AC     Patient Effort good  -AC       Row Name 03/08/25 1017          General Information    Patient Profile Reviewed yes  -AC     Prior Level of Function --  patient was (I) with adls and used a cane for functional mobility, has walk in shower with seat and regular toilet in place.  -AC     Existing Precautions/Restrictions fall  " -     Barriers to Rehab none identified  -       Row Name 03/08/25 1017          Occupational Profile    Reason for Services/Referral (Occupational Profile) Pt. is a 84 year old male admitted for the above diagnosis. Pt. referred to OT services to assess independence with ADLs and adl transfers/fx'l mobility. No previous OT services for current condition.  -       Row Name 03/08/25 1017          Living Environment    Current Living Arrangements home  -     People in Home alone  -       Row Name 03/08/25 1017          Cognition    Orientation Status (Cognition) oriented x 3  -       Row Name 03/08/25 1017          Safety Issues/Impairments Affecting Functional Mobility    Impairments Affecting Function (Mobility) balance;endurance/activity tolerance;strength  -               User Key  (r) = Recorded By, (t) = Taken By, (c) = Cosigned By      Initials Name Provider Type     Hui Dial, OT Occupational Therapist                     Mobility/ADL's       Row Name 03/08/25 1019          Bed Mobility    Bed Mobility bed mobility (all) activities  -     All Activities, [place] (Bed Mobility) standby assist  -       Row Name 03/08/25 1019          Transfers    Transfers sit-stand transfer  -       Row Name 03/08/25 1019          Sit-Stand Transfer    Sit-Stand [place] (Transfers) contact guard;1 person assist;minimum assist (75% patient effort);verbal cues;nonverbal cues (demo/gesture)  -     Assistive Device (Sit-Stand Transfers) walker, front-wheeled;cane, straight  -AC     Comment, (Sit-Stand Transfer) patient wants to lean back with standing, required cues for safety and technique. Patient stood x2, first time with cane and second with walker.  -       Row Name 03/08/25 1019          Functional Mobility    Functional Mobility- Ind. Level contact guard assist;minimum assist (75% patient effort)  -     Functional Mobility- Device walker, front-wheeled;cane, straight  -AC       Row  Name 03/08/25 1019          Activities of Daily Living    BADL Assessment/Intervention --  patient is setup for upper body bathing/dressing, setup for grooming, setup for self-feeding, CGA/min A for lower body bathing/dressing, CGA/min A for toileting.  -               User Key  (r) = Recorded By, (t) = Taken By, (c) = Cosigned By      Initials Name Provider Type     Hui Dial OT Occupational Therapist                   Obj/Interventions       Row Name 03/08/25 1021          Sensory Assessment (Somatosensory)    Sensory Assessment (Somatosensory) UE sensation intact  -     Sensory Assessment brisa complained of neuropathy in his feet  -       Row Name 03/08/25 1021          Vision Assessment/Intervention    Visual Impairment/Limitations WFL  -       Row Name 03/08/25 1021          Range of Motion Comprehensive    General Range of Motion bilateral upper extremity ROM WNL  -       Row Name 03/08/25 1021          Strength Comprehensive (MMT)    Comment, General Manual Muscle Testing (MMT) Assessment BUE 4/5  -       Row Name 03/08/25 1021          Motor Skills    Motor Skills coordination;functional endurance  -     Coordination WFL  -     Functional Endurance fair  -       Row Name 03/08/25 1021          Balance    Balance Assessment standing dynamic balance  -     Dynamic Standing Balance contact guard;minimal assist;verbal cues;non-verbal cues (demo/gesture)  -     Position/Device Used, Standing Balance walker, rolling  -AC     Balance Interventions standing;sit to stand;supported;dynamic;minimal challenge;occupation based/functional task  -               User Key  (r) = Recorded By, (t) = Taken By, (c) = Cosigned By      Initials Name Provider Type    Hui Brunson OT Occupational Therapist                   Goals/Plan       Row Name 03/08/25 1022          Transfer Goal 1 (OT)    Activity/Assistive Device (Transfer Goal 1, OT) transfers, all  -     [place] Level/Cues  Needed (Transfer Goal 1, OT) modified independence  -AC     Time Frame (Transfer Goal 1, OT) long term goal (LTG);10 days  -AC       Row Name 03/08/25 1022          Bathing Goal 1 (OT)    Activity/Device (Bathing Goal 1, OT) bathing skills, all  -AC     [place] Level/Cues Needed (Bathing Goal 1, OT) modified independence  -AC     Time Frame (Bathing Goal 1, OT) long term goal (LTG);10 days  -AC       Row Name 03/08/25 1022          Dressing Goal 1 (OT)    Activity/Device (Dressing Goal 1, OT) dressing skills, all  -AC     [place]/Cues Needed (Dressing Goal 1, OT) modified independence  -AC     Time Frame (Dressing Goal 1, OT) long term goal (LTG);10 days  -AC       Row Name 03/08/25 1022          Toileting Goal 1 (OT)    Activity/Device (Toileting Goal 1, OT) toileting skills, all  -AC     [place] Level/Cues Needed (Toileting Goal 1, OT) modified independence  -AC     Time Frame (Toileting Goal 1, OT) long term goal (LTG);10 days  -AC       Row Name 03/08/25 1022          Grooming Goal 1 (OT)    Activity/Device (Grooming Goal 1, OT) grooming skills, all  -AC     [place] (Grooming Goal 1, OT) modified independence  -AC     Time Frame (Grooming Goal 1, OT) long term goal (LTG);10 days  -AC       Row Name 03/08/25 1022          Strength Goal 1 (OT)    Strength Goal 1 (OT) Patient will improve BUE strength to 5/5 for adls.  -AC     Time Frame (Strength Goal 1, OT) long term goal (LTG);10 days  -AC       Row Name 03/08/25 1022          Problem Specific Goal 1 (OT)    Problem Specific Goal 1 (OT) Patient will demonstrate improve endurance to good for adls.  -AC     Time Frame (Problem Specific Goal 1, OT) long term goal (LTG);10 days  -AC       Row Name 03/08/25 1022          Therapy Assessment/Plan (OT)    Planned Therapy Interventions (OT) activity tolerance training;functional balance retraining;occupation/activity based interventions;ROM/therapeutic exercise;transfer/mobility  retraining;patient/caregiver education/training;BADL retraining  -               User Key  (r) = Recorded By, (t) = Taken By, (c) = Cosigned By      Initials Name Provider Type    Hui Brunson OT Occupational Therapist                   Clinical Impression       Row Name 03/08/25 1022          Pain Assessment    Pretreatment Pain Rating 0/10 - no pain  -     Posttreatment Pain Rating 0/10 - no pain  -       Row Name 03/08/25 1022          Plan of Care Review    Plan of Care Reviewed With patient  -     Progress no change  -     Outcome Evaluation Patient presents with balance, endurance and strength limitations that impede his/her ability to perform ADLS. The skills of a therapist are necessary to maximize independence with ADLs.  -       Row Name 03/08/25 1022          Therapy Assessment/Plan (OT)    Patient/Family Therapy Goal Statement (OT) Patient would like to maximize independence with adls.  -     Rehab Potential (OT) good  -     Criteria for Skilled Therapeutic Interventions Met (OT) yes;meets criteria;skilled treatment is necessary  -     Therapy Frequency (OT) 5 times/wk  -       Row Name 03/08/25 1022          Therapy Plan Review/Discharge Plan (OT)    Equipment Needs Upon Discharge (OT) commode chair;walker, rolling  -AC     Anticipated Discharge Disposition (OT) inpatient rehabilitation facility  -       Row Name 03/08/25 1022          Positioning and Restraints    Pre-Treatment Position in bed  -AC     Post Treatment Position bed  -AC     In Bed fowlers;call light within reach;encouraged to call for assist;exit alarm on  -               User Key  (r) = Recorded By, (t) = Taken By, (c) = Cosigned By      Initials Name Provider Type    Hui Brunson OT Occupational Therapist                   Outcome Measures       Row Name 03/08/25 1024          How much help from another is currently needed...    Putting on and taking off regular lower body clothing? 3  -AC     Bathing  (including washing, rinsing, and drying) 3  -AC     Toileting (which includes using toilet bed pan or urinal) 3  -AC     Putting on and taking off regular upper body clothing 4  -AC     Taking care of personal grooming (such as brushing teeth) 4  -AC     Eating meals 4  -AC     AM-PAC 6 Clicks Score (OT) 21  -AC       Row Name 03/08/25 0853          How much help from another person do you currently need...    Turning from your back to your side while in flat bed without using bedrails? 4  -SL     Moving from lying on back to sitting on the side of a flat bed without bedrails? 4  -SL     Moving to and from a bed to a chair (including a wheelchair)? 4  -SL     Standing up from a chair using your arms (e.g., wheelchair, bedside chair)? 4  -SL     Climbing 3-5 steps with a railing? 3  -SL     To walk in hospital room? 4  -SL     AM-PAC 6 Clicks Score (PT) 23  -SL       Row Name 03/08/25 1024          Functional Assessment    Outcome Measure Options AM-PAC 6 Clicks Daily Activity (OT);Optimal Instrument  -AC       Row Name 03/08/25 1024          Optimal Instrument    Optimal Instrument Optimal - 3  -AC     Bending/Stooping 1  -AC     Standing 2  -AC     Reaching 1  -AC     From the list, choose the 3 activities you would most like to be able to do without any difficulty Bending/stooping;Standing;Reaching  -AC     Total Score Optimal - 3 4  -AC               User Key  (r) = Recorded By, (t) = Taken By, (c) = Cosigned By      Initials Name Provider Type    Hui Brunson OT Occupational Therapist    SL Lee, Soo Kyeong, MANOLO Registered Nurse                    Occupational Therapy Education       Title: PT OT SLP Therapies (Done)       Topic: Occupational Therapy (Done)       Point: ADL training (Done)       Description:   Instruct learner(s) on proper safety adaptation and remediation techniques during self care or transfers.   Instruct in proper use of assistive devices.                  Learning Progress Summary             Patient Acceptance, E, VU by  at 3/8/2025 1024                      Point: Home exercise program (Done)       Description:   Instruct learner(s) on appropriate technique for monitoring, assisting and/or progressing therapeutic exercises/activities.                  Learning Progress Summary            Patient Acceptance, E, VU by  at 3/8/2025 1024                      Point: Precautions (Done)       Description:   Instruct learner(s) on prescribed precautions during self-care and functional transfers.                  Learning Progress Summary            Patient Acceptance, E, VU by  at 3/8/2025 1024                      Point: Body mechanics (Done)       Description:   Instruct learner(s) on proper positioning and spine alignment during self-care, functional mobility activities and/or exercises.                  Learning Progress Summary            Patient Acceptance, E, VU by  at 3/8/2025 1024                                      User Key       Initials Effective Dates Name Provider Type Discipline     06/16/21 -  Hui Dial OT Occupational Therapist OT                  OT Recommendation and Plan  Planned Therapy Interventions (OT): activity tolerance training, functional balance retraining, occupation/activity based interventions, ROM/therapeutic exercise, transfer/mobility retraining, patient/caregiver education/training, BADL retraining  Therapy Frequency (OT): 5 times/wk  Plan of Care Review  Plan of Care Reviewed With: patient  Progress: no change  Outcome Evaluation: Patient presents with balance, endurance and strength limitations that impede his/her ability to perform ADLS. The skills of a therapist are necessary to maximize independence with ADLs.     Time Calculation:   Evaluation Complexity (OT)  Review Occupational Profile/Medical/Therapy History Complexity: expanded/moderate complexity  Assessment, Occupational Performance/Identification of Deficit Complexity: 1-3 performance  deficits  Clinical Decision Making Complexity (OT): problem focused assessment/low complexity  Overall Complexity of Evaluation (OT): low complexity     Time Calculation- OT       Row Name 03/08/25 1026             Time Calculation- OT    OT Received On 03/08/25  -AC      OT Goal Re-Cert Due Date 03/17/25  -AC         Untimed Charges    OT Eval/Re-eval Minutes 31  -AC         Total Minutes    Untimed Charges Total Minutes 31  -AC       Total Minutes 31  -AC                User Key  (r) = Recorded By, (t) = Taken By, (c) = Cosigned By      Initials Name Provider Type    AC Hui Dial OT Occupational Therapist                  Therapy Charges for Today       Code Description Service Date Service Provider Modifiers Qty    [id number] HC OT EVAL LOW COMPLEXITY 3 3/8/2025 Hui Dial OT GO 1                 Hui Dial OT  3/8/2025

## 2025-03-08 NOTE — SIGNIFICANT NOTE
03/08/25 1316   Physical Therapy Time and Intention   Session Not Performed patient/family declined evaluation

## 2025-03-08 NOTE — PLAN OF CARE
Goal Outcome Evaluation:           Progress: no change  Outcome Evaluation: No issues during shift. Patient rested much of night. Jp Little RN

## 2025-03-09 LAB
ANION GAP SERPL CALCULATED.3IONS-SCNC: 11.7 MMOL/L (ref 5–15)
B PARAPERT DNA SPEC QL NAA+PROBE: NOT DETECTED
B PERT DNA SPEC QL NAA+PROBE: NOT DETECTED
BUN SERPL-MCNC: 45 MG/DL (ref 8–23)
BUN/CREAT SERPL: 23.3 (ref 7–25)
C PNEUM DNA NPH QL NAA+NON-PROBE: NOT DETECTED
CALCIUM SPEC-SCNC: 8.6 MG/DL (ref 8.6–10.5)
CHLORIDE SERPL-SCNC: 101 MMOL/L (ref 98–107)
CO2 SERPL-SCNC: 25.3 MMOL/L (ref 22–29)
CREAT SERPL-MCNC: 1.93 MG/DL (ref 0.76–1.27)
DEPRECATED RDW RBC AUTO: 54.4 FL (ref 37–54)
EGFRCR SERPLBLD CKD-EPI 2021: 34.1 ML/MIN/1.73
ERYTHROCYTE [DISTWIDTH] IN BLOOD BY AUTOMATED COUNT: 17.1 % (ref 12.3–15.4)
FLUAV H3 RNA NPH QL NAA+PROBE: DETECTED
FLUBV RNA ISLT QL NAA+PROBE: NOT DETECTED
GLUCOSE SERPL-MCNC: 90 MG/DL (ref 65–99)
HADV DNA SPEC NAA+PROBE: NOT DETECTED
HCOV 229E RNA SPEC QL NAA+PROBE: NOT DETECTED
HCOV HKU1 RNA SPEC QL NAA+PROBE: NOT DETECTED
HCOV NL63 RNA SPEC QL NAA+PROBE: NOT DETECTED
HCOV OC43 RNA SPEC QL NAA+PROBE: NOT DETECTED
HCT VFR BLD AUTO: 39.3 % (ref 37.5–51)
HGB BLD-MCNC: 12.8 G/DL (ref 13–17.7)
HMPV RNA NPH QL NAA+NON-PROBE: NOT DETECTED
HPIV1 RNA ISLT QL NAA+PROBE: NOT DETECTED
HPIV2 RNA SPEC QL NAA+PROBE: NOT DETECTED
HPIV3 RNA NPH QL NAA+PROBE: NOT DETECTED
HPIV4 P GENE NPH QL NAA+PROBE: NOT DETECTED
M PNEUMO IGG SER IA-ACNC: NOT DETECTED
MAGNESIUM SERPL-MCNC: 2.2 MG/DL (ref 1.6–2.4)
MCH RBC QN AUTO: 29.8 PG (ref 26.6–33)
MCHC RBC AUTO-ENTMCNC: 32.6 G/DL (ref 31.5–35.7)
MCV RBC AUTO: 91.4 FL (ref 79–97)
PLATELET # BLD AUTO: 152 10*3/MM3 (ref 140–450)
PMV BLD AUTO: 10.8 FL (ref 6–12)
POTASSIUM SERPL-SCNC: 4.4 MMOL/L (ref 3.5–5.2)
RBC # BLD AUTO: 4.3 10*6/MM3 (ref 4.14–5.8)
RHINOVIRUS RNA SPEC NAA+PROBE: NOT DETECTED
RSV RNA NPH QL NAA+NON-PROBE: NOT DETECTED
SARS-COV-2 RNA RESP QL NAA+PROBE: NOT DETECTED
SODIUM SERPL-SCNC: 138 MMOL/L (ref 136–145)
WBC NRBC COR # BLD AUTO: 4.71 10*3/MM3 (ref 3.4–10.8)

## 2025-03-09 PROCEDURE — 83735 ASSAY OF MAGNESIUM: CPT | Performed by: INTERNAL MEDICINE

## 2025-03-09 PROCEDURE — A9270 NON-COVERED ITEM OR SERVICE: HCPCS | Performed by: UROLOGY

## 2025-03-09 PROCEDURE — 80048 BASIC METABOLIC PNL TOTAL CA: CPT | Performed by: INTERNAL MEDICINE

## 2025-03-09 PROCEDURE — A9270 NON-COVERED ITEM OR SERVICE: HCPCS | Performed by: INTERNAL MEDICINE

## 2025-03-09 PROCEDURE — 63710000001 TAMSULOSIN 0.4 MG CAPSULE: Performed by: UROLOGY

## 2025-03-09 PROCEDURE — 63710000001 OSELTAMIVIR 75 MG CAPSULE: Performed by: INTERNAL MEDICINE

## 2025-03-09 PROCEDURE — 63710000001 LORAZEPAM 0.5 MG TABLET: Performed by: UROLOGY

## 2025-03-09 PROCEDURE — 25810000003 LACTATED RINGERS SOLUTION: Performed by: INTERNAL MEDICINE

## 2025-03-09 PROCEDURE — 63710000001 ONDANSETRON ODT 4 MG TABLET DISPERSIBLE: Performed by: INTERNAL MEDICINE

## 2025-03-09 PROCEDURE — 99232 SBSQ HOSP IP/OBS MODERATE 35: CPT | Performed by: INTERNAL MEDICINE

## 2025-03-09 PROCEDURE — 63710000001 TRAZODONE 50 MG TABLET: Performed by: UROLOGY

## 2025-03-09 PROCEDURE — 63710000001 ALLOPURINOL 100 MG TABLET: Performed by: UROLOGY

## 2025-03-09 PROCEDURE — 85027 COMPLETE CBC AUTOMATED: CPT | Performed by: INTERNAL MEDICINE

## 2025-03-09 PROCEDURE — 63710000001 ONDANSETRON ODT 4 MG TABLET DISPERSIBLE: Performed by: UROLOGY

## 2025-03-09 PROCEDURE — 63710000001 APIXABAN 2.5 MG TABLET: Performed by: UROLOGY

## 2025-03-09 PROCEDURE — 0202U NFCT DS 22 TRGT SARS-COV-2: CPT | Performed by: INTERNAL MEDICINE

## 2025-03-09 PROCEDURE — G0378 HOSPITAL OBSERVATION PER HR: HCPCS

## 2025-03-09 RX ORDER — OSELTAMIVIR PHOSPHATE 30 MG/1
30 CAPSULE ORAL EVERY 12 HOURS SCHEDULED
Status: COMPLETED | OUTPATIENT
Start: 2025-03-10 | End: 2025-03-14

## 2025-03-09 RX ORDER — OSELTAMIVIR PHOSPHATE 75 MG/1
75 CAPSULE ORAL ONCE
Status: COMPLETED | OUTPATIENT
Start: 2025-03-09 | End: 2025-03-09

## 2025-03-09 RX ADMIN — Medication 10 ML: at 20:45

## 2025-03-09 RX ADMIN — ALLOPURINOL 100 MG: 100 TABLET ORAL at 09:32

## 2025-03-09 RX ADMIN — LORAZEPAM 0.5 MG: 0.5 TABLET ORAL at 07:33

## 2025-03-09 RX ADMIN — SODIUM CHLORIDE, SODIUM LACTATE, POTASSIUM CHLORIDE, AND CALCIUM CHLORIDE 250 ML: .6; .31; .03; .02 INJECTION, SOLUTION INTRAVENOUS at 13:29

## 2025-03-09 RX ADMIN — PANTOPRAZOLE SODIUM 40 MG: 40 TABLET, DELAYED RELEASE ORAL at 06:10

## 2025-03-09 RX ADMIN — LORAZEPAM 0.5 MG: 0.5 TABLET ORAL at 20:45

## 2025-03-09 RX ADMIN — TRAZODONE HYDROCHLORIDE 50 MG: 50 TABLET ORAL at 20:45

## 2025-03-09 RX ADMIN — APIXABAN 2.5 MG: 2.5 TABLET, FILM COATED ORAL at 20:45

## 2025-03-09 RX ADMIN — Medication 10 ML: at 09:32

## 2025-03-09 RX ADMIN — ONDANSETRON 4 MG: 4 TABLET, ORALLY DISINTEGRATING ORAL at 11:10

## 2025-03-09 RX ADMIN — APIXABAN 2.5 MG: 2.5 TABLET, FILM COATED ORAL at 09:32

## 2025-03-09 RX ADMIN — CITALOPRAM HYDROBROMIDE 10 MG: 20 TABLET ORAL at 09:32

## 2025-03-09 RX ADMIN — OSELTAMIVIR PHOSPHATE 75 MG: 75 CAPSULE ORAL at 18:56

## 2025-03-09 RX ADMIN — TAMSULOSIN HYDROCHLORIDE 0.4 MG: 0.4 CAPSULE ORAL at 09:32

## 2025-03-09 NOTE — PLAN OF CARE
Goal Outcome Evaluation:  Plan of Care Reviewed With: patient        Progress: improving  Outcome Evaluation: Pt c/o of frequent, congested, non-productive cough, tested positive for Flu A. Orthostatic bp obtained, no significant drop noticed. Diamond having red urine output.

## 2025-03-09 NOTE — PLAN OF CARE
Goal Outcome Evaluation:           Progress: no change  Outcome Evaluation: No changes. Patient awaiting rehab placement. Jp Little RN

## 2025-03-09 NOTE — PROGRESS NOTES
[facility]   Hospitalist Progress Note  Date: 3/9/2025  Patient Name: Javi Martínez  : 1940  MRN: [id number]  Date of admission: 3/6/2025  Room/Bed: [facility]      Subjective   Subjective     Chief Complaint:   Urinary retention    Summary:  Javi Martínez is an 84 y.o. male with medical history of heart failure with reduced ejection fraction, atrial fibrillation, CKD stage IIIb, COPD, CAD, hypertension, history of prostate cancer status post brachytherapy and external beam XRT and BPH.  Patient presents to the emergency department, for the past several days patient has been having issues with difficulty urinating.  Patient is on chronic Lasix for his history of heart failure.  Patient endorses suprapubic pain, frequent dribbling.  Intermittently having large-volume urination.  In the emergency department patient found to have urinary retention on bladder scan.  Nursing staff attempted Diamond placement at bedside, however unable to successfully place.  Urology consulted in the emergency department for placement of Diamond catheter.  Patient able to tolerate dilation and placement of 12 Czech catheter.  Patient tolerated procedure well, clear yellow urine drained easily from bladder.  Of note in the emergency department patient's initial potassium 5.3, creatinine 2.2.  This appears slightly above patient's baseline of 1.8-1.9.  Repeat BMP shows resolution, potassium of 4.8.  Creatinine down to 2.04.  Patient's proBNP elevated 9000, however down from previous heart failure exacerbations.  Patient states he does not have any dyspnea at this time, trace lower extremity edema.  Patient's lactate returned back at 2.5.  Lactate returned to normal.    Interval Followup:   Patient still states he feels dizzy, orthostatics have been negative.  Blood pressures running soft, however this appears baseline for patient on review of records.  Will hold Lasix today.  Will provide small push of IV fluids encouraging oral  hydration    Objective   Objective     Vitals:   Temp:  [97.2 °F (36.2 °C)-98.2 °F (36.8 °C)] 97.9 °F (36.6 °C)  Heart Rate:  [70-75] 72  Resp:  [16-20] 20  BP: ()/(60-72) 95/64    Physical Exam               Constitutional: Awake, alert, no acute distress              Eyes: Pupils equal, sclerae anicteric, no conjunctival injection              HENT: NCAT, mucous membranes moist              Neck: Supple, no thyromegaly, no lymphadenopathy, trachea midline              Respiratory: No crackles, no rhonchi              Cardiovascular: Irregularly irregular, no murmurs, rubs, or gallops, palpable pedal pulses bilaterally              Gastrointestinal: Positive bowel sounds, soft, nontender, nondistended              Musculoskeletal: No bilateral ankle edema, no clubbing or cyanosis to extremities              Neurologic: Oriented x 3, strength symmetric in all extremities, Cranial Nerves grossly intact to confrontation, speech clear              : Catheter in place, resolving hematuria    Result Review    Result Review:  I have personally reviewed these results:  [x]  Laboratory      Lab 03/09/25  0438 03/08/25 0450 03/07/25  0434 03/06/25  1643 03/06/25  1352 03/06/25  0943   WBC 4.71 3.91 4.43  --   --  6.08   HEMOGLOBIN 12.8* 12.3* 12.3*  --   --  13.9   HEMATOCRIT 39.3 37.5 39.4  --   --  43.9   PLATELETS 152 166 167  --   --  188   NEUTROS ABS  --   --  2.37  --   --  3.98   IMMATURE GRANS (ABS)  --   --  0.03  --   --  0.02   LYMPHS ABS  --   --  1.25  --   --  1.03   MONOS ABS  --   --  0.69  --   --  1.01*   EOS ABS  --   --  0.06  --   --  0.01   MCV 91.4 92.6 93.6  --   --  91.8   LACTATE  --   --   --  1.9 3.3* 2.5*         Lab 03/09/25  0439 03/08/25 0450 03/07/25  0434   SODIUM 138 138 134*   POTASSIUM 4.4 4.5 4.4   CHLORIDE 101 101 98   CO2 25.3 26.5 29.6*   ANION GAP 11.7 10.5 6.4   BUN 45* 48* 45*   CREATININE 1.93* 2.07* 2.07*   EGFR 34.1* 31.4* 31.4*   GLUCOSE 90 87 78   CALCIUM 8.6 8.8  8.8   MAGNESIUM 2.2 2.4 2.4         Lab 03/06/25  0943   TOTAL PROTEIN 7.1   ALBUMIN 3.4*   GLOBULIN 3.7   ALT (SGPT) 15   AST (SGOT) 26   BILIRUBIN 1.0   ALK PHOS 106   LIPASE 47         Lab 03/06/25  0943   PROBNP 9,460.0*                 Brief Urine Lab Results  (Last result in the past 365 days)        Color   Clarity   Blood   Leuk Est   Nitrite   Protein   CREAT   Urine HCG        03/06/25 1613 Yellow   Clear   Small (1+)   Small (1+)   Negative   Trace                 [x]  Microbiology   Microbiology Results (last 10 days)       ** No results found for the last 240 hours. **          [x]  Radiology  No radiology results for the last 7 days  []  EKG/Telemetry   []  Cardiology/Vascular   []  Pathology  []  Old records  []  Other:    Assessment & Plan   Assessment / Plan     Assessment:  Obstructive uropathy status post Diamond catheter placement  Hyperkalemia, resolved  Lactic acidosis, resolved  History of prostate cancer status post brachytherapy and external beam XRT  BPH  CKD stage IIIb  COPD  CAD  Hypertension  Heart failure with reduced ejection fraction  Atrial fibrillation on Eliquis     Plan:  Patient still stating he feels unsafe ambulating.  PT and OT evaluation orders are in place  Will provide small push of IV fluids today encouraging oral hydration  Holding patient's Lasix  Patient now with Diamond catheter in place, plan to discharge on Diamond catheter  Potassium and creatinine remain appropriate.  Patient's lactate trended down to normal  Patient's blood pressure has been soft, complaining of lightheadedness will hold further doses of diuretics.   Hold antihypertensives, noted soft pressures.  Orthostatics performed by nursing staff with no significant drop  Patient's other home medications reviewed and resumed as indicated  Continue to monitor patient's urinary output, flush as needed, watch for clots  Discussed with urology, plans for patient to undergo cystoscopy early next week as an outpatient      Discussed with RN.  Discussed with case management    VTE Prophylaxis:  Pharmacologic & mechanical VTE prophylaxis orders are present.        CODE STATUS:   Code Status (Patient has no pulse and is not breathing): No CPR (Do Not Attempt to Resuscitate)  Medical Interventions (Patient has pulse or is breathing): Limited Support  Medical Intervention Limits: No intubation (DNI)      Electronically signed by Vladimir Parker MD, 3/9/2025, 15:11 EDT.

## 2025-03-10 ENCOUNTER — ANESTHESIA EVENT (OUTPATIENT)
Dept: PERIOP | Facility: HOSPITAL | Age: 85
End: 2025-03-10
Payer: MEDICARE

## 2025-03-10 LAB
ANION GAP SERPL CALCULATED.3IONS-SCNC: 11.9 MMOL/L (ref 5–15)
BUN SERPL-MCNC: 39 MG/DL (ref 8–23)
BUN/CREAT SERPL: 24.5 (ref 7–25)
CALCIUM SPEC-SCNC: 8.7 MG/DL (ref 8.6–10.5)
CHLORIDE SERPL-SCNC: 103 MMOL/L (ref 98–107)
CO2 SERPL-SCNC: 26.1 MMOL/L (ref 22–29)
CREAT SERPL-MCNC: 1.59 MG/DL (ref 0.76–1.27)
DEPRECATED RDW RBC AUTO: 55.7 FL (ref 37–54)
EGFRCR SERPLBLD CKD-EPI 2021: 43.1 ML/MIN/1.73
ERYTHROCYTE [DISTWIDTH] IN BLOOD BY AUTOMATED COUNT: 17.8 % (ref 12.3–15.4)
GLUCOSE SERPL-MCNC: 83 MG/DL (ref 65–99)
HCT VFR BLD AUTO: 40.9 % (ref 37.5–51)
HGB BLD-MCNC: 13.3 G/DL (ref 13–17.7)
MAGNESIUM SERPL-MCNC: 2.1 MG/DL (ref 1.6–2.4)
MCH RBC QN AUTO: 30.2 PG (ref 26.6–33)
MCHC RBC AUTO-ENTMCNC: 32.5 G/DL (ref 31.5–35.7)
MCV RBC AUTO: 93 FL (ref 79–97)
PLATELET # BLD AUTO: 146 10*3/MM3 (ref 140–450)
PMV BLD AUTO: 11 FL (ref 6–12)
POTASSIUM SERPL-SCNC: 4.2 MMOL/L (ref 3.5–5.2)
RBC # BLD AUTO: 4.4 10*6/MM3 (ref 4.14–5.8)
SODIUM SERPL-SCNC: 141 MMOL/L (ref 136–145)
WBC NRBC COR # BLD AUTO: 5.74 10*3/MM3 (ref 3.4–10.8)

## 2025-03-10 PROCEDURE — A9270 NON-COVERED ITEM OR SERVICE: HCPCS | Performed by: UROLOGY

## 2025-03-10 PROCEDURE — A9270 NON-COVERED ITEM OR SERVICE: HCPCS | Performed by: INTERNAL MEDICINE

## 2025-03-10 PROCEDURE — 63710000001 PANTOPRAZOLE 40 MG TABLET DELAYED-RELEASE: Performed by: UROLOGY

## 2025-03-10 PROCEDURE — 63710000001 LORAZEPAM 0.5 MG TABLET: Performed by: INTERNAL MEDICINE

## 2025-03-10 PROCEDURE — 83735 ASSAY OF MAGNESIUM: CPT | Performed by: INTERNAL MEDICINE

## 2025-03-10 PROCEDURE — G0378 HOSPITAL OBSERVATION PER HR: HCPCS

## 2025-03-10 PROCEDURE — 99231 SBSQ HOSP IP/OBS SF/LOW 25: CPT | Performed by: UROLOGY

## 2025-03-10 PROCEDURE — 97161 PT EVAL LOW COMPLEX 20 MIN: CPT

## 2025-03-10 PROCEDURE — 99024 POSTOP FOLLOW-UP VISIT: CPT | Performed by: UROLOGY

## 2025-03-10 PROCEDURE — 63710000001 CITALOPRAM 20 MG TABLET: Performed by: UROLOGY

## 2025-03-10 PROCEDURE — 85027 COMPLETE CBC AUTOMATED: CPT | Performed by: INTERNAL MEDICINE

## 2025-03-10 PROCEDURE — 80048 BASIC METABOLIC PNL TOTAL CA: CPT | Performed by: INTERNAL MEDICINE

## 2025-03-10 PROCEDURE — 63710000001 OSELTAMIVIR 30 MG CAPSULE: Performed by: UROLOGY

## 2025-03-10 PROCEDURE — 99232 SBSQ HOSP IP/OBS MODERATE 35: CPT | Performed by: INTERNAL MEDICINE

## 2025-03-10 RX ORDER — LORAZEPAM 0.5 MG/1
0.5 TABLET ORAL ONCE AS NEEDED
Status: COMPLETED | OUTPATIENT
Start: 2025-03-10 | End: 2025-03-10

## 2025-03-10 RX ADMIN — CITALOPRAM HYDROBROMIDE 10 MG: 20 TABLET ORAL at 10:09

## 2025-03-10 RX ADMIN — OSELTAMIVIR PHOSPHATE 30 MG: 30 CAPSULE ORAL at 10:09

## 2025-03-10 RX ADMIN — APIXABAN 2.5 MG: 2.5 TABLET, FILM COATED ORAL at 10:09

## 2025-03-10 RX ADMIN — LORAZEPAM 0.5 MG: 0.5 TABLET ORAL at 05:37

## 2025-03-10 RX ADMIN — LORAZEPAM 0.5 MG: 0.5 TABLET ORAL at 14:59

## 2025-03-10 RX ADMIN — ALLOPURINOL 100 MG: 100 TABLET ORAL at 10:09

## 2025-03-10 RX ADMIN — TAMSULOSIN HYDROCHLORIDE 0.4 MG: 0.4 CAPSULE ORAL at 10:09

## 2025-03-10 RX ADMIN — Medication 10 ML: at 20:43

## 2025-03-10 RX ADMIN — Medication 10 ML: at 10:10

## 2025-03-10 RX ADMIN — APIXABAN 2.5 MG: 2.5 TABLET, FILM COATED ORAL at 20:43

## 2025-03-10 RX ADMIN — PANTOPRAZOLE SODIUM 40 MG: 40 TABLET, DELAYED RELEASE ORAL at 05:36

## 2025-03-10 RX ADMIN — OSELTAMIVIR PHOSPHATE 30 MG: 30 CAPSULE ORAL at 20:43

## 2025-03-10 NOTE — PLAN OF CARE
Goal Outcome Evaluation:  Plan of Care Reviewed With: patient        Progress: no change  Outcome Evaluation: pt c/o of short of air, was very anxious, O2 saturation checked, 92-95% on room air, MD notified, one time dose of ativan given per MAR.  Pt agreeable with the procedure tomorrow, consent signed.  Encouraged to use IS.

## 2025-03-10 NOTE — PLAN OF CARE
Goal Outcome Evaluation:  Plan of Care Reviewed With: patient        Progress: no change  Outcome Evaluation: VSS. Cough dry and infrequent. No s/s distress noted. Resting comfortably no complaints at this time.

## 2025-03-10 NOTE — PLAN OF CARE
Goal Outcome Evaluation:  Plan of Care Reviewed With: (P) patient           Outcome Evaluation: (P) Pt presents with balance and endurance deficits and is unable to safely ambulate without assistance. Pt requires skilled therapy services to address these impairments    Anticipated Discharge Disposition (PT): (P) inpatient rehabilitation facility

## 2025-03-10 NOTE — PROGRESS NOTES
[facility]   Urology Progress Note    Patient Name: Javi Martínez  : 1940  MRN: [id number]  Primary Care Physician:  Rani Lopez APRN  Date of admission: 3/6/2025    Subjective   Subjective       Catheter not bothering him draining okay  No events    Objective   Objective     Vitals:   Temp:  [97.1 °F (36.2 °C)-98.2 °F (36.8 °C)] 97.1 °F (36.2 °C)  Heart Rate:  [69-75] 71  Resp:  [16-20] 20  BP: ()/(53-72) 97/53  Physical Exam     Alert and oriented x3  No acute distress  Unlabored respirations  Nontender/nondistended   Catheter in place draining some blood-tinged urine    Result Review    Result Review:  I have personally reviewed the results from the time of this admission to 3/10/2025 06:24 EDT and agree with these findings:  []  Laboratory  []  Microbiology  []  Radiology  []  EKG/Telemetry   []  Cardiology/Vascular   []  Pathology  []  Old records  []  Other:      Assessment & Plan   Assessment / Plan     Brief Patient Summary:  Javi Martínez is a 84 y.o. male    Active Hospital Problems:  Active Hospital Problems    Diagnosis     **Urinary retention      Urinary retention  History of prostate cancer status post XRT      Discussed possibility of void trial this morning, patient at this time would like the catheter left for couple more days risk and benefits discussed    Follow-up in Dr. Bernal's office this week        Electronically signed by August Myers MD, 03/10/25, 6:24 AM EDT.

## 2025-03-10 NOTE — THERAPY EVALUATION
Acute Care - Physical Therapy Initial Evaluation  [facility]     Patient Name: Javi Martínez  : 1940  MRN: [id number]  Today's Date: 3/10/2025      Visit Dx:     ICD-10-CM ICD-9-CM   1. Urinary retention  R33.9 788.20   2. Impaired mobility and ADLs  Z74.09 V49.89    Z78.9    3. Difficulty in walking  R26.2 719.7     Patient Active Problem List   Diagnosis    Primary osteoarthritis of left knee    S/P TKR (total knee replacement)    Essential hypertension    CAD with CABG    Hyperlipidemia    Presence of cardiac pacemaker single chamber    Anxiety    Gastroesophageal reflux disease    Gout    History of malignant neoplasm of prostate    Testicular hypofunction    Polyneuropathy    Chronic obstructive pulmonary disease    Benign prostatic hyperplasia without lower urinary tract symptoms    Atrial fibrillation, chronic    Stage 3b chronic kidney disease    Postprocedural membranous urethral stricture    Acute on chronic HFrEF (heart failure with reduced ejection fraction)    Suprapubic catheter dysfunction    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    Urethral stricture in male    Lyme disease    Acute exacerbation of CHF (congestive heart failure)    Other dysphagia    CHF (congestive heart failure)    Urinary retention     Past Medical History:   Diagnosis Date    Aneurysm     Arthritis     left knee     Atrial fibrillation, persistent     CHB (complete heart block) 2023    CHF exacerbation 2024    Chronic obstructive pulmonary disease 2023    pt denies having this    Difficulty walking     Numbness in right foot    Essential hypertension 2019    GERD (gastroesophageal reflux disease)     HFrEF (heart failure with reduced ejection fraction) 2021    Hyperlipidemia 2021    Paroxysmal atrial fibrillation     Polyneuropathy 2023    Prostate cancer 2010    with seeds implant     Septic joint 2021    Sleep apnea      Past Surgical History:   Procedure Laterality  "Date    ABLATION OF DYSRHYTHMIC FOCUS  2013    APPENDECTOMY      ARTERIAL BYPASS SURGERY      CARDIAC ABLATION      x2  \"years ago\"     CARDIAC ELECTROPHYSIOLOGY PROCEDURE N/A 11/01/2021    Procedure: PPM battery change;  Surgeon: Ge Whelan MD;  Location: [facility] CATH INVASIVE LOCATION;  Service: Cardiovascular;  Laterality: N/A;    CARDIAC SURGERY  2006    cabg 3v    COLONOSCOPY      CORONARY ARTERY BYPASS GRAFT  2006    CYSTOSCOPY URETHROTOMY VISUAL INTERNAL N/A 08/15/2024    Procedure: CYSTOSCOPY, URETHRAL DILATION, CATHETER PLACEMENT, RETROGRADE URETHROGRAM;  Surgeon: August Myers MD;  Location: [facility] MAIN OR;  Service: Urology;  Laterality: N/A;    CYSTOSCOPY URETHROTOMY VISUAL INTERNAL N/A 10/24/2024    Procedure: CYSTOSCOPY URETHROTOMY;  Surgeon: Mally Bernal MD;  Location: [facility] MAIN OR;  Service: Urology;  Laterality: N/A;    ENDOSCOPY      with dilation     ENDOSCOPY N/A 1/21/2025    Procedure: ESOPHAGOGASTRODUODENOSCOPY WITH BIOPSIES, POLYPECTOMY, BALLOON DILATION 18-20mm;  Surgeon: Simona Garcia MD;  Location: [facility] ENDOSCOPY;  Service: Gastroenterology;  Laterality: N/A;  GASTRIC POLYPS, SCHATZKI'S RING, HIATAL HERNIA    KNEE ARTHROSCOPY Left     TOTAL KNEE ARTHROPLASTY Left 07/23/2021    Procedure: TOTAL KNEE ARTHROPLASTY WITH DORA NAVIGATION WITH BIOMET;  Surgeon: Carlitos Zhao MD;  Location: [facility] MAIN OR;  Service: Orthopedics;  Laterality: Left;    VENTRICULAR CARDIAC PACEMAKER INSERTION      medtronic      PT Assessment (Last 12 Hours)       PT Evaluation and Treatment       Row Name 03/10/25 0800          Physical Therapy Time and Intention    Subjective Information complains of;fatigue (P)   -TM     Document Type evaluation (P)   -TM     Mode of Treatment individual therapy (P)   -TM     Patient Effort good (P)   -TM       Row Name 03/10/25 0800          General Information    Prior Level of Function independent:;all household mobility;ADL's (P)   -TM     " Equipment Currently Used at Home cane, straight;rollator (P)   -TM     Existing Precautions/Restrictions fall (P)   -TM       Row Name 03/10/25 0800          Living Environment    Current Living Arrangements home (P)   -TM     Home Accessibility stairs to enter home (P)   -TM     People in Home alone (P)   -TM     Primary Care Provided by self (P)   -       Row Name 03/10/25 0800          Home Main Entrance    Number of Stairs, Main Entrance two (P)   -TM     Stair Railings, Main Entrance railings safe and in good condition (P)   -TM       Row Name 03/10/25 0800          Range of Motion (ROM)    Range of Motion ROM is WNL;bilateral lower extremities (P)   -       Row Name 03/10/25 0800          Strength (Manual Muscle Testing)    Strength (Manual Muscle Testing) strength is WNL;bilateral lower extremities (P)   -       Row Name 03/10/25 0800          Bed Mobility    Bed Mobility supine-sit (P)   -TM     All Activities, [place] (Bed Mobility) contact guard (P)   -TM     Supine-Sit [place] (Bed Mobility) contact guard (P)   -       Row Name 03/10/25 0800          Transfers    Transfers sit-stand transfer (P)   -       Row Name 03/10/25 0800          Sit-Stand Transfer    Sit-Stand [place] (Transfers) contact guard (P)   -TM     Assistive Device (Sit-Stand Transfers) walker, front-wheeled (P)   -TM       Row Name 03/10/25 0800          Gait/Stairs (Locomotion)    Gait/Stairs Locomotion gait/ambulation independence;gait/ambulation assistive device (P)   -TM     [place] Level (Gait) contact guard (P)   -TM     Assistive Device (Gait) walker, front-wheeled (P)   -TM     Patient was able to Ambulate yes (P)   -TM     Distance in Feet (Gait) 15 (P)   -       Row Name 03/10/25 0800          Safety Issues/Impairments Affecting Functional Mobility    Impairments Affecting Function (Mobility) balance;endurance/activity tolerance (P)   -       Row Name 03/10/25 0800          Balance    Balance  Assessment standing dynamic balance (P)   -TM     Dynamic Standing Balance contact guard (P)   -TM     Position/Device Used, Standing Balance walker, front-wheeled (P)   -TM       Row Name 03/10/25 0800          Plan of Care Review    Plan of Care Reviewed With patient (P)   -TM     Outcome Evaluation Pt presents with balance and endurance deficits and is unable to safely ambulate without assistance. Pt requires skilled therapy services to address these impairments (P)   -TM       Row Name 03/10/25 0800          Therapy Assessment/Plan (PT)    Patient/Family Therapy Goals Statement (PT) Return home (P)   -TM     Rehab Potential (PT) good (P)   -TM     Criteria for Skilled Interventions Met (PT) skilled treatment is necessary (P)   -TM     Therapy Frequency (PT) daily (P)   -TM     Predicted Duration of Therapy Intervention (PT) 10 days (P)   -TM     Problem List (PT) problems related to;balance;mobility (P)   -TM     Activity Limitations Related to Problem List (PT) unable to ambulate safely (P)   -TM       Row Name 03/10/25 0800          PT Evaluation Complexity    History, PT Evaluation Complexity no personal factors and/or comorbidities (P)   -TM     Examination of Body Systems (PT Eval Complexity) total of 4 or more elements (P)   -TM     Clinical Presentation (PT Evaluation Complexity) stable (P)   -TM     Clinical Decision Making (PT Evaluation Complexity) low complexity (P)   -TM     Overall Complexity (PT Evaluation Complexity) low complexity (P)   -TM       Row Name 03/10/25 0800          Therapy Plan Review/Discharge Plan (PT)    Therapy Plan Review (PT) evaluation/treatment results reviewed;patient (P)   -TM       Row Name 03/10/25 0800          Physical Therapy Goals    Transfer Goal Selection (PT) transfer, PT goal 1 (P)   -TM     Gait Training Goal Selection (PT) gait training, PT goal 1 (P)   -TM     Balance Goal Selection (PT) balance, PT goal 1 (P)   -TM       Row Name 03/10/25 0800           Transfer Goal 1 (PT)    Activity/Assistive Device (Transfer Goal 1, PT) transfers, all (P)   -TM     [place] Level/Cues Needed (Transfer Goal 1, PT) independent (P)   -TM     Time Frame (Transfer Goal 1, PT) 10 days (P)   -TM       Row Name 03/10/25 0800          Gait Training Goal 1 (PT)    Activity/Assistive Device (Gait Training Goal 1, PT) gait (walking locomotion);assistive device use (P)   -TM     [place] Level (Gait Training Goal 1, PT) independent (P)   -TM     Distance (Gait Training Goal 1, PT) 300' (P)   -TM     Time Frame (Gait Training Goal 1, PT) 10 days (P)   -TM       Row Name 03/10/25 0800          Balance Goal 1 (PT)    Activity/Assistive Device (Balance Goal) standing dynamic balance (P)   -TM     [place] Level/Cues Needed (Balance Goal 1, PT) independent (P)   -TM     Time Frame (Balance Goal 1, PT) 2 weeks (P)   -TM               User Key  (r) = Recorded By, (t) = Taken By, (c) = Cosigned By      Initials Name Provider Type    TM Law Ann, PT Student PT Student                    Physical Therapy Education       Title: PT OT SLP Therapies (Done)       Topic: Physical Therapy (Done)       Point: Mobility training (Done)       Learning Progress Summary            Patient Acceptance, E,TB, VU by TM at 3/10/2025 0858                      Point: Home exercise program (Done)       Learning Progress Summary            Patient Acceptance, E,TB, VU by TM at 3/10/2025 0858                      Point: Body mechanics (Done)       Learning Progress Summary            Patient Acceptance, E,TB, VU by TM at 3/10/2025 0858                      Point: Precautions (Done)       Learning Progress Summary            Patient Acceptance, E,TB, VU by TM at 3/10/2025 0858                                      User Key       Initials Effective Dates Name Provider Type Discipline     02/04/25 -  Law Ann, PT Student PT Student PT                  PT Recommendation and Plan  Anticipated Discharge  Disposition (PT): (P) inpatient rehabilitation facility  Planned Therapy Interventions (PT): (P) balance training, gait training, home exercise program, neuromuscular re-education, strengthening, transfer training  Therapy Frequency (PT): (P) daily  Plan of Care Reviewed With: (P) patient  Outcome Evaluation: (P) Pt presents with balance and endurance deficits and is unable to safely ambulate without assistance. Pt requires skilled therapy services to address these impairments   Outcome Measures       Row Name 03/10/25 0800             How much help from another person do you currently need...    Turning from your back to your side while in flat bed without using bedrails? 4 (P)   -TM      Moving from lying on back to sitting on the side of a flat bed without bedrails? 3 (P)   -TM      Moving to and from a bed to a chair (including a wheelchair)? 3 (P)   -TM      Standing up from a chair using your arms (e.g., wheelchair, bedside chair)? 3 (P)   -TM      Climbing 3-5 steps with a railing? 3 (P)   -TM      To walk in hospital room? 3 (P)   -TM      AM-PAC 6 Clicks Score (PT) 19 (P)   -TM         Functional Assessment    Outcome Measure Options AM-PAC 6 Clicks Basic Mobility (PT) (P)   -TM                User Key  (r) = Recorded By, (t) = Taken By, (c) = Cosigned By      Initials Name Provider Type    Law Fulton PT Student PT Student                     Time Calculation:    PT Charges       Row Name 03/10/25 0854             Time Calculation    PT Received On 03/10/25 (P)   -TM      PT Goal Re-Cert Due Date 03/19/25 (P)   -TM         Untimed Charges    PT Eval/Re-eval Minutes 25 (P)   -TM         Total Minutes    Untimed Charges Total Minutes 25 (P)   -TM       Total Minutes 25 (P)   -TM                User Key  (r) = Recorded By, (t) = Taken By, (c) = Cosigned By      Initials Name Provider Type    Law [place], PT Student PT Student                  Therapy Charges for Today       Code Description Service  Date Service Provider Modifiers Qty    [id number] HC PT EVAL LOW COMPLEXITY 2 3/10/2025 Law Ann, PT Student GP 1            PT G-Codes  Outcome Measure Options: (P) AM-PAC 6 Clicks Basic Mobility (PT)  AM-PAC 6 Clicks Score (PT): (P) 19  AM-PAC 6 Clicks Score (OT): 21    Law Ann PT Student  3/10/2025

## 2025-03-10 NOTE — PROGRESS NOTES
Chart reviewed.  Spoke with Dr. Parker about this patient.  Will still plan on operating room tomorrow.  He will have a cystoscopy with possible optical internal urethrotomy.  Risk and benefits were discussed with the patient last week and he is agreeable to proceed.  He did have a positive test for the flu but has no respiratory symptoms.  I discussed with Timothy Bailey MD, anesthesiology today and he was okay with the patient proceeding with surgery.

## 2025-03-10 NOTE — PROGRESS NOTES
[facility]   Hospitalist Progress Note  Date: 3/10/2025  Patient Name: Javi Martínez  : 1940  MRN: [id number]  Date of admission: 3/6/2025  Room/Bed: [facility]      Subjective   Subjective     Chief Complaint:   Urinary retention    Summary:  Javi Martínez is an 84 y.o. male with medical history of heart failure with reduced ejection fraction, atrial fibrillation, CKD stage IIIb, COPD, CAD, hypertension, history of prostate cancer status post brachytherapy and external beam XRT and BPH.  Patient presents to the emergency department, for the past several days patient has been having issues with difficulty urinating.  Patient is on chronic Lasix for his history of heart failure.  Patient endorses suprapubic pain, frequent dribbling.  Intermittently having large-volume urination.  In the emergency department patient found to have urinary retention on bladder scan.  Nursing staff attempted Diamond placement at bedside, however unable to successfully place.  Urology consulted in the emergency department for placement of Diamond catheter.  Patient able to tolerate dilation and placement of 12 Czech catheter.  Patient tolerated procedure well, clear yellow urine drained easily from bladder.  Of note in the emergency department patient's initial potassium 5.3, creatinine 2.2.  This appears slightly above patient's baseline of 1.8-1.9.  Repeat BMP shows resolution, potassium of 4.8.  Creatinine down to 2.04.  Patient's proBNP elevated 9000, however down from previous heart failure exacerbations.  Patient states he does not have any dyspnea at this time, trace lower extremity edema.  Patient's lactate returned back at 2.5.  Lactate returned to normal.  Over the weekend patient did not feel stable on his feet, complaining of unsteadiness.  Working towards SNF discharge, PT and OT consults placed.  Patient tested positive for influenza on the weekend.    Interval Followup:   Tested positive for influenza, started on  Tamiflu based on renal function.  Discussed with urologist, Dr. Bernal, as patient is still in the hospital we will see if patient's urological procedure originally scheduled as an outpatient can be done inpatient.  Given patient's new influenza A positive status, anesthesia will need an update.  Patient remains on room air at this time.  Blood pressures richard stable low.  Continue to hold antihypertensive and diuretic today.    Objective   Objective     Vitals:   Temp:  [97.1 °F (36.2 °C)-97.3 °F (36.3 °C)] 97.3 °F (36.3 °C)  Heart Rate:  [67-75] 67  Resp:  [16-20] 18  BP: ()/() 96/56    Physical Exam               Constitutional: Awake, alert, no acute distress              Eyes: Pupils equal, sclerae anicteric, no conjunctival injection              HENT: NCAT, mucous membranes moist              Neck: Supple, no thyromegaly, no lymphadenopathy, trachea midline              Respiratory: No crackles, no rhonchi              Cardiovascular: Irregularly irregular, no murmurs, rubs, or gallops, palpable pedal pulses bilaterally              Gastrointestinal: Positive bowel sounds, soft, nontender, nondistended              Musculoskeletal: No bilateral ankle edema, no clubbing or cyanosis to extremities              Neurologic: Oriented x 3, strength symmetric in all extremities, Cranial Nerves grossly intact to confrontation, speech clear              : Catheter in place, resolving hematuria    Result Review    Result Review:  I have personally reviewed these results:  [x]  Laboratory      Lab 03/10/25  0421 03/09/25  0438 03/08/25  0450 03/07/25  0434 03/06/25  1643 03/06/25  1352 03/06/25  0943   WBC 5.74 4.71 3.91 4.43  --   --  6.08   HEMOGLOBIN 13.3 12.8* 12.3* 12.3*  --   --  13.9   HEMATOCRIT 40.9 39.3 37.5 39.4  --   --  43.9   PLATELETS 146 152 166 167  --   --  188   NEUTROS ABS  --   --   --  2.37  --   --  3.98   IMMATURE GRANS (ABS)  --   --   --  0.03  --   --  0.02   LYMPHS ABS  --   --    --  1.25  --   --  1.03   MONOS ABS  --   --   --  0.69  --   --  1.01*   EOS ABS  --   --   --  0.06  --   --  0.01   MCV 93.0 91.4 92.6 93.6  --   --  91.8   LACTATE  --   --   --   --  1.9 3.3* 2.5*         Lab 03/10/25  0421 03/09/25  0439 03/08/25  0450   SODIUM 141 138 138   POTASSIUM 4.2 4.4 4.5   CHLORIDE 103 101 101   CO2 26.1 25.3 26.5   ANION GAP 11.9 11.7 10.5   BUN 39* 45* 48*   CREATININE 1.59* 1.93* 2.07*   EGFR 43.1* 34.1* 31.4*   GLUCOSE 83 90 87   CALCIUM 8.7 8.6 8.8   MAGNESIUM 2.1 2.2 2.4         Lab 03/06/25  0943   TOTAL PROTEIN 7.1   ALBUMIN 3.4*   GLOBULIN 3.7   ALT (SGPT) 15   AST (SGOT) 26   BILIRUBIN 1.0   ALK PHOS 106   LIPASE 47         Lab 03/06/25  0943   PROBNP 9,460.0*                 Brief Urine Lab Results  (Last result in the past 365 days)        Color   Clarity   Blood   Leuk Est   Nitrite   Protein   CREAT   Urine HCG        03/06/25 1613 Yellow   Clear   Small (1+)   Small (1+)   Negative   Trace                 [x]  Microbiology   Microbiology Results (last 10 days)       Procedure Component Value - Date/Time    Respiratory Panel PCR w/COVID-19(SARS-CoV-2) WAGNER/RENAE/STEF/PAD/COR/NELLIE In-House, NP Swab in UTM/VTM, 2 HR TAT - Swab, Nasopharynx [[id number]]  (Abnormal) Collected: 03/09/25 1619    Lab Status: Final result Specimen: Swab from Nasopharynx Updated: 03/09/25 1804     ADENOVIRUS, PCR Not Detected     Coronavirus 229E Not Detected     Coronavirus HKU1 Not Detected     Coronavirus NL63 Not Detected     Coronavirus OC43 Not Detected     COVID19 Not Detected     Human Metapneumovirus Not Detected     Human Rhinovirus/Enterovirus Not Detected     Influenza A H3 Detected     Influenza B PCR Not Detected     Parainfluenza Virus 1 Not Detected     Parainfluenza Virus 2 Not Detected     Parainfluenza Virus 3 Not Detected     Parainfluenza Virus 4 Not Detected     RSV, PCR Not Detected     Bordetella pertussis pcr Not Detected     Bordetella parapertussis PCR Not Detected      Chlamydophila pneumoniae PCR Not Detected     Mycoplasma pneumo by PCR Not Detected    Narrative:      In the setting of a positive respiratory panel with a viral infection PLUS a negative procalcitonin without other underlying concern for bacterial infection, consider observing off antibiotics or discontinuation of antibiotics and continue supportive care. If the respiratory panel is positive for atypical bacterial infection (Bordetella pertussis, Chlamydophila pneumoniae, or Mycoplasma pneumoniae), consider antibiotic de-escalation to target atypical bacterial infection.          [x]  Radiology  No radiology results for the last 7 days  []  EKG/Telemetry   []  Cardiology/Vascular   []  Pathology  []  Old records  []  Other:    Assessment & Plan   Assessment / Plan     Assessment:  Obstructive uropathy status post Diamond catheter placement  Influenza A  Hyperkalemia, resolved  Lactic acidosis, resolved  History of prostate cancer status post brachytherapy and external beam XRT  BPH  CKD stage IIIb  COPD  CAD  Hypertension  Heart failure with reduced ejection fraction  Atrial fibrillation on Eliquis     Plan:  Patient remains in the hospital for further care and management  Discussed with urologist, possibility of procedure being performed tomorrow as an inpatient.  Will need to ensure anesthesia is on board given patient's new influenza A positive status.  Patient started on Tamiflu, renally dosed  Holding patient's Lasix  Patient now with Diamond catheter in place  Potassium and creatinine remain appropriate.    Patient's blood pressure has been soft, complaining of lightheadedness will hold further doses of diuretics.   Hold antihypertensives, noted soft pressures.  Continue to monitor orthostatics  Patient's other home medications reviewed and resumed as indicated  Continue to monitor patient's urinary output, flush as needed, watch for clots     Discussed with RN.  Discussed with case management, discussed with  urology    VTE Prophylaxis:  Pharmacologic & mechanical VTE prophylaxis orders are present.        CODE STATUS:   Code Status (Patient has no pulse and is not breathing): No CPR (Do Not Attempt to Resuscitate)  Medical Interventions (Patient has pulse or is breathing): Limited Support  Medical Intervention Limits: No intubation (DNI)      Electronically signed by Vladimir Parker MD, 3/10/2025, 14:47 EDT.

## 2025-03-11 ENCOUNTER — ANESTHESIA (OUTPATIENT)
Dept: PERIOP | Facility: HOSPITAL | Age: 85
End: 2025-03-11
Payer: MEDICARE

## 2025-03-11 LAB
ANION GAP SERPL CALCULATED.3IONS-SCNC: 10.3 MMOL/L (ref 5–15)
BUN SERPL-MCNC: 36 MG/DL (ref 8–23)
BUN/CREAT SERPL: 22 (ref 7–25)
CALCIUM SPEC-SCNC: 8.8 MG/DL (ref 8.6–10.5)
CHLORIDE SERPL-SCNC: 102 MMOL/L (ref 98–107)
CO2 SERPL-SCNC: 27.7 MMOL/L (ref 22–29)
CREAT SERPL-MCNC: 1.64 MG/DL (ref 0.76–1.27)
DEPRECATED RDW RBC AUTO: 55.7 FL (ref 37–54)
EGFRCR SERPLBLD CKD-EPI 2021: 41.5 ML/MIN/1.73
ERYTHROCYTE [DISTWIDTH] IN BLOOD BY AUTOMATED COUNT: 17.1 % (ref 12.3–15.4)
GLUCOSE SERPL-MCNC: 97 MG/DL (ref 65–99)
HCT VFR BLD AUTO: 42 % (ref 37.5–51)
HGB BLD-MCNC: 13.8 G/DL (ref 13–17.7)
MAGNESIUM SERPL-MCNC: 2.1 MG/DL (ref 1.6–2.4)
MCH RBC QN AUTO: 30.3 PG (ref 26.6–33)
MCHC RBC AUTO-ENTMCNC: 32.9 G/DL (ref 31.5–35.7)
MCV RBC AUTO: 92.1 FL (ref 79–97)
PLATELET # BLD AUTO: 159 10*3/MM3 (ref 140–450)
PMV BLD AUTO: 10.5 FL (ref 6–12)
POTASSIUM SERPL-SCNC: 4.2 MMOL/L (ref 3.5–5.2)
RBC # BLD AUTO: 4.56 10*6/MM3 (ref 4.14–5.8)
SODIUM SERPL-SCNC: 140 MMOL/L (ref 136–145)
WBC NRBC COR # BLD AUTO: 4.99 10*3/MM3 (ref 3.4–10.8)

## 2025-03-11 PROCEDURE — 99232 SBSQ HOSP IP/OBS MODERATE 35: CPT | Performed by: UROLOGY

## 2025-03-11 PROCEDURE — 25810000003 LACTATED RINGERS PER 1000 ML: Performed by: ANESTHESIOLOGY

## 2025-03-11 PROCEDURE — 85027 COMPLETE CBC AUTOMATED: CPT | Performed by: INTERNAL MEDICINE

## 2025-03-11 PROCEDURE — 63710000001 ACETAMINOPHEN 325 MG TABLET: Performed by: UROLOGY

## 2025-03-11 PROCEDURE — 51102 DRAIN BL W/CATH INSERTION: CPT | Performed by: UROLOGY

## 2025-03-11 PROCEDURE — 52276 CYSTOSCOPY AND TREATMENT: CPT | Performed by: UROLOGY

## 2025-03-11 PROCEDURE — A9270 NON-COVERED ITEM OR SERVICE: HCPCS | Performed by: UROLOGY

## 2025-03-11 PROCEDURE — 25010000002 PROPOFOL 10 MG/ML EMULSION: Performed by: NURSE ANESTHETIST, CERTIFIED REGISTERED

## 2025-03-11 PROCEDURE — G0378 HOSPITAL OBSERVATION PER HR: HCPCS

## 2025-03-11 PROCEDURE — 25010000002 MIDAZOLAM PER 1MG: Performed by: ANESTHESIOLOGY

## 2025-03-11 PROCEDURE — 25010000002 CEFAZOLIN PER 500 MG: Performed by: UROLOGY

## 2025-03-11 PROCEDURE — 63710000001 OXYBUTYNIN 5 MG TABLET: Performed by: UROLOGY

## 2025-03-11 PROCEDURE — 99232 SBSQ HOSP IP/OBS MODERATE 35: CPT | Performed by: INTERNAL MEDICINE

## 2025-03-11 PROCEDURE — 25010000002 LIDOCAINE PF 2% 2 % SOLUTION: Performed by: NURSE ANESTHETIST, CERTIFIED REGISTERED

## 2025-03-11 PROCEDURE — 80048 BASIC METABOLIC PNL TOTAL CA: CPT | Performed by: INTERNAL MEDICINE

## 2025-03-11 PROCEDURE — 25010000002 MORPHINE PER 10 MG: Performed by: UROLOGY

## 2025-03-11 PROCEDURE — 83735 ASSAY OF MAGNESIUM: CPT | Performed by: INTERNAL MEDICINE

## 2025-03-11 RX ORDER — SODIUM CHLORIDE 0.9 % (FLUSH) 0.9 %
10 SYRINGE (ML) INJECTION EVERY 12 HOURS SCHEDULED
Status: DISCONTINUED | OUTPATIENT
Start: 2025-03-11 | End: 2025-03-11 | Stop reason: HOSPADM

## 2025-03-11 RX ORDER — SODIUM CHLORIDE 0.9 % (FLUSH) 0.9 %
10 SYRINGE (ML) INJECTION AS NEEDED
Status: DISCONTINUED | OUTPATIENT
Start: 2025-03-11 | End: 2025-03-11 | Stop reason: HOSPADM

## 2025-03-11 RX ORDER — OXYCODONE HYDROCHLORIDE 5 MG/1
5 TABLET ORAL
Status: DISCONTINUED | OUTPATIENT
Start: 2025-03-11 | End: 2025-03-11

## 2025-03-11 RX ORDER — CALCIUM CHLORIDE 100 MG/ML
INJECTION INTRAVENOUS; INTRAVENTRICULAR AS NEEDED
Status: DISCONTINUED | OUTPATIENT
Start: 2025-03-11 | End: 2025-03-11 | Stop reason: SURG

## 2025-03-11 RX ORDER — MIDAZOLAM HYDROCHLORIDE 2 MG/2ML
2 INJECTION, SOLUTION INTRAMUSCULAR; INTRAVENOUS ONCE
Status: COMPLETED | OUTPATIENT
Start: 2025-03-11 | End: 2025-03-11

## 2025-03-11 RX ORDER — SODIUM CHLORIDE 9 MG/ML
40 INJECTION, SOLUTION INTRAVENOUS AS NEEDED
Status: DISCONTINUED | OUTPATIENT
Start: 2025-03-11 | End: 2025-03-11 | Stop reason: HOSPADM

## 2025-03-11 RX ORDER — MORPHINE SULFATE 2 MG/ML
2 INJECTION, SOLUTION INTRAMUSCULAR; INTRAVENOUS
Status: DISCONTINUED | OUTPATIENT
Start: 2025-03-11 | End: 2025-03-18 | Stop reason: HOSPADM

## 2025-03-11 RX ORDER — EPHEDRINE SULFATE 50 MG/ML
INJECTION INTRAVENOUS AS NEEDED
Status: DISCONTINUED | OUTPATIENT
Start: 2025-03-11 | End: 2025-03-11 | Stop reason: SURG

## 2025-03-11 RX ORDER — OXYBUTYNIN CHLORIDE 5 MG/1
5 TABLET ORAL 3 TIMES DAILY
Status: DISCONTINUED | OUTPATIENT
Start: 2025-03-11 | End: 2025-03-18 | Stop reason: HOSPADM

## 2025-03-11 RX ORDER — IPRATROPIUM BROMIDE AND ALBUTEROL SULFATE 2.5; .5 MG/3ML; MG/3ML
3 SOLUTION RESPIRATORY (INHALATION) ONCE
Status: COMPLETED | OUTPATIENT
Start: 2025-03-11 | End: 2025-03-11

## 2025-03-11 RX ORDER — LIDOCAINE HYDROCHLORIDE 20 MG/ML
INJECTION, SOLUTION EPIDURAL; INFILTRATION; INTRACAUDAL; PERINEURAL AS NEEDED
Status: DISCONTINUED | OUTPATIENT
Start: 2025-03-11 | End: 2025-03-11 | Stop reason: SURG

## 2025-03-11 RX ORDER — KETAMINE HYDROCHLORIDE 10 MG/ML
INJECTION, SOLUTION INTRAMUSCULAR; INTRAVENOUS AS NEEDED
Status: DISCONTINUED | OUTPATIENT
Start: 2025-03-11 | End: 2025-03-11 | Stop reason: SURG

## 2025-03-11 RX ORDER — PROPOFOL 10 MG/ML
VIAL (ML) INTRAVENOUS AS NEEDED
Status: DISCONTINUED | OUTPATIENT
Start: 2025-03-11 | End: 2025-03-11 | Stop reason: SURG

## 2025-03-11 RX ORDER — MAGNESIUM HYDROXIDE 1200 MG/15ML
LIQUID ORAL AS NEEDED
Status: DISCONTINUED | OUTPATIENT
Start: 2025-03-11 | End: 2025-03-11 | Stop reason: HOSPADM

## 2025-03-11 RX ORDER — ONDANSETRON 2 MG/ML
4 INJECTION INTRAMUSCULAR; INTRAVENOUS ONCE AS NEEDED
Status: DISCONTINUED | OUTPATIENT
Start: 2025-03-11 | End: 2025-03-11

## 2025-03-11 RX ORDER — PHENYLEPHRINE HCL IN 0.9% NACL 1 MG/10 ML
SYRINGE (ML) INTRAVENOUS AS NEEDED
Status: DISCONTINUED | OUTPATIENT
Start: 2025-03-11 | End: 2025-03-11 | Stop reason: SURG

## 2025-03-11 RX ORDER — PROMETHAZINE HYDROCHLORIDE 25 MG/1
25 TABLET ORAL ONCE AS NEEDED
Status: DISCONTINUED | OUTPATIENT
Start: 2025-03-11 | End: 2025-03-11

## 2025-03-11 RX ORDER — HYDROCODONE BITARTRATE AND ACETAMINOPHEN 5; 325 MG/1; MG/1
1 TABLET ORAL EVERY 6 HOURS PRN
Refills: 0 | Status: DISCONTINUED | OUTPATIENT
Start: 2025-03-11 | End: 2025-03-16

## 2025-03-11 RX ORDER — PROMETHAZINE HYDROCHLORIDE 25 MG/1
25 SUPPOSITORY RECTAL ONCE AS NEEDED
Status: DISCONTINUED | OUTPATIENT
Start: 2025-03-11 | End: 2025-03-11

## 2025-03-11 RX ORDER — SODIUM CHLORIDE, SODIUM LACTATE, POTASSIUM CHLORIDE, CALCIUM CHLORIDE 600; 310; 30; 20 MG/100ML; MG/100ML; MG/100ML; MG/100ML
9 INJECTION, SOLUTION INTRAVENOUS CONTINUOUS PRN
Status: DISCONTINUED | OUTPATIENT
Start: 2025-03-11 | End: 2025-03-11

## 2025-03-11 RX ADMIN — MIDAZOLAM HYDROCHLORIDE 2 MG: 1 INJECTION, SOLUTION INTRAMUSCULAR; INTRAVENOUS at 13:16

## 2025-03-11 RX ADMIN — LIDOCAINE HYDROCHLORIDE 80 MG: 20 INJECTION, SOLUTION INTRAVENOUS at 13:32

## 2025-03-11 RX ADMIN — SODIUM CHLORIDE 2 G: 9 INJECTION, SOLUTION INTRAVENOUS at 13:25

## 2025-03-11 RX ADMIN — PROPOFOL 60 MG: 10 INJECTION, EMULSION INTRAVENOUS at 13:32

## 2025-03-11 RX ADMIN — EPHEDRINE SULFATE 5 MG: 50 INJECTION INTRAVENOUS at 14:06

## 2025-03-11 RX ADMIN — EPHEDRINE SULFATE 5 MG: 50 INJECTION INTRAVENOUS at 13:49

## 2025-03-11 RX ADMIN — KETAMINE HYDROCHLORIDE 10 MG: 10 INJECTION INTRAMUSCULAR; INTRAVENOUS at 14:13

## 2025-03-11 RX ADMIN — CALCIUM CHLORIDE 100 MG: 100 INJECTION INTRAVENOUS; INTRAVENTRICULAR at 13:58

## 2025-03-11 RX ADMIN — CALCIUM CHLORIDE 100 MG: 100 INJECTION INTRAVENOUS; INTRAVENTRICULAR at 14:06

## 2025-03-11 RX ADMIN — OXYBUTYNIN CHLORIDE 5 MG: 5 TABLET ORAL at 19:10

## 2025-03-11 RX ADMIN — LORAZEPAM 0.5 MG: 0.5 TABLET ORAL at 09:24

## 2025-03-11 RX ADMIN — OSELTAMIVIR PHOSPHATE 30 MG: 30 CAPSULE ORAL at 19:46

## 2025-03-11 RX ADMIN — EPHEDRINE SULFATE 5 MG: 50 INJECTION INTRAVENOUS at 13:58

## 2025-03-11 RX ADMIN — CALCIUM CHLORIDE 200 MG: 100 INJECTION INTRAVENOUS; INTRAVENTRICULAR at 13:32

## 2025-03-11 RX ADMIN — TAMSULOSIN HYDROCHLORIDE 0.4 MG: 0.4 CAPSULE ORAL at 09:18

## 2025-03-11 RX ADMIN — OSELTAMIVIR PHOSPHATE 30 MG: 30 CAPSULE ORAL at 09:19

## 2025-03-11 RX ADMIN — ALLOPURINOL 100 MG: 100 TABLET ORAL at 09:19

## 2025-03-11 RX ADMIN — Medication 10 ML: at 19:47

## 2025-03-11 RX ADMIN — KETAMINE HYDROCHLORIDE 20 MG: 10 INJECTION INTRAMUSCULAR; INTRAVENOUS at 13:32

## 2025-03-11 RX ADMIN — LORAZEPAM 0.5 MG: 0.5 TABLET ORAL at 19:46

## 2025-03-11 RX ADMIN — ACETAMINOPHEN 650 MG: 325 TABLET ORAL at 16:32

## 2025-03-11 RX ADMIN — MORPHINE SULFATE 2 MG: 2 INJECTION, SOLUTION INTRAMUSCULAR; INTRAVENOUS at 19:03

## 2025-03-11 RX ADMIN — CITALOPRAM HYDROBROMIDE 10 MG: 20 TABLET ORAL at 09:19

## 2025-03-11 RX ADMIN — PROPOFOL 40 MG: 10 INJECTION, EMULSION INTRAVENOUS at 14:13

## 2025-03-11 RX ADMIN — Medication 10 ML: at 09:19

## 2025-03-11 RX ADMIN — IPRATROPIUM BROMIDE AND ALBUTEROL SULFATE 3 ML: .5; 3 SOLUTION RESPIRATORY (INHALATION) at 13:15

## 2025-03-11 RX ADMIN — Medication 100 MCG: at 14:06

## 2025-03-11 RX ADMIN — EPHEDRINE SULFATE 10 MG: 50 INJECTION INTRAVENOUS at 13:36

## 2025-03-11 RX ADMIN — SODIUM CHLORIDE, POTASSIUM CHLORIDE, SODIUM LACTATE AND CALCIUM CHLORIDE: 600; 310; 30; 20 INJECTION, SOLUTION INTRAVENOUS at 13:25

## 2025-03-11 RX ADMIN — CALCIUM CHLORIDE 100 MG: 100 INJECTION INTRAVENOUS; INTRAVENTRICULAR at 13:49

## 2025-03-11 NOTE — PROGRESS NOTES
[facility]   Urology Progress Note    Patient Name: Javi Martínez  : 1940  MRN: [id number]  Primary Care Physician:  Rani Lopez APRN  Date of admission: 3/6/2025    Subjective   Subjective       Called by nursing secondary to inability to flush catheter with clots suprapubic tube very bloody          Objective   Objective     Vitals:   Temp:  [97.1 °F (36.2 °C)-97.3 °F (36.3 °C)] 97.3 °F (36.3 °C)  Heart Rate:  [69-99] 86  Resp:  [12-20] 18  BP: (103-114)/(60-74) 105/60  Flow (L/min) (Oxygen Therapy):  [2-5] 2  Physical Exam     Alert and oriented x3  No acute distress  Unlabored respirations  Nontender/nondistended     Suprapubic tube very bloody, with clots  Urethral Diamond    Result Review    Result Review:  I have personally reviewed the results from the time of this admission to 3/11/2025 19:11 EDT and agree with these findings:  []  Laboratory  []  Microbiology  []  Radiology  []  EKG/Telemetry   []  Cardiology/Vascular   []  Pathology  []  Old records  []  Other:      Assessment & Plan   Assessment / Plan     Brief Patient Summary:  Javi Martínez is a 84 y.o. 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    Placed on continuous bladder irrigation.    Nursing to flush hourly for the next few hours and as needed    Wean CBI overnight as tolerated    Ditropan as needed    As needed pain meds                Electronically signed by August Myers MD, 25, 7:11 PM EDT.

## 2025-03-11 NOTE — PROGRESS NOTES
[facility]   Hospitalist Progress Note  Date: 3/11/2025  Patient Name: Javi Martínez  : 1940  MRN: [id number]  Date of admission: 3/6/2025  Room/Bed: [facility]      Subjective   Subjective     Chief Complaint:   Urinary retention    Summary:  Javi Martínez is an 84 y.o. male with medical history of heart failure with reduced ejection fraction, atrial fibrillation, CKD stage IIIb, COPD, CAD, hypertension, history of prostate cancer status post brachytherapy and external beam XRT and BPH.  Patient presents to the emergency department, for the past several days patient has been having issues with difficulty urinating.  Patient is on chronic Lasix for his history of heart failure.  Patient endorses suprapubic pain, frequent dribbling.  Intermittently having large-volume urination.  In the emergency department patient found to have urinary retention on bladder scan.  Nursing staff attempted Diamond placement at bedside, however unable to successfully place.  Urology consulted in the emergency department for placement of Diamond catheter.  Patient able to tolerate dilation and placement of 12 Ukrainian catheter.  Patient tolerated procedure well, clear yellow urine drained easily from bladder.  Of note in the emergency department patient's initial potassium 5.3, creatinine 2.2.  This appears slightly above patient's baseline of 1.8-1.9.  Repeat BMP shows resolution, potassium of 4.8.  Creatinine down to 2.04.  Patient's proBNP elevated 9000, however down from previous heart failure exacerbations.  Patient states he does not have any dyspnea at this time, trace lower extremity edema.  Patient's lactate returned back at 2.5.  Lactate returned to normal.  Over the weekend patient did not feel stable on his feet, complaining of unsteadiness.  Working towards SNF discharge, PT and OT consults placed.  Patient tested positive for influenza on the weekend.    Interval Followup:   No acute events overnight.  Has had some  nasal congestion but denies shortness of breath or significant cough.  Has been thinking about it and no longer wants to pursue rehab, wants to return home at discharge.    Objective   Objective     Vitals:   Temp:  [97.1 °F (36.2 °C)-97.3 °F (36.3 °C)] 97.1 °F (36.2 °C)  Heart Rate:  [69-74] 74  Resp:  [12-20] 12  BP: (103-114)/(63-74) 109/74  Flow (L/min) (Oxygen Therapy):  [2-5] 2    Physical Exam               Constitutional: Awake, alert, no acute distress              HENT: NCAT, mucous membranes moist              Respiratory: No crackles, no rhonchi              Cardiovascular: Irregularly irregular, no MRG              Gastrointestinal: Positive bowel sounds, soft, nontender, nondistended              Musculoskeletal: No bilateral ankle edema, no clubbing or cyanosis to extremities              Neurologic: Oriented x 3, strength symmetric in all extremities, Cranial Nerves grossly intact to confrontation, speech clear              : Catheter in place, no evidence of hematuria    Result Review    I have personally reviewed these results:  [x]  Laboratory personally reviewed BMP, CBC, magnesium      Lab 03/11/25  0435 03/10/25  0421 03/09/25  0438 03/08/25  0450 03/07/25  0434 03/06/25  1643 03/06/25  1352 03/06/25  0943   WBC 4.99 5.74 4.71   < > 4.43  --   --  6.08   HEMOGLOBIN 13.8 13.3 12.8*   < > 12.3*  --   --  13.9   HEMATOCRIT 42.0 40.9 39.3   < > 39.4  --   --  43.9   PLATELETS 159 146 152   < > 167  --   --  188   NEUTROS ABS  --   --   --   --  2.37  --   --  3.98   IMMATURE GRANS (ABS)  --   --   --   --  0.03  --   --  0.02   LYMPHS ABS  --   --   --   --  1.25  --   --  1.03   MONOS ABS  --   --   --   --  0.69  --   --  1.01*   EOS ABS  --   --   --   --  0.06  --   --  0.01   MCV 92.1 93.0 91.4   < > 93.6  --   --  91.8   LACTATE  --   --   --   --   --  1.9 3.3* 2.5*    < > = values in this interval not displayed.         Lab 03/11/25  0435 03/10/25  0421 03/09/25  0439   SODIUM 140 141 138    POTASSIUM 4.2 4.2 4.4   CHLORIDE 102 103 101   CO2 27.7 26.1 25.3   ANION GAP 10.3 11.9 11.7   BUN 36* 39* 45*   CREATININE 1.64* 1.59* 1.93*   EGFR 41.5* 43.1* 34.1*   GLUCOSE 97 83 90   CALCIUM 8.8 8.7 8.6   MAGNESIUM 2.1 2.1 2.2         Lab 03/06/25  0943   TOTAL PROTEIN 7.1   ALBUMIN 3.4*   GLOBULIN 3.7   ALT (SGPT) 15   AST (SGOT) 26   BILIRUBIN 1.0   ALK PHOS 106   LIPASE 47         Lab 03/06/25  0943   PROBNP 9,460.0*                 Brief Urine Lab Results  (Last result in the past 365 days)        Color   Clarity   Blood   Leuk Est   Nitrite   Protein   CREAT   Urine HCG        03/06/25 1613 Yellow   Clear   Small (1+)   Small (1+)   Negative   Trace                 [x]  Microbiology   [x]  Radiology  []  EKG/Telemetry   []  Cardiology/Vascular   []  Pathology  []  Old records  []  Other:    Assessment & Plan   Assessment / Plan   Obstructive uropathy status post Diamond catheter placement  Influenza A  Hyperkalemia, resolved  Lactic acidosis, resolved  History of prostate cancer status post brachytherapy and external beam XRT  BPH  CKD stage IIIb  COPD  CAD  Hypertension  Heart failure with reduced ejection fraction  Atrial fibrillation on Eliquis     Continue to monitor in the hospital for workup and management of the above  Discussed with urologist, plan to proceed to the OR today for cystoscopy and urethrostomy, suprapubic catheter insertion  Continue Tamiflu to complete a 5-day course  Continue to hold Lasix today  Continue Diamond catheter for now  Hold antihypertensives, noted soft pressures.  Continue to monitor orthostatics  Patient's other home medications reviewed and resumed as indicated  Continue appropriate home medications  Trend renal function and electrolytes with a.m. BMP, magnesium   Trend Hgb and WBC with a.m. CBC     Discussed with RN, urology.  Discussed with case management, will plan for home health after discharge    VTE Prophylaxis:  Pharmacologic & mechanical VTE prophylaxis orders  are present.        CODE STATUS:   Code Status (Patient has no pulse and is not breathing): No CPR (Do Not Attempt to Resuscitate)  Medical Interventions (Patient has pulse or is breathing): Limited Support  Medical Intervention Limits: No intubation (DNI)

## 2025-03-11 NOTE — ANESTHESIA PREPROCEDURE EVALUATION
Anesthesia Evaluation     Patient summary reviewed and Nursing notes reviewed   no history of anesthetic complications:   NPO Solid Status: > 8 hours  NPO Liquid Status: > 2 hours           Airway   Mallampati: II  TM distance: <3 FB  Neck ROM: full  No difficulty expected  Dental    (+) lower dentures and poor dentition    Pulmonary - negative pulmonary ROS and normal exam    breath sounds clear to auscultation  (+) pneumonia (tested positive for FLU A on 3/9/25, states his only symptom is a cough that has mostly improved) , COPD,sleep apnea  Cardiovascular - normal exam  Exercise tolerance: good (4-7 METS)    ECG reviewed  PT is on anticoagulation therapy  Beta blocker given within 24 hours of surgery  Rhythm: regular    (+) pacemaker pacemaker, hypertension, CAD, CABG >6 Months, cardiac stents more than 12 months ago , dysrhythmias (conplete heart block, pacer dependent) Paroxysmal Atrial Fib, CHF (EF <20%) Systolic <55%, hyperlipidemia      Neuro/Psych  (+) weakness, numbness, psychiatric history Anxiety  GI/Hepatic/Renal/Endo    (+) GERD well controlled, renal disease (urinary retention)-    Musculoskeletal     Abdominal    Substance History - negative use     OB/GYN negative ob/gyn ROS         Other   arthritis,   history of cancer    ROS/Med Hx Other: Urinary retention    Echo 1/8/25- Ef 21-25%, severely dilated LV, mod MR    Stress 4/24/24- Ef 18%, no ischemia, prior infarct    EKG 7/24/24- vpaced (70)    FLU A positive                      Anesthesia Plan    ASA 4     general and MAC     (MAC vs GA.  Will attempt to do a moderate sedation to avoid GA.  Discussed risks of anesthesia in light of complex cardiac history at length with patient.  Pt voices understanding and wishes to proceed    Discussed DNR status, pt agreed to LMA if needed but no intubation.  If his heart were to stop he does not want any resuscitation.  )  intravenous induction     Anesthetic plan, risks, benefits, and alternatives have  been provided, discussed and informed consent has been obtained with: patient.    Use of blood products discussed with patient .

## 2025-03-11 NOTE — OP NOTE
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  The patient was prepped and draped in the normal fashion for a suprapubic tube insertion.    A 22 Irish rigid cystoscope was passed per urethra to the bladder.  There was noted to be a dense stricture within the bulbar urethra which I was unable to get the scope past.  At this point I inserted a cold urethrotome knife through the urethrotome scope and incised the stricture at the 12 o'clock position.  I was unable to pass the scope into the bladder.      At this point the bladder was filled to capacity.  A spinal needle was used in the midline just above the pubic bone on the lower anterior abdominal wall.  The spinal needle was advanced into the bladder and was seen and under direct vision with the cystoscope to be coming into the bladder.  At this point I removed the spinal needle and made an incision over this area.  At this point a suprapubic tube and due to introducer was passed through the incision and into the bladder, again under direct vision.  Through the suprapubic tube introducer a 16 Irish Diamond  catheter was inserted.  The balloon was inflated to 10 cc.  The introducer was removed leaving the Diamond catheter in place.  A 20 Portuguese Diamond catheter was placed through the penis and was capped.    Patient tolerated the procedure well.  Patient was transferred the postanesthesia care unit in satisfactory condition with stable vital signs.      Assistant: Thomas Woodson RN CSA  was responsible for performing the following activities: Retraction, Suction, Irrigation, Suturing, Closing, and Placing Dressing and their skilled assistance was necessary for the success of this case.    Mally Bernal MD     Date: 3/11/2025  Time: 14:45 EDT

## 2025-03-11 NOTE — NURSING NOTE
VSS. Cysto today. New suprapubic catheter. Monitoring I&O. Output was bright red blood and thick. RN irrigated catheter. MD aware. CBI started. Patient stated he started to feel relief and less pressure. Call light and table within reach. No concerns per patient at this time.

## 2025-03-11 NOTE — PLAN OF CARE
Goal Outcome Evaluation:  Plan of Care Reviewed With: patient        Progress: improving  Outcome Evaluation: VSS. No s/s distress noted. Resting comfortably. Consent signed in chart planned cysto 3/11 NPO since mn. No s/s distress noted.

## 2025-03-11 NOTE — ANESTHESIA POSTPROCEDURE EVALUATION
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      (Urinary retention [R33.9])      (Other stricture of bulbous urethra in male [N35.812])    Surgeons: Mally Bernal MD Provider: Javi Villatoro MD    Anesthesia Type: general, MAC ASA Status: 4            Anesthesia Type: general, MAC    Vitals  Vitals Value Taken Time   /74 03/11/25 15:26   Temp 36.2 °C (97.1 °F) 03/11/25 15:25   Pulse 74 03/11/25 15:30   Resp 12 03/11/25 15:25   SpO2 92 % 03/11/25 15:30           Post Anesthesia Care and Evaluation    Patient location during evaluation: bedside  Patient participation: complete - patient participated  Level of consciousness: awake  Pain management: adequate    Airway patency: patent  PONV Status: none  Cardiovascular status: acceptable and stable  Respiratory status: acceptable  Hydration status: acceptable

## 2025-03-12 LAB
HCT VFR BLD AUTO: 38.6 % (ref 37.5–51)
HGB BLD-MCNC: 12.4 G/DL (ref 13–17.7)
HOLD SPECIMEN: NORMAL

## 2025-03-12 PROCEDURE — 99232 SBSQ HOSP IP/OBS MODERATE 35: CPT | Performed by: UROLOGY

## 2025-03-12 PROCEDURE — 99232 SBSQ HOSP IP/OBS MODERATE 35: CPT | Performed by: INTERNAL MEDICINE

## 2025-03-12 PROCEDURE — 85014 HEMATOCRIT: CPT | Performed by: INTERNAL MEDICINE

## 2025-03-12 PROCEDURE — 99024 POSTOP FOLLOW-UP VISIT: CPT | Performed by: UROLOGY

## 2025-03-12 PROCEDURE — 63710000001 ONDANSETRON ODT 4 MG TABLET DISPERSIBLE: Performed by: UROLOGY

## 2025-03-12 PROCEDURE — 25010000002 MORPHINE PER 10 MG: Performed by: UROLOGY

## 2025-03-12 PROCEDURE — G0378 HOSPITAL OBSERVATION PER HR: HCPCS

## 2025-03-12 PROCEDURE — 85018 HEMOGLOBIN: CPT | Performed by: INTERNAL MEDICINE

## 2025-03-12 RX ADMIN — TRAZODONE HYDROCHLORIDE 50 MG: 50 TABLET ORAL at 22:17

## 2025-03-12 RX ADMIN — OSELTAMIVIR PHOSPHATE 30 MG: 30 CAPSULE ORAL at 08:21

## 2025-03-12 RX ADMIN — OXYBUTYNIN CHLORIDE 5 MG: 5 TABLET ORAL at 18:51

## 2025-03-12 RX ADMIN — Medication 10 ML: at 08:21

## 2025-03-12 RX ADMIN — LORAZEPAM 0.5 MG: 0.5 TABLET ORAL at 18:51

## 2025-03-12 RX ADMIN — ONDANSETRON 4 MG: 4 TABLET, ORALLY DISINTEGRATING ORAL at 11:47

## 2025-03-12 RX ADMIN — PANTOPRAZOLE SODIUM 40 MG: 40 TABLET, DELAYED RELEASE ORAL at 05:35

## 2025-03-12 RX ADMIN — OXYBUTYNIN CHLORIDE 5 MG: 5 TABLET ORAL at 16:57

## 2025-03-12 RX ADMIN — OXYBUTYNIN CHLORIDE 5 MG: 5 TABLET ORAL at 03:12

## 2025-03-12 RX ADMIN — ALLOPURINOL 100 MG: 100 TABLET ORAL at 08:21

## 2025-03-12 RX ADMIN — LORAZEPAM 0.5 MG: 0.5 TABLET ORAL at 08:25

## 2025-03-12 RX ADMIN — TAMSULOSIN HYDROCHLORIDE 0.4 MG: 0.4 CAPSULE ORAL at 08:21

## 2025-03-12 RX ADMIN — CITALOPRAM HYDROBROMIDE 10 MG: 20 TABLET ORAL at 08:20

## 2025-03-12 RX ADMIN — MORPHINE SULFATE 2 MG: 2 INJECTION, SOLUTION INTRAMUSCULAR; INTRAVENOUS at 02:44

## 2025-03-12 RX ADMIN — Medication 10 ML: at 20:23

## 2025-03-12 RX ADMIN — OXYBUTYNIN CHLORIDE 5 MG: 5 TABLET ORAL at 08:20

## 2025-03-12 RX ADMIN — OSELTAMIVIR PHOSPHATE 30 MG: 30 CAPSULE ORAL at 18:51

## 2025-03-12 NOTE — SIGNIFICANT NOTE
03/12/25 0948   Physical Therapy Time and Intention   Session Not Performed patient/family declined treatment  (Attempt x2)

## 2025-03-12 NOTE — PLAN OF CARE
Goal Outcome Evaluation:  Plan of Care Reviewed With: patient        Progress: improving  Outcome Evaluation: VSS. CBI present and noted. Tolerating well. No s/s distress noted. Restless but no complaints at this time.

## 2025-03-12 NOTE — PROGRESS NOTES
[facility]   Hospitalist Progress Note  Date: 3/12/2025  Patient Name: Javi Martínez  : 1940  MRN: [id number]  Date of admission: 3/6/2025  Room/Bed: [facility]      Subjective   Subjective     Chief Complaint:   Urinary retention    Summary:  Javi Martínez is an 84 y.o. male with medical history of heart failure with reduced ejection fraction, atrial fibrillation, CKD stage IIIb, COPD, CAD, hypertension, history of prostate cancer status post brachytherapy and external beam XRT and BPH.  Patient presents to the emergency department, for the past several days patient has been having issues with difficulty urinating.  Patient is on chronic Lasix for his history of heart failure.  Patient endorses suprapubic pain, frequent dribbling.  Intermittently having large-volume urination.  In the emergency department patient found to have urinary retention on bladder scan.  Nursing staff attempted Diamond placement at bedside, however unable to successfully place.  Urology consulted in the emergency department for placement of Diamond catheter.  Patient able to tolerate dilation and placement of 12 Portuguese catheter.  Patient tolerated procedure well, clear yellow urine drained easily from bladder.  Of note in the emergency department patient's initial potassium 5.3, creatinine 2.2.  This appears slightly above patient's baseline of 1.8-1.9.  Repeat BMP shows resolution, potassium of 4.8.  Creatinine down to 2.04.  Patient's proBNP elevated 9000, however down from previous heart failure exacerbations.  Patient states he does not have any dyspnea at this time, trace lower extremity edema.  Patient's lactate returned back at 2.5.  Lactate returned to normal.  Over the weekend patient did not feel stable on his feet, complaining of unsteadiness.  Working towards SNF discharge, PT and OT consults placed.  Patient tested positive for influenza on the weekend.    Interval Followup:   Patient had bloody output in his suprapubic  catheter as well as blood clots.  Started on continuous bladder irrigation with significant improvement of his pain and clots.  Urine is now a pink color.  States his pain is currently controlled.    Objective   Objective     Vitals:   Temp:  [97.1 °F (36.2 °C)-97.9 °F (36.6 °C)] 97.9 °F (36.6 °C)  Heart Rate:  [70-99] 71  Resp:  [12-18] 18  BP: ()/(59-74) 104/65  Flow (L/min) (Oxygen Therapy):  [2-5] 2    Physical Exam               Constitutional: Awake, alert, no acute distress              HENT: NCAT, mucous membranes moist              Respiratory: Clear to auscultation bilaterally, no W/R/R              Cardiovascular: Irregularly irregular, no MRG              Gastrointestinal: Positive bowel sounds, soft, nontender, nondistended              Musculoskeletal: No bilateral ankle edema, no clubbing or cyanosis to extremities              Neurologic: Oriented x 3, strength symmetric in all extremities, Cranial Nerves grossly intact to confrontation, speech clear              : Catheter in place, improving hematuria noted    Result Review    I have personally reviewed these results:  [x]  Laboratory personally reviewed BMP, CBC, magnesium      Lab 03/11/25  0435 03/10/25  0421 03/09/25  0438 03/08/25  0450 03/07/25  0434 03/06/25  1643 03/06/25  1352 03/06/25  0943   WBC 4.99 5.74 4.71   < > 4.43  --   --  6.08   HEMOGLOBIN 13.8 13.3 12.8*   < > 12.3*  --   --  13.9   HEMATOCRIT 42.0 40.9 39.3   < > 39.4  --   --  43.9   PLATELETS 159 146 152   < > 167  --   --  188   NEUTROS ABS  --   --   --   --  2.37  --   --  3.98   IMMATURE GRANS (ABS)  --   --   --   --  0.03  --   --  0.02   LYMPHS ABS  --   --   --   --  1.25  --   --  1.03   MONOS ABS  --   --   --   --  0.69  --   --  1.01*   EOS ABS  --   --   --   --  0.06  --   --  0.01   MCV 92.1 93.0 91.4   < > 93.6  --   --  91.8   LACTATE  --   --   --   --   --  1.9 3.3* 2.5*    < > = values in this interval not displayed.         Lab 03/11/25  0432  03/10/25  0421 03/09/25  0439   SODIUM 140 141 138   POTASSIUM 4.2 4.2 4.4   CHLORIDE 102 103 101   CO2 27.7 26.1 25.3   ANION GAP 10.3 11.9 11.7   BUN 36* 39* 45*   CREATININE 1.64* 1.59* 1.93*   EGFR 41.5* 43.1* 34.1*   GLUCOSE 97 83 90   CALCIUM 8.8 8.7 8.6   MAGNESIUM 2.1 2.1 2.2         Lab 03/06/25  0943   TOTAL PROTEIN 7.1   ALBUMIN 3.4*   GLOBULIN 3.7   ALT (SGPT) 15   AST (SGOT) 26   BILIRUBIN 1.0   ALK PHOS 106   LIPASE 47         Lab 03/06/25  0943   PROBNP 9,460.0*                 Brief Urine Lab Results  (Last result in the past 365 days)        Color   Clarity   Blood   Leuk Est   Nitrite   Protein   CREAT   Urine HCG        03/06/25 1613 Yellow   Clear   Small (1+)   Small (1+)   Negative   Trace                 [x]  Microbiology   [x]  Radiology  []  EKG/Telemetry   []  Cardiology/Vascular   []  Pathology  []  Old records  []  Other:    Assessment & Plan   Assessment / Plan   Obstructive uropathy status post Diamond catheter placement  Hematuria  Influenza A  Hyperkalemia, resolved  Lactic acidosis, resolved  History of prostate cancer status post brachytherapy and external beam XRT  BPH  CKD stage IIIb  COPD  CAD  Hypertension  Heart failure with reduced ejection fraction  Atrial fibrillation on Eliquis     Continue to monitor in the hospital for workup and management of the above  Discussed with urologist, continue SP tube and Diamond catheter.  Patient has dense bulbar urethral stricture which has been dilated/incised multiple times  Continue to wean CBI, hold Eliquis until cleared from urology standpoint  Continue Tamiflu to complete a 5-day course  Continue to hold Lasix today  Hold antihypertensives, noted soft pressures  Continue appropriate home medications  Trend renal function and electrolytes with a.m. BMP, magnesium   Trend Hgb and WBC with a.m. CBC     Discussed with RN, urology    VTE Prophylaxis:  Pharmacologic & mechanical VTE prophylaxis orders are present.        CODE STATUS:   Code  Status (Patient has no pulse and is not breathing): No CPR (Do Not Attempt to Resuscitate)  Medical Interventions (Patient has pulse or is breathing): Limited Support  Medical Intervention Limits: No intubation (DNI)

## 2025-03-12 NOTE — PLAN OF CARE
Goal Outcome Evaluation:         VSS. CBI stopped. Patient tolerating well. Strict I&O. Ambulated to restroom. Tolerated well. Complaints of nausea treated with PRN medicine. See MAR. Call light and table within reach. No concerns per patient at this time.

## 2025-03-12 NOTE — PROGRESS NOTES
[facility]   Urology Progress Note    Patient Name: Javi Martínez  : 1940  MRN: [id number]  Primary Care Physician:  Rani Lopez APRN  Date of admission: 3/6/2025    Subjective   Subjective       HPI:  Catheter clotted off overnight.  CBI started.      Objective   Objective     Vitals:   Temp:  [97.1 °F (36.2 °C)-97.9 °F (36.6 °C)] 97.9 °F (36.6 °C)  Heart Rate:  [70-99] 71  Resp:  [12-20] 18  BP: ()/(59-74) 104/65  Flow (L/min) (Oxygen Therapy):  [2-5] 2      Physical Exam    Urine light red on CBI       Result Review    Result Review:  I have personally reviewed the results from the time of this admission to 3/12/2025 08:54 EDT and agree with these findings:  [x]  Laboratory  []  Microbiology  [x]  Radiology  []  EKG/Telemetry   []  Cardiology/Vascular   []  Pathology  [x]  Old records  []  Other:    Assessment & Plan   Assessment / Plan       Active Hospital Problems:  Active Hospital Problems    Diagnosis     **Urinary retention     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  Patient has dense bulbar urethral stricture which has been dilated/incised multiple times.  He understands that the likelihood of this staying open is low.  SP tube placed at the time of OIU.  I will change the SP tube the first time in the office in 6 weeks.  Continue loyd catheter for now.  I will remove loyd in penis in office next week.  Okay to discharge home from urology standpoint once otherwise medically ready.  Wean CBI.  Hold Eliquis.        Electronically signed by Mally Bernal MD, 25, 8:54 AM EDT.

## 2025-03-12 NOTE — PROGRESS NOTES
[facility]   Urology Progress Note    Patient Name: Javi Martínez  : 1940  MRN: [id number]  Primary Care Physician:  Rani Lopez APRN  Date of admission: 3/6/2025    Subjective   Subjective     Placed on CBI yesterday secondary to bloody urine with clots clogging the catheter    Did okay overnight clots have cleared      Objective   Objective     Vitals:   Temp:  [97.1 °F (36.2 °C)-97.3 °F (36.3 °C)] 97.2 °F (36.2 °C)  Heart Rate:  [70-99] 89  Resp:  [12-20] 18  BP: ()/(59-74) 97/63  Flow (L/min) (Oxygen Therapy):  [2-5] 2  Physical Exam     Alert and oriented x3  No acute distress  Unlabored respirations  Nontender/nondistended   Suprapubic tube is in flow  Urethral Diamond is out flow      Mildly blood-tinged no clots CBI mid drip      Result Review    Result Review:  I have personally reviewed the results from the time of this admission to 3/12/2025 06:18 EDT and agree with these findings:  []  Laboratory  []  Microbiology  []  Radiology  []  EKG/Telemetry   []  Cardiology/Vascular   []  Pathology  []  Old records  []  Other:      Assessment & Plan   Assessment / Plan     Brief Patient Summary:  Javi Martínez is a 84 y.o. 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

## 2025-03-13 LAB
ANION GAP SERPL CALCULATED.3IONS-SCNC: 11 MMOL/L (ref 5–15)
BASOPHILS # BLD AUTO: 0.03 10*3/MM3 (ref 0–0.2)
BASOPHILS NFR BLD AUTO: 0.5 % (ref 0–1.5)
BUN SERPL-MCNC: 31 MG/DL (ref 8–23)
BUN/CREAT SERPL: 17.1 (ref 7–25)
CALCIUM SPEC-SCNC: 9.3 MG/DL (ref 8.6–10.5)
CHLORIDE SERPL-SCNC: 105 MMOL/L (ref 98–107)
CO2 SERPL-SCNC: 27 MMOL/L (ref 22–29)
CREAT SERPL-MCNC: 1.81 MG/DL (ref 0.76–1.27)
DEPRECATED RDW RBC AUTO: 54.8 FL (ref 37–54)
EGFRCR SERPLBLD CKD-EPI 2021: 36.9 ML/MIN/1.73
EOSINOPHIL # BLD AUTO: 0.05 10*3/MM3 (ref 0–0.4)
EOSINOPHIL NFR BLD AUTO: 0.9 % (ref 0.3–6.2)
ERYTHROCYTE [DISTWIDTH] IN BLOOD BY AUTOMATED COUNT: 17 % (ref 12.3–15.4)
GLUCOSE SERPL-MCNC: 103 MG/DL (ref 65–99)
HCT VFR BLD AUTO: 38.2 % (ref 37.5–51)
HGB BLD-MCNC: 11.9 G/DL (ref 13–17.7)
IMM GRANULOCYTES # BLD AUTO: 0.01 10*3/MM3 (ref 0–0.05)
IMM GRANULOCYTES NFR BLD AUTO: 0.2 % (ref 0–0.5)
LYMPHOCYTES # BLD AUTO: 1.48 10*3/MM3 (ref 0.7–3.1)
LYMPHOCYTES NFR BLD AUTO: 26.1 % (ref 19.6–45.3)
MAGNESIUM SERPL-MCNC: 2.1 MG/DL (ref 1.6–2.4)
MCH RBC QN AUTO: 28.3 PG (ref 26.6–33)
MCHC RBC AUTO-ENTMCNC: 31.2 G/DL (ref 31.5–35.7)
MCV RBC AUTO: 91 FL (ref 79–97)
MONOCYTES # BLD AUTO: 0.69 10*3/MM3 (ref 0.1–0.9)
MONOCYTES NFR BLD AUTO: 12.2 % (ref 5–12)
NEUTROPHILS NFR BLD AUTO: 3.41 10*3/MM3 (ref 1.7–7)
NEUTROPHILS NFR BLD AUTO: 60.1 % (ref 42.7–76)
NRBC BLD AUTO-RTO: 0 /100 WBC (ref 0–0.2)
PHOSPHATE SERPL-MCNC: 3.3 MG/DL (ref 2.5–4.5)
PLATELET # BLD AUTO: 175 10*3/MM3 (ref 140–450)
PMV BLD AUTO: 10.9 FL (ref 6–12)
POTASSIUM SERPL-SCNC: 4.5 MMOL/L (ref 3.5–5.2)
QT INTERVAL: 519 MS
QTC INTERVAL: 573 MS
RBC # BLD AUTO: 4.2 10*6/MM3 (ref 4.14–5.8)
SODIUM SERPL-SCNC: 143 MMOL/L (ref 136–145)
WBC NRBC COR # BLD AUTO: 5.67 10*3/MM3 (ref 3.4–10.8)

## 2025-03-13 PROCEDURE — 63710000001 ONDANSETRON ODT 4 MG TABLET DISPERSIBLE: Performed by: UROLOGY

## 2025-03-13 PROCEDURE — 83735 ASSAY OF MAGNESIUM: CPT | Performed by: INTERNAL MEDICINE

## 2025-03-13 PROCEDURE — G0378 HOSPITAL OBSERVATION PER HR: HCPCS

## 2025-03-13 PROCEDURE — 99232 SBSQ HOSP IP/OBS MODERATE 35: CPT | Performed by: INTERNAL MEDICINE

## 2025-03-13 PROCEDURE — 25810000003 LACTATED RINGERS PER 1000 ML: Performed by: INTERNAL MEDICINE

## 2025-03-13 PROCEDURE — 84100 ASSAY OF PHOSPHORUS: CPT | Performed by: INTERNAL MEDICINE

## 2025-03-13 PROCEDURE — 85025 COMPLETE CBC W/AUTO DIFF WBC: CPT | Performed by: INTERNAL MEDICINE

## 2025-03-13 PROCEDURE — 80048 BASIC METABOLIC PNL TOTAL CA: CPT | Performed by: INTERNAL MEDICINE

## 2025-03-13 RX ORDER — SODIUM CHLORIDE, SODIUM LACTATE, POTASSIUM CHLORIDE, CALCIUM CHLORIDE 600; 310; 30; 20 MG/100ML; MG/100ML; MG/100ML; MG/100ML
125 INJECTION, SOLUTION INTRAVENOUS CONTINUOUS
Status: DISCONTINUED | OUTPATIENT
Start: 2025-03-13 | End: 2025-03-14

## 2025-03-13 RX ADMIN — TAMSULOSIN HYDROCHLORIDE 0.4 MG: 0.4 CAPSULE ORAL at 08:07

## 2025-03-13 RX ADMIN — ALLOPURINOL 100 MG: 100 TABLET ORAL at 08:07

## 2025-03-13 RX ADMIN — OXYBUTYNIN CHLORIDE 5 MG: 5 TABLET ORAL at 16:25

## 2025-03-13 RX ADMIN — PANTOPRAZOLE SODIUM 40 MG: 40 TABLET, DELAYED RELEASE ORAL at 06:41

## 2025-03-13 RX ADMIN — LORAZEPAM 0.5 MG: 0.5 TABLET ORAL at 23:20

## 2025-03-13 RX ADMIN — Medication 10 ML: at 08:07

## 2025-03-13 RX ADMIN — OSELTAMIVIR PHOSPHATE 30 MG: 30 CAPSULE ORAL at 08:07

## 2025-03-13 RX ADMIN — SODIUM CHLORIDE, SODIUM LACTATE, POTASSIUM CHLORIDE, CALCIUM CHLORIDE 125 ML/HR: 20; 30; 600; 310 INJECTION, SOLUTION INTRAVENOUS at 23:20

## 2025-03-13 RX ADMIN — CITALOPRAM HYDROBROMIDE 10 MG: 20 TABLET ORAL at 08:07

## 2025-03-13 RX ADMIN — SODIUM CHLORIDE, SODIUM LACTATE, POTASSIUM CHLORIDE, CALCIUM CHLORIDE 125 ML/HR: 20; 30; 600; 310 INJECTION, SOLUTION INTRAVENOUS at 18:11

## 2025-03-13 RX ADMIN — ONDANSETRON 4 MG: 4 TABLET, ORALLY DISINTEGRATING ORAL at 09:55

## 2025-03-13 RX ADMIN — OXYBUTYNIN CHLORIDE 5 MG: 5 TABLET ORAL at 08:07

## 2025-03-13 RX ADMIN — TRAZODONE HYDROCHLORIDE 50 MG: 50 TABLET ORAL at 20:26

## 2025-03-13 RX ADMIN — LORAZEPAM 0.5 MG: 0.5 TABLET ORAL at 10:01

## 2025-03-13 RX ADMIN — OSELTAMIVIR PHOSPHATE 30 MG: 30 CAPSULE ORAL at 20:26

## 2025-03-13 RX ADMIN — OXYBUTYNIN CHLORIDE 5 MG: 5 TABLET ORAL at 20:26

## 2025-03-13 NOTE — PLAN OF CARE
Goal Outcome Evaluation:  Plan of Care Reviewed With: patient        Progress: improving  Outcome Evaluation: VSS. A/O x4. No s/s distress noted. Resting comfortably. Urethral F/C draining dark red MD aware. Suprapubic cath in place capped.

## 2025-03-13 NOTE — PROGRESS NOTES
[facility]   Hospitalist Progress Note  Date: 3/13/2025  Patient Name: Javi Martínez  : 1940  MRN: [id number]  Date of admission: 3/6/2025  Room/Bed: [facility]      Subjective   Subjective     Chief Complaint:   Urinary retention    Summary:  Javi Martínez is an 84 y.o. male with medical history of heart failure with reduced ejection fraction, atrial fibrillation, CKD stage IIIb, COPD, CAD, hypertension, history of prostate cancer status post brachytherapy and external beam XRT and BPH.  Patient presents to the emergency department, for the past several days patient has been having issues with difficulty urinating.  Patient is on chronic Lasix for his history of heart failure.  Patient endorses suprapubic pain, frequent dribbling.  Intermittently having large-volume urination.  In the emergency department patient found to have urinary retention on bladder scan.  Nursing staff attempted Diamond placement at bedside, however unable to successfully place.  Urology consulted in the emergency department for placement of Diamond catheter.  Patient able to tolerate dilation and placement of 12 Bulgarian catheter.  Patient tolerated procedure well, clear yellow urine drained easily from bladder.  Of note in the emergency department patient's initial potassium 5.3, creatinine 2.2.  This appears slightly above patient's baseline of 1.8-1.9.  Repeat BMP shows resolution, potassium of 4.8.  Creatinine down to 2.04.  Patient's proBNP elevated 9000, however down from previous heart failure exacerbations.  Patient states he does not have any dyspnea at this time, trace lower extremity edema.  Patient's lactate returned back at 2.5.  Lactate returned to normal.  Over the weekend patient did not feel stable on his feet, complaining of unsteadiness.  Working towards SNF discharge, PT and OT consults placed.  Patient tested positive for influenza on the weekend.    Interval Followup:   No acute events overnight.  CBI completed,  urine is still somewhat bloody but catheter is flowing freely without obstruction.  He denies any new or worsening pain.  Still feels weak and fatigued.    Objective   Objective     Vitals:   Temp:  [97.2 °F (36.2 °C)-97.9 °F (36.6 °C)] 97.7 °F (36.5 °C)  Heart Rate:  [70-77] 76  Resp:  [18-20] 18  BP: ()/(60-72) 92/69    Physical Exam               Constitutional: Awake, alert, no acute distress              HENT: NCAT, mucous membranes moist              Respiratory: Clear to auscultation bilaterally, no W/R/R              Gastrointestinal: Positive bowel sounds, soft, nontender, nondistended              Musculoskeletal: No bilateral ankle edema, no clubbing or cyanosis to extremities              Neurologic: Oriented x 3, strength symmetric in all extremities, Cranial Nerves grossly intact to confrontation, speech clear              : Catheter in place, hematuria noted    Result Review    I have personally reviewed these results:  [x]  Laboratory personally reviewed BMP, CBC, magnesium      Lab 03/13/25  0423 03/12/25  1310 03/11/25  0435 03/10/25  0421 03/08/25  0450 03/07/25  0434 03/06/25  1643 03/06/25  1352   WBC 5.67  --  4.99 5.74   < > 4.43  --   --    HEMOGLOBIN 11.9* 12.4* 13.8 13.3   < > 12.3*  --   --    HEMATOCRIT 38.2 38.6 42.0 40.9   < > 39.4  --   --    PLATELETS 175  --  159 146   < > 167  --   --    NEUTROS ABS 3.41  --   --   --   --  2.37  --   --    IMMATURE GRANS (ABS) 0.01  --   --   --   --  0.03  --   --    LYMPHS ABS 1.48  --   --   --   --  1.25  --   --    MONOS ABS 0.69  --   --   --   --  0.69  --   --    EOS ABS 0.05  --   --   --   --  0.06  --   --    MCV 91.0  --  92.1 93.0   < > 93.6  --   --    LACTATE  --   --   --   --   --   --  1.9 3.3*    < > = values in this interval not displayed.         Lab 03/13/25  0423 03/11/25  0435 03/10/25  0421   SODIUM 143 140 141   POTASSIUM 4.5 4.2 4.2   CHLORIDE 105 102 103   CO2 27.0 27.7 26.1   ANION GAP 11.0 10.3 11.9   BUN 31*  36* 39*   CREATININE 1.81* 1.64* 1.59*   EGFR 36.9* 41.5* 43.1*   GLUCOSE 103* 97 83   CALCIUM 9.3 8.8 8.7   MAGNESIUM 2.1 2.1 2.1   PHOSPHORUS 3.3  --   --                              Brief Urine Lab Results  (Last result in the past 365 days)        Color   Clarity   Blood   Leuk Est   Nitrite   Protein   CREAT   Urine HCG        03/06/25 1613 Yellow   Clear   Small (1+)   Small (1+)   Negative   Trace                 [x]  Microbiology   [x]  Radiology  []  EKG/Telemetry   []  Cardiology/Vascular   []  Pathology  []  Old records  []  Other:    Assessment & Plan   Assessment / Plan   Obstructive uropathy status post Diamond catheter placement  Hematuria  Influenza A  Hyperkalemia, resolved  Lactic acidosis, resolved  History of prostate cancer status post brachytherapy and external beam XRT  BPH  CKD stage IIIb  COPD  CAD  Hypertension  Heart failure with reduced ejection fraction  Atrial fibrillation on Eliquis     Continue to monitor in the hospital for workup and management of the above  Discussed with urologist, continue SP tube and Diamond catheter.  Patient has dense bulbar urethral stricture which has been dilated/incised multiple times  Continue to hold Eliquis until urine is clear  Okay to discharge from urology standpoint  Continue Tamiflu to complete a 5-day course  Continue to hold Lasix today  Hold antihypertensives, noted soft pressures  Continue appropriate home medications  Patient has declined rehab placement and requesting discharge home with home health although he does have some reservations about going home alone  Trend renal function and electrolytes with a.m. BMP, magnesium   Trend Hgb and WBC with a.m. CBC     Discussed with RN, urology    VTE Prophylaxis:  Pharmacologic & mechanical VTE prophylaxis orders are present.        CODE STATUS:   Code Status (Patient has no pulse and is not breathing): No CPR (Do Not Attempt to Resuscitate)  Medical Interventions (Patient has pulse or is breathing):  Limited Support  Medical Intervention Limits: No intubation (DNI)

## 2025-03-13 NOTE — PLAN OF CARE
Problem: Comorbidity Management  Goal: Blood Pressure in Desired Range  Outcome: Progressing  Intervention: Maintain Blood Pressure Management  Recent Flowsheet Documentation  Taken 3/13/2025 0804 by Celia Perez RN  Medication Review/Management: medications reviewed     Problem: Fall Injury Risk  Goal: Absence of Fall and Fall-Related Injury  Outcome: Progressing  Intervention: Identify and Manage Contributors  Recent Flowsheet Documentation  Taken 3/13/2025 0804 by Celia Perez RN  Medication Review/Management: medications reviewed  Self-Care Promotion: independence encouraged  Intervention: Promote Injury-Free Environment  Recent Flowsheet Documentation  Taken 3/13/2025 1630 by Celia Perez RN  Safety Promotion/Fall Prevention:   activity supervised   assistive device/personal items within reach   clutter free environment maintained   fall prevention program maintained   nonskid shoes/slippers when out of bed   room organization consistent   safety round/check completed  Taken 3/13/2025 1400 by Celia Perez RN  Safety Promotion/Fall Prevention:   activity supervised   assistive device/personal items within reach   clutter free environment maintained   fall prevention program maintained   nonskid shoes/slippers when out of bed   room organization consistent   safety round/check completed  Taken 3/13/2025 1245 by Celia Perez RN  Safety Promotion/Fall Prevention:   activity supervised   assistive device/personal items within reach   clutter free environment maintained   fall prevention program maintained   nonskid shoes/slippers when out of bed   room organization consistent   safety round/check completed  Taken 3/13/2025 1045 by Celia Perez RN  Safety Promotion/Fall Prevention:   activity supervised   assistive device/personal items within reach   clutter free environment maintained   fall prevention program maintained   nonskid shoes/slippers when out of bed    "room organization consistent   safety round/check completed  Taken 3/13/2025 0804 by Celia Perez RN  Safety Promotion/Fall Prevention:   activity supervised   assistive device/personal items within reach   clutter free environment maintained   fall prevention program maintained   nonskid shoes/slippers when out of bed   room organization consistent   safety round/check completed     Problem: Adult Inpatient Plan of Care  Goal: Plan of Care Review  Outcome: Progressing  Flowsheets (Taken 3/13/2025 1751)  Progress: no change  Outcome Evaluation: VSS. A&Ox4 w/intermittent confusion. Patient placed on 2L NC for comfort as patient felt \"short of breath.\" Urine draining dark red/coca-cola colored urine. MD aware. Suprapubic cath in place and capped.  Plan of Care Reviewed With: patient  Goal: Patient-Specific Goal (Individualized)  Outcome: Progressing  Goal: Absence of Hospital-Acquired Illness or Injury  Outcome: Progressing  Intervention: Identify and Manage Fall Risk  Recent Flowsheet Documentation  Taken 3/13/2025 1630 by Celia Perez RN  Safety Promotion/Fall Prevention:   activity supervised   assistive device/personal items within reach   clutter free environment maintained   fall prevention program maintained   nonskid shoes/slippers when out of bed   room organization consistent   safety round/check completed  Taken 3/13/2025 1400 by Celia Perez RN  Safety Promotion/Fall Prevention:   activity supervised   assistive device/personal items within reach   clutter free environment maintained   fall prevention program maintained   nonskid shoes/slippers when out of bed   room organization consistent   safety round/check completed  Taken 3/13/2025 1245 by Celia Perez RN  Safety Promotion/Fall Prevention:   activity supervised   assistive device/personal items within reach   clutter free environment maintained   fall prevention program maintained   nonskid shoes/slippers when out of " bed   room organization consistent   safety round/check completed  Taken 3/13/2025 1045 by Celia Perez RN  Safety Promotion/Fall Prevention:   activity supervised   assistive device/personal items within reach   clutter free environment maintained   fall prevention program maintained   nonskid shoes/slippers when out of bed   room organization consistent   safety round/check completed  Taken 3/13/2025 0804 by Celia Perez RN  Safety Promotion/Fall Prevention:   activity supervised   assistive device/personal items within reach   clutter free environment maintained   fall prevention program maintained   nonskid shoes/slippers when out of bed   room organization consistent   safety round/check completed  Intervention: Prevent Skin Injury  Recent Flowsheet Documentation  Taken 3/13/2025 1630 by Celia Perez RN  Body Position: position changed independently  Taken 3/13/2025 1400 by Celia Perez RN  Body Position: position changed independently  Taken 3/13/2025 1245 by Celia Perez RN  Body Position: position changed independently  Taken 3/13/2025 1045 by Celia Perez RN  Body Position: position changed independently  Taken 3/13/2025 0804 by Celia Perez RN  Body Position: position changed independently  Skin Protection:   incontinence pads utilized   transparent dressing maintained  Intervention: Prevent and Manage VTE (Venous Thromboembolism) Risk  Recent Flowsheet Documentation  Taken 3/13/2025 0804 by Celia Perez RN  VTE Prevention/Management:   bilateral   SCDs (sequential compression devices) off  Intervention: Prevent Infection  Recent Flowsheet Documentation  Taken 3/13/2025 1630 by Celia Perez RN  Infection Prevention:   environmental surveillance performed   hand hygiene promoted   rest/sleep promoted  Taken 3/13/2025 1400 by Celia Perez RN  Infection Prevention:   environmental surveillance performed   hand hygiene promoted    rest/sleep promoted  Taken 3/13/2025 1245 by Celia Perez RN  Infection Prevention:   environmental surveillance performed   hand hygiene promoted   rest/sleep promoted  Taken 3/13/2025 1045 by Celia Perez RN  Infection Prevention:   environmental surveillance performed   hand hygiene promoted   rest/sleep promoted  Taken 3/13/2025 0804 by Celia Perez RN  Infection Prevention:   environmental surveillance performed   hand hygiene promoted   rest/sleep promoted  Goal: Optimal Comfort and Wellbeing  Outcome: Progressing  Intervention: Provide Person-Centered Care  Recent Flowsheet Documentation  Taken 3/13/2025 0804 by Celia Perez RN  Trust Relationship/Rapport:   care explained   choices provided   emotional support provided   empathic listening provided   questions answered   questions encouraged   reassurance provided   thoughts/feelings acknowledged  Goal: Readiness for Transition of Care  Outcome: Progressing     Problem: Skin Injury Risk Increased  Goal: Skin Health and Integrity  Outcome: Progressing  Intervention: Optimize Skin Protection  Recent Flowsheet Documentation  Taken 3/13/2025 1630 by Celia Perez RN  Activity Management: activity encouraged  Head of Bed (HOB) Positioning: HOB elevated  Taken 3/13/2025 1400 by Celia Perez RN  Activity Management: activity encouraged  Head of Bed (HOB) Positioning: HOB elevated  Taken 3/13/2025 1245 by Celia Perez RN  Activity Management: activity encouraged  Head of Bed (HOB) Positioning: HOB elevated  Taken 3/13/2025 1045 by Celia Perez RN  Activity Management: activity encouraged  Head of Bed (HOB) Positioning: HOB elevated  Taken 3/13/2025 0804 by Celia Perez RN  Activity Management: activity encouraged  Pressure Reduction Techniques: frequent weight shift encouraged  Head of Bed (HOB) Positioning: HOB elevated  Pressure Reduction Devices: pressure-redistributing mattress utilized  Skin  "Protection:   incontinence pads utilized   transparent dressing maintained     Problem: Urinary Retention  Goal: Effective Urinary Elimination  Outcome: Progressing   Goal Outcome Evaluation:  Plan of Care Reviewed With: patient        Progress: no change  Outcome Evaluation: VSS. A&Ox4 w/intermittent confusion. Patient placed on 2L NC for comfort as patient felt \"short of breath.\" Urine draining dark red/coca-cola colored urine. MD aware. Suprapubic cath in place and capped.                             "

## 2025-03-14 ENCOUNTER — APPOINTMENT (OUTPATIENT)
Dept: GENERAL RADIOLOGY | Facility: HOSPITAL | Age: 85
DRG: 699 | End: 2025-03-14
Payer: MEDICARE

## 2025-03-14 LAB
ANION GAP SERPL CALCULATED.3IONS-SCNC: 10.2 MMOL/L (ref 5–15)
BASOPHILS # BLD AUTO: 0.04 10*3/MM3 (ref 0–0.2)
BASOPHILS NFR BLD AUTO: 0.7 % (ref 0–1.5)
BUN SERPL-MCNC: 28 MG/DL (ref 8–23)
BUN/CREAT SERPL: 18.7 (ref 7–25)
CALCIUM SPEC-SCNC: 8.5 MG/DL (ref 8.6–10.5)
CHLORIDE SERPL-SCNC: 106 MMOL/L (ref 98–107)
CO2 SERPL-SCNC: 23.8 MMOL/L (ref 22–29)
CREAT SERPL-MCNC: 1.5 MG/DL (ref 0.76–1.27)
DEPRECATED RDW RBC AUTO: 56 FL (ref 37–54)
EGFRCR SERPLBLD CKD-EPI 2021: 46.2 ML/MIN/1.73
EOSINOPHIL # BLD AUTO: 0.11 10*3/MM3 (ref 0–0.4)
EOSINOPHIL NFR BLD AUTO: 2 % (ref 0.3–6.2)
ERYTHROCYTE [DISTWIDTH] IN BLOOD BY AUTOMATED COUNT: 17.2 % (ref 12.3–15.4)
GLUCOSE SERPL-MCNC: 86 MG/DL (ref 65–99)
HCT VFR BLD AUTO: 37.4 % (ref 37.5–51)
HGB BLD-MCNC: 12.2 G/DL (ref 13–17.7)
IMM GRANULOCYTES # BLD AUTO: 0.02 10*3/MM3 (ref 0–0.05)
IMM GRANULOCYTES NFR BLD AUTO: 0.4 % (ref 0–0.5)
LYMPHOCYTES # BLD AUTO: 1.36 10*3/MM3 (ref 0.7–3.1)
LYMPHOCYTES NFR BLD AUTO: 24.4 % (ref 19.6–45.3)
MAGNESIUM SERPL-MCNC: 2.1 MG/DL (ref 1.6–2.4)
MCH RBC QN AUTO: 31 PG (ref 26.6–33)
MCHC RBC AUTO-ENTMCNC: 32.6 G/DL (ref 31.5–35.7)
MCV RBC AUTO: 95.2 FL (ref 79–97)
MONOCYTES # BLD AUTO: 0.71 10*3/MM3 (ref 0.1–0.9)
MONOCYTES NFR BLD AUTO: 12.7 % (ref 5–12)
NEUTROPHILS NFR BLD AUTO: 3.33 10*3/MM3 (ref 1.7–7)
NEUTROPHILS NFR BLD AUTO: 59.8 % (ref 42.7–76)
NRBC BLD AUTO-RTO: 0 /100 WBC (ref 0–0.2)
PHOSPHATE SERPL-MCNC: 3.1 MG/DL (ref 2.5–4.5)
PLATELET # BLD AUTO: 143 10*3/MM3 (ref 140–450)
PMV BLD AUTO: 11.2 FL (ref 6–12)
POTASSIUM SERPL-SCNC: 4.1 MMOL/L (ref 3.5–5.2)
RBC # BLD AUTO: 3.93 10*6/MM3 (ref 4.14–5.8)
SODIUM SERPL-SCNC: 140 MMOL/L (ref 136–145)
WBC NRBC COR # BLD AUTO: 5.57 10*3/MM3 (ref 3.4–10.8)

## 2025-03-14 PROCEDURE — 80048 BASIC METABOLIC PNL TOTAL CA: CPT | Performed by: INTERNAL MEDICINE

## 2025-03-14 PROCEDURE — 99233 SBSQ HOSP IP/OBS HIGH 50: CPT | Performed by: INTERNAL MEDICINE

## 2025-03-14 PROCEDURE — 85025 COMPLETE CBC W/AUTO DIFF WBC: CPT | Performed by: INTERNAL MEDICINE

## 2025-03-14 PROCEDURE — 63710000001 ONDANSETRON ODT 4 MG TABLET DISPERSIBLE: Performed by: UROLOGY

## 2025-03-14 PROCEDURE — 83735 ASSAY OF MAGNESIUM: CPT | Performed by: INTERNAL MEDICINE

## 2025-03-14 PROCEDURE — 84100 ASSAY OF PHOSPHORUS: CPT | Performed by: INTERNAL MEDICINE

## 2025-03-14 PROCEDURE — G0378 HOSPITAL OBSERVATION PER HR: HCPCS

## 2025-03-14 PROCEDURE — 71045 X-RAY EXAM CHEST 1 VIEW: CPT

## 2025-03-14 RX ORDER — GUAIFENESIN 200 MG/10ML
200 LIQUID ORAL EVERY 4 HOURS PRN
Status: DISCONTINUED | OUTPATIENT
Start: 2025-03-14 | End: 2025-03-18 | Stop reason: HOSPADM

## 2025-03-14 RX ORDER — FUROSEMIDE 10 MG/ML
40 INJECTION INTRAMUSCULAR; INTRAVENOUS ONCE
Status: DISCONTINUED | OUTPATIENT
Start: 2025-03-14 | End: 2025-03-15 | Stop reason: SDUPTHER

## 2025-03-14 RX ORDER — BENZONATATE 100 MG/1
100 CAPSULE ORAL 3 TIMES DAILY
Status: DISCONTINUED | OUTPATIENT
Start: 2025-03-14 | End: 2025-03-17

## 2025-03-14 RX ADMIN — OSELTAMIVIR PHOSPHATE 30 MG: 30 CAPSULE ORAL at 08:37

## 2025-03-14 RX ADMIN — OXYBUTYNIN CHLORIDE 5 MG: 5 TABLET ORAL at 16:08

## 2025-03-14 RX ADMIN — BENZONATATE 100 MG: 100 CAPSULE ORAL at 20:12

## 2025-03-14 RX ADMIN — ONDANSETRON 4 MG: 4 TABLET, ORALLY DISINTEGRATING ORAL at 12:56

## 2025-03-14 RX ADMIN — GUAIFENESIN 200 MG: 100 LIQUID ORAL at 23:12

## 2025-03-14 RX ADMIN — BENZONATATE 100 MG: 100 CAPSULE ORAL at 12:01

## 2025-03-14 RX ADMIN — OSELTAMIVIR PHOSPHATE 30 MG: 30 CAPSULE ORAL at 20:12

## 2025-03-14 RX ADMIN — TRAZODONE HYDROCHLORIDE 50 MG: 50 TABLET ORAL at 20:12

## 2025-03-14 RX ADMIN — PANTOPRAZOLE SODIUM 40 MG: 40 TABLET, DELAYED RELEASE ORAL at 08:37

## 2025-03-14 RX ADMIN — Medication 10 ML: at 20:14

## 2025-03-14 RX ADMIN — CITALOPRAM HYDROBROMIDE 10 MG: 20 TABLET ORAL at 08:37

## 2025-03-14 RX ADMIN — GUAIFENESIN 200 MG: 100 LIQUID ORAL at 18:37

## 2025-03-14 RX ADMIN — LORAZEPAM 0.5 MG: 0.5 TABLET ORAL at 20:12

## 2025-03-14 RX ADMIN — ALLOPURINOL 100 MG: 100 TABLET ORAL at 08:37

## 2025-03-14 RX ADMIN — LORAZEPAM 0.5 MG: 0.5 TABLET ORAL at 12:20

## 2025-03-14 RX ADMIN — BENZONATATE 100 MG: 100 CAPSULE ORAL at 16:08

## 2025-03-14 RX ADMIN — TAMSULOSIN HYDROCHLORIDE 0.4 MG: 0.4 CAPSULE ORAL at 08:37

## 2025-03-14 RX ADMIN — OXYBUTYNIN CHLORIDE 5 MG: 5 TABLET ORAL at 08:37

## 2025-03-14 RX ADMIN — Medication 10 ML: at 08:37

## 2025-03-14 RX ADMIN — OXYBUTYNIN CHLORIDE 5 MG: 5 TABLET ORAL at 20:12

## 2025-03-14 NOTE — PROGRESS NOTES
[facility]   Hospitalist Progress Note  Date: 3/14/2025  Patient Name: Javi Martínez  : 1940  MRN: [id number]  Date of admission: 3/6/2025  Room/Bed: Critical [facility] hospital      Subjective   Subjective     Chief Complaint:   Urinary retention    Summary:  Javi Martínez is an 84 y.o. male with medical history of heart failure with reduced ejection fraction, atrial fibrillation, CKD stage IIIb, COPD, CAD, hypertension, history of prostate cancer status post brachytherapy and external beam XRT and BPH.  Patient presents to the emergency department, for the past several days patient has been having issues with difficulty urinating.  Patient is on chronic Lasix for his history of heart failure.  Patient endorses suprapubic pain, frequent dribbling.  Intermittently having large-volume urination.  In the emergency department patient found to have urinary retention on bladder scan.  Nursing staff attempted Diamond placement at bedside, however unable to successfully place.  Urology consulted in the emergency department for placement of Diamond catheter.  Patient able to tolerate dilation and placement of 12 [place] catheter.  Patient tolerated procedure well, clear yellow urine drained easily from bladder.  Of note in the emergency department patient's initial potassium 5.3, creatinine 2.2.  This appears slightly above patient's baseline of 1.8-1.9.  Repeat BMP shows resolution, potassium of 4.8.  Creatinine down to 2.04.  Patient's proBNP elevated 9000, however down from previous heart failure exacerbations.  Patient states he does not have any dyspnea at this time, trace lower extremity edema.  Patient's lactate returned back at 2.5.  Lactate returned to normal.  Over the weekend patient did not feel stable on his feet, complaining of unsteadiness.  Working towards SNF discharge, PT and OT consults placed.  Patient tested positive for influenza on the weekend.    Interval Followup:   No acute events overnight.  Still feels  extremely weak and fatigued.  Urine in Diamond catheter is still dark but lighter than yesterday.    Objective   Objective     Vitals:   Temp:  [97.4 °F (36.3 °C)-97.7 °F (36.5 °C)] 97.7 °F (36.5 °C)  Heart Rate:  [] 73  Resp:  [16-18] 16  BP: ()/(57-72) 102/70  Flow (L/min) (Oxygen Therapy):  [2] 2    Physical Exam               Constitutional: Awake, alert, no acute distress, appears weak              HENT: NCAT, mucous membranes moist              Respiratory: Clear to auscultation bilaterally, no W/R/R              Gastrointestinal: Positive bowel sounds, soft, NTND              Musculoskeletal: No bilateral ankle edema, no clubbing or cyanosis to extremities              Neurologic: Oriented x 3, strength symmetric in all extremities, Cranial Nerves grossly intact to confrontation, speech clear              : Catheter in place, hematuria noted    Result Review    I have personally reviewed these results:  [x]  Laboratory personally reviewed BMP, CBC, magnesium      Lab 03/14/25 0448 03/13/25 0423 03/12/25  1310 03/11/25  0435   WBC 5.57 5.67  --  4.99   HEMOGLOBIN 12.2* 11.9* 12.4* 13.8   HEMATOCRIT 37.4* 38.2 38.6 42.0   PLATELETS 143 175  --  159   NEUTROS ABS 3.33 3.41  --   --    IMMATURE GRANS (ABS) 0.02 0.01  --   --    LYMPHS ABS 1.36 1.48  --   --    MONOS ABS 0.71 0.69  --   --    EOS ABS 0.11 0.05  --   --    MCV 95.2 91.0  --  92.1         Lab 03/14/25 0448 03/13/25 0423 03/11/25  0435   SODIUM 140 143 140   POTASSIUM 4.1 4.5 4.2   CHLORIDE 106 105 102   CO2 23.8 27.0 27.7   ANION GAP 10.2 11.0 10.3   BUN 28* 31* 36*   CREATININE 1.50* 1.81* 1.64*   EGFR 46.2* 36.9* 41.5*   GLUCOSE 86 103* 97   CALCIUM 8.5* 9.3 8.8   MAGNESIUM 2.1 2.1 2.1   PHOSPHORUS 3.1 3.3  --                              Brief Urine Lab Results  (Last result in the past 365 days)        Color   Clarity   Blood   Leuk Est   Nitrite   Protein   CREAT   Urine HCG        03/06/25 1613 Yellow   Clear   Small (1+)    Small (1+)   Negative   Trace                 [x]  Microbiology   [x]  Radiology  []  EKG/Telemetry   []  Cardiology/Vascular   []  Pathology  []  Old records  []  Other:    Assessment & Plan   Assessment / Plan   Obstructive uropathy status post Diamond catheter placement  Hematuria  Influenza A  Hyperkalemia, resolved  Lactic acidosis, resolved  History of prostate cancer status post brachytherapy and external beam XRT  BPH  CKD stage IIIb  COPD  CAD  Hypertension  Heart failure with reduced ejection fraction  Atrial fibrillation on Eliquis     Continue to monitor in the hospital for workup and management of the above  Discussed with urologist, continue SP tube and Diamond catheter.  Patient has dense bulbar urethral stricture which has been dilated/incised multiple times  Continue to hold Eliquis until urine is clear  Okay to discharge from urology standpoint  Continue Tamiflu to complete a 5-day course  Will dose Lasix 40 mg IV once and monitor response, trend renal function and electrolytes closely while IV diuretics  Add scheduled Tessalon Perles, can use cough syrup as needed  Continue appropriate home medications  Patient has declined rehab placement and requesting discharge home with home health  Trend renal function and electrolytes with a.m. BMP, magnesium   Trend Hgb and WBC with a.m. CBC     Discussed with RN    VTE Prophylaxis:  Pharmacologic & mechanical VTE prophylaxis orders are present.        CODE STATUS:   Code Status (Patient has no pulse and is not breathing): No CPR (Do Not Attempt to Resuscitate)  Medical Interventions (Patient has pulse or is breathing): Limited Support  Medical Intervention Limits: No intubation (DNI)

## 2025-03-14 NOTE — PLAN OF CARE
Goal Outcome Evaluation:  Plan of Care Reviewed With: patient        Progress: no change  Outcome Evaluation: pt up to toilet x1 this shift. complaints of coughing and nausea, see MAR. O2 weaned to RA.

## 2025-03-15 PROBLEM — J10.1 INFLUENZA A: Status: ACTIVE | Noted: 2025-03-15

## 2025-03-15 LAB
ANION GAP SERPL CALCULATED.3IONS-SCNC: 12.2 MMOL/L (ref 5–15)
BASOPHILS # BLD AUTO: 0.03 10*3/MM3 (ref 0–0.2)
BASOPHILS NFR BLD AUTO: 0.3 % (ref 0–1.5)
BUN SERPL-MCNC: 30 MG/DL (ref 8–23)
BUN/CREAT SERPL: 16.6 (ref 7–25)
CALCIUM SPEC-SCNC: 9 MG/DL (ref 8.6–10.5)
CHLORIDE SERPL-SCNC: 103 MMOL/L (ref 98–107)
CO2 SERPL-SCNC: 23.8 MMOL/L (ref 22–29)
CREAT SERPL-MCNC: 1.81 MG/DL (ref 0.76–1.27)
DEPRECATED RDW RBC AUTO: 56.5 FL (ref 37–54)
EGFRCR SERPLBLD CKD-EPI 2021: 36.9 ML/MIN/1.73
EOSINOPHIL # BLD AUTO: 0.01 10*3/MM3 (ref 0–0.4)
EOSINOPHIL NFR BLD AUTO: 0.1 % (ref 0.3–6.2)
ERYTHROCYTE [DISTWIDTH] IN BLOOD BY AUTOMATED COUNT: 17.6 % (ref 12.3–15.4)
GLUCOSE SERPL-MCNC: 122 MG/DL (ref 65–99)
HCT VFR BLD AUTO: 38.1 % (ref 37.5–51)
HGB BLD-MCNC: 12.7 G/DL (ref 13–17.7)
IMM GRANULOCYTES # BLD AUTO: 0.05 10*3/MM3 (ref 0–0.05)
IMM GRANULOCYTES NFR BLD AUTO: 0.6 % (ref 0–0.5)
LYMPHOCYTES # BLD AUTO: 1.15 10*3/MM3 (ref 0.7–3.1)
LYMPHOCYTES NFR BLD AUTO: 13.3 % (ref 19.6–45.3)
MAGNESIUM SERPL-MCNC: 2.1 MG/DL (ref 1.6–2.4)
MCH RBC QN AUTO: 31.5 PG (ref 26.6–33)
MCHC RBC AUTO-ENTMCNC: 33.3 G/DL (ref 31.5–35.7)
MCV RBC AUTO: 94.5 FL (ref 79–97)
MONOCYTES # BLD AUTO: 0.64 10*3/MM3 (ref 0.1–0.9)
MONOCYTES NFR BLD AUTO: 7.4 % (ref 5–12)
NEUTROPHILS NFR BLD AUTO: 6.79 10*3/MM3 (ref 1.7–7)
NEUTROPHILS NFR BLD AUTO: 78.3 % (ref 42.7–76)
NRBC BLD AUTO-RTO: 0 /100 WBC (ref 0–0.2)
PHOSPHATE SERPL-MCNC: 3.8 MG/DL (ref 2.5–4.5)
PLATELET # BLD AUTO: 151 10*3/MM3 (ref 140–450)
PMV BLD AUTO: 11.1 FL (ref 6–12)
POTASSIUM SERPL-SCNC: 5.1 MMOL/L (ref 3.5–5.2)
RBC # BLD AUTO: 4.03 10*6/MM3 (ref 4.14–5.8)
SODIUM SERPL-SCNC: 139 MMOL/L (ref 136–145)
WBC NRBC COR # BLD AUTO: 8.67 10*3/MM3 (ref 3.4–10.8)

## 2025-03-15 PROCEDURE — 25010000002 FUROSEMIDE PER 20 MG: Performed by: INTERNAL MEDICINE

## 2025-03-15 PROCEDURE — 99233 SBSQ HOSP IP/OBS HIGH 50: CPT | Performed by: INTERNAL MEDICINE

## 2025-03-15 PROCEDURE — 94799 UNLISTED PULMONARY SVC/PX: CPT

## 2025-03-15 PROCEDURE — 83735 ASSAY OF MAGNESIUM: CPT | Performed by: INTERNAL MEDICINE

## 2025-03-15 PROCEDURE — 84100 ASSAY OF PHOSPHORUS: CPT | Performed by: INTERNAL MEDICINE

## 2025-03-15 PROCEDURE — 85025 COMPLETE CBC W/AUTO DIFF WBC: CPT | Performed by: INTERNAL MEDICINE

## 2025-03-15 PROCEDURE — 80048 BASIC METABOLIC PNL TOTAL CA: CPT | Performed by: INTERNAL MEDICINE

## 2025-03-15 PROCEDURE — 94640 AIRWAY INHALATION TREATMENT: CPT

## 2025-03-15 RX ORDER — ALBUTEROL SULFATE 0.83 MG/ML
2.5 SOLUTION RESPIRATORY (INHALATION) EVERY 4 HOURS PRN
Status: DISCONTINUED | OUTPATIENT
Start: 2025-03-15 | End: 2025-03-17

## 2025-03-15 RX ORDER — FUROSEMIDE 10 MG/ML
40 INJECTION INTRAMUSCULAR; INTRAVENOUS ONCE
Status: COMPLETED | OUTPATIENT
Start: 2025-03-15 | End: 2025-03-15

## 2025-03-15 RX ADMIN — BENZONATATE 100 MG: 100 CAPSULE ORAL at 20:27

## 2025-03-15 RX ADMIN — TRAZODONE HYDROCHLORIDE 50 MG: 50 TABLET ORAL at 20:26

## 2025-03-15 RX ADMIN — OXYBUTYNIN CHLORIDE 5 MG: 5 TABLET ORAL at 15:09

## 2025-03-15 RX ADMIN — Medication 10 ML: at 09:07

## 2025-03-15 RX ADMIN — BENZONATATE 100 MG: 100 CAPSULE ORAL at 09:07

## 2025-03-15 RX ADMIN — BENZONATATE 100 MG: 100 CAPSULE ORAL at 15:10

## 2025-03-15 RX ADMIN — OXYBUTYNIN CHLORIDE 5 MG: 5 TABLET ORAL at 20:26

## 2025-03-15 RX ADMIN — TAMSULOSIN HYDROCHLORIDE 0.4 MG: 0.4 CAPSULE ORAL at 09:07

## 2025-03-15 RX ADMIN — PANTOPRAZOLE SODIUM 40 MG: 40 TABLET, DELAYED RELEASE ORAL at 05:21

## 2025-03-15 RX ADMIN — ALBUTEROL SULFATE 2.5 MG: 2.5 SOLUTION RESPIRATORY (INHALATION) at 16:57

## 2025-03-15 RX ADMIN — OXYBUTYNIN CHLORIDE 5 MG: 5 TABLET ORAL at 09:07

## 2025-03-15 RX ADMIN — FUROSEMIDE 40 MG: 10 INJECTION, SOLUTION INTRAMUSCULAR; INTRAVENOUS at 09:35

## 2025-03-15 RX ADMIN — GUAIFENESIN 200 MG: 100 LIQUID ORAL at 09:39

## 2025-03-15 RX ADMIN — CITALOPRAM HYDROBROMIDE 10 MG: 20 TABLET ORAL at 09:07

## 2025-03-15 RX ADMIN — Medication 10 ML: at 20:27

## 2025-03-15 RX ADMIN — ALLOPURINOL 100 MG: 100 TABLET ORAL at 09:07

## 2025-03-15 NOTE — PROGRESS NOTES
[facility]   Hospitalist Progress Note  Date: 3/15/2025  Patient Name: Javi Martínez  : 1940  MRN: [id number]  Date of admission: 3/6/2025  Room/Bed: [facility]      Subjective   Subjective     Chief Complaint:   Urinary retention    Summary:  Javi Martínez is an 84 y.o. male with medical history of heart failure with reduced ejection fraction, atrial fibrillation, CKD stage IIIb, COPD, CAD, hypertension, history of prostate cancer status post brachytherapy and external beam XRT and BPH.  Patient presents to the emergency department, for the past several days patient has been having issues with difficulty urinating.  Patient is on chronic Lasix for his history of heart failure.  Patient endorses suprapubic pain, frequent dribbling.  Intermittently having large-volume urination.  In the emergency department patient found to have urinary retention on bladder scan.  Nursing staff attempted Diamond placement at bedside, however unable to successfully place.  Urology consulted in the emergency department for placement of Diamond catheter.  Patient able to tolerate dilation and placement of 12 Turkish catheter.  Patient tolerated procedure well, clear yellow urine drained easily from bladder.  Of note in the emergency department patient's initial potassium 5.3, creatinine 2.2.  This appears slightly above patient's baseline of 1.8-1.9.  Repeat BMP shows resolution, potassium of 4.8.  Creatinine down to 2.04.  Patient's proBNP elevated 9000, however down from previous heart failure exacerbations.  Patient states he does not have any dyspnea at this time, trace lower extremity edema.  Patient's lactate returned back at 2.5.  Lactate returned to normal.  Over the weekend patient did not feel stable on his feet, complaining of unsteadiness.  Working towards SNF discharge, PT and OT consults placed.  Patient tested positive for influenza on the weekend.    Interval Followup:   No acute events overnight.  Continues to feel  weak and fatigued.  Still has persistent cough despite Tessalon Perles and cough syrup.  Has had decreased urine output, urine is still dark.  However, he denies any abdominal pain or discomfort.    Objective   Objective     Vitals:   Temp:  [97.3 °F (36.3 °C)-98.1 °F (36.7 °C)] 97.9 °F (36.6 °C)  Heart Rate:  [70-82] 73  Resp:  [18-24] 20  BP: ()/(65-81) 95/65  Flow (L/min) (Oxygen Therapy):  [2] 2    Physical Exam               Constitutional: Awake, alert, no acute distress              HENT: NCAT, mucous membranes moist              Respiratory: Clear to auscultation bilaterally, no W/R/R              Gastrointestinal: Positive bowel sounds, soft, NTND              Musculoskeletal: No bilateral ankle edema, no clubbing or cyanosis to extremities              Neurologic: Oriented x 3, strength symmetric in all extremities, Cranial Nerves grossly intact to confrontation, speech clear              : Catheter in place, hematuria noted    Result Review    I have personally reviewed these results:  [x]  Laboratory personally reviewed BMP, CBC, magnesium      Lab 03/15/25  0438 03/14/25  0448 03/13/25  0423   WBC 8.67 5.57 5.67   HEMOGLOBIN 12.7* 12.2* 11.9*   HEMATOCRIT 38.1 37.4* 38.2   PLATELETS 151 143 175   NEUTROS ABS 6.79 3.33 3.41   IMMATURE GRANS (ABS) 0.05 0.02 0.01   LYMPHS ABS 1.15 1.36 1.48   MONOS ABS 0.64 0.71 0.69   EOS ABS 0.01 0.11 0.05   MCV 94.5 95.2 91.0         Lab 03/15/25  0438 03/14/25 0448 03/13/25  0423   SODIUM 139 140 143   POTASSIUM 5.1 4.1 4.5   CHLORIDE 103 106 105   CO2 23.8 23.8 27.0   ANION GAP 12.2 10.2 11.0   BUN 30* 28* 31*   CREATININE 1.81* 1.50* 1.81*   EGFR 36.9* 46.2* 36.9*   GLUCOSE 122* 86 103*   CALCIUM 9.0 8.5* 9.3   MAGNESIUM 2.1 2.1 2.1   PHOSPHORUS 3.8 3.1 3.3                             Brief Urine Lab Results  (Last result in the past 365 days)        Color   Clarity   Blood   Leuk Est   Nitrite   Protein   CREAT   Urine HCG        03/06/25 1613 Yellow    Clear   Small (1+)   Small (1+)   Negative   Trace                 [x]  Microbiology   [x]  Radiology  []  EKG/Telemetry   []  Cardiology/Vascular   []  Pathology  []  Old records  []  Other:    Assessment & Plan   Assessment / Plan   Obstructive uropathy status post Diamond catheter placement  Hematuria  Influenza A  Hyperkalemia, resolved  Lactic acidosis, resolved  History of prostate cancer status post brachytherapy and external beam XRT  BPH  CKD stage IIIb  COPD  CAD  Hypertension  Heart failure with reduced ejection fraction  Atrial fibrillation on Eliquis     Continue to monitor in the hospital for workup and management of the above  Discussed with urologist, continue SP tube and Diamond catheter, continue to hold Eliquis until urine is clear  Completed a 5-day course of Tamiflu  Chest x-ray reviewed with evidence of volume overload and pulmonary edema  Patient did not receive Lasix yesterday due to blood pressures, will dose Lasix 40 mg IV once and monitor response, trend renal function and electrolytes closely while IV diuretics  Continue scheduled Tessalon Perles, can use cough syrup as needed  And albuterol nebulizers as needed  Continue appropriate home medications  Patient has declined rehab placement and requesting discharge home with home health  Trend renal function and electrolytes with a.m. BMP, magnesium   Trend Hgb and WBC with a.m. CBC     Discussed with RN, urology    VTE Prophylaxis:  Pharmacologic & mechanical VTE prophylaxis orders are present.        CODE STATUS:   Code Status (Patient has no pulse and is not breathing): No CPR (Do Not Attempt to Resuscitate)  Medical Interventions (Patient has pulse or is breathing): Limited Support  Medical Intervention Limits: No intubation (DNI)

## 2025-03-15 NOTE — PLAN OF CARE
Goal Outcome Evaluation:           Progress: no change  Outcome Evaluation: Patient weak and fatigued. Cough medicine given as needed. Patient had a few episodes of anxiety. Ativen given x1. Jp Little RN

## 2025-03-15 NOTE — PLAN OF CARE
Goal Outcome Evaluation:  Plan of Care Reviewed With: patient        Progress: no change  Outcome Evaluation: pt up to toilet and chair once this shift, but refused other activity, education provided as well as encouragement. complaints of cough, see MAR.

## 2025-03-15 NOTE — PROGRESS NOTES
[facility]     Progress Note    Patient Name: Javi Martínez  : 1940  MRN: [id number]  Primary Care Physician:  Rani Lopez APRN  Date of admission: 3/6/2025    Subjective   Subjective     Chief Complaint: Urethral stricture    Urinary Retention    Patient Reports patient underwent suprapubic catheter placement and Diamond catheter insertion by Dr. Walker.  He is doing well.  He has no complaints    Review of Systems  Negative  Objective   Objective     Vitals:   Temp:  [97.3 °F (36.3 °C)-98.1 °F (36.7 °C)] 97.3 °F (36.3 °C)  Heart Rate:  [70-82] 74  Resp:  [16-24] 20  BP: ()/(67-81) 111/67  Flow (L/min) (Oxygen Therapy):  [2] 2    Physical Exam   Afebrile vital signs are stable  Awake alert oriented  Diamond catheter is in place.  Urine draining well.  The urine remains clear.  Abdomen is soft and benign.  Result Review    Result Review:  I have personally reviewed the results from the time of this admission to 3/15/2025 10:01 EDT and agree with these findings:  [x]  Laboratory list / accordion  []  Microbiology  []  Radiology  []  EKG/Telemetry   []  Cardiology/Vascular   []  Pathology  []  Old records  []  Other:  Most notable findings include: Creatinine is 1.81 it was 1.5 yesterday and 1.81 on .  White blood cell count is 8.67.      Assessment & Plan   Assessment / Plan     Brief Patient Summary:  Javi Martínez is a 84 y.o. 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 Walker she place a suprapubic catheter and a Diamond catheter.  He is doing well postoperatively his urine output is low he had 100 mL last shift but before that it was good output and his creatinine remained stable.  I think he can be discharged home anytime the medical team deems it appropriate will follow him with you thank you.    VTE  Prophylaxis:  Pharmacologic & mechanical VTE prophylaxis orders are present.        CODE STATUS:    Code Status (Patient has no pulse and is not breathing): No CPR (Do Not Attempt to Resuscitate)  Medical Interventions (Patient has pulse or is breathing): Limited Support  Medical Intervention Limits: No intubation (DNI)    Disposition:  I expect patient to be discharged home.    Donn Garcia MD

## 2025-03-16 LAB
ANION GAP SERPL CALCULATED.3IONS-SCNC: 9.8 MMOL/L (ref 5–15)
BASOPHILS # BLD AUTO: 0.03 10*3/MM3 (ref 0–0.2)
BASOPHILS NFR BLD AUTO: 0.4 % (ref 0–1.5)
BUN SERPL-MCNC: 32 MG/DL (ref 8–23)
BUN/CREAT SERPL: 15.9 (ref 7–25)
CALCIUM SPEC-SCNC: 8.8 MG/DL (ref 8.6–10.5)
CHLORIDE SERPL-SCNC: 104 MMOL/L (ref 98–107)
CO2 SERPL-SCNC: 27.2 MMOL/L (ref 22–29)
CREAT SERPL-MCNC: 2.01 MG/DL (ref 0.76–1.27)
DEPRECATED RDW RBC AUTO: 55.6 FL (ref 37–54)
EGFRCR SERPLBLD CKD-EPI 2021: 32.5 ML/MIN/1.73
EOSINOPHIL # BLD AUTO: 0.1 10*3/MM3 (ref 0–0.4)
EOSINOPHIL NFR BLD AUTO: 1.3 % (ref 0.3–6.2)
ERYTHROCYTE [DISTWIDTH] IN BLOOD BY AUTOMATED COUNT: 17.1 % (ref 12.3–15.4)
GLUCOSE SERPL-MCNC: 91 MG/DL (ref 65–99)
HCT VFR BLD AUTO: 37.6 % (ref 37.5–51)
HGB BLD-MCNC: 12.1 G/DL (ref 13–17.7)
IMM GRANULOCYTES # BLD AUTO: 0.03 10*3/MM3 (ref 0–0.05)
IMM GRANULOCYTES NFR BLD AUTO: 0.4 % (ref 0–0.5)
LYMPHOCYTES # BLD AUTO: 1.43 10*3/MM3 (ref 0.7–3.1)
LYMPHOCYTES NFR BLD AUTO: 18.5 % (ref 19.6–45.3)
MAGNESIUM SERPL-MCNC: 2 MG/DL (ref 1.6–2.4)
MCH RBC QN AUTO: 29.7 PG (ref 26.6–33)
MCHC RBC AUTO-ENTMCNC: 32.2 G/DL (ref 31.5–35.7)
MCV RBC AUTO: 92.2 FL (ref 79–97)
MONOCYTES # BLD AUTO: 0.9 10*3/MM3 (ref 0.1–0.9)
MONOCYTES NFR BLD AUTO: 11.6 % (ref 5–12)
NEUTROPHILS NFR BLD AUTO: 5.25 10*3/MM3 (ref 1.7–7)
NEUTROPHILS NFR BLD AUTO: 67.8 % (ref 42.7–76)
NRBC BLD AUTO-RTO: 0 /100 WBC (ref 0–0.2)
PHOSPHATE SERPL-MCNC: 3.7 MG/DL (ref 2.5–4.5)
PLATELET # BLD AUTO: 169 10*3/MM3 (ref 140–450)
PMV BLD AUTO: 10.5 FL (ref 6–12)
POTASSIUM SERPL-SCNC: 4.1 MMOL/L (ref 3.5–5.2)
RBC # BLD AUTO: 4.08 10*6/MM3 (ref 4.14–5.8)
SODIUM SERPL-SCNC: 141 MMOL/L (ref 136–145)
WBC NRBC COR # BLD AUTO: 7.74 10*3/MM3 (ref 3.4–10.8)

## 2025-03-16 PROCEDURE — 84100 ASSAY OF PHOSPHORUS: CPT | Performed by: INTERNAL MEDICINE

## 2025-03-16 PROCEDURE — 99233 SBSQ HOSP IP/OBS HIGH 50: CPT | Performed by: INTERNAL MEDICINE

## 2025-03-16 PROCEDURE — 94799 UNLISTED PULMONARY SVC/PX: CPT

## 2025-03-16 PROCEDURE — 85025 COMPLETE CBC W/AUTO DIFF WBC: CPT | Performed by: INTERNAL MEDICINE

## 2025-03-16 PROCEDURE — 83735 ASSAY OF MAGNESIUM: CPT | Performed by: INTERNAL MEDICINE

## 2025-03-16 PROCEDURE — 94664 DEMO&/EVAL PT USE INHALER: CPT

## 2025-03-16 PROCEDURE — 80048 BASIC METABOLIC PNL TOTAL CA: CPT | Performed by: INTERNAL MEDICINE

## 2025-03-16 PROCEDURE — 63710000001 ONDANSETRON ODT 4 MG TABLET DISPERSIBLE: Performed by: UROLOGY

## 2025-03-16 RX ORDER — HYDROCODONE BITARTRATE AND ACETAMINOPHEN 5; 325 MG/1; MG/1
1 TABLET ORAL EVERY 6 HOURS PRN
Refills: 0 | Status: DISCONTINUED | OUTPATIENT
Start: 2025-03-16 | End: 2025-03-18 | Stop reason: HOSPADM

## 2025-03-16 RX ADMIN — BENZONATATE 100 MG: 100 CAPSULE ORAL at 15:27

## 2025-03-16 RX ADMIN — LORAZEPAM 0.5 MG: 0.5 TABLET ORAL at 15:27

## 2025-03-16 RX ADMIN — ALBUTEROL SULFATE 2.5 MG: 2.5 SOLUTION RESPIRATORY (INHALATION) at 11:59

## 2025-03-16 RX ADMIN — GUAIFENESIN 200 MG: 100 LIQUID ORAL at 09:44

## 2025-03-16 RX ADMIN — GUAIFENESIN 200 MG: 100 LIQUID ORAL at 21:52

## 2025-03-16 RX ADMIN — Medication 10 ML: at 21:53

## 2025-03-16 RX ADMIN — TAMSULOSIN HYDROCHLORIDE 0.4 MG: 0.4 CAPSULE ORAL at 08:37

## 2025-03-16 RX ADMIN — CITALOPRAM HYDROBROMIDE 10 MG: 20 TABLET ORAL at 08:37

## 2025-03-16 RX ADMIN — Medication 10 ML: at 08:38

## 2025-03-16 RX ADMIN — OXYBUTYNIN CHLORIDE 5 MG: 5 TABLET ORAL at 21:52

## 2025-03-16 RX ADMIN — BENZONATATE 100 MG: 100 CAPSULE ORAL at 08:37

## 2025-03-16 RX ADMIN — TRAZODONE HYDROCHLORIDE 50 MG: 50 TABLET ORAL at 21:52

## 2025-03-16 RX ADMIN — ALLOPURINOL 100 MG: 100 TABLET ORAL at 08:37

## 2025-03-16 RX ADMIN — LORAZEPAM 0.5 MG: 0.5 TABLET ORAL at 03:18

## 2025-03-16 RX ADMIN — OXYBUTYNIN CHLORIDE 5 MG: 5 TABLET ORAL at 08:37

## 2025-03-16 RX ADMIN — OXYBUTYNIN CHLORIDE 5 MG: 5 TABLET ORAL at 15:27

## 2025-03-16 RX ADMIN — GUAIFENESIN 200 MG: 100 LIQUID ORAL at 00:13

## 2025-03-16 RX ADMIN — PANTOPRAZOLE SODIUM 40 MG: 40 TABLET, DELAYED RELEASE ORAL at 06:30

## 2025-03-16 RX ADMIN — BENZONATATE 100 MG: 100 CAPSULE ORAL at 21:53

## 2025-03-16 RX ADMIN — ONDANSETRON 4 MG: 4 TABLET, ORALLY DISINTEGRATING ORAL at 09:43

## 2025-03-16 NOTE — PLAN OF CARE
Goal Outcome Evaluation:  Plan of Care Reviewed With: patient        Progress: improving  Outcome Evaluation: pt refused activity this AM, but reports that he will get up to chair for dinner. complaints of coughing, see MAR.

## 2025-03-16 NOTE — PROGRESS NOTES
Made attempt to walk pt at 15:45, Pt stated he wanted to wait until after dinner to walk. Made another attempt to walk pt at 17:25, pt was in the middle of eating dinner.  Will pass on in shift report that walk will need to be done.

## 2025-03-16 NOTE — SIGNIFICANT NOTE
"   03/16/25 1200   Physical Therapy Time and Intention   Session Not Performed patient/family declined treatment   Comment, Session Not Performed Pt. declined PT Tx this a.m. stating that he was \"too tired right now\" and that \"moving makes my coughing worse.\"  Pt. requested to rest at this time.       "

## 2025-03-16 NOTE — PROGRESS NOTES
[facility]     Progress Note    Patient Name: Javi Martínez  : 1940  MRN: [id number]  Primary Care Physician:  Rani Lopez APRN  Date of admission: 3/6/2025    Subjective   Subjective     Chief Complaint: Urethral stricture    Urinary Retention    Patient Reports patient underwent suprapubic catheter placement dilation of urethral stricture and Diamond catheter placement by Dr. Walker.  He is doing well his only complaint today is his he is very fatigued and tired.    Review of Systems  Negative  Objective   Objective     Vitals:   Temp:  [97.3 °F (36.3 °C)-98.1 °F (36.7 °C)] 97.5 °F (36.4 °C)  Heart Rate:  [69-76] 69  Resp:  [18-22] 18  BP: ()/(61-71) 96/71  Flow (L/min) (Oxygen Therapy):  [2] 2    Physical Exam   Afebrile vital signs are stable  Awake alert oriented  Abdomen is soft and benign suprapubic catheter is in place insertion point looks good and normal.  Diamond catheter is in place penis and scrotum is normal.  Result Review    Result Review:  I have personally reviewed the results from the time of this admission to 3/16/2025 10:05 EDT and agree with these findings:  [x]  Laboratory list / accordion  []  Microbiology  []  Radiology  []  EKG/Telemetry   []  Cardiology/Vascular   []  Pathology  []  Old records  []  Other:  Most notable findings include: Stable      Assessment & Plan   Assessment / Plan     Brief Patient Summary:  Javi Martínez is a 84 y.o. 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 Walker who placed a suprapubic catheter and a Diamond catheter.  He is doing well his urine output was excellent yesterday.  White blood cell count was normal at 7.74 creatinine was 2.01 up from 1.81 yesterday. D/C plans per Hospitalist service.  He needs to follow up with Dr. Bernal.    VTE  Prophylaxis:  Pharmacologic & mechanical VTE prophylaxis orders are present.        CODE STATUS:    Code Status (Patient has no pulse and is not breathing): No CPR (Do Not Attempt to Resuscitate)  Medical Interventions (Patient has pulse or is breathing): Limited Support  Medical Intervention Limits: No intubation (DNI)    Disposition:  I expect patient to be discharged home.    Donn Garcia MD

## 2025-03-16 NOTE — PROGRESS NOTES
[facility]   Hospitalist Progress Note  Date: 3/16/2025  Patient Name: Javi Martínez  : 1940  MRN: [id number]  Date of admission: 3/6/2025  Room/Bed: [facility]      Subjective   Subjective     Chief Complaint:   Urinary retention    Summary:  Javi Martínez is an 84 y.o. male with medical history of heart failure with reduced ejection fraction, atrial fibrillation, CKD stage IIIb, COPD, CAD, hypertension, history of prostate cancer status post brachytherapy and external beam XRT and BPH.  Patient presents to the emergency department, for the past several days patient has been having issues with difficulty urinating.  Patient is on chronic Lasix for his history of heart failure.  Patient endorses suprapubic pain, frequent dribbling.  Intermittently having large-volume urination.  In the emergency department patient found to have urinary retention on bladder scan.  Nursing staff attempted Diamond placement at bedside, however unable to successfully place.  Urology consulted in the emergency department for placement of Diamond catheter.  Patient able to tolerate dilation and placement of 12 Japanese catheter.  Patient tolerated procedure well, clear yellow urine drained easily from bladder.  Of note in the emergency department patient's initial potassium 5.3, creatinine 2.2.  This appears slightly above patient's baseline of 1.8-1.9.  Repeat BMP shows resolution, potassium of 4.8.  Creatinine down to 2.04.  Patient's proBNP elevated 9000, however down from previous heart failure exacerbations.  Patient states he does not have any dyspnea at this time, trace lower extremity edema.  Patient's lactate returned back at 2.5.  Lactate returned to normal.  Over the weekend patient did not feel stable on his feet, complaining of unsteadiness.  PT/OT consulted and recommended rehab.  The patient did test positive for influenza, did have shortness of breath and cough but never required oxygen.  Completed course of Tamiflu.   Patient had previously had an outpatient procedure planned, urology took the patient to the OR on 3/11 for cystoscopy urethral ostomy with suprapubic catheter insertion.  The patient has a dense bulbar urethral stricture which has been dilated/incised multiple times, they feel eventually he will become SP catheter dependent.  He had postoperative hematuria, Eliquis was held but cleared after CBI.    Interval Followup:   No acute events overnight.  He still feels extremely weak and fatigued.  Has been thinking things over and no longer wants to go home, would like to pursue rehab.  Had increased urine output with Lasix yesterday.    Objective   Objective     Vitals:   Temp:  [97.3 °F (36.3 °C)-98.1 °F (36.7 °C)] 97.5 °F (36.4 °C)  Heart Rate:  [69-76] 69  Resp:  [18-22] 18  BP: ()/(61-71) 96/71  Flow (L/min) (Oxygen Therapy):  [2] 2    Physical Exam               Constitutional: Chronically ill-appearing male, thin, awake, alert              HENT: NCAT, mucous membranes moist              Respiratory: Clear to auscultation bilaterally, coughs during interview, no W/R/R              Gastrointestinal: Positive bowel sounds, soft, NTND              Musculoskeletal: No bilateral ankle edema, no clubbing or cyanosis to extremities              Neurologic: Oriented x 3, strength symmetric in all extremities, Cranial Nerves grossly intact to confrontation, speech clear              : Catheter in place, urine clear    Result Review    I have personally reviewed these results:  [x]  Laboratory personally reviewed BMP, CBC, magnesium      Lab 03/16/25  0419 03/15/25  0438 03/14/25  0448   WBC 7.74 8.67 5.57   HEMOGLOBIN 12.1* 12.7* 12.2*   HEMATOCRIT 37.6 38.1 37.4*   PLATELETS 169 151 143   NEUTROS ABS 5.25 6.79 3.33   IMMATURE GRANS (ABS) 0.03 0.05 0.02   LYMPHS ABS 1.43 1.15 1.36   MONOS ABS 0.90 0.64 0.71   EOS ABS 0.10 0.01 0.11   MCV 92.2 94.5 95.2         Lab 03/16/25  0419 03/15/25  0438 03/14/25  0448   SODIUM  141 139 140   POTASSIUM 4.1 5.1 4.1   CHLORIDE 104 103 106   CO2 27.2 23.8 23.8   ANION GAP 9.8 12.2 10.2   BUN 32* 30* 28*   CREATININE 2.01* 1.81* 1.50*   EGFR 32.5* 36.9* 46.2*   GLUCOSE 91 122* 86   CALCIUM 8.8 9.0 8.5*   MAGNESIUM 2.0 2.1 2.1   PHOSPHORUS 3.7 3.8 3.1                             Brief Urine Lab Results  (Last result in the past 365 days)        Color   Clarity   Blood   Leuk Est   Nitrite   Protein   CREAT   Urine HCG        03/06/25 1613 Yellow   Clear   Small (1+)   Small (1+)   Negative   Trace                 [x]  Microbiology   [x]  Radiology  []  EKG/Telemetry   []  Cardiology/Vascular   []  Pathology  []  Old records  []  Other:    Assessment & Plan   Assessment / Plan   Obstructive uropathy status post Diamond catheter placement  Hematuria  Influenza A  Hyperkalemia, resolved  Lactic acidosis, resolved  History of prostate cancer status post brachytherapy and external beam XRT  BPH  CKD stage IIIb  COPD  CAD  Hypertension  Heart failure with reduced ejection fraction  Atrial fibrillation on Eliquis     Continue to monitor in the hospital for workup and management of the above  Discussed with urologist, continue SP tube and Diamond catheter now and at discharge, will follow-up with urology on outpatient basis  Will likely restart Eliquis tomorrow as hematuria has improved  Completed a 5-day course of Tamiflu  Renal function slightly worsened, but he is has had increased urine output  Diuretic holiday today and monitor renal function in a.m.  Continue scheduled Tessalon Perles, can use cough syrup as needed  Albuterol nebulizer as needed  Continue appropriate home medications  Patient has now changed his mind and would like rehab placement.  Social work aware  Trend renal function and electrolytes with a.m. BMP, magnesium   Trend Hgb and WBC with a.m. CBC     Discussed with RN, urology    Total of 53 minutes spent reviewing labs and imaging, counseling and educating the patient and family,  ordering medications, discussing with specialty services, documenting clinical information in the electronic medical record, and care coordination.    VTE Prophylaxis:  Pharmacologic & mechanical VTE prophylaxis orders are present.        CODE STATUS:   Code Status (Patient has no pulse and is not breathing): No CPR (Do Not Attempt to Resuscitate)  Medical Interventions (Patient has pulse or is breathing): Limited Support  Medical Intervention Limits: No intubation (DNI)

## 2025-03-17 LAB
ANION GAP SERPL CALCULATED.3IONS-SCNC: 10.1 MMOL/L (ref 5–15)
BASOPHILS # BLD AUTO: 0.02 10*3/MM3 (ref 0–0.2)
BASOPHILS NFR BLD AUTO: 0.2 % (ref 0–1.5)
BUN SERPL-MCNC: 34 MG/DL (ref 8–23)
BUN/CREAT SERPL: 19.2 (ref 7–25)
CALCIUM SPEC-SCNC: 9 MG/DL (ref 8.6–10.5)
CHLORIDE SERPL-SCNC: 102 MMOL/L (ref 98–107)
CO2 SERPL-SCNC: 27.9 MMOL/L (ref 22–29)
CREAT SERPL-MCNC: 1.77 MG/DL (ref 0.76–1.27)
DEPRECATED RDW RBC AUTO: 56.5 FL (ref 37–54)
EGFRCR SERPLBLD CKD-EPI 2021: 37.9 ML/MIN/1.73
EOSINOPHIL # BLD AUTO: 0.09 10*3/MM3 (ref 0–0.4)
EOSINOPHIL NFR BLD AUTO: 1 % (ref 0.3–6.2)
ERYTHROCYTE [DISTWIDTH] IN BLOOD BY AUTOMATED COUNT: 17.1 % (ref 12.3–15.4)
GLUCOSE SERPL-MCNC: 110 MG/DL (ref 65–99)
HCT VFR BLD AUTO: 40.3 % (ref 37.5–51)
HGB BLD-MCNC: 12.6 G/DL (ref 13–17.7)
IMM GRANULOCYTES # BLD AUTO: 0.04 10*3/MM3 (ref 0–0.05)
IMM GRANULOCYTES NFR BLD AUTO: 0.5 % (ref 0–0.5)
LYMPHOCYTES # BLD AUTO: 1.44 10*3/MM3 (ref 0.7–3.1)
LYMPHOCYTES NFR BLD AUTO: 16.7 % (ref 19.6–45.3)
MAGNESIUM SERPL-MCNC: 2.2 MG/DL (ref 1.6–2.4)
MCH RBC QN AUTO: 29.2 PG (ref 26.6–33)
MCHC RBC AUTO-ENTMCNC: 31.3 G/DL (ref 31.5–35.7)
MCV RBC AUTO: 93.3 FL (ref 79–97)
MONOCYTES # BLD AUTO: 1.07 10*3/MM3 (ref 0.1–0.9)
MONOCYTES NFR BLD AUTO: 12.4 % (ref 5–12)
NEUTROPHILS NFR BLD AUTO: 5.98 10*3/MM3 (ref 1.7–7)
NEUTROPHILS NFR BLD AUTO: 69.2 % (ref 42.7–76)
NRBC BLD AUTO-RTO: 0 /100 WBC (ref 0–0.2)
PHOSPHATE SERPL-MCNC: 3.2 MG/DL (ref 2.5–4.5)
PLATELET # BLD AUTO: 180 10*3/MM3 (ref 140–450)
PMV BLD AUTO: 10.3 FL (ref 6–12)
POTASSIUM SERPL-SCNC: 4.6 MMOL/L (ref 3.5–5.2)
RBC # BLD AUTO: 4.32 10*6/MM3 (ref 4.14–5.8)
SODIUM SERPL-SCNC: 140 MMOL/L (ref 136–145)
WBC NRBC COR # BLD AUTO: 8.64 10*3/MM3 (ref 3.4–10.8)

## 2025-03-17 PROCEDURE — 94799 UNLISTED PULMONARY SVC/PX: CPT

## 2025-03-17 PROCEDURE — 80048 BASIC METABOLIC PNL TOTAL CA: CPT | Performed by: INTERNAL MEDICINE

## 2025-03-17 PROCEDURE — 85025 COMPLETE CBC W/AUTO DIFF WBC: CPT | Performed by: INTERNAL MEDICINE

## 2025-03-17 PROCEDURE — 94618 PULMONARY STRESS TESTING: CPT

## 2025-03-17 PROCEDURE — 99232 SBSQ HOSP IP/OBS MODERATE 35: CPT | Performed by: INTERNAL MEDICINE

## 2025-03-17 PROCEDURE — 84100 ASSAY OF PHOSPHORUS: CPT | Performed by: INTERNAL MEDICINE

## 2025-03-17 PROCEDURE — 63710000001 ONDANSETRON ODT 4 MG TABLET DISPERSIBLE: Performed by: UROLOGY

## 2025-03-17 PROCEDURE — 83735 ASSAY OF MAGNESIUM: CPT | Performed by: INTERNAL MEDICINE

## 2025-03-17 RX ORDER — SODIUM CHLORIDE FOR INHALATION 3 %
4 VIAL, NEBULIZER (ML) INHALATION
Status: DISCONTINUED | OUTPATIENT
Start: 2025-03-17 | End: 2025-03-18 | Stop reason: HOSPADM

## 2025-03-17 RX ORDER — FUROSEMIDE 40 MG/1
40 TABLET ORAL DAILY
Status: DISCONTINUED | OUTPATIENT
Start: 2025-03-17 | End: 2025-03-18 | Stop reason: HOSPADM

## 2025-03-17 RX ORDER — BENZONATATE 100 MG/1
200 CAPSULE ORAL 3 TIMES DAILY
Status: DISCONTINUED | OUTPATIENT
Start: 2025-03-17 | End: 2025-03-18 | Stop reason: HOSPADM

## 2025-03-17 RX ORDER — HYDROCODONE POLISTIREX AND CHLORPHENIRAMINE POLISTIREX 10; 8 MG/5ML; MG/5ML
5 SUSPENSION, EXTENDED RELEASE ORAL 2 TIMES DAILY
Refills: 0 | Status: DISCONTINUED | OUTPATIENT
Start: 2025-03-17 | End: 2025-03-18 | Stop reason: HOSPADM

## 2025-03-17 RX ORDER — BUDESONIDE 0.5 MG/2ML
0.5 INHALANT ORAL
Status: DISCONTINUED | OUTPATIENT
Start: 2025-03-17 | End: 2025-03-18 | Stop reason: HOSPADM

## 2025-03-17 RX ORDER — ALBUTEROL SULFATE 0.83 MG/ML
2.5 SOLUTION RESPIRATORY (INHALATION) EVERY 4 HOURS PRN
Status: DISCONTINUED | OUTPATIENT
Start: 2025-03-17 | End: 2025-03-18 | Stop reason: HOSPADM

## 2025-03-17 RX ORDER — ARFORMOTEROL TARTRATE 15 UG/2ML
15 SOLUTION RESPIRATORY (INHALATION)
Status: DISCONTINUED | OUTPATIENT
Start: 2025-03-17 | End: 2025-03-18 | Stop reason: HOSPADM

## 2025-03-17 RX ORDER — IPRATROPIUM BROMIDE AND ALBUTEROL SULFATE 2.5; .5 MG/3ML; MG/3ML
3 SOLUTION RESPIRATORY (INHALATION)
Status: DISCONTINUED | OUTPATIENT
Start: 2025-03-17 | End: 2025-03-18 | Stop reason: HOSPADM

## 2025-03-17 RX ADMIN — PANTOPRAZOLE SODIUM 40 MG: 40 TABLET, DELAYED RELEASE ORAL at 06:01

## 2025-03-17 RX ADMIN — LORAZEPAM 0.5 MG: 0.5 TABLET ORAL at 04:43

## 2025-03-17 RX ADMIN — IPRATROPIUM BROMIDE AND ALBUTEROL SULFATE 3 ML: .5; 3 SOLUTION RESPIRATORY (INHALATION) at 19:37

## 2025-03-17 RX ADMIN — Medication 10 ML: at 08:45

## 2025-03-17 RX ADMIN — HYDROCODONE POLISTIREX AND CHLORPHENIRAMINE POLISTIREX 5 ML: 10; 8 SUSPENSION, EXTENDED RELEASE ORAL at 20:12

## 2025-03-17 RX ADMIN — Medication 4 ML: at 19:37

## 2025-03-17 RX ADMIN — BENZONATATE 200 MG: 100 CAPSULE ORAL at 15:41

## 2025-03-17 RX ADMIN — APIXABAN 2.5 MG: 2.5 TABLET, FILM COATED ORAL at 20:12

## 2025-03-17 RX ADMIN — BENZONATATE 200 MG: 100 CAPSULE ORAL at 20:12

## 2025-03-17 RX ADMIN — GUAIFENESIN 200 MG: 100 LIQUID ORAL at 04:43

## 2025-03-17 RX ADMIN — TRAZODONE HYDROCHLORIDE 50 MG: 50 TABLET ORAL at 20:12

## 2025-03-17 RX ADMIN — ONDANSETRON 4 MG: 4 TABLET, ORALLY DISINTEGRATING ORAL at 07:54

## 2025-03-17 RX ADMIN — ARFORMOTEROL TARTRATE 15 MCG: 15 SOLUTION RESPIRATORY (INHALATION) at 19:37

## 2025-03-17 RX ADMIN — OXYBUTYNIN CHLORIDE 5 MG: 5 TABLET ORAL at 15:41

## 2025-03-17 RX ADMIN — BUDESONIDE 0.5 MG: 0.5 INHALANT RESPIRATORY (INHALATION) at 19:37

## 2025-03-17 RX ADMIN — Medication 10 ML: at 20:13

## 2025-03-17 RX ADMIN — OXYBUTYNIN CHLORIDE 5 MG: 5 TABLET ORAL at 20:12

## 2025-03-17 NOTE — PLAN OF CARE
Goal Outcome Evaluation:                         Patient is able to make needs known. He is fully oriented. He takes medications whole without difficulty. He transfers and ambulates well with rolling walker and stand by assistance.

## 2025-03-17 NOTE — PROGRESS NOTES
[facility]     Progress Note    Patient Name: Javi Martínez  : 1940  MRN: [id number]  Primary Care Physician:  Rani Lopez APRN  Date of admission: 3/6/2025    Subjective   Subjective     Chief Complaint: Severe fatigue    Urinary Retention    Patient Reports patient has a history of urethral stricture that was dilated by Dr. Walker she placed a Diamond catheter and a suprapubic catheter in him.  The catheters are in good position and draining well.  Patient however complains of severe fatigue and very sleepy.    Review of Systems  Negative  Objective   Objective     Vitals:   Temp:  [97 °F (36.1 °C)-98.7 °F (37.1 °C)] 97 °F (36.1 °C)  Heart Rate:  [63-77] 75  Resp:  [17-24] 24  BP: ()/(50-86) 100/50  Flow (L/min) (Oxygen Therapy):  [2] 2    Physical Exam   Afebrile vital signs are stable  Awake alert oriented  Abdomen is soft nontender benign  Suprapubic catheter in good position as is the Diamond  Result Review    Result Review:  I have personally reviewed the results from the time of this admission to 3/17/2025 09:32 EDT and agree with these findings:  [x]  Laboratory list / accordion  []  Microbiology  []  Radiology  []  EKG/Telemetry   []  Cardiology/Vascular   []  Pathology  []  Old records  []  Other:  Most notable findings include: Creatinine 1.77 down from 2.01 yesterday white blood cell count is 8.64.      Assessment & Plan   Assessment / Plan     Brief Patient Summary:  Javi Martínez is a 84 y.o. 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  Patient however is very fatigued we will allow hospitalist service to address that.  Continue with monitoring.    VTE Prophylaxis:  Pharmacologic & mechanical VTE prophylaxis orders are present.        CODE STATUS:    Code  Status (Patient has no pulse and is not breathing): No CPR (Do Not Attempt to Resuscitate)  Medical Interventions (Patient has pulse or is breathing): Limited Support  Medical Intervention Limits: No intubation (DNI)    Disposition:  I expect patient to be discharged home.    Donn Garcia MD

## 2025-03-17 NOTE — PROGRESS NOTES
[facility]   Hospitalist Progress Note  Date: 3/17/2025  Patient Name: Javi Martínez  : 1940  MRN: [id number]  Date of admission: 3/6/2025  Room/Bed: [facility]      Subjective   Subjective     Chief Complaint:   Urinary retention    Summary:  Javi Martínez is an 84 y.o. male with medical history of heart failure with reduced ejection fraction, atrial fibrillation on Eliquis, CKD stage IIIb, COPD, CAD, hypertension, history of prostate cancer status post brachytherapy and external beam XRT and BPH.  Patient presents to the emergency department, for the past several days patient has been having issues with difficulty urinating.  Patient is on chronic Lasix for his history of heart failure.  Patient endorses suprapubic pain, frequent dribbling.  Intermittently having large-volume urination.  In the emergency department patient found to have urinary retention on bladder scan.  Nursing staff attempted Diamond placement at bedside, however unable to successfully place.  Urology consulted in the emergency department for placement of Diamond catheter.  Patient able to tolerate dilation and placement of 12 Sinhala catheter.  Patient tolerated procedure well, clear yellow urine drained easily from bladder.  Of note in the emergency department patient's initial potassium 5.3, creatinine 2.2.  This appears slightly above patient's baseline of 1.8-1.9.  Repeat BMP shows resolution, potassium of 4.8.  Creatinine down to 2.04.  Patient's proBNP elevated 9000, however down from previous heart failure exacerbations.  Patient states he does not have any dyspnea at this time, trace lower extremity edema.  Patient's lactate returned back at 2.5.  Lactate returned to normal.  Over the weekend patient did not feel stable on his feet, complaining of unsteadiness.  PT/OT consulted and recommended rehab.  The patient did test positive for influenza, did have shortness of breath and cough but never required oxygen.  Completed course  of Tamiflu.  Patient had previously had an outpatient procedure planned, urology took the patient to the OR on 3/11 for cystoscopy urethral ostomy with suprapubic catheter insertion.  The patient has a dense bulbar urethral stricture which has been dilated/incised multiple times, they feel eventually he will become SP catheter dependent.  He had postoperative hematuria, Eliquis was held but cleared after CBI.  Eliquis restarted on 3/16 without recurrence of hematuria.  Very weak and fatigued, awaiting rehab placement.    Interval Followup:   No acute events overnight.  Still feels so weak and fatigued he was unable to work with therapy yesterday.  Still has a cough that is so significant he has difficulty eating and loss of appetite.    Objective   Objective     Vitals:   Temp:  [97 °F (36.1 °C)-98.7 °F (37.1 °C)] 98.2 °F (36.8 °C)  Heart Rate:  [63-77] 67  Resp:  [17-24] 18  BP: (100-117)/(50-86) 107/72    Physical Exam               Constitutional: Chronically ill-appearing male, thin, awake, alert, appears fatigued              HENT: NCAT, mucous membranes moist              Respiratory: Clear to auscultation bilaterally, no W/R/R              Gastrointestinal: Positive bowel sounds, soft, NTND              Musculoskeletal: No bilateral ankle edema, no clubbing or cyanosis to extremities              Neurologic: Oriented x 3, strength symmetric in all extremities, Cranial Nerves grossly intact to confrontation, speech clear              : Catheter in place, urine clear    Result Review    I have personally reviewed these results:  [x]  Laboratory personally reviewed BMP, CBC, magnesium      Lab 03/17/25  0401 03/16/25  0419 03/15/25  0438   WBC 8.64 7.74 8.67   HEMOGLOBIN 12.6* 12.1* 12.7*   HEMATOCRIT 40.3 37.6 38.1   PLATELETS 180 169 151   NEUTROS ABS 5.98 5.25 6.79   IMMATURE GRANS (ABS) 0.04 0.03 0.05   LYMPHS ABS 1.44 1.43 1.15   MONOS ABS 1.07* 0.90 0.64   EOS ABS 0.09 0.10 0.01   MCV 93.3 92.2 94.5          Lab 03/17/25  0401 03/16/25  0419 03/15/25  0438   SODIUM 140 141 139   POTASSIUM 4.6 4.1 5.1   CHLORIDE 102 104 103   CO2 27.9 27.2 23.8   ANION GAP 10.1 9.8 12.2   BUN 34* 32* 30*   CREATININE 1.77* 2.01* 1.81*   EGFR 37.9* 32.5* 36.9*   GLUCOSE 110* 91 122*   CALCIUM 9.0 8.8 9.0   MAGNESIUM 2.2 2.0 2.1   PHOSPHORUS 3.2 3.7 3.8                             Brief Urine Lab Results  (Last result in the past 365 days)        Color   Clarity   Blood   Leuk Est   Nitrite   Protein   CREAT   Urine HCG        03/06/25 1613 Yellow   Clear   Small (1+)   Small (1+)   Negative   Trace                 [x]  Microbiology   [x]  Radiology  []  EKG/Telemetry   []  Cardiology/Vascular   []  Pathology  []  Old records  []  Other:    Assessment & Plan   Assessment / Plan   Obstructive uropathy status post Diamond catheter placement  Hematuria  Influenza A  Hyperkalemia, resolved  Lactic acidosis, resolved  History of prostate cancer status post brachytherapy and external beam XRT  BPH  CKD stage IIIb  COPD  CAD  Hypertension  Heart failure with reduced ejection fraction  Atrial fibrillation on Eliquis     Continue to monitor in the hospital for workup and management of the above  Discussed with urologist, continue SP tube and Diamond catheter now and at discharge, will follow-up with urology on outpatient basis  Restart Eliquis 5 mg p.o. twice daily for A-fib as hematuria has resolved  Completed a 5-day course of Tamiflu  Restart Lasix 40 mg p.o. daily  Scheduled Tussionex twice daily  Continue scheduled Tessalon Perles, can use cough syrup as needed  Add scheduled nebulizers, add saline nebulizers twice daily, OPEP, bronchopulmonary hygiene  Continue appropriate home medications  PT/OT following-patient is now amenable to rehab placement, social work aware  Trend renal function and electrolytes with a.m. BMP, magnesium   Trend Hgb and WBC with a.m. CBC     Discussed with RN, urology    VTE Prophylaxis:  Pharmacologic & mechanical  VTE prophylaxis orders are present.        CODE STATUS:   Code Status (Patient has no pulse and is not breathing): No CPR (Do Not Attempt to Resuscitate)  Medical Interventions (Patient has pulse or is breathing): Limited Support  Medical Intervention Limits: No intubation (DNI)

## 2025-03-17 NOTE — THERAPY EVALUATION
Respiratory Therapist Broncho-Pulmonary Hygiene Progress Note      Patient Name:  Javi Martínez  YOB: 1940    Javi Martínez meets the qualification for Level 2 of the Bronco-Pulmonary Hygiene Protocol. This was based on my daily patient assessment and includes review of chest x-ray results, cough ability and quality, oxygenation, secretions or risk for secretion development and patient mobility.     Broncho-Pulmonary Hygiene Assessment:    Level of Movement: Actively changing positions without assistance  Alert/ oriented/ cooperative    Breath Sounds: Clear to slightly diminished    Cough: Strong, effective    Chest X-Ray: Mild consolidation and/or atelectasis.    Sputum Productions: None or small amount of thin or watery secretions with effective cough    History and Physical: None    SpO2 to Oxygen Need: greater than 92% on room air or  less than 3L nasal canula    Current SpO2 is: 93% on room air    Based on this information, I have completed the following interventions: Aerobika with bronchodialtor medication or TID      Electronically signed by Amber Block, JOSSELYN, 03/17/25, 2:10 PM EDT.

## 2025-03-17 NOTE — PROGRESS NOTES
Walking Oximetry Progress Note      Patient Name:  Javi Martínez  YOB: 1940  Date of Procedure: 03/17/25              ROOM AIR BASELINE   SpO2% 92   Heart Rate 81     EXERCISE ON ROOM AIR SpO2% EXERCISE ON O2 LPM SpO2%   1 MINUTE 93 1 MINUTE     2 MINUTES 93 2 MINUTES     3 MINUTES 93 3 MINUTES     4 MINUTES 93 4 MINUTES     5 MINUTES 93 5 MINUTES     6 MINUTES 93 6 MINUTES                Time to Recovery  NA   SpO2% Post Exercise  92 on room air.    HR Post Exercise  88     Comments:           Electronically signed by Roberto Calvo CRT, 03/17/25, 8:01 AM EDT.

## 2025-03-18 VITALS
OXYGEN SATURATION: 94 % | HEART RATE: 70 BPM | DIASTOLIC BLOOD PRESSURE: 51 MMHG | WEIGHT: 178.57 LBS | TEMPERATURE: 97.9 F | RESPIRATION RATE: 20 BRPM | SYSTOLIC BLOOD PRESSURE: 97 MMHG | BODY MASS INDEX: 21.75 KG/M2 | HEIGHT: 76 IN

## 2025-03-18 LAB
ANION GAP SERPL CALCULATED.3IONS-SCNC: 10.8 MMOL/L (ref 5–15)
BASOPHILS # BLD AUTO: 0.06 10*3/MM3 (ref 0–0.2)
BASOPHILS NFR BLD AUTO: 0.7 % (ref 0–1.5)
BUN SERPL-MCNC: 32 MG/DL (ref 8–23)
BUN/CREAT SERPL: 18.8 (ref 7–25)
CALCIUM SPEC-SCNC: 9.2 MG/DL (ref 8.6–10.5)
CHLORIDE SERPL-SCNC: 103 MMOL/L (ref 98–107)
CO2 SERPL-SCNC: 23.2 MMOL/L (ref 22–29)
CREAT SERPL-MCNC: 1.7 MG/DL (ref 0.76–1.27)
DEPRECATED RDW RBC AUTO: 58.3 FL (ref 37–54)
EGFRCR SERPLBLD CKD-EPI 2021: 39.8 ML/MIN/1.73
EOSINOPHIL # BLD AUTO: 0.11 10*3/MM3 (ref 0–0.4)
EOSINOPHIL NFR BLD AUTO: 1.2 % (ref 0.3–6.2)
ERYTHROCYTE [DISTWIDTH] IN BLOOD BY AUTOMATED COUNT: 18.6 % (ref 12.3–15.4)
GLUCOSE SERPL-MCNC: 97 MG/DL (ref 65–99)
HCT VFR BLD AUTO: 40.3 % (ref 37.5–51)
HGB BLD-MCNC: 12.7 G/DL (ref 13–17.7)
IMM GRANULOCYTES # BLD AUTO: 0.03 10*3/MM3 (ref 0–0.05)
IMM GRANULOCYTES NFR BLD AUTO: 0.3 % (ref 0–0.5)
LYMPHOCYTES # BLD AUTO: 1.87 10*3/MM3 (ref 0.7–3.1)
LYMPHOCYTES NFR BLD AUTO: 20.4 % (ref 19.6–45.3)
MAGNESIUM SERPL-MCNC: 2.2 MG/DL (ref 1.6–2.4)
MCH RBC QN AUTO: 29.9 PG (ref 26.6–33)
MCHC RBC AUTO-ENTMCNC: 31.5 G/DL (ref 31.5–35.7)
MCV RBC AUTO: 94.8 FL (ref 79–97)
MONOCYTES # BLD AUTO: 1.09 10*3/MM3 (ref 0.1–0.9)
MONOCYTES NFR BLD AUTO: 11.9 % (ref 5–12)
NEUTROPHILS NFR BLD AUTO: 6 10*3/MM3 (ref 1.7–7)
NEUTROPHILS NFR BLD AUTO: 65.5 % (ref 42.7–76)
NRBC BLD AUTO-RTO: 0 /100 WBC (ref 0–0.2)
PHOSPHATE SERPL-MCNC: 3.2 MG/DL (ref 2.5–4.5)
PLATELET # BLD AUTO: 210 10*3/MM3 (ref 140–450)
PMV BLD AUTO: 11.1 FL (ref 6–12)
POTASSIUM SERPL-SCNC: 4.1 MMOL/L (ref 3.5–5.2)
RBC # BLD AUTO: 4.25 10*6/MM3 (ref 4.14–5.8)
SODIUM SERPL-SCNC: 137 MMOL/L (ref 136–145)
WBC NRBC COR # BLD AUTO: 9.16 10*3/MM3 (ref 3.4–10.8)

## 2025-03-18 PROCEDURE — 99239 HOSP IP/OBS DSCHRG MGMT >30: CPT | Performed by: INTERNAL MEDICINE

## 2025-03-18 PROCEDURE — 94799 UNLISTED PULMONARY SVC/PX: CPT

## 2025-03-18 PROCEDURE — 83735 ASSAY OF MAGNESIUM: CPT | Performed by: INTERNAL MEDICINE

## 2025-03-18 PROCEDURE — 84100 ASSAY OF PHOSPHORUS: CPT | Performed by: INTERNAL MEDICINE

## 2025-03-18 PROCEDURE — 85025 COMPLETE CBC W/AUTO DIFF WBC: CPT | Performed by: INTERNAL MEDICINE

## 2025-03-18 PROCEDURE — 80048 BASIC METABOLIC PNL TOTAL CA: CPT | Performed by: INTERNAL MEDICINE

## 2025-03-18 RX ORDER — BENZONATATE 200 MG/1
200 CAPSULE ORAL 3 TIMES DAILY
Qty: 30 CAPSULE | Refills: 0 | Status: SHIPPED | OUTPATIENT
Start: 2025-03-18 | End: 2025-03-28

## 2025-03-18 RX ORDER — LORAZEPAM 0.5 MG/1
0.5 TABLET ORAL 2 TIMES DAILY PRN
Qty: 4 TABLET | Refills: 0 | Status: SHIPPED | OUTPATIENT
Start: 2025-03-18 | End: 2025-03-21

## 2025-03-18 RX ORDER — HYDROCODONE BITARTRATE AND ACETAMINOPHEN 5; 325 MG/1; MG/1
1 TABLET ORAL EVERY 6 HOURS PRN
Qty: 8 TABLET | Refills: 0 | Status: SHIPPED | OUTPATIENT
Start: 2025-03-18 | End: 2025-03-21

## 2025-03-18 RX ADMIN — HYDROCODONE POLISTIREX AND CHLORPHENIRAMINE POLISTIREX 5 ML: 10; 8 SUSPENSION, EXTENDED RELEASE ORAL at 09:53

## 2025-03-18 RX ADMIN — ALLOPURINOL 100 MG: 100 TABLET ORAL at 09:52

## 2025-03-18 RX ADMIN — FUROSEMIDE 40 MG: 40 TABLET ORAL at 09:52

## 2025-03-18 RX ADMIN — CITALOPRAM HYDROBROMIDE 10 MG: 20 TABLET ORAL at 09:52

## 2025-03-18 RX ADMIN — OXYBUTYNIN CHLORIDE 5 MG: 5 TABLET ORAL at 09:52

## 2025-03-18 RX ADMIN — OXYBUTYNIN CHLORIDE 5 MG: 5 TABLET ORAL at 15:38

## 2025-03-18 RX ADMIN — Medication 10 ML: at 09:58

## 2025-03-18 RX ADMIN — EMPAGLIFLOZIN 10 MG: 10 TABLET, FILM COATED ORAL at 10:38

## 2025-03-18 RX ADMIN — PANTOPRAZOLE SODIUM 40 MG: 40 TABLET, DELAYED RELEASE ORAL at 04:48

## 2025-03-18 RX ADMIN — TAMSULOSIN HYDROCHLORIDE 0.4 MG: 0.4 CAPSULE ORAL at 10:38

## 2025-03-18 RX ADMIN — BUDESONIDE 0.5 MG: 0.5 INHALANT RESPIRATORY (INHALATION) at 07:23

## 2025-03-18 RX ADMIN — IPRATROPIUM BROMIDE AND ALBUTEROL SULFATE 3 ML: .5; 3 SOLUTION RESPIRATORY (INHALATION) at 00:42

## 2025-03-18 RX ADMIN — BENZONATATE 200 MG: 100 CAPSULE ORAL at 15:37

## 2025-03-18 RX ADMIN — Medication 4 ML: at 07:23

## 2025-03-18 RX ADMIN — IPRATROPIUM BROMIDE AND ALBUTEROL SULFATE 3 ML: .5; 3 SOLUTION RESPIRATORY (INHALATION) at 13:08

## 2025-03-18 RX ADMIN — IPRATROPIUM BROMIDE AND ALBUTEROL SULFATE 3 ML: .5; 3 SOLUTION RESPIRATORY (INHALATION) at 07:23

## 2025-03-18 RX ADMIN — BENZONATATE 200 MG: 100 CAPSULE ORAL at 09:53

## 2025-03-18 RX ADMIN — ARFORMOTEROL TARTRATE 15 MCG: 15 SOLUTION RESPIRATORY (INHALATION) at 07:23

## 2025-03-18 RX ADMIN — APIXABAN 2.5 MG: 2.5 TABLET, FILM COATED ORAL at 09:53

## 2025-03-18 NOTE — DISCHARGE SUMMARY
[facility]         HOSPITALIST  DISCHARGE SUMMARY    Patient Name: Javi Martínez  : 1940  MRN: [id number]    Date of Admission: 3/6/2025  Date of Discharge:  3/18/2025  Primary Care Physician: Rani Lopez APRN    Consults       Date and Time Order Name Status Description    3/6/2025  3:13 PM Inpatient Urology Consult      3/6/2025  1:53 PM Inpatient Urology Consult      3/6/2025 10:35 AM Inpatient Hospitalist Consult      3/6/2025 10:26 AM Inpatient Urology Consult              Active and Resolved Hospital Problems:  Obstructive uropathy status post Diamond catheter placement  Hematuria  Influenza A  Hyperkalemia, resolved  Lactic acidosis, resolved  History of prostate cancer status post brachytherapy and external beam XRT  BPH  CKD stage IIIb  COPD  CAD  Hypertension  Heart failure with reduced ejection fraction  Atrial fibrillation on [facility]    Hospital Course     Hospital Course:  Javi Martínez is an 84 y.o. male with medical history of heart failure with reduced ejection fraction, atrial fibrillation on Eliquis, CKD stage IIIb, COPD, CAD, hypertension, history of prostate cancer status post brachytherapy and external beam XRT and BPH.  Patient presents to the emergency department, for the past several days patient has been having issues with difficulty urinating.  Patient is on chronic Lasix for his history of heart failure.  Patient endorses suprapubic pain, frequent dribbling.  Intermittently having large-volume urination.  In the emergency department patient found to have urinary retention on bladder scan.  Nursing staff attempted Diamond placement at bedside, however unable to successfully place.  Urology consulted in the emergency department for placement of Diamond catheter.  Patient able to tolerate dilation and placement of 12 [place] catheter.  Patient tolerated procedure well, clear yellow urine drained easily from bladder.  Of note in the emergency department patient's  initial potassium 5.3, creatinine 2.2.  This appears slightly above patient's baseline of 1.8-1.9.  Repeat BMP shows resolution, potassium of 4.8.  Creatinine down to 2.04.  Patient's proBNP elevated 9000, however down from previous heart failure exacerbations.  Patient states he does not have any dyspnea at this time, trace lower extremity edema.  Patient's lactate returned back at 2.5.  Lactate returned to normal.  Over the weekend patient did not feel stable on his feet, complaining of unsteadiness.  PT/OT consulted and recommended rehab.  The patient did test positive for influenza, did have shortness of breath and cough but never required oxygen.  Completed course of Tamiflu.  Patient had previously had an outpatient procedure planned, urology took the patient to the OR on 3/11 for cystoscopy urethral ostomy with suprapubic catheter insertion.  The patient has a dense bulbar urethral stricture which has been dilated/incised multiple times, they feel eventually he will become SP catheter dependent.  He had postoperative hematuria, Eliquis was held but cleared after CBI.  Eliquis restarted on 3/16 without recurrence of hematuria.  Very weak and fatigued, PT/OT recommending rehab placement.  Patient will be discharged with Diamond catheter and SP catheter, patient will follow-up with urology, Dr. Walker within a couple weeks of getting out of the hospital.  Patient is discharged to rehab today in stable condition.    Day of Discharge     Vital Signs:  Temp:  [97.5 °F (36.4 °C)-98 °F (36.7 °C)] 97.6 °F (36.4 °C)  Heart Rate:  [68-79] 69  Resp:  [17-24] 20  BP: ()/(57-70) 111/60    Physical Exam:   GEN: No acute distress  HEENT: Moist mucous membranes  LUNGS: Equal chest rise bilaterally  CARDIAC: Regular rate and rhythm  NEURO: Moving all 4 extremities spontaneously  SKIN: No obvious breakdown    Discharge Details        Discharge Medications        New Medications        Instructions Start Date   benzonatate  200 MG capsule  Commonly known as: TESSALON   200 mg, Oral, 3 times daily      HYDROcodone-acetaminophen 5-325 MG per tablet  Commonly known as: NORCO   1 tablet, Oral, Every 6 Hours PRN             Continue These Medications        Instructions Start Date   allopurinol 100 MG tablet  Commonly known as: ZYLOPRIM   100 mg, Daily      citalopram 10 MG tablet  Commonly known as: CeleXA   10 mg, Daily      Eliquis 2.5 MG tablet tablet  Generic drug: apixaban   2.5 mg, Every 12 Hours Scheduled      furosemide 40 MG tablet  Commonly known as: LASIX   40 mg, Oral, Daily      Jardiance 10 MG tablet tablet  Generic drug: empagliflozin   10 mg, Oral, Daily      LORazepam 0.5 MG tablet  Commonly known as: ATIVAN   0.5 mg, Oral, 2 Times Daily PRN      omeprazole 20 MG capsule  Commonly known as: priLOSEC   20 mg, Daily      tamsulosin 0.4 MG capsule 24 hr capsule  Commonly known as: FLOMAX   0.4 mg, Oral, Daily      traZODone 50 MG tablet  Commonly known as: DESYREL   50 mg, Nightly PRN             Stop These Medications      potassium chloride 10 MEQ CR capsule  Commonly known as: MICRO-K     valsartan 40 MG tablet  Commonly known as: DIOVAN              No Known Allergies    Discharge Disposition:  Skilled Nursing Facility (DC - External)    Diet:  Hospital:  Diet Order   Procedures   • Diet: Regular/House; Fluid Consistency: Thin (IDDSI 0)       Discharge Activity:   Activity Instructions       Activity as Tolerated              CODE STATUS:  Code Status and Medical Interventions: No CPR (Do Not Attempt to Resuscitate); Limited Support; No intubation (DNI)   Ordered at: 03/06/25 1123     Code Status (Patient has no pulse and is not breathing):    No CPR (Do Not Attempt to Resuscitate)     Medical Interventions (Patient has pulse or is breathing):    Limited Support     Medical Intervention Limits:    No intubation (DNI)       Future Appointments   Date Time Provider Department Center   3/19/2025 12:45 PM [facility]ARD [place]  DEVICE CHECK [facility] [place] [facility]   3/19/2025  1:00 PM Ge Whelan MD [facility] CD [place] [facility]   3/27/2025  3:00 PM Aggie Arreola APRN [facility] GE [place] [facility]   5/19/2025  1:30 PM Mally Bernal MD [facility] U [place] [facility]       Additional Instructions for the Follow-ups that You Need to Schedule       Ambulatory Referral to Sleep Medicine   As directed      Follow-up needed: Yes        Discharge Follow-up with PCP   As directed       Currently Documented PCP:    Rani Lopez APRN    PCP Phone Number:    [phone]     Follow Up Details: In less than one week        Discharge Follow-up with PCP   As directed       Currently Documented PCP:    Rani Lopez APRN    PCP Phone Number:    [phone]     Follow Up Details: 3 to 7 days        Discharge Follow-up with Specified Provider: Dr Bernal, Urology   As directed      To: Dr Bernal, Urology        Discharge Follow-up with Specified Provider: urology   As directed      To: urology                Pertinent  and/or Most Recent Results     IMAGING:  XR Chest 1 View  Result Date: 3/14/2025  XR CHEST 1 VW Date of Exam: 3/14/2025 11:18 AM EDT Indication: Shortness of breath Comparison: 2/25/2025. Findings: The heart is enlarged. The patient has had a previous median sternotomy. Several upper sternal wires are fractured. There is a left-sided transvenous pacemaker in place. There is a small-moderate size right and small left pleural effusion. There are abnormal areas of consolidation in the lung bases, right greater than left side probably due to a combination of atelectasis and airspace disease. The pulmonary vascular markings are mildly increased suggestive of vascular congestion/low-grade pulmonary edema. There is no pneumothorax. There are chronic age-related changes involving the bony thorax and thoracic aorta.     Impression: 1.Cardiomegaly. The pulmonary vascular markings are increased representing vascular congestion/low-grade pulmonary edema 2.Small-moderate size right  and small left pleural effusions. 3.Abnormal areas of consolidation in the lung bases, right greater than left side probably due to a combination of atelectasis and airspace disease. Electronically Signed: Mazin Hairston MD  3/14/2025 11:42 AM EDT  Workstation ID: [id number]    XR Chest 1 View  Result Date: 2/25/2025  XR CHEST 1 VW Date of Exam: 2/25/2025 5:52 PM EST Indication: SOA Triage Protocol Comparison: Chest radiograph 1/27/2025 Findings: Left-sided pacemaker appears in stable position. Mediastinum: Cardiac silhouette appears unchanged including cardiomegaly and postoperative changes. Lungs: Mild prominence of central pulmonary vasculature without overt pulmonary edema. Bibasal patchy opacities. Pleura: Similar small bilateral pleural effusions. Bones and soft tissues: No acute, displaced fracture seen. Median sternotomy     Impression: Similar small bilateral pleural effusions with bibasal opacities likely reflecting atelectasis. Superimposed infection to be excluded clinically. Electronically Signed: Shaq Cee  2/25/2025 6:51 PM EST  Workstation ID: [id number]      LAB RESULTS:      Lab 03/18/25 0537 03/17/25 0401 03/16/25  0419 03/15/25  0438 03/14/25  0448   WBC 9.16 8.64 7.74 8.67 5.57   HEMOGLOBIN 12.7* 12.6* 12.1* 12.7* 12.2*   HEMATOCRIT 40.3 40.3 37.6 38.1 37.4*   PLATELETS 210 180 169 151 143   NEUTROS ABS 6.00 5.98 5.25 6.79 3.33   IMMATURE GRANS (ABS) 0.03 0.04 0.03 0.05 0.02   LYMPHS ABS 1.87 1.44 1.43 1.15 1.36   MONOS ABS 1.09* 1.07* 0.90 0.64 0.71   EOS ABS 0.11 0.09 0.10 0.01 0.11   MCV 94.8 93.3 92.2 94.5 95.2         Lab 03/18/25  0537 03/17/25  0401 03/16/25 0419 03/15/25  0438 03/14/25  0448   SODIUM 137 140 141 139 140   POTASSIUM 4.1 4.6 4.1 5.1 4.1   CHLORIDE 103 102 104 103 106   CO2 23.2 27.9 27.2 23.8 23.8   ANION GAP 10.8 10.1 9.8 12.2 10.2   BUN 32* 34* 32* 30* 28*   CREATININE 1.70* 1.77* 2.01* 1.81* 1.50*   EGFR 39.8* 37.9* 32.5* 36.9* 46.2*   GLUCOSE 97 110* 91 122* 86    CALCIUM 9.2 9.0 8.8 9.0 8.5*   MAGNESIUM 2.2 2.2 2.0 2.1 2.1   PHOSPHORUS 3.2 3.2 3.7 3.8 3.1                         Brief Urine Lab Results  (Last result in the past 365 days)        Color   Clarity   Blood   Leuk Est   Nitrite   Protein   CREAT   Urine HCG        03/06/25 1613 Yellow   Clear   Small (1+)   Small (1+)   Negative   Trace                 Microbiology Results (last 10 days)       Procedure Component Value - Date/Time    Respiratory Panel PCR w/COVID-19(SARS-CoV-2) WAGNER/RENAE/STEF/PAD/COR/NELLIE In-House, NP Swab in UTM/VTM, 2 HR TAT - Swab, Nasopharynx [[id number]]  (Abnormal) Collected: 03/09/25 1619    Lab Status: Final result Specimen: Swab from Nasopharynx Updated: 03/09/25 1804     ADENOVIRUS, PCR Not Detected     Coronavirus 229E Not Detected     Coronavirus HKU1 Not Detected     Coronavirus NL63 Not Detected     Coronavirus OC43 Not Detected     COVID19 Not Detected     Human Metapneumovirus Not Detected     Human Rhinovirus/Enterovirus Not Detected     Influenza A H3 Detected     Influenza B PCR Not Detected     Parainfluenza Virus 1 Not Detected     Parainfluenza Virus 2 Not Detected     Parainfluenza Virus 3 Not Detected     Parainfluenza Virus 4 Not Detected     RSV, PCR Not Detected     Bordetella pertussis pcr Not Detected     Bordetella parapertussis PCR Not Detected     Chlamydophila pneumoniae PCR Not Detected     Mycoplasma pneumo by PCR Not Detected    Narrative:      In the setting of a positive respiratory panel with a viral infection PLUS a negative procalcitonin without other underlying concern for bacterial infection, consider observing off antibiotics or discontinuation of antibiotics and continue supportive care. If the respiratory panel is positive for atypical bacterial infection (Bordetella pertussis, Chlamydophila pneumoniae, or Mycoplasma pneumoniae), consider antibiotic de-escalation to target atypical bacterial infection.              Results for orders placed during the  hospital encounter of 01/18/25    Adult Transthoracic Echo Complete W/ Cont if Necessary Per Protocol    Interpretation Summary  •  Left ventricular ejection fraction appears to be 21 - 25%.  •  The left ventricular cavity is severely dilated.  •  Left ventricular diastolic function was indeterminate.  •  The left atrial cavity is moderate to severely dilated.  •  The right atrial cavity is mildly  dilated.  •  Moderate mitral valve regurgitation is present.  •  Estimated right ventricular systolic pressure from tricuspid regurgitation is moderately elevated (45-55 mmHg).  •  Aortic root measures 4.1 cm.  Ascending aorta measures 4.8 cm.  •  Pleural effusion noted.  •  No significant change from echo done 7/2024.      Time spent on Discharge including face to face service: Greater than 30 minutes      Electronically signed by Carlitos Fuentes MD, 03/18/25, 11:49 AM EDT.

## 2025-03-18 NOTE — PLAN OF CARE
Problem: Comorbidity Management  Goal: Blood Pressure in Desired Range  Outcome: Progressing  Intervention: Maintain Blood Pressure Management  Recent Flowsheet Documentation  Taken 3/17/2025 2012 by Nataly Fernandez RN  Medication Review/Management:   medications reviewed   high-risk medications identified     Problem: Fall Injury Risk  Goal: Absence of Fall and Fall-Related Injury  Outcome: Progressing  Intervention: Identify and Manage Contributors  Recent Flowsheet Documentation  Taken 3/17/2025 2012 by Nataly Fernandez RN  Medication Review/Management:   medications reviewed   high-risk medications identified  Intervention: Promote Injury-Free Environment  Recent Flowsheet Documentation  Taken 3/18/2025 0325 by Nataly Fernandez, RN  Safety Promotion/Fall Prevention:   assistive device/personal items within reach   clutter free environment maintained   fall prevention program maintained   lighting adjusted   nonskid shoes/slippers when out of bed   room organization consistent   safety round/check completed  Taken 3/18/2025 0202 by Nataly Fernandez, RN  Safety Promotion/Fall Prevention:   assistive device/personal items within reach   clutter free environment maintained   fall prevention program maintained   lighting adjusted   nonskid shoes/slippers when out of bed   room organization consistent   safety round/check completed  Taken 3/18/2025 0015 by Nataly Fernandez, RN  Safety Promotion/Fall Prevention:   assistive device/personal items within reach   clutter free environment maintained   fall prevention program maintained   lighting adjusted   nonskid shoes/slippers when out of bed   room organization consistent   safety round/check completed  Taken 3/17/2025 2215 by Nataly Fernandez, RN  Safety Promotion/Fall Prevention:   assistive device/personal items within reach   clutter free environment maintained   fall prevention program maintained   lighting adjusted   nonskid shoes/slippers when out of bed   room  organization consistent   safety round/check completed  Taken 3/17/2025 2012 by Nataly Fernandez, RN  Safety Promotion/Fall Prevention:   assistive device/personal items within reach   clutter free environment maintained   fall prevention program maintained   lighting adjusted   nonskid shoes/slippers when out of bed   room organization consistent   safety round/check completed  Taken 3/17/2025 1912 by Nataly Fernandez, RN  Safety Promotion/Fall Prevention:   assistive device/personal items within reach   clutter free environment maintained   fall prevention program maintained   lighting adjusted   nonskid shoes/slippers when out of bed   room organization consistent   safety round/check completed     Problem: Adult Inpatient Plan of Care  Goal: Plan of Care Review  Outcome: Progressing  Flowsheets (Taken 3/18/2025 0326)  Progress: no change  Outcome Evaluation: Patient is alert and oriented x4. Patient has had no complaints this shift and has no complaints at this time.  Plan of Care Reviewed With: patient  Goal: Patient-Specific Goal (Individualized)  Outcome: Progressing  Goal: Absence of Hospital-Acquired Illness or Injury  Outcome: Progressing  Intervention: Identify and Manage Fall Risk  Recent Flowsheet Documentation  Taken 3/18/2025 0325 by Nataly Fernandez, RN  Safety Promotion/Fall Prevention:   assistive device/personal items within reach   clutter free environment maintained   fall prevention program maintained   lighting adjusted   nonskid shoes/slippers when out of bed   room organization consistent   safety round/check completed  Taken 3/18/2025 0202 by Nataly Fernandez, RN  Safety Promotion/Fall Prevention:   assistive device/personal items within reach   clutter free environment maintained   fall prevention program maintained   lighting adjusted   nonskid shoes/slippers when out of bed   room organization consistent   safety round/check completed  Taken 3/18/2025 0015 by Nataly Fernandez, RN  Safety  Promotion/Fall Prevention:   assistive device/personal items within reach   clutter free environment maintained   fall prevention program maintained   lighting adjusted   nonskid shoes/slippers when out of bed   room organization consistent   safety round/check completed  Taken 3/17/2025 2215 by Nataly Fernandez RN  Safety Promotion/Fall Prevention:   assistive device/personal items within reach   clutter free environment maintained   fall prevention program maintained   lighting adjusted   nonskid shoes/slippers when out of bed   room organization consistent   safety round/check completed  Taken 3/17/2025 2012 by Nataly Fernandez RN  Safety Promotion/Fall Prevention:   assistive device/personal items within reach   clutter free environment maintained   fall prevention program maintained   lighting adjusted   nonskid shoes/slippers when out of bed   room organization consistent   safety round/check completed  Taken 3/17/2025 1912 by Nataly Fernandez RN  Safety Promotion/Fall Prevention:   assistive device/personal items within reach   clutter free environment maintained   fall prevention program maintained   lighting adjusted   nonskid shoes/slippers when out of bed   room organization consistent   safety round/check completed  Intervention: Prevent Infection  Recent Flowsheet Documentation  Taken 3/18/2025 0325 by Nataly Fernandez, RN  Infection Prevention:   cohorting utilized   environmental surveillance performed   equipment surfaces disinfected   hand hygiene promoted   personal protective equipment utilized   rest/sleep promoted   single patient room provided  Taken 3/18/2025 0202 by Nataly Fernandez RN  Infection Prevention:   cohorting utilized   environmental surveillance performed   equipment surfaces disinfected   hand hygiene promoted   personal protective equipment utilized   rest/sleep promoted   single patient room provided  Taken 3/18/2025 0015 by Nataly Fernandez RN  Infection Prevention:   cohorting  utilized   environmental surveillance performed   equipment surfaces disinfected   hand hygiene promoted   personal protective equipment utilized   rest/sleep promoted   single patient room provided  Taken 3/17/2025 2215 by Nataly Fernandez RN  Infection Prevention:   cohorting utilized   environmental surveillance performed   equipment surfaces disinfected   hand hygiene promoted   personal protective equipment utilized   rest/sleep promoted   single patient room provided  Taken 3/17/2025 2012 by Nataly Fernandez RN  Infection Prevention:   cohorting utilized   environmental surveillance performed   equipment surfaces disinfected   hand hygiene promoted   personal protective equipment utilized   rest/sleep promoted   single patient room provided  Taken 3/17/2025 1912 by Nataly Fernandez RN  Infection Prevention:   cohorting utilized   environmental surveillance performed   equipment surfaces disinfected   hand hygiene promoted   personal protective equipment utilized   rest/sleep promoted   single patient room provided  Goal: Optimal Comfort and Wellbeing  Outcome: Progressing  Intervention: Provide Person-Centered Care  Recent Flowsheet Documentation  Taken 3/17/2025 2012 by Nataly Fernandez RN  Trust Relationship/Rapport:   care explained   choices provided   emotional support provided   empathic listening provided   questions answered   questions encouraged   reassurance provided   thoughts/feelings acknowledged  Goal: Readiness for Transition of Care  Outcome: Progressing     Problem: Skin Injury Risk Increased  Goal: Skin Health and Integrity  Outcome: Progressing     Problem: Urinary Retention  Goal: Effective Urinary Elimination  Outcome: Progressing   Goal Outcome Evaluation:  Plan of Care Reviewed With: patient        Progress: no change  Outcome Evaluation: Patient is alert and oriented x4. Patient has had no complaints this shift and has no complaints at this time.

## 2025-03-18 NOTE — SIGNIFICANT NOTE
03/18/25 1221   Physical Therapy Time and Intention   Session Not Performed   (Pt has orders for discharge shortly.)

## 2025-03-18 NOTE — PROGRESS NOTES
[facility]     Progress Note    Patient Name: Javi Martínez  : 1940  MRN: [id number]  Primary Care Physician:  Rani Lopez APRN  Date of admission: 3/6/2025    Subjective   Subjective     Chief Complaint: No complaints today  Urinary Retention    Patient Reports patient is admitted to the hospital with urethral stricture disease.  Dr. Walker placed a suprapubic catheter in him and a Diamond catheter as well.  Currently he is doing well he is on Eliquis but has not had any gross hematuria.    Review of Systems  Negative  Objective   Objective     Vitals:   Temp:  [97.5 °F (36.4 °C)-98.2 °F (36.8 °C)] 97.6 °F (36.4 °C)  Heart Rate:  [67-79] 76  Resp:  [17-24] 20  BP: ()/(57-72) 101/65    Physical Exam   Afebrile vital signs are stable  Awake alert oriented  Urine is clear yellow  Result Review    Result Review:  I have personally reviewed the results from the time of this admission to 3/18/2025 09:54 EDT and agree with these findings:  []  Laboratory list / accordion  [x]  Microbiology  []  Radiology  []  EKG/Telemetry   []  Cardiology/Vascular   []  Pathology  []  Old records  []  Other:  Most notable findings include: Creatinine 1.70 white blood cell count is 9.16      Assessment & Plan   Assessment / Plan     Brief Patient Summary:  Javi Martínez is a 84 y.o. 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  Continue with these catheters he needs to follow-up as outpatient with Dr. Wlaker.  Please call for any questions.    VTE Prophylaxis:  Pharmacologic & mechanical VTE prophylaxis orders are present.        CODE STATUS:    Code Status (Patient has no pulse and is not  breathing): No CPR (Do Not Attempt to Resuscitate)  Medical Interventions (Patient has pulse or is breathing): Limited Support  Medical Intervention Limits: No intubation (DNI)    Disposition:  I expect patient to be discharged home.    Donn Garcia MD

## 2025-03-18 NOTE — PLAN OF CARE
Goal Outcome Evaluation:      VSS. Pt has no c/o of pain or discomfort. Aox4. Pt is d/c to [facility] via personal car with a friend.      Progress: improving

## 2025-03-22 ENCOUNTER — HOSPITAL ENCOUNTER (EMERGENCY)
Facility: HOSPITAL | Age: 85
Discharge: HOME | End: 2025-03-22
Attending: EMERGENCY MEDICINE
Payer: MEDICARE

## 2025-03-22 ENCOUNTER — APPOINTMENT (OUTPATIENT)
Dept: GENERAL RADIOLOGY | Facility: HOSPITAL | Age: 85
End: 2025-03-22
Payer: MEDICARE

## 2025-03-22 VITALS
BODY MASS INDEX: 21.75 KG/M2 | HEART RATE: 70 BPM | TEMPERATURE: 98 F | OXYGEN SATURATION: 95 % | DIASTOLIC BLOOD PRESSURE: 70 MMHG | HEIGHT: 76 IN | RESPIRATION RATE: 23 BRPM | SYSTOLIC BLOOD PRESSURE: 101 MMHG | WEIGHT: 178.57 LBS

## 2025-03-22 DIAGNOSIS — I50.9 CONGESTIVE HEART FAILURE, UNSPECIFIED HF CHRONICITY, UNSPECIFIED HEART FAILURE TYPE: ICD-10-CM

## 2025-03-22 DIAGNOSIS — J90 CHRONIC BILATERAL PLEURAL EFFUSIONS: Primary | ICD-10-CM

## 2025-03-22 LAB
ALBUMIN SERPL-MCNC: 3.3 G/DL (ref 3.5–5.2)
ALBUMIN/GLOB SERPL: 1 G/DL
ALP SERPL-CCNC: 90 U/L (ref 39–117)
ALT SERPL W P-5'-P-CCNC: 7 U/L (ref 1–41)
ANION GAP SERPL CALCULATED.3IONS-SCNC: 15.3 MMOL/L (ref 5–15)
AST SERPL-CCNC: 19 U/L (ref 1–40)
BASOPHILS # BLD AUTO: 0.04 10*3/MM3 (ref 0–0.2)
BASOPHILS NFR BLD AUTO: 0.5 % (ref 0–1.5)
BILIRUB SERPL-MCNC: 1.9 MG/DL (ref 0–1.2)
BUN SERPL-MCNC: 31 MG/DL (ref 8–23)
BUN/CREAT SERPL: 17.3 (ref 7–25)
CALCIUM SPEC-SCNC: 8.9 MG/DL (ref 8.6–10.5)
CHLORIDE SERPL-SCNC: 101 MMOL/L (ref 98–107)
CO2 SERPL-SCNC: 25.7 MMOL/L (ref 22–29)
CREAT SERPL-MCNC: 1.79 MG/DL (ref 0.76–1.27)
DEPRECATED RDW RBC AUTO: 57.1 FL (ref 37–54)
EGFRCR SERPLBLD CKD-EPI 2021: 37.4 ML/MIN/1.73
EOSINOPHIL # BLD AUTO: 0.04 10*3/MM3 (ref 0–0.4)
EOSINOPHIL NFR BLD AUTO: 0.5 % (ref 0.3–6.2)
ERYTHROCYTE [DISTWIDTH] IN BLOOD BY AUTOMATED COUNT: 18.5 % (ref 12.3–15.4)
GEN 5 1HR TROPONIN T REFLEX: 90 NG/L
GLOBULIN UR ELPH-MCNC: 3.4 GM/DL
GLUCOSE SERPL-MCNC: 93 MG/DL (ref 65–99)
HCT VFR BLD AUTO: 43.1 % (ref 37.5–51)
HGB BLD-MCNC: 13.4 G/DL (ref 13–17.7)
HOLD SPECIMEN: NORMAL
HOLD SPECIMEN: NORMAL
IMM GRANULOCYTES # BLD AUTO: 0.02 10*3/MM3 (ref 0–0.05)
IMM GRANULOCYTES NFR BLD AUTO: 0.3 % (ref 0–0.5)
LYMPHOCYTES # BLD AUTO: 1.41 10*3/MM3 (ref 0.7–3.1)
LYMPHOCYTES NFR BLD AUTO: 18.2 % (ref 19.6–45.3)
MCH RBC QN AUTO: 28.6 PG (ref 26.6–33)
MCHC RBC AUTO-ENTMCNC: 31.1 G/DL (ref 31.5–35.7)
MCV RBC AUTO: 92.1 FL (ref 79–97)
MONOCYTES # BLD AUTO: 0.81 10*3/MM3 (ref 0.1–0.9)
MONOCYTES NFR BLD AUTO: 10.5 % (ref 5–12)
NEUTROPHILS NFR BLD AUTO: 5.41 10*3/MM3 (ref 1.7–7)
NEUTROPHILS NFR BLD AUTO: 70 % (ref 42.7–76)
NRBC BLD AUTO-RTO: 0 /100 WBC (ref 0–0.2)
NT-PROBNP SERPL-MCNC: ABNORMAL PG/ML (ref 0–1800)
PLATELET # BLD AUTO: 238 10*3/MM3 (ref 140–450)
PMV BLD AUTO: 10.5 FL (ref 6–12)
POTASSIUM SERPL-SCNC: 3.5 MMOL/L (ref 3.5–5.2)
PROT SERPL-MCNC: 6.7 G/DL (ref 6–8.5)
QT INTERVAL: 598 MS
QTC INTERVAL: 655 MS
RBC # BLD AUTO: 4.68 10*6/MM3 (ref 4.14–5.8)
SODIUM SERPL-SCNC: 142 MMOL/L (ref 136–145)
TROPONIN T % DELTA: 5
TROPONIN T NUMERIC DELTA: 4 NG/L
TROPONIN T SERPL HS-MCNC: 86 NG/L
WBC NRBC COR # BLD AUTO: 7.73 10*3/MM3 (ref 3.4–10.8)
WHOLE BLOOD HOLD COAG: NORMAL
WHOLE BLOOD HOLD SPECIMEN: NORMAL

## 2025-03-22 PROCEDURE — 80053 COMPREHEN METABOLIC PANEL: CPT

## 2025-03-22 PROCEDURE — 84484 ASSAY OF TROPONIN QUANT: CPT

## 2025-03-22 PROCEDURE — 83880 ASSAY OF NATRIURETIC PEPTIDE: CPT

## 2025-03-22 PROCEDURE — 36415 COLL VENOUS BLD VENIPUNCTURE: CPT

## 2025-03-22 PROCEDURE — 85025 COMPLETE CBC W/AUTO DIFF WBC: CPT

## 2025-03-22 PROCEDURE — 71045 X-RAY EXAM CHEST 1 VIEW: CPT

## 2025-03-22 PROCEDURE — 99284 EMERGENCY DEPT VISIT MOD MDM: CPT

## 2025-03-22 PROCEDURE — 93005 ELECTROCARDIOGRAM TRACING: CPT

## 2025-03-22 PROCEDURE — 93005 ELECTROCARDIOGRAM TRACING: CPT | Performed by: EMERGENCY MEDICINE

## 2025-03-22 PROCEDURE — 84484 ASSAY OF TROPONIN QUANT: CPT | Performed by: EMERGENCY MEDICINE

## 2025-03-22 RX ORDER — SODIUM CHLORIDE 0.9 % (FLUSH) 0.9 %
10 SYRINGE (ML) INJECTION AS NEEDED
Status: DISCONTINUED | OUTPATIENT
Start: 2025-03-22 | End: 2025-03-23 | Stop reason: HOSPADM

## 2025-03-22 RX ORDER — BUMETANIDE 0.25 MG/ML
2 INJECTION, SOLUTION INTRAMUSCULAR; INTRAVENOUS ONCE
Status: DISCONTINUED | OUTPATIENT
Start: 2025-03-22 | End: 2025-03-23 | Stop reason: HOSPADM

## 2025-03-22 NOTE — ED NOTES
Pt states he has no shortness of breath and no longer wants to stay at his facility because he does not like his care that he is receiving.

## 2025-03-27 ENCOUNTER — OFFICE VISIT (OUTPATIENT)
Dept: GASTROENTEROLOGY | Facility: CLINIC | Age: 85
End: 2025-03-27
Payer: MEDICARE

## 2025-03-27 VITALS
HEIGHT: 76 IN | HEART RATE: 72 BPM | WEIGHT: 170 LBS | DIASTOLIC BLOOD PRESSURE: 60 MMHG | BODY MASS INDEX: 20.7 KG/M2 | SYSTOLIC BLOOD PRESSURE: 98 MMHG

## 2025-03-27 DIAGNOSIS — K44.9 HIATAL HERNIA: ICD-10-CM

## 2025-03-27 DIAGNOSIS — K22.2 SCHATZKI RING OF DISTAL ESOPHAGUS: ICD-10-CM

## 2025-03-27 DIAGNOSIS — R13.10 DYSPHAGIA, UNSPECIFIED TYPE: Primary | ICD-10-CM

## 2025-03-27 PROCEDURE — 3074F SYST BP LT 130 MM HG: CPT | Performed by: NURSE PRACTITIONER

## 2025-03-27 PROCEDURE — 3078F DIAST BP <80 MM HG: CPT | Performed by: NURSE PRACTITIONER

## 2025-03-27 PROCEDURE — 1159F MED LIST DOCD IN RCRD: CPT | Performed by: NURSE PRACTITIONER

## 2025-03-27 PROCEDURE — 1160F RVW MEDS BY RX/DR IN RCRD: CPT | Performed by: NURSE PRACTITIONER

## 2025-03-27 PROCEDURE — 99214 OFFICE O/P EST MOD 30 MIN: CPT | Performed by: NURSE PRACTITIONER

## 2025-03-27 NOTE — PROGRESS NOTES
Chief Complaint   Difficulty Swallowing    History of Present Illness       Javi Martínez is a 84 y.o. male who presents today accompanied by a caregiver from his facility to [facility] for follow-up for dysphagia.  He is new to me today.    He was seen by Dr. Garcia as a new consult for dysphagia and black stools on 1/20/2025.  He was on Eliquis for history of CHF and A-fib.  He underwent EGD with Dr. Garcia while in the hospital.  EGD did show a Schatzki ring which was dilated and biopsied.  Hiatal hernia.  Gastritis.  Path was negative.  He is happy to report since EGD with dilation he is swallowing a lot better.  He does not want further workup of his swallowing at this time.  Appetite is decreased but he admits that is not related to his swallowing.  GERD is well-controlled on omeprazole.  Bowels moving well currently.  He reports a BM every other day.  Denies any rectal bleeding or melena.    Last colonoscopy was in 2006    GI family history  Results       Result Review :       CMP          3/17/2025    04:01 3/18/2025    05:37 3/22/2025    15:50   CMP   Glucose 110  97  93    BUN 34  32  31    Creatinine 1.77  1.70  1.79    EGFR 37.9  39.8  37.4    Sodium 140  137  142    Potassium 4.6  4.1  3.5    Chloride 102  103  101    Calcium 9.0  9.2  8.9    Total Protein   6.7    Albumin   3.3    Globulin   3.4    Total Bilirubin   1.9    Alkaline Phosphatase   90    AST (SGOT)   19    ALT (SGPT)   7    Albumin/Globulin Ratio   1.0    BUN/Creatinine Ratio 19.2  18.8  17.3    Anion Gap 10.1  10.8  15.3      CBC          3/17/2025    04:01 3/18/2025    05:37 3/22/2025    15:50   CBC   WBC 8.64  9.16  7.73    RBC 4.32  4.25  4.68    Hemoglobin 12.6  12.7  13.4    Hematocrit 40.3  40.3  43.1    MCV 93.3  94.8  92.1    MCH 29.2  29.9  28.6    MCHC 31.3  31.5  31.1    RDW 17.1  18.6  18.5    Platelets 180  210  238      CBC w/diff          3/17/2025    04:01 3/18/2025    05:37  "3/22/2025    15:50   CBC w/Diff   WBC 8.64  9.16  7.73    RBC 4.32  4.25  4.68    Hemoglobin 12.6  12.7  13.4    Hematocrit 40.3  40.3  43.1    MCV 93.3  94.8  92.1    MCH 29.2  29.9  28.6    MCHC 31.3  31.5  31.1    RDW 17.1  18.6  18.5    Platelets 180  210  238    Neutrophil Rel % 69.2  65.5  70.0    Immature Granulocyte Rel % 0.5  0.3  0.3    Lymphocyte Rel % 16.7  20.4  18.2    Monocyte Rel % 12.4  11.9  10.5    Eosinophil Rel % 1.0  1.2  0.5    Basophil Rel % 0.2  0.7  0.5      Lipid Panel          4/26/2024    05:20   Lipid Panel   Total Cholesterol 122    Triglycerides 82    HDL Cholesterol 32    VLDL Cholesterol 16    LDL Cholesterol  74    LDL/HDL Ratio 2.30      TSH          4/24/2024    11:35 2/25/2025    19:04   TSH   TSH 2.080  3.740        Lipase   Lipase   Date Value Ref Range Status   03/06/2025 47 13 - 60 U/L Final     Amylase No results found for: \"AMYLASE\"  Iron Profile No results found for: \"IRON\", \"TIBC\", \"LABIRON\", \"TRANSFERRIN\"  Ferritin No results found for: \"FERRITIN\"  ESR (Sed Rate)   Sed Rate   Date Value Ref Range Status   06/21/2023 18 0 - 20 mm/hr Final     CRP (C-Reactive)   C-Reactive Protein   Date Value Ref Range Status   06/21/2023 0.49 0.00 - 0.50 mg/dL Final     Liver Workup   IgG   Date Value Ref Range Status   06/21/2023 1164 603 - 1613 mg/dL Final     IgA   Date Value Ref Range Status   06/21/2023 271 61 - 437 mg/dL Final     IgM   Date Value Ref Range Status   06/21/2023 52 15 - 143 mg/dL Final     Protime   Date Value Ref Range Status   11/01/2021 11.4 9.4 - 12.0 Seconds Final     INR   Date Value Ref Range Status   11/01/2021 1.05 (L) 2.00 - 3.00 Final               Past Medical History       Past Medical History:   Diagnosis Date    Aneurysm 2013    Arthritis     left knee     Atrial fibrillation, persistent     CHB (complete heart block) 06/07/2023    CHF exacerbation 04/24/2024    Chronic obstructive pulmonary disease 09/01/2023    pt denies having this    Difficulty " "walking 2023    Numbness in right foot    Erectile dysfunction     Essential hypertension 05/08/2019    GERD (gastroesophageal reflux disease)     HFrEF (heart failure with reduced ejection fraction) 08/11/2021    Hyperlipidemia 08/11/2021    Paroxysmal atrial fibrillation     Polyneuropathy 06/19/2023    Prostate cancer 2010    with seeds implant     Septic joint 11/26/2021    Sleep apnea     Urinary incontinence 2021    Urinary tract infection 2022       Past Surgical History:   Procedure Laterality Date    ABLATION OF DYSRHYTHMIC FOCUS  2013    APPENDECTOMY  1947    ARTERIAL BYPASS SURGERY      CARDIAC ABLATION      x2  \"years ago\"     CARDIAC ELECTROPHYSIOLOGY PROCEDURE N/A 11/01/2021    Procedure: PPM battery change;  Surgeon: Ge Whelan MD;  Location: [facility] CATH INVASIVE LOCATION;  Service: Cardiovascular;  Laterality: N/A;    CARDIAC SURGERY  2006    cabg 3v    COLONOSCOPY      CORONARY ARTERY BYPASS GRAFT  2006    CYSTOSCOPY URETHROTOMY VISUAL INTERNAL N/A 08/15/2024    Procedure: CYSTOSCOPY, URETHRAL DILATION, CATHETER PLACEMENT, RETROGRADE URETHROGRAM;  Surgeon: August Myers MD;  Location: [facility] MAIN OR;  Service: Urology;  Laterality: N/A;    CYSTOSCOPY URETHROTOMY VISUAL INTERNAL N/A 10/24/2024    Procedure: CYSTOSCOPY URETHROTOMY;  Surgeon: Mally Bernal MD;  Location: [facility] MAIN OR;  Service: Urology;  Laterality: N/A;    CYSTOSCOPY URETHROTOMY VISUAL INTERNAL N/A 03/11/2025    Procedure: CYSTOSCOPY URETHROTOMY VISUAL INTERNAL;  Surgeon: Mally Bernal MD;  Location: [facility] MAIN OR;  Service: Urology;  Laterality: N/A;    ENDOSCOPY      with dilation     ENDOSCOPY N/A 01/21/2025    Procedure: ESOPHAGOGASTRODUODENOSCOPY WITH BIOPSIES, POLYPECTOMY, BALLOON DILATION 18-20mm;  Surgeon: Simona Garcia MD;  Location: [facility] ENDOSCOPY;  Service: Gastroenterology;  Laterality: N/A;  GASTRIC POLYPS, SCHATZKI'S RING, HIATAL HERNIA    KNEE ARTHROSCOPY Left     PROSTATE SURGERY  2012 "    SUPRAPUBIC TUBE PLACEMENT N/A 03/11/2025    Procedure: SUPRAPUBIC CATHETER INSERTION;  Surgeon: Mally Bernal MD;  Location: [facility] MAIN OR;  Service: Urology;  Laterality: N/A;    TOTAL KNEE ARTHROPLASTY Left 07/23/2021    Procedure: TOTAL KNEE ARTHROPLASTY WITH DORA NAVIGATION WITH BIOMET;  Surgeon: Carlitos Zhao MD;  Location: [facility] MAIN OR;  Service: Orthopedics;  Laterality: Left;    VENTRICULAR CARDIAC PACEMAKER INSERTION      [employer]          Current Outpatient Medications:     allopurinol (ZYLOPRIM) 100 MG tablet, Take 1 tablet by mouth Daily., Disp: , Rfl:     benzonatate (TESSALON) 200 MG capsule, Take 1 capsule by mouth 3 times a day for 10 days., Disp: 30 capsule, Rfl: 0    citalopram (CeleXA) 10 MG tablet, Take 1 tablet by mouth Daily., Disp: , Rfl:     Eliquis 2.5 MG tablet tablet, Take 1 tablet by mouth Every 12 (Twelve) Hours., Disp: , Rfl:     furosemide (LASIX) 40 MG tablet, Take 1 tablet by mouth Daily., Disp: , Rfl:     Jardiance 10 MG tablet tablet, Take 1 tablet by mouth Daily., Disp: , Rfl:     omeprazole (priLOSEC) 20 MG capsule, Take 1 capsule by mouth Daily., Disp: , Rfl:     tamsulosin (FLOMAX) 0.4 MG capsule 24 hr capsule, Take 1 capsule by mouth Daily., Disp: , Rfl:     traZODone (DESYREL) 50 MG tablet, Take 1 tablet by mouth At Night As Needed for Sleep., Disp: , Rfl:      No Known Allergies    Family History   Problem Relation Age of Onset    Heart attack Mother     Heart disease Mother     Heart attack Father     No Known Problems Sister     No Known Problems Brother     No Known Problems Maternal Aunt     No Known Problems Maternal Uncle     No Known Problems Paternal Aunt     No Known Problems Paternal Uncle     No Known Problems Maternal Grandmother     No Known Problems Maternal Grandfather     No Known Problems Paternal Grandmother     No Known Problems Paternal Grandfather     No Known Problems Other         Social History     Social History Narrative    Not on  "file       Objective       Review of Systems   Constitutional:  Positive for appetite change. Negative for fatigue, fever, unexpected weight gain and unexpected weight loss.   HENT:  Negative for trouble swallowing.    Respiratory:  Negative for cough, choking, chest tightness, shortness of breath, wheezing and stridor.    Cardiovascular:  Negative for chest pain, palpitations and leg swelling.   Gastrointestinal:  Negative for abdominal distention, abdominal pain, anal bleeding, blood in stool, constipation, diarrhea, nausea, rectal pain, vomiting, GERD and indigestion.        Vital Signs:   BP 98/60 (BP Location: Left arm, Patient Position: Sitting, Cuff Size: Adult)   Pulse 72   Ht 193 cm (76\")   Wt 77.1 kg (170 lb)   BMI 20.69 kg/m²       Physical Exam  Constitutional:       General: He is not in acute distress.     Appearance: He is well-developed. He is not ill-appearing.   HENT:      Head: Normocephalic.   Eyes:      Pupils: Pupils are equal, round, and reactive to light.   Cardiovascular:      Rate and Rhythm: Normal rate and regular rhythm.      Heart sounds: Normal heart sounds.   Pulmonary:      Effort: Pulmonary effort is normal.      Breath sounds: Normal breath sounds.   Abdominal:      General: Bowel sounds are normal. There is no distension.      Palpations: Abdomen is soft. There is no mass.      Tenderness: There is no abdominal tenderness. There is no guarding or rebound.      Hernia: No hernia is present.   Musculoskeletal:         General: Normal range of motion.      Comments: Wheelchair-bound   Skin:     General: Skin is warm and dry.   Neurological:      Mental Status: He is alert and oriented to person, place, and time.   Psychiatric:         Speech: Speech normal.         Behavior: Behavior normal.         Judgment: Judgment normal.           Assessment & Plan          Assessment and Plan    Diagnoses and all orders for this visit:    1. Dysphagia, unspecified type (Primary)    2. " Hiatal hernia    3. Schatzki ring of distal esophagus      Reviewed EGD results with him today.  Swallowing so much better since EGD with dilation.  Continue omeprazole.  Continue GERD precautions.  Bowels moving well currently.  No longer having any rectal bleeding or black stools.  Patient to call the office with any issues.  Patient to follow-up with me as needed.  Patient is agreeable to the plan.          Follow Up       Follow Up   Return if symptoms worsen or fail to improve, for GERD, DYSPHAGIA.  Patient was given instructions and counseling regarding his condition or for health maintenance advice. Please see specific information pulled into the AVS if appropriate.

## 2025-04-02 LAB
QT INTERVAL: 598 MS
QTC INTERVAL: 655 MS

## 2025-04-15 PROBLEM — R53.1 GENERALIZED WEAKNESS: Status: ACTIVE | Noted: 2025-01-01

## 2025-04-15 NOTE — ED PROVIDER NOTES
"Time: 2:41 PM EDT  Date of encounter:  4/15/2025  Independent Historian/Clinical History and Information was obtained by:   Patient    History is limited by: N/A    Chief Complaint: Weakness      History of Present Illness:  Patient is a 84 y.o. year old male who presents to the emergency department for evaluation of weakness and difficulty breathing over the past 2 days.  The patient reports that he also lives alone has not been able to make food for himself.  Patient denies nausea and vomiting.  Patient denies abdominal pain.  Patient denies chest pain.      Patient Care Team  Primary Care Provider: Rani Lopez APRN    Past Medical History:     No Known Allergies  Past Medical History:   Diagnosis Date    Aneurysm 2013    Arthritis     left knee     Atrial fibrillation, persistent     CHB (complete heart block) 06/07/2023    CHF exacerbation 04/24/2024    Chronic obstructive pulmonary disease 09/01/2023    pt denies having this    Difficulty walking 2023    Numbness in right foot    Erectile dysfunction     Essential hypertension 05/08/2019    GERD (gastroesophageal reflux disease)     HFrEF (heart failure with reduced ejection fraction) 08/11/2021    Hyperlipidemia 08/11/2021    Paroxysmal atrial fibrillation     Polyneuropathy 06/19/2023    Prostate cancer 2010    with seeds implant     Septic joint 11/26/2021    Sleep apnea     Urinary incontinence 2021    Urinary tract infection 2022     Past Surgical History:   Procedure Laterality Date    ABLATION OF DYSRHYTHMIC FOCUS  2013    APPENDECTOMY  1947    ARTERIAL BYPASS SURGERY      CARDIAC ABLATION      x2  \"years ago\"     CARDIAC ELECTROPHYSIOLOGY PROCEDURE N/A 11/01/2021    Procedure: PPM battery change;  Surgeon: Ge Whelan MD;  Location: [facility] INVASIVE LOCATION;  Service: Cardiovascular;  Laterality: N/A;    CARDIAC SURGERY  2006    cabg 3v    COLONOSCOPY      CORONARY ARTERY BYPASS GRAFT  2006    CYSTOSCOPY URETHROTOMY VISUAL INTERNAL N/A " 08/15/2024    Procedure: CYSTOSCOPY, URETHRAL DILATION, CATHETER PLACEMENT, RETROGRADE URETHROGRAM;  Surgeon: August Myers MD;  Location: [facility] MAIN OR;  Service: Urology;  Laterality: N/A;    CYSTOSCOPY URETHROTOMY VISUAL INTERNAL N/A 10/24/2024    Procedure: CYSTOSCOPY URETHROTOMY;  Surgeon: Mally Bernal MD;  Location: [facility] MAIN OR;  Service: Urology;  Laterality: N/A;    CYSTOSCOPY URETHROTOMY VISUAL INTERNAL N/A 03/11/2025    Procedure: CYSTOSCOPY URETHROTOMY VISUAL INTERNAL;  Surgeon: Mally Bernal MD;  Location: [facility] MAIN OR;  Service: Urology;  Laterality: N/A;    ENDOSCOPY      with dilation     ENDOSCOPY N/A 01/21/2025    Procedure: ESOPHAGOGASTRODUODENOSCOPY WITH BIOPSIES, POLYPECTOMY, BALLOON DILATION 18-20mm;  Surgeon: Simona Garcia MD;  Location: [facility] ENDOSCOPY;  Service: Gastroenterology;  Laterality: N/A;  GASTRIC POLYPS, SCHATZKI'S RING, HIATAL HERNIA    KNEE ARTHROSCOPY Left     PROSTATE SURGERY  2012    SUPRAPUBIC TUBE PLACEMENT N/A 03/11/2025    Procedure: SUPRAPUBIC CATHETER INSERTION;  Surgeon: Mally Bernal MD;  Location: [facility] OR;  Service: Urology;  Laterality: N/A;    TOTAL KNEE ARTHROPLASTY Left 07/23/2021    Procedure: TOTAL KNEE ARTHROPLASTY WITH DORA NAVIGATION WITH BIOMET;  Surgeon: Carlitos Zhao MD;  Location: [facility] OR;  Service: Orthopedics;  Laterality: Left;    VENTRICULAR CARDIAC PACEMAKER INSERTION      medtronic      Family History   Problem Relation Age of Onset    Heart attack Mother     Heart disease Mother     Heart attack Father     No Known Problems Sister     No Known Problems Brother     No Known Problems Maternal Aunt     No Known Problems Maternal Uncle     No Known Problems Paternal Aunt     No Known Problems Paternal Uncle     No Known Problems Maternal Grandmother     No Known Problems Maternal Grandfather     No Known Problems Paternal Grandmother     No Known Problems Paternal Grandfather     No Known  Problems Other        Home Medications:  Prior to Admission medications    Medication Sig Start Date End Date Taking? Authorizing Provider   allopurinol (ZYLOPRIM) 100 MG tablet Take 1 tablet by mouth Daily. 3/11/22   Ely Srinivasan MD   citalopram (CeleXA) 10 MG tablet Take 1 tablet by mouth Daily. 25   Ely Srinivasan MD   Eliquis 2.5 MG tablet tablet Take 1 tablet by mouth Every 12 (Twelve) Hours. 9/3/24   Ely Srinivasan MD   furosemide (LASIX) 40 MG tablet Take 1 tablet by mouth Daily. 25   Farhad Gomez DO   Jardiance 10 MG tablet tablet Take 1 tablet by mouth Daily. 25   Farhad Gomez DO   omeprazole (priLOSEC) 20 MG capsule Take 1 capsule by mouth Daily.    Ely Srinivasan MD   tamsulosin (FLOMAX) 0.4 MG capsule 24 hr capsule Take 1 capsule by mouth Daily. 25   Farhad Gomez DO   traZODone (DESYREL) 50 MG tablet Take 1 tablet by mouth At Night As Needed for Sleep.    Ely Srinivasan MD        Social History:   Social History     Tobacco Use    Smoking status: Former     Current packs/day: 0.00     Average packs/day: 0.5 packs/day for 33.6 years (16.8 ttl pk-yrs)     Types: Cigarettes     Start date: 1962     Quit date: 1995     Years since quittin.7     Passive exposure: Never    Smokeless tobacco: Never   Vaping Use    Vaping status: Never Used   Substance Use Topics    Alcohol use: Never    Drug use: Never         Review of Systems:  Review of Systems   Constitutional:  Negative for chills and fever.   HENT:  Negative for congestion, rhinorrhea and sore throat.    Eyes:  Negative for pain and visual disturbance.   Respiratory:  Positive for shortness of breath. Negative for apnea, cough and chest tightness.    Cardiovascular:  Negative for chest pain and palpitations.   Gastrointestinal:  Negative for abdominal pain, diarrhea, nausea and vomiting.   Genitourinary:  Negative for difficulty urinating and dysuria.   Musculoskeletal:  Negative  "for joint swelling and myalgias.   Skin:  Negative for color change.   Neurological:  Negative for seizures and headaches.   Psychiatric/Behavioral: Negative.     All other systems reviewed and are negative.       Physical Exam:  /68 (Patient Position: Lying)   Pulse 71   Temp 98 °F (36.7 °C) (Oral)   Resp 15   Ht 193 cm (76\")   Wt 78.7 kg (173 lb 8 oz)   SpO2 93%   BMI 21.12 kg/m²     Physical Exam  Vitals and nursing note reviewed.   Constitutional:       General: He is not in acute distress.     Appearance: Normal appearance. He is not toxic-appearing.   HENT:      Head: Normocephalic and atraumatic.      Jaw: There is normal jaw occlusion.   Eyes:      General: Lids are normal.      Extraocular Movements: Extraocular movements intact.      Conjunctiva/sclera: Conjunctivae normal.      Pupils: Pupils are equal, round, and reactive to light.   Cardiovascular:      Rate and Rhythm: Normal rate and regular rhythm.      Pulses: Normal pulses.      Heart sounds: Normal heart sounds.   Pulmonary:      Effort: Pulmonary effort is normal. Tachypnea present. No respiratory distress.      Breath sounds: Decreased breath sounds present. No wheezing or rhonchi.   Abdominal:      General: Abdomen is flat.      Palpations: Abdomen is soft.      Tenderness: There is no abdominal tenderness. There is no guarding or rebound.   Musculoskeletal:         General: Normal range of motion.      Cervical back: Normal range of motion and neck supple.      Right lower leg: No edema.      Left lower leg: No edema.   Skin:     General: Skin is warm and dry.   Neurological:      Mental Status: He is alert and oriented to person, place, and time. Mental status is at baseline.   Psychiatric:         Mood and Affect: Mood normal.                    Medical Decision Making:      Comorbidities that affect care:    Congestive heart failure    External Notes reviewed:    Hospital Discharge Summary: Patient was last admitted for " congestive heart failure and urinary retention      The following orders were placed and all results were independently analyzed by me:  Orders Placed This Encounter   Procedures    COVID PRE-OP / PRE-PROCEDURE SCREENING ORDER (NO ISOLATION) - Swab, Nasopharynx    COVID-19, FLU A/B, RSV PCR 1 HR TAT - Swab, Nasopharynx    Blood Culture - Blood,    Blood Culture - Blood,    XR Chest 1 View    [place] Draw    Comprehensive Metabolic Panel    BNP    High Sensitivity Troponin T    CBC Auto Differential    High Sensitivity Troponin T 1Hr    Urinalysis With Microscopic If Indicated (No Culture) - Urine, Clean Catch    Lactic Acid, Plasma    STAT Lactic Acid, Reflex    Urinalysis, Microscopic Only - Urine, Clean Catch    NPO Diet NPO Type: Strict NPO    Undress & Gown    Continuous Pulse Oximetry    Vital Signs    Code Status and Medical Interventions: CPR (Attempt to Resuscitate); Full Support    Inpatient Hospitalist Consult    Oxygen Therapy- Nasal Cannula; Titrate 1-6 LPM Per SpO2; 90 - 95%    ECG 12 Lead ED Triage Standing Order; SOA    Insert Peripheral IV    Inpatient Admission    CBC & Differential    Green Top (Gel)    Lavender Top    Gold Top - SST    Light Blue Top    Extra Tubes    Gray Top       Medications Given in the Emergency Department:  Medications   sodium chloride 0.9 % flush 10 mL (has no administration in time range)   cefTRIAXone (ROCEPHIN) in NS 2 GRAMS/20ml IV PUSH syringe (has no administration in time range)   furosemide (LASIX) injection 60 mg (60 mg Intravenous Given 4/15/25 1458)        ED Course:         Labs:    Lab Results (last 24 hours)       Procedure Component Value Units Date/Time    CBC & Differential [[id number]]  (Abnormal) Collected: 04/15/25 1346    Specimen: Blood Updated: 04/15/25 1355    Narrative:      The following orders were created for panel order CBC & Differential.  Procedure                               Abnormality         Status                     ---------                                -----------         ------                     CBC Auto Differential[[id number]]        Abnormal            Final result                 Please view results for these tests on the individual orders.    Comprehensive Metabolic Panel [[id number]]  (Abnormal) Collected: 04/15/25 1346    Specimen: Blood Updated: 04/15/25 1416     Glucose 108 mg/dL      BUN 30 mg/dL      Creatinine 1.62 mg/dL      Sodium 137 mmol/L      Potassium 4.0 mmol/L      Comment: Slight hemolysis detected by analyzer. Result may be falsely elevated.        Chloride 96 mmol/L      CO2 29.9 mmol/L      Calcium 8.9 mg/dL      Total Protein 6.7 g/dL      Albumin 3.3 g/dL      ALT (SGPT) 8 U/L      AST (SGOT) 16 U/L      Alkaline Phosphatase 100 U/L      Total Bilirubin 1.9 mg/dL      Globulin 3.4 gm/dL      A/G Ratio 1.0 g/dL      BUN/Creatinine Ratio 18.5     Anion Gap 11.1 mmol/L      eGFR 42.1 mL/min/1.73     Narrative:      GFR Categories in Chronic Kidney Disease (CKD)      GFR Category          GFR (mL/min/1.73)    Interpretation  G1                     90 or greater         Normal or high (1)  G2                      60-89                Mild decrease (1)  G3a                   45-59                Mild to moderate decrease  G3b                   30-44                Moderate to severe decrease  G4                    15-29                Severe decrease  G5                    14 or less           Kidney failure          (1)In the absence of evidence of kidney disease, neither GFR category G1 or G2 fulfill the criteria for CKD.    eGFR calculation 2021 CKD-EPI creatinine equation, which does not include race as a factor    BNP [[id number]]  (Abnormal) Collected: 04/15/25 1346    Specimen: Blood Updated: 04/15/25 1414     proBNP 14,794.0 pg/mL     Narrative:      This assay is used as an aid in the diagnosis of individuals suspected of having heart failure. It can be used as an aid in the diagnosis of acute decompensated heart  failure (ADHF) in patients presenting with signs and symptoms of ADHF to the emergency department (ED). In addition, NT-proBNP of <300 pg/mL indicates ADHF is not likely.    Age Range Result Interpretation  NT-proBNP Concentration (pg/mL:      <50             Positive            >450                   Gray                 300-450                    Negative             <300    50-75           Positive            >900                  Gray                300-900                  Negative            <300      >75             Positive            >1800                  Gray                300-1800                  Negative            <300    High Sensitivity Troponin T [[id number]]  (Abnormal) Collected: 04/15/25 1346    Specimen: Blood Updated: 04/15/25 1429     HS Troponin T 63 ng/L     Narrative:      High Sensitive Troponin T Reference Range:  <14.0 ng/L- Negative Female for AMI  <22.0 ng/L- Negative Male for AMI  >=14 - Abnormal Female indicating possible myocardial injury.  >=22 - Abnormal Male indicating possible myocardial injury.   Clinicians would have to utilize clinical acumen, EKG, Troponin, and serial changes to determine if it is an Acute Myocardial Infarction or myocardial injury due to an underlying chronic condition.         CBC Auto Differential [[id number]]  (Abnormal) Collected: 04/15/25 1346    Specimen: Blood Updated: 04/15/25 1355     WBC 10.12 10*3/mm3      RBC 4.72 10*6/mm3      Hemoglobin 13.8 g/dL      Hematocrit 43.5 %      MCV 92.2 fL      MCH 29.2 pg      MCHC 31.7 g/dL      RDW 18.2 %      RDW-SD 60.1 fl      MPV 9.9 fL      Platelets 219 10*3/mm3      Neutrophil % 77.3 %      Lymphocyte % 12.9 %      Monocyte % 8.5 %      Eosinophil % 0.5 %      Basophil % 0.5 %      Immature Grans % 0.3 %      Neutrophils, Absolute 7.82 10*3/mm3      Lymphocytes, Absolute 1.31 10*3/mm3      Monocytes, Absolute 0.86 10*3/mm3      Eosinophils, Absolute 0.05 10*3/mm3      Basophils, Absolute 0.05 10*3/mm3       Immature Grans, Absolute 0.03 10*3/mm3      nRBC 0.0 /100 WBC     Lactic Acid, Plasma [[id number]]  (Abnormal) Collected: 04/15/25 1346    Specimen: Blood Updated: 04/15/25 1503     Lactate 2.3 mmol/L     Urinalysis With Microscopic If Indicated (No Culture) - Urine, Clean Catch [[id number]]  (Abnormal) Collected: 04/15/25 1823    Specimen: Urine, Clean Catch Updated: 04/15/25 1837     Color, UA Yellow     Appearance, UA Clear     pH, UA 8.0     Specific Gravity, UA 1.007     Glucose,  mg/dL (Trace)     Ketones, UA Negative     Bilirubin, UA Negative     Blood, UA Trace     Protein, UA Negative     Leuk Esterase, UA Large (3+)     Nitrite, UA Negative     Urobilinogen, UA 1.0 E.U./dL    Urinalysis, Microscopic Only - Urine, Clean Catch [[id number]]  (Abnormal) Collected: 04/15/25 1823    Specimen: Urine, Clean Catch Updated: 04/15/25 1837     RBC, UA 6-10 /HPF      WBC, UA Too Numerous to Count /HPF      Bacteria, UA 4+ /HPF      Squamous Epithelial Cells, UA None Seen /HPF      Hyaline Casts, UA None Seen /LPF      Methodology Automated Microscopy    COVID PRE-OP / PRE-PROCEDURE SCREENING ORDER (NO ISOLATION) - Swab, Nasopharynx [[id number]]  (Normal) Collected: 04/15/25 1917    Specimen: Swab from Nasopharynx Updated: 04/15/25 2006    Narrative:      The following orders were created for panel order COVID PRE-OP / PRE-PROCEDURE SCREENING ORDER (NO ISOLATION) - Swab, Nasopharynx.  Procedure                               Abnormality         Status                     ---------                               -----------         ------                     COVID-19, FLU A/B, RSV P...[[id number]]  Normal              Final result                 Please view results for these tests on the individual orders.    COVID-19, FLU A/B, RSV PCR 1 HR TAT - Swab, Nasopharynx [[id number]]  (Normal) Collected: 04/15/25 1917    Specimen: Swab from Nasopharynx Updated: 04/15/25 2006     COVID19 Not Detected     Influenza A PCR Not  Detected     Influenza B PCR Not Detected     RSV, PCR Not Detected    Narrative:      Fact sheet for providers: https://www.fda.gov/media/887159/download    Fact sheet for patients: https://www.fda.gov/media/296742/download    Test performed by PCR.    High Sensitivity Troponin T 1Hr [[id number]]  (Abnormal) Collected: 04/15/25 1920    Specimen: Blood from Arm, Right Updated: 04/15/25 2004     HS Troponin T 65 ng/L      Troponin T Numeric Delta 2 ng/L      Troponin T % Delta 3    Narrative:      High Sensitive Troponin T Reference Range:  <14.0 ng/L- Negative Female for AMI  <22.0 ng/L- Negative Male for AMI  >=14 - Abnormal Female indicating possible myocardial injury.  >=22 - Abnormal Male indicating possible myocardial injury.   Clinicians would have to utilize clinical acumen, EKG, Troponin, and serial changes to determine if it is an Acute Myocardial Infarction or myocardial injury due to an underlying chronic condition.         STAT Lactic Acid, Reflex [[id number]]  (Normal) Collected: 04/15/25 1920    Specimen: Blood from Arm, Right Updated: 04/15/25 1957     Lactate 1.7 mmol/L     Blood Culture - Blood, Arm, Left [[id number]] Collected: 04/15/25 2020    Specimen: Blood from Arm, Left Updated: 04/15/25 2023    Blood Culture - Blood, Arm, Right [[id number]] Collected: 04/15/25 2020    Specimen: Blood from Arm, Right Updated: 04/15/25 2023             Imaging:    XR Chest 1 View  Result Date: 4/15/2025  XR CHEST 1 VW Date of Exam: 4/15/2025 2:02 PM EDT Indication: SOA Triage Protocol Comparison: AP chest radiograph 3/22/2025. Findings: Stable moderate cardiomegaly. Small bilateral pleural effusions and probable bibasilar atelectasis, stable to slightly improved.. Fine interstitial thickening is present in both lungs, with improved aeration since 3/22/2025. No pneumothorax. Left chest wall pacemaker and leads appear similarly positioned     Impression: 1. Features of interstitial pulmonary edema appear improved  since 3/2/2025. Stable cardiac enlargement. 2. Small bilateral pleural effusions and probable passive bibasilar atelectasis, stable to slightly improved Electronically Signed: Poonam Pisano MD  4/15/2025 2:15 PM EDT  Workstation ID: [id number]        Differential Diagnosis and Discussion:    Weakness: Based on the patient's history, signs, and symptoms, the diffential diagnosis includes but is not limited to meningitis, stroke, sepsis, subarachnoid hemorrhage, intracranial bleeding, encephalitis, acute uti, dehydration, MS, myasthenia gravis, Gu[place], migraine variant, neuromuscular disorders vertigo, electrolyte imbalance, and metabolic disorders.    PROCEDURES:    Labs were collected in the emergency department and all labs were reviewed and interpreted by me.  X-ray were performed in the emergency department and all X-ray impressions were independently interpreted by me.  An EKG was performed and the EKG was interpreted by me.    ECG 12 Lead ED Triage Standing Order; SOA   Preliminary Result   HEART QGUI=229  bpm   RR Yagzpwqq=267  ms   MD Interval=  ms   P Horizontal Axis=  deg   P Front Axis=  deg   QRSD Wqsekheu=323  ms   QT Anxxncvp=744  ms   CJmJ=927  ms   QRS Axis=-71  deg   T Wave Axis=114  deg   - ABNORMAL ECG -   Atrial fibrillation   Ventricular bigeminy   Nonspecific IVCD with LAD   LVH with secondary repolarization abnormality   ST elevation secondary to LVH   Prolonged QT interval   Date and Time of Study:2025-04-15 13:44:19          Procedures    MDM     Amount and/or Complexity of Data Reviewed  Decide to obtain previous medical records or to obtain history from someone other than the patient: yes       The patient´s CBC that was reviewed and interpreted by me shows no abnormalities of critical concern. Of note, there is no anemia requiring a blood transfusion and the platelet count is acceptable.  CMP shows a BUN of 30 and creatinine of 1.62.  Troponin is 63.  Urinalysis shows 4+ bacteriuria.   BNP is elevated.  Patient was given Lasix in the emergency department.  Rocephin was ordered for his urinary tract infection.                Patient Care Considerations:    CT ABDOMEN AND PELVIS: I considered ordering a CT scan of the abdomen and pelvis however abdomen is soft and nontender.      Consultants/Shared Management Plan:    Case was discussed with Dr. Schuler who agrees with admission.    Social Determinants of Health:    Patient is unable to carry out activities of daily life. Escalation of care is necessary.       Disposition and Care Coordination:    Admit:   Through independent evaluation of the patient's history, physical, and imperical data, the patient meets criteria for inpatient admission to the hospital.        Final diagnoses:   Generalized weakness        ED Disposition       ED Disposition   Decision to Admit    Condition   --    Comment   Level of Care: Remote Telemetry [26]   Diagnosis: Generalized weakness [188321]   Admitting Physician: RAVI SCHULER [[id number]]   Certification: I Certify That Inpatient Hospital Services Are Medically Necessary For Greater Than 2 Midnights                 This medical record created using voice recognition software.             Ashley Hudson MD  04/15/25 2025

## 2025-04-16 NOTE — PLAN OF CARE
Goal Outcome Evaluation:  Plan of Care Reviewed With: patient        Progress: declining  Outcome Evaluation: Pt aox4 with periods of confusion. Pt admitted tonight, acclimated to floor. Wound care completed. Pt seems to be resting comfortably throughout the night. Will continue to monitor.

## 2025-04-16 NOTE — THERAPY EVALUATION
Patient Name: Javi Martínez  : 1940    MRN: [id number]                              Today's Date: 2025       Admit Date: 4/15/2025    Visit Dx:     ICD-10-CM ICD-9-CM   1. Generalized weakness  R53.1 780.79     Patient Active Problem List   Diagnosis    Primary osteoarthritis of left knee    S/P TKR (total knee replacement)    Essential hypertension    CAD with CABG    Hyperlipidemia    Presence of cardiac pacemaker single chamber    Anxiety    Gastroesophageal reflux disease    Gout    History of malignant neoplasm of prostate    Testicular hypofunction    Polyneuropathy    Chronic obstructive pulmonary disease    Benign prostatic hyperplasia without lower urinary tract symptoms    Atrial fibrillation, chronic    Stage 3b chronic kidney disease    Postprocedural membranous urethral stricture    Acute on chronic HFrEF (heart failure with reduced ejection fraction)    Suprapubic catheter dysfunction    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    Urethral stricture in male    Lyme disease    Acute exacerbation of CHF (congestive heart failure)    Other dysphagia    CHF (congestive heart failure)    Urinary retention    Influenza A    Generalized weakness     Past Medical History:   Diagnosis Date    Aneurysm     Arthritis     left knee     Atrial fibrillation, persistent     CHB (complete heart block) 2023    CHF exacerbation 2024    Chronic obstructive pulmonary disease 2023    pt denies having this    Difficulty walking     Numbness in right foot    Erectile dysfunction     Essential hypertension 2019    GERD (gastroesophageal reflux disease)     HFrEF (heart failure with reduced ejection fraction) 2021    Hyperlipidemia 2021    Paroxysmal atrial fibrillation     Polyneuropathy 2023    Prostate cancer 2010    with seeds implant     Septic joint 2021    Sleep apnea     Urinary incontinence     Urinary tract infection      Past Surgical History:  "  Procedure Laterality Date    ABLATION OF DYSRHYTHMIC FOCUS  2013    APPENDECTOMY  1947    ARTERIAL BYPASS SURGERY      CARDIAC ABLATION      x2  \"years ago\"     CARDIAC ELECTROPHYSIOLOGY PROCEDURE N/A 11/01/2021    Procedure: PPM battery change;  Surgeon: Ge Whelan MD;  Location: [facility] CATH INVASIVE LOCATION;  Service: Cardiovascular;  Laterality: N/A;    CARDIAC SURGERY  2006    cabg 3v    COLONOSCOPY      CORONARY ARTERY BYPASS GRAFT  2006    CYSTOSCOPY URETHROTOMY VISUAL INTERNAL N/A 08/15/2024    Procedure: CYSTOSCOPY, URETHRAL DILATION, CATHETER PLACEMENT, RETROGRADE URETHROGRAM;  Surgeon: August Myers MD;  Location: [facility] MAIN OR;  Service: Urology;  Laterality: N/A;    CYSTOSCOPY URETHROTOMY VISUAL INTERNAL N/A 10/24/2024    Procedure: CYSTOSCOPY URETHROTOMY;  Surgeon: Mally Bernal MD;  Location: [facility] MAIN OR;  Service: Urology;  Laterality: N/A;    CYSTOSCOPY URETHROTOMY VISUAL INTERNAL N/A 03/11/2025    Procedure: CYSTOSCOPY URETHROTOMY VISUAL INTERNAL;  Surgeon: Mally Bernal MD;  Location: [facility] MAIN OR;  Service: Urology;  Laterality: N/A;    ENDOSCOPY      with dilation     ENDOSCOPY N/A 01/21/2025    Procedure: ESOPHAGOGASTRODUODENOSCOPY WITH BIOPSIES, POLYPECTOMY, BALLOON DILATION 18-20mm;  Surgeon: Simona Garcia MD;  Location: [facility] ENDOSCOPY;  Service: Gastroenterology;  Laterality: N/A;  GASTRIC POLYPS, SCHATZKI'S RING, HIATAL HERNIA    KNEE ARTHROSCOPY Left     PROSTATE SURGERY  2012    SUPRAPUBIC TUBE PLACEMENT N/A 03/11/2025    Procedure: SUPRAPUBIC CATHETER INSERTION;  Surgeon: Mally Bernal MD;  Location: [facility] MAIN OR;  Service: Urology;  Laterality: N/A;    TOTAL KNEE ARTHROPLASTY Left 07/23/2021    Procedure: TOTAL KNEE ARTHROPLASTY WITH DORA NAVIGATION WITH BIOMET;  Surgeon: Carlitos Zhao MD;  Location: [facility] MAIN OR;  Service: Orthopedics;  Laterality: Left;    VENTRICULAR CARDIAC PACEMAKER INSERTION      medtronic       General " Information       Row Name 04/16/25 0929          OT Time and Intention    Document Type evaluation  -PG     Mode of Treatment individual therapy;occupational therapy  -PG       Row Name 04/16/25 0929          General Information    Patient Profile Reviewed yes  Reports living alone.  Uses cane and walker at home and independent with all self-care/transfers.  Has been receiving home health services.  Drives occasionally.  Food is delivered  -PG     Prior Level of Function independent:;transfer;ADL's  -PG     Existing Precautions/Restrictions fall  -PG     Barriers to Rehab none identified  -PG       Row Name 04/16/25 0929          Occupational Profile    Reason for Services/Referral (Occupational Profile) Patient is a 84-year-old male admitted for generalized weakness, UTI and CHF.  Patient is being evaluated by Occupational Therapy due to recent decline in ADL function.  No previous OT services identified  -PG       Row Name 04/16/25 0929          Living Environment    Current Living Arrangements home  -PG       Row Name 04/16/25 0929          Cognition    Orientation Status (Cognition) oriented x 3  -PG       Row Name 04/16/25 0929          Safety Issues/Impairments Affecting Functional Mobility    Impairments Affecting Function (Mobility) balance;endurance/activity tolerance;strength  -PG               User Key  (r) = Recorded By, (t) = Taken By, (c) = Cosigned By      Initials Name Provider Type    PG Martir Mesa, OT Occupational Therapist                     Mobility/ADL's       Row Name 04/16/25 0931          Bed Mobility    Bed Mobility supine-sit  -PG     Supine-Sit [place] (Bed Mobility) minimum assist (75% patient effort)  -PG       Row Name 04/16/25 0931          Transfers    Transfers sit-stand transfer;stand-sit transfer  -PG       Row Name 04/16/25 0931          Sit-Stand Transfer    Sit-Stand [place] (Transfers) contact guard;verbal cues  -PG     Assistive Device (Sit-Stand Transfers)  walker, standard  -PG       Row Name 04/16/25 0931          Stand-Sit Transfer    Stand-Sit [place] (Transfers) contact guard;verbal cues  -PG     Assistive Device (Stand-Sit Transfers) walker, standard  -PG       Row Name 04/16/25 0931          Activities of Daily Living    BADL Assessment/Intervention bathing;upper body dressing;lower body dressing;grooming;toileting  -PG       Row Name 04/16/25 0931          Bathing Assessment/Intervention    [place] Level (Bathing) bathing skills;minimum assist (75% patient effort)  -PG       Row Name 04/16/25 0931          Upper Body Dressing Assessment/Training    [place] Level (Upper Body Dressing) upper body dressing skills;set up  -PG       Row Name 04/16/25 0931          Lower Body Dressing Assessment/Training    [place] Level (Lower Body Dressing) lower body dressing skills;moderate assist (50% patient effort)  -PG       Row Name 04/16/25 0931          Grooming Assessment/Training    [place] Level (Grooming) grooming skills;set up  -PG       Row Name 04/16/25 0931          Toileting Assessment/Training    [place] Level (Toileting) toileting skills;minimum assist (75% patient effort)  -PG               User Key  (r) = Recorded By, (t) = Taken By, (c) = Cosigned By      Initials Name Provider Type    PG Martir Mesa OT Occupational Therapist                   Obj/Interventions       Row Name 04/16/25 0932          Sensory Assessment (Somatosensory)    Sensory Assessment (Somatosensory) sensation intact  -[facility] Name 04/16/25 0932          Vision Assessment/Intervention    Visual Impairment/Limitations WFL  -[facility] Name 04/16/25 0932          Range of Motion Comprehensive    General Range of Motion no range of motion deficits identified  -       Row Name 04/16/25 0932          Strength Comprehensive (MMT)    Comment, General Manual Muscle Testing (MMT) Assessment 4/5  -[facility] Name 04/16/25 0932          Motor Skills     Motor Skills coordination;functional endurance  -PG     Coordination WFL  -PG     Functional Endurance fair minus  -PG               User Key  (r) = Recorded By, (t) = Taken By, (c) = Cosigned By      Initials Name Provider Type    PG Martir Mesa OT Occupational Therapist                   Goals/Plan       Row Name 04/16/25 0933          Transfer Goal 1 (OT)    Activity/Assistive Device (Transfer Goal 1, OT) transfers, all  -PG     [place] Level/Cues Needed (Transfer Goal 1, OT) modified independence  -PG     Time Frame (Transfer Goal 1, OT) long term goal (LTG);10 days  -PG       Row Name 04/16/25 0933          Bathing Goal 1 (OT)    Activity/Device (Bathing Goal 1, OT) bathing skills, all  -PG     [place] Level/Cues Needed (Bathing Goal 1, OT) modified independence  -PG     Time Frame (Bathing Goal 1, OT) long term goal (LTG);10 days  -PG       Row Name 04/16/25 0933          Dressing Goal 1 (OT)    Activity/Device (Dressing Goal 1, OT) dressing skills, all  -PG     [place]/Cues Needed (Dressing Goal 1, OT) modified independence  -PG     Time Frame (Dressing Goal 1, OT) long term goal (LTG);10 days  -PG       Row Name 04/16/25 0933          Toileting Goal 1 (OT)    Activity/Device (Toileting Goal 1, OT) toileting skills, all  -PG     [place] Level/Cues Needed (Toileting Goal 1, OT) modified independence  -PG     Time Frame (Toileting Goal 1, OT) long term goal (LTG);10 days  -PG       Row Name 04/16/25 0933          Grooming Goal 1 (OT)    Activity/Device (Grooming Goal 1, OT) grooming skills, all  -PG     [place] (Grooming Goal 1, OT) modified independence  -PG     Time Frame (Grooming Goal 1, OT) long term goal (LTG);10 days  -PG       Row Name 04/16/25 0933          Problem Specific Goal 1 (OT)    Problem Specific Goal 1 (OT) Patient will improve activity tolerance to good minus to support independence with self-care activities  -PG     Time Frame (Problem Specific Goal 1, OT) long  term goal (LTG);10 days  -PG       Row Name 04/16/25 0933          Therapy Assessment/Plan (OT)    Planned Therapy Interventions (OT) activity tolerance training;BADL retraining;strengthening exercise;transfer/mobility retraining;patient/caregiver education/training;occupation/activity based interventions  -PG               User Key  (r) = Recorded By, (t) = Taken By, (c) = Cosigned By      Initials Name Provider Type    PG Martir Mesa OT Occupational Therapist                   Clinical Impression       Row Name 04/16/25 0933          Pain Assessment    Pretreatment Pain Rating 0/10 - no pain  -PG     Posttreatment Pain Rating 0/10 - no pain  -PG       Row Name 04/16/25 0933          Plan of Care Review    Plan of Care Reviewed With patient  -PG     Progress no change  -PG     Outcome Evaluation Patient presents with limitations affecting strength, activity tolerance, and balance impacting patient's ability to return home safely and independently.  The skills of a therapist will be required to safely and effectively implement the following treatment plan to restore maximal level of function  -PG       Row Name 04/16/25 0933          Therapy Assessment/Plan (OT)    Patient/Family Therapy Goal Statement (OT) Get stronger and return home independently  -PG     Rehab Potential (OT) good  -PG     Criteria for Skilled Therapeutic Interventions Met (OT) yes;meets criteria;skilled treatment is necessary  -PG     Therapy Frequency (OT) 5 times/wk  -PG       Row Name 04/16/25 0933          Therapy Plan Review/Discharge Plan (OT)    Anticipated Discharge Disposition (OT) skilled nursing facility  -PG               User Key  (r) = Recorded By, (t) = Taken By, (c) = Cosigned By      Initials Name Provider Type    PG Martir Mesa OT Occupational Therapist                   Outcome Measures       Row Name 04/16/25 0936          How much help from another is currently needed...    Putting on and taking off regular lower body  clothing? 2  -PG     Bathing (including washing, rinsing, and drying) 2  -PG     Toileting (which includes using toilet bed pan or urinal) 3  -PG     Putting on and taking off regular upper body clothing 4  -PG     Taking care of personal grooming (such as brushing teeth) 4  -PG     Eating meals 4  -PG     AM-PAC 6 Clicks Score (OT) 19  -PG       Row Name 04/15/25 2226          How much help from another person do you currently need...    Turning from your back to your side while in flat bed without using bedrails? 3  -HD     Moving from lying on back to sitting on the side of a flat bed without bedrails? 3  -HD     Moving to and from a bed to a chair (including a wheelchair)? 3  -HD     Standing up from a chair using your arms (e.g., wheelchair, bedside chair)? 2  -HD     Climbing 3-5 steps with a railing? 2  -HD     To walk in hospital room? 2  -HD     AM-PAC 6 Clicks Score (PT) 15  -HD       Row Name 04/16/25 0936          Functional Assessment    Outcome Measure Options Optimal Instrument;AM-PAC 6 Clicks Daily Activity (OT)  -PG       Row Name 04/16/25 0936          Optimal Instrument    Optimal Instrument Optimal - 3  -PG     Bending/Stooping 3  -PG     Standing 2  -PG     Reaching 1  -PG     From the list, choose the 3 activities you would most like to be able to do without any difficulty Bending/stooping;Standing;Reaching  -PG     Total Score Optimal - 3 6  -PG               User Key  (r) = Recorded By, (t) = Taken By, (c) = Cosigned By      Initials Name Provider Type    PG Martir Mesa OT Occupational Therapist    Craolyn Mariscal, RN Registered Nurse                    Occupational Therapy Education       Title: PT OT SLP Therapies (Done)       Topic: Occupational Therapy (Done)       Point: ADL training (Done)       Learning Progress Summary            Patient Acceptance, E,D, DU by PG at 4/16/2025 0936                      Point: Home exercise program (Done)       Learning Progress Summary             Patient Acceptance, E,D, DU by PG at 4/16/2025 0936                      Point: Precautions (Done)       Learning Progress Summary            Patient Acceptance, E,D, DU by PG at 4/16/2025 0936                      Point: Body mechanics (Done)       Learning Progress Summary            Patient Acceptance, E,D, DU by PG at 4/16/2025 0936                                      User Key       Initials Effective Dates Name Provider Type Discipline     06/16/21 -  Martir Mesa, OT Occupational Therapist OT                  OT Recommendation and Plan  Planned Therapy Interventions (OT): activity tolerance training, BADL retraining, strengthening exercise, transfer/mobility retraining, patient/caregiver education/training, occupation/activity based interventions  Therapy Frequency (OT): 5 times/wk  Plan of Care Review  Plan of Care Reviewed With: patient  Progress: no change  Outcome Evaluation: Patient presents with limitations affecting strength, activity tolerance, and balance impacting patient's ability to return home safely and independently.  The skills of a therapist will be required to safely and effectively implement the following treatment plan to restore maximal level of function     Time Calculation:   Evaluation Complexity (OT)  Review Occupational Profile/Medical/Therapy History Complexity: brief/low complexity  Assessment, Occupational Performance/Identification of Deficit Complexity: 3-5 performance deficits  Clinical Decision Making Complexity (OT): problem focused assessment/low complexity  Overall Complexity of Evaluation (OT): low complexity     Time Calculation- OT       Row Name 04/16/25 0937             Time Calculation- OT    OT Received On 04/16/25  -PG      OT Goal Re-Cert Due Date 04/25/25  -PG         Untimed Charges    OT Eval/Re-eval Minutes 35  -PG         Total Minutes    Untimed Charges Total Minutes 35  -PG       Total Minutes 35  -PG                User Key  (r) = Recorded By, (t) =  Taken By, (c) = Cosigned By      Initials Name Provider Type    PG Martir Mesa, DARREN Occupational Therapist                  Therapy Charges for Today       Code Description Service Date Service Provider Modifiers Qty    [id number] HC OT EVAL LOW COMPLEXITY 3 4/16/2025 Martir Mesa OT GO 1                 Martir Mesa OT  4/16/2025

## 2025-04-16 NOTE — H&P
[facility]   HISTORY AND PHYSICAL    Patient Name: Javi Martínez  : 1940  MRN: [id number]  Primary Care Physician:  Rani Lopez APRN  Date of admission: 4/15/2025    Subjective   Subjective     Chief Complaint: Generalized weakness    HPI:    Javi Martínez is a 84 y.o. male with past medical history of hypertension, CHF, A-fib on Eliquis, arthritis, and GERD presented to the ED for evaluation of generalized weakness and fall.  Patient states that for the last week or so he has not been feeling well with increased weakness, dysuria, lethargy, decreased p.o. intake, and multiple falls.  Patient does live alone and had been unable to perform his ADLs so he was brought to the ED for further evaluation.  In the ED patient was tachypneic on arrival with remaining vitals being within normal limits on room air.  His UA was positive for a UTI with labs showing an elevated proBNP, reduced renal function, elevated lactic acid, and elevated but flat troponin.  Remaining labs are relatively unremarkable including a normal WBC and negative COVID flu RSV.  Chest x-ray showed pulmonary edema although improved from prior imaging.  When seen patient was resting comfortably and when asked he denied any recent fevers, chills, headaches, focal weakness, chest pain, palpitation, shortness of breath, cough, abdominal pain, nausea, vomiting, diarrhea, constipation, hematuria, hematochezia, melena, or anxiety.   Patient admitted for further evaluation and treatment.    Review of Systems   All systems were reviewed and negative except for: As per HPI    Personal History     Past Medical History:   Diagnosis Date    Aneurysm     Arthritis     left knee     Atrial fibrillation, persistent     CHB (complete heart block) 2023    CHF exacerbation 2024    Chronic obstructive pulmonary disease 2023    pt denies having this    Difficulty walking     Numbness in right foot    Erectile dysfunction      "Essential hypertension 05/08/2019    GERD (gastroesophageal reflux disease)     HFrEF (heart failure with reduced ejection fraction) 08/11/2021    Hyperlipidemia 08/11/2021    Paroxysmal atrial fibrillation     Polyneuropathy 06/19/2023    Prostate cancer 2010    with seeds implant     Septic joint 11/26/2021    Sleep apnea     Urinary incontinence 2021    Urinary tract infection 2022       Past Surgical History:   Procedure Laterality Date    ABLATION OF DYSRHYTHMIC FOCUS  2013    APPENDECTOMY  1947    ARTERIAL BYPASS SURGERY      CARDIAC ABLATION      x2  \"years ago\"     CARDIAC ELECTROPHYSIOLOGY PROCEDURE N/A 11/01/2021    Procedure: PPM battery change;  Surgeon: Ge Whelan MD;  Location: [facility] CATH INVASIVE LOCATION;  Service: Cardiovascular;  Laterality: N/A;    CARDIAC SURGERY  2006    cabg 3v    COLONOSCOPY      CORONARY ARTERY BYPASS GRAFT  2006    CYSTOSCOPY URETHROTOMY VISUAL INTERNAL N/A 08/15/2024    Procedure: CYSTOSCOPY, URETHRAL DILATION, CATHETER PLACEMENT, RETROGRADE URETHROGRAM;  Surgeon: August Myers MD;  Location: [facility] MAIN OR;  Service: Urology;  Laterality: N/A;    CYSTOSCOPY URETHROTOMY VISUAL INTERNAL N/A 10/24/2024    Procedure: CYSTOSCOPY URETHROTOMY;  Surgeon: Mally Bernal MD;  Location: [facility] MAIN OR;  Service: Urology;  Laterality: N/A;    CYSTOSCOPY URETHROTOMY VISUAL INTERNAL N/A 03/11/2025    Procedure: CYSTOSCOPY URETHROTOMY VISUAL INTERNAL;  Surgeon: Mally Bernal MD;  Location: [facility] MAIN OR;  Service: Urology;  Laterality: N/A;    ENDOSCOPY      with dilation     ENDOSCOPY N/A 01/21/2025    Procedure: ESOPHAGOGASTRODUODENOSCOPY WITH BIOPSIES, POLYPECTOMY, BALLOON DILATION 18-20mm;  Surgeon: Simona Garcia MD;  Location: [facility] ENDOSCOPY;  Service: Gastroenterology;  Laterality: N/A;  GASTRIC POLYPS, SCHATZKI'S RING, HIATAL HERNIA    KNEE ARTHROSCOPY Left     PROSTATE SURGERY  2012    SUPRAPUBIC TUBE PLACEMENT N/A 03/11/2025    Procedure: " SUPRAPUBIC CATHETER INSERTION;  Surgeon: Mally Bernal MD;  Location: [facility] MAIN OR;  Service: Urology;  Laterality: N/A;    TOTAL KNEE ARTHROPLASTY Left 07/23/2021    Procedure: TOTAL KNEE ARTHROPLASTY WITH DORA NAVIGATION WITH BIOMET;  Surgeon: Carlitos Zhao MD;  Location: [facility] MAIN OR;  Service: Orthopedics;  Laterality: Left;    VENTRICULAR CARDIAC PACEMAKER INSERTION      medtronic        Family History: family history includes Heart attack in his father and mother; Heart disease in his mother; No Known Problems in his brother, maternal aunt, maternal grandfather, maternal grandmother, maternal uncle, paternal aunt, paternal grandfather, paternal grandmother, paternal uncle, sister, and another family member. Otherwise pertinent FHx was reviewed and not pertinent to current issue.    Social History:  reports that he quit smoking about 29 years ago. His smoking use included cigarettes. He started smoking about 63 years ago. He has a 16.8 pack-year smoking history. He has never been exposed to tobacco smoke. He has never used smokeless tobacco. He reports that he does not drink alcohol and does not use drugs.    Home Medications:  allopurinol, apixaban, citalopram, empagliflozin, furosemide, omeprazole, tamsulosin, and traZODone      Allergies:  No Known Allergies    Objective   Objective     Vitals:   Temp:  [97.3 °F (36.3 °C)-98 °F (36.7 °C)] 97.3 °F (36.3 °C)  Heart Rate:  [70-71] 70  Resp:  [15-29] 15  BP: ()/(64-70) 102/70  Physical Exam    Constitutional: Awake, alert, frail   Eyes: PERRLA, sclerae anicteric, no conjunctival injection   HENT: NCAT, mucous membranes dry   Neck: Supple, no thyromegaly, no lymphadenopathy, trachea midline   Respiratory: Decreased breath sounds bilaterally, nonlabored respirations    Cardiovascular: RRR, no murmurs, rubs, or gallops, palpable pedal pulses bilaterally   Gastrointestinal: Positive bowel sounds, soft, nontender, nondistended   Musculoskeletal:  No bilateral ankle edema, no clubbing or cyanosis to extremities   Psychiatric: Appropriate affect, cooperative   Neurologic: Oriented x 3, strength symmetric in all extremities, Cranial Nerves grossly intact to confrontation, speech clear   Skin: No rashes     Result Review    Result Review:  I have personally reviewed the results from the time of this admission to 4/16/2025 00:02 EDT and agree with these findings:  [x]  Laboratory list / accordion  []  Microbiology  [x]  Radiology  [x]  EKG/Telemetry   []  Cardiology/Vascular   []  Pathology  []  Old records  []  Other:  Most notable findings include: His UA was positive for a UTI with labs showing an elevated proBNP, reduced renal function, elevated lactic acid, and elevated but flat troponin.  Remaining labs are relatively unremarkable including a normal WBC and negative COVID flu RSV.  Chest x-ray showed pulmonary edema although improved from prior imaging.        Assessment & Plan   Assessment / Plan     Brief Patient Summary:  Javi Martínez is a 84 y.o. male with past medical history of hypertension, CHF, A-fib on Eliquis, arthritis, and GERD presented to the ED for evaluation of generalized weakness and fall.     Active Hospital Problems:  Active Hospital Problems    Diagnosis     **Generalized weakness     Urinary retention     CHF (congestive heart failure)     Acute on chronic HFrEF (heart failure with reduced ejection fraction)     Stage 3b chronic kidney disease     Anxiety     Essential hypertension     CAD with CABG      Plan:     Generalized weakness  - Admit to telemetry  - Likely secondary to UTI  - Fall precautions  - Gentle hydration  - PT OT  - Patient lives alone unable to perform ADL  - Placement if needed  - Supportive care    HTN  -Currently well controlled  -PRN BP meds  -Resume home meds when available  -Titrate if needed    A-fib  -Eliquis    CHF  CAD s/p CABG    GI ppx  DVT ppx    VTE Prophylaxis:  Pharmacologic VTE prophylaxis orders  are present.        CODE STATUS:    Code Status (Patient has no pulse and is not breathing): No CPR (Do Not Attempt to Resuscitate)  Medical Interventions (Patient has pulse or is breathing): Limited Support  Medical Intervention Limits: No intubation (DNI)    Admission Status:  I believe this patient meets inpatient status.      Electronically signed by Renzo Meeks MD, 04/16/25, 12:02 AM EDT.

## 2025-04-16 NOTE — CONSULTS
Cardiology Consult Note  [address]          Patient Identification:  Javi Martínez      [id number]  84 y.o.        male  1940           Reason for Consultation: CHF    PCP: Rani Lopez APRN    History of Present Illness:     Patient is a 84-year-old with a previous history of HFrEF, persistent atrial fibrillation, dyslipidemia, CAD status post CABG, COPD, essential hypertension complete block with dual-chamber pacemaker, who has had increasing function and debility over the last year with episodes of decompensated CHF, obstructive uropathy, and hyperkalemia as well as generalized weakness still living at home but requiring caregivers for assistance presenting with increased generalized weakness symptoms.  He reports decreased appetite and p.o. intake as well as some decreased blood pressure at times.  Not been having problems with increased PND orthopnea but does have lower extremity edema.  Patient had been having increasing difficulty doing his ADLs was found to have an elevated white cell count as well as evidence of urinary tract infection and admitted for treatment.  He reports increased difficulty with living at home    Past History:  Past Medical History:   Diagnosis Date    Aneurysm 2013    Arthritis     left knee     Atrial fibrillation, persistent     CHB (complete heart block) 06/07/2023    CHF exacerbation 04/24/2024    Chronic obstructive pulmonary disease 09/01/2023    pt denies having this    Difficulty walking 2023    Numbness in right foot    Erectile dysfunction     Essential hypertension 05/08/2019    GERD (gastroesophageal reflux disease)     HFrEF (heart failure with reduced ejection fraction) 08/11/2021    Hyperlipidemia 08/11/2021    Paroxysmal atrial fibrillation     Polyneuropathy 06/19/2023    Prostate cancer 2010    with seeds implant     Septic joint 11/26/2021    Sleep apnea     Urinary incontinence 2021    Urinary tract infection 2022     Past  "Surgical History:   Procedure Laterality Date    ABLATION OF DYSRHYTHMIC FOCUS  2013    APPENDECTOMY  1947    ARTERIAL BYPASS SURGERY      CARDIAC ABLATION      x2  \"years ago\"     CARDIAC ELECTROPHYSIOLOGY PROCEDURE N/A 11/01/2021    Procedure: PPM battery change;  Surgeon: Ge Whelan MD;  Location: [facility] CATH INVASIVE LOCATION;  Service: Cardiovascular;  Laterality: N/A;    CARDIAC SURGERY  2006    cabg 3v    COLONOSCOPY      CORONARY ARTERY BYPASS GRAFT  2006    CYSTOSCOPY URETHROTOMY VISUAL INTERNAL N/A 08/15/2024    Procedure: CYSTOSCOPY, URETHRAL DILATION, CATHETER PLACEMENT, RETROGRADE URETHROGRAM;  Surgeon: August Myers MD;  Location: [facility] MAIN OR;  Service: Urology;  Laterality: N/A;    CYSTOSCOPY URETHROTOMY VISUAL INTERNAL N/A 10/24/2024    Procedure: CYSTOSCOPY URETHROTOMY;  Surgeon: Mally Bernal MD;  Location: [facility] MAIN OR;  Service: Urology;  Laterality: N/A;    CYSTOSCOPY URETHROTOMY VISUAL INTERNAL N/A 03/11/2025    Procedure: CYSTOSCOPY URETHROTOMY VISUAL INTERNAL;  Surgeon: Mally Bernal MD;  Location: [facility] MAIN OR;  Service: Urology;  Laterality: N/A;    ENDOSCOPY      with dilation     ENDOSCOPY N/A 01/21/2025    Procedure: ESOPHAGOGASTRODUODENOSCOPY WITH BIOPSIES, POLYPECTOMY, BALLOON DILATION 18-20mm;  Surgeon: Simona Garcia MD;  Location: [facility] ENDOSCOPY;  Service: Gastroenterology;  Laterality: N/A;  GASTRIC POLYPS, SCHATZKI'S RING, HIATAL HERNIA    KNEE ARTHROSCOPY Left     PROSTATE SURGERY  2012    SUPRAPUBIC TUBE PLACEMENT N/A 03/11/2025    Procedure: SUPRAPUBIC CATHETER INSERTION;  Surgeon: Mally Bernal MD;  Location: [facility] MAIN OR;  Service: Urology;  Laterality: N/A;    TOTAL KNEE ARTHROPLASTY Left 07/23/2021    Procedure: TOTAL KNEE ARTHROPLASTY WITH DORA NAVIGATION WITH BIOMET;  Surgeon: Carlitos Zhao MD;  Location: [facility] MAIN OR;  Service: Orthopedics;  Laterality: Left;    VENTRICULAR CARDIAC PACEMAKER INSERTION      medtronic  "     No Known Allergies  Social History     Socioeconomic History    Marital status:    Tobacco Use    Smoking status: Former     Current packs/day: 0.00     Average packs/day: 0.5 packs/day for 33.6 years (16.8 ttl pk-yrs)     Types: Cigarettes     Start date: 1962     Quit date: 1995     Years since quittin.7     Passive exposure: Never    Smokeless tobacco: Never   Vaping Use    Vaping status: Never Used   Substance and Sexual Activity    Alcohol use: Never    Drug use: Never    Sexual activity: Not Currently     Partners: Female     Family History   Problem Relation Age of Onset    Heart attack Mother     Heart disease Mother     Heart attack Father     No Known Problems Sister     No Known Problems Brother     No Known Problems Maternal Aunt     No Known Problems Maternal Uncle     No Known Problems Paternal Aunt     No Known Problems Paternal Uncle     No Known Problems Maternal Grandmother     No Known Problems Maternal Grandfather     No Known Problems Paternal Grandmother     No Known Problems Paternal Grandfather     No Known Problems Other        Medications:  Prior to Admission medications    Medication Sig Start Date End Date Taking? Authorizing Provider   allopurinol (ZYLOPRIM) 100 MG tablet Take 1 tablet by mouth Daily. 3/11/22   Ely Srinivasan MD   citalopram (CeleXA) 10 MG tablet Take 1 tablet by mouth Daily. 25   Ely Srinivasan MD   Eliquis 2.5 MG tablet tablet Take 1 tablet by mouth Every 12 (Twelve) Hours. 9/3/24   Ely Srinivasan MD   furosemide (LASIX) 40 MG tablet Take 1 tablet by mouth Daily. 25   Farhad Gomez DO   Jardiance 10 MG tablet tablet Take 1 tablet by mouth Daily. 25   Farhad Gomez DO   omeprazole (priLOSEC) 20 MG capsule Take 1 capsule by mouth Daily.    Ely Srinivasan MD   tamsulosin (FLOMAX) 0.4 MG capsule 24 hr capsule Take 1 capsule by mouth Daily. 25   Farhad Gomez DO   traZODone (DESYREL) 50 MG tablet Take  "1 tablet by mouth At Night As Needed for Sleep.    Provider, MD Ely      Current medications:  allopurinol, 100 mg, Oral, Daily  apixaban, 2.5 mg, Oral, Q12H  citalopram, 10 mg, Oral, Daily  [START ON 4/17/2025] furosemide, 40 mg, Oral, Daily  pantoprazole, 40 mg, Oral, Q AM  piperacillin-tazobactam, 3.375 g, Intravenous, Once  piperacillin-tazobactam, 3.375 g, Intravenous, Q8H  potassium chloride, 40 mEq, Oral, Once  sodium chloride, 10 mL, Intravenous, Q12H  [Held by provider] tamsulosin, 0.4 mg, Oral, Daily  vancomycin, 1,000 mg, Intravenous, Q24H      Current IV drips:  Pharmacy to dose vancomycin,   Pharmacy To Dose:,         Review of Systems   Constitutional: Negative for chills, fever and weight loss.   HENT:  Negative for congestion and nosebleeds.    Cardiovascular:  Negative for orthopnea and paroxysmal nocturnal dyspnea.   Respiratory:  Negative for cough and shortness of breath.    Endocrine: Negative for cold intolerance and heat intolerance.   Skin:  Negative for rash.   Musculoskeletal:  Positive for muscle weakness. Negative for back pain.   Gastrointestinal:  Negative for abdominal pain, nausea and vomiting.   Genitourinary:  Negative for dysuria and nocturia.   Neurological:  Negative for dizziness and light-headedness.   Psychiatric/Behavioral:  Negative for altered mental status and hallucinations.          Physical Exam    BP 90/58   Pulse 70   Temp 97.3 °F (36.3 °C) (Oral)   Resp 8   Ht 193 cm (76\")   Wt 78.5 kg (173 lb)   SpO2 97%   BMI 21.06 kg/m²  Body mass index is 21.06 kg/m².   Oxygen saturation   @FLOWAN(10::1)@ SpO2  Min: 90 %  Max: 97 %    General Appearance:   no acute distress  Alert and oriented x3  HENT:   lips not cyanotic  Atraumatic  Neck:  thyroid not enlarged  supple  Respiratory:  no respiratory distress  normal breath sounds  no rales  Cardiovascular:  no jugular venous distention  regular rhythm  apical impulse normal  S1 normal, S2 normal  no S3, no S4   no " murmur  no rub, no thrill  no carotid bruit  pedal pulses normal  lower extremity edema: +1  Gastrointestinal:   bowel sounds normal  non-tender  no hepatomegaly, no splenomegaly  Musculoskeletal:  no clubbing of fingers.   normocephalic, head atraumatic  Skin:   warm, dry  No rashes  Neuro/Psychiatric:  normal mood and affect  judgement and insight appropriate      Cardiographics:     ECG  (personally reviewed)    Telemetry:  (personally reviewed) paced rhythm   Results for orders placed during the hospital encounter of 01/18/25    Adult Transthoracic Echo Complete W/ Cont if Necessary Per Protocol    Interpretation Summary    Left ventricular ejection fraction appears to be 21 - 25%.    The left ventricular cavity is severely dilated.    Left ventricular diastolic function was indeterminate.    The left atrial cavity is moderate to severely dilated.    The right atrial cavity is mildly  dilated.    Moderate mitral valve regurgitation is present.    Estimated right ventricular systolic pressure from tricuspid regurgitation is moderately elevated (45-55 mmHg).    Aortic root measures 4.1 cm.  Ascending aorta measures 4.8 cm.    Pleural effusion noted.    No significant change from echo done 7/2024.     Results for orders placed during the hospital encounter of 04/24/24    Stress Test With Myocardial Perfusion One Day    Interpretation Summary    Left ventricular ejection fraction is severely reduced (Calculated EF = 18%).  May be inaccurate due to irregularities in gating from underlying rhythm    EKG portion was nondiagnostic due to paced rhythm    Moderate sized fixed moderate intensity inferior lateral defect consistent with prior infarct no significant ischemic changes      No results found for this or any previous visit.     Cardiolite (Tc-99m Sestamibi) stress test              Results for orders placed during the hospital encounter of 04/24/24    Stress Test With Myocardial Perfusion One Day    Interpretation  "Summary    Left ventricular ejection fraction is severely reduced (Calculated EF = 18%).  May be inaccurate due to irregularities in gating from underlying rhythm    EKG portion was nondiagnostic due to paced rhythm    Moderate sized fixed moderate intensity inferior lateral defect consistent with prior infarct no significant ischemic changes      Lab Review:       CBC          3/22/2025    15:50 4/15/2025    13:46 4/16/2025    05:54   CBC   WBC 7.73  10.12  9.78    RBC 4.68  4.72  4.73    Hemoglobin 13.4  13.8  15.0    Hematocrit 43.1  43.5  46.3    MCV 92.1  92.2  97.9    MCH 28.6  29.2  31.7    MCHC 31.1  31.7  32.4    RDW 18.5  18.2  19.0    Platelets 238  219  224        CMP          3/22/2025    15:50 4/15/2025    13:46 4/16/2025    05:36   CMP   Glucose 93  108  71    BUN 31  30  29    Creatinine 1.79  1.62  1.43    EGFR 37.4  42.1  48.9    Sodium 142  137  135    Potassium 3.5  4.0  3.7    Chloride 101  96  95    Calcium 8.9  8.9  9.2    Total Protein 6.7  6.7     Albumin 3.3  3.3     Globulin 3.4  3.4     Total Bilirubin 1.9  1.9     Alkaline Phosphatase 90  100     AST (SGOT) 19  16     ALT (SGPT) 7  8     Albumin/Globulin Ratio 1.0  1.0     BUN/Creatinine Ratio 17.3  18.5  20.3    Anion Gap 15.3  11.1  17.6         CARDIAC LABS:      Lab 04/16/25  0536 04/15/25  1920 04/15/25  1346   PROBNP 14,672.0*  --  14,794.0*   HSTROP T  --  65* 63*      No results found for: \"DIGOXIN\"   Lab Results   Component Value Date    TSH 3.740 02/25/2025           Invalid input(s): \"LDLCALC\"  No results found for: \"POCTROP\"  No results found for: \"DDIMERQUAN\"  Lab Results   Component Value Date    MG 2.6 (H) 04/16/2025             CARDIAC LABS:      Lab 04/16/25  0536 04/15/25  1920 04/15/25  1346   PROBNP 14,672.0*  --  14,794.0*   HSTROP T  --  65* 63*        Imaging:  CXR  Narrative & Impression   XR CHEST 1 VW     Date of Exam: 4/16/2025 12:00 PM EDT     Indication: SOA     Comparison: AP chest x-ray 4/15/2025, " 3/22/2025     Findings:  Cardiac silhouette remains enlarged. Bilateral airspace opacities appear stable. There is unchanged blunting of each lateral costophrenic angle. No pneumothorax is seen. Postsurgical changes appear stable.     IMPRESSION:  Impression:  Stable bilateral airspace opacities, likely due to pulmonary edema with bilateral pleural effusions. Patchy right basilar opacities may be due to superimposed atelectasis and/or pneumonia.           Electronically Signed: Simona Tadeo MD    4/16/2025 12:42 PM EDT    Workstation ID: [id number]        Assessment:    Generalized weakness    CAD with CABG    Acute on chronic HFrEF (heart failure with reduced ejection fraction)    Essential hypertension    Anxiety    Stage 3b chronic kidney disease    CHF (congestive heart failure)    Urinary retention      HFrEF acute on chronic patient with worsening clinical status especially over the last year decreased ejection fraction unfortunately not able to tolerate GDMT due to hypotension issues overall feel that the patient's clinical prognosis is poor and that he is approaching more end-stage disease from his CHF as well as other comorbidities we will go ahead and try to diurese with IV Lasix to see if this improves any of his symptoms overall feel that he would be reasonable for palliative care consult    Thank you for allowing us to share in [facility].            Ge Whelan MD   4/16/2025    15:05 EDT

## 2025-04-16 NOTE — NURSING NOTE
RRT called d/t hypotension and increased confusion. Bolus given, labs drawn. No other orders at this time.

## 2025-04-16 NOTE — PLAN OF CARE
Goal Outcome Evaluation:              Outcome Evaluation: Pt is alert and oriented but sad. C/o nausea during shift, zofran given with relief of s/s. Reported to MD critical lab - positive for MRSA of nares. MD aware of hypotensive BP and accessory muscle use, respirations of 8. Continue plan of care.

## 2025-04-16 NOTE — PROGRESS NOTES
"[facility] Clinical Pharmacy Services: Vancomycin Pharmacokinetic Initial Consult Note    Javi Martínez is a 84 y.o. male who is on day 1 of pharmacy to dose vancomycin for Urinary Tract Infection.    Consult Information  Consulting Provider: Alber Guerrero  Planned Duration of Therapy: 7 d  Was Patient Receiving Prior to Admission/Consult?: No  Loading Dose Ordered or Given:none  PK/PD Target: -600 mg/L.hr   Relevant ID History: History of ESBL UTI, has suprapubic catheter  Other Antimicrobials: Piperacillin/Tazobactam    Imaging Reviewed?: Yes    Microbiology Data  MRSA PCR performed: 25; Result: Pending  Culture/Source:   Blood cultures and urine cultures pending frm 4/15  Wound culture ordered       Vitals/Labs  Ht: 193 cm (76\"); Wt: 78.5 kg (173 lb)  Temp (24hrs), Av.5 °F (36.4 °C), Min:97.3 °F (36.3 °C), Max:98 °F (36.7 °C)   Estimated Creatinine Clearance: 44.2 mL/min (A) (by C-G formula based on SCr of 1.43 mg/dL (H)).       Results from last 7 days   Lab Units 25  0554 25  0536 04/15/25  1346   CREATININE mg/dL  --  1.43* 1.62*   WBC 10*3/mm3 9.78  --  10.12     Assessment/Plan:    Vancomycin Dose:  1000 mg IV every 24 hours; which provides the following predicted parameters:  Loading dose: N/A  Regimen: 1000 mg IV every 24 hours.  Start time: 12:07 on 2025  Exposure target: AUC24 (range)400-600 mg/L.hr   AUC24,ss: 466 mg/L.hr  Probability of AUC24 > 400: 68 %  Ctrough,ss: 14.8 mg/L  Probability of Ctrough,ss > 20: 21 %  Probability of nephrotoxicity (Lodise MASOUD ): 10 %  Vanc Random ordered for  at 0600 (with AM labs)  Patient has order for Basic Metabolic Panel    Pharmacy will follow patient's kidney function and will adjust doses and obtain levels as necessary. Thank you for involving pharmacy in this patient's care. Please contact pharmacy with any questions or concerns.                           Suzanne Borjas, [facility]  Clinical Pharmacist      "

## 2025-04-17 NOTE — PLAN OF CARE
Goal Outcome Evaluation:  Plan of Care Reviewed With: patient        Progress: no change  Outcome Evaluation: Pt presents with limitations that impede their ability to safely and independently transfer and ambulate. The skills of a therapist will be required to safely and effectively implement the following treatment plan to restore maximal level of function.    Anticipated Discharge Disposition (PT): inpatient rehabilitation facility

## 2025-04-17 NOTE — THERAPY EVALUATION
Acute Care - Physical Therapy Initial Evaluation  [facility]     Patient Name: Javi Martínez  : 1940  MRN: [id number]  Today's Date: 2025      Visit Dx:     ICD-10-CM ICD-9-CM   1. Generalized weakness  R53.1 780.79   2. Difficulty walking  R26.2 719.7     Patient Active Problem List   Diagnosis    Primary osteoarthritis of left knee    S/P TKR (total knee replacement)    Essential hypertension    CAD with CABG    Hyperlipidemia    Presence of cardiac pacemaker single chamber    Anxiety    Gastroesophageal reflux disease    Gout    History of malignant neoplasm of prostate    Testicular hypofunction    Polyneuropathy    Chronic obstructive pulmonary disease    Benign prostatic hyperplasia without lower urinary tract symptoms    Atrial fibrillation, chronic    Stage 3b chronic kidney disease    Postprocedural membranous urethral stricture    Acute on chronic HFrEF (heart failure with reduced ejection fraction)    Suprapubic catheter dysfunction    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    Urethral stricture in male    Lyme disease    Acute exacerbation of CHF (congestive heart failure)    Other dysphagia    CHF (congestive heart failure)    Urinary retention    Influenza A    Generalized weakness     Past Medical History:   Diagnosis Date    Aneurysm     Arthritis     left knee     Atrial fibrillation, persistent     CHB (complete heart block) 2023    CHF exacerbation 2024    Chronic obstructive pulmonary disease 2023    pt denies having this    Difficulty walking     Numbness in right foot    Erectile dysfunction     Essential hypertension 2019    GERD (gastroesophageal reflux disease)     HFrEF (heart failure with reduced ejection fraction) 2021    Hyperlipidemia 2021    Paroxysmal atrial fibrillation     Polyneuropathy 2023    Prostate cancer 2010    with seeds implant     Septic joint 2021    Sleep apnea     Urinary incontinence     Urinary  "tract infection 2022     Past Surgical History:   Procedure Laterality Date    ABLATION OF DYSRHYTHMIC FOCUS  2013    APPENDECTOMY  1947    ARTERIAL BYPASS SURGERY      CARDIAC ABLATION      x2  \"years ago\"     CARDIAC ELECTROPHYSIOLOGY PROCEDURE N/A 11/01/2021    Procedure: PPM battery change;  Surgeon: Ge Whelan MD;  Location: [facility] CATH INVASIVE LOCATION;  Service: Cardiovascular;  Laterality: N/A;    CARDIAC SURGERY  2006    cabg 3v    COLONOSCOPY      CORONARY ARTERY BYPASS GRAFT  2006    CYSTOSCOPY URETHROTOMY VISUAL INTERNAL N/A 08/15/2024    Procedure: CYSTOSCOPY, URETHRAL DILATION, CATHETER PLACEMENT, RETROGRADE URETHROGRAM;  Surgeon: August Myers MD;  Location: [facility] MAIN OR;  Service: Urology;  Laterality: N/A;    CYSTOSCOPY URETHROTOMY VISUAL INTERNAL N/A 10/24/2024    Procedure: CYSTOSCOPY URETHROTOMY;  Surgeon: Mally Bernal MD;  Location: [facility] MAIN OR;  Service: Urology;  Laterality: N/A;    CYSTOSCOPY URETHROTOMY VISUAL INTERNAL N/A 03/11/2025    Procedure: CYSTOSCOPY URETHROTOMY VISUAL INTERNAL;  Surgeon: Mally Bernal MD;  Location: [facility] OR;  Service: Urology;  Laterality: N/A;    ENDOSCOPY      with dilation     ENDOSCOPY N/A 01/21/2025    Procedure: ESOPHAGOGASTRODUODENOSCOPY WITH BIOPSIES, POLYPECTOMY, BALLOON DILATION 18-20mm;  Surgeon: Simona Garcia MD;  Location: [facility] ENDOSCOPY;  Service: Gastroenterology;  Laterality: N/A;  GASTRIC POLYPS, SCHATZKI'S RING, HIATAL HERNIA    KNEE ARTHROSCOPY Left     PROSTATE SURGERY  2012    SUPRAPUBIC TUBE PLACEMENT N/A 03/11/2025    Procedure: SUPRAPUBIC CATHETER INSERTION;  Surgeon: Mally Bernal MD;  Location: [facility] MAIN OR;  Service: Urology;  Laterality: N/A;    TOTAL KNEE ARTHROPLASTY Left 07/23/2021    Procedure: TOTAL KNEE ARTHROPLASTY WITH DORA NAVIGATION WITH BIOMET;  Surgeon: Carlitos Zhao MD;  Location: [facility] MAIN OR;  Service: Orthopedics;  Laterality: Left;    VENTRICULAR CARDIAC PACEMAKER " INSERTION      medtronic      PT Assessment (Last 12 Hours)       PT Evaluation and Treatment       Row Name 04/17/25 1400          Physical Therapy Time and Intention    Subjective Information no complaints  -CS     Document Type evaluation  -CS     Mode of Treatment individual therapy;physical therapy  -CS     Patient Effort fair  -CS     Symptoms Noted During/After Treatment fatigue  -CS       Row Name 04/17/25 1400          General Information    Patient Profile Reviewed yes  -CS     Patient Observations alert;cooperative  -CS     Prior Level of Function independent:;all household mobility;gait;transfer;bed mobility;ADL's  -CS     Equipment Currently Used at Home cane, straight;walker, rolling;shower chair  Pt uses a rolling walker and STC depending how he is feeling. He has a shower chair to use if needed. No home O2  -CS     Existing Precautions/Restrictions fall  -CS     Barriers to Rehab none identified  -CS       Row Name 04/17/25 1400          Living Environment    Current Living Arrangements home  -CS     Home Accessibility stairs to enter home;stairs within home  -CS     People in Home alone  -CS     Primary Care Provided by self  -CS       Row Name 04/17/25 1400          Home Main Entrance    Number of Stairs, Main Entrance two  -CS     Stair Railings, Main Entrance railings safe and in good condition  -CS       Row Name 04/17/25 1400          Stairs Within Home, Primary    Number of Stairs, Within Home, Primary none  -CS       Row Name 04/17/25 1400          Pain    Pretreatment Pain Rating 0/10 - no pain  -CS     Posttreatment Pain Rating 0/10 - no pain  -CS       Row Name 04/17/25 1400          Cognition    Affect/Mental Status (Cognition) emotionally labile;sad/depressed affect  intermittent confusion  -CS     Orientation Status (Cognition) oriented to;person;place  -CS       Row Name 04/17/25 1400          Range of Motion Comprehensive    General Range of Motion bilateral lower extremity ROM WFL   -       Row Name 04/17/25 1400          Strength Comprehensive (MMT)    General Manual Muscle Testing (MMT) Assessment lower extremity strength deficits identified  -     Comment, General Manual Muscle Testing (MMT) Assessment BLEs assessed at 4-/5  -       Row Name 04/17/25 1400          Bed Mobility    Bed Mobility bed mobility (all) activities  -     All Activities, [place] (Bed Mobility) verbal cues;minimum assist (75% patient effort)  -     Bed Mobility, Safety Issues decreased use of legs for bridging/pushing  -     Assistive Device (Bed Mobility) bed rails  -[facility] Name 04/17/25 1400          Transfers    Transfers sit-stand transfer  -       Row Name 04/17/25 1400          Sit-Stand Transfer    Sit-Stand [place] (Transfers) verbal cues;minimum assist (75% patient effort)  -     Assistive Device (Sit-Stand Transfers) walker, standard  -[facility] Name 04/17/25 1400          Gait/Stairs (Locomotion)    [place] Level (Gait) not tested  pt wanted to lay back down to nap, would not stay up out of bed or sit in the recliner after education  -[facility] Name 04/17/25 1400          Safety Issues/Impairments Affecting Functional Mobility    Impairments Affecting Function (Mobility) balance;cognition;endurance/activity tolerance;strength;pain  -[facility] Name 04/17/25 1400          Balance    Balance Assessment standing dynamic balance  -     Dynamic Standing Balance verbal cues;minimal assist  -     Position/Device Used, Standing Balance walker, standard  -       Row Name             Wound 04/15/25 2152 Bilateral upper coccyx Pressure Injury    Wound - Properties Group Placement Date: 04/15/25  -HD Placement Time: 2152 -HD Present on Original Admission: Y  -HD Side: Bilateral  -HD Orientation: upper  -HD Location: coccyx  -HD Primary Wound Type: Pressure Inj  -HD    Retired Wound - Properties Group Placement Date: 04/15/25  -HD Placement Time: 2152 -HD Present on  Original Admission: Y  -HD Side: Bilateral  -HD Orientation: upper  -HD Location: coccyx  -HD    Retired Wound - Properties Group Placement Date: 04/15/25  -HD Placement Time: 2152 -HD Present on Original Admission: Y  -HD Side: Bilateral  -HD Orientation: upper  -HD Location: coccyx  -HD    Retired Wound - Properties Group Date first assessed: 04/15/25  -HD Time first assessed: 2152 -HD Present on Original Admission: Y  -HD Side: Bilateral  -HD Location: coccyx  -HD      Row Name 04/17/25 1400          Plan of Care Review    Plan of Care Reviewed With patient  -CS     Progress no change  -CS     Outcome Evaluation Pt presents with limitations that impede their ability to safely and independently transfer and ambulate. The skills of a therapist will be required to safely and effectively implement the following treatment plan to restore maximal level of function.  -CS       Row Name 04/17/25 1400          Positioning and Restraints    Pre-Treatment Position in bed  -CS     Post Treatment Position bed  -CS     In Bed supine;call light within reach;encouraged to call for assist;exit alarm on  -CS       Row Name 04/17/25 1400          Therapy Assessment/Plan (PT)    Rehab Potential (PT) fair  -CS     Criteria for Skilled Interventions Met (PT) yes;skilled treatment is necessary  -CS     Therapy Frequency (PT) daily  -CS     Predicted Duration of Therapy Intervention (PT) 10 days  -CS     Problem List (PT) problems related to;balance;mobility;cognition;strength  -CS     Activity Limitations Related to Problem List (PT) unable to ambulate safely;unable to transfer safely  -CS       Row Name 04/17/25 1400          PT Evaluation Complexity    History, PT Evaluation Complexity 1-2 personal factors and/or comorbidities  -CS     Examination of Body Systems (PT Eval Complexity) total of 4 or more elements  -CS     Clinical Presentation (PT Evaluation Complexity) stable  -CS     Clinical Decision Making (PT Evaluation  Complexity) low complexity  -CS     Overall Complexity (PT Evaluation Complexity) low complexity  -CS       Row Name 04/17/25 1400          Therapy Plan Review/Discharge Plan (PT)    Therapy Plan Review (PT) evaluation/treatment results reviewed;patient  -CS       Row Name 04/17/25 1400          Physical Therapy Goals    Bed Mobility Goal Selection (PT) bed mobility, PT goal 1  -CS     Transfer Goal Selection (PT) transfer, PT goal 1  -CS     Gait Training Goal Selection (PT) gait training, PT goal 1  -CS       Row Name 04/17/25 1400          Bed Mobility Goal 1 (PT)    Activity/Assistive Device (Bed Mobility Goal 1, PT) bed mobility activities, all  -CS     [place] Level/Cues Needed (Bed Mobility Goal 1, PT) modified independence  -CS     Time Frame (Bed Mobility Goal 1, PT) long term goal (LTG);10 days  -CS       Row Name 04/17/25 1400          Transfer Goal 1 (PT)    Activity/Assistive Device (Transfer Goal 1, PT) sit-to-stand/stand-to-sit;bed-to-chair/chair-to-bed;walker, rolling  -CS     [place] Level/Cues Needed (Transfer Goal 1, PT) supervision required  -CS     Time Frame (Transfer Goal 1, PT) long term goal (LTG);10 days  -CS       Row Name 04/17/25 1400          Gait Training Goal 1 (PT)    Activity/Assistive Device (Gait Training Goal 1, PT) gait (walking locomotion);assistive device use;walker, rolling  -CS     [place] Level (Gait Training Goal 1, PT) supervision required  -CS     Distance (Gait Training Goal 1, PT) 125  -CS     Time Frame (Gait Training Goal 1, PT) long term goal (LTG);10 days  -CS               User Key  (r) = Recorded By, (t) = Taken By, (c) = Cosigned By      Initials Name Provider Type    CS Coco Swanson, PT Physical Therapist    Carolyn Mariscal, RN Registered Nurse                      PT Recommendation and Plan  Anticipated Discharge Disposition (PT): inpatient rehabilitation facility  Planned Therapy Interventions (PT): balance training, bed mobility  training, gait training, strengthening, transfer training  Therapy Frequency (PT): daily  Plan of Care Reviewed With: patient  Progress: no change  Outcome Evaluation: Pt presents with limitations that impede their ability to safely and independently transfer and ambulate. The skills of a therapist will be required to safely and effectively implement the following treatment plan to restore maximal level of function.   Outcome Measures       Row Name 04/17/25 1400             How much help from another person do you currently need...    Turning from your back to your side while in flat bed without using bedrails? 2  -CS      Moving from lying on back to sitting on the side of a flat bed without bedrails? 2  -CS      Moving to and from a bed to a chair (including a wheelchair)? 2  -CS      Standing up from a chair using your arms (e.g., wheelchair, bedside chair)? 2  -CS      Climbing 3-5 steps with a railing? 1  -CS      To walk in hospital room? 2  -CS      AM-PAC 6 Clicks Score (PT) 11  -CS         Functional Assessment    Outcome Measure Options AM-PAC 6 Clicks Basic Mobility (PT)  -CS                User Key  (r) = Recorded By, (t) = Taken By, (c) = Cosigned By      Initials Name Provider Type    Coco Garcia PT Physical Therapist                     Time Calculation:    PT Charges       Row Name 04/17/25 1440             Time Calculation    PT Received On 04/17/25  -CS      PT Goal Re-Cert Due Date 04/26/25  -CS         Untimed Charges    PT Eval/Re-eval Minutes 32  -CS         Total Minutes    Untimed Charges Total Minutes 32  -CS       Total Minutes 32  -CS                User Key  (r) = Recorded By, (t) = Taken By, (c) = Cosigned By      Initials Name Provider Type    Coco Garcia PT Physical Therapist                  Therapy Charges for Today       Code Description Service Date Service Provider Modifiers Qty    [id number] HC PT EVAL LOW COMPLEXITY 3 4/17/2025 Coco Swanson PT GP 1             PT G-Codes  Outcome Measure Options: AM-PAC 6 Clicks Basic Mobility (PT)  AM-PAC 6 Clicks Score (PT): 11  AM-PAC 6 Clicks Score (OT): 19    Coco Swanson, PT  4/17/2025

## 2025-04-17 NOTE — PROGRESS NOTES
[facility]   Hospitalist Progress Note  Date: 2025  Patient Name: Javi Martínez  : 1940  MRN: [id number]  Date of admission: 4/15/2025  Room/Bed: [facility]/      Subjective   Subjective     Chief Complaint: Generalized weakness     Summary:Javi Martínez is a 84 y.o. male with past medical history of hypertension, CHF, A-fib on Eliquis, arthritis, history of prostate cancer status post brachytherapy and external beam XRT and BPH and GERD presented to the ED for evaluation of generalized weakness and fall.  Patient states that for the last week or so he has not been feeling well with increased weakness, dysuria, lethargy, decreased p.o. intake, and multiple falls.  Patient does live alone and had been unable to perform his ADLs so he was brought to the ED for further evaluation.  In the ED patient was tachypneic on arrival with remaining vitals being within normal limits on room air.  His UA was positive for a UTI with labs showing an elevated proBNP, reduced renal function, elevated lactic acid, and elevated but flat troponin.  Remaining labs are relatively unremarkable including a normal WBC and negative COVID flu RSV.  Chest x-ray showed pulmonary edema although improved from prior imaging.  When seen patient was resting comfortably and when asked he denied any recent fevers, chills, headaches, focal weakness, chest pain, palpitation, shortness of breath, cough, abdominal pain, nausea, vomiting, diarrhea, constipation, hematuria, hematochezia, melena, or anxiety.   Patient admitted for further evaluation and treatment.  Patient had pus coming from suprapubic catheter insertion site along with pus within suprapubic catheter.  Blood pressure borderline low, perfusing extremities well.  Cardiologist consulted.  Also started on IV antibiotics for possible intra-abdominal infection.  CT abdomen pelvis without contrast was obtained.  Wound culture and urine culture are pending.  Blood culture  sent.    Interval Followup: No acute events overnight.  Patient seemed depressed and agreed with palliative consult which was also recommended by cardiologist.  Laying in bed, denied any active complaints.  Wound culture showed MRSA.  Antibiotics switched to vancomycin only.    Review of Systems    All systems reviewed and negative except for what is outlined above.      Objective   Objective     Vitals:   Temp:  [97.3 °F (36.3 °C)-98.2 °F (36.8 °C)] 98.2 °F (36.8 °C)  Heart Rate:  [70-75] 75  Resp:  [8-18] 16  BP: ()/(55-64) 105/64    Physical Exam   General: Awake, alert,NAD  Cardiovascular: RRR, no murmurs   Pulmonary: CTA bilaterally; no wheezes; no conversational dyspnea  Gastrointestinal: S/ND/NT, +BS  Neuro: Alert, awake, oriented x 3; speech clear; no tremor      Result Review    Result Review:  I have personally reviewed these results:  [x]  Laboratory      Lab 04/17/25  0529 04/16/25  0556 04/16/25  0554 04/15/25  1920 04/15/25  1346   WBC 7.75  --  9.78  --  10.12   HEMOGLOBIN 13.8  --  15.0  --  13.8   HEMATOCRIT 41.0  --  46.3  --  43.5   PLATELETS 220  --  224  --  219   NEUTROS ABS 5.28  --  6.80  --  7.82*   IMMATURE GRANS (ABS) 0.03  --  0.02  --  0.03   LYMPHS ABS 1.46  --  1.36  --  1.31   MONOS ABS 0.84  --  1.35*  --  0.86   EOS ABS 0.09  --  0.15  --  0.05   MCV 94.7  --  97.9*  --  92.2   LACTATE  --  1.2 2.2* 1.7 2.3*         Lab 04/17/25  0529 04/16/25  0536 04/15/25  1346   SODIUM 139 135* 137   POTASSIUM 3.6 3.7 4.0   CHLORIDE 99 95* 96*   CO2 28.3 22.4 29.9*   ANION GAP 11.7 17.6* 11.1   BUN 27* 29* 30*   CREATININE 1.68* 1.43* 1.62*   EGFR 40.3* 48.9* 42.1*   GLUCOSE 93 71 108*   CALCIUM 8.4* 9.2 8.9   MAGNESIUM 2.3 2.6*  --    PHOSPHORUS 3.2  --   --          Lab 04/15/25  1346   TOTAL PROTEIN 6.7   ALBUMIN 3.3*   GLOBULIN 3.4   ALT (SGPT) 8   AST (SGOT) 16   BILIRUBIN 1.9*   ALK PHOS 100         Lab 04/16/25  0536 04/15/25  1920 04/15/25  1346   PROBNP 14,672.0*  --  14,794.0*    HSTROP T  --  65* 63*                 Lab 04/16/25  0556   PH, ARTERIAL 7.509*   PCO2, ARTERIAL 34.7*   PO2 ART 62.8*   O2 SATURATION ART 93.9*   HCO3 ART 27.6*   BASE EXCESS ART 4.8*     Brief Urine Lab Results  (Last result in the past 365 days)        Color   Clarity   Blood   Leuk Est   Nitrite   Protein   CREAT   Urine HCG        04/15/25 1823 Yellow   Clear   Trace   Large (3+)   Negative   Negative                 [x]  Microbiology   Microbiology Results (last 10 days)       Procedure Component Value - Date/Time    Wound Culture - Wound, Suprapubic Catheter [[id number]]  (Abnormal) Collected: 04/16/25 1437    Lab Status: Preliminary result Specimen: Wound from Suprapubic Catheter Updated: 04/17/25 0930     Wound Culture Scant growth (1+) Staphylococcus aureus, MRSA     Comment:   Methicillin resistant Staphylococcus aureus, Patient may be an isolation risk.        Gram Stain Rare (1+) WBCs seen      Occasional Gram positive cocci    MRSA Screen, PCR (Inpatient) - Swab, Nares [[id number]]  (Abnormal) Collected: 04/16/25 1437    Lab Status: Final result Specimen: Swab from Nares Updated: 04/16/25 1601     MRSA PCR MRSA Detected    Narrative:      The negative predictive value of this diagnostic test is high and should only be used to consider de-escalating anti-MRSA therapy. A positive result may indicate colonization with MRSA and must be correlated clinically.    Blood Culture - Blood, Arm, Left [[id number]]  (Normal) Collected: 04/15/25 2020    Lab Status: Preliminary result Specimen: Blood from Arm, Left Updated: 04/16/25 2030     Blood Culture No growth at 24 hours    Blood Culture - Blood, Arm, Right [[id number]]  (Normal) Collected: 04/15/25 2020    Lab Status: Preliminary result Specimen: Blood from Arm, Right Updated: 04/16/25 2030     Blood Culture No growth at 24 hours    COVID PRE-OP / PRE-PROCEDURE SCREENING ORDER (NO ISOLATION) - Swab, Nasopharynx [[id number]]  (Normal) Collected: 04/15/25 1917     Lab Status: Final result Specimen: Swab from Nasopharynx Updated: 04/15/25 2006    Narrative:      The following orders were created for panel order COVID PRE-OP / PRE-PROCEDURE SCREENING ORDER (NO ISOLATION) - Swab, Nasopharynx.  Procedure                               Abnormality         Status                     ---------                               -----------         ------                     COVID-19, FLU A/B, RSV P...[[id number]]  Normal              Final result                 Please view results for these tests on the individual orders.    COVID-19, FLU A/B, RSV PCR 1 HR TAT - Swab, Nasopharynx [[id number]]  (Normal) Collected: 04/15/25 1917    Lab Status: Final result Specimen: Swab from Nasopharynx Updated: 04/15/25 2006     COVID19 Not Detected     Influenza A PCR Not Detected     Influenza B PCR Not Detected     RSV, PCR Not Detected    Narrative:      Fact sheet for providers: https://www.fda.gov/media/469834/download    Fact sheet for patients: https://www.fda.gov/media/124865/download    Test performed by PCR.          [x]  Radiology  CT Abdomen Pelvis Without Contrast  Result Date: 4/16/2025  1. No obstructive uropathy 2. 2 mm nonobstructing right renal pelvis stone 3. Right renal cysts 4. Haziness of the perivesical fat suggests cystitis 5. Cholelithiasis. 6. Colonic diverticulosis. 7. Trace perihepatic ascites 8. Cardiomegaly with bilateral pleural effusions Electronically Signed: Víctor Vallejo  4/16/2025 3:11 PM EDT  Workstation ID: [id number]    XR Chest 1 View  Result Date: 4/16/2025  Impression: Stable bilateral airspace opacities, likely due to pulmonary edema with bilateral pleural effusions. Patchy right basilar opacities may be due to superimposed atelectasis and/or pneumonia. Electronically Signed: Simona Tadeo MD  4/16/2025 12:42 PM EDT  Workstation ID: [id number]    XR Chest 1 View  Result Date: 4/15/2025  Impression: 1. Features of interstitial pulmonary edema appear improved since  3/2/2025. Stable cardiac enlargement. 2. Small bilateral pleural effusions and probable passive bibasilar atelectasis, stable to slightly improved Electronically Signed: Poonam Pisano MD  4/15/2025 2:15 PM EDT  Workstation ID: [id number]    []  EKG/Telemetry   []  Cardiology/Vascular   []  Pathology  []  Old records  []  Other:    Assessment & Plan   Assessment / Plan     Assessment:  UTI  MRSA positive in wound culture from suprapubic catheter insertion site  Lactic acidosis, resolved  Acute on chronic HFrEF  History of heart failure with reduced ejection fraction,Last EF of 21-25% on 1/2025  Hypotension, not in cardiogenic shock  CAD s/p CABG  Stage IIIb CKD  Urinary retention S/p suprapubic catheter placement  Atrial fibrillation on Eliquis    Plan:  Patient currently being managed medicine service.  On IV vancomycin for MRSA infection.  Wound culture from suprapubic catheter insertion site growing MRSA.  Blood culture showing no growth at 24 hours.  Suprapubic catheter draining pus in urine, urine culture pending.  Cardiology was consulted for acute on chronic HFrEF.  Patient at end stage heart failure.  Palliative consulted for goals of care discussion.  Blood pressure low normal, perfusing extremities well.  Not in cardiogenic shock.  Not able to introduce GDMT given his low blood pressure.  Goals of care discussion ongoing.  Continue rest of current management.  Labs in AM.  Guarded prognosis.  Clinical course to dictate further management.     Discussed with RN.    VTE Prophylaxis:  Pharmacologic VTE prophylaxis orders are present.        CODE STATUS:   Code Status (Patient has no pulse and is not breathing): No CPR (Do Not Attempt to Resuscitate)  Medical Interventions (Patient has pulse or is breathing): Limited Support  Medical Intervention Limits: No intubation (DNI)      Electronically signed by Alber Guerrero MD, 04/17/25, 10:01 AM EDT.

## 2025-04-17 NOTE — CONSULTS
04/17/25 1450   Coping/Psychosocial   Additional Documentation Spiritual Care (Group)   Spiritual Care   Spiritual Care Visit Type initial   Spiritual Care Source  initiative   Response to Spiritual Care visit timing not optimal (follow-up planned)   Spiritual Care Follow-Up will follow closely

## 2025-04-17 NOTE — PROGRESS NOTES
CARDIOLOGY  INPATIENT PROGRESS NOTE                [address]    4/17/2025      PATIENT IDENTIFICATION:   Name:  Javi Martínez      MRN:  [id number]     84 y.o.  male             Chief Complaint   Patient presents with    Shortness of Breath     weak    Weakness - Generalized        SUBJECTIVE:   Patient with no events overnight  OBJECTIVE:  Vitals:    04/17/25 0351 04/17/25 0751 04/17/25 1137 04/17/25 1519   BP: 99/62 105/64 119/70 96/62   BP Location: Left arm Right arm Right arm Right arm   Patient Position: Lying Lying Lying Lying   Pulse: 70 75 78 66   Resp: 16 16 17 19   Temp: 98.1 °F (36.7 °C) 98.2 °F (36.8 °C) 98.2 °F (36.8 °C) 97.7 °F (36.5 °C)   TempSrc: Oral Oral Oral Oral   SpO2: 91% 92% 95% 95%   Weight: 77.9 kg (171 lb 11.8 oz)      Height:               Body mass index is 20.9 kg/m².    Intake/Output Summary (Last 24 hours) at 4/17/2025 1544  Last data filed at 4/17/2025 1519  Gross per 24 hour   Intake 720 ml   Output 2200 ml   Net -1480 ml       Telemetry: Paced ventricular rhythm    Physical Exam  General Appearance:   no acute distress  Alert and oriented x3  HENT:   lips not cyanotic  Atraumatic  Neck:  No jvd   supple  Respiratory:  no respiratory distress  normal breath sounds  no rales  Cardiovascular:    regular rhythm  no S3, no S4   no murmur  no rub  lower extremity edema: +1 skin:   warm, dry  No rashes      No Known Allergies  Scheduled meds:  allopurinol, 100 mg, Oral, Daily  apixaban, 2.5 mg, Oral, Q12H  citalopram, 10 mg, Oral, Daily  furosemide, 40 mg, Intravenous, Q12H  pantoprazole, 40 mg, Oral, Q AM  sodium chloride, 10 mL, Intravenous, Q12H  [Held by provider] tamsulosin, 0.4 mg, Oral, Daily  vancomycin, 1,000 mg, Intravenous, Q24H      IV meds:                      Pharmacy to dose vancomycin,       Data Review:  CBC          4/15/2025    13:46 4/16/2025    05:54 4/17/2025    05:29   CBC   WBC 10.12  9.78  7.75    RBC 4.72  4.73  4.33    Hemoglobin 13.8  " 15.0  13.8    Hematocrit 43.5  46.3  41.0    MCV 92.2  97.9  94.7    MCH 29.2  31.7  31.9    MCHC 31.7  32.4  33.7    RDW 18.2  19.0  17.9    Platelets 219  224  220      CMP          4/15/2025    13:46 4/16/2025    05:36 4/17/2025    05:29   CMP   Glucose 108  71  93    BUN 30  29  27    Creatinine 1.62  1.43  1.68    EGFR 42.1  48.9  40.3    Sodium 137  135  139    Potassium 4.0  3.7  3.6    Chloride 96  95  99    Calcium 8.9  9.2  8.4    Total Protein 6.7      Albumin 3.3      Globulin 3.4      Total Bilirubin 1.9      Alkaline Phosphatase 100      AST (SGOT) 16      ALT (SGPT) 8      Albumin/Globulin Ratio 1.0      BUN/Creatinine Ratio 18.5  20.3  16.1    Anion Gap 11.1  17.6  11.7       CARDIAC LABS:      Lab 04/16/25  0536 04/15/25  1920 04/15/25  1346   PROBNP 14,672.0*  --  14,794.0*   HSTROP T  --  65* 63*        No results found for: \"DIGOXIN\"   Lab Results   Component Value Date    TSH 3.740 02/25/2025           Invalid input(s): \"LDLCALC\"  No results found for: \"POCTROP\"  Lab Results   Component Value Date    TROPONINT 65 (C) 04/15/2025   (  Lab Results   Component Value Date    MG 2.3 04/17/2025     Results for orders placed during the hospital encounter of 01/18/25    Adult Transthoracic Echo Complete W/ Cont if Necessary Per Protocol    Interpretation Summary    Left ventricular ejection fraction appears to be 21 - 25%.    The left ventricular cavity is severely dilated.    Left ventricular diastolic function was indeterminate.    The left atrial cavity is moderate to severely dilated.    The right atrial cavity is mildly  dilated.    Moderate mitral valve regurgitation is present.    Estimated right ventricular systolic pressure from tricuspid regurgitation is moderately elevated (45-55 mmHg).    Aortic root measures 4.1 cm.  Ascending aorta measures 4.8 cm.    Pleural effusion noted.    No significant change from echo done 7/2024.         Results for orders placed during the hospital encounter of " 04/24/24    Stress Test With Myocardial Perfusion One Day    Interpretation Summary    Left ventricular ejection fraction is severely reduced (Calculated EF = 18%).  May be inaccurate due to irregularities in gating from underlying rhythm    EKG portion was nondiagnostic due to paced rhythm    Moderate sized fixed moderate intensity inferior lateral defect consistent with prior infarct no significant ischemic changes    ASSESSMENT:    Generalized weakness    CAD with CABG    Acute on chronic HFrEF (heart failure with reduced ejection fraction)    Essential hypertension    Anxiety    Stage 3b chronic kidney disease    CHF (congestive heart failure)    Urinary retention        PLAN:  1.  Change patient to p.o. Lasix 40 daily continue with supportive care GDMT limited due to low blood pressure issues.  Will sign off          Ge Whelan MD  4/17/2025    15:44 EDT

## 2025-04-17 NOTE — CONSULTS
Purpose of the visit was to evaluate for: goals of care/advanced care planning, support for patient/family, and pain/symptom management. Spoke with MD, RN, and patient and discussed goals of care, resuscitation status, advanced care planning, clarify code status, and [place] MOST .      Assessment:  RN, CCM, Palliative Care met with patient to introduce self and assess Palliative Care needs.  Medical history of hypertension, CHF, A-fib on Eliquis, arthritis, Aneurysm, Prostate CA, CABG, dual-chamber pacemaker and GERD.  Patient reports lives by himself and has assistance when needed from friends and home health.  Patient is agreeable to complete a Living Will and [place] MOST form.  Living Will completed with copy faxed to [facility] and original given to patient.  [place] MOST form also sent to [facility] and placed copy in physical chart.  Patient would be eligible for [facility] due to CHF and EF.  Discussed and educated on [facility] care but patient reports not wanting at this time.  Provided emotional support and will continue to follow.            Tasks Completed: Livingwill, Code Status clarification, Emotional Support, and [place] MOST .    Sammi WERNER RN, [facility]  Palliative Care

## 2025-04-17 NOTE — PLAN OF CARE
Goal Outcome Evaluation:              Outcome Evaluation: Pt is alert and oriented but frustrated. Diamond care completed. Wound on bottom dressed per wound care nurse in room. No other complaints during shift. Continue plan of care.

## 2025-04-17 NOTE — SIGNIFICANT NOTE
" Wound Eval / Progress Noted     Vickie     Patient Name: Javi Martínez  : 1940  MRN: [id number]  Today's Date: 2025                 Admit Date: 4/15/2025    Visit Dx:    ICD-10-CM ICD-9-CM   1. Generalized weakness  R53.1 780.79   2. Difficulty walking  R26.2 719.7         Generalized weakness    Essential hypertension    CAD with CABG    Anxiety    Stage 3b chronic kidney disease    Acute on chronic HFrEF (heart failure with reduced ejection fraction)    CHF (congestive heart failure)    Urinary retention        Past Medical History:   Diagnosis Date    Aneurysm 2013    Arthritis     left knee     Atrial fibrillation, persistent     CHB (complete heart block) 2023    CHF exacerbation 2024    Chronic obstructive pulmonary disease 2023    pt denies having this    Difficulty walking     Numbness in right foot    Erectile dysfunction     Essential hypertension 2019    GERD (gastroesophageal reflux disease)     HFrEF (heart failure with reduced ejection fraction) 2021    Hyperlipidemia 2021    Paroxysmal atrial fibrillation     Polyneuropathy 2023    Prostate cancer 2010    with seeds implant     Septic joint 2021    Sleep apnea     Urinary incontinence     Urinary tract infection         Past Surgical History:   Procedure Laterality Date    ABLATION OF DYSRHYTHMIC FOCUS      APPENDECTOMY      ARTERIAL BYPASS SURGERY      CARDIAC ABLATION      x2  \"years ago\"     CARDIAC ELECTROPHYSIOLOGY PROCEDURE N/A 2021    Procedure: PPM battery change;  Surgeon: Ge Whelan MD;  Location: [facility] INVASIVE LOCATION;  Service: Cardiovascular;  Laterality: N/A;    CARDIAC SURGERY  2006    cabg 3v    COLONOSCOPY      CORONARY ARTERY BYPASS GRAFT  2006    CYSTOSCOPY URETHROTOMY VISUAL INTERNAL N/A 08/15/2024    Procedure: CYSTOSCOPY, URETHRAL DILATION, CATHETER PLACEMENT, RETROGRADE URETHROGRAM;  Surgeon: August Myers MD;  [facility] MAIN OR;  Service: Urology;  Laterality: N/A;    CYSTOSCOPY URETHROTOMY VISUAL INTERNAL N/A 10/24/2024    Procedure: CYSTOSCOPY URETHROTOMY;  Surgeon: Mally Bernal MD;  Location: [facility] MAIN OR;  Service: Urology;  Laterality: N/A;    CYSTOSCOPY URETHROTOMY VISUAL INTERNAL N/A 03/11/2025    Procedure: CYSTOSCOPY URETHROTOMY VISUAL INTERNAL;  Surgeon: Mally Bernal MD;  Location: [facility] MAIN OR;  Service: Urology;  Laterality: N/A;    ENDOSCOPY      with dilation     ENDOSCOPY N/A 01/21/2025    Procedure: ESOPHAGOGASTRODUODENOSCOPY WITH BIOPSIES, POLYPECTOMY, BALLOON DILATION 18-20mm;  Surgeon: Simona Garcia MD;  Location: [facility] ENDOSCOPY;  Service: Gastroenterology;  Laterality: N/A;  GASTRIC POLYPS, SCHATZKI'S RING, HIATAL HERNIA    KNEE ARTHROSCOPY Left     PROSTATE SURGERY  2012    SUPRAPUBIC TUBE PLACEMENT N/A 03/11/2025    Procedure: SUPRAPUBIC CATHETER INSERTION;  Surgeon: Mally Bernal MD;  Location: [facility] MAIN OR;  Service: Urology;  Laterality: N/A;    TOTAL KNEE ARTHROPLASTY Left 07/23/2021    Procedure: TOTAL KNEE ARTHROPLASTY WITH DORA NAVIGATION WITH BIOMET;  Surgeon: Carlitos Zhao MD;  Location: [facility] OR;  Service: Orthopedics;  Laterality: Left;    VENTRICULAR CARDIAC PACEMAKER INSERTION      Underground [employer]          Physical Assessment:  Wound 04/15/25 2152 Bilateral upper coccyx Pressure Injury (Active)   Pressure Injury Stage 2 04/17/25 1605   Dressing Appearance dry 04/17/25 0901   Base pink;moist 04/17/25 1605   Periwound pink 04/17/25 1605   Periwound Temperature warm 04/17/25 1605   Periwound Skin Turgor soft 04/17/25 1605   Edges open 04/17/25 1605   Drainage Amount none 04/17/25 1700   Care, Wound cleansed with;soap and water 04/17/25 1700   Dressing Care dressing changed;border dressing;petroleum-based;non-adherent;silicone border foam 04/17/25 1700   Periwound Care absorptive dressing applied 04/17/25 1700            Supra pubic catheter - not in use,  not capped/plugged. Moist /red at opening crusting surrounding, cleansed with NS moist gauze, drain gauze applied and taped tubing onto gauze to attempt to stabilize and allow open to heal toward tube.       Stage 2 PI to buttocks along lower mid sacrum - RN performing wound care , wound nurse noted small partial thickness openings along midline to each side, RN cleansed and applied oil emulsion and silicone border.   Gel mattress in use and did allow for repositioning at this time to right side.   Areas due julius around openings.     Wound Check / Follow-up:  wound nurse follow up as patient decline assessment yesterday, RN performing wound care and patient agreeable to wound nurse looking while RN does care.   (See above)     Heels very dry slightly red but remain blanchable.     Impression: suprapubic catheter site with crusting - tubing not capped/plugged or anchored. Stage 2 PI to sacrum/buttocks     Short term goals:  repositioning, wound care, suprapubic cath site care, toward regaining skin integrity .     Triny Gaviria RN    4/17/2025    19:24 EDT

## 2025-04-18 NOTE — PROGRESS NOTES
"[facility] Clinical Pharmacy Services: Vancomycin Pharmacokinetic Initial Consult Note    Javi Martínez is a 84 y.o. male who is on day 3 of pharmacy to dose vancomycin for Urinary Tract Infection.    Consult Information  Consulting Provider: Alber Guerrero  Planned Duration of Therapy: 7 d  Was Patient Receiving Prior to Admission/Consult?: No  Loading Dose Ordered or Given:none  PK/PD Target: -600 mg/L.hr   Relevant ID History: History of ESBL UTI, has suprapubic catheter  Other Antimicrobials: None    Imaging Reviewed?: Yes    Microbiology Data  MRSA PCR performed: 25; Result: Positive (will continue to follow other cultures to guide continued clinical need)  Culture/Source:   Blood Culture   Date Value Ref Range Status   04/15/2025 No growth at 24 hours  Preliminary   04/15/2025 No growth at 24 hours  Preliminary     Blood Culture   Date Value Ref Range Status     Urine Culture   Date Value Ref Range Status   04/15/2025 Culture in progress  Preliminary     Wound Culture   Date Value Ref Range Status   2025 Scant growth (1+) Staphylococcus aureus, MRSA (A)  Final     Comment:       Methicillin resistant Staphylococcus aureus, Patient may be an isolation risk.         Vitals/Labs  Ht: 193 cm (76\"); Wt: 77.9 kg (171 lb 11.8 oz)  Temp (24hrs), Av.9 °F (36.6 °C), Min:97.5 °F (36.4 °C), Max:98.2 °F (36.8 °C)   Estimated Creatinine Clearance: 39.5 mL/min (A) (by C-G formula based on SCr of 1.59 mg/dL (H)).       Results from last 7 days   Lab Units 25  0512 25  0529 25  0554 25  0536   VANCOMYCIN RM mcg/mL 13.40  --   --   --    CREATININE mg/dL 1.59* 1.68*  --  1.43*   WBC 10*3/mm3 7.12 7.75 9.78  --      Assessment/Plan:    Vancomycin Dose:  1000 mg IV every 24 hours; which provides the following predicted parameters:  Loading dose: N/A  Regimen: 1000 mg IV every 24 hours.  Start time: 12:07 on 2025  Exposure target: AUC24 (range)400-600 mg/L  AUC24,ss: 539 " mg/L.hr  Probability of AUC24 > 400: 94 %  Ctrough,ss: 17.5 mg/L  Probability of Ctrough,ss > 20: 31 %  Probability of nephrotoxicity (Lodise MASOUD 2009): 13 %  Vanc Random ordered for 4/21 at 0600 (with AM labs)  Patient has order for Basic Metabolic Panel    Pharmacy will follow patient's kidney function and will adjust doses and obtain levels as necessary. Thank you for involving pharmacy in this patient's care. Please contact pharmacy with any questions or concerns.                           Suzanne Borjas [facility]  Clinical Pharmacist

## 2025-04-18 NOTE — CONSULTS
"   25 1130   Spiritual Care   Spiritual Care Visit Type initial   Spiritual Care Source  initiative   Receptivity to Spiritual Care visit welcomed   Spiritual Care Request coping/stress of illness support   Spiritual Care Interventions supportive conversation provided;referral provided   Response to Spiritual Care receptive of support;engaged in conversation   Use of Spiritual Resources non-Judaism use of spiritual care   Spiritual Care Follow-Up will follow closely     AT time of visit Javi in on 3NT sitting up in his bead brushing his hair. Introductions are made and my role explained. Javi shares that he is excited to speak with the  and asks me to take a seat.    He shares that he was raised a cultural Moravian. His family only went to [employer] during high holy days but other than that did not practice their Zoroastrianism brie. Javi is currently active at [employer], where his  wife went. He shares that the Bible study group has been checking on him.    Javi shares that he met his  wife in college, wanted to  her, but his mother disapproved because she was not Adventism, so he broke off the relationship. He remained in love with her his entire life. They met up again 6 yrs ago and got .     She suddenly   in his arms on 2024 after stating that she had a head ache.    He states \"I came alive when we  and  the day she . We only got 5 yrs together.\"    Javi shares that the doctors have mentioned hospice. He is trying to discern if he wants to consider hospice or wants to try to live longer. He shares when his wife  all he wanted was to die to be with her but now that he is faced with death he is unsure that death is actually what he wants. He names his daughter, and grandchildren as being the reason to continue aggressive treatment. But then he says \"they don't live close so what's the point of that.\"    Javi may benefit " greatly from a psych evaluation as he is deeply grieving the death of his wife and also the death of his son. His son  of an overdose a few yrs before his wife.     Javi would like to know the full scale of his prognosis and what his life would entail if he attempted aggressive treatment. He is very adamant that his remaining days not be spent in a nursing facility.     Chaplains will continue to follow and support.

## 2025-04-18 NOTE — PLAN OF CARE
Problem: Adult Inpatient Plan of Care  Goal: Plan of Care Review  Outcome: Progressing  Flowsheets (Taken 4/18/2025 0324)  Outcome Evaluation: Pt A&O, no c/o pain, Diamond intact and draining well, Suprapubic intact and clamped, catheter care this shift, pt encourage turn and reposition, will continue current plan of care, call light in reach  Plan of Care Reviewed With: patient  Goal: Patient-Specific Goal (Individualized)  Outcome: Progressing  Goal: Absence of Hospital-Acquired Illness or Injury  Outcome: Progressing  Intervention: Identify and Manage Fall Risk  Recent Flowsheet Documentation  Taken 4/18/2025 0323 by Lilian Higginbotham LPN  Safety Promotion/Fall Prevention: safety round/check completed  Taken 4/18/2025 0143 by Lilian Higginbotham LPN  Safety Promotion/Fall Prevention: safety round/check completed  Taken 4/17/2025 2300 by Lilian Higginbotham LPN  Safety Promotion/Fall Prevention: safety round/check completed  Taken 4/17/2025 2158 by Lilian Higginbotham LPN  Safety Promotion/Fall Prevention: safety round/check completed  Taken 4/17/2025 2100 by Lilian Higginbotham LPN  Safety Promotion/Fall Prevention: safety round/check completed  Taken 4/17/2025 1911 by Lilian Higginbotham LPN  Safety Promotion/Fall Prevention: safety round/check completed  Intervention: Prevent Skin Injury  Recent Flowsheet Documentation  Taken 4/18/2025 0323 by Lilian Higginbotham LPN  Body Position:   neutral body alignment   position changed independently  Taken 4/17/2025 2300 by Lilian Higginbotham LPN  Body Position: (encourage)   patient/family refused   other (see comments)  Taken 4/17/2025 2158 by Lilian Higginbotham LPN  Body Position:   side-lying   right  Skin Protection: incontinence pads utilized  Intervention: Prevent and Manage VTE (Venous Thromboembolism) Risk  Recent Flowsheet Documentation  Taken 4/17/2025 2158 by Lilian Higginbotham LPN  VTE Prevention/Management: (see mar) other (see comments)  Intervention: Prevent Infection  Recent  Flowsheet Documentation  Taken 4/17/2025 2158 by Lilian Higginbotham LPN  Infection Prevention:   rest/sleep promoted   single patient room provided  Goal: Optimal Comfort and Wellbeing  Outcome: Progressing  Intervention: Provide Person-Centered Care  Recent Flowsheet Documentation  Taken 4/17/2025 2158 by Lilian Higginbotham LPN  Trust Relationship/Rapport:   care explained   emotional support provided  Goal: Readiness for Transition of Care  Outcome: Progressing     Problem: Skin Injury Risk Increased  Goal: Skin Health and Integrity  Outcome: Progressing  Intervention: Optimize Skin Protection  Recent Flowsheet Documentation  Taken 4/17/2025 2158 by Lilian Higginbotham LPN  Pressure Reduction Techniques: frequent weight shift encouraged  Pressure Reduction Devices: positioning supports utilized  Skin Protection: incontinence pads utilized     Problem: Comorbidity Management  Goal: Blood Pressure in Desired Range  Outcome: Progressing  Intervention: Maintain Blood Pressure Management  Recent Flowsheet Documentation  Taken 4/17/2025 2158 by Lilian Higginbotham LPN  Medication Review/Management: medications reviewed     Problem: Heart Failure  Goal: Optimal Coping  Outcome: Progressing  Intervention: Support Psychosocial Response  Recent Flowsheet Documentation  Taken 4/17/2025 2158 by Lilian Higginbotham LPN  Supportive Measures: active listening utilized  Family/Support System Care:   self-care encouraged   support provided  Goal: Optimal Cardiac Output and Blood Flow  Outcome: Progressing  Goal: Stable Heart Rate and Rhythm  Outcome: Progressing  Goal: Fluid and Electrolyte Balance  Outcome: Progressing  Goal: Optimal Functional Ability  Outcome: Progressing  Intervention: Optimize Functional Ability  Recent Flowsheet Documentation  Taken 4/17/2025 2158 by Lilian Higginbotham LPN  Self-Care Promotion: independence encouraged  Goal: Improved Oral Intake  Outcome: Progressing  Goal: Effective Oxygenation and Ventilation  Outcome:  Progressing  Intervention: Promote Airway Secretion Clearance  Recent Flowsheet Documentation  Taken 4/17/2025 2158 by Lilian Higginbotham LPN  Cough And Deep Breathing: done independently per patient  Goal: Effective Breathing Pattern During Sleep  Outcome: Progressing  Intervention: Monitor and Manage Obstructive Sleep Apnea  Recent Flowsheet Documentation  Taken 4/17/2025 2158 by Lilian Higginbotham LPN  Medication Review/Management: medications reviewed     Problem: Fall Injury Risk  Goal: Absence of Fall and Fall-Related Injury  Outcome: Progressing  Intervention: Identify and Manage Contributors  Recent Flowsheet Documentation  Taken 4/17/2025 2158 by Lilian Higginbotham LPN  Medication Review/Management: medications reviewed  Self-Care Promotion: independence encouraged  Intervention: Promote Injury-Free Environment  Recent Flowsheet Documentation  Taken 4/18/2025 0323 by Lilian Higginbotham LPN  Safety Promotion/Fall Prevention: safety round/check completed  Taken 4/18/2025 0143 by Lilian Higginbotham LPN  Safety Promotion/Fall Prevention: safety round/check completed  Taken 4/17/2025 2300 by Lilian Higginbotham LPN  Safety Promotion/Fall Prevention: safety round/check completed  Taken 4/17/2025 2158 by Lilian Higginbotham LPN  Safety Promotion/Fall Prevention: safety round/check completed  Taken 4/17/2025 2100 by Lilian Higginbotham LPN  Safety Promotion/Fall Prevention: safety round/check completed  Taken 4/17/2025 1911 by Lilian Higginbotham LPN  Safety Promotion/Fall Prevention: safety round/check completed     Problem: Palliative Care  Goal: Enhanced Quality of Life  Outcome: Progressing  Intervention: Optimize Function  Recent Flowsheet Documentation  Taken 4/17/2025 2158 by Lilian Higginbotham LPN  Sensory Stimulation Regulation: care clustered  Intervention: Optimize Psychosocial Wellbeing  Recent Flowsheet Documentation  Taken 4/17/2025 2158 by Lilian Higginbotham LPN  Supportive Measures: active listening utilized  Family/Support  System Care:   self-care encouraged   support provided   Goal Outcome Evaluation:  Plan of Care Reviewed With: patient           Outcome Evaluation: Pt A&O, no c/o pain, Diamond intact and draining well, Suprapubic intact and clamped, catheter care this shift, pt encourage turn and reposition, will continue current plan of care, call light in reach

## 2025-04-18 NOTE — NURSING NOTE
"Discussed goals with pt as some concerns had been brought up. Very in depth conversation occurred. While pt has no intentions/plans of harming himself he reports he knows he hasn't done well at living. But during discussion its noted pt exhibited signs of a grieving spouse who is trying to find his purpose again even voiced knowing he hasn't done well because he never thought he would be in this situation. Provided emotional support and discussed goals which pt answered \"I honestly don't know what I want.' Daughter is to arrive Sunday verbalized following up Monday with pt to see if any help could be offered. Palliative care will continue to follow.        Mary REYES RN  Palliative Care      "

## 2025-04-18 NOTE — PLAN OF CARE
Goal Outcome Evaluation:  Plan of Care Reviewed With: patient        Progress: no change  Outcome Evaluation: Bp has been running low this shift. MD aware. Pt is A&O x4. Pt has been resting well. Pt tested positive for Cdiff this shift. Pt had multiple loose stools this shift. Pt has not complained of pain this shift. PT did work with pt today. No other complaints this shift. Call light [facility]. Will continue plan of care.

## 2025-04-18 NOTE — PROGRESS NOTES
[facility]   Hospitalist Progress Note  Date: 2025  Patient Name: Javi Martínez  : 1940  MRN: [id number]  Date of admission: 4/15/2025  Room/Bed: [facility]0/      Subjective   Subjective     Chief Complaint: Generalized weakness     Summary:Javi Martínez is a 84 y.o. male with past medical history of hypertension, CHF, A-fib on Eliquis, arthritis, history of prostate cancer status post brachytherapy and external beam XRT and BPH and GERD presented to the ED for evaluation of generalized weakness and fall.  Patient states that for the last week or so he has not been feeling well with increased weakness, dysuria, lethargy, decreased p.o. intake, and multiple falls.  Patient does live alone and had been unable to perform his ADLs so he was brought to the ED for further evaluation.  In the ED patient was tachypneic on arrival with remaining vitals being within normal limits on room air.  His UA was positive for a UTI with labs showing an elevated proBNP, reduced renal function, elevated lactic acid, and elevated but flat troponin.  Remaining labs are relatively unremarkable including a normal WBC and negative COVID flu RSV.  Chest x-ray showed pulmonary edema although improved from prior imaging.  When seen patient was resting comfortably and when asked he denied any recent fevers, chills, headaches, focal weakness, chest pain, palpitation, shortness of breath, cough, abdominal pain, nausea, vomiting, diarrhea, constipation, hematuria, hematochezia, melena, or anxiety.   Patient admitted for further evaluation and treatment.  Patient had pus coming from suprapubic catheter insertion site along with pus within suprapubic catheter.  Blood pressure borderline low, perfusing extremities well.  Cardiologist consulted.  Also started on IV antibiotics for possible intra-abdominal infection.  CT abdomen pelvis without contrast was obtained.  Wound culture grew MRSA.  Urine culture pending.  Blood culture showing  no growth till date.    Interval Followup: No acute events overnight.  Patient seemed less depressed today and more interactive.  He did mention above shooting himself if he had a gun in his hand earlier to the staff, when I rediscussed the patient stated he was joking and has no such intention.  Mood seems better.  Daughter coming in from [place] on Sunday and patient excited about it.  Laying in bed, denied any active complaints.  Wound culture showed MRSA.      Review of Systems    All systems reviewed and negative except for what is outlined above.      Objective   Objective     Vitals:   Temp:  [97.5 °F (36.4 °C)-98.2 °F (36.8 °C)] 97.9 °F (36.6 °C)  Heart Rate:  [66-78] 70  Resp:  [16-19] 16  BP: ()/(46-70) 82/51    Physical Exam   General: Awake, alert,NAD  Cardiovascular: RRR, no murmurs   Pulmonary: CTA bilaterally; no wheezes; no conversational dyspnea  Gastrointestinal: S/ND/NT, +BS  Neuro: Alert, awake, oriented x 3; speech clear; no tremor      Result Review    Result Review:  I have personally reviewed these results:  [x]  Laboratory      Lab 04/18/25  0512 04/17/25  0529 04/16/25  0556 04/16/25  0554 04/15/25  1920   WBC 7.12 7.75  --  9.78  --    HEMOGLOBIN 12.6* 13.8  --  15.0  --    HEMATOCRIT 37.4* 41.0  --  46.3  --    PLATELETS 222 220  --  224  --    NEUTROS ABS 4.81 5.28  --  6.80  --    IMMATURE GRANS (ABS) 0.02 0.03  --  0.02  --    LYMPHS ABS 1.36 1.46  --  1.36  --    MONOS ABS 0.78 0.84  --  1.35*  --    EOS ABS 0.09 0.09  --  0.15  --    MCV 94.2 94.7  --  97.9*  --    LACTATE  --   --  1.2 2.2* 1.7         Lab 04/18/25  0512 04/17/25  0529 04/16/25  0536   SODIUM 137 139 135*   POTASSIUM 3.5 3.6 3.7   CHLORIDE 99 99 95*   CO2 26.4 28.3 22.4   ANION GAP 11.6 11.7 17.6*   BUN 26* 27* 29*   CREATININE 1.59* 1.68* 1.43*   EGFR 43.1* 40.3* 48.9*   GLUCOSE 101* 93 71   CALCIUM 8.4* 8.4* 9.2   MAGNESIUM 2.2 2.3 2.6*   PHOSPHORUS 2.9 3.2  --          Lab 04/15/25  1346   TOTAL PROTEIN  6.7   ALBUMIN 3.3*   GLOBULIN 3.4   ALT (SGPT) 8   AST (SGOT) 16   BILIRUBIN 1.9*   ALK PHOS 100         Lab 04/16/25  0536 04/15/25  1920 04/15/25  1346   PROBNP 14,672.0*  --  14,794.0*   HSTROP T  --  65* 63*                 Lab 04/16/25  0556   PH, ARTERIAL 7.509*   PCO2, ARTERIAL 34.7*   PO2 ART 62.8*   O2 SATURATION ART 93.9*   HCO3 ART 27.6*   BASE EXCESS ART 4.8*     Brief Urine Lab Results  (Last result in the past 365 days)        Color   Clarity   Blood   Leuk Est   Nitrite   Protein   CREAT   Urine HCG        04/15/25 1823 Yellow   Clear   Trace   Large (3+)   Negative   Negative                 [x]  Microbiology   Microbiology Results (last 10 days)       Procedure Component Value - Date/Time    Wound Culture - Wound, Suprapubic Catheter [[id number]]  (Abnormal)  (Susceptibility) Collected: 04/16/25 1437    Lab Status: Final result Specimen: Wound from Suprapubic Catheter Updated: 04/18/25 0645     Wound Culture Scant growth (1+) Staphylococcus aureus, MRSA     Comment:   Methicillin resistant Staphylococcus aureus, Patient may be an isolation risk.        Gram Stain Rare (1+) WBCs seen      Occasional Gram positive cocci    Susceptibility        Staphylococcus aureus, MRSA      SHINE      Clindamycin Resistant      Erythromycin Resistant      Oxacillin Resistant      Rifampin Susceptible      Tetracycline Susceptible      Trimethoprim + Sulfamethoxazole Susceptible      Vancomycin Susceptible                           MRSA Screen, PCR (Inpatient) - Swab, Nares [[id number]]  (Abnormal) Collected: 04/16/25 1437    Lab Status: Final result Specimen: Swab from Nares Updated: 04/16/25 1601     MRSA PCR MRSA Detected    Narrative:      The negative predictive value of this diagnostic test is high and should only be used to consider de-escalating anti-MRSA therapy. A positive result may indicate colonization with MRSA and must be correlated clinically.    Blood Culture - Blood, Arm, Left [[id number]]  (Normal)  Collected: 04/15/25 2020    Lab Status: Preliminary result Specimen: Blood from Arm, Left Updated: 04/17/25 2030     Blood Culture No growth at 2 days    Blood Culture - Blood, Arm, Right [[id number]]  (Normal) Collected: 04/15/25 2020    Lab Status: Preliminary result Specimen: Blood from Arm, Right Updated: 04/17/25 2030     Blood Culture No growth at 2 days    COVID PRE-OP / PRE-PROCEDURE SCREENING ORDER (NO ISOLATION) - Swab, Nasopharynx [[id number]]  (Normal) Collected: 04/15/25 1917    Lab Status: Final result Specimen: Swab from Nasopharynx Updated: 04/15/25 2006    Narrative:      The following orders were created for panel order COVID PRE-OP / PRE-PROCEDURE SCREENING ORDER (NO ISOLATION) - Swab, Nasopharynx.  Procedure                               Abnormality         Status                     ---------                               -----------         ------                     COVID-19, FLU A/B, RSV P...[[id number]]  Normal              Final result                 Please view results for these tests on the individual orders.    COVID-19, FLU A/B, RSV PCR 1 HR TAT - Swab, Nasopharynx [[id number]]  (Normal) Collected: 04/15/25 1917    Lab Status: Final result Specimen: Swab from Nasopharynx Updated: 04/15/25 2006     COVID19 Not Detected     Influenza A PCR Not Detected     Influenza B PCR Not Detected     RSV, PCR Not Detected    Narrative:      Fact sheet for providers: https://www.fda.gov/media/278930/download    Fact sheet for patients: https://www.fda.gov/media/153306/download    Test performed by PCR.    Urine Culture - Urine, Urine, Clean Catch [[id number]]  (Normal) Collected: 04/15/25 1823    Lab Status: Preliminary result Specimen: Urine, Clean Catch Updated: 04/17/25 1133     Urine Culture Culture in progress          [x]  Radiology  CT Abdomen Pelvis Without Contrast  Result Date: 4/16/2025  1. No obstructive uropathy 2. 2 mm nonobstructing right renal pelvis stone 3. Right renal cysts 4. Haziness  of the perivesical fat suggests cystitis 5. Cholelithiasis. 6. Colonic diverticulosis. 7. Trace perihepatic ascites 8. Cardiomegaly with bilateral pleural effusions Electronically Signed: Víctor Yoni  4/16/2025 3:11 PM EDT  Workstation ID: [id number]    XR Chest 1 View  Result Date: 4/16/2025  Impression: Stable bilateral airspace opacities, likely due to pulmonary edema with bilateral pleural effusions. Patchy right basilar opacities may be due to superimposed atelectasis and/or pneumonia. Electronically Signed: Simona Tadeo MD  4/16/2025 12:42 PM EDT  Workstation ID: [id number]    XR Chest 1 View  Result Date: 4/15/2025  Impression: 1. Features of interstitial pulmonary edema appear improved since 3/2/2025. Stable cardiac enlargement. 2. Small bilateral pleural effusions and probable passive bibasilar atelectasis, stable to slightly improved Electronically Signed: Poonam Pisano MD  4/15/2025 2:15 PM EDT  Workstation ID: [id number]    []  EKG/Telemetry   []  Cardiology/Vascular   []  Pathology  []  Old records  []  Other:    Assessment & Plan   Assessment / Plan     Assessment:  UTI  MRSA positive in wound culture from suprapubic catheter insertion site  Lactic acidosis, resolved  Acute on chronic HFrEF  History of heart failure with reduced ejection fraction,Last EF of 21-25% on 1/2025  Hypotension, not in cardiogenic shock  CAD s/p CABG  Stage IIIb CKD  Urinary retention S/p suprapubic catheter placement  Atrial fibrillation on Eliquis    Plan:  Patient currently being managed medicine service.  On IV vancomycin for MRSA infection.  Wound culture from suprapubic catheter insertion site growing MRSA.  Blood culture showing no growth at 2 days.  Suprapubic catheter draining pus in urine, urine culture pending.  Blood culture showing no growth at 2 days.  Cardiology was consulted for acute on chronic HFrEF.  Patient at end stage heart failure.  Palliative consulted for goals of care discussion.  Blood pressure low  normal, perfusing extremities well.  Not in cardiogenic shock.  Not able to introduce GDMT given his low blood pressure.  Goals of care discussion ongoing.  Continue rest of current management.  Labs in AM.  Guarded prognosis.  Discussed with patient's daughter over telephone yesterday on 4/17, she stated that his mental status and mood has declined significantly since his wife passed away about a year ago.  He has given up and wants to die as per the daughter.  Discontinuing escitalopram and switching to Lexapro 50 mg daily.  Clinical course to dictate further management.     Discussed with RN.    VTE Prophylaxis:  Pharmacologic VTE prophylaxis orders are present.        CODE STATUS:   Code Status (Patient has no pulse and is not breathing): No CPR (Do Not Attempt to Resuscitate)  Medical Interventions (Patient has pulse or is breathing): Limited Support  Medical Intervention Limits: No intubation (DNI)      Electronically signed by Alber Guerrero MD, 04/18/25, 10:01 AM EDT.

## 2025-04-19 NOTE — PLAN OF CARE
Goal Outcome Evaluation:              Outcome Evaluation: Pt is alert and oriented.  VSS with blood pressures running a little low.  SOA with O2 WNL on RA.  Ativan given for anxiety this shift.  No c/o of pain and no episodes of diarrhea. Continue POC.

## 2025-04-19 NOTE — PLAN OF CARE
Goal Outcome Evaluation:  Plan of Care Reviewed With: patient        Progress: no change  Outcome Evaluation: VSS. A&Ox4. Contact spore precautions maintained. Skin care tolerated well. Pt rested well throughout shift. No c/o pain or discomfort at this time.

## 2025-04-19 NOTE — PROGRESS NOTES
[facility]   Hospitalist Progress Note  Date: 2025  Patient Name: Javi Martínez  : 1940  MRN: [id number]  Date of admission: 4/15/2025  Room/Bed: [facility]0/      Subjective   Subjective     Chief Complaint: Generalized weakness     Summary:Javi Martínez is a 84 y.o. male with past medical history of hypertension, CHF, A-fib on Eliquis, arthritis, history of prostate cancer status post brachytherapy and external beam XRT and BPH and GERD presented to the ED for evaluation of generalized weakness and fall.  Patient states that for the last week or so he has not been feeling well with increased weakness, dysuria, lethargy, decreased p.o. intake, and multiple falls.  Patient does live alone and had been unable to perform his ADLs so he was brought to the ED for further evaluation.  In the ED patient was tachypneic on arrival with remaining vitals being within normal limits on room air.  His UA was positive for a UTI with labs showing an elevated proBNP, reduced renal function, elevated lactic acid, and elevated but flat troponin.  Remaining labs are relatively unremarkable including a normal WBC and negative COVID flu RSV.  Chest x-ray showed pulmonary edema although improved from prior imaging.  When seen patient was resting comfortably and when asked he denied any recent fevers, chills, headaches, focal weakness, chest pain, palpitation, shortness of breath, cough, abdominal pain, nausea, vomiting, diarrhea, constipation, hematuria, hematochezia, melena, or anxiety.   Patient admitted for further evaluation and treatment.  Patient had pus coming from suprapubic catheter insertion site along with pus within suprapubic catheter.  Blood pressure borderline low, perfusing extremities well.  Cardiologist consulted.  Also started on IV antibiotics for possible intra-abdominal infection.  CT abdomen pelvis without contrast was obtained.  Wound culture grew MRSA.  Urine culture pending.  Blood culture showing  no growth till date.    Interval Followup: No acute events overnight.  Patient had multiple episodes of diarrhea yesterday.  C. difficile showed colonization no active infection.  Enteric and bacterial panel negative.  No diarrhea today.  Laying in bed comfortably, no active complaints.      Review of Systems    All systems reviewed and negative except for what is outlined above.      Objective   Objective     Vitals:   Temp:  [97.5 °F (36.4 °C)-98 °F (36.7 °C)] 97.7 °F (36.5 °C)  Heart Rate:  [67-72] 70  Resp:  [16] 16  BP: ()/(61-72) 92/70  Flow (L/min) (Oxygen Therapy):  [1] 1    Physical Exam   General: Awake, alert,NAD  Cardiovascular: RRR, no murmurs   Pulmonary: CTA bilaterally; no wheezes; no conversational dyspnea  Gastrointestinal: S/ND/NT, +BS  Neuro: Alert, awake, oriented x 3; speech clear; no tremor      Result Review    Result Review:  I have personally reviewed these results:  [x]  Laboratory      Lab 04/19/25 0517 04/18/25 0512 04/17/25  0529 04/16/25  0556 04/16/25  0554 04/15/25  1920   WBC 8.32 7.12 7.75  --  9.78  --    HEMOGLOBIN 14.0 12.6* 13.8  --  15.0  --    HEMATOCRIT 41.8 37.4* 41.0  --  46.3  --    PLATELETS 226 222 220  --  224  --    NEUTROS ABS 5.52 4.81 5.28  --  6.80  --    IMMATURE GRANS (ABS) 0.04 0.02 0.03  --  0.02  --    LYMPHS ABS 1.72 1.36 1.46  --  1.36  --    MONOS ABS 0.92* 0.78 0.84  --  1.35*  --    EOS ABS 0.07 0.09 0.09  --  0.15  --    MCV 93.7 94.2 94.7  --  97.9*  --    LACTATE  --   --   --  1.2 2.2* 1.7         Lab 04/19/25 0517 04/18/25  0512 04/17/25 0529   SODIUM 136 137 139   POTASSIUM 3.7 3.5 3.6   CHLORIDE 100 99 99   CO2 23.7 26.4 28.3   ANION GAP 12.3 11.6 11.7   BUN 23 26* 27*   CREATININE 1.39* 1.59* 1.68*   EGFR 50.6* 43.1* 40.3*   GLUCOSE 107* 101* 93   CALCIUM 8.8 8.4* 8.4*   MAGNESIUM 2.2 2.2 2.3   PHOSPHORUS 2.8 2.9 3.2         Lab 04/15/25  1346   TOTAL PROTEIN 6.7   ALBUMIN 3.3*   GLOBULIN 3.4   ALT (SGPT) 8   AST (SGOT) 16   BILIRUBIN  1.9*   ALK PHOS 100         Lab 04/16/25  0536 04/15/25  1920 04/15/25  1346   PROBNP 14,672.0*  --  14,794.0*   HSTROP T  --  65* 63*                 Lab 04/16/25  0556   PH, ARTERIAL 7.509*   PCO2, ARTERIAL 34.7*   PO2 ART 62.8*   O2 SATURATION ART 93.9*   HCO3 ART 27.6*   BASE EXCESS ART 4.8*     Brief Urine Lab Results  (Last result in the past 365 days)        Color   Clarity   Blood   Leuk Est   Nitrite   Protein   CREAT   Urine HCG        04/15/25 1823 Yellow   Clear   Trace   Large (3+)   Negative   Negative                 [x]  Microbiology   Microbiology Results (last 10 days)       Procedure Component Value - Date/Time    Clostridioides difficile Toxin - Stool, Per Rectum [[id number]]  (Abnormal) Collected: 04/18/25 1519    Lab Status: Final result Specimen: Stool from Per Rectum Updated: 04/18/25 1648    Narrative:      The following orders were created for panel order Clostridioides difficile Toxin - Stool, Per Rectum.  Procedure                               Abnormality         Status                     ---------                               -----------         ------                     Clostridioides difficile...[[id number]]  Abnormal            Final result                 Please view results for these tests on the individual orders.    Clostridioides difficile Toxin, PCR - Stool, Per Rectum [[id number]]  (Abnormal) Collected: 04/18/25 1519    Lab Status: Final result Specimen: Stool from Per Rectum Updated: 04/18/25 1648     Toxigenic C. difficile by PCR Positive     027 Toxin Presumptive Negative    Narrative:      DNA from a toxigenic strain of C.difficile has been detected. Antigen testing for the presence of free C.difficile toxin is currently in progress, to help determine the clinical significance of this PCR result.     Clostridioides difficile toxin Ag, Reflex - Stool, Per Rectum [[id number]]  (Normal) Collected: 04/18/25 1519    Lab Status: Final result Specimen: Stool from Per Rectum  Updated: 04/18/25 1648     C.diff Toxin Ag Negative    Narrative:      DNA from a toxigenic strain of C.difficile was detected, although the free toxin itself was not detected. These findings are consistent with C.difficile colonization and may not reflect actual C.difficile infection. Clinical correlation needed.    Wound Culture - Wound, Suprapubic Catheter [[id number]]  (Abnormal)  (Susceptibility) Collected: 04/16/25 1437    Lab Status: Final result Specimen: Wound from Suprapubic Catheter Updated: 04/18/25 0645     Wound Culture Scant growth (1+) Staphylococcus aureus, MRSA     Comment:   Methicillin resistant Staphylococcus aureus, Patient may be an isolation risk.        Gram Stain Rare (1+) WBCs seen      Occasional Gram positive cocci    Susceptibility        Staphylococcus aureus, MRSA      SHINE      Clindamycin Resistant      Erythromycin Resistant      Oxacillin Resistant      Rifampin Susceptible      Tetracycline Susceptible      Trimethoprim + Sulfamethoxazole Susceptible      Vancomycin Susceptible                           MRSA Screen, PCR (Inpatient) - Swab, Nares [[id number]]  (Abnormal) Collected: 04/16/25 1437    Lab Status: Final result Specimen: Swab from Nares Updated: 04/16/25 1601     MRSA PCR MRSA Detected    Narrative:      The negative predictive value of this diagnostic test is high and should only be used to consider de-escalating anti-MRSA therapy. A positive result may indicate colonization with MRSA and must be correlated clinically.    Blood Culture - Blood, Arm, Left [[id number]]  (Normal) Collected: 04/15/25 2020    Lab Status: Preliminary result Specimen: Blood from Arm, Left Updated: 04/18/25 2030     Blood Culture No growth at 3 days    Blood Culture - Blood, Arm, Right [[id number]]  (Normal) Collected: 04/15/25 2020    Lab Status: Preliminary result Specimen: Blood from Arm, Right Updated: 04/18/25 2030     Blood Culture No growth at 3 days    COVID PRE-OP / PRE-PROCEDURE  SCREENING ORDER (NO ISOLATION) - Swab, Nasopharynx [[id number]]  (Normal) Collected: 04/15/25 1917    Lab Status: Final result Specimen: Swab from Nasopharynx Updated: 04/15/25 2006    Narrative:      The following orders were created for panel order COVID PRE-OP / PRE-PROCEDURE SCREENING ORDER (NO ISOLATION) - Swab, Nasopharynx.  Procedure                               Abnormality         Status                     ---------                               -----------         ------                     COVID-19, FLU A/B, RSV P...[[id number]]  Normal              Final result                 Please view results for these tests on the individual orders.    COVID-19, FLU A/B, RSV PCR 1 HR TAT - Swab, Nasopharynx [[id number]]  (Normal) Collected: 04/15/25 1917    Lab Status: Final result Specimen: Swab from Nasopharynx Updated: 04/15/25 2006     COVID19 Not Detected     Influenza A PCR Not Detected     Influenza B PCR Not Detected     RSV, PCR Not Detected    Narrative:      Fact sheet for providers: https://www.fda.gov/media/519158/download    Fact sheet for patients: https://www.fda.gov/media/389368/download    Test performed by PCR.    Urine Culture - Urine, Urine, Clean Catch [[id number]]  (Abnormal) Collected: 04/15/25 1823    Lab Status: Preliminary result Specimen: Urine, Clean Catch Updated: 04/18/25 0928     Urine Culture >100,000 CFU/mL Gram Negative Bacilli    Narrative:      Colonization of the urinary tract without infection is common. Treatment is discouraged unless the patient is symptomatic, pregnant, or undergoing an invasive urologic procedure.          [x]  Radiology  CT Abdomen Pelvis Without Contrast  Result Date: 4/16/2025  1. No obstructive uropathy 2. 2 mm nonobstructing right renal pelvis stone 3. Right renal cysts 4. Haziness of the perivesical fat suggests cystitis 5. Cholelithiasis. 6. Colonic diverticulosis. 7. Trace perihepatic ascites 8. Cardiomegaly with bilateral pleural effusions  Electronically Signed: Víctor Vallejo  4/16/2025 3:11 PM EDT  Workstation ID: [id number]    XR Chest 1 View  Result Date: 4/16/2025  Impression: Stable bilateral airspace opacities, likely due to pulmonary edema with bilateral pleural effusions. Patchy right basilar opacities may be due to superimposed atelectasis and/or pneumonia. Electronically Signed: Simona Tadeo MD  4/16/2025 12:42 PM EDT  Workstation ID: [id number]    XR Chest 1 View  Result Date: 4/15/2025  Impression: 1. Features of interstitial pulmonary edema appear improved since 3/2/2025. Stable cardiac enlargement. 2. Small bilateral pleural effusions and probable passive bibasilar atelectasis, stable to slightly improved Electronically Signed: Poonam Pisano MD  4/15/2025 2:15 PM EDT  Workstation ID: [id number]    []  EKG/Telemetry   []  Cardiology/Vascular   []  Pathology  []  Old records  []  Other:    Assessment & Plan   Assessment / Plan     Assessment:  UTI  MRSA positive in wound culture from suprapubic catheter insertion site  Lactic acidosis, resolved  Acute on chronic HFrEF  History of heart failure with reduced ejection fraction,Last EF of 21-25% on 1/2025  Hypotension, not in cardiogenic shock  CAD s/p CABG  Stage IIIb CKD  Urinary retention S/p suprapubic catheter placement  Atrial fibrillation on Eliquis    Plan:  Patient currently being managed medicine service.  On IV vancomycin for MRSA infection.  Wound culture from suprapubic catheter insertion site grew MRSA.  Blood culture showing no growth till date.  Suprapubic catheter draining pus in urine, urine culture ESBL E. coli and Citobacter farmeri.  Will start patient on Zosyn to cover both.  Blood culture showing no growth at 2 days.  Cardiology was consulted for acute on chronic HFrEF.  Patient at end stage heart failure.  Palliative consulted for goals of care discussion.  Blood pressure low normal, perfusing extremities well.  Not in cardiogenic shock.  Not able to introduce GDMT given  his low blood pressure.  Goals of care discussion ongoing.  Continue rest of current management.  Labs in AM.  Guarded prognosis.  Discussed with patient's daughter over telephone yesterday on 4/17, she stated that his mental status and mood has declined significantly since his wife passed away about a year ago.  He has given up and wants to die as per the daughter.  Discontinued home escitalopram and switching to Lexapro 50 mg daily.  Mood stable today.  Clinical course to dictate further management.     Discussed with RN.    VTE Prophylaxis:  Pharmacologic VTE prophylaxis orders are present.        CODE STATUS:   Code Status (Patient has no pulse and is not breathing): No CPR (Do Not Attempt to Resuscitate)  Medical Interventions (Patient has pulse or is breathing): Limited Support  Medical Intervention Limits: No intubation (DNI)      Electronically signed by Alber Guerrero MD, 04/19/25, 10:01 AM EDT.

## 2025-04-20 NOTE — PLAN OF CARE
Goal Outcome Evaluation:              Outcome Evaluation: Pt is alert and oriented.  VSS.  Pt reported stomach cramping.  Bentyl prescribed.  No nausea or vomiting.  No PRN meds given.  Continue POC.

## 2025-04-20 NOTE — PROGRESS NOTES
[facility]   Hospitalist Progress Note  Date: 2025  Patient Name: Javi Martínez  : 1940  MRN: [id number]  Date of admission: 4/15/2025  Room/Bed: [facility]/      Subjective   Subjective     Chief Complaint: Generalized weakness     Summary:Javi Martínez is a 84 y.o. male with past medical history of hypertension, CHF, A-fib on Eliquis, arthritis, history of prostate cancer status post brachytherapy and external beam XRT and BPH and GERD presented to the ED for evaluation of generalized weakness and fall.  Patient states that for the last week or so he has not been feeling well with increased weakness, dysuria, lethargy, decreased p.o. intake, and multiple falls.  Patient does live alone and had been unable to perform his ADLs so he was brought to the ED for further evaluation.  In the ED patient was tachypneic on arrival with remaining vitals being within normal limits on room air.  His UA was positive for a UTI with labs showing an elevated proBNP, reduced renal function, elevated lactic acid, and elevated but flat troponin.  Remaining labs are relatively unremarkable including a normal WBC and negative COVID flu RSV.  Chest x-ray showed pulmonary edema although improved from prior imaging.  When seen patient was resting comfortably and when asked he denied any recent fevers, chills, headaches, focal weakness, chest pain, palpitation, shortness of breath, cough, abdominal pain, nausea, vomiting, diarrhea, constipation, hematuria, hematochezia, melena, or anxiety.   Patient admitted for further evaluation and treatment.  Patient had pus coming from suprapubic catheter insertion site along with pus within suprapubic catheter.  Blood pressure borderline low, perfusing extremities well.  Cardiologist consulted.  Also started on IV antibiotics for possible intra-abdominal infection.  CT abdomen pelvis without contrast was obtained.  Wound culture grew MRSA.  Urine culture grew ESBL and Citrobacter  maeve.  Blood culture showing no growth till date.    Interval Followup: No acute events overnight.  Started on dicyclomine for urgency and abdominal cramping.  Also on as needed Imodium for diarrhea; daily.  Today.  C. difficile showed colonization no active infection.  Enteric and bacterial panel negative.  Laying in bed comfortably, no other complaints.        Review of Systems    All systems reviewed and negative except for what is outlined above.      Objective   Objective     Vitals:   Temp:  [97.5 °F (36.4 °C)-98.2 °F (36.8 °C)] 98.2 °F (36.8 °C)  Heart Rate:  [70-72] 71  Resp:  [16-18] 18  BP: ()/(64-79) 99/67  Flow (L/min) (Oxygen Therapy):  [1] 1    Physical Exam   General: Awake, alert,NAD  Cardiovascular: RRR, no murmurs   Pulmonary: CTA bilaterally; no wheezes; no conversational dyspnea  Gastrointestinal: S/ND/NT, +BS  Neuro: Alert, awake, oriented x 3; speech clear; no tremor      Result Review    Result Review:  I have personally reviewed these results:  [x]  Laboratory      Lab 04/20/25  0618 04/19/25  0517 04/18/25  0512 04/17/25  0529 04/16/25  0556 04/16/25  0554 04/15/25  1920   WBC 8.70 8.32 7.12   < >  --  9.78  --    HEMOGLOBIN 13.8 14.0 12.6*   < >  --  15.0  --    HEMATOCRIT 43.1 41.8 37.4*   < >  --  46.3  --    PLATELETS 213 226 222   < >  --  224  --    NEUTROS ABS 6.10 5.52 4.81   < >  --  6.80  --    IMMATURE GRANS (ABS) 0.03 0.04 0.02   < >  --  0.02  --    LYMPHS ABS 1.47 1.72 1.36   < >  --  1.36  --    MONOS ABS 0.96* 0.92* 0.78   < >  --  1.35*  --    EOS ABS 0.05 0.07 0.09   < >  --  0.15  --    MCV 94.1 93.7 94.2   < >  --  97.9*  --    LACTATE  --   --   --   --  1.2 2.2* 1.7    < > = values in this interval not displayed.         Lab 04/20/25  0618 04/19/25  0517 04/18/25  0512   SODIUM 137 136 137   POTASSIUM 3.7 3.7 3.5   CHLORIDE 100 100 99   CO2 22.1 23.7 26.4   ANION GAP 14.9 12.3 11.6   BUN 24* 23 26*   CREATININE 1.37* 1.39* 1.59*   EGFR 51.5* 50.6* 43.1*    GLUCOSE 118* 107* 101*   CALCIUM 8.9 8.8 8.4*   MAGNESIUM 2.3 2.2 2.2   PHOSPHORUS 3.3 2.8 2.9         Lab 04/15/25  1346   TOTAL PROTEIN 6.7   ALBUMIN 3.3*   GLOBULIN 3.4   ALT (SGPT) 8   AST (SGOT) 16   BILIRUBIN 1.9*   ALK PHOS 100         Lab 04/16/25  0536 04/15/25  1920 04/15/25  1346   PROBNP 14,672.0*  --  14,794.0*   HSTROP T  --  65* 63*                 Lab 04/16/25  0556   PH, ARTERIAL 7.509*   PCO2, ARTERIAL 34.7*   PO2 ART 62.8*   O2 SATURATION ART 93.9*   HCO3 ART 27.6*   BASE EXCESS ART 4.8*     Brief Urine Lab Results  (Last result in the past 365 days)        Color   Clarity   Blood   Leuk Est   Nitrite   Protein   CREAT   Urine HCG        04/15/25 1823 Yellow   Clear   Trace   Large (3+)   Negative   Negative                 [x]  Microbiology   Microbiology Results (last 10 days)       Procedure Component Value - Date/Time    Clostridioides difficile Toxin - Stool, Per Rectum [[id number]]  (Abnormal) Collected: 04/18/25 1519    Lab Status: Final result Specimen: Stool from Per Rectum Updated: 04/18/25 1648    Narrative:      The following orders were created for panel order Clostridioides difficile Toxin - Stool, Per Rectum.  Procedure                               Abnormality         Status                     ---------                               -----------         ------                     Clostridioides difficile...[[id number]]  Abnormal            Final result                 Please view results for these tests on the individual orders.    Clostridioides difficile Toxin, PCR - Stool, Per Rectum [[id number]]  (Abnormal) Collected: 04/18/25 1519    Lab Status: Final result Specimen: Stool from Per Rectum Updated: 04/18/25 1648     Toxigenic C. difficile by PCR Positive     027 Toxin Presumptive Negative    Narrative:      DNA from a toxigenic strain of C.difficile has been detected. Antigen testing for the presence of free C.difficile toxin is currently in progress, to help determine the  clinical significance of this PCR result.     Enteric Parasite Panel - Stool, Per Rectum [[id number]]  (Normal) Collected: 04/18/25 1519    Lab Status: Final result Specimen: Stool from Per Rectum Updated: 04/19/25 1318     Giardia lamblia Not Detected     Cryptosporidium Not Detected     Entamoeba histolytica Not Detected    Enteric Bacterial Panel - Stool, Per Rectum [[id number]]  (Normal) Collected: 04/18/25 1519    Lab Status: Final result Specimen: Stool from Per Rectum Updated: 04/19/25 1313     Salmonella Not Detected     Campylobacter Not Detected     Shigella/Enteroinvasive E. coli (EIEC) Not Detected     Shiga-like toxin-producing E. coli (STEC) stx1/stx2 Not Detected    Clostridioides difficile toxin Ag, Reflex - Stool, Per Rectum [[id number]]  (Normal) Collected: 04/18/25 1519    Lab Status: Final result Specimen: Stool from Per Rectum Updated: 04/18/25 1648     C.diff Toxin Ag Negative    Narrative:      DNA from a toxigenic strain of C.difficile was detected, although the free toxin itself was not detected. These findings are consistent with C.difficile colonization and may not reflect actual C.difficile infection. Clinical correlation needed.    Wound Culture - Wound, Suprapubic Catheter [[id number]]  (Abnormal)  (Susceptibility) Collected: 04/16/25 1437    Lab Status: Final result Specimen: Wound from Suprapubic Catheter Updated: 04/18/25 0645     Wound Culture Scant growth (1+) Staphylococcus aureus, MRSA     Comment:   Methicillin resistant Staphylococcus aureus, Patient may be an isolation risk.        Gram Stain Rare (1+) WBCs seen      Occasional Gram positive cocci    Susceptibility        Staphylococcus aureus, MRSA      SHINE      Clindamycin Resistant      Erythromycin Resistant      Oxacillin Resistant      Rifampin Susceptible      Tetracycline Susceptible      Trimethoprim + Sulfamethoxazole Susceptible      Vancomycin Susceptible                           MRSA Screen, PCR (Inpatient) - Swab,  Nares [[id number]]  (Abnormal) Collected: 04/16/25 1437    Lab Status: Final result Specimen: Swab from Nares Updated: 04/16/25 1601     MRSA PCR MRSA Detected    Narrative:      The negative predictive value of this diagnostic test is high and should only be used to consider de-escalating anti-MRSA therapy. A positive result may indicate colonization with MRSA and must be correlated clinically.    Blood Culture - Blood, Arm, Left [[id number]]  (Normal) Collected: 04/15/25 2020    Lab Status: Preliminary result Specimen: Blood from Arm, Left Updated: 04/19/25 2031     Blood Culture No growth at 4 days    Blood Culture - Blood, Arm, Right [[id number]]  (Normal) Collected: 04/15/25 2020    Lab Status: Preliminary result Specimen: Blood from Arm, Right Updated: 04/19/25 2031     Blood Culture No growth at 4 days    COVID PRE-OP / PRE-PROCEDURE SCREENING ORDER (NO ISOLATION) - Swab, Nasopharynx [[id number]]  (Normal) Collected: 04/15/25 1917    Lab Status: Final result Specimen: Swab from Nasopharynx Updated: 04/15/25 2006    Narrative:      The following orders were created for panel order COVID PRE-OP / PRE-PROCEDURE SCREENING ORDER (NO ISOLATION) - Swab, Nasopharynx.  Procedure                               Abnormality         Status                     ---------                               -----------         ------                     COVID-19, FLU A/B, RSV P...[[id number]]  Normal              Final result                 Please view results for these tests on the individual orders.    COVID-19, FLU A/B, RSV PCR 1 HR TAT - Swab, Nasopharynx [[id number]]  (Normal) Collected: 04/15/25 1917    Lab Status: Final result Specimen: Swab from Nasopharynx Updated: 04/15/25 2006     COVID19 Not Detected     Influenza A PCR Not Detected     Influenza B PCR Not Detected     RSV, PCR Not Detected    Narrative:      Fact sheet for providers: https://www.fda.gov/media/529606/download    Fact sheet for patients:  https://www.fda.gov/media/522542/download    Test performed by PCR.    Urine Culture - Urine, Urine, Clean Catch [[id number]]  (Abnormal)  (Susceptibility) Collected: 04/15/25 1823    Lab Status: Final result Specimen: Urine, Clean Catch Updated: 04/19/25 1207     Urine Culture >100,000 CFU/mL Escherichia coli ESBL      >100,000 CFU/mL Citrobacter farmeri    Narrative:      Colonization of the urinary tract without infection is common. Treatment is discouraged unless the patient is symptomatic, pregnant, or undergoing an invasive urologic procedure.  Recent outcomes data supports the use of pip/tazo in the treatment of susceptible ESBL infections for uncomplicated UTI. Consider use of pip/tazo as a carbapenem-sparing regimen in applicable patients.    Susceptibility        Escherichia coli ESBL      SHINE      Ciprofloxacin Resistant      Ertapenem Susceptible      Gentamicin Susceptible      Levofloxacin Resistant      Meropenem Susceptible      Nitrofurantoin Susceptible      Piperacillin + Tazobactam Susceptible      Trimethoprim + Sulfamethoxazole Susceptible                       Susceptibility        Citrobacter farmeri      SHINE      Amoxicillin + Clavulanate Susceptible      Cefazolin (Urine) Resistant      Cefepime Susceptible      Ceftazidime Susceptible      Ceftriaxone Susceptible      Gentamicin Susceptible      Levofloxacin Susceptible      Nitrofurantoin Intermediate      Piperacillin + Tazobactam Susceptible      Trimethoprim + Sulfamethoxazole Susceptible                                 [x]  Radiology  CT Abdomen Pelvis Without Contrast  Result Date: 4/16/2025  1. No obstructive uropathy 2. 2 mm nonobstructing right renal pelvis stone 3. Right renal cysts 4. Haziness of the perivesical fat suggests cystitis 5. Cholelithiasis. 6. Colonic diverticulosis. 7. Trace perihepatic ascites 8. Cardiomegaly with bilateral pleural effusions Electronically Signed: Víctor Vallejo  4/16/2025 3:11 PM EDT  Workstation  ID: [id number]    XR Chest 1 View  Result Date: 4/16/2025  Impression: Stable bilateral airspace opacities, likely due to pulmonary edema with bilateral pleural effusions. Patchy right basilar opacities may be due to superimposed atelectasis and/or pneumonia. Electronically Signed: Simona Tadeo MD  4/16/2025 12:42 PM EDT  Workstation ID: [id number]    XR Chest 1 View  Result Date: 4/15/2025  Impression: 1. Features of interstitial pulmonary edema appear improved since 3/2/2025. Stable cardiac enlargement. 2. Small bilateral pleural effusions and probable passive bibasilar atelectasis, stable to slightly improved Electronically Signed: Poonam Pisano MD  4/15/2025 2:15 PM EDT  Workstation ID: [id number]    []  EKG/Telemetry   []  Cardiology/Vascular   []  Pathology  []  Old records  []  Other:    Assessment & Plan   Assessment / Plan     Assessment:  UTI  MRSA positive in wound culture from suprapubic catheter insertion site  Lactic acidosis, resolved  Acute on chronic HFrEF  History of heart failure with reduced ejection fraction,Last EF of 21-25% on 1/2025  Hypotension, not in cardiogenic shock  CAD s/p CABG  Stage IIIb CKD  Urinary retention S/p suprapubic catheter placement  Atrial fibrillation on Eliquis    Plan:  Patient currently being managed medicine service.  On IV Zosyn and vancomycin for MRSA infection and ESBL UTI.  Wound culture from suprapubic catheter insertion site grew MRSA.  Blood culture showing no growth till date.  Suprapubic catheter draining pus in urine, urine culture ESBL E. coli and Citobacter farmeri.   Cardiology was consulted for acute on chronic HFrEF.  Patient at End stage heart failure.  Palliative consulted for goals of care discussion.  Blood pressure low normal, perfusing extremities well.  Not in cardiogenic shock.  Not able to introduce GDMT given his low blood pressure.  Goals of care discussion ongoing.  Continue rest of current management.  Labs in AM.  Guarded  prognosis.  Discussed with patient's daughter over telephone yesterday on 4/17, she stated that his mental status and mood has declined significantly since his wife passed away about a year ago.  He has given up and wants to die as per the daughter.  Discontinued home escitalopram and switched to Lexapro 50 mg daily.  Mood stable.  Continue.  Clinical course to dictate further management.  Family wants him to get some rehab prior to transferring him to [place].  Referral sent [facility].     Discussed with RN.    VTE Prophylaxis:  Pharmacologic VTE prophylaxis orders are present.        CODE STATUS:   Code Status (Patient has no pulse and is not breathing): No CPR (Do Not Attempt to Resuscitate)  Medical Interventions (Patient has pulse or is breathing): Limited Support  Medical Intervention Limits: No intubation (DNI)      Electronically signed by Alber Guerrero MD, 04/20/25, 10:01 AM EDT.

## 2025-04-20 NOTE — PROGRESS NOTES
[facility] Clinical Pharmacy Services: Vancomycin Monitoring Note    Javi Martínez is a 84 y.o. male who is on day 5/7 of pharmacy to dose vancomycin for Skin and Soft Tissue.    Imaging Reviewed?: Yes  Updated Cultures and Sensitivities:   Microbiology Results (last 10 days)       Procedure Component Value - Date/Time    Clostridioides difficile Toxin - Stool, Per Rectum [[id number]]  (Abnormal) Collected: 04/18/25 1519    Lab Status: Final result Specimen: Stool from Per Rectum Updated: 04/18/25 1648    Narrative:      The following orders were created for panel order Clostridioides difficile Toxin - Stool, Per Rectum.  Procedure                               Abnormality         Status                     ---------                               -----------         ------                     Clostridioides difficile...[[id number]]  Abnormal            Final result                 Please view results for these tests on the individual orders.    Clostridioides difficile Toxin, PCR - Stool, Per Rectum [[id number]]  (Abnormal) Collected: 04/18/25 1519    Lab Status: Final result Specimen: Stool from Per Rectum Updated: 04/18/25 1648     Toxigenic C. difficile by PCR Positive     027 Toxin Presumptive Negative    Narrative:      DNA from a toxigenic strain of C.difficile has been detected. Antigen testing for the presence of free C.difficile toxin is currently in progress, to help determine the clinical significance of this PCR result.     Enteric Parasite Panel - Stool, Per Rectum [[id number]]  (Normal) Collected: 04/18/25 1519    Lab Status: Final result Specimen: Stool from Per Rectum Updated: 04/19/25 1318     Giardia lamblia Not Detected     Cryptosporidium Not Detected     Entamoeba histolytica Not Detected    Enteric Bacterial Panel - Stool, Per Rectum [[id number]]  (Normal) Collected: 04/18/25 1519    Lab Status: Final result Specimen: Stool from Per Rectum Updated: 04/19/25 1313     Salmonella Not Detected      Campylobacter Not Detected     Shigella/Enteroinvasive E. coli (EIEC) Not Detected     Shiga-like toxin-producing E. coli (STEC) stx1/stx2 Not Detected    Clostridioides difficile toxin Ag, Reflex - Stool, Per Rectum [[id number]]  (Normal) Collected: 04/18/25 1519    Lab Status: Final result Specimen: Stool from Per Rectum Updated: 04/18/25 1648     C.diff Toxin Ag Negative    Narrative:      DNA from a toxigenic strain of C.difficile was detected, although the free toxin itself was not detected. These findings are consistent with C.difficile colonization and may not reflect actual C.difficile infection. Clinical correlation needed.    Wound Culture - Wound, Suprapubic Catheter [[id number]]  (Abnormal)  (Susceptibility) Collected: 04/16/25 1437    Lab Status: Final result Specimen: Wound from Suprapubic Catheter Updated: 04/18/25 0645     Wound Culture Scant growth (1+) Staphylococcus aureus, MRSA     Comment:   Methicillin resistant Staphylococcus aureus, Patient may be an isolation risk.        Gram Stain Rare (1+) WBCs seen      Occasional Gram positive cocci    Susceptibility        Staphylococcus aureus, MRSA      SHINE      Clindamycin >=4 ug/ml Resistant      Erythromycin >=8 ug/ml Resistant      Oxacillin >=4 ug/ml Resistant      Rifampin <=0.5 ug/ml Susceptible      Tetracycline <=1 ug/ml Susceptible      Trimethoprim + Sulfamethoxazole <=10 ug/ml Susceptible      Vancomycin 1 ug/ml Susceptible                           MRSA Screen, PCR (Inpatient) - Swab, Nares [[id number]]  (Abnormal) Collected: 04/16/25 1437    Lab Status: Final result Specimen: Swab from Nares Updated: 04/16/25 1601     MRSA PCR MRSA Detected    Narrative:      The negative predictive value of this diagnostic test is high and should only be used to consider de-escalating anti-MRSA therapy. A positive result may indicate colonization with MRSA and must be correlated clinically.    Blood Culture - Blood, Arm, Left [[id number]]  (Normal)  Collected: 04/15/25 2020    Lab Status: Preliminary result Specimen: Blood from Arm, Left Updated: 04/19/25 2031     Blood Culture No growth at 4 days    Blood Culture - Blood, Arm, Right [[id number]]  (Normal) Collected: 04/15/25 2020    Lab Status: Preliminary result Specimen: Blood from Arm, Right Updated: 04/19/25 2031     Blood Culture No growth at 4 days    COVID PRE-OP / PRE-PROCEDURE SCREENING ORDER (NO ISOLATION) - Swab, Nasopharynx [[id number]]  (Normal) Collected: 04/15/25 1917    Lab Status: Final result Specimen: Swab from Nasopharynx Updated: 04/15/25 2006    Narrative:      The following orders were created for panel order COVID PRE-OP / PRE-PROCEDURE SCREENING ORDER (NO ISOLATION) - Swab, Nasopharynx.  Procedure                               Abnormality         Status                     ---------                               -----------         ------                     COVID-19, FLU A/B, RSV P...[[id number]]  Normal              Final result                 Please view results for these tests on the individual orders.    COVID-19, FLU A/B, RSV PCR 1 HR TAT - Swab, Nasopharynx [[id number]]  (Normal) Collected: 04/15/25 1917    Lab Status: Final result Specimen: Swab from Nasopharynx Updated: 04/15/25 2006     COVID19 Not Detected     Influenza A PCR Not Detected     Influenza B PCR Not Detected     RSV, PCR Not Detected    Narrative:      Fact sheet for providers: https://www.fda.gov/media/962489/download    Fact sheet for patients: https://www.fda.gov/media/410218/download    Test performed by PCR.    Urine Culture - Urine, Urine, Clean Catch [[id number]]  (Abnormal)  (Susceptibility) Collected: 04/15/25 1823    Lab Status: Final result Specimen: Urine, Clean Catch Updated: 04/19/25 1207     Urine Culture >100,000 CFU/mL Escherichia coli ESBL      >100,000 CFU/mL Citrobacter farmeri    Narrative:      Colonization of the urinary tract without infection is common. Treatment is discouraged unless the  "patient is symptomatic, pregnant, or undergoing an invasive urologic procedure.  Recent outcomes data supports the use of pip/tazo in the treatment of susceptible ESBL infections for uncomplicated UTI. Consider use of pip/tazo as a carbapenem-sparing regimen in applicable patients.    Susceptibility        Escherichia coli ESBL      SHINE      Ciprofloxacin >=4 ug/ml Resistant      Ertapenem <=0.12 ug/ml Susceptible      Gentamicin <=1 ug/ml Susceptible      Levofloxacin >=8 ug/ml Resistant      Meropenem <=0.25 ug/ml Susceptible      Nitrofurantoin <=16 ug/ml Susceptible      Piperacillin + Tazobactam <=4 ug/ml Susceptible      Trimethoprim + Sulfamethoxazole <=20 ug/ml Susceptible                       Susceptibility        Citrobacter farmeri      SHINE      Amoxicillin + Clavulanate 8 ug/ml Susceptible      Cefazolin (Urine) >=32 ug/ml Resistant      Cefepime <=0.12 ug/ml Susceptible      Ceftazidime <=0.5 ug/ml Susceptible      Ceftriaxone <=0.25 ug/ml Susceptible      Gentamicin <=1 ug/ml Susceptible      Levofloxacin <=0.12 ug/ml Susceptible      Nitrofurantoin 64 ug/ml Intermediate      Piperacillin + Tazobactam <=4 ug/ml Susceptible      Trimethoprim + Sulfamethoxazole <=20 ug/ml Susceptible                                     Vitals/Labs  Ht: 193 cm (76\"); Wt: 87.9 kg (193 lb 12.6 oz)   Temp (24hrs), Av.6 °F (36.4 °C), Min:97.5 °F (36.4 °C), Max:97.9 °F (36.6 °C)   Estimated Creatinine Clearance: 51.7 mL/min (A) (by C-G formula based on SCr of 1.37 mg/dL (H)).     Results from last 7 days   Lab Units 25  0618 25  0517 25  0512   VANCOMYCIN RM mcg/mL  --   --  13.40   CREATININE mg/dL 1.37* 1.39* 1.59*   WBC 10*3/mm3 8.70 8.32 7.12     Assessment/Plan    Current Vancomycin Dose:  1000 mg IV every 24 hours; which provides the following predicted parameters:    AUC24 (range)400-600 mg/L.hr   AUC24,ss: 446 mg/L.hr  Probability of AUC24 > 400: 68 %  Ctrough,ss: 14 mg/L  Probability of " Ctrough,ss > 20: 13 %  Probability of nephrotoxicity (Lodise MASOUD 2009): 9 %    Next Vanc Random planned 4/21 @ 0600  We will continue to monitor patient changes and renal function     Thank you for involving pharmacy in this patient's care. Please contact pharmacy with any questions or concerns.    Keon Roche  Clinical Pharmacist

## 2025-04-20 NOTE — PLAN OF CARE
Goal Outcome Evaluation:  Plan of Care Reviewed With: patient        Progress: no change     Unchanged from previous assessment.  No s/s of distress observed.

## 2025-04-21 NOTE — PLAN OF CARE
Goal Outcome Evaluation:  Plan of Care Reviewed With: patient        Progress: no change  Outcome Evaluation: Pt is A&O x4. Pts BP continues to be low. Pt has been resting well. Pt is starting to have blood in loyd. MD notified. Pt has not reported any nausea or vomiting. Wound care done. Pt continues to receive antibiotics. Daughter came by and taked to case management, pallative, and MD about plan for pt. No other complaints this shift. Call light within reach. Will continue plan of care.

## 2025-04-21 NOTE — PROGRESS NOTES
[facility]   Hospitalist Progress Note  Date: 2025  Patient Name: Javi Martínez  : 1940  MRN: [id number]  Date of admission: 4/15/2025  Room/Bed: [facility]0/      Subjective   Subjective     Chief Complaint: Generalized weakness     Summary:Javi Martínez is a 84 y.o. male with past medical history of hypertension, CHF, A-fib on Eliquis, arthritis, history of prostate cancer status post brachytherapy and external beam XRT and BPH and GERD presented to the ED for evaluation of generalized weakness and fall.  Patient states that for the last week or so he has not been feeling well with increased weakness, dysuria, lethargy, decreased p.o. intake, and multiple falls.  Patient does live alone and had been unable to perform his ADLs so he was brought to the ED for further evaluation.  In the ED patient was tachypneic on arrival with remaining vitals being within normal limits on room air.  His UA was positive for a UTI with labs showing an elevated proBNP, reduced renal function, elevated lactic acid, and elevated but flat troponin.  Remaining labs are relatively unremarkable including a normal WBC and negative COVID flu RSV.  Chest x-ray showed pulmonary edema although improved from prior imaging.  When seen patient was resting comfortably and when asked he denied any recent fevers, chills, headaches, focal weakness, chest pain, palpitation, shortness of breath, cough, abdominal pain, nausea, vomiting, diarrhea, constipation, hematuria, hematochezia, melena, or anxiety.   Patient admitted for further evaluation and treatment.  Patient had pus coming from suprapubic catheter insertion site along with pus within suprapubic catheter.  Blood pressure borderline low, perfusing extremities well.  Cardiologist consulted.  Also started on IV antibiotics for possible intra-abdominal infection.  CT abdomen pelvis without contrast was obtained.  Wound culture grew MRSA.  Urine culture grew ESBL and Citrobacter  maeve.  Blood culture showing no growth till date.    Interval Followup: No acute events overnight.  Having diarrhea but slightly better today, formed stool.  On as needed Imodium for diarrhea.   Laying in bed comfortably, no other complaints.  Patient's daughter at bedside, updated.      Review of Systems    All systems reviewed and negative except for what is outlined above.      Objective   Objective     Vitals:   Temp:  [97.5 °F (36.4 °C)-97.7 °F (36.5 °C)] 97.5 °F (36.4 °C)  Heart Rate:  [70-73] 70  Resp:  [16-20] 18  BP: ()/(56-80) 92/65  Flow (L/min) (Oxygen Therapy):  [0] 0    Physical Exam   General: Awake, alert,NAD  Cardiovascular: RRR, no murmurs   Pulmonary: CTA bilaterally; no wheezes; no conversational dyspnea  Gastrointestinal: S/ND/NT, +BS  Neuro: Alert, awake, oriented x 3; speech clear; no tremor      Result Review    Result Review:  I have personally reviewed these results:  [x]  Laboratory      Lab 04/21/25  0501 04/20/25  0618 04/19/25  0517 04/17/25  0529 04/16/25  0556 04/16/25  0554 04/15/25  1920   WBC 8.19 8.70 8.32   < >  --  9.78  --    HEMOGLOBIN 13.4 13.8 14.0   < >  --  15.0  --    HEMATOCRIT 41.2 43.1 41.8   < >  --  46.3  --    PLATELETS 210 213 226   < >  --  224  --    NEUTROS ABS 5.53 6.10 5.52   < >  --  6.80  --    IMMATURE GRANS (ABS) 0.04 0.03 0.04   < >  --  0.02  --    LYMPHS ABS 1.62 1.47 1.72   < >  --  1.36  --    MONOS ABS 0.82 0.96* 0.92*   < >  --  1.35*  --    EOS ABS 0.10 0.05 0.07   < >  --  0.15  --    MCV 95.2 94.1 93.7   < >  --  97.9*  --    LACTATE  --   --   --   --  1.2 2.2* 1.7    < > = values in this interval not displayed.         Lab 04/21/25  0501 04/20/25  0618 04/19/25  0517   SODIUM 139 137 136   POTASSIUM 3.7 3.7 3.7   CHLORIDE 100 100 100   CO2 24.3 22.1 23.7   ANION GAP 14.7 14.9 12.3   BUN 24* 24* 23   CREATININE 1.60* 1.37* 1.39*   EGFR 42.8* 51.5* 50.6*   GLUCOSE 110* 118* 107*   CALCIUM 8.9 8.9 8.8   MAGNESIUM 2.2 2.3 2.2   PHOSPHORUS  3.2 3.3 2.8         Lab 04/15/25  1346   TOTAL PROTEIN 6.7   ALBUMIN 3.3*   GLOBULIN 3.4   ALT (SGPT) 8   AST (SGOT) 16   BILIRUBIN 1.9*   ALK PHOS 100         Lab 04/16/25  0536 04/15/25  1920 04/15/25  1346   PROBNP 14,672.0*  --  14,794.0*   HSTROP T  --  65* 63*                 Lab 04/16/25  0556   PH, ARTERIAL 7.509*   PCO2, ARTERIAL 34.7*   PO2 ART 62.8*   O2 SATURATION ART 93.9*   HCO3 ART 27.6*   BASE EXCESS ART 4.8*     Brief Urine Lab Results  (Last result in the past 365 days)        Color   Clarity   Blood   Leuk Est   Nitrite   Protein   CREAT   Urine HCG        04/15/25 1823 Yellow   Clear   Trace   Large (3+)   Negative   Negative                 [x]  Microbiology   Microbiology Results (last 10 days)       Procedure Component Value - Date/Time    Clostridioides difficile Toxin - Stool, Per Rectum [[id number]]  (Abnormal) Collected: 04/18/25 1519    Lab Status: Final result Specimen: Stool from Per Rectum Updated: 04/18/25 1648    Narrative:      The following orders were created for panel order Clostridioides difficile Toxin - Stool, Per Rectum.  Procedure                               Abnormality         Status                     ---------                               -----------         ------                     Clostridioides difficile...[[id number]]  Abnormal            Final result                 Please view results for these tests on the individual orders.    Clostridioides difficile Toxin, PCR - Stool, Per Rectum [[id number]]  (Abnormal) Collected: 04/18/25 1519    Lab Status: Final result Specimen: Stool from Per Rectum Updated: 04/18/25 1648     Toxigenic C. difficile by PCR Positive     027 Toxin Presumptive Negative    Narrative:      DNA from a toxigenic strain of C.difficile has been detected. Antigen testing for the presence of free C.difficile toxin is currently in progress, to help determine the clinical significance of this PCR result.     Enteric Parasite Panel - Stool, Per Rectum  [[id number]]  (Normal) Collected: 04/18/25 1519    Lab Status: Final result Specimen: Stool from Per Rectum Updated: 04/19/25 1318     Giardia lamblia Not Detected     Cryptosporidium Not Detected     Entamoeba histolytica Not Detected    Enteric Bacterial Panel - Stool, Per Rectum [[id number]]  (Normal) Collected: 04/18/25 1519    Lab Status: Final result Specimen: Stool from Per Rectum Updated: 04/19/25 1313     Salmonella Not Detected     Campylobacter Not Detected     Shigella/Enteroinvasive E. coli (EIEC) Not Detected     Shiga-like toxin-producing E. coli (STEC) stx1/stx2 Not Detected    Clostridioides difficile toxin Ag, Reflex - Stool, Per Rectum [[id number]]  (Normal) Collected: 04/18/25 1519    Lab Status: Final result Specimen: Stool from Per Rectum Updated: 04/18/25 1648     C.diff Toxin Ag Negative    Narrative:      DNA from a toxigenic strain of C.difficile was detected, although the free toxin itself was not detected. These findings are consistent with C.difficile colonization and may not reflect actual C.difficile infection. Clinical correlation needed.    Wound Culture - Wound, Suprapubic Catheter [[id number]]  (Abnormal)  (Susceptibility) Collected: 04/16/25 1437    Lab Status: Final result Specimen: Wound from Suprapubic Catheter Updated: 04/18/25 0645     Wound Culture Scant growth (1+) Staphylococcus aureus, MRSA     Comment:   Methicillin resistant Staphylococcus aureus, Patient may be an isolation risk.        Gram Stain Rare (1+) WBCs seen      Occasional Gram positive cocci    Susceptibility        Staphylococcus aureus, MRSA      SHINE      Clindamycin Resistant      Erythromycin Resistant      Oxacillin Resistant      Rifampin Susceptible      Tetracycline Susceptible      Trimethoprim + Sulfamethoxazole Susceptible      Vancomycin Susceptible                           MRSA Screen, PCR (Inpatient) - Swab, Nares [[id number]]  (Abnormal) Collected: 04/16/25 1437    Lab Status: Final result  Specimen: Swab from Nares Updated: 04/16/25 1601     MRSA PCR MRSA Detected    Narrative:      The negative predictive value of this diagnostic test is high and should only be used to consider de-escalating anti-MRSA therapy. A positive result may indicate colonization with MRSA and must be correlated clinically.    Blood Culture - Blood, Arm, Left [[id number]]  (Normal) Collected: 04/15/25 2020    Lab Status: Final result Specimen: Blood from Arm, Left Updated: 04/20/25 2031     Blood Culture No growth at 5 days    Blood Culture - Blood, Arm, Right [[id number]]  (Normal) Collected: 04/15/25 2020    Lab Status: Final result Specimen: Blood from Arm, Right Updated: 04/20/25 2031     Blood Culture No growth at 5 days    COVID PRE-OP / PRE-PROCEDURE SCREENING ORDER (NO ISOLATION) - Swab, Nasopharynx [[id number]]  (Normal) Collected: 04/15/25 1917    Lab Status: Final result Specimen: Swab from Nasopharynx Updated: 04/15/25 2006    Narrative:      The following orders were created for panel order COVID PRE-OP / PRE-PROCEDURE SCREENING ORDER (NO ISOLATION) - Swab, Nasopharynx.  Procedure                               Abnormality         Status                     ---------                               -----------         ------                     COVID-19, FLU A/B, RSV P...[[id number]]  Normal              Final result                 Please view results for these tests on the individual orders.    COVID-19, FLU A/B, RSV PCR 1 HR TAT - Swab, Nasopharynx [[id number]]  (Normal) Collected: 04/15/25 1917    Lab Status: Final result Specimen: Swab from Nasopharynx Updated: 04/15/25 2006     COVID19 Not Detected     Influenza A PCR Not Detected     Influenza B PCR Not Detected     RSV, PCR Not Detected    Narrative:      Fact sheet for providers: https://www.fda.gov/media/986108/download    Fact sheet for patients: https://www.fda.gov/media/247869/download    Test performed by PCR.    Urine Culture - Urine, Urine, Clean Catch  [[id number]]  (Abnormal)  (Susceptibility) Collected: 04/15/25 1823    Lab Status: Final result Specimen: Urine, Clean Catch Updated: 04/19/25 1207     Urine Culture >100,000 CFU/mL Escherichia coli ESBL      >100,000 CFU/mL Citrobacter farmeri    Narrative:      Colonization of the urinary tract without infection is common. Treatment is discouraged unless the patient is symptomatic, pregnant, or undergoing an invasive urologic procedure.  Recent outcomes data supports the use of pip/tazo in the treatment of susceptible ESBL infections for uncomplicated UTI. Consider use of pip/tazo as a carbapenem-sparing regimen in applicable patients.    Susceptibility        Escherichia coli ESBL      SHINE      Ciprofloxacin Resistant      Ertapenem Susceptible      Gentamicin Susceptible      Levofloxacin Resistant      Meropenem Susceptible      Nitrofurantoin Susceptible      Piperacillin + Tazobactam Susceptible      Trimethoprim + Sulfamethoxazole Susceptible                       Susceptibility        Citrobacter farmeri      SHINE      Amoxicillin + Clavulanate Susceptible      Cefazolin (Urine) Resistant      Cefepime Susceptible      Ceftazidime Susceptible      Ceftriaxone Susceptible      Gentamicin Susceptible      Levofloxacin Susceptible      Nitrofurantoin Intermediate      Piperacillin + Tazobactam Susceptible      Trimethoprim + Sulfamethoxazole Susceptible                                 [x]  Radiology  CT Abdomen Pelvis Without Contrast  Result Date: 4/16/2025  1. No obstructive uropathy 2. 2 mm nonobstructing right renal pelvis stone 3. Right renal cysts 4. Haziness of the perivesical fat suggests cystitis 5. Cholelithiasis. 6. Colonic diverticulosis. 7. Trace perihepatic ascites 8. Cardiomegaly with bilateral pleural effusions Electronically Signed: Víctor Vallejo  4/16/2025 3:11 PM EDT  Workstation ID: [id number]    XR Chest 1 View  Result Date: 4/16/2025  Impression: Stable bilateral airspace opacities, likely  due to pulmonary edema with bilateral pleural effusions. Patchy right basilar opacities may be due to superimposed atelectasis and/or pneumonia. Electronically Signed: Simona Tadeo MD  4/16/2025 12:42 PM EDT  Workstation ID: [id number]    XR Chest 1 View  Result Date: 4/15/2025  Impression: 1. Features of interstitial pulmonary edema appear improved since 3/2/2025. Stable cardiac enlargement. 2. Small bilateral pleural effusions and probable passive bibasilar atelectasis, stable to slightly improved Electronically Signed: Poonam Pisano MD  4/15/2025 2:15 PM EDT  Workstation ID: [id number]    []  EKG/Telemetry   []  Cardiology/Vascular   []  Pathology  []  Old records  []  Other:    Assessment & Plan   Assessment / Plan     Assessment:  UTI  MRSA positive in wound culture from suprapubic catheter insertion site  Lactic acidosis, resolved  Acute on chronic HFrEF  History of heart failure with reduced ejection fraction,Last EF of 21-25% on 1/2025  Hypotension, not in cardiogenic shock  CAD s/p CABG  Stage IIIb CKD  Urinary retention S/p suprapubic catheter placement  Atrial fibrillation on Eliquis    Plan:  Patient currently being managed medicine service.  On IV Zosyn and vancomycin for MRSA infection and ESBL UTI.  To complete 7-day course of antibiotics.  Wound culture from suprapubic catheter insertion site grew MRSA.  Suprapubic catheter draining pus in urine, urine culture ESBL E. coli and Citobacter farmeri.   Blood culture showing no growth till date.  Cardiology was consulted for acute on chronic HFrEF.  Patient at End stage heart failure.  Palliative consulted for goals of care discussion.  Not able to introduce GDMT given his low blood pressure.  Holding Lasix due to increased creatinine and low blood pressure.  Continue rest of current management.  Labs in AM.  Guarded prognosis.  Discussed with patient's daughter at bedside today.  Willing to discharge him to rehab so he is stable enough to travel to  [place] and she is planning on placing him at a nursing home there.  If unable to do so, she is okay to admit him to nursing facility here in [place].    Discontinued home escitalopram and switched to Lexapro 50 mg daily.  Mood stable.  Continue.  Clinical course to dictate further management.  Awaiting PT/OT evaluation.  Pending disposition.     Discussed with RN.    VTE Prophylaxis:  Pharmacologic VTE prophylaxis orders are present.        CODE STATUS:   Code Status (Patient has no pulse and is not breathing): No CPR (Do Not Attempt to Resuscitate)  Medical Interventions (Patient has pulse or is breathing): Limited Support  Medical Intervention Limits: No intubation (DNI)      Electronically signed by Alber Guerrero MD, 04/21/25, 10:01 AM EDT.

## 2025-04-21 NOTE — NURSING NOTE
"Palliative Care follow up with patient and daughter.  Daughter inquiring of discharge options.  Patient reports \"I don't want to talk about [facility]\".  Daughter will abide by his wishes.  Plans are for patient's discharge to inpatient rehab and possible Assisted Living, close to daughter in [place], at discharge.  Provided emotional support.  Palliative Care will continue to follow.    Sammi WERNER RN, [facility]  Palliative Care  "

## 2025-04-21 NOTE — PROGRESS NOTES
"[facility] Clinical Pharmacy Services: Vancomycin Monitoring Note    Javi Martínez is a 84 y.o. male who is on day  of pharmacy to dose vancomycin for Urinary Tract Infection.    Previous Vancomycin Dose:   1000 mg IV every  24  hours  Imaging Reviewed?: Yes  Updated Cultures and Sensitivities:   4/15 COVID, Flu A/B, RSV: none detected  4/15 Urine cx: ESBL E.coli, citrobacter farmeri  4/15 Blood cx: 2/2 NGTD   MRSA PCR: DETECTED   Wound cx, suprapubic catheter: MRSA   Enteric bacterial panel, stool: none detected   Enteric parasite panel, stool: none detected   C.diff toxin, PCR: POSITIVE (027 toxin presumptive negative)   C.diff toxin, antigen: negative    Vitals/Labs  Ht: 193 cm (76\"); Wt: 87.4 kg (192 lb 10.9 oz)   Temp (24hrs), Av.6 °F (36.4 °C), Min:97.5 °F (36.4 °C), Max:97.7 °F (36.5 °C)   Estimated Creatinine Clearance: 44 mL/min (A) (by C-G formula based on SCr of 1.6 mg/dL (H)).     Results from last 7 days   Lab Units 25  0501 25  0618 25  0517 25  0512   VANCOMYCIN RM mcg/mL 17.60  --   --  13.40   CREATININE mg/dL 1.60* 1.37* 1.39* 1.59*   WBC 10*3/mm3 8.19 8.70 8.32 7.12     Assessment/Plan    Current Vancomycin Dose:  1000 mg IV every 24 hours; which provides the following predicted parameters:  AUC24,ss: 511 mg/L.hr  Probability of AUC24 > 400: 96 %  Ctrough,ss: 16.7 mg/L  Probability of Ctrough,ss > 20: 19 %  Probability of nephrotoxicity (Lodise MASOUD ): 12 %  No additional levels unless therapy extended.    We will continue to monitor patient changes and renal function     Thank you for involving pharmacy in this patient's care. Please contact pharmacy with any questions or concerns.    Laurel Sanchez [facility]  Clinical Pharmacist  "

## 2025-04-21 NOTE — SIGNIFICANT NOTE
04/21/25 0936   Physical Therapy Time and Intention   Session Not Performed patient unavailable for treatment

## 2025-04-22 NOTE — PROGRESS NOTES
[facility]   Hospitalist Progress Note  Date: 2025  Patient Name: Javi Martínez  : 1940  MRN: [id number]  Date of admission: 4/15/2025      Subjective   Subjective     Chief Complaint: Follow-up generalized weakness    Summary:Javi Martínez is a 84 y.o. male  with past medical history of hypertension, CHF, A-fib on Eliquis, arthritis, history of prostate cancer status post brachytherapy and external beam XRT and BPH and GERD presented to the ED for evaluation of generalized weakness and fall.  Patient states that for the last week or so he has not been feeling well with increased weakness, dysuria, lethargy, decreased p.o. intake, and multiple falls.  Patient does live alone and had been unable to perform his ADLs so he was brought to the ED for further evaluation.  In the ED patient was tachypneic on arrival with remaining vitals being within normal limits on room air.  His UA was positive for a UTI with labs showing an elevated proBNP, reduced renal function, elevated lactic acid, and elevated but flat troponin.  Remaining labs are relatively unremarkable including a normal WBC and negative COVID flu RSV.  Chest x-ray showed pulmonary edema although improved from prior imaging.  When seen patient was resting comfortably and when asked he denied any recent fevers, chills, headaches, focal weakness, chest pain, palpitation, shortness of breath, cough, abdominal pain, nausea, vomiting, diarrhea, constipation, hematuria, hematochezia, melena, or anxiety.   Patient admitted for further evaluation and treatment.  Patient had pus coming from suprapubic catheter insertion site along with pus within suprapubic catheter.  Blood pressure borderline low, perfusing extremities well.  Cardiologist consulted.  Also started on IV antibiotics for possible intra-abdominal infection.  CT abdomen pelvis demonstrated some haziness of the perivesical fat suggesting cystitis but no abscess appreciated.  Also  demonstrated cardiomegaly with bilateral pleural effusions.  Wound culture grew MRSA.  Urine culture grew ESBL E. coli and Citrobacter farmeri.  Antibiotics tailored to sensitivities.  Patient noted to have some loose stools.  C. difficile detected by PCR but toxin negative indicating carrier status.  Blood culture negative to date.  Patient planning to discharge to [facility] skilled nursing rehab.  Discharge planning coordinating.    Interval Followup: Patient sitting up at the bedside chair appears to be resting fairly comfortably with family at the bedside.  Patient continues to endorse some weakness but improved.  Urine was somewhat bloody overnight but clearing up today.  Afebrile overnight.  V-paced 70s on telemetry review.  Blood pressure low normal limits.  Satting well on room air.  Serum creatinine trending up slightly.  No other issues per nursing.    Review of Systems  Constitutional: Negative for fatigue and fever.   HENT: Negative for sore throat and trouble swallowing.    Eyes: Negative for pain and discharge.   Respiratory: Negative for cough and shortness of breath.    Cardiovascular: Negative for chest pain and palpitations.   Gastrointestinal: Negative for abdominal pain, nausea and vomiting.   Endocrine: Negative for cold intolerance and heat intolerance.   Genitourinary: Positive for difficulty urinating and dysuria.   Musculoskeletal: Negative for back pain and neck stiffness.   Skin: Negative for color change and rash.   Neurological: Negative for syncope and headaches.   Hematological: Negative for adenopathy.   Psychiatric/Behavioral: Negative for confusion and hallucinations.    Objective   Objective     Vitals:   Temp:  [97.5 °F (36.4 °C)-97.9 °F (36.6 °C)] 97.7 °F (36.5 °C)  Heart Rate:  [70-72] 71  Resp:  [16-18] 18  BP: ()/(64-84) 92/66  Flow (L/min) (Oxygen Therapy):  [1] 1  Physical Exam   General: well-developed appearing stated age in no acute distress  HEENT: Normocephalic  atraumatic moist membranes pupils equal round reactive light, no scleral icterus no conjunctival injection  Cardiovascular: regular rate and rhythm no murmurs rubs or gallops S1-S2, no lower extremity edema appreciated  Pulmonary: Clear to auscultation bilaterally no wheezes rales or rhonchi symmetric chest expansion, unlabored, no conversational dyspnea appreciated  Gastrointestinal: Soft nontender nondistended positive bowel sounds all 4 quadrants no rebound or guarding  Musculoskeletal: No clubbing cyanosis, warm and well-perfused, calves soft symmetric nontender bilaterally  Skin: Clean dry without rashes  Neuro: Cranial nerves II through XII intact grossly no sensorimotor deficits appreciated bilateral upper and lower extremities  Psych: Patient is calm cooperative and appropriate with exam not responding to internal stimuli  : Suprapubic catheter no bladder distention positive suprapubic tenderness    Result Review    Result Review:  I have personally reviewed these results and agree with these findings:  [x]  Laboratory  LAB RESULTS:      Lab 04/22/25  0446 04/21/25  1740 04/21/25  0501 04/20/25  0618 04/19/25  0517 04/17/25  0529 04/16/25  0556 04/16/25  0554 04/15/25  1920   WBC 6.72 9.00 8.19 8.70 8.32   < >  --  9.78  --    HEMOGLOBIN 13.2 13.6 13.4 13.8 14.0   < >  --  15.0  --    HEMATOCRIT 42.3 43.7 41.2 43.1 41.8   < >  --  46.3  --    PLATELETS 178 225 210 213 226   < >  --  224  --    NEUTROS ABS 3.92 5.77 5.53 6.10 5.52   < >  --  6.80  --    IMMATURE GRANS (ABS) 0.02 0.03 0.04 0.03 0.04   < >  --  0.02  --    LYMPHS ABS 1.70 2.09 1.62 1.47 1.72   < >  --  1.36  --    MONOS ABS 0.87 0.95* 0.82 0.96* 0.92*   < >  --  1.35*  --    EOS ABS 0.13 0.09 0.10 0.05 0.07   < >  --  0.15  --    .0* 92.6 95.2 94.1 93.7   < >  --  97.9*  --    LACTATE  --   --   --   --   --   --  1.2 2.2* 1.7    < > = values in this interval not displayed.         Lab 04/22/25  0446 04/21/25  0501 04/20/25  0618  04/19/25  0517 04/18/25  0512   SODIUM 134* 139 137 136 137   POTASSIUM 3.7 3.7 3.7 3.7 3.5   CHLORIDE 99 100 100 100 99   CO2 22.5 24.3 22.1 23.7 26.4   ANION GAP 12.5 14.7 14.9 12.3 11.6   BUN 25* 24* 24* 23 26*   CREATININE 1.72* 1.60* 1.37* 1.39* 1.59*   EGFR 39.2* 42.8* 51.5* 50.6* 43.1*   GLUCOSE 87 110* 118* 107* 101*   CALCIUM 8.8 8.9 8.9 8.8 8.4*   MAGNESIUM 2.3 2.2 2.3 2.2 2.2   PHOSPHORUS 3.2 3.2 3.3 2.8 2.9             Lab 04/16/25  0536 04/15/25  1920   PROBNP 14,672.0*  --    HSTROP T  --  65*                 Lab 04/16/25  0556   PH, ARTERIAL 7.509*   PCO2, ARTERIAL 34.7*   PO2 ART 62.8*   O2 SATURATION ART 93.9*   HCO3 ART 27.6*   BASE EXCESS ART 4.8*     Brief Urine Lab Results  (Last result in the past 365 days)        Color   Clarity   Blood   Leuk Est   Nitrite   Protein   CREAT   Urine HCG        04/15/25 1823 Yellow   Clear   Trace   Large (3+)   Negative   Negative                 Microbiology Results (last 10 days)       Procedure Component Value - Date/Time    Clostridioides difficile Toxin - Stool, Per Rectum [[id number]]  (Abnormal) Collected: 04/18/25 1519    Lab Status: Final result Specimen: Stool from Per Rectum Updated: 04/18/25 1648    Narrative:      The following orders were created for panel order Clostridioides difficile Toxin - Stool, Per Rectum.  Procedure                               Abnormality         Status                     ---------                               -----------         ------                     Clostridioides difficile...[[id number]]  Abnormal            Final result                 Please view results for these tests on the individual orders.    Clostridioides difficile Toxin, PCR - Stool, Per Rectum [[id number]]  (Abnormal) Collected: 04/18/25 1519    Lab Status: Final result Specimen: Stool from Per Rectum Updated: 04/18/25 1648     Toxigenic C. difficile by PCR Positive     027 Toxin Presumptive Negative    Narrative:      DNA from a toxigenic strain of  C.difficile has been detected. Antigen testing for the presence of free C.difficile toxin is currently in progress, to help determine the clinical significance of this PCR result.     Enteric Parasite Panel - Stool, Per Rectum [[id number]]  (Normal) Collected: 04/18/25 1519    Lab Status: Final result Specimen: Stool from Per Rectum Updated: 04/19/25 1318     Giardia lamblia Not Detected     Cryptosporidium Not Detected     Entamoeba histolytica Not Detected    Enteric Bacterial Panel - Stool, Per Rectum [[id number]]  (Normal) Collected: 04/18/25 1519    Lab Status: Final result Specimen: Stool from Per Rectum Updated: 04/19/25 1313     Salmonella Not Detected     Campylobacter Not Detected     Shigella/Enteroinvasive E. coli (EIEC) Not Detected     Shiga-like toxin-producing E. coli (STEC) stx1/stx2 Not Detected    Clostridioides difficile toxin Ag, Reflex - Stool, Per Rectum [[id number]]  (Normal) Collected: 04/18/25 1519    Lab Status: Final result Specimen: Stool from Per Rectum Updated: 04/18/25 1648     C.diff Toxin Ag Negative    Narrative:      DNA from a toxigenic strain of C.difficile was detected, although the free toxin itself was not detected. These findings are consistent with C.difficile colonization and may not reflect actual C.difficile infection. Clinical correlation needed.    Wound Culture - Wound, Suprapubic Catheter [[id number]]  (Abnormal)  (Susceptibility) Collected: 04/16/25 1437    Lab Status: Final result Specimen: Wound from Suprapubic Catheter Updated: 04/18/25 0645     Wound Culture Scant growth (1+) Staphylococcus aureus, MRSA     Comment:   Methicillin resistant Staphylococcus aureus, Patient may be an isolation risk.        Gram Stain Rare (1+) WBCs seen      Occasional Gram positive cocci    Susceptibility        Staphylococcus aureus, MRSA      SHINE      Clindamycin Resistant      Erythromycin Resistant      Oxacillin Resistant      Rifampin Susceptible      Tetracycline Susceptible       Trimethoprim + Sulfamethoxazole Susceptible      Vancomycin Susceptible                           MRSA Screen, PCR (Inpatient) - Swab, Nares [[id number]]  (Abnormal) Collected: 04/16/25 1437    Lab Status: Final result Specimen: Swab from Nares Updated: 04/16/25 1601     MRSA PCR MRSA Detected    Narrative:      The negative predictive value of this diagnostic test is high and should only be used to consider de-escalating anti-MRSA therapy. A positive result may indicate colonization with MRSA and must be correlated clinically.    Blood Culture - Blood, Arm, Left [[id number]]  (Normal) Collected: 04/15/25 2020    Lab Status: Final result Specimen: Blood from Arm, Left Updated: 04/20/25 2031     Blood Culture No growth at 5 days    Blood Culture - Blood, Arm, Right [[id number]]  (Normal) Collected: 04/15/25 2020    Lab Status: Final result Specimen: Blood from Arm, Right Updated: 04/20/25 2031     Blood Culture No growth at 5 days    COVID PRE-OP / PRE-PROCEDURE SCREENING ORDER (NO ISOLATION) - Swab, Nasopharynx [[id number]]  (Normal) Collected: 04/15/25 1917    Lab Status: Final result Specimen: Swab from Nasopharynx Updated: 04/15/25 2006    Narrative:      The following orders were created for panel order COVID PRE-OP / PRE-PROCEDURE SCREENING ORDER (NO ISOLATION) - Swab, Nasopharynx.  Procedure                               Abnormality         Status                     ---------                               -----------         ------                     COVID-19, FLU A/B, RSV P...[[id number]]  Normal              Final result                 Please view results for these tests on the individual orders.    COVID-19, FLU A/B, RSV PCR 1 HR TAT - Swab, Nasopharynx [[id number]]  (Normal) Collected: 04/15/25 1917    Lab Status: Final result Specimen: Swab from Nasopharynx Updated: 04/15/25 2006     COVID19 Not Detected     Influenza A PCR Not Detected     Influenza B PCR Not Detected     RSV, PCR Not Detected     Narrative:      Fact sheet for providers: https://www.fda.gov/media/218371/download    Fact sheet for patients: https://www.fda.gov/media/555697/download    Test performed by PCR.    Urine Culture - Urine, Urine, Clean Catch [[id number]]  (Abnormal)  (Susceptibility) Collected: 04/15/25 1823    Lab Status: Final result Specimen: Urine, Clean Catch Updated: 04/19/25 1207     Urine Culture >100,000 CFU/mL Escherichia coli ESBL      >100,000 CFU/mL Citrobacter farmeri    Narrative:      Colonization of the urinary tract without infection is common. Treatment is discouraged unless the patient is symptomatic, pregnant, or undergoing an invasive urologic procedure.  Recent outcomes data supports the use of pip/tazo in the treatment of susceptible ESBL infections for uncomplicated UTI. Consider use of pip/tazo as a carbapenem-sparing regimen in applicable patients.    Susceptibility        Escherichia coli ESBL      SHINE      Ciprofloxacin Resistant      Ertapenem Susceptible      Gentamicin Susceptible      Levofloxacin Resistant      Meropenem Susceptible      Nitrofurantoin Susceptible      Piperacillin + Tazobactam Susceptible      Trimethoprim + Sulfamethoxazole Susceptible                       Susceptibility        Citrobacter farmeri      SHINE      Amoxicillin + Clavulanate Susceptible      Cefazolin (Urine) Resistant      Cefepime Susceptible      Ceftazidime Susceptible      Ceftriaxone Susceptible      Gentamicin Susceptible      Levofloxacin Susceptible      Nitrofurantoin Intermediate      Piperacillin + Tazobactam Susceptible      Trimethoprim + Sulfamethoxazole Susceptible                                   [x]  Microbiology  [x]  Radiology  CT Abdomen Pelvis Without Contrast  Result Date: 4/16/2025  1. No obstructive uropathy 2. 2 mm nonobstructing right renal pelvis stone 3. Right renal cysts 4. Haziness of the perivesical fat suggests cystitis 5. Cholelithiasis. 6. Colonic diverticulosis. 7. Trace perihepatic  ascites 8. Cardiomegaly with bilateral pleural effusions Electronically Signed: Víctor Vallejo  4/16/2025 3:11 PM EDT  Workstation ID: [id number]    XR Chest 1 View  Result Date: 4/16/2025  Impression: Stable bilateral airspace opacities, likely due to pulmonary edema with bilateral pleural effusions. Patchy right basilar opacities may be due to superimposed atelectasis and/or pneumonia. Electronically Signed: Simona Tadeo MD  4/16/2025 12:42 PM EDT  Workstation ID: [id number]    XR Chest 1 View  Result Date: 4/15/2025  Impression: 1. Features of interstitial pulmonary edema appear improved since 3/2/2025. Stable cardiac enlargement. 2. Small bilateral pleural effusions and probable passive bibasilar atelectasis, stable to slightly improved Electronically Signed: Poonam Pisano MD  4/15/2025 2:15 PM EDT  Workstation ID: [id number]      [x]  EKG/Telemetry   []  Cardiology/Vascular   []  Pathology  []  Old records  [x]  Other:  Scheduled Meds:allopurinol, 100 mg, Oral, Daily  apixaban, 2.5 mg, Oral, Q12H  dicyclomine, 10 mg, Oral, 4x Daily  [Held by provider] furosemide, 40 mg, Oral, Daily  LORazepam, 0.5 mg, Oral, Nightly  [Held by provider] pantoprazole, 40 mg, Oral, Q AM  piperacillin-tazobactam, 3.375 g, Intravenous, Q8H  sertraline, 50 mg, Oral, Daily  sodium chloride, 10 mL, Intravenous, Q12H  [Held by provider] tamsulosin, 0.4 mg, Oral, Daily      Continuous Infusions:Pharmacy to dose vancomycin,       PRN Meds:.  senna-docusate sodium **AND** polyethylene glycol **AND** bisacodyl **AND** bisacodyl    LORazepam    melatonin    nitroglycerin    ondansetron    Pharmacy to dose vancomycin    sodium chloride    sodium chloride    sodium chloride    traZODone      Assessment & Plan   Assessment / Plan     Assessment/Plan:  UTI due to ESBL E. coli and Citrobacter associated with chronic indwelling suprapubic Diamond catheter.  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      Patient admitted for further evaluation and treatment  Cardiology consulted thank for assistance  Continue piperacillin/tazobactam to complete 7-day course  Continue vancomycin to complete 7-day course  Continue wound care per nursing  Hold further diuresis as patient appears euvolemic currently  Patient not able to tolerate further maximal medical directed therapy for heart failure due to hypotension  Continue diuresis as needed  Continue monitor renal function closely  Continue to monitor electrolytes replace as needed  Continue suprapubic catheter care per nursing  No indication for primary C. difficile prophylaxis though will hold home Protonix while on broad-spectrum antibiotics  Continue physical therapy Occupational Therapy  Further inpatient orders recommendations pending clinical course           Discussed plan with bedside RN.    Disposition: Skilled nursing rehab.  Patient prefers [facility].  Discharge planning coordinating.    VTE Prophylaxis:  Pharmacologic VTE prophylaxis orders are present.        CODE STATUS:   Code Status (Patient has no pulse and is not breathing): No CPR (Do Not Attempt to Resuscitate)  Medical Interventions (Patient has pulse or is breathing): Limited Support  Medical Intervention Limits: No intubation (DNI)

## 2025-04-22 NOTE — PLAN OF CARE
Goal Outcome Evaluation:  Plan of Care Reviewed With: patient        Progress: no change  Outcome Evaluation: Vitals WNL. Pt is A&O x3-4. Pt has been resting well. No complaints of pain this shift. Pt got up to the chair. Family at bedside. Pt has continued to receive antibiotics. Wound care due tomorrow 4/23. Waiting to see of SNF will accept pt for rehab. Call light within reach. Will continue plan of care.

## 2025-04-22 NOTE — PLAN OF CARE
Problem: Adult Inpatient Plan of Care  Goal: Plan of Care Review  Outcome: Progressing  Flowsheets (Taken 4/22/2025 0305)  Progress: no change  Goal: Patient-Specific Goal (Individualized)  Outcome: Progressing  Goal: Absence of Hospital-Acquired Illness or Injury  Outcome: Progressing  Intervention: Identify and Manage Fall Risk  Recent Flowsheet Documentation  Taken 4/21/2025 2300 by Yris Manley RN  Safety Promotion/Fall Prevention:   activity supervised   safety round/check completed  Taken 4/21/2025 2100 by Yris Manley RN  Safety Promotion/Fall Prevention:   nonskid shoes/slippers when out of bed   safety round/check completed  Intervention: Prevent Skin Injury  Recent Flowsheet Documentation  Taken 4/21/2025 2300 by Yris Manley RN  Body Position:   turned   position changed independently  Taken 4/21/2025 2100 by Yris Manley RN  Body Position: position changed independently  Intervention: Prevent Infection  Recent Flowsheet Documentation  Taken 4/21/2025 2300 by Yris Manley RN  Infection Prevention:   hand hygiene promoted   rest/sleep promoted  Taken 4/21/2025 2100 by Yris Manley RN  Infection Prevention:   rest/sleep promoted   hand hygiene promoted  Goal: Optimal Comfort and Wellbeing  Outcome: Progressing  Intervention: Monitor Pain and Promote Comfort  Recent Flowsheet Documentation  Taken 4/21/2025 2000 by Yris Manley RN  Pain Management Interventions: medication offered but refused  Intervention: Provide Person-Centered Care  Recent Flowsheet Documentation  Taken 4/21/2025 2300 by Yris Manley RN  Trust Relationship/Rapport:   care explained   choices provided   emotional support provided   empathic listening provided   questions answered   questions encouraged   reassurance provided   thoughts/feelings acknowledged  Goal: Readiness for Transition of Care  Outcome: Progressing     Problem: Skin Injury Risk Increased  Goal: Skin Health and  Integrity  Outcome: Progressing  Intervention: Optimize Skin Protection  Recent Flowsheet Documentation  Taken 4/21/2025 2300 by Yris Manley RN  Activity Management: bedrest  Head of Bed (HOB) Positioning: HOB at 20-30 degrees  Taken 4/21/2025 2100 by Yris Manley RN  Activity Management: bedrest  Head of Bed (HOB) Positioning: HOB elevated  Taken 4/21/2025 2000 by Yris Manley RN  Activity Management: bedrest     Problem: Comorbidity Management  Goal: Blood Pressure in Desired Range  Outcome: Progressing     Problem: Heart Failure  Goal: Optimal Coping  Outcome: Progressing  Intervention: Support Psychosocial Response  Recent Flowsheet Documentation  Taken 4/21/2025 2300 by Yris Manley RN  Supportive Measures: active listening utilized  Taken 4/21/2025 2000 by Yris Manley RN  Supportive Measures:   active listening utilized   decision-making supported   positive reinforcement provided   relaxation techniques promoted   self-reflection promoted   verbalization of feelings encouraged  Goal: Optimal Cardiac Output and Blood Flow  Outcome: Progressing  Intervention: Optimize Cardiac Output  Recent Flowsheet Documentation  Taken 4/21/2025 2300 by Yris Manley RN  Stabilization Measures: legs elevated  Taken 4/21/2025 2000 by Yris Manley RN  Environmental Support:   calm environment promoted   caregiver consistency promoted  Goal: Stable Heart Rate and Rhythm  Outcome: Progressing  Goal: Fluid and Electrolyte Balance  Outcome: Progressing  Intervention: Monitor and Manage Fluid and Electrolyte Balance  Recent Flowsheet Documentation  Taken 4/21/2025 2000 by Yris Manley RN  Fluid/Electrolyte Management: fluids provided  Goal: Optimal Functional Ability  Outcome: Progressing  Intervention: Optimize Functional Ability  Recent Flowsheet Documentation  Taken 4/21/2025 2300 by Yris Manley RN  Activity Management: bedrest  Taken 4/21/2025 2100 by Yris Manley RN  Activity  Management: bedrest  Taken 4/21/2025 2000 by Yris Manley RN  Activity Management: bedrest  Goal: Improved Oral Intake  Outcome: Progressing  Goal: Effective Oxygenation and Ventilation  Outcome: Progressing  Intervention: Promote Airway Secretion Clearance  Recent Flowsheet Documentation  Taken 4/21/2025 2300 by Yris Manley RN  Activity Management: bedrest  Taken 4/21/2025 2100 by Yris Manley RN  Activity Management: bedrest  Taken 4/21/2025 2000 by Yris Manley RN  Activity Management: bedrest  Cough And Deep Breathing: done independently per patient  Intervention: Optimize Oxygenation and Ventilation  Recent Flowsheet Documentation  Taken 4/21/2025 2300 by Yris Manley RN  Head of Bed (HOB) Positioning: HOB at 20-30 degrees  Taken 4/21/2025 2100 by Yris Manley RN  Head of Bed (HOB) Positioning: HOB elevated  Goal: Effective Breathing Pattern During Sleep  Outcome: Progressing     Problem: Fall Injury Risk  Goal: Absence of Fall and Fall-Related Injury  Outcome: Progressing  Intervention: Promote Injury-Free Environment  Recent Flowsheet Documentation  Taken 4/21/2025 2300 by Yris Manley RN  Safety Promotion/Fall Prevention:   activity supervised   safety round/check completed  Taken 4/21/2025 2100 by Yris Manley RN  Safety Promotion/Fall Prevention:   nonskid shoes/slippers when out of bed   safety round/check completed     Problem: Palliative Care  Goal: Enhanced Quality of Life  Outcome: Progressing  Intervention: Maximize Comfort  Recent Flowsheet Documentation  Taken 4/21/2025 2000 by Yris Manley RN  Pain Management Interventions: medication offered but refused  Intervention: Optimize Psychosocial Wellbeing  Recent Flowsheet Documentation  Taken 4/21/2025 2300 by Yris Manley RN  Supportive Measures: active listening utilized  Taken 4/21/2025 2000 by Yris Manley RN  Supportive Measures:   active listening utilized   decision-making supported    positive reinforcement provided   relaxation techniques promoted   self-reflection promoted   verbalization of feelings encouraged   Goal Outcome Evaluation:           Progress: no change

## 2025-04-22 NOTE — THERAPY TREATMENT NOTE
Patient Name: Javi Martínez  : 1940    MRN: [id number]                              Today's Date: 2025       Admit Date: 4/15/2025    Visit Dx:     ICD-10-CM ICD-9-CM   1. Generalized weakness  R53.1 780.79   2. Difficulty walking  R26.2 719.7     Patient Active Problem List   Diagnosis    Primary osteoarthritis of left knee    S/P TKR (total knee replacement)    Essential hypertension    CAD with CABG    Hyperlipidemia    Presence of cardiac pacemaker single chamber    Anxiety    Gastroesophageal reflux disease    Gout    History of malignant neoplasm of prostate    Testicular hypofunction    Polyneuropathy    Chronic obstructive pulmonary disease    Benign prostatic hyperplasia without lower urinary tract symptoms    Atrial fibrillation, chronic    Stage 3b chronic kidney disease    Postprocedural membranous urethral stricture    Acute on chronic HFrEF (heart failure with reduced ejection fraction)    Suprapubic catheter dysfunction    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    Urethral stricture in male    Lyme disease    Acute exacerbation of CHF (congestive heart failure)    Other dysphagia    CHF (congestive heart failure)    Urinary retention    Influenza A    Generalized weakness     Past Medical History:   Diagnosis Date    Aneurysm     Arthritis     left knee     Atrial fibrillation, persistent     CHB (complete heart block) 2023    CHF exacerbation 2024    Chronic obstructive pulmonary disease 2023    pt denies having this    Difficulty walking     Numbness in right foot    Erectile dysfunction     Essential hypertension 2019    GERD (gastroesophageal reflux disease)     HFrEF (heart failure with reduced ejection fraction) 2021    Hyperlipidemia 2021    Paroxysmal atrial fibrillation     Polyneuropathy 2023    Prostate cancer 2010    with seeds implant     Septic joint 2021    Sleep apnea     Urinary incontinence     Urinary tract  "infection 2022     Past Surgical History:   Procedure Laterality Date    ABLATION OF DYSRHYTHMIC FOCUS  2013    APPENDECTOMY  1947    ARTERIAL BYPASS SURGERY      CARDIAC ABLATION      x2  \"years ago\"     CARDIAC ELECTROPHYSIOLOGY PROCEDURE N/A 11/01/2021    Procedure: PPM battery change;  Surgeon: Ge Whelan MD;  Location: [facility] CATH INVASIVE LOCATION;  Service: Cardiovascular;  Laterality: N/A;    CARDIAC SURGERY  2006    cabg 3v    COLONOSCOPY      CORONARY ARTERY BYPASS GRAFT  2006    CYSTOSCOPY URETHROTOMY VISUAL INTERNAL N/A 08/15/2024    Procedure: CYSTOSCOPY, URETHRAL DILATION, CATHETER PLACEMENT, RETROGRADE URETHROGRAM;  Surgeon: August Myers MD;  Location: [facility] MAIN OR;  Service: Urology;  Laterality: N/A;    CYSTOSCOPY URETHROTOMY VISUAL INTERNAL N/A 10/24/2024    Procedure: CYSTOSCOPY URETHROTOMY;  Surgeon: Mally Bernal MD;  Location: [facility] OR;  Service: Urology;  Laterality: N/A;    CYSTOSCOPY URETHROTOMY VISUAL INTERNAL N/A 03/11/2025    Procedure: CYSTOSCOPY URETHROTOMY VISUAL INTERNAL;  Surgeon: Mally Bernal MD;  Location: [facility] OR;  Service: Urology;  Laterality: N/A;    ENDOSCOPY      with dilation     ENDOSCOPY N/A 01/21/2025    Procedure: ESOPHAGOGASTRODUODENOSCOPY WITH BIOPSIES, POLYPECTOMY, BALLOON DILATION 18-20mm;  Surgeon: Simona Garcia MD;  Location: [facility] ENDOSCOPY;  Service: Gastroenterology;  Laterality: N/A;  GASTRIC POLYPS, SCHATZKI'S RING, HIATAL HERNIA    KNEE ARTHROSCOPY Left     PROSTATE SURGERY  2012    SUPRAPUBIC TUBE PLACEMENT N/A 03/11/2025    Procedure: SUPRAPUBIC CATHETER INSERTION;  Surgeon: Mally Bernal MD;  Location: [facility] MAIN OR;  Service: Urology;  Laterality: N/A;    TOTAL KNEE ARTHROPLASTY Left 07/23/2021    Procedure: TOTAL KNEE ARTHROPLASTY WITH DORA NAVIGATION WITH BIOMET;  Surgeon: Carlitos Zhao MD;  Location: [facility] MAIN OR;  Service: Orthopedics;  Laterality: Left;    VENTRICULAR CARDIAC PACEMAKER " INSERTION      [employer]       General Information       Row Name 04/22/25 1302          OT Time and Intention    Document Type therapy note (daily note)  -PG     Mode of Treatment individual therapy;occupational therapy  -PG               User Key  (r) = Recorded By, (t) = Taken By, (c) = Cosigned By      Initials Name Provider Type    PG Martir Mesa, OT Occupational Therapist                     Mobility/ADL's       Row Name 04/22/25 1303          Transfers    Transfers bed-chair transfer  -PG       Row Name 04/22/25 1303          Bed-Chair Transfer    Bed-Chair [place] (Transfers) verbal cues;contact guard;minimum assist (75% patient effort)  -PG     Assistive Device (Bed-Chair Transfers) walker, front-wheeled  -PG     Comment, (Bed-Chair Transfer) Pt walked around bed CG/min Rwx and chair behind  -PG               User Key  (r) = Recorded By, (t) = Taken By, (c) = Cosigned By      Initials Name Provider Type    PG Martir Mesa, OT Occupational Therapist                   Obj/Interventions    No documentation.                  Goals/Plan    No documentation.                  Clinical Impression       Row Name 04/22/25 1304          Plan of Care Review    Plan of Care Reviewed With patient  -PG     Progress no change  -PG               User Key  (r) = Recorded By, (t) = Taken By, (c) = Cosigned By      Initials Name Provider Type    PG Martir Mesa, OT Occupational Therapist                   Outcome Measures       Row Name 04/22/25 1304          How much help from another is currently needed...    Putting on and taking off regular lower body clothing? 1  -PG     Bathing (including washing, rinsing, and drying) 2  -PG     Toileting (which includes using toilet bed pan or urinal) 1  -PG     Putting on and taking off regular upper body clothing 3  -PG     Taking care of personal grooming (such as brushing teeth) 4  -PG     Eating meals 4  -PG     AM-PAC 6 Clicks Score (OT) 15  -PG       Row Name 04/22/25  0912 04/22/25 0848       How much help from another person do you currently need...    Turning from your back to your side while in flat bed without using bedrails? 3  -NL 3  -DK    Moving from lying on back to sitting on the side of a flat bed without bedrails? 2  -NL 2  -DK    Moving to and from a bed to a chair (including a wheelchair)? 2  -NL 2  -DK    Standing up from a chair using your arms (e.g., wheelchair, bedside chair)? 2  -NL 2  -DK    Climbing 3-5 steps with a railing? 1  -NL 1  -DK    To walk in hospital room? 1  -NL 1  -DK    AM-PAC 6 Clicks Score (PT) 11  -NL 11  -DK    Highest Level of Mobility Goal 4 --> Transfer to chair/commode  -NL 4 --> Transfer to chair/commode  -DK      Row Name 04/22/25 1304 04/22/25 0848       Functional Assessment    Outcome Measure Options AM-PAC 6 Clicks Daily Activity (OT);Optimal Instrument  -PG AM-PAC 6 Clicks Basic Mobility (PT)  -DK      Row Name 04/22/25 1304          Optimal Instrument    Optimal Instrument Optimal - 3  -PG     Bending/Stooping 4  -PG     Standing 3  -PG     Reaching 2  -PG               User Key  (r) = Recorded By, (t) = Taken By, (c) = Cosigned By      Initials Name Provider Type    Deandra Zuniga PTA Physical Therapist Assistant    PG Martir Mesa OT Occupational Therapist    NL Trey Andersen, RN Registered Nurse                      OT Recommendation and Plan  Planned Therapy Interventions (OT): activity tolerance training, BADL retraining, strengthening exercise, transfer/mobility retraining, patient/caregiver education/training, occupation/activity based interventions  Therapy Frequency (OT): 5 times/wk  Plan of Care Review  Plan of Care Reviewed With: patient  Progress: no change  Outcome Evaluation: Patient presents with limitations affecting strength, activity tolerance, and balance impacting patient's ability to return home safely and independently.  The skills of a therapist will be required to safely and effectively implement  the following treatment plan to restore maximal level of function     Time Calculation:   Evaluation Complexity (OT)  Review Occupational Profile/Medical/Therapy History Complexity: brief/low complexity  Assessment, Occupational Performance/Identification of Deficit Complexity: 3-5 performance deficits  Clinical Decision Making Complexity (OT): problem focused assessment/low complexity  Overall Complexity of Evaluation (OT): low complexity     Time Calculation- OT       Row Name 04/22/25 1305             Time Calculation- OT    OT Received On 04/22/25  -PG      OT Goal Re-Cert Due Date 04/25/25  -PG         Timed Charges    84723 - OT Therapeutic Activity Minutes 15  -PG         Total Minutes    Timed Charges Total Minutes 15  -PG       Total Minutes 15  -PG                User Key  (r) = Recorded By, (t) = Taken By, (c) = Cosigned By      Initials Name Provider Type    PG Martir Mesa OT Occupational Therapist                  Therapy Charges for Today       Code Description Service Date Service Provider Modifiers Qty    60987770844  OT THERAPEUTIC ACT EA 15 MIN 4/22/2025 Martir Mesa OT GO 1                 Martir Mesa OT  4/22/2025

## 2025-04-22 NOTE — THERAPY TREATMENT NOTE
Acute Care - Physical Therapy Treatment Note  [facility]     Patient Name: Javi Martínez  : 1940  MRN: [id number]  Today's Date: 2025      Visit Dx:     ICD-10-CM ICD-9-CM   1. Generalized weakness  R53.1 780.79   2. Difficulty walking  R26.2 719.7     Patient Active Problem List   Diagnosis    Primary osteoarthritis of left knee    S/P TKR (total knee replacement)    Essential hypertension    CAD with CABG    Hyperlipidemia    Presence of cardiac pacemaker single chamber    Anxiety    Gastroesophageal reflux disease    Gout    History of malignant neoplasm of prostate    Testicular hypofunction    Polyneuropathy    Chronic obstructive pulmonary disease    Benign prostatic hyperplasia without lower urinary tract symptoms    Atrial fibrillation, chronic    Stage 3b chronic kidney disease    Postprocedural membranous urethral stricture    Acute on chronic HFrEF (heart failure with reduced ejection fraction)    Suprapubic catheter dysfunction    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    Urethral stricture in male    Lyme disease    Acute exacerbation of CHF (congestive heart failure)    Other dysphagia    CHF (congestive heart failure)    Urinary retention    Influenza A    Generalized weakness     Past Medical History:   Diagnosis Date    Aneurysm     Arthritis     left knee     Atrial fibrillation, persistent     CHB (complete heart block) 2023    CHF exacerbation 2024    Chronic obstructive pulmonary disease 2023    pt denies having this    Difficulty walking     Numbness in right foot    Erectile dysfunction     Essential hypertension 2019    GERD (gastroesophageal reflux disease)     HFrEF (heart failure with reduced ejection fraction) 2021    Hyperlipidemia 2021    Paroxysmal atrial fibrillation     Polyneuropathy 2023    Prostate cancer 2010    with seeds implant     Septic joint 2021    Sleep apnea     Urinary incontinence     Urinary tract  "infection 2022     Past Surgical History:   Procedure Laterality Date    ABLATION OF DYSRHYTHMIC FOCUS  2013    APPENDECTOMY  1947    ARTERIAL BYPASS SURGERY      CARDIAC ABLATION      x2  \"years ago\"     CARDIAC ELECTROPHYSIOLOGY PROCEDURE N/A 11/01/2021    Procedure: PPM battery change;  Surgeon: Ge Whelan MD;  Location: [facility] CATH INVASIVE LOCATION;  Service: Cardiovascular;  Laterality: N/A;    CARDIAC SURGERY  2006    cabg 3v    COLONOSCOPY      CORONARY ARTERY BYPASS GRAFT  2006    CYSTOSCOPY URETHROTOMY VISUAL INTERNAL N/A 08/15/2024    Procedure: CYSTOSCOPY, URETHRAL DILATION, CATHETER PLACEMENT, RETROGRADE URETHROGRAM;  Surgeon: August Myers MD;  Location: [facility] MAIN OR;  Service: Urology;  Laterality: N/A;    CYSTOSCOPY URETHROTOMY VISUAL INTERNAL N/A 10/24/2024    Procedure: CYSTOSCOPY URETHROTOMY;  Surgeon: Mally Bernal MD;  Location: [facility] OR;  Service: Urology;  Laterality: N/A;    CYSTOSCOPY URETHROTOMY VISUAL INTERNAL N/A 03/11/2025    Procedure: CYSTOSCOPY URETHROTOMY VISUAL INTERNAL;  Surgeon: Mally Bernal MD;  Location: [facility] OR;  Service: Urology;  Laterality: N/A;    ENDOSCOPY      with dilation     ENDOSCOPY N/A 01/21/2025    Procedure: ESOPHAGOGASTRODUODENOSCOPY WITH BIOPSIES, POLYPECTOMY, BALLOON DILATION 18-20mm;  Surgeon: Simona Garcia MD;  Location: [facility] ENDOSCOPY;  Service: Gastroenterology;  Laterality: N/A;  GASTRIC POLYPS, SCHATZKI'S RING, HIATAL HERNIA    KNEE ARTHROSCOPY Left     PROSTATE SURGERY  2012    SUPRAPUBIC TUBE PLACEMENT N/A 03/11/2025    Procedure: SUPRAPUBIC CATHETER INSERTION;  Surgeon: Mally Bernal MD;  Location: [facility] MAIN OR;  Service: Urology;  Laterality: N/A;    TOTAL KNEE ARTHROPLASTY Left 07/23/2021    Procedure: TOTAL KNEE ARTHROPLASTY WITH DORA NAVIGATION WITH BIOMET;  Surgeon: Carlitos Zhao MD;  Location: [facility] MAIN OR;  Service: Orthopedics;  Laterality: Left;    VENTRICULAR CARDIAC PACEMAKER " INSERTION      [employer]      PT Assessment (Last 12 Hours)       PT Evaluation and Treatment       Row Name 04/22/25 0848          Physical Therapy Time and Intention    Subjective Information complains of;weakness;fatigue  -DK     Document Type therapy note (daily note)  -DK     Mode of Treatment individual therapy;physical therapy  -DK     Patient Effort adequate  -DK     Symptoms Noted During/After Treatment fatigue  -DK     Comment Pt was initially very lethargic, difficult to stay awake for treatment. He states he has not walked in weeks.  -DK       Row Name 04/22/25 0848          Pain    Pretreatment Pain Rating 0/10 - no pain  -DK     Posttreatment Pain Rating 0/10 - no pain  -DK       Row Name 04/22/25 0848          Cognition    Affect/Mental Status (Cognition) confused;low arousal/lethargic  -DK     Orientation Status (Cognition) oriented to;person;situation;place  -DK     Follows Commands (Cognition) physical/tactile prompts required;repetition of directions required;verbal cues/prompting required  -DK     Cognitive Function (Cognition) attention deficit  -DK     Attention Deficit (Cognition) minimal deficit;arousal/alertness;concentration;focused/sustained attention  -DK     Personal Safety Interventions nonskid shoes/slippers when out of bed;supervised activity;gait belt  -DK       Row Name 04/22/25 0848          Bed Mobility    Bed Mobility supine-sit-supine;scooting/bridging  -DK     All Activities, [place] (Bed Mobility) moderate assist (50% patient effort);maximum assist (25% patient effort);1 person assist  -DK     Scooting/Bridging [place] (Bed Mobility) maximum assist (25% patient effort);1 person assist  -DK     Supine-Sit [place] (Bed Mobility) moderate assist (50% patient effort);maximum assist (25% patient effort);1 person assist  -DK     Supine-Sit-Supine [place] (Bed Mobility) moderate assist (50% patient effort);maximum assist (25% patient effort);1 person assist  -DK      Bed Mobility, Safety Issues decreased use of legs for bridging/pushing;decreased use of arms for pushing/pulling  -     Assistive Device (Bed Mobility) bed rails;repositioning sheet  -     Comment, (Bed Mobility) Pt tolerated sitting EOB x 2-3 minutes with min assist x 1.  He returned to bed on alert post treatment. Posterior lean noted.  -       Row Name 04/22/25 0848          Safety Issues/Impairments Affecting Functional Mobility    Safety Issues Affecting Function (Mobility) ability to follow commands;awareness of need for assistance;impulsivity;judgment;safety precaution awareness  -     Impairments Affecting Function (Mobility) balance;cognition;endurance/activity tolerance;strength  -     Cognitive Impairments, Mobility Safety/Performance attention;awareness, need for assistance;impulsivity;judgment;safety precaution awareness  -       Row Name 04/22/25 0848          Balance    Balance Assessment sitting static balance;sitting dynamic balance  -DK     Static Sitting Balance minimal assist;1-person assist  -DK     Dynamic Sitting Balance minimal assist;1-person assist  -DK     Position, Sitting Balance supported;sitting edge of bed  -     Balance Interventions sitting;supported;static;dynamic  -       Row Name 04/22/25 08          Motor Skills    Motor Skills --  therapeutic exercises  -     Coordination WFL  -     Therapeutic Exercise hip;knee;ankle  -       Row Name 04/22/25 0848          Hip (Therapeutic Exercise)    Hip (Therapeutic Exercise) AAROM (active assistive range of motion)  -     Hip AAROM (Therapeutic Exercise) bilateral;flexion;extension;aBduction;aDduction;supine;10 repetitions;2 sets  -       Row Name 04/22/25 0830          Knee (Therapeutic Exercise)    Knee (Therapeutic Exercise) AAROM (active assistive range of motion)  -     Knee AAROM (Therapeutic Exercise) bilateral;flexion;extension;supine;10 repetitions;2 sets  -       Row Name 04/22/25 0859           Ankle (Therapeutic Exercise)    Ankle (Therapeutic Exercise) AAROM (active assistive range of motion)  -DK     Ankle AAROM (Therapeutic Exercise) bilateral;dorsiflexion;plantarflexion;supine;10 repetitions;2 sets  -DK       Row Name             Wound 04/15/25 2152 Bilateral upper coccyx Pressure Injury    Wound - Properties Group Placement Date: 04/15/25  -HD Placement Time: 2152 -HD Present on Original Admission: Y  -HD Side: Bilateral  -HD Orientation: upper  -HD Location: coccyx  -HD Primary Wound Type: Pressure Inj  -HD    Retired Wound - Properties Group Placement Date: 04/15/25  -HD Placement Time: 2152 -HD Present on Original Admission: Y  -HD Side: Bilateral  -HD Orientation: upper  -HD Location: coccyx  -HD    Retired Wound - Properties Group Placement Date: 04/15/25  -HD Placement Time: 2152 -HD Present on Original Admission: Y  -HD Side: Bilateral  -HD Orientation: upper  -HD Location: coccyx  -HD    Retired Wound - Properties Group Date first assessed: 04/15/25  -HD Time first assessed: 2152 -HD Present on Original Admission: Y  -HD Side: Bilateral  -HD Location: coccyx  -HD      Row Name 04/22/25 0848          Plan of Care Review    Plan of Care Reviewed With patient  -DK     Progress improving  -DK       Row Name 04/22/25 0848          Positioning and Restraints    Pre-Treatment Position in bed  -DK     Post Treatment Position bed  -DK     In Bed supine;call light within reach;encouraged to call for assist;exit alarm on;side rails up x3;legs elevated  -DK               User Key  (r) = Recorded By, (t) = Taken By, (c) = Cosigned By      Initials Name Provider Type    Deandra Zuniga PTA Physical Therapist Assistant    Carolyn Mariscal RN Registered Nurse                      PT Recommendation and Plan     Plan of Care Reviewed With: patient  Progress: improving   Outcome Measures       Row Name 04/22/25 0848             How much help from another person do you currently need...    Turning from  your back to your side while in flat bed without using bedrails? 3  -DK      Moving from lying on back to sitting on the side of a flat bed without bedrails? 2  -DK      Moving to and from a bed to a chair (including a wheelchair)? 2  -DK      Standing up from a chair using your arms (e.g., wheelchair, bedside chair)? 2  -DK      Climbing 3-5 steps with a railing? 1  -DK      To walk in hospital room? 1  -DK      AM-PAC 6 Clicks Score (PT) 11  -DK         Functional Assessment    Outcome Measure Options AM-PAC 6 Clicks Basic Mobility (PT)  -DK                User Key  (r) = Recorded By, (t) = Taken By, (c) = Cosigned By      Initials Name Provider Type    Deandra Zuniga PTA Physical Therapist Assistant                     Time Calculation:    PT Charges       Row Name 04/22/25 0853             Time Calculation    PT Received On 04/22/25  -DK      PT Goal Re-Cert Due Date 04/26/25  -DK         Timed Charges    91551 - PT Therapeutic Exercise Minutes 14  -DK      38647 - PT Therapeutic Activity Minutes 12  -DK         Total Minutes    Timed Charges Total Minutes 26  -DK       Total Minutes 26  -DK                User Key  (r) = Recorded By, (t) = Taken By, (c) = Cosigned By      Initials Name Provider Type    Deandra Zuniga PTA Physical Therapist Assistant                  Therapy Charges for Today       Code Description Service Date Service Provider Modifiers Qty    07351421077 HC PT THER PROC EA 15 MIN 4/22/2025 Deandra Kidd PTA GP 1    21209244552 HC PT THERAPEUTIC ACT EA 15 MIN 4/22/2025 Deandra Kidd PTA GP 1            PT G-Codes  Outcome Measure Options: AM-PAC 6 Clicks Basic Mobility (PT)  AM-PAC 6 Clicks Score (PT): 11  AM-PAC 6 Clicks Score (OT): 19    Deandra Kidd PTA  4/22/2025

## 2025-04-22 NOTE — SIGNIFICANT NOTE
04/22/25 1100   Coping/Psychosocial   Observed Emotional State calm;cooperative   Verbalized Emotional State hopefulness   Trust Relationship/Rapport empathic listening provided   Involvement in Care interacting with patient   Additional Documentation Spiritual Care (Group)   Spiritual Care   Spiritual Care Visit Type initial   Spiritual Care Source palliative care   Receptivity to Spiritual Care visit welcomed   Spiritual Care Interventions prayer support provided;supportive conversation provided   Response to Spiritual Care receptive of support;engaged in conversation;thanks expressed   Use of Spiritual Resources prayer;spirituality for coping, indicated strong use of   Spiritual Care Follow-Up follow-up, none required as presently assessed     Length of visit: 12 min

## 2025-04-23 NOTE — THERAPY RE-EVALUATION
Acute Care - Physical Therapy Re-Evaluation  [facility]     Patient Name: Javi Martínez  : 1940  MRN: [id number]  Today's Date: 2025      Visit Dx:     ICD-10-CM ICD-9-CM   1. Generalized weakness  R53.1 780.79   2. Difficulty walking  R26.2 719.7     Patient Active Problem List   Diagnosis    Primary osteoarthritis of left knee    S/P TKR (total knee replacement)    Essential hypertension    CAD with CABG    Hyperlipidemia    Presence of cardiac pacemaker single chamber    Anxiety    Gastroesophageal reflux disease    Gout    History of malignant neoplasm of prostate    Testicular hypofunction    Polyneuropathy    Chronic obstructive pulmonary disease    Benign prostatic hyperplasia without lower urinary tract symptoms    Atrial fibrillation, chronic    Stage 3b chronic kidney disease    Postprocedural membranous urethral stricture    Acute on chronic HFrEF (heart failure with reduced ejection fraction)    Suprapubic catheter dysfunction    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    Urethral stricture in male    Lyme disease    Acute exacerbation of CHF (congestive heart failure)    Other dysphagia    CHF (congestive heart failure)    Urinary retention    Influenza A    Generalized weakness     Past Medical History:   Diagnosis Date    Aneurysm     Arthritis     left knee     Atrial fibrillation, persistent     CHB (complete heart block) 2023    CHF exacerbation 2024    Chronic obstructive pulmonary disease 2023    pt denies having this    Difficulty walking     Numbness in right foot    Erectile dysfunction     Essential hypertension 2019    GERD (gastroesophageal reflux disease)     HFrEF (heart failure with reduced ejection fraction) 2021    Hyperlipidemia 2021    Paroxysmal atrial fibrillation     Polyneuropathy 2023    Prostate cancer 2010    with seeds implant     Septic joint 2021    Sleep apnea     Urinary incontinence     Urinary tract  "infection 2022     Past Surgical History:   Procedure Laterality Date    ABLATION OF DYSRHYTHMIC FOCUS  2013    APPENDECTOMY  1947    ARTERIAL BYPASS SURGERY      CARDIAC ABLATION      x2  \"years ago\"     CARDIAC ELECTROPHYSIOLOGY PROCEDURE N/A 11/01/2021    Procedure: PPM battery change;  Surgeon: Ge Whlean MD;  Location: [facility] CATH INVASIVE LOCATION;  Service: Cardiovascular;  Laterality: N/A;    CARDIAC SURGERY  2006    cabg 3v    COLONOSCOPY      CORONARY ARTERY BYPASS GRAFT  2006    CYSTOSCOPY URETHROTOMY VISUAL INTERNAL N/A 08/15/2024    Procedure: CYSTOSCOPY, URETHRAL DILATION, CATHETER PLACEMENT, RETROGRADE URETHROGRAM;  Surgeon: August Myers MD;  Location: [facility] MAIN OR;  Service: Urology;  Laterality: N/A;    CYSTOSCOPY URETHROTOMY VISUAL INTERNAL N/A 10/24/2024    Procedure: CYSTOSCOPY URETHROTOMY;  Surgeon: Mally Bernal MD;  Location: [facility] OR;  Service: Urology;  Laterality: N/A;    CYSTOSCOPY URETHROTOMY VISUAL INTERNAL N/A 03/11/2025    Procedure: CYSTOSCOPY URETHROTOMY VISUAL INTERNAL;  Surgeon: Mally Bernal MD;  Location: [facility] OR;  Service: Urology;  Laterality: N/A;    ENDOSCOPY      with dilation     ENDOSCOPY N/A 01/21/2025    Procedure: ESOPHAGOGASTRODUODENOSCOPY WITH BIOPSIES, POLYPECTOMY, BALLOON DILATION 18-20mm;  Surgeon: Simona Garcia MD;  Location: [facility] ENDOSCOPY;  Service: Gastroenterology;  Laterality: N/A;  GASTRIC POLYPS, SCHATZKI'S RING, HIATAL HERNIA    KNEE ARTHROSCOPY Left     PROSTATE SURGERY  2012    SUPRAPUBIC TUBE PLACEMENT N/A 03/11/2025    Procedure: SUPRAPUBIC CATHETER INSERTION;  Surgeon: Mally Bernal MD;  Location: [facility] MAIN OR;  Service: Urology;  Laterality: N/A;    TOTAL KNEE ARTHROPLASTY Left 07/23/2021    Procedure: TOTAL KNEE ARTHROPLASTY WITH DORA NAVIGATION WITH BIOMET;  Surgeon: Carlitos Zhao MD;  Location: [facility] MAIN OR;  Service: Orthopedics;  Laterality: Left;    VENTRICULAR CARDIAC PACEMAKER " INSERTION      medtronic      PT Assessment (Last 12 Hours)       PT Evaluation and Treatment       Row Name 04/23/25 1400 04/23/25 1300       Physical Therapy Time and Intention    Subjective Information complains of;nausea/vomiting  diarrhoea  -DP --    Document Type re-evaluation  -DP --    Mode of Treatment individual therapy;physical therapy  -DP --    Comment, Session Not Performed -- pt refused treatment session (P)   -RA    Patient Effort adequate  -DP --      Row Name 04/23/25 1400          General Information    Patient Observations alert;poorly cooperative  -DP       Row Name 04/23/25 1400          Bed Mobility    Supine-Sit [place] (Bed Mobility) minimum assist (75% patient effort)  -DP     Supine-Sit-Supine [place] (Bed Mobility) minimum assist (75% patient effort)  -DP     Bed Mobility, Safety Issues decreased use of arms for pushing/pulling;decreased use of legs for bridging/pushing  -DP     Assistive Device (Bed Mobility) bed rails;head of bed elevated  -DP       Row Name 04/23/25 1400          Transfers    Transfers sit-stand transfer  -DP       Row Name 04/23/25 1400          Sit-Stand Transfer    Sit-Stand [place] (Transfers) minimum assist (75% patient effort)  -DP     Assistive Device (Sit-Stand Transfers) walker, front-wheeled  -DP       Row Name 04/23/25 1400          Gait/Stairs (Locomotion)    Gait/Stairs Locomotion gait/ambulation assistive device  -DP     [place] Level (Gait) minimum assist (75% patient effort)  -DP     Assistive Device (Gait) walker, front-wheeled  -DP     Distance in Feet (Gait) 4  sideways  -DP       Row Name 04/23/25 1400          Balance    Dynamic Standing Balance minimal assist  -DP       Row Name             Wound 04/15/25 2152 Bilateral upper coccyx Pressure Injury    Wound - Properties Group Placement Date: 04/15/25  -HD Placement Time: 2152  -HD Present on Original Admission: Y  -HD Side: Bilateral  -HD Orientation: upper  -HD Location:  coccyx  -HD Primary Wound Type: Pressure Inj  -HD    Retired Wound - Properties Group Placement Date: 04/15/25  -HD Placement Time: 2152 -HD Present on Original Admission: Y  -HD Side: Bilateral  -HD Orientation: upper  -HD Location: coccyx  -HD    Retired Wound - Properties Group Placement Date: 04/15/25  -HD Placement Time: 2152 -HD Present on Original Admission: Y  -HD Side: Bilateral  -HD Orientation: upper  -HD Location: coccyx  -HD    Retired Wound - Properties Group Date first assessed: 04/15/25  -HD Time first assessed: 2152 -HD Present on Original Admission: Y  -HD Side: Bilateral  -HD Location: coccyx  -HD      Row Name 04/23/25 1400          Plan of Care Review    Outcome Evaluation Patient continues to present with decreased strength, transfers, endurance, balance, and ambulation.  He will benefit from inpatient PT services and placement in a rehab facility upon discharge.  -DP       Row Name 04/23/25 1400          Therapy Assessment/Plan (PT)    Rehab Potential (PT) fair  -DP     Criteria for Skilled Interventions Met (PT) yes;meets criteria  -DP     Therapy Frequency (PT) daily  -DP     Predicted Duration of Therapy Intervention (PT) 10 days  -DP     Problem List (PT) problems related to;balance;mobility;cognition;strength  -DP     Activity Limitations Related to Problem List (PT) unable to ambulate safely;unable to transfer safely  -DP       Row Name 04/23/25 1400          Physical Therapy Goals    Bed Mobility Goal Selection (PT) bed mobility, PT goal 1  -DP     Transfer Goal Selection (PT) transfer, PT goal 1  -DP     Gait Training Goal Selection (PT) gait training, PT goal 1  -DP       Row Name 04/23/25 1400          Bed Mobility Goal 1 (PT)    Activity/Assistive Device (Bed Mobility Goal 1, PT) bed mobility activities, all  -DP     [place] Level/Cues Needed (Bed Mobility Goal 1, PT) modified independence  -DP     Time Frame (Bed Mobility Goal 1, PT) long term goal (LTG);10 days  -DP      Progress/Outcomes (Bed Mobility Goal 1, PT) goal ongoing  -DP       Row Name 04/23/25 1400          Transfer Goal 1 (PT)    Activity/Assistive Device (Transfer Goal 1, PT) sit-to-stand/stand-to-sit;bed-to-chair/chair-to-bed;walker, rolling  -DP     [place] Level/Cues Needed (Transfer Goal 1, PT) supervision required  -DP     Time Frame (Transfer Goal 1, PT) long term goal (LTG);10 days  -DP     Progress/Outcome (Transfer Goal 1, PT) goal ongoing  -DP       Row Name 04/23/25 1400          Gait Training Goal 1 (PT)    Activity/Assistive Device (Gait Training Goal 1, PT) gait (walking locomotion);assistive device use;walker, rolling  -DP     [place] Level (Gait Training Goal 1, PT) supervision required  -DP     Distance (Gait Training Goal 1, PT) 125  -DP     Time Frame (Gait Training Goal 1, PT) long term goal (LTG);10 days  -DP     Progress/Outcome (Gait Training Goal 1, PT) goal ongoing  -DP               User Key  (r) = Recorded By, (t) = Taken By, (c) = Cosigned By      Initials Name Provider Type    DP Janey Duarte, PT Physical Therapist    Carolyn Mariscal RN Registered Nurse    Kelley Jeronimo, PT Student PT Student                      PT Recommendation and Plan  Anticipated Discharge Disposition (PT): sub acute care setting  Planned Therapy Interventions (PT): balance training, bed mobility training, gait training, transfer training, strengthening  Therapy Frequency (PT): daily  Outcome Evaluation: Patient continues to present with decreased strength, transfers, endurance, balance, and ambulation.  He will benefit from inpatient PT services and placement in a rehab facility upon discharge.   Outcome Measures       Row Name 04/23/25 1400 04/22/25 0848          How much help from another person do you currently need...    Turning from your back to your side while in flat bed without using bedrails? 3  -DP 3  -DK     Moving from lying on back to sitting on the side of a flat bed without  bedrails? 2  -DP 2  -DK     Moving to and from a bed to a chair (including a wheelchair)? 2  -DP 2  -DK     Standing up from a chair using your arms (e.g., wheelchair, bedside chair)? 2  -DP 2  -DK     Climbing 3-5 steps with a railing? 2  -DP 1  -DK     To walk in hospital room? 2  -DP 1  -DK     AM-PAC 6 Clicks Score (PT) 13  -DP 11  -DK        Functional Assessment    Outcome Measure Options AM-PAC 6 Clicks Basic Mobility (PT)  -DP AM-PAC 6 Clicks Basic Mobility (PT)  -DK               User Key  (r) = Recorded By, (t) = Taken By, (c) = Cosigned By      Initials Name Provider Type    Deandra Zuniga, PTA Physical Therapist Assistant    Janey Byrnes, PT Physical Therapist                     Time Calculation:    PT Charges       Row Name 04/23/25 1436             Time Calculation    PT Received On 04/23/25  -DP      PT Goal Re-Cert Due Date 05/02/25  -DP         Untimed Charges    PT Eval/Re-eval Minutes 25  -DP         Total Minutes    Untimed Charges Total Minutes 25  -DP       Total Minutes 25  -DP                User Key  (r) = Recorded By, (t) = Taken By, (c) = Cosigned By      Initials Name Provider Type    Janey Byrnes, PT Physical Therapist                      PT G-Codes  Outcome Measure Options: AM-PAC 6 Clicks Basic Mobility (PT)  AM-PAC 6 Clicks Score (PT): 13  AM-PAC 6 Clicks Score (OT): 15    Janey Duarte PT  4/23/2025

## 2025-04-23 NOTE — PLAN OF CARE
Goal Outcome Evaluation:  Plan of Care Reviewed With: patient  Pt A&Ox4. Pt has remained in bed all day with generalized weakness. Pt complained of nausea and pain this shift, PRN Zofran and norco administered once this shift. Wound care completed this shift. Pt has been turning every 2 hours with assistance. Continue plan of care.

## 2025-04-23 NOTE — PLAN OF CARE
Goal Outcome Evaluation:  Plan of Care Reviewed With: patient        Progress: no change  Outcome Evaluation: Patient alert and oriented, no c/o pain or discomfort, patient receiving iv antibiotics, loyd in place to BSD, call light within reach.

## 2025-04-23 NOTE — PLAN OF CARE
Goal Outcome Evaluation:              Outcome Evaluation: Patient continues to present with decreased strength, transfers, endurance, balance, and ambulation.  He will benefit from inpatient PT services and placement in a rehab facility upon discharge.    Anticipated Discharge Disposition (PT): sub acute care setting

## 2025-04-23 NOTE — SIGNIFICANT NOTE
04/23/25 1300   Physical Therapy Time and Intention   Comment, Session Not Performed pt refused treatment session

## 2025-04-23 NOTE — PROGRESS NOTES
[facility]   Hospitalist Progress Note  Date: 2025  Patient Name: Javi Martínez  : 1940  MRN: [id number]  Date of admission: 4/15/2025      Subjective   Subjective     Chief Complaint: Follow-up generalized weakness    Summary:Javi Martínez is a 84 y.o. male  with past medical history of hypertension, CHF, A-fib on Eliquis, arthritis, history of prostate cancer status post brachytherapy and external beam XRT and BPH and GERD presented to the ED for evaluation of generalized weakness and fall.  Patient states that for the last week or so he has not been feeling well with increased weakness, dysuria, lethargy, decreased p.o. intake, and multiple falls.  Patient does live alone and had been unable to perform his ADLs so he was brought to the ED for further evaluation.  In the ED patient was tachypneic on arrival with remaining vitals being within normal limits on room air.  His UA was positive for a UTI with labs showing an elevated proBNP, reduced renal function, elevated lactic acid, and elevated but flat troponin.  Remaining labs are relatively unremarkable including a normal WBC and negative COVID flu RSV.  Chest x-ray showed pulmonary edema although improved from prior imaging.  When seen patient was resting comfortably and when asked he denied any recent fevers, chills, headaches, focal weakness, chest pain, palpitation, shortness of breath, cough, abdominal pain, nausea, vomiting, diarrhea, constipation, hematuria, hematochezia, melena, or anxiety.   Patient admitted for further evaluation and treatment.  Patient had pus coming from suprapubic catheter insertion site along with pus within suprapubic catheter.  Blood pressure borderline low, perfusing extremities well.  Cardiologist consulted.  Also started on IV antibiotics for possible intra-abdominal infection.  CT abdomen pelvis demonstrated some haziness of the perivesical fat suggesting cystitis but no abscess appreciated.  Also  demonstrated cardiomegaly with bilateral pleural effusions.  Wound culture grew MRSA.  Urine culture grew ESBL E. coli and Citrobacter farmeri.  Antibiotics tailored to sensitivities.  Patient noted to have some loose stools.  C. difficile detected by PCR but toxin negative indicating carrier status.  Blood culture negative to date.  Patient planning to discharge to skilled nursing rehab.  Discharge planning coordinating.    Interval Followup: Patient sitting up in bed appears to be less comfortable today.  Patient complaining of multiple hard stools.  Nurse confirms 2 firm stools.  Also endorsing some nausea and abdominal pain.  Patient continues to endorse some weakness.  Afebrile overnight.  V-paced 70s - 90s on telemetry review.  Blood pressure low normal limits.  Satting well on room air.  Serum creatinine trending down slightly.  No other issues per nursing.    Review of Systems  Constitutional: Negative for fatigue and fever.   HENT: Negative for sore throat and trouble swallowing.    Eyes: Negative for pain and discharge.   Respiratory: Negative for cough and shortness of breath.    Cardiovascular: Negative for chest pain and palpitations.   Gastrointestinal: Positive for abdominal pain, nausea and negative vomiting.   Endocrine: Negative for cold intolerance and heat intolerance.   Genitourinary: Positive for difficulty urinating and dysuria.   Musculoskeletal: Negative for back pain and neck stiffness.   Skin: Negative for color change and rash.   Neurological: Negative for syncope and headaches.   Hematological: Negative for adenopathy.   Psychiatric/Behavioral: Negative for confusion and hallucinations.    Objective   Objective     Vitals:   Temp:  [97.3 °F (36.3 °C)-98.7 °F (37.1 °C)] 97.3 °F (36.3 °C)  Heart Rate:  [70-76] 70  Resp:  [18-20] 18  BP: ()/(59-74) 105/74  Physical Exam   General: well-developed appearing stated age in no acute distress  HEENT: Normocephalic atraumatic moist membranes  pupils equal round reactive light, no scleral icterus no conjunctival injection  Cardiovascular: regular rate and rhythm no murmurs rubs or gallops S1-S2, no lower extremity edema appreciated  Pulmonary: Clear to auscultation bilaterally no wheezes rales or rhonchi symmetric chest expansion, unlabored, no conversational dyspnea appreciated  Gastrointestinal: Soft nontender nondistended positive bowel sounds all 4 quadrants no rebound or guarding  Musculoskeletal: No clubbing cyanosis, warm and well-perfused, calves soft symmetric nontender bilaterally  Skin: Clean dry without rashes  Neuro: Cranial nerves II through XII intact grossly no sensorimotor deficits appreciated bilateral upper and lower extremities  Psych: Patient is calm cooperative and appropriate with exam not responding to internal stimuli  : Suprapubic catheter no bladder distention positive suprapubic tenderness    Result Review    Result Review:  I have personally reviewed these results and agree with these findings:  [x]  Laboratory  LAB RESULTS:      Lab 04/23/25  0539 04/22/25  0446 04/21/25  1740 04/21/25  0501 04/20/25  0618   WBC 8.08 6.72 9.00 8.19 8.70   HEMOGLOBIN 13.9 13.2 13.6 13.4 13.8   HEMATOCRIT 41.3 42.3 43.7 41.2 43.1   PLATELETS 227 178 225 210 213   NEUTROS ABS 5.45 3.92 5.77 5.53 6.10   IMMATURE GRANS (ABS) 0.04 0.02 0.03 0.04 0.03   LYMPHS ABS 1.64 1.70 2.09 1.62 1.47   MONOS ABS 0.74 0.87 0.95* 0.82 0.96*   EOS ABS 0.13 0.13 0.09 0.10 0.05   MCV 95.2 100.0* 92.6 95.2 94.1         Lab 04/23/25  0539 04/22/25  0446 04/21/25  0501 04/20/25  0618 04/19/25  0517   SODIUM 136 134* 139 137 136   POTASSIUM 3.7 3.7 3.7 3.7 3.7   CHLORIDE 101 99 100 100 100   CO2 23.2 22.5 24.3 22.1 23.7   ANION GAP 11.8 12.5 14.7 14.9 12.3   BUN 24* 25* 24* 24* 23   CREATININE 1.50* 1.72* 1.60* 1.37* 1.39*   EGFR 46.2* 39.2* 42.8* 51.5* 50.6*   GLUCOSE 103* 87 110* 118* 107*   CALCIUM 9.0 8.8 8.9 8.9 8.8   MAGNESIUM 2.3 2.3 2.2 2.3 2.2   PHOSPHORUS  3.1 3.2 3.2 3.3 2.8                             Brief Urine Lab Results  (Last result in the past 365 days)        Color   Clarity   Blood   Leuk Est   Nitrite   Protein   CREAT   Urine HCG        04/15/25 1823 Yellow   Clear   Trace   Large (3+)   Negative   Negative                 Microbiology Results (last 10 days)       Procedure Component Value - Date/Time    Clostridioides difficile Toxin - Stool, Per Rectum [[id number]]  (Abnormal) Collected: 04/18/25 1519    Lab Status: Final result Specimen: Stool from Per Rectum Updated: 04/18/25 1648    Narrative:      The following orders were created for panel order Clostridioides difficile Toxin - Stool, Per Rectum.  Procedure                               Abnormality         Status                     ---------                               -----------         ------                     Clostridioides difficile...[[id number]]  Abnormal            Final result                 Please view results for these tests on the individual orders.    Clostridioides difficile Toxin, PCR - Stool, Per Rectum [[id number]]  (Abnormal) Collected: 04/18/25 1519    Lab Status: Final result Specimen: Stool from Per Rectum Updated: 04/18/25 1648     Toxigenic C. difficile by PCR Positive     027 Toxin Presumptive Negative    Narrative:      DNA from a toxigenic strain of C.difficile has been detected. Antigen testing for the presence of free C.difficile toxin is currently in progress, to help determine the clinical significance of this PCR result.     Enteric Parasite Panel - Stool, Per Rectum [[id number]]  (Normal) Collected: 04/18/25 1519    Lab Status: Final result Specimen: Stool from Per Rectum Updated: 04/19/25 1318     Giardia lamblia Not Detected     Cryptosporidium Not Detected     Entamoeba histolytica Not Detected    Enteric Bacterial Panel - Stool, Per Rectum [[id number]]  (Normal) Collected: 04/18/25 1519    Lab Status: Final result Specimen: Stool from Per Rectum Updated:  04/19/25 1313     Salmonella Not Detected     Campylobacter Not Detected     Shigella/Enteroinvasive E. coli (EIEC) Not Detected     Shiga-like toxin-producing E. coli (STEC) stx1/stx2 Not Detected    Clostridioides difficile toxin Ag, Reflex - Stool, Per Rectum [[id number]]  (Normal) Collected: 04/18/25 1519    Lab Status: Final result Specimen: Stool from Per Rectum Updated: 04/18/25 1648     C.diff Toxin Ag Negative    Narrative:      DNA from a toxigenic strain of C.difficile was detected, although the free toxin itself was not detected. These findings are consistent with C.difficile colonization and may not reflect actual C.difficile infection. Clinical correlation needed.    Wound Culture - Wound, Suprapubic Catheter [[id number]]  (Abnormal)  (Susceptibility) Collected: 04/16/25 1437    Lab Status: Final result Specimen: Wound from Suprapubic Catheter Updated: 04/18/25 0645     Wound Culture Scant growth (1+) Staphylococcus aureus, MRSA     Comment:   Methicillin resistant Staphylococcus aureus, Patient may be an isolation risk.        Gram Stain Rare (1+) WBCs seen      Occasional Gram positive cocci    Susceptibility        Staphylococcus aureus, MRSA      SHINE      Clindamycin Resistant      Erythromycin Resistant      Oxacillin Resistant      Rifampin Susceptible      Tetracycline Susceptible      Trimethoprim + Sulfamethoxazole Susceptible      Vancomycin Susceptible                           MRSA Screen, PCR (Inpatient) - Swab, Nares [[id number]]  (Abnormal) Collected: 04/16/25 1437    Lab Status: Final result Specimen: Swab from Nares Updated: 04/16/25 1601     MRSA PCR MRSA Detected    Narrative:      The negative predictive value of this diagnostic test is high and should only be used to consider de-escalating anti-MRSA therapy. A positive result may indicate colonization with MRSA and must be correlated clinically.    Blood Culture - Blood, Arm, Left [[id number]]  (Normal) Collected: 04/15/25 2020    Lab  Status: Final result Specimen: Blood from Arm, Left Updated: 04/20/25 2031     Blood Culture No growth at 5 days    Blood Culture - Blood, Arm, Right [[id number]]  (Normal) Collected: 04/15/25 2020    Lab Status: Final result Specimen: Blood from Arm, Right Updated: 04/20/25 2031     Blood Culture No growth at 5 days    COVID PRE-OP / PRE-PROCEDURE SCREENING ORDER (NO ISOLATION) - Swab, Nasopharynx [[id number]]  (Normal) Collected: 04/15/25 1917    Lab Status: Final result Specimen: Swab from Nasopharynx Updated: 04/15/25 2006    Narrative:      The following orders were created for panel order COVID PRE-OP / PRE-PROCEDURE SCREENING ORDER (NO ISOLATION) - Swab, Nasopharynx.  Procedure                               Abnormality         Status                     ---------                               -----------         ------                     COVID-19, FLU A/B, RSV P...[[id number]]  Normal              Final result                 Please view results for these tests on the individual orders.    COVID-19, FLU A/B, RSV PCR 1 HR TAT - Swab, Nasopharynx [[id number]]  (Normal) Collected: 04/15/25 1917    Lab Status: Final result Specimen: Swab from Nasopharynx Updated: 04/15/25 2006     COVID19 Not Detected     Influenza A PCR Not Detected     Influenza B PCR Not Detected     RSV, PCR Not Detected    Narrative:      Fact sheet for providers: https://www.fda.gov/media/074291/download    Fact sheet for patients: https://www.fda.gov/media/503383/download    Test performed by PCR.    Urine Culture - Urine, Urine, Clean Catch [[id number]]  (Abnormal)  (Susceptibility) Collected: 04/15/25 1823    Lab Status: Final result Specimen: Urine, Clean Catch Updated: 04/19/25 1207     Urine Culture >100,000 CFU/mL Escherichia coli ESBL      >100,000 CFU/mL Citrobacter farmeri    Narrative:      Colonization of the urinary tract without infection is common. Treatment is discouraged unless the patient is symptomatic, pregnant, or  undergoing an invasive urologic procedure.  Recent outcomes data supports the use of pip/tazo in the treatment of susceptible ESBL infections for uncomplicated UTI. Consider use of pip/tazo as a carbapenem-sparing regimen in applicable patients.    Susceptibility        Escherichia coli ESBL      SHINE      Ciprofloxacin Resistant      Ertapenem Susceptible      Gentamicin Susceptible      Levofloxacin Resistant      Meropenem Susceptible      Nitrofurantoin Susceptible      Piperacillin + Tazobactam Susceptible      Trimethoprim + Sulfamethoxazole Susceptible                       Susceptibility        Citrobacter farmeri      SHINE      Amoxicillin + Clavulanate Susceptible      Cefazolin (Urine) Resistant      Cefepime Susceptible      Ceftazidime Susceptible      Ceftriaxone Susceptible      Gentamicin Susceptible      Levofloxacin Susceptible      Nitrofurantoin Intermediate      Piperacillin + Tazobactam Susceptible      Trimethoprim + Sulfamethoxazole Susceptible                                   [x]  Microbiology  [x]  Radiology  CT Abdomen Pelvis Without Contrast  Result Date: 4/16/2025  1. No obstructive uropathy 2. 2 mm nonobstructing right renal pelvis stone 3. Right renal cysts 4. Haziness of the perivesical fat suggests cystitis 5. Cholelithiasis. 6. Colonic diverticulosis. 7. Trace perihepatic ascites 8. Cardiomegaly with bilateral pleural effusions Electronically Signed: Víctor Vallejo  4/16/2025 3:11 PM EDT  Workstation ID: [id number]    XR Chest 1 View  Result Date: 4/16/2025  Impression: Stable bilateral airspace opacities, likely due to pulmonary edema with bilateral pleural effusions. Patchy right basilar opacities may be due to superimposed atelectasis and/or pneumonia. Electronically Signed: Simona Tadeo MD  4/16/2025 12:42 PM EDT  Workstation ID: [id number]      [x]  EKG/Telemetry   []  Cardiology/Vascular   []  Pathology  []  Old records  [x]  Other:  Scheduled Meds:allopurinol, 100 mg, Oral,  Daily  apixaban, 2.5 mg, Oral, Q12H  dicyclomine, 10 mg, Oral, 4x Daily  [Held by provider] furosemide, 40 mg, Oral, Daily  LORazepam, 0.5 mg, Oral, Nightly  [Held by provider] pantoprazole, 40 mg, Oral, Q AM  piperacillin-tazobactam, 3.375 g, Intravenous, Q8H  sertraline, 50 mg, Oral, Daily  sodium chloride, 10 mL, Intravenous, Q12H  [Held by provider] tamsulosin, 0.4 mg, Oral, Daily      Continuous Infusions:     PRN Meds:.  senna-docusate sodium **AND** polyethylene glycol **AND** bisacodyl **AND** bisacodyl    HYDROcodone-acetaminophen    LORazepam    melatonin    nitroglycerin    ondansetron    sodium chloride    sodium chloride    sodium chloride    traZODone      Assessment & Plan   Assessment / Plan     Assessment/Plan:  UTI due to ESBL E. coli and Citrobacter associated with chronic indwelling suprapubic Diamond catheter.  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      Patient admitted for further evaluation and treatment  Cardiology consulted thank for assistance  Continue piperacillin/tazobactam to complete 7-day course  Completed 7-day course of vancomycin  Continue wound care per nursing  Continue to hold further diuresis as patient appears euvolemic currently  Patient not able to tolerate further maximal medical directed therapy for heart failure due to hypotension  Continue diuresis as needed  Continue monitor renal function closely  Continue to monitor electrolytes replace as needed  Continue suprapubic catheter care per nursing  No indication for primary C. difficile prophylaxis though will hold home Protonix while on broad-spectrum antibiotics  Continue physical  therapy Occupational Therapy  Continue bowel regimen as needed  Further inpatient orders recommendations pending clinical course           Discussed plan with bedside RN.    Disposition: Skilled nursing rehab.  Patient prefers [facility].  Discharge planning coordinating.    VTE Prophylaxis:  Pharmacologic VTE prophylaxis orders are present.        CODE STATUS:   Code Status (Patient has no pulse and is not breathing): No CPR (Do Not Attempt to Resuscitate)  Medical Interventions (Patient has pulse or is breathing): Limited Support  Medical Intervention Limits: No intubation (DNI)

## 2025-04-23 NOTE — NURSING NOTE
At this time, no further needs identified. Palliative Care will sign off. If new needs arise, please place new Palliative Care consult order.    Sammi Cevallos RN, [facility]  Palliative Care

## 2025-04-24 NOTE — PROGRESS NOTES
[facility]   Hospitalist Progress Note  Date: 2025  Patient Name: Javi Martínez  : 1940  MRN: [id number]  Date of admission: 4/15/2025      Subjective   Subjective     Chief Complaint: Follow-up generalized weakness    Summary:Javi Martínez is a 84 y.o. male  with past medical history of hypertension, CHF, A-fib on Eliquis, arthritis, history of prostate cancer status post brachytherapy and external beam XRT and BPH and GERD presented to the ED for evaluation of generalized weakness and fall.  Patient states that for the last week or so he has not been feeling well with increased weakness, dysuria, lethargy, decreased p.o. intake, and multiple falls.  Patient does live alone and had been unable to perform his ADLs so he was brought to the ED for further evaluation.  In the ED patient was tachypneic on arrival with remaining vitals being within normal limits on room air.  His UA was positive for a UTI with labs showing an elevated proBNP, reduced renal function, elevated lactic acid, and elevated but flat troponin.  Remaining labs are relatively unremarkable including a normal WBC and negative COVID flu RSV.  Chest x-ray showed pulmonary edema although improved from prior imaging.  When seen patient was resting comfortably and when asked he denied any recent fevers, chills, headaches, focal weakness, chest pain, palpitation, shortness of breath, cough, abdominal pain, nausea, vomiting, diarrhea, constipation, hematuria, hematochezia, melena, or anxiety.   Patient admitted for further evaluation and treatment.  Patient had pus coming from suprapubic catheter insertion site along with pus within suprapubic catheter.  Blood pressure borderline low, perfusing extremities well.  Cardiologist consulted.  Also started on IV antibiotics for possible intra-abdominal infection.  CT abdomen pelvis demonstrated some haziness of the perivesical fat suggesting cystitis but no abscess appreciated.  Also  demonstrated cardiomegaly with bilateral pleural effusions.  Wound culture grew MRSA.  Urine culture grew ESBL E. coli and Citrobacter farmeri.  Antibiotics tailored to sensitivities.  Patient noted to have some loose stools.  C. difficile detected by PCR but toxin negative indicating carrier status.  Blood culture negative to date.  Patient planning to discharge to skilled nursing rehab.  Discharge planning coordinating.    Interval Followup: Patient sitting up in bed appears to be less comfortable.  Patient brings up that he has had left forearm and hand swelling since the tourniquet was left on a couple weeks ago.  No erythema of the upper extremity.  Patient continues to endorse weakness.  Afebrile overnight.  V-paced 70s  on telemetry review.  Blood pressure low normal limits.  Satting well on room air.   No other issues per nursing.    Review of Systems  Constitutional: Negative for fatigue and fever.   HENT: Negative for sore throat and trouble swallowing.    Eyes: Negative for pain and discharge.   Respiratory: Negative for cough and shortness of breath.    Cardiovascular: Negative for chest pain and palpitations.   Gastrointestinal: Positive for abdominal pain, nausea and negative vomiting.   Endocrine: Negative for cold intolerance and heat intolerance.   Genitourinary: Positive for difficulty urinating and dysuria.   Musculoskeletal: Negative for back pain and neck stiffness.   Skin: Negative for color change and rash.   Neurological: Negative for syncope and headaches.   Hematological: Negative for adenopathy.   Psychiatric/Behavioral: Negative for confusion and hallucinations.    Objective   Objective     Vitals:   Temp:  [97.3 °F (36.3 °C)-98 °F (36.7 °C)] 97.9 °F (36.6 °C)  Heart Rate:  [70-74] 70  Resp:  [16] 16  BP: ()/(62-75) 103/68  Physical Exam   General: well-developed appearing stated age in no acute distress  HEENT: Normocephalic atraumatic moist membranes pupils equal round reactive  light, no scleral icterus no conjunctival injection  Cardiovascular: regular rate and rhythm no murmurs rubs or gallops S1-S2, no lower extremity edema appreciated  Pulmonary: Clear to auscultation bilaterally no wheezes rales or rhonchi symmetric chest expansion, unlabored, no conversational dyspnea appreciated  Gastrointestinal: Soft nontender nondistended positive bowel sounds all 4 quadrants no rebound or guarding  Musculoskeletal: No clubbing cyanosis, warm and well-perfused, calves soft symmetric nontender bilaterally  Skin: Clean dry without rashes  Neuro: Cranial nerves II through XII intact grossly no sensorimotor deficits appreciated bilateral upper and lower extremities  Psych: Patient is calm cooperative and appropriate with exam not responding to internal stimuli  : Suprapubic catheter no bladder distention positive suprapubic tenderness    Result Review    Result Review:  I have personally reviewed these results and agree with these findings:  [x]  Laboratory  LAB RESULTS:      Lab 04/23/25  0539 04/22/25  0446 04/21/25  1740 04/21/25  0501 04/20/25  0618   WBC 8.08 6.72 9.00 8.19 8.70   HEMOGLOBIN 13.9 13.2 13.6 13.4 13.8   HEMATOCRIT 41.3 42.3 43.7 41.2 43.1   PLATELETS 227 178 225 210 213   NEUTROS ABS 5.45 3.92 5.77 5.53 6.10   IMMATURE GRANS (ABS) 0.04 0.02 0.03 0.04 0.03   LYMPHS ABS 1.64 1.70 2.09 1.62 1.47   MONOS ABS 0.74 0.87 0.95* 0.82 0.96*   EOS ABS 0.13 0.13 0.09 0.10 0.05   MCV 95.2 100.0* 92.6 95.2 94.1         Lab 04/23/25  0539 04/22/25  0446 04/21/25  0501 04/20/25  0618 04/19/25  0517   SODIUM 136 134* 139 137 136   POTASSIUM 3.7 3.7 3.7 3.7 3.7   CHLORIDE 101 99 100 100 100   CO2 23.2 22.5 24.3 22.1 23.7   ANION GAP 11.8 12.5 14.7 14.9 12.3   BUN 24* 25* 24* 24* 23   CREATININE 1.50* 1.72* 1.60* 1.37* 1.39*   EGFR 46.2* 39.2* 42.8* 51.5* 50.6*   GLUCOSE 103* 87 110* 118* 107*   CALCIUM 9.0 8.8 8.9 8.9 8.8   MAGNESIUM 2.3 2.3 2.2 2.3 2.2   PHOSPHORUS 3.1 3.2 3.2 3.3 2.8                              Brief Urine Lab Results  (Last result in the past 365 days)        Color   Clarity   Blood   Leuk Est   Nitrite   Protein   CREAT   Urine HCG        04/15/25 1823 Yellow   Clear   Trace   Large (3+)   Negative   Negative                 Microbiology Results (last 10 days)       Procedure Component Value - Date/Time    Clostridioides difficile Toxin - Stool, Per Rectum [[id number]]  (Abnormal) Collected: 04/18/25 1519    Lab Status: Final result Specimen: Stool from Per Rectum Updated: 04/18/25 1648    Narrative:      The following orders were created for panel order Clostridioides difficile Toxin - Stool, Per Rectum.  Procedure                               Abnormality         Status                     ---------                               -----------         ------                     Clostridioides difficile...[[id number]]  Abnormal            Final result                 Please view results for these tests on the individual orders.    Clostridioides difficile Toxin, PCR - Stool, Per Rectum [[id number]]  (Abnormal) Collected: 04/18/25 1519    Lab Status: Final result Specimen: Stool from Per Rectum Updated: 04/18/25 1648     Toxigenic C. difficile by PCR Positive     027 Toxin Presumptive Negative    Narrative:      DNA from a toxigenic strain of C.difficile has been detected. Antigen testing for the presence of free C.difficile toxin is currently in progress, to help determine the clinical significance of this PCR result.     Enteric Parasite Panel - Stool, Per Rectum [[id number]]  (Normal) Collected: 04/18/25 1519    Lab Status: Final result Specimen: Stool from Per Rectum Updated: 04/19/25 1318     Giardia lamblia Not Detected     Cryptosporidium Not Detected     Entamoeba histolytica Not Detected    Enteric Bacterial Panel - Stool, Per Rectum [[id number]]  (Normal) Collected: 04/18/25 1519    Lab Status: Final result Specimen: Stool from Per Rectum Updated: 04/19/25 1313     Salmonella Not  Detected     Campylobacter Not Detected     Shigella/Enteroinvasive E. coli (EIEC) Not Detected     Shiga-like toxin-producing E. coli (STEC) stx1/stx2 Not Detected    Clostridioides difficile toxin Ag, Reflex - Stool, Per Rectum [[id number]]  (Normal) Collected: 04/18/25 1519    Lab Status: Final result Specimen: Stool from Per Rectum Updated: 04/18/25 1648     C.diff Toxin Ag Negative    Narrative:      DNA from a toxigenic strain of C.difficile was detected, although the free toxin itself was not detected. These findings are consistent with C.difficile colonization and may not reflect actual C.difficile infection. Clinical correlation needed.    Wound Culture - Wound, Suprapubic Catheter [[id number]]  (Abnormal)  (Susceptibility) Collected: 04/16/25 1437    Lab Status: Final result Specimen: Wound from Suprapubic Catheter Updated: 04/18/25 0645     Wound Culture Scant growth (1+) Staphylococcus aureus, MRSA     Comment:   Methicillin resistant Staphylococcus aureus, Patient may be an isolation risk.        Gram Stain Rare (1+) WBCs seen      Occasional Gram positive cocci    Susceptibility        Staphylococcus aureus, MRSA      SHINE      Clindamycin Resistant      Erythromycin Resistant      Oxacillin Resistant      Rifampin Susceptible      Tetracycline Susceptible      Trimethoprim + Sulfamethoxazole Susceptible      Vancomycin Susceptible                           MRSA Screen, PCR (Inpatient) - Swab, Nares [[id number]]  (Abnormal) Collected: 04/16/25 1437    Lab Status: Final result Specimen: Swab from Nares Updated: 04/16/25 1601     MRSA PCR MRSA Detected    Narrative:      The negative predictive value of this diagnostic test is high and should only be used to consider de-escalating anti-MRSA therapy. A positive result may indicate colonization with MRSA and must be correlated clinically.    Blood Culture - Blood, Arm, Left [[id number]]  (Normal) Collected: 04/15/25 2020    Lab Status: Final result Specimen:  Blood from Arm, Left Updated: 04/20/25 2031     Blood Culture No growth at 5 days    Blood Culture - Blood, Arm, Right [[id number]]  (Normal) Collected: 04/15/25 2020    Lab Status: Final result Specimen: Blood from Arm, Right Updated: 04/20/25 2031     Blood Culture No growth at 5 days    COVID PRE-OP / PRE-PROCEDURE SCREENING ORDER (NO ISOLATION) - Swab, Nasopharynx [[id number]]  (Normal) Collected: 04/15/25 1917    Lab Status: Final result Specimen: Swab from Nasopharynx Updated: 04/15/25 2006    Narrative:      The following orders were created for panel order COVID PRE-OP / PRE-PROCEDURE SCREENING ORDER (NO ISOLATION) - Swab, Nasopharynx.  Procedure                               Abnormality         Status                     ---------                               -----------         ------                     COVID-19, FLU A/B, RSV P...[[id number]]  Normal              Final result                 Please view results for these tests on the individual orders.    COVID-19, FLU A/B, RSV PCR 1 HR TAT - Swab, Nasopharynx [[id number]]  (Normal) Collected: 04/15/25 1917    Lab Status: Final result Specimen: Swab from Nasopharynx Updated: 04/15/25 2006     COVID19 Not Detected     Influenza A PCR Not Detected     Influenza B PCR Not Detected     RSV, PCR Not Detected    Narrative:      Fact sheet for providers: https://www.fda.gov/media/530014/download    Fact sheet for patients: https://www.fda.gov/media/688482/download    Test performed by PCR.    Urine Culture - Urine, Urine, Clean Catch [[id number]]  (Abnormal)  (Susceptibility) Collected: 04/15/25 1823    Lab Status: Final result Specimen: Urine, Clean Catch Updated: 04/19/25 1207     Urine Culture >100,000 CFU/mL Escherichia coli ESBL      >100,000 CFU/mL Citrobacter farmeri    Narrative:      Colonization of the urinary tract without infection is common. Treatment is discouraged unless the patient is symptomatic, pregnant, or undergoing an invasive urologic  procedure.  Recent outcomes data supports the use of pip/tazo in the treatment of susceptible ESBL infections for uncomplicated UTI. Consider use of pip/tazo as a carbapenem-sparing regimen in applicable patients.    Susceptibility        Escherichia coli ESBL      SHINE      Ciprofloxacin Resistant      Ertapenem Susceptible      Gentamicin Susceptible      Levofloxacin Resistant      Meropenem Susceptible      Nitrofurantoin Susceptible      Piperacillin + Tazobactam Susceptible      Trimethoprim + Sulfamethoxazole Susceptible                       Susceptibility        Citrobacter farmeri      SHINE      Amoxicillin + Clavulanate Susceptible      Cefazolin (Urine) Resistant      Cefepime Susceptible      Ceftazidime Susceptible      Ceftriaxone Susceptible      Gentamicin Susceptible      Levofloxacin Susceptible      Nitrofurantoin Intermediate      Piperacillin + Tazobactam Susceptible      Trimethoprim + Sulfamethoxazole Susceptible                                   [x]  Microbiology  [x]  Radiology  No radiology results for the last 7 days      [x]  EKG/Telemetry   []  Cardiology/Vascular   []  Pathology  []  Old records  [x]  Other:  Scheduled Meds:allopurinol, 100 mg, Oral, Daily  apixaban, 2.5 mg, Oral, Q12H  dicyclomine, 10 mg, Oral, 4x Daily  famotidine, 20 mg, Oral, BID AC  [Held by provider] furosemide, 40 mg, Oral, Daily  LORazepam, 0.5 mg, Oral, Nightly  [Held by provider] pantoprazole, 40 mg, Oral, Q AM  piperacillin-tazobactam, 3.375 g, Intravenous, Q8H  sertraline, 50 mg, Oral, Daily  sodium chloride, 10 mL, Intravenous, Q12H  [Held by provider] tamsulosin, 0.4 mg, Oral, Daily      Continuous Infusions:     PRN Meds:.  senna-docusate sodium **AND** polyethylene glycol **AND** bisacodyl **AND** bisacodyl    HYDROcodone-acetaminophen    LORazepam    melatonin    nitroglycerin    ondansetron    sodium chloride    sodium chloride    sodium chloride    traZODone      Assessment & Plan   Assessment / Plan      Assessment/Plan:  UTI due to ESBL E. coli and Citrobacter associated with chronic indwelling suprapubic Diamond catheter.  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  Left forearm swelling      Patient admitted for further evaluation and treatment  Cardiology consulted thank for assistance  Continue piperacillin/tazobactam to complete 7-day course  Completed 7-day course of vancomycin  Continue wound care per nursing  Continue to hold further diuresis as patient appears euvolemic currently  Patient not able to tolerate further maximal medical directed therapy for heart failure due to hypotension  Continue diuresis as needed  Continue monitor renal function closely  Continue to monitor electrolytes replace as needed  Continue suprapubic catheter care per nursing  No indication for primary C. difficile prophylaxis though will hold home Protonix while on broad-spectrum antibiotics  Continue physical therapy Occupational Therapy  Continue bowel regimen as needed  Follow-up venous duplex left upper extremity  Further inpatient orders recommendations pending clinical course           Discussed plan with bedside RN.    Disposition: Skilled nursing rehab.  Discharge planning coordinating.    VTE Prophylaxis:  Pharmacologic VTE prophylaxis orders are present.        CODE STATUS:   Code Status (Patient has no pulse and is not breathing): No CPR (Do Not Attempt to Resuscitate)  Medical Interventions (Patient has pulse or is breathing): Limited Support  Medical Intervention Limits: No intubation (DNI)

## 2025-04-24 NOTE — PLAN OF CARE
Goal Outcome Evaluation:  Plan of Care Reviewed With: patient        Progress: no change  Outcome Evaluation: Vss. Diamond to bsd. Rested well overnight. No acute events noted

## 2025-04-24 NOTE — THERAPY TREATMENT NOTE
Patient Name: Javi Martínez  : 1940    MRN: [id number]                              Today's Date: 2025       Admit Date: 4/15/2025    Visit Dx:     ICD-10-CM ICD-9-CM   1. Generalized weakness  R53.1 780.79   2. Difficulty walking  R26.2 719.7     Patient Active Problem List   Diagnosis    Primary osteoarthritis of left knee    S/P TKR (total knee replacement)    Essential hypertension    CAD with CABG    Hyperlipidemia    Presence of cardiac pacemaker single chamber    Anxiety    Gastroesophageal reflux disease    Gout    History of malignant neoplasm of prostate    Testicular hypofunction    Polyneuropathy    Chronic obstructive pulmonary disease    Benign prostatic hyperplasia without lower urinary tract symptoms    Atrial fibrillation, chronic    Stage 3b chronic kidney disease    Postprocedural membranous urethral stricture    Acute on chronic HFrEF (heart failure with reduced ejection fraction)    Suprapubic catheter dysfunction    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    Urethral stricture in male    Lyme disease    Acute exacerbation of CHF (congestive heart failure)    Other dysphagia    CHF (congestive heart failure)    Urinary retention    Influenza A    Generalized weakness     Past Medical History:   Diagnosis Date    Aneurysm     Arthritis     left knee     Atrial fibrillation, persistent     CHB (complete heart block) 2023    CHF exacerbation 2024    Chronic obstructive pulmonary disease 2023    pt denies having this    Difficulty walking     Numbness in right foot    Erectile dysfunction     Essential hypertension 2019    GERD (gastroesophageal reflux disease)     HFrEF (heart failure with reduced ejection fraction) 2021    Hyperlipidemia 2021    Paroxysmal atrial fibrillation     Polyneuropathy 2023    Prostate cancer 2010    with seeds implant     Septic joint 2021    Sleep apnea     Urinary incontinence     Urinary tract  "infection 2022     Past Surgical History:   Procedure Laterality Date    ABLATION OF DYSRHYTHMIC FOCUS  2013    APPENDECTOMY  1947    ARTERIAL BYPASS SURGERY      CARDIAC ABLATION      x2  \"years ago\"     CARDIAC ELECTROPHYSIOLOGY PROCEDURE N/A 11/01/2021    Procedure: PPM battery change;  Surgeon: Ge Whelan MD;  Location: [facility] CATH INVASIVE LOCATION;  Service: Cardiovascular;  Laterality: N/A;    CARDIAC SURGERY  2006    cabg 3v    COLONOSCOPY      CORONARY ARTERY BYPASS GRAFT  2006    CYSTOSCOPY URETHROTOMY VISUAL INTERNAL N/A 08/15/2024    Procedure: CYSTOSCOPY, URETHRAL DILATION, CATHETER PLACEMENT, RETROGRADE URETHROGRAM;  Surgeon: August Myers MD;  Location: [facility] MAIN OR;  Service: Urology;  Laterality: N/A;    CYSTOSCOPY URETHROTOMY VISUAL INTERNAL N/A 10/24/2024    Procedure: CYSTOSCOPY URETHROTOMY;  Surgeon: Mally Bernal MD;  Location: [facility] OR;  Service: Urology;  Laterality: N/A;    CYSTOSCOPY URETHROTOMY VISUAL INTERNAL N/A 03/11/2025    Procedure: CYSTOSCOPY URETHROTOMY VISUAL INTERNAL;  Surgeon: Mally Bernal MD;  Location: [facility] OR;  Service: Urology;  Laterality: N/A;    ENDOSCOPY      with dilation     ENDOSCOPY N/A 01/21/2025    Procedure: ESOPHAGOGASTRODUODENOSCOPY WITH BIOPSIES, POLYPECTOMY, BALLOON DILATION 18-20mm;  Surgeon: Simona Garcia MD;  Location: [facility] ENDOSCOPY;  Service: Gastroenterology;  Laterality: N/A;  GASTRIC POLYPS, SCHATZKI'S RING, HIATAL HERNIA    KNEE ARTHROSCOPY Left     PROSTATE SURGERY  2012    SUPRAPUBIC TUBE PLACEMENT N/A 03/11/2025    Procedure: SUPRAPUBIC CATHETER INSERTION;  Surgeon: Mally Bernal MD;  Location: [facility] MAIN OR;  Service: Urology;  Laterality: N/A;    TOTAL KNEE ARTHROPLASTY Left 07/23/2021    Procedure: TOTAL KNEE ARTHROPLASTY WITH DORA NAVIGATION WITH BIOMET;  Surgeon: Carlitos Zhao MD;  Location: [facility] MAIN OR;  Service: Orthopedics;  Laterality: Left;    VENTRICULAR CARDIAC PACEMAKER " INSERTION      [employer]       General Information       Row Name 04/24/25 1300          OT Time and Intention    Document Type therapy note (daily note)  -PG     Mode of Treatment individual therapy;occupational therapy  -PG               User Key  (r) = Recorded By, (t) = Taken By, (c) = Cosigned By      Initials Name Provider Type    PG Martir Mesa OT Occupational Therapist                     Mobility/ADL's    No documentation.                  Obj/Interventions       Row Name 04/24/25 1300          Shoulder (Therapeutic Exercise)    Shoulder (Therapeutic Exercise) strengthening exercise  -PG     Shoulder Strengthening (Therapeutic Exercise) 1 lb free weight;15 repititions  -PG       Row Name 04/24/25 1300          Elbow/Forearm (Therapeutic Exercise)    Elbow/Forearm (Therapeutic Exercise) strengthening exercise  -PG     Elbow/Forearm Strengthening (Therapeutic Exercise) 1 lb free weight;15 repititions  -PG       Row Name 04/24/25 1300          Motor Skills    Therapeutic Exercise shoulder;elbow/forearm  -PG               User Key  (r) = Recorded By, (t) = Taken By, (c) = Cosigned By      Initials Name Provider Type    PG Martir Mesa OT Occupational Therapist                   Goals/Plan    No documentation.                  Clinical Impression       Row Name 04/24/25 1300          Plan of Care Review    Progress no change  -PG               User Key  (r) = Recorded By, (t) = Taken By, (c) = Cosigned By      Initials Name Provider Type    PG Martir Mesa OT Occupational Therapist                   Outcome Measures       Row Name 04/24/25 1300          How much help from another is currently needed...    Putting on and taking off regular lower body clothing? 1  -PG     Bathing (including washing, rinsing, and drying) 2  -PG     Toileting (which includes using toilet bed pan or urinal) 1  -PG     Putting on and taking off regular upper body clothing 3  -PG     Taking care of personal grooming (such as  brushing teeth) 3  -PG     Eating meals 4  -PG     AM-PAC 6 Clicks Score (OT) 14  -PG       Row Name 04/24/25 1300          Functional Assessment    Outcome Measure Options AM-PAC 6 Clicks Daily Activity (OT);Optimal Instrument  -PG       Row Name 04/24/25 1300          Optimal Instrument    Optimal Instrument Optimal - 3  -PG     Bending/Stooping 4  -PG     Standing 3  -PG     Reaching 2  -PG               User Key  (r) = Recorded By, (t) = Taken By, (c) = Cosigned By      Initials Name Provider Type    PG Martir Mesa, OT Occupational Therapist                      OT Recommendation and Plan  Planned Therapy Interventions (OT): activity tolerance training, BADL retraining, strengthening exercise, transfer/mobility retraining, patient/caregiver education/training, occupation/activity based interventions  Therapy Frequency (OT): 5 times/wk  Plan of Care Review  Plan of Care Reviewed With: patient  Progress: no change  Outcome Evaluation: Patient presents with limitations affecting strength, activity tolerance, and balance impacting patient's ability to return home safely and independently.  The skills of a therapist will be required to safely and effectively implement the following treatment plan to restore maximal level of function     Time Calculation:   Evaluation Complexity (OT)  Review Occupational Profile/Medical/Therapy History Complexity: brief/low complexity  Assessment, Occupational Performance/Identification of Deficit Complexity: 3-5 performance deficits  Clinical Decision Making Complexity (OT): problem focused assessment/low complexity  Overall Complexity of Evaluation (OT): low complexity     Time Calculation- OT       Row Name 04/24/25 1303             Time Calculation- OT    OT Received On 04/24/25  -PG      OT Goal Re-Cert Due Date 04/25/25  -PG         Timed Charges    68789 - OT Therapeutic Exercise Minutes 10  -PG         Total Minutes    Timed Charges Total Minutes 10  -PG       Total Minutes  10  -PG                User Key  (r) = Recorded By, (t) = Taken By, (c) = Cosigned By      Initials Name Provider Type    PG Martir Mesa OT Occupational Therapist                  Therapy Charges for Today       Code Description Service Date Service Provider Modifiers Qty    [id number]  OT THER PROC EA 15 MIN 4/24/2025 Martir Mesa OT GO 1                 Martir Mesa OT  4/24/2025

## 2025-04-24 NOTE — CONSULTS
"Nutrition Services    Patient Name: Javi Martínez  YOB: 1940  MRN: [id number]  Admission date: 4/15/2025      CLINICAL NUTRITION ASSESSMENT      Reason for Assessment  LOS     H&P:  Past Medical History:   Diagnosis Date    Aneurysm 2013    Arthritis     left knee     Atrial fibrillation, persistent     CHB (complete heart block) 06/07/2023    CHF exacerbation 04/24/2024    Chronic obstructive pulmonary disease 09/01/2023    pt denies having this    Difficulty walking 2023    Numbness in right foot    Erectile dysfunction     Essential hypertension 05/08/2019    GERD (gastroesophageal reflux disease)     HFrEF (heart failure with reduced ejection fraction) 08/11/2021    Hyperlipidemia 08/11/2021    Paroxysmal atrial fibrillation     Polyneuropathy 06/19/2023    Prostate cancer 2010    with seeds implant     Septic joint 11/26/2021    Sleep apnea     Urinary incontinence 2021    Urinary tract infection 2022        Current Problems:   Active Hospital Problems    Diagnosis     **Generalized weakness     Urinary retention     CHF (congestive heart failure)     Acute on chronic HFrEF (heart failure with reduced ejection fraction)     Stage 3b chronic kidney disease     Anxiety     Essential hypertension     CAD with CABG         Nutrition/Diet History         Narrative     Patient assessed by RD for LOS. Patient is at low risk per nutrition risk screening (NRS-2002).     No acute nutrition concerns or interventions at this time.      Anthropometrics        Current Height, Weight Height: 193 cm (76\")  Weight: 87.3 kg (192 lb 7.4 oz)   Current BMI Body mass index is 23.43 kg/m².   BMI Classification Normal range   % % (86.8 kg)    Adjusted Body Weight (ABW)    Weight Hx  Wt Readings from Last 30 Encounters:   04/24/25 0417 87.3 kg (192 lb 7.4 oz)   04/22/25 0237 87 kg (191 lb 12.8 oz)   04/21/25 0248 87.4 kg (192 lb 10.9 oz)   04/20/25 0451 87.9 kg (193 lb 12.6 oz)   04/19/25 0435 76.1 kg (167 lb " 12.3 oz)   04/17/25 0351 77.9 kg (171 lb 11.8 oz)   04/16/25 0828 78.5 kg (173 lb)   04/15/25 1337 78.7 kg (173 lb 8 oz)   03/27/25 1426 77.1 kg (170 lb)   03/22/25 1629 81 kg (178 lb 9.2 oz)   03/06/25 0820 81 kg (178 lb 9.2 oz)   03/02/25 0613 81 kg (178 lb 9.2 oz)   03/01/25 0244 82.4 kg (181 lb 10.5 oz)   02/28/25 0500 81.3 kg (179 lb 3.7 oz)   02/27/25 0300 81.8 kg (180 lb 5.4 oz)   02/26/25 0227 80.7 kg (177 lb 14.6 oz)   02/25/25 1921 87 kg (191 lb 12.8 oz)   02/19/25 1323 79.4 kg (175 lb)   02/12/25 1301 79.4 kg (175 lb 0.7 oz)   01/29/25 1037 79.4 kg (175 lb)   01/27/25 0909 79.6 kg (175 lb 7.8 oz)   01/23/25 0300 75.1 kg (165 lb 9.1 oz)   01/22/25 0608 78.2 kg (172 lb 6.4 oz)   01/21/25 0452 77.5 kg (170 lb 13.7 oz)   01/20/25 0510 80 kg (176 lb 5.9 oz)   01/18/25 1017 80.2 kg (176 lb 12.9 oz)   11/19/24 1259 79 kg (174 lb 3.2 oz)   11/18/24 1313 85.7 kg (189 lb)   11/06/24 2050 85.9 kg (189 lb 6 oz)   11/06/24 1539 85.8 kg (189 lb 2.5 oz)   10/30/24 1029 84.4 kg (186 lb)   10/24/24 1003 84.6 kg (186 lb 8.2 oz)   10/23/24 1116 83.5 kg (184 lb)   09/23/24 1056 77.1 kg (170 lb)   09/09/24 1116 77.2 kg (170 lb 3.2 oz)   08/28/24 1052 77.6 kg (171 lb)   08/15/24 2324 78 kg (171 lb 15.3 oz)   08/15/24 1815 77.9 kg (171 lb 11.8 oz)   08/02/24 0420 79.5 kg (175 lb 4.3 oz)   08/01/24 0345 87.5 kg (192 lb 14.4 oz)   07/31/24 0541 89.1 kg (196 lb 6.9 oz)   07/30/24 1930 86.9 kg (191 lb 9.3 oz)   07/30/24 1625 88.4 kg (194 lb 14.2 oz)   07/22/24 1000 89.2 kg (196 lb 10.4 oz)   07/22/24 1502 88.9 kg (196 lb)   05/14/24 0929 86.2 kg (190 lb)   04/27/24 0628 83.3 kg (183 lb 10.3 oz)   04/26/24 0510 82.5 kg (181 lb 14.1 oz)   04/25/24 0434 81.5 kg (179 lb 10.8 oz)   04/24/24 1056 88.5 kg (195 lb)   04/08/24 1213 86.8 kg (191 lb 5.8 oz)   03/20/24 1337 88.5 kg (195 lb)   09/07/23 1336 93.9 kg (207 lb)   09/04/23 0500 91.1 kg (200 lb 13.4 oz)   09/03/23 1035 91.9 kg (202 lb 9.6 oz)   09/02/23 0215 94.8 kg (208 lb 15.9 oz)    06/19/23 1545 94.3 kg (208 lb)            Wt Change Observation UBW of 180-190 lbs     Estimated/Assessed Needs  Estimated Needs based on: Current Body Weight       Energy Requirements 22-25 kcal/kg    EST Needs (kcal/day) 6328-5792 kcal        Protein Requirements 1.0-1.2 g/kg   EST Daily Needs (g/day)  g        Fluid Requirements 1 ml/kcal    Estimated Needs (mL/day) 3645-3765 ml      Labs/Medications         Pertinent Labs Reviewed.   Results from last 7 days   Lab Units 04/23/25  0539 04/22/25  0446 04/21/25  0501   SODIUM mmol/L 136 134* 139   POTASSIUM mmol/L 3.7 3.7 3.7   CHLORIDE mmol/L 101 99 100   CO2 mmol/L 23.2 22.5 24.3   BUN mg/dL 24* 25* 24*   CREATININE mg/dL 1.50* 1.72* 1.60*   CALCIUM mg/dL 9.0 8.8 8.9   GLUCOSE mg/dL 103* 87 110*     Results from last 7 days   Lab Units 04/23/25  0539 04/22/25  0446 04/21/25  1740 04/21/25  0501   MAGNESIUM mg/dL 2.3 2.3  --  2.2   PHOSPHORUS mg/dL 3.1 3.2  --  3.2   HEMOGLOBIN g/dL 13.9 13.2   < > 13.4   HEMATOCRIT % 41.3 42.3   < > 41.2    < > = values in this interval not displayed.     COVID19   Date Value Ref Range Status   04/15/2025 Not Detected Not Detected - Ref. Range Final     Lab Results   Component Value Date    HGBA1C 5.60 04/26/2024         Pertinent Medications Reviewed.     Malnutrition Severity Assessment              Nutrition Diagnosis         Nutrition Dx Problem 1 No nutrition diagnosis at this time.       Nutrition Intervention        Current Nutrition Orders & Evaluation of Intake     Current Nutrition Orders & Evaluation of Intake       Current PO Diet Diet: Cardiac; Healthy Heart (2-3 Na+); Fluid Consistency: Thin (IDDSI 0)   Supplement No active supplement orders       Nutrition Intervention/Prescription        No further nutrition intervention indicated.       Medical Nutrition Therapy/Nutrition Education          Learner     Readiness Patient  Education not indicated.      Method     Response N/A  N/A     Monitor/Evaluation         Monitor Per protocol     Nutrition Discharge Plan         No discharge nutrition needs identified.      Electronically signed by:  Sandie Rojas RD  04/24/25 15:23 EDT

## 2025-04-24 NOTE — PLAN OF CARE
Goal Outcome Evaluation:               Patient is able to make needs known. He is fully oriented. He takes medications whole without difficulty. MD notified of patient report of increased swelling to LUE for 2 weeks. MD new orders received for doppler. Patient states the swelling started after a tourniquet was left for hours at [facility].

## 2025-04-24 NOTE — SIGNIFICANT NOTE
" Wound Eval / Progress Noted     Vickie     Patient Name: Javi Martínez  : 1940  MRN: [id number]  Today's Date: 2025                 Admit Date: 4/15/2025    Visit Dx:    ICD-10-CM ICD-9-CM   1. Generalized weakness  R53.1 780.79   2. Difficulty walking  R26.2 719.7         Generalized weakness    Essential hypertension    CAD with CABG    Anxiety    Stage 3b chronic kidney disease    Acute on chronic HFrEF (heart failure with reduced ejection fraction)    CHF (congestive heart failure)    Urinary retention        Past Medical History:   Diagnosis Date    Aneurysm 2013    Arthritis     left knee     Atrial fibrillation, persistent     CHB (complete heart block) 2023    CHF exacerbation 2024    Chronic obstructive pulmonary disease 2023    pt denies having this    Difficulty walking     Numbness in right foot    Erectile dysfunction     Essential hypertension 2019    GERD (gastroesophageal reflux disease)     HFrEF (heart failure with reduced ejection fraction) 2021    Hyperlipidemia 2021    Paroxysmal atrial fibrillation     Polyneuropathy 2023    Prostate cancer 2010    with seeds implant     Septic joint 2021    Sleep apnea     Urinary incontinence     Urinary tract infection         Past Surgical History:   Procedure Laterality Date    ABLATION OF DYSRHYTHMIC FOCUS      APPENDECTOMY      ARTERIAL BYPASS SURGERY      CARDIAC ABLATION      x2  \"years ago\"     CARDIAC ELECTROPHYSIOLOGY PROCEDURE N/A 2021    Procedure: PPM battery change;  Surgeon: Ge Whelan MD;  Location: [facility] INVASIVE LOCATION;  Service: Cardiovascular;  Laterality: N/A;    CARDIAC SURGERY  2006    cabg 3v    COLONOSCOPY      CORONARY ARTERY BYPASS GRAFT  2006    CYSTOSCOPY URETHROTOMY VISUAL INTERNAL N/A 08/15/2024    Procedure: CYSTOSCOPY, URETHRAL DILATION, CATHETER PLACEMENT, RETROGRADE URETHROGRAM;  Surgeon: August Myers MD;  Location: [facility]" "[facility] MAIN OR;  Service: Urology;  Laterality: N/A;    CYSTOSCOPY URETHROTOMY VISUAL INTERNAL N/A 10/24/2024    Procedure: CYSTOSCOPY URETHROTOMY;  Surgeon: Mally Bernal MD;  Location: [facility] MAIN OR;  Service: Urology;  Laterality: N/A;    CYSTOSCOPY URETHROTOMY VISUAL INTERNAL N/A 03/11/2025    Procedure: CYSTOSCOPY URETHROTOMY VISUAL INTERNAL;  Surgeon: Mally Bernal MD;  Location: [facility] OR;  Service: Urology;  Laterality: N/A;    ENDOSCOPY      with dilation     ENDOSCOPY N/A 01/21/2025    Procedure: ESOPHAGOGASTRODUODENOSCOPY WITH BIOPSIES, POLYPECTOMY, BALLOON DILATION 18-20mm;  Surgeon: Simona Garcia MD;  Location: [facility] ENDOSCOPY;  Service: Gastroenterology;  Laterality: N/A;  GASTRIC POLYPS, SCHATZKI'S RING, HIATAL HERNIA    KNEE ARTHROSCOPY Left     PROSTATE SURGERY  2012    SUPRAPUBIC TUBE PLACEMENT N/A 03/11/2025    Procedure: SUPRAPUBIC CATHETER INSERTION;  Surgeon: Mally Bernal MD;  Location: [facility] OR;  Service: Urology;  Laterality: N/A;    TOTAL KNEE ARTHROPLASTY Left 07/23/2021    Procedure: TOTAL KNEE ARTHROPLASTY WITH DORA NAVIGATION WITH BIOMET;  Surgeon: Carlitos Zhao MD;  Location: [facility];  Service: Orthopedics;  Laterality: Left;    VENTRICULAR CARDIAC PACEMAKER INSERTION      [employer]          Physical Assessment:       04/24/25 0850   Skin   Skin WDL X;color;temperature;moisture;elasticity;integrity;all   Skin Color/Characteristics redness blanchable;other (see comments)  (buttocks. JAROCHO, RN, W[facility])   Skin Temperature warm   Skin Moisture dry   Skin Elasticity quick return to original state   Skin Integrity intact   Suprapubic Catheter Non-latex   Placement date: If unknown, DO NOT use \"Add Comment\" note: 03/11/25   Inserted by: yas  Catheter Type: Non-latex  Urine Returned: Yes   Site Assessment Clean;Dry;Intact   Dressing Status Clean;Dry;Intact   Dressing Type Split gauze     Wound Check / Follow-up:  Patient was seen today for a wound " check and dressing change. Patient is awake and alert at the time of the assessment; agreeable to the visit. Patient was incontinent of stool prior to arrival to the unit; provided incontinence care.    Buttocks with full closure; no erosion noted. Sacrum remains reddened and tissue is blanchable and intact. Recommending to provide diligent skin care and hygiene with the application of the blue top moisture barrier TID / PRN and leave open to air.     Suprapubic catheter site with intact dry skin; no evidence of erosion present. Periwound is intact and dry. Recommending to continue providing site care and skin care care will increase drain care to twice daily.    Impression: tissue improved    Short term goals: Regain skin integrity, skin protection, moisture prevention, pressure reduction, topical treatment, quality skin care and hygiene.    Sonja Najera RN    4/24/2025    11:45 EDT

## 2025-04-25 NOTE — PROGRESS NOTES
[facility]   Hospitalist Progress Note  Date: 2025  Patient Name: Javi Martínez  : 1940  MRN: [id number]  Date of admission: 4/15/2025      Subjective   Subjective     Chief Complaint: Follow-up generalized weakness    Summary:Javi Martínez is a 84 y.o. male  with past medical history of hypertension, CHF, A-fib on Eliquis, arthritis, history of prostate cancer status post brachytherapy and external beam XRT and BPH and GERD presented to the ED for evaluation of generalized weakness and fall.  Patient states that for the last week or so he has not been feeling well with increased weakness, dysuria, lethargy, decreased p.o. intake, and multiple falls.  Patient does live alone and had been unable to perform his ADLs so he was brought to the ED for further evaluation.  In the ED patient was tachypneic on arrival with remaining vitals being within normal limits on room air.  His UA was positive for a UTI with labs showing an elevated proBNP, reduced renal function, elevated lactic acid, and elevated but flat troponin.  Remaining labs are relatively unremarkable including a normal WBC and negative COVID flu RSV.  Chest x-ray showed pulmonary edema although improved from prior imaging.  When seen patient was resting comfortably and when asked he denied any recent fevers, chills, headaches, focal weakness, chest pain, palpitation, shortness of breath, cough, abdominal pain, nausea, vomiting, diarrhea, constipation, hematuria, hematochezia, melena, or anxiety.   Patient admitted for further evaluation and treatment.  Patient had pus coming from suprapubic catheter insertion site along with pus within suprapubic catheter.  Blood pressure borderline low, perfusing extremities well.  Cardiologist consulted.  Also started on IV antibiotics for possible intra-abdominal infection.  CT abdomen pelvis demonstrated some haziness of the perivesical fat suggesting cystitis but no abscess appreciated.  Also  demonstrated cardiomegaly with bilateral pleural effusions.  Wound culture grew MRSA.  Urine culture grew ESBL E. coli and Citrobacter farmeri.  Antibiotics tailored to sensitivities.  Patient noted to have some loose stools.  C. difficile detected by PCR but toxin negative indicating carrier status.  Blood culture negative to date.  Had issues with Diamond catheter clogging and urology consulted.  Patient developed increased work of breathing and Lasix initiated.  Patient planning to discharge to skilled nursing rehab.  Discharge planning coordinating.    Interval Followup: Patient sitting up in bed appears to more short of breath with increased work of breathing and accessory muscle use.  Patient wakes easily states he feels more fatigued today.  Denies productive cough.  Overnight there was issues with patient's Diamond catheter clogging.  Urology consulted and was able to irrigate.  CT abdomen pelvis negative for acute abnormalities..  Venous duplex left upper extremity normal.   Afebrile overnight.  V-paced 70s to 80s on telemetry review.  Blood pressure within normal limits.  Placed on 2 L nasal cannula to help with increased work of breathing.  ABG shows hypoxia.  proBNP elevated to 37,000.  Serum creatinine trending up.  Lactate elevated improved with improved oxygenation.  Chest x-ray demonstrates pulmonary edema and bilateral pleural effusions with bibasilar atelectasis.  No other issues per nursing.    Review of Systems  Constitutional: Positive for fatigue and negative fever.   HENT: Negative for sore throat and trouble swallowing.    Eyes: Negative for pain and discharge.   Respiratory: Negative for cough and positive shortness of breath.    Cardiovascular: Negative for chest pain and palpitations.   Gastrointestinal: Positive for abdominal pain, nausea and negative vomiting.   Endocrine: Negative for cold intolerance and heat intolerance.   Genitourinary: Positive for difficulty urinating and dysuria.    Musculoskeletal: Negative for back pain and neck stiffness.   Skin: Negative for color change and rash.   Neurological: Negative for syncope and headaches.   Hematological: Negative for adenopathy.   Psychiatric/Behavioral: Negative for confusion and hallucinations.    Objective   Objective     Vitals:   Temp:  [97.3 °F (36.3 °C)-98.2 °F (36.8 °C)] 98 °F (36.7 °C)  Heart Rate:  [] 74  Resp:  [16-18] 18  BP: ()/(64-93) 111/79  Flow (L/min) (Oxygen Therapy):  [2] 2  Physical Exam   General: well-developed appearing stated age in no acute distress  HEENT: Normocephalic atraumatic moist membranes pupils equal round reactive light, no scleral icterus no conjunctival injection  Cardiovascular: regular rate and rhythm no murmurs rubs or gallops S1-S2, no lower extremity edema appreciated  Pulmonary: Diminished bilateral bases no wheezes or rhonchi symmetric chest expansion, labored with accessory muscle use, no conversational dyspnea appreciated  Gastrointestinal: Soft nontender nondistended positive bowel sounds all 4 quadrants no rebound or guarding  Musculoskeletal: No clubbing cyanosis, warm and well-perfused, calves soft symmetric nontender bilaterally  Skin: Clean dry without rashes  Neuro: Cranial nerves II through XII intact grossly no sensorimotor deficits appreciated bilateral upper and lower extremities  Psych: Patient is calm cooperative and appropriate with exam not responding to internal stimuli  : Suprapubic catheter no bladder distention positive suprapubic tenderness    Result Review    Result Review:  I have personally reviewed these results and agree with these findings:  [x]  Laboratory  LAB RESULTS:      Lab 04/25/25  1611 04/25/25  1231 04/23/25  0539 04/22/25  0446 04/21/25  1740 04/21/25  0501 04/20/25  0618   WBC  --  8.40 8.08 6.72 9.00 8.19 8.70   HEMOGLOBIN  --  13.9 13.9 13.2 13.6 13.4 13.8   HEMATOCRIT  --  45.1 41.3 42.3 43.7 41.2 43.1   PLATELETS  --  249 227 178 225 210 213    NEUTROS ABS  --   --  5.45 3.92 5.77 5.53 6.10   IMMATURE GRANS (ABS)  --   --  0.04 0.02 0.03 0.04 0.03   LYMPHS ABS  --   --  1.64 1.70 2.09 1.62 1.47   MONOS ABS  --   --  0.74 0.87 0.95* 0.82 0.96*   EOS ABS  --   --  0.13 0.13 0.09 0.10 0.05   MCV  --  94.2 95.2 100.0* 92.6 95.2 94.1   LACTATE 3.4* 3.8*  --   --   --   --   --          Lab 04/25/25  1231 04/23/25  0539 04/22/25  0446 04/21/25  0501 04/20/25  0618 04/19/25  0517   SODIUM 139 136 134* 139 137 136   POTASSIUM 4.1 3.7 3.7 3.7 3.7 3.7   CHLORIDE 99 101 99 100 100 100   CO2 22.4 23.2 22.5 24.3 22.1 23.7   ANION GAP 17.6* 11.8 12.5 14.7 14.9 12.3   BUN 32* 24* 25* 24* 24* 23   CREATININE 2.11* 1.50* 1.72* 1.60* 1.37* 1.39*   EGFR 30.7* 46.2* 39.2* 42.8* 51.5* 50.6*   GLUCOSE 108* 103* 87 110* 118* 107*   CALCIUM 9.4 9.0 8.8 8.9 8.9 8.8   MAGNESIUM  --  2.3 2.3 2.2 2.3 2.2   PHOSPHORUS  --  3.1 3.2 3.2 3.3 2.8         Lab 04/25/25  1231   TOTAL PROTEIN 6.6   ALBUMIN 3.2*   GLOBULIN 3.4   ALT (SGPT) 9   AST (SGOT) 16   BILIRUBIN 1.2   ALK PHOS 87         Lab 04/25/25  1611   PROBNP 37,306.0*                   Lab 04/25/25  1236   PH, ARTERIAL 7.390   PCO2, ARTERIAL 40.8   PO2 ART 51.1*   O2 SATURATION ART 85.4*   FIO2 21   HCO3 ART 24.7   BASE EXCESS ART -0.3       Brief Urine Lab Results  (Last result in the past 365 days)        Color   Clarity   Blood   Leuk Est   Nitrite   Protein   CREAT   Urine HCG        04/15/25 1823 Yellow   Clear   Trace   Large (3+)   Negative   Negative                 Microbiology Results (last 10 days)       Procedure Component Value - Date/Time    Clostridioides difficile Toxin - Stool, Per Rectum [[id number]]  (Abnormal) Collected: 04/18/25 1519    Lab Status: Final result Specimen: Stool from Per Rectum Updated: 04/18/25 1648    Narrative:      The following orders were created for panel order Clostridioides difficile Toxin - Stool, Per Rectum.  Procedure                               Abnormality         Status                      ---------                               -----------         ------                     Clostridioides difficile...[[id number]]  Abnormal            Final result                 Please view results for these tests on the individual orders.    Clostridioides difficile Toxin, PCR - Stool, Per Rectum [[id number]]  (Abnormal) Collected: 04/18/25 1519    Lab Status: Final result Specimen: Stool from Per Rectum Updated: 04/18/25 1648     Toxigenic C. difficile by PCR Positive     027 Toxin Presumptive Negative    Narrative:      DNA from a toxigenic strain of C.difficile has been detected. Antigen testing for the presence of free C.difficile toxin is currently in progress, to help determine the clinical significance of this PCR result.     Enteric Parasite Panel - Stool, Per Rectum [[id number]]  (Normal) Collected: 04/18/25 1519    Lab Status: Final result Specimen: Stool from Per Rectum Updated: 04/19/25 1318     Giardia lamblia Not Detected     Cryptosporidium Not Detected     Entamoeba histolytica Not Detected    Enteric Bacterial Panel - Stool, Per Rectum [[id number]]  (Normal) Collected: 04/18/25 1519    Lab Status: Final result Specimen: Stool from Per Rectum Updated: 04/19/25 1313     Salmonella Not Detected     Campylobacter Not Detected     Shigella/Enteroinvasive E. coli (EIEC) Not Detected     Shiga-like toxin-producing E. coli (STEC) stx1/stx2 Not Detected    Clostridioides difficile toxin Ag, Reflex - Stool, Per Rectum [[id number]]  (Normal) Collected: 04/18/25 1519    Lab Status: Final result Specimen: Stool from Per Rectum Updated: 04/18/25 1648     C.diff Toxin Ag Negative    Narrative:      DNA from a toxigenic strain of C.difficile was detected, although the free toxin itself was not detected. These findings are consistent with C.difficile colonization and may not reflect actual C.difficile infection. Clinical correlation needed.    Wound Culture - Wound, Suprapubic Catheter [[id number]]  (Abnormal)   (Susceptibility) Collected: 04/16/25 1437    Lab Status: Final result Specimen: Wound from Suprapubic Catheter Updated: 04/18/25 0645     Wound Culture Scant growth (1+) Staphylococcus aureus, MRSA     Comment:   Methicillin resistant Staphylococcus aureus, Patient may be an isolation risk.        Gram Stain Rare (1+) WBCs seen      Occasional Gram positive cocci    Susceptibility        Staphylococcus aureus, MRSA      SHINE      Clindamycin Resistant      Erythromycin Resistant      Oxacillin Resistant      Rifampin Susceptible      Tetracycline Susceptible      Trimethoprim + Sulfamethoxazole Susceptible      Vancomycin Susceptible                           MRSA Screen, PCR (Inpatient) - Swab, Nares [[id number]]  (Abnormal) Collected: 04/16/25 1437    Lab Status: Final result Specimen: Swab from Nares Updated: 04/16/25 1601     MRSA PCR MRSA Detected    Narrative:      The negative predictive value of this diagnostic test is high and should only be used to consider de-escalating anti-MRSA therapy. A positive result may indicate colonization with MRSA and must be correlated clinically.    Blood Culture - Blood, Arm, Left [[id number]]  (Normal) Collected: 04/15/25 2020    Lab Status: Final result Specimen: Blood from Arm, Left Updated: 04/20/25 2031     Blood Culture No growth at 5 days    Blood Culture - Blood, Arm, Right [[id number]]  (Normal) Collected: 04/15/25 2020    Lab Status: Final result Specimen: Blood from Arm, Right Updated: 04/20/25 2031     Blood Culture No growth at 5 days    COVID PRE-OP / PRE-PROCEDURE SCREENING ORDER (NO ISOLATION) - Swab, Nasopharynx [[id number]]  (Normal) Collected: 04/15/25 1917    Lab Status: Final result Specimen: Swab from Nasopharynx Updated: 04/15/25 2006    Narrative:      The following orders were created for panel order COVID PRE-OP / PRE-PROCEDURE SCREENING ORDER (NO ISOLATION) - Swab, Nasopharynx.  Procedure                               Abnormality         Status                      ---------                               -----------         ------                     COVID-19, FLU A/B, RSV P...[[id number]]  Normal              Final result                 Please view results for these tests on the individual orders.    COVID-19, FLU A/B, RSV PCR 1 HR TAT - Swab, Nasopharynx [[id number]]  (Normal) Collected: 04/15/25 1917    Lab Status: Final result Specimen: Swab from Nasopharynx Updated: 04/15/25 2006     COVID19 Not Detected     Influenza A PCR Not Detected     Influenza B PCR Not Detected     RSV, PCR Not Detected    Narrative:      Fact sheet for providers: https://www.fda.gov/media/202618/download    Fact sheet for patients: https://www.fda.gov/media/034485/download    Test performed by PCR.    Urine Culture - Urine, Urine, Clean Catch [[id number]]  (Abnormal)  (Susceptibility) Collected: 04/15/25 1823    Lab Status: Final result Specimen: Urine, Clean Catch Updated: 04/19/25 1207     Urine Culture >100,000 CFU/mL Escherichia coli ESBL      >100,000 CFU/mL Citrobacter farmeri    Narrative:      Colonization of the urinary tract without infection is common. Treatment is discouraged unless the patient is symptomatic, pregnant, or undergoing an invasive urologic procedure.  Recent outcomes data supports the use of pip/tazo in the treatment of susceptible ESBL infections for uncomplicated UTI. Consider use of pip/tazo as a carbapenem-sparing regimen in applicable patients.    Susceptibility        Escherichia coli ESBL      SHINE      Ciprofloxacin Resistant      Ertapenem Susceptible      Gentamicin Susceptible      Levofloxacin Resistant      Meropenem Susceptible      Nitrofurantoin Susceptible      Piperacillin + Tazobactam Susceptible      Trimethoprim + Sulfamethoxazole Susceptible                       Susceptibility        Citrobacter farmeri      SHINE      Amoxicillin + Clavulanate Susceptible      Cefazolin (Urine) Resistant      Cefepime Susceptible      Ceftazidime  Susceptible      Ceftriaxone Susceptible      Gentamicin Susceptible      Levofloxacin Susceptible      Nitrofurantoin Intermediate      Piperacillin + Tazobactam Susceptible      Trimethoprim + Sulfamethoxazole Susceptible                                   [x]  Microbiology  [x]  Radiology  XR Chest 1 View  Result Date: 4/25/2025  Impression: Similar appearance of findings suggestive of pulmonary edema with bilateral pleural effusions and bibasilar atelectasis. Electronically Signed: Simona Tadeo MD  4/25/2025 1:56 PM EDT  Workstation ID: [id number]    CT Abdomen Pelvis Without Contrast  Result Date: 4/25/2025  Impression: 1.No acute abnormality identified within the abdomen or pelvis. 2.Previously noted punctate calculus within the right renal pelvis has likely passed into the urinary bladder in the interim (series 2, image 220). 3.Indwelling Diamond catheter and suprapubic catheter within the urinary bladder. 4.Partially visualized bilateral pleural effusions with bibasilar atelectasis. 5.Cholelithiasis. 6.Colonic diverticulosis. 7.Mild perihepatic and pelvic ascites. 8.Additional findings as detailed above. Electronically Signed: West Peña MD  4/25/2025 9:31 AM EDT  Workstation ID: [id number]        [x]  EKG/Telemetry   []  Cardiology/Vascular   []  Pathology  []  Old records  [x]  Other:  Scheduled Meds:allopurinol, 100 mg, Oral, Daily  [Held by provider] apixaban, 2.5 mg, Oral, Q12H  dicyclomine, 10 mg, Oral, 4x Daily  famotidine, 20 mg, Oral, BID AC  furosemide, 40 mg, Intravenous, BID Diuretics  [Held by provider] furosemide, 40 mg, Oral, Daily  LORazepam, 0.5 mg, Oral, Nightly  [Held by provider] pantoprazole, 40 mg, Oral, Q AM  piperacillin-tazobactam, 3.375 g, Intravenous, Q8H  sertraline, 50 mg, Oral, Daily  sodium chloride, 10 mL, Intravenous, Q12H  [Held by provider] tamsulosin, 0.4 mg, Oral, Daily      Continuous Infusions:     PRN Meds:.  senna-docusate sodium **AND** polyethylene glycol **AND**  bisacodyl **AND** bisacodyl    HYDROcodone-acetaminophen    LORazepam    melatonin    nitroglycerin    ondansetron    sodium chloride    sodium chloride    sodium chloride    traZODone      Assessment & Plan   Assessment / Plan     Assessment/Plan:  UTI due to ESBL E. coli and Citrobacter associated with chronic indwelling suprapubic Diamond catheter.  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  Left forearm swelling      Patient admitted for further evaluation and treatment  Urology and cardiology consulted thank for assistance  Continue piperacillin/tazobactam to complete 7-day course  Completed 7-day course of vancomycin  Continue wound care per nursing  Start Lasix 40 mg IV twice daily  Start strict I's and O's and daily weights  Patient not able to tolerate further maximal medical directed therapy for heart failure due to hypotension  Continue monitor renal function closely  Continue to monitor electrolytes replace as needed  Continue suprapubic catheter and Diamond catheter care per nursing  No indication for primary C. difficile prophylaxis though will hold home Protonix while on broad-spectrum antibiotics  Continue physical therapy Occupational Therapy  Continue bowel regimen as needed  Venous duplex left upper extremity within normal limits suspect possible lymph damage.  Patient instructed to keep left upper extremity elevated.  Further inpatient orders recommendations pending clinical course           Discussed plan with bedside RN as well as Dr. Garcia urology attending.    Disposition: Skilled nursing rehab.  Discharge planning coordinating.    VTE  Prophylaxis:  Pharmacologic VTE prophylaxis orders are present.        CODE STATUS:   Code Status (Patient has no pulse and is not breathing): No CPR (Do Not Attempt to Resuscitate)  Medical Interventions (Patient has pulse or is breathing): Limited Support  Medical Intervention Limits: No intubation (DNI)

## 2025-04-25 NOTE — PROGRESS NOTES
Patient underwent CT scan today and it is unremarkable the bladder is empty both suprapubic catheter and Diamond catheter seen in the bladder.  I flushed the suprapubic catheter again and it drained very well through the urethral Diamond catheter without any problems no clots noted.  Continue with medical management at this point.

## 2025-04-25 NOTE — PLAN OF CARE
Goal Outcome Evaluation:  Plan of Care Reviewed With: patient           Outcome Evaluation: Pt a/ox3. Room air. Unable to stand at bedside this shift. At 0330, pt wanted to urinate. Noted that upper part of loyd had accumulation of red puss and sediment. Attempted to clear with 10 mL syringe  but no movement so contacted MD at 0350 and notified that loyd cath was blocked or clotted. MD stated to irrigate. After several attempts, loyd line was cleared but no flow of urine present. Bladder scan attempted but accurate scan unsuccessful. Pt experiencing quite a bit of pain during this time so gave norco as per mar. MD notified of lack of flow. He sent another nurse up to evalute; irrigation done again but little output from loyd once irrigated. Pt in considerable pain, so MD ordered a one time dose of dilaudid. Pain somewhat subsided but still uncomfortable. Pt still complaining of pain and  pressure in lower abd and groin area. Urology was consulted by hospitalist. Pt placed on NPO. Cont with poc.

## 2025-04-25 NOTE — PLAN OF CARE
Goal Outcome Evaluation:                 Patient seems more SOA this shift. MD notified of patient decline. Daughter Yancy is aware. Multiple orders received this shift. With a critical lactic coming back. MD notified. Orders received. Patient has not been able to make needs known this shift.

## 2025-04-25 NOTE — CONSULTS
"[facility]   Consult Note    Patient Name: Javi Martínez  : 1940  MRN: [id number]  Primary Care Physician:  Rani Lopez APRN  Referring Physician: No ref. provider found  Date of admission: 4/15/2025    Consults  Subjective   Subjective     Reason for Consult/ Chief Complaint: Clot urinary retention    Shortness of Breath    Weakness - Generalized    Javi Martínez is a 84 y.o. male admitted to the hospital initially for fatigue and falls.  Patient has a history of prostate cancer apparently has received both external beam radiation therapy and brachytherapy.  He developed urethral stricture and bladder neck contractures and the Dr. Walker has operated on him and opened up his stricture and has a Diamond catheter and a suprapubic catheter in the patient.  Nursing staff called us because they were unable to irrigate the catheter and patient was very uncomfortable had to be sedated.    Review of Systems   Respiratory:  Positive for shortness of breath.    Negative    Personal History     Past Medical History:   Diagnosis Date    Aneurysm     Arthritis     left knee     Atrial fibrillation, persistent     CHB (complete heart block) 2023    CHF exacerbation 2024    Chronic obstructive pulmonary disease 2023    pt denies having this    Difficulty walking     Numbness in right foot    Erectile dysfunction     Essential hypertension 2019    GERD (gastroesophageal reflux disease)     HFrEF (heart failure with reduced ejection fraction) 2021    Hyperlipidemia 2021    Paroxysmal atrial fibrillation     Polyneuropathy 2023    Prostate cancer 2010    with seeds implant     Septic joint 2021    Sleep apnea     Urinary incontinence     Urinary tract infection        Past Surgical History:   Procedure Laterality Date    ABLATION OF DYSRHYTHMIC FOCUS      APPENDECTOMY  194    ARTERIAL BYPASS SURGERY      CARDIAC ABLATION      x2  \"years ago\"     " CARDIAC ELECTROPHYSIOLOGY PROCEDURE N/A 11/01/2021    Procedure: PPM battery change;  Surgeon: Ge Whelan MD;  Location: [facility] CATH INVASIVE LOCATION;  Service: Cardiovascular;  Laterality: N/A;    CARDIAC SURGERY  2006    cabg 3v    COLONOSCOPY      CORONARY ARTERY BYPASS GRAFT  2006    CYSTOSCOPY URETHROTOMY VISUAL INTERNAL N/A 08/15/2024    Procedure: CYSTOSCOPY, URETHRAL DILATION, CATHETER PLACEMENT, RETROGRADE URETHROGRAM;  Surgeon: August Myers MD;  Location: [facility] MAIN OR;  Service: Urology;  Laterality: N/A;    CYSTOSCOPY URETHROTOMY VISUAL INTERNAL N/A 10/24/2024    Procedure: CYSTOSCOPY URETHROTOMY;  Surgeon: Mally Bernal MD;  Location: [facility] MAIN OR;  Service: Urology;  Laterality: N/A;    CYSTOSCOPY URETHROTOMY VISUAL INTERNAL N/A 03/11/2025    Procedure: CYSTOSCOPY URETHROTOMY VISUAL INTERNAL;  Surgeon: Mally Bernal MD;  Location: [facility] MAIN OR;  Service: Urology;  Laterality: N/A;    ENDOSCOPY      with dilation     ENDOSCOPY N/A 01/21/2025    Procedure: ESOPHAGOGASTRODUODENOSCOPY WITH BIOPSIES, POLYPECTOMY, BALLOON DILATION 18-20mm;  Surgeon: Simona Garcia MD;  Location: [facility] ENDOSCOPY;  Service: Gastroenterology;  Laterality: N/A;  GASTRIC POLYPS, SCHATZKI'S RING, HIATAL HERNIA    KNEE ARTHROSCOPY Left     PROSTATE SURGERY  2012    SUPRAPUBIC TUBE PLACEMENT N/A 03/11/2025    Procedure: SUPRAPUBIC CATHETER INSERTION;  Surgeon: Mally Bernal MD;  Location: [facility] OR;  Service: Urology;  Laterality: N/A;    TOTAL KNEE ARTHROPLASTY Left 07/23/2021    Procedure: TOTAL KNEE ARTHROPLASTY WITH DROA NAVIGATION WITH BIOMET;  Surgeon: Carlitos Zhao MD;  Location: [facility] OR;  Service: Orthopedics;  Laterality: Left;    VENTRICULAR CARDIAC PACEMAKER INSERTION      medtronic        Family History: family history includes Heart attack in his father and mother; Heart disease in his mother; No Known Problems in his brother, maternal aunt, maternal grandfather,  maternal grandmother, maternal uncle, paternal aunt, paternal grandfather, paternal grandmother, paternal uncle, sister, and another family member. Otherwise pertinent FHx was reviewed and not pertinent to current issue.    Social History:  reports that he quit smoking about 29 years ago. His smoking use included cigarettes. He started smoking about 63 years ago. He has a 16.8 pack-year smoking history. He has never been exposed to tobacco smoke. He has never used smokeless tobacco. He reports that he does not drink alcohol and does not use drugs.    Home Medications:   HYDROcodone-acetaminophen, LORazepam, allopurinol, apixaban, benzonatate, citalopram, empagliflozin, furosemide, melatonin, omeprazole, and tamsulosin    Allergies:  No Known Allergies    Objective    Objective     Vitals:  Temp:  [97.3 °F (36.3 °C)-97.9 °F (36.6 °C)] 97.7 °F (36.5 °C)  Heart Rate:  [] 71  Resp:  [16] 16  BP: ()/(66-93) 115/78    Physical Exam  Afebrile vital signs are stable  Patient is sedated unable to communicate.  Abdomen is soft and benign although when you press on the suprapubic region he has pain but there is no rebound guarding or rigidity noted  No CVA tenderness  Normal penis normal scrotum bilateral descended testicles no hernias noted  Result Review    Result Review:  I have personally reviewed the results from the time of this admission to 4/25/2025 07:31 EDT and agree with these findings:  [x]  Laboratory list / accordion  []  Microbiology  [x]  Radiology  []  EKG/Telemetry   []  Cardiology/Vascular   []  Pathology  []  Old records  []  Other:  Most notable findings include: Creatinine is 1.50 white blood cell count is 8.08      Assessment & Plan   Assessment / Plan     Brief Patient Summary:  Javi Martínez is a 84 y.o. male who patient has both a suprapubic catheter and a urethral Diamond catheter and the nursing staff said that neither 1 has been draining properly.  They tried to irrigate it but have not  been able to have clear flow.    Active Hospital Problems:  Active Hospital Problems    Diagnosis     **Generalized weakness     Urinary retention     CHF (congestive heart failure)     Acute on chronic HFrEF (heart failure with reduced ejection fraction)     Stage 3b chronic kidney disease     Anxiety     Essential hypertension     CAD with CABG      Plan:   With patient being in a supine position I brought in the irrigation tray and I irrigated both his Diamond catheter and the suprapubic and they both irrigated well without any evidence of large amount of urine.  However patient is in severe pain and so I will proceed with a CT scan to see if the bladder is distended or not and to evaluate the bladder.    Donn Gacria MD

## 2025-04-26 NOTE — PROGRESS NOTES
[facility]     Progress Note    Patient Name: Javi Martínez  : 1940  MRN: [id number]  Primary Care Physician:  Rani Lopez APRN  Date of admission: 4/15/2025    Subjective   Subjective     Chief Complaint: Urinary retention    Shortness of Breath    Weakness - Generalized    Patient Reports has urinary retention and has both a suprapubic and a urethral Diamond catheter.  Patient yesterday had some gross hematuria and the catheters were not draining and we checked the CT and his bladder was empty and after some irrigation they both started irrigating very nicely.    Review of Systems   Respiratory:  Positive for shortness of breath.        Objective   Objective     Vitals:   Temp:  [97.7 °F (36.5 °C)-98.4 °F (36.9 °C)] 97.7 °F (36.5 °C)  Heart Rate:  [72-76] 72  Resp:  [18] 18  BP: ()/(63-79) 96/63  Flow (L/min) (Oxygen Therapy):  [1-2] 1    Physical Exam   Awake alert but is not oriented at this time  Afebrile vital signs are stable  Abdomen is soft and benign  Normal penis and scrotum with Diamond catheter and suprapubic catheter is in good condition.  Result Review    Result Review:  I have personally reviewed the results from the time of this admission to 2025 10:56 EDT and agree with these findings:  [x]  Laboratory list / accordion  []  Microbiology  []  Radiology  []  EKG/Telemetry   []  Cardiology/Vascular   []  Pathology  []  Old records  []  Other:  Most notable findings include: Creatinine 2.46 white blood cell count 7.94      Assessment & Plan   Assessment / Plan     Brief Patient Summary:  Javi Martínez is a 84 y.o. male who urinary catheters    Active Hospital Problems:  Active Hospital Problems    Diagnosis     **Generalized weakness     Urinary retention     CHF (congestive heart failure)     Acute on chronic HFrEF (heart failure with reduced ejection fraction)     Stage 3b chronic kidney disease     Anxiety     Essential hypertension     CAD with CABG      Plan:   Both  catheters are draining yellow clear urine.  Continue with medical management patient is doing well with with both catheters.  No intervention required at this time.    VTE Prophylaxis:  Pharmacologic VTE prophylaxis orders are present.        CODE STATUS:    Code Status (Patient has no pulse and is not breathing): No CPR (Do Not Attempt to Resuscitate)  Medical Interventions (Patient has pulse or is breathing): Limited Support  Medical Intervention Limits: No intubation (DNI)    Disposition:  I expect patient to be discharged skilled nursing facility.    Donn Garcia MD

## 2025-04-26 NOTE — PLAN OF CARE
Goal Outcome Evaluation:              Outcome Evaluation: Has intermittent confusion. PO Lasix held this shift due to low BP, per MD. No other issues noted this shift. Able to make needs known, with call light within reach. Plan of care ongoing.

## 2025-04-26 NOTE — PROGRESS NOTES
[facility]   Hospitalist Progress Note  Date: 2025  Patient Name: Javi Martínez  : 1940  MRN: [id number]  Date of admission: 4/15/2025      Subjective   Subjective     Chief Complaint: Follow-up generalized weakness    Summary:Javi Martínez is a 84 y.o. male  with past medical history of hypertension, CHF, A-fib on Eliquis, arthritis, history of prostate cancer status post brachytherapy and external beam XRT and BPH and GERD presented to the ED for evaluation of generalized weakness and fall.  Patient states that for the last week or so he has not been feeling well with increased weakness, dysuria, lethargy, decreased p.o. intake, and multiple falls.  Patient does live alone and had been unable to perform his ADLs so he was brought to the ED for further evaluation.  In the ED patient was tachypneic on arrival with remaining vitals being within normal limits on room air.  His UA was positive for a UTI with labs showing an elevated proBNP, reduced renal function, elevated lactic acid, and elevated but flat troponin.  Remaining labs are relatively unremarkable including a normal WBC and negative COVID flu RSV.  Chest x-ray showed pulmonary edema although improved from prior imaging.  When seen patient was resting comfortably and when asked he denied any recent fevers, chills, headaches, focal weakness, chest pain, palpitation, shortness of breath, cough, abdominal pain, nausea, vomiting, diarrhea, constipation, hematuria, hematochezia, melena, or anxiety.   Patient admitted for further evaluation and treatment.  Patient had pus coming from suprapubic catheter insertion site along with pus within suprapubic catheter.  Blood pressure borderline low, perfusing extremities well.  Cardiologist consulted.  Also started on IV antibiotics for possible intra-abdominal infection.  CT abdomen pelvis demonstrated some haziness of the perivesical fat suggesting cystitis but no abscess appreciated.  Also  demonstrated cardiomegaly with bilateral pleural effusions.  Wound culture grew MRSA.  Urine culture grew ESBL E. coli and Citrobacter farmeri.  Antibiotics tailored to sensitivities.  Patient noted to have some loose stools.  C. difficile detected by PCR but toxin negative indicating carrier status.  Blood culture negative to date.  Had issues with Diamond catheter clogging and urology consulted.  Patient developed increased work of breathing and Lasix initiated.  Patient planning to discharge to skilled nursing rehab.  Discharge planning coordinating.    Interval Followup: Patient sitting up in bed appears to more comfortable today.  Patient remains fatigued today but improved.  Patient denies shortness of breath.  Urine output improved.  Patient denies any new issues.  Still having intermittent confusion per nursing.  Discussed case with patient's daughter over the phone.   Afebrile overnight.  A-fib and V-paced 70s to 80s with a 6 beat run of V. tach on telemetry review.  Blood pressure running low normal limits today.  Satting well on room air during exam.   Serum creatinine trending up.  No other issues per nursing.    Review of Systems  Constitutional: Positive for fatigue and negative fever.   HENT: Negative for sore throat and trouble swallowing.    Eyes: Negative for pain and discharge.   Respiratory: Negative for cough and negative shortness of breath.    Cardiovascular: Negative for chest pain and palpitations.   Gastrointestinal: Positive for abdominal pain, nausea and negative vomiting.   Endocrine: Negative for cold intolerance and heat intolerance.   Genitourinary: Positive for difficulty urinating and dysuria.   Musculoskeletal: Negative for back pain and neck stiffness.   Skin: Negative for color change and rash.   Neurological: Negative for syncope and headaches.   Hematological: Negative for adenopathy.   Psychiatric/Behavioral: Negative for confusion and hallucinations.    Objective   Objective      Vitals:   Temp:  [97.6 °F (36.4 °C)-98.4 °F (36.9 °C)] 97.8 °F (36.6 °C)  Heart Rate:  [72-78] 78  Resp:  [18-20] 20  BP: ()/(63-78) 96/71  Flow (L/min) (Oxygen Therapy):  [1-2] 1  Physical Exam   General: well-developed appearing stated age in no acute distress  HEENT: Normocephalic atraumatic moist membranes pupils equal round reactive light, no scleral icterus no conjunctival injection  Cardiovascular: regular rate and rhythm no murmurs rubs or gallops S1-S2, no lower extremity edema appreciated  Pulmonary: Diminished bilateral bases no wheezes or rhonchi symmetric chest expansion, labored with accessory muscle use, no conversational dyspnea appreciated  Gastrointestinal: Soft nontender nondistended positive bowel sounds all 4 quadrants no rebound or guarding  Musculoskeletal: No clubbing cyanosis, warm and well-perfused, calves soft symmetric nontender bilaterally  Skin: Clean dry without rashes  Neuro: Cranial nerves II through XII intact grossly no sensorimotor deficits appreciated bilateral upper and lower extremities  Psych: Patient is calm cooperative and appropriate with exam not responding to internal stimuli  : Suprapubic and Diamond catheter no bladder distention positive suprapubic tenderness    Result Review    Result Review:  I have personally reviewed these results and agree with these findings:  [x]  Laboratory  LAB RESULTS:      Lab 04/26/25  0504 04/25/25  2151 04/25/25  1854 04/25/25  1611 04/25/25  1231 04/23/25  0539 04/22/25  0446 04/21/25  1740 04/21/25  0501 04/20/25  0618   WBC 7.94  --   --   --  8.40 8.08 6.72 9.00 8.19 8.70   HEMOGLOBIN 13.9  --   --   --  13.9 13.9 13.2 13.6 13.4 13.8   HEMATOCRIT 43.4  --   --   --  45.1 41.3 42.3 43.7 41.2 43.1   PLATELETS 214  --   --   --  249 227 178 225 210 213   NEUTROS ABS  --   --   --   --   --  5.45 3.92 5.77 5.53 6.10   IMMATURE GRANS (ABS)  --   --   --   --   --  0.04 0.02 0.03 0.04 0.03   LYMPHS ABS  --   --   --   --   --   1.64 1.70 2.09 1.62 1.47   MONOS ABS  --   --   --   --   --  0.74 0.87 0.95* 0.82 0.96*   EOS ABS  --   --   --   --   --  0.13 0.13 0.09 0.10 0.05   MCV 94.3  --   --   --  94.2 95.2 100.0* 92.6 95.2 94.1   LACTATE  --  2.5* 3.1* 3.4* 3.8*  --   --   --   --   --          Lab 04/26/25  0504 04/25/25  1231 04/23/25  0539 04/22/25  0446 04/21/25  0501 04/20/25  0618   SODIUM 140 139 136 134* 139 137   POTASSIUM 3.9 4.1 3.7 3.7 3.7 3.7   CHLORIDE 102 99 101 99 100 100   CO2 21.1* 22.4 23.2 22.5 24.3 22.1   ANION GAP 16.9* 17.6* 11.8 12.5 14.7 14.9   BUN 36* 32* 24* 25* 24* 24*   CREATININE 2.46* 2.11* 1.50* 1.72* 1.60* 1.37*   EGFR 25.5* 30.7* 46.2* 39.2* 42.8* 51.5*   GLUCOSE 92 108* 103* 87 110* 118*   CALCIUM 9.2 9.4 9.0 8.8 8.9 8.9   MAGNESIUM  --   --  2.3 2.3 2.2 2.3   PHOSPHORUS 4.9*  --  3.1 3.2 3.2 3.3         Lab 04/26/25  0504 04/25/25  1231   TOTAL PROTEIN  --  6.6   ALBUMIN 2.9* 3.2*   GLOBULIN  --  3.4   ALT (SGPT)  --  9   AST (SGOT)  --  16   BILIRUBIN  --  1.2   ALK PHOS  --  87         Lab 04/25/25  1611   PROBNP 37,306.0*                   Lab 04/25/25  1236   PH, ARTERIAL 7.390   PCO2, ARTERIAL 40.8   PO2 ART 51.1*   O2 SATURATION ART 85.4*   FIO2 21   HCO3 ART 24.7   BASE EXCESS ART -0.3       Brief Urine Lab Results  (Last result in the past 365 days)        Color   Clarity   Blood   Leuk Est   Nitrite   Protein   CREAT   Urine HCG        04/15/25 1823 Yellow   Clear   Trace   Large (3+)   Negative   Negative                 Microbiology Results (last 10 days)       Procedure Component Value - Date/Time    Clostridioides difficile Toxin - Stool, Per Rectum [[id number]]  (Abnormal) Collected: 04/18/25 1519    Lab Status: Final result Specimen: Stool from Per Rectum Updated: 04/18/25 1648    Narrative:      The following orders were created for panel order Clostridioides difficile Toxin - Stool, Per Rectum.  Procedure                               Abnormality         Status                      ---------                               -----------         ------                     Clostridioides difficile...[[id number]]  Abnormal            Final result                 Please view results for these tests on the individual orders.    Clostridioides difficile Toxin, PCR - Stool, Per Rectum [[id number]]  (Abnormal) Collected: 04/18/25 1519    Lab Status: Final result Specimen: Stool from Per Rectum Updated: 04/18/25 1648     Toxigenic C. difficile by PCR Positive     027 Toxin Presumptive Negative    Narrative:      DNA from a toxigenic strain of C.difficile has been detected. Antigen testing for the presence of free C.difficile toxin is currently in progress, to help determine the clinical significance of this PCR result.     Enteric Parasite Panel - Stool, Per Rectum [[id number]]  (Normal) Collected: 04/18/25 1519    Lab Status: Final result Specimen: Stool from Per Rectum Updated: 04/19/25 1318     Giardia lamblia Not Detected     Cryptosporidium Not Detected     Entamoeba histolytica Not Detected    Enteric Bacterial Panel - Stool, Per Rectum [[id number]]  (Normal) Collected: 04/18/25 1519    Lab Status: Final result Specimen: Stool from Per Rectum Updated: 04/19/25 1313     Salmonella Not Detected     Campylobacter Not Detected     Shigella/Enteroinvasive E. coli (EIEC) Not Detected     Shiga-like toxin-producing E. coli (STEC) stx1/stx2 Not Detected    Clostridioides difficile toxin Ag, Reflex - Stool, Per Rectum [[id number]]  (Normal) Collected: 04/18/25 1519    Lab Status: Final result Specimen: Stool from Per Rectum Updated: 04/18/25 1648     C.diff Toxin Ag Negative    Narrative:      DNA from a toxigenic strain of C.difficile was detected, although the free toxin itself was not detected. These findings are consistent with C.difficile colonization and may not reflect actual C.difficile infection. Clinical correlation needed.            [x]  Microbiology  [x]  Radiology  XR Chest 1 View  Result Date:  4/25/2025  Impression: Similar appearance of findings suggestive of pulmonary edema with bilateral pleural effusions and bibasilar atelectasis. Electronically Signed: Simona Tadeo MD  4/25/2025 1:56 PM EDT  Workstation ID: [id number]    CT Abdomen Pelvis Without Contrast  Result Date: 4/25/2025  Impression: 1.No acute abnormality identified within the abdomen or pelvis. 2.Previously noted punctate calculus within the right renal pelvis has likely passed into the urinary bladder in the interim (series 2, image 220). 3.Indwelling Diamond catheter and suprapubic catheter within the urinary bladder. 4.Partially visualized bilateral pleural effusions with bibasilar atelectasis. 5.Cholelithiasis. 6.Colonic diverticulosis. 7.Mild perihepatic and pelvic ascites. 8.Additional findings as detailed above. Electronically Signed: West Peña MD  4/25/2025 9:31 AM EDT  Workstation ID: [id number]        [x]  EKG/Telemetry   []  Cardiology/Vascular   []  Pathology  []  Old records  [x]  Other:  Scheduled Meds:allopurinol, 100 mg, Oral, Daily  [Held by provider] apixaban, 2.5 mg, Oral, Q12H  dicyclomine, 10 mg, Oral, 4x Daily  famotidine, 20 mg, Oral, BID AC  furosemide, 20 mg, Oral, BID Diuretics  LORazepam, 0.5 mg, Oral, Nightly  midodrine, 10 mg, Oral, TID AC  [Held by provider] pantoprazole, 40 mg, Oral, Q AM  piperacillin-tazobactam, 3.375 g, Intravenous, Q8H  sertraline, 50 mg, Oral, Daily  sodium chloride, 10 mL, Intravenous, Q12H  [Held by provider] tamsulosin, 0.4 mg, Oral, Daily      Continuous Infusions:     PRN Meds:.  senna-docusate sodium **AND** polyethylene glycol **AND** bisacodyl **AND** bisacodyl    HYDROcodone-acetaminophen    LORazepam    melatonin    nitroglycerin    ondansetron    sodium chloride    sodium chloride    sodium chloride    traZODone      Assessment & Plan   Assessment / Plan     Assessment/Plan:  UTI due to ESBL E. coli and Citrobacter associated with chronic indwelling suprapubic Diamond  catheter.  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 twice daily  Start midodrine for blood pressure support to allow for diuresis  Start strict I's and O's and daily weights  Patient not able to tolerate further maximal medical directed therapy for heart failure due to hypotension  Continue monitor renal function closely  Continue to monitor electrolytes replace as needed  Continue suprapubic catheter and Diamond catheter care per nursing  Will discuss with Dr. Bernal removing Diamond catheter and changing out suprapubic catheter on Monday  No indication for primary C. difficile prophylaxis though will hold home Protonix while on broad-spectrum antibiotics  Continue physical therapy Occupational Therapy  Continue bowel regimen as needed  Venous duplex left upper extremity within normal limits suspect possible lymph damage.  Patient instructed to keep left upper extremity elevated.  Further inpatient orders recommendations pending clinical course           Discussed plan with bedside RN as well as Dr. Garcia urology attending.    Disposition: Inpatient rehab versus return to [place] with family.  Discharge planning coordinating.    VTE Prophylaxis:  Pharmacologic VTE prophylaxis orders are present.        CODE STATUS:   Code Status (Patient has no pulse and is not breathing): No  CPR (Do Not Attempt to Resuscitate)  Medical Interventions (Patient has pulse or is breathing): Limited Support  Medical Intervention Limits: No intubation (DNI)

## 2025-04-27 NOTE — PLAN OF CARE
Goal Outcome Evaluation:              Outcome Evaluation: No significant changes this shift. Plan of care ongoing.

## 2025-04-27 NOTE — PROGRESS NOTES
[facility]     Progress Note    Patient Name: Javi Martínez  : 1940  MRN: [id number]  Primary Care Physician:  Rani Lopez APRN  Date of admission: 4/15/2025    Subjective   Subjective     Chief Complaint: No complaints today    Shortness of Breath    Weakness - Generalized    Patient Reports patient has chronic urinary retention and has a suprapubic catheter and a urethral Diamond catheter as well.  Most recently he was having gross blood come out and the catheters were not draining but I irrigated and flush the catheters and they started draining well.  Today patient has no complaints urine is flowing well.    Review of Systems   Respiratory:  Positive for shortness of breath.    Negative    Objective   Objective     Vitals:   Temp:  [94.5 °F (34.7 °C)-98 °F (36.7 °C)] 96 °F (35.6 °C)  Heart Rate:  [71-78] 71  Resp:  [20] 20  BP: ()/(61-91) 112/91  Flow (L/min) (Oxygen Therapy):  [1] 1    Physical Exam   Awake alert oriented  Afebrile vital signs are stable  Suprapubic and Diamond catheter in place and draining clear yellow urine.  Result Review    Result Review:  I have personally reviewed the results from the time of this admission to 2025 09:26 EDT and agree with these findings:  [x]  Laboratory list / accordion  []  Microbiology  []  Radiology  []  EKG/Telemetry   []  Cardiology/Vascular   []  Pathology  []  Old records  []  Other:  Most notable findings include: Creatinine is 2.48      Assessment & Plan   Assessment / Plan     Brief Patient Summary:  Javi Martínez is a 84 y.o. male who patient has chronic urinary retention from bladder outlet obstruction.  She has a urethral Diamond catheter and a suprapubic catheter in place.    Active Hospital Problems:  Active Hospital Problems    Diagnosis     **Generalized weakness     Urinary retention     CHF (congestive heart failure)     Acute on chronic HFrEF (heart failure with reduced ejection fraction)     Stage 3b chronic kidney disease      Anxiety     Essential hypertension     CAD with CABG      Plan:   Patient has a suprapubic catheter and a urethral Diamond catheter in place both are draining well now a few days ago they were not draining due to gross hematuria but the catheters were irrigated and now they are flowing well.  Dr. Hyde will reach out to Dr. Walker tomorrow to ask if he needs to have both suprapubic and urethral Diamond catheters.  I know that he had developed significant bladder neck contracture and urethral stricture disease and had to undergo procedure to get the Diamond catheter be inserted.  Continue both for now.    VTE Prophylaxis:  Pharmacologic VTE prophylaxis orders are present.        CODE STATUS:    Code Status (Patient has no pulse and is not breathing): No CPR (Do Not Attempt to Resuscitate)  Medical Interventions (Patient has pulse or is breathing): Limited Support  Medical Intervention Limits: No intubation (DNI)    Disposition:  I expect patient to be discharged skilled nursing.    Donn Garcia MD

## 2025-04-27 NOTE — PROGRESS NOTES
[facility]   Hospitalist Progress Note  Date: 2025  Patient Name: Javi Martínez  : 1940  MRN: [id number]  Date of admission: 4/15/2025      Subjective   Subjective     Chief Complaint: Follow-up generalized weakness    Summary:Javi Martínez is a 84 y.o. male  with past medical history of hypertension, CHF, A-fib on Eliquis, arthritis, history of prostate cancer status post brachytherapy and external beam XRT and BPH and GERD presented to the ED for evaluation of generalized weakness and fall.  Patient states that for the last week or so he has not been feeling well with increased weakness, dysuria, lethargy, decreased p.o. intake, and multiple falls.  Patient does live alone and had been unable to perform his ADLs so he was brought to the ED for further evaluation.  In the ED patient was tachypneic on arrival with remaining vitals being within normal limits on room air.  His UA was positive for a UTI with labs showing an elevated proBNP, reduced renal function, elevated lactic acid, and elevated but flat troponin.  Remaining labs are relatively unremarkable including a normal WBC and negative COVID flu RSV.  Chest x-ray showed pulmonary edema although improved from prior imaging.  When seen patient was resting comfortably and when asked he denied any recent fevers, chills, headaches, focal weakness, chest pain, palpitation, shortness of breath, cough, abdominal pain, nausea, vomiting, diarrhea, constipation, hematuria, hematochezia, melena, or anxiety.   Patient admitted for further evaluation and treatment.  Patient had pus coming from suprapubic catheter insertion site along with pus within suprapubic catheter.  Blood pressure borderline low, perfusing extremities well.  Cardiologist consulted.  Also started on IV antibiotics for possible intra-abdominal infection.  CT abdomen pelvis demonstrated some haziness of the perivesical fat suggesting cystitis but no abscess appreciated.  Also  demonstrated cardiomegaly with bilateral pleural effusions.  Wound culture grew MRSA.  Urine culture grew ESBL E. coli and Citrobacter farmeri.  Antibiotics tailored to sensitivities.  Patient noted to have some loose stools.  C. difficile detected by PCR but toxin negative indicating carrier status.  Blood culture negative to date.  Had issues with Diamond catheter clogging and urology consulted.  Patient developed increased work of breathing and Lasix initiated.  Started midodrine for blood pressure support to allow for further diuresis.  Patient planning to discharge to assisted living with family in [place] versus patient rehab locally depending on timing per family.  Discharge planning coordinating.    Interval Followup: Patient sitting up in bed appears to comfortable today.  Patient still endorsing fatigue.  Patient denies shortness of breath.  Urine about the same.  Still having intermittent confusion per nursing.  Afebrile overnight.  A-fib and V-paced with PVCs 70s to 80s on telemetry review.  Blood pressure still low normal limits today.  Satting well on room air.   Serum creatinine remains elevated but stable.  No other issues per nursing.    Review of Systems  Constitutional: Positive for fatigue and negative fever.   HENT: Negative for sore throat and trouble swallowing.    Eyes: Negative for pain and discharge.   Respiratory: Negative for cough and negative shortness of breath.    Cardiovascular: Negative for chest pain and palpitations.   Gastrointestinal: Negative for abdominal pain, nausea and negative vomiting.   Endocrine: Negative for cold intolerance and heat intolerance.   Genitourinary: Positive for difficulty urinating and dysuria.   Musculoskeletal: Negative for back pain and neck stiffness.   Skin: Negative for color change and rash.   Neurological: Negative for syncope and headaches.   Hematological: Negative for adenopathy.   Psychiatric/Behavioral: Negative for confusion and  hallucinations.    Objective   Objective     Vitals:   Temp:  [94.5 °F (34.7 °C)-98 °F (36.7 °C)] 96 °F (35.6 °C)  Heart Rate:  [71-75] 71  Resp:  [20] 20  BP: ()/(61-91) 112/91  Physical Exam   General: well-developed appearing stated age in no acute distress  HEENT: Normocephalic atraumatic moist membranes pupils equal round reactive light, no scleral icterus no conjunctival injection  Cardiovascular: regular rate and rhythm no murmurs rubs or gallops S1-S2, 1+ bilateral lower extremity edema appreciated  Pulmonary: Diminished bilateral bases no wheezes or rhonchi symmetric chest expansion, labored with accessory muscle use, no conversational dyspnea appreciated  Gastrointestinal: Soft nontender nondistended positive bowel sounds all 4 quadrants no rebound or guarding  Musculoskeletal: No clubbing cyanosis, warm and well-perfused, calves soft symmetric nontender bilaterally  Skin: Clean dry without rashes  Neuro: Cranial nerves II through XII intact grossly no sensorimotor deficits appreciated bilateral upper and lower extremities  Psych: Patient is calm cooperative and appropriate with exam not responding to internal stimuli  : Suprapubic and Diamond catheter no bladder distention positive suprapubic tenderness    Result Review    Result Review:  I have personally reviewed these results and agree with these findings:  [x]  Laboratory  LAB RESULTS:      Lab 04/26/25  0504 04/25/25  2151 04/25/25  1854 04/25/25  1611 04/25/25  1231 04/23/25  0539 04/22/25  0446 04/21/25  1740 04/21/25  0501   WBC 7.94  --   --   --  8.40 8.08 6.72 9.00 8.19   HEMOGLOBIN 13.9  --   --   --  13.9 13.9 13.2 13.6 13.4   HEMATOCRIT 43.4  --   --   --  45.1 41.3 42.3 43.7 41.2   PLATELETS 214  --   --   --  249 227 178 225 210   NEUTROS ABS  --   --   --   --   --  5.45 3.92 5.77 5.53   IMMATURE GRANS (ABS)  --   --   --   --   --  0.04 0.02 0.03 0.04   LYMPHS ABS  --   --   --   --   --  1.64 1.70 2.09 1.62   MONOS ABS  --   --    --   --   --  0.74 0.87 0.95* 0.82   EOS ABS  --   --   --   --   --  0.13 0.13 0.09 0.10   MCV 94.3  --   --   --  94.2 95.2 100.0* 92.6 95.2   LACTATE  --  2.5* 3.1* 3.4* 3.8*  --   --   --   --          Lab 04/27/25  0538 04/26/25  0504 04/25/25  1231 04/23/25  0539 04/22/25  0446 04/21/25  0501   SODIUM 139 140 139 136 134* 139   POTASSIUM 3.9 3.9 4.1 3.7 3.7 3.7   CHLORIDE 102 102 99 101 99 100   CO2 24.1 21.1* 22.4 23.2 22.5 24.3   ANION GAP 12.9 16.9* 17.6* 11.8 12.5 14.7   BUN 41* 36* 32* 24* 25* 24*   CREATININE 2.48* 2.46* 2.11* 1.50* 1.72* 1.60*   EGFR 25.3* 25.5* 30.7* 46.2* 39.2* 42.8*   GLUCOSE 96 92 108* 103* 87 110*   CALCIUM 9.3 9.2 9.4 9.0 8.8 8.9   MAGNESIUM  --   --   --  2.3 2.3 2.2   PHOSPHORUS 4.5 4.9*  --  3.1 3.2 3.2         Lab 04/27/25  0538 04/26/25  0504 04/25/25  1231   TOTAL PROTEIN  --   --  6.6   ALBUMIN 3.0* 2.9* 3.2*   GLOBULIN  --   --  3.4   ALT (SGPT)  --   --  9   AST (SGOT)  --   --  16   BILIRUBIN  --   --  1.2   ALK PHOS  --   --  87         Lab 04/25/25  1611   PROBNP 37,306.0*                   Lab 04/25/25  1236   PH, ARTERIAL 7.390   PCO2, ARTERIAL 40.8   PO2 ART 51.1*   O2 SATURATION ART 85.4*   FIO2 21   HCO3 ART 24.7   BASE EXCESS ART -0.3       Brief Urine Lab Results  (Last result in the past 365 days)        Color   Clarity   Blood   Leuk Est   Nitrite   Protein   CREAT   Urine HCG        04/15/25 1823 Yellow   Clear   Trace   Large (3+)   Negative   Negative                 Microbiology Results (last 10 days)       Procedure Component Value - Date/Time    Clostridioides difficile Toxin - Stool, Per Rectum [[id number]]  (Abnormal) Collected: 04/18/25 1519    Lab Status: Final result Specimen: Stool from Per Rectum Updated: 04/18/25 1648    Narrative:      The following orders were created for panel order Clostridioides difficile Toxin - Stool, Per Rectum.  Procedure                               Abnormality         Status                     ---------                                -----------         ------                     Clostridioides difficile...[[id number]]  Abnormal            Final result                 Please view results for these tests on the individual orders.    Clostridioides difficile Toxin, PCR - Stool, Per Rectum [[id number]]  (Abnormal) Collected: 04/18/25 1519    Lab Status: Final result Specimen: Stool from Per Rectum Updated: 04/18/25 1648     Toxigenic C. difficile by PCR Positive     027 Toxin Presumptive Negative    Narrative:      DNA from a toxigenic strain of C.difficile has been detected. Antigen testing for the presence of free C.difficile toxin is currently in progress, to help determine the clinical significance of this PCR result.     Enteric Parasite Panel - Stool, Per Rectum [[id number]]  (Normal) Collected: 04/18/25 1519    Lab Status: Final result Specimen: Stool from Per Rectum Updated: 04/19/25 1318     Giardia lamblia Not Detected     Cryptosporidium Not Detected     Entamoeba histolytica Not Detected    Enteric Bacterial Panel - Stool, Per Rectum [[id number]]  (Normal) Collected: 04/18/25 1519    Lab Status: Final result Specimen: Stool from Per Rectum Updated: 04/19/25 1313     Salmonella Not Detected     Campylobacter Not Detected     Shigella/Enteroinvasive E. coli (EIEC) Not Detected     Shiga-like toxin-producing E. coli (STEC) stx1/stx2 Not Detected    Clostridioides difficile toxin Ag, Reflex - Stool, Per Rectum [[id number]]  (Normal) Collected: 04/18/25 1519    Lab Status: Final result Specimen: Stool from Per Rectum Updated: 04/18/25 1648     C.diff Toxin Ag Negative    Narrative:      DNA from a toxigenic strain of C.difficile was detected, although the free toxin itself was not detected. These findings are consistent with C.difficile colonization and may not reflect actual C.difficile infection. Clinical correlation needed.            [x]  Microbiology  [x]  Radiology  XR Chest 1 View  Result Date: 4/25/2025  Impression: Similar  appearance of findings suggestive of pulmonary edema with bilateral pleural effusions and bibasilar atelectasis. Electronically Signed: Simona Tadeo MD  4/25/2025 1:56 PM EDT  Workstation ID: [id number]    CT Abdomen Pelvis Without Contrast  Result Date: 4/25/2025  Impression: 1.No acute abnormality identified within the abdomen or pelvis. 2.Previously noted punctate calculus within the right renal pelvis has likely passed into the urinary bladder in the interim (series 2, image 220). 3.Indwelling Diamond catheter and suprapubic catheter within the urinary bladder. 4.Partially visualized bilateral pleural effusions with bibasilar atelectasis. 5.Cholelithiasis. 6.Colonic diverticulosis. 7.Mild perihepatic and pelvic ascites. 8.Additional findings as detailed above. Electronically Signed: West Peña MD  4/25/2025 9:31 AM EDT  Workstation ID: [id number]        [x]  EKG/Telemetry   []  Cardiology/Vascular   []  Pathology  []  Old records  [x]  Other:  Scheduled Meds:allopurinol, 100 mg, Oral, Daily  [Held by provider] apixaban, 2.5 mg, Oral, Q12H  dicyclomine, 10 mg, Oral, 4x Daily  famotidine, 20 mg, Oral, BID AC  furosemide, 20 mg, Oral, BID Diuretics  LORazepam, 0.5 mg, Oral, Nightly  midodrine, 5 mg, Oral, Q8H  [Held by provider] pantoprazole, 40 mg, Oral, Q AM  sertraline, 50 mg, Oral, Daily  sodium chloride, 10 mL, Intravenous, Q12H  [Held by provider] tamsulosin, 0.4 mg, Oral, Daily      Continuous Infusions:     PRN Meds:.  senna-docusate sodium **AND** polyethylene glycol **AND** bisacodyl **AND** bisacodyl    HYDROcodone-acetaminophen    LORazepam    melatonin    nitroglycerin    ondansetron    sodium chloride    sodium chloride    sodium chloride    traZODone      Assessment & Plan   Assessment / Plan     Assessment/Plan:  UTI due to ESBL E. coli and Citrobacter associated with chronic indwelling suprapubic Diamond catheter.  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 twice daily  Continue midodrine for blood pressure support to allow for diuresis and titrate as needed  Continue strict I's and O's and daily weights  Patient not able to tolerate further maximal medical directed therapy for heart failure due to hypotension  Continue monitor renal function closely  Continue to monitor electrolytes replace as needed  Continue suprapubic catheter and Diamond catheter care per nursing  Will discuss with Dr. Bernal removing Diamond catheter and changing out suprapubic catheter on Monday  No indication for primary C. difficile prophylaxis though will hold home Protonix while on broad-spectrum antibiotics  Continue physical therapy Occupational Therapy  Continue bowel regimen as needed  Venous duplex left upper extremity within normal limits suspect possible lymph damage.  Patient instructed to keep left upper extremity elevated.  Further inpatient orders recommendations pending clinical course           Discussed plan with bedside RN as well as Dr. Garcia urology attending.    Disposition: Inpatient rehab versus return to [place] with family.  Discharge planning coordinating.    VTE Prophylaxis:  Pharmacologic VTE prophylaxis orders are present.        CODE STATUS:   Code Status (Patient has no pulse and is not breathing): No CPR (Do Not Attempt to Resuscitate)  Medical Interventions (Patient has  pulse or is breathing): Limited Support  Medical Intervention Limits: No intubation (DNI)

## 2025-04-28 NOTE — PROGRESS NOTES
[facility]   Urology Progress Note    Patient Name: Javi Martínez  : 1940  MRN: [id number]  Primary Care Physician:  Rani Lopez APRN  Date of admission: 4/15/2025    Subjective   Subjective       HPI: Patient with indwelling SP tube.  6 weeks out from his first placement.  UTI.      Objective   Objective     Vitals:   Temp:  [97.3 °F (36.3 °C)-98.9 °F (37.2 °C)] 98.9 °F (37.2 °C)  Heart Rate:  [74-80] 80  Resp:  [16-20] 18  BP: ()/(64-85) 101/68      Physical Exam    Constitutional: Awake, alert   Respiratory:  Nonlabored respirations   GI:  Abd soft, approp tender to palpation   Neurologic: Oriented x 3, speech clear       Result Review    Result Review:  I have personally reviewed the results from the time of this admission to 2025 12:53 EDT and agree with these findings:  [x]  Laboratory  []  Microbiology  [x]  Radiology  []  EKG/Telemetry   []  Cardiology/Vascular   []  Pathology  [x]  Old records  []  Other:    Assessment & Plan   Assessment / Plan       Active Hospital Problems:  Active Hospital Problems    Diagnosis     **Generalized weakness     Urinary retention     CHF (congestive heart failure)     Acute on chronic HFrEF (heart failure with reduced ejection fraction)     Stage 3b chronic kidney disease     Anxiety     Essential hypertension     CAD with CABG          Plan: I replaced his suprapubic catheter.  I removed his Diamond catheter.  Continue treatment for UTI.  No further recommendations from urology.  Follow-up with me in 1 month.       Electronically signed by Mally Bernal MD, 25, 12:53 PM EDT.

## 2025-04-28 NOTE — CONSULTS
[facility] Consult Note      Patient Name: Javi Martínez  : 1940  MRN: [id number]  Primary Care Physician:  Rani Lopez APRN  Referring Physician: No ref. provider found  Date of admission: 4/15/2025    Subjective     Reason for Consult: Abnormal renal function    HPI:   Javi Martínez is a 84 y.o. 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 coli and Citrobacter associated with chronic indwelling suprapubic Diamond catheter that was exchanged by urology.      Nephrology consultation have been requested further management    Patient unfortunately is a poor historian, information gathered from patient's chart    Review of Systems:   14 point review of systems is otherwise negative except for mentioned above on HPI    Personal History     Past Medical History:   Diagnosis Date    Aneurysm     Arthritis     left knee     Atrial fibrillation, persistent     CHB (complete heart block) 2023    CHF exacerbation 2024    Chronic obstructive pulmonary disease 2023    pt denies having this    Difficulty walking     Numbness in right foot    Erectile dysfunction     Essential hypertension 2019    GERD (gastroesophageal reflux disease)     HFrEF (heart failure with reduced ejection fraction) 2021    Hyperlipidemia 2021    Paroxysmal atrial fibrillation     Polyneuropathy 2023    Prostate cancer 2010    with seeds implant     Septic joint 2021    Sleep apnea     Urinary incontinence     Urinary tract  "infection 2022       Past Surgical History:   Procedure Laterality Date    ABLATION OF DYSRHYTHMIC FOCUS  2013    APPENDECTOMY  1947    ARTERIAL BYPASS SURGERY      CARDIAC ABLATION      x2  \"years ago\"     CARDIAC ELECTROPHYSIOLOGY PROCEDURE N/A 11/01/2021    Procedure: PPM battery change;  Surgeon: Ge Whelan MD;  Location: [facility] CATH INVASIVE LOCATION;  Service: Cardiovascular;  Laterality: N/A;    CARDIAC SURGERY  2006    cabg 3v    COLONOSCOPY      CORONARY ARTERY BYPASS GRAFT  2006    CYSTOSCOPY URETHROTOMY VISUAL INTERNAL N/A 08/15/2024    Procedure: CYSTOSCOPY, URETHRAL DILATION, CATHETER PLACEMENT, RETROGRADE URETHROGRAM;  Surgeon: August Myers MD;  Location: [facility] MAIN OR;  Service: Urology;  Laterality: N/A;    CYSTOSCOPY URETHROTOMY VISUAL INTERNAL N/A 10/24/2024    Procedure: CYSTOSCOPY URETHROTOMY;  Surgeon: Mally Bernal MD;  Location: [facility] OR;  Service: Urology;  Laterality: N/A;    CYSTOSCOPY URETHROTOMY VISUAL INTERNAL N/A 03/11/2025    Procedure: CYSTOSCOPY URETHROTOMY VISUAL INTERNAL;  Surgeon: Mally Bernal MD;  Location: [facility] OR;  Service: Urology;  Laterality: N/A;    ENDOSCOPY      with dilation     ENDOSCOPY N/A 01/21/2025    Procedure: ESOPHAGOGASTRODUODENOSCOPY WITH BIOPSIES, POLYPECTOMY, BALLOON DILATION 18-20mm;  Surgeon: Simona Garcia MD;  Location: [facility] ENDOSCOPY;  Service: Gastroenterology;  Laterality: N/A;  GASTRIC POLYPS, SCHATZKI'S RING, HIATAL HERNIA    KNEE ARTHROSCOPY Left     PROSTATE SURGERY  2012    SUPRAPUBIC TUBE PLACEMENT N/A 03/11/2025    Procedure: SUPRAPUBIC CATHETER INSERTION;  Surgeon: Mally Bernal MD;  Location: [facility] MAIN OR;  Service: Urology;  Laterality: N/A;    TOTAL KNEE ARTHROPLASTY Left 07/23/2021    Procedure: TOTAL KNEE ARTHROPLASTY WITH DORA NAVIGATION WITH BIOMET;  Surgeon: Carlitos Zhao MD;  Location: [facility] MAIN OR;  Service: Orthopedics;  Laterality: Left;    VENTRICULAR CARDIAC PACEMAKER " INSERTION      medtronic        Family History: family history includes Heart attack in his father and mother; Heart disease in his mother; No Known Problems in his brother, maternal aunt, maternal grandfather, maternal grandmother, maternal uncle, paternal aunt, paternal grandfather, paternal grandmother, paternal uncle, sister, and another family member.    Social History:  reports that he quit smoking about 29 years ago. His smoking use included cigarettes. He started smoking about 63 years ago. He has a 16.8 pack-year smoking history. He has never been exposed to tobacco smoke. He has never used smokeless tobacco. He reports that he does not drink alcohol and does not use drugs.    Home Medications:  Prior to Admission medications    Medication Sig Start Date End Date Taking? Authorizing Provider   allopurinol (ZYLOPRIM) 100 MG tablet Take 1 tablet by mouth Daily. 3/11/22  Yes Ely Srinivasan MD   benzonatate (TESSALON) 200 MG capsule Take 1 capsule by mouth 3 (Three) Times a Day As Needed for Cough.   Yes Ely Srinivasan MD   citalopram (CeleXA) 20 MG tablet Take 1 tablet by mouth Daily. 2/24/25  Yes Ely Srinivasan MD   Eliquis 2.5 MG tablet tablet Take 1 tablet by mouth Every 12 (Twelve) Hours. 9/3/24  Yes Ely Srinivasan MD   furosemide (LASIX) 40 MG tablet Take 1 tablet by mouth Daily. 1/23/25  Yes Farhad Gomez,    HYDROcodone-acetaminophen (NORCO) 5-325 MG per tablet Take 1 tablet by mouth Every 6 (Six) Hours As Needed for Moderate Pain.   Yes Ely Srinivasan MD   Jardiance 10 MG tablet tablet Take 1 tablet by mouth Daily. 1/23/25  Yes Farhad Gomez DO   LORazepam (ATIVAN) 0.5 MG tablet Take 1 tablet by mouth Every 12 (Twelve) Hours As Needed.   Yes Ely Srinivasan MD   melatonin 5 MG tablet tablet Take 1 tablet by mouth At Night As Needed.   Yes Ely Srinivasan MD   omeprazole (priLOSEC) 20 MG capsule Take 1 capsule by mouth Daily.   Yes Ely Srinivasan MD    tamsulosin (FLOMAX) 0.4 MG capsule 24 hr capsule Take 1 capsule by mouth Daily. 1/25/25  Yes Farhad Gomez DO       Allergies:  No Known Allergies    Objective     Vitals:   Temp:  [97.3 °F (36.3 °C)-98.9 °F (37.2 °C)] 98 °F (36.7 °C)  Heart Rate:  [74-80] 80  Resp:  [16-20] 18  BP: ()/(68-85) 110/78    Intake/Output Summary (Last 24 hours) at 4/28/2025 1744  Last data filed at 4/28/2025 1200  Gross per 24 hour   Intake 340 ml   Output 575 ml   Net -235 ml       Physical Exam:   Constitutional: Confused.  Chronically ill and deconditioned with bilateral temporal wasting  HEENT: Sclera anicteric, no conjunctival injection  Neck: Supple, no thyromegaly, no lymphadenopathy, trachea at midline, no JVD  Respiratory: Clear to auscultation bilaterally, nonlabored respiration  Cardiovascular: Systolic ejection murmur left upper AICD in place  Gastrointestinal: Positive bowel sounds, abdomen is soft, nontender and nondistended  : Suprapubic catheter in place  Musculoskeletal: No edema, no clubbing or cyanosis  Neurologic: Confused no focalization, diffuse muscle wasting and muscle atrophy      Scheduled Meds:     allopurinol, 100 mg, Oral, Daily  apixaban, 2.5 mg, Oral, Q12H  dicyclomine, 10 mg, Oral, 4x Daily  famotidine, 20 mg, Oral, BID AC  [Held by provider] furosemide, 20 mg, Oral, BID Diuretics  LORazepam, 0.5 mg, Oral, Nightly  midodrine, 15 mg, Oral, Q8H  [Held by provider] pantoprazole, 40 mg, Oral, Q AM  sertraline, 50 mg, Oral, Daily  sodium chloride, 10 mL, Intravenous, Q12H  [Held by provider] tamsulosin, 0.4 mg, Oral, Daily      IV Meds:        Results Reviewed:   I have personally reviewed the results from the time of this admission to 4/28/2025 17:44 EDT     Lab Results   Component Value Date    GLUCOSE 102 (H) 04/28/2025    CALCIUM 9.4 04/28/2025     04/28/2025    K 3.6 04/28/2025    CO2 23.7 04/28/2025     04/28/2025    BUN 44 (H) 04/28/2025    CREATININE 2.53 (H) 04/28/2025     EGFRIFAFRI 41 (L) 08/23/2022    EGFRIFNONA 45 (L) 11/28/2021    BCR 17.4 04/28/2025    ANIONGAP 16.3 (H) 04/28/2025      Lab Results   Component Value Date    MG 2.4 04/28/2025    PHOS 4.6 (H) 04/28/2025    ALBUMIN 3.0 (L) 04/28/2025           Assessment / Plan       Generalized weakness    Essential hypertension    CAD with CABG    Anxiety    Stage 3b chronic kidney disease    Acute on chronic HFrEF (heart failure with reduced ejection fraction)    CHF (congestive heart failure)    Urinary retention      ASSESSMENT:     Acute kidney injury likely multifactorial from hemodynamic fluctuations, urinary retention and infection volume status remained stable.  Patient continues to remain hypotensive from his underlying cardiorenal physiology will increase midodrine to improve renal perfusion     Chronic kidney disease stage III secondary to cardiorenal syndrome    Urinary tract infection with isolation of ESBL E. coli and Citrobacter that completed course of antibiotics    Chronic indwelling catheter with urinary retention status post suprapubic catheter placement    Chronic atrial fibrillation on chronic anticoagulation in the form apixaban    Acute on chronic systolic congestive failure.  His hypotension is a limiting factor for diuresis .  His previous proBNP was elevated and his x-ray showed vascular congestion .  Will add on midodrine and will attempt albumin in combination with furosemide.  Will continue to follow patient closely    Coronary artery disease status post coronary bypass grafting    PLAN:  -Increase midodrine  - Will attempt trial of bumetanide with combination of albumin, and evaluate clinical response,  ( 2 gr bumetanide and 25 gr of albumin )   - Continue to follow patient closely   - Obtain repeat CxR tomorrow AM ( order placed )     Discussed with Dr. Hyde    Thank you for involving us in the care of Javi Saulemanuel.  Please feel free to call with any questions.    Hay Mendez,  MD  04/28/25  17:44 EDT    [facility]  [phone]      Please note that portions of this note were completed with a voice recognition program.

## 2025-04-28 NOTE — PLAN OF CARE
Goal Outcome Evaluation:  Plan of Care Reviewed With: patient        Progress: no change  Outcome Evaluation: Vitals WNL. Pt is A&O x2-3. Pt has been resting well. No complaints of pain this shift. Pts loyd was taken out and suprapubic was changed. No other complaints this shift. Call light within reach. Will continue plan of care.

## 2025-04-28 NOTE — PROGRESS NOTES
[facility]   Hospitalist Progress Note  Date: 2025  Patient Name: Javi Martínez  : 1940  MRN: [id number]  Date of admission: 4/15/2025      Subjective   Subjective     Chief Complaint: Follow-up generalized weakness    Summary:Javi Martínez is a 84 y.o. male  with past medical history of hypertension, CHF, A-fib on Eliquis, arthritis, history of prostate cancer status post brachytherapy and external beam XRT and BPH and GERD presented to the ED for evaluation of generalized weakness and fall.  Patient states that for the last week or so he has not been feeling well with increased weakness, dysuria, lethargy, decreased p.o. intake, and multiple falls.  Patient does live alone and had been unable to perform his ADLs so he was brought to the ED for further evaluation.  In the ED patient was tachypneic on arrival with remaining vitals being within normal limits on room air.  His UA was positive for a UTI with labs showing an elevated proBNP, reduced renal function, elevated lactic acid, and elevated but flat troponin.  Remaining labs are relatively unremarkable including a normal WBC and negative COVID flu RSV.  Chest x-ray showed pulmonary edema although improved from prior imaging.  When seen patient was resting comfortably and when asked he denied any recent fevers, chills, headaches, focal weakness, chest pain, palpitation, shortness of breath, cough, abdominal pain, nausea, vomiting, diarrhea, constipation, hematuria, hematochezia, melena, or anxiety.   Patient admitted for further evaluation and treatment.  Patient had pus coming from suprapubic catheter insertion site along with pus within suprapubic catheter.  Blood pressure borderline low, perfusing extremities well.  Cardiologist consulted.  Also started on IV antibiotics for possible intra-abdominal infection.  CT abdomen pelvis demonstrated some haziness of the perivesical fat suggesting cystitis but no abscess appreciated.  Also  demonstrated cardiomegaly with bilateral pleural effusions.  Wound culture grew MRSA.  Urine culture grew ESBL E. coli and Citrobacter farmeri.  Antibiotics tailored to sensitivities.  Patient noted to have some loose stools.  C. difficile detected by PCR but toxin negative indicating carrier status.  Blood culture negative to date.  Had issues with Diamond catheter clogging and urology consulted.  Diamond catheter removed and suprapubic catheter replaced.  Patient developed increased work of breathing and Lasix initiated.  Started midodrine for blood pressure support to allow for further diuresis.  Renal function continued to decline.  Nephrology consulted.  Patient planning to discharge to assisted living with family in [place] versus patient rehab locally depending on timing per family.  Discharge planning coordinating.    Interval Followup: Patient sitting up in bed appears to be comfortable today.  Patient wakes fairly easily.  Patient still endorsing fatigue.  Patient denies shortness of breath.  Still having intermittent confusion per nursing.  Afebrile overnight.  A-fib and V-paced with PVCs 70s to 80s on telemetry review.  Blood pressure still low normal limits today.  Satting well on room air.   Serum creatinine remains elevated but stable.  No other issues per nursing.    Review of Systems  Constitutional: Positive for fatigue and negative fever.   HENT: Negative for sore throat and trouble swallowing.    Eyes: Negative for pain and discharge.   Respiratory: Negative for cough and negative shortness of breath.    Cardiovascular: Negative for chest pain and palpitations.   Gastrointestinal: Negative for abdominal pain, nausea and negative vomiting.   Endocrine: Negative for cold intolerance and heat intolerance.   Genitourinary: Positive for difficulty urinating and dysuria.   Musculoskeletal: Negative for back pain and neck stiffness.   Skin: Negative for color change and rash.   Neurological: Negative for  syncope and headaches.   Hematological: Negative for adenopathy.   Psychiatric/Behavioral: Negative for confusion and hallucinations.    Objective   Objective     Vitals:   Temp:  [97.3 °F (36.3 °C)-98.9 °F (37.2 °C)] 98 °F (36.7 °C)  Heart Rate:  [74-80] 80  Resp:  [16-20] 18  BP: ()/(68-85) 110/78  Physical Exam   General: well-developed appearing stated age in no acute distress  HEENT: Normocephalic atraumatic moist membranes pupils equal round reactive light, no scleral icterus no conjunctival injection  Cardiovascular: regular rate and rhythm no murmurs rubs or gallops S1-S2, 1+ bilateral lower extremity edema appreciated  Pulmonary: Diminished bilateral bases no wheezes or rhonchi symmetric chest expansion, labored with accessory muscle use, no conversational dyspnea appreciated  Gastrointestinal: Soft nontender nondistended positive bowel sounds all 4 quadrants no rebound or guarding  Musculoskeletal: No clubbing cyanosis, warm and well-perfused, calves soft symmetric nontender bilaterally  Skin: Clean dry without rashes  Neuro: Cranial nerves II through XII intact grossly no sensorimotor deficits appreciated bilateral upper and lower extremities  Psych: Patient is calm cooperative and appropriate with exam not responding to internal stimuli  : Suprapubic catheter no bladder distention positive suprapubic tenderness    Result Review    Result Review:  I have personally reviewed these results and agree with these findings:  [x]  Laboratory  LAB RESULTS:      Lab 04/28/25  0507 04/26/25  0504 04/25/25  2151 04/25/25  1854 04/25/25  1611 04/25/25  1231 04/23/25  0539 04/22/25  0446 04/21/25  1740   WBC 7.74 7.94  --   --   --  8.40 8.08 6.72 9.00   HEMOGLOBIN 14.0 13.9  --   --   --  13.9 13.9 13.2 13.6   HEMATOCRIT 43.9 43.4  --   --   --  45.1 41.3 42.3 43.7   PLATELETS 232 214  --   --   --  249 227 178 225   NEUTROS ABS  --   --   --   --   --   --  5.45 3.92 5.77   IMMATURE GRANS (ABS)  --   --   --    --   --   --  0.04 0.02 0.03   LYMPHS ABS  --   --   --   --   --   --  1.64 1.70 2.09   MONOS ABS  --   --   --   --   --   --  0.74 0.87 0.95*   EOS ABS  --   --   --   --   --   --  0.13 0.13 0.09   MCV 93.8 94.3  --   --   --  94.2 95.2 100.0* 92.6   LACTATE  --   --  2.5* 3.1* 3.4* 3.8*  --   --   --          Lab 04/28/25  0507 04/27/25  0538 04/26/25  0504 04/25/25  1231 04/23/25  0539 04/22/25  0446   SODIUM 141 139 140 139 136 134*   POTASSIUM 3.6 3.9 3.9 4.1 3.7 3.7   CHLORIDE 101 102 102 99 101 99   CO2 23.7 24.1 21.1* 22.4 23.2 22.5   ANION GAP 16.3* 12.9 16.9* 17.6* 11.8 12.5   BUN 44* 41* 36* 32* 24* 25*   CREATININE 2.53* 2.48* 2.46* 2.11* 1.50* 1.72*   EGFR 24.7* 25.3* 25.5* 30.7* 46.2* 39.2*   GLUCOSE 102* 96 92 108* 103* 87   CALCIUM 9.4 9.3 9.2 9.4 9.0 8.8   MAGNESIUM 2.4  --   --   --  2.3 2.3   PHOSPHORUS 4.6* 4.5 4.9*  --  3.1 3.2         Lab 04/28/25  0507 04/27/25  0538 04/26/25  0504 04/25/25  1231   TOTAL PROTEIN  --   --   --  6.6   ALBUMIN 3.0* 3.0* 2.9* 3.2*   GLOBULIN  --   --   --  3.4   ALT (SGPT)  --   --   --  9   AST (SGOT)  --   --   --  16   BILIRUBIN  --   --   --  1.2   ALK PHOS  --   --   --  87         Lab 04/25/25  1611   PROBNP 37,306.0*                   Lab 04/25/25  1236   PH, ARTERIAL 7.390   PCO2, ARTERIAL 40.8   PO2 ART 51.1*   O2 SATURATION ART 85.4*   FIO2 21   HCO3 ART 24.7   BASE EXCESS ART -0.3       Brief Urine Lab Results  (Last result in the past 365 days)        Color   Clarity   Blood   Leuk Est   Nitrite   Protein   CREAT   Urine HCG        04/15/25 1823 Yellow   Clear   Trace   Large (3+)   Negative   Negative                 Microbiology Results (last 10 days)       ** No results found for the last 240 hours. **            [x]  Microbiology  [x]  Radiology  XR Chest 1 View  Result Date: 4/25/2025  Impression: Similar appearance of findings suggestive of pulmonary edema with bilateral pleural effusions and bibasilar atelectasis. Electronically Signed:  Simona Tadeo MD  4/25/2025 1:56 PM EDT  Workstation ID: [id number]    CT Abdomen Pelvis Without Contrast  Result Date: 4/25/2025  Impression: 1.No acute abnormality identified within the abdomen or pelvis. 2.Previously noted punctate calculus within the right renal pelvis has likely passed into the urinary bladder in the interim (series 2, image 220). 3.Indwelling Diamond catheter and suprapubic catheter within the urinary bladder. 4.Partially visualized bilateral pleural effusions with bibasilar atelectasis. 5.Cholelithiasis. 6.Colonic diverticulosis. 7.Mild perihepatic and pelvic ascites. 8.Additional findings as detailed above. Electronically Signed: West Peña MD  4/25/2025 9:31 AM EDT  Workstation ID: [id number]        [x]  EKG/Telemetry   []  Cardiology/Vascular   []  Pathology  []  Old records  [x]  Other:  Scheduled Meds:allopurinol, 100 mg, Oral, Daily  apixaban, 2.5 mg, Oral, Q12H  dicyclomine, 10 mg, Oral, 4x Daily  famotidine, 20 mg, Oral, BID AC  [Held by provider] furosemide, 20 mg, Oral, BID Diuretics  LORazepam, 0.5 mg, Oral, Nightly  midodrine, 15 mg, Oral, Q8H  [Held by provider] pantoprazole, 40 mg, Oral, Q AM  sertraline, 50 mg, Oral, Daily  sodium chloride, 10 mL, Intravenous, Q12H  [Held by provider] tamsulosin, 0.4 mg, Oral, Daily      Continuous Infusions:     PRN Meds:.  senna-docusate sodium **AND** polyethylene glycol **AND** bisacodyl **AND** bisacodyl    LORazepam    melatonin    nitroglycerin    ondansetron    sodium chloride    sodium chloride    sodium chloride    traZODone      Assessment & Plan   Assessment / Plan     Assessment/Plan:  UTI due to ESBL E. coli and Citrobacter associated with chronic indwelling suprapubic Diamond catheter.  MRSA infection of wound associated with suprapubic catheter insertion site  Lactic acidosis, resolved  Acute on chronic heart failure with reduced ejection fraction,Last EF of 21-25% on 1/2025  Hypotension, not in cardiogenic shock  CAD s/p  CABG  DELANEY on CKD IIIb   Urinary retention S/p suprapubic catheter placement  Atrial fibrillation on Eliquis  C. difficile carrier without active colitis  Left forearm swelling      Patient admitted for further evaluation and treatment  Nephrology, urology and cardiology consulted thank for assistance  Completed 7-day course of piperacillin/tazobactam  Completed 7-day course of vancomycin  Continue wound care per nursing  Will defer further diuresis to nephrology  Continue midodrine for blood pressure support and titrate as needed  Continue strict I's and O's and daily weights  Patient not able to tolerate further maximal medical directed therapy for heart failure due to hypotension  Continue monitor renal function closely  Continue to monitor electrolytes replace as needed  Suprapubic catheter exchanged and Diamond catheter removed.  Patient to follow-up with urology in 1 month  No indication for primary C. difficile prophylaxis though will hold home Protonix while on broad-spectrum antibiotics  Continue physical therapy Occupational Therapy  Continue bowel regimen as needed  Venous duplex left upper extremity within normal limits suspect possible lymph damage.  Patient instructed to keep left upper extremity elevated.  Continue physical therapy occupational therapy  Further inpatient orders recommendations pending clinical course           Discussed plan with bedside RN as well as Dr. Mendez nephrology attending.    Disposition: Inpatient rehab versus return to [place] with family.  Discharge planning coordinating.    VTE Prophylaxis:  Pharmacologic VTE prophylaxis orders are present.        CODE STATUS:   Code Status (Patient has no pulse and is not breathing): No CPR (Do Not Attempt to Resuscitate)  Medical Interventions (Patient has pulse or is breathing): Limited Support  Medical Intervention Limits: No intubation (DNI)

## 2025-04-28 NOTE — THERAPY TREATMENT NOTE
Acute Care - Physical Therapy Treatment Note  BEAU Johnston     Patient Name: Javi Martínez  : 1940  MRN: [id number]  Today's Date: 2025      Visit Dx:     ICD-10-CM ICD-9-CM   1. Generalized weakness  R53.1 780.79   2. Difficulty walking  R26.2 719.7     Patient Active Problem List   Diagnosis    Primary osteoarthritis of left knee    S/P TKR (total knee replacement)    Essential hypertension    CAD with CABG    Hyperlipidemia    Presence of cardiac pacemaker single chamber    Anxiety    Gastroesophageal reflux disease    Gout    History of malignant neoplasm of prostate    Testicular hypofunction    Polyneuropathy    Chronic obstructive pulmonary disease    Benign prostatic hyperplasia without lower urinary tract symptoms    Atrial fibrillation, chronic    Stage 3b chronic kidney disease    Postprocedural membranous urethral stricture    Acute on chronic HFrEF (heart failure with reduced ejection fraction)    Suprapubic catheter dysfunction    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    Urethral stricture in male    Lyme disease    Acute exacerbation of CHF (congestive heart failure)    Other dysphagia    CHF (congestive heart failure)    Urinary retention    Influenza A    Generalized weakness     Past Medical History:   Diagnosis Date    Aneurysm     Arthritis     left knee     Atrial fibrillation, persistent     CHB (complete heart block) 2023    CHF exacerbation 2024    Chronic obstructive pulmonary disease 2023    pt denies having this    Difficulty walking     Numbness in right foot    Erectile dysfunction     Essential hypertension 2019    GERD (gastroesophageal reflux disease)     HFrEF (heart failure with reduced ejection fraction) 2021    Hyperlipidemia 2021    Paroxysmal atrial fibrillation     Polyneuropathy 2023    Prostate cancer 2010    with seeds implant     Septic joint 2021    Sleep apnea     Urinary incontinence     Urinary tract  "infection 2022     Past Surgical History:   Procedure Laterality Date    ABLATION OF DYSRHYTHMIC FOCUS  2013    APPENDECTOMY  1947    ARTERIAL BYPASS SURGERY      CARDIAC ABLATION      x2  \"years ago\"     CARDIAC ELECTROPHYSIOLOGY PROCEDURE N/A 11/01/2021    Procedure: PPM battery change;  Surgeon: Ge Whelan MD;  Location: [facility] CATH INVASIVE LOCATION;  Service: Cardiovascular;  Laterality: N/A;    CARDIAC SURGERY  2006    cabg 3v    COLONOSCOPY      CORONARY ARTERY BYPASS GRAFT  2006    CYSTOSCOPY URETHROTOMY VISUAL INTERNAL N/A 08/15/2024    Procedure: CYSTOSCOPY, URETHRAL DILATION, CATHETER PLACEMENT, RETROGRADE URETHROGRAM;  Surgeon: August Myers MD;  Location: [facility] MAIN OR;  Service: Urology;  Laterality: N/A;    CYSTOSCOPY URETHROTOMY VISUAL INTERNAL N/A 10/24/2024    Procedure: CYSTOSCOPY URETHROTOMY;  Surgeon: Mally Bernal MD;  Location: [facility] OR;  Service: Urology;  Laterality: N/A;    CYSTOSCOPY URETHROTOMY VISUAL INTERNAL N/A 03/11/2025    Procedure: CYSTOSCOPY URETHROTOMY VISUAL INTERNAL;  Surgeon: Mally Bernal MD;  Location: [facility] OR;  Service: Urology;  Laterality: N/A;    ENDOSCOPY      with dilation     ENDOSCOPY N/A 01/21/2025    Procedure: ESOPHAGOGASTRODUODENOSCOPY WITH BIOPSIES, POLYPECTOMY, BALLOON DILATION 18-20mm;  Surgeon: Simona Garcia MD;  Location: [facility] ENDOSCOPY;  Service: Gastroenterology;  Laterality: N/A;  GASTRIC POLYPS, SCHATZKI'S RING, HIATAL HERNIA    KNEE ARTHROSCOPY Left     PROSTATE SURGERY  2012    SUPRAPUBIC TUBE PLACEMENT N/A 03/11/2025    Procedure: SUPRAPUBIC CATHETER INSERTION;  Surgeon: Mally Bernal MD;  Location: [facility] MAIN OR;  Service: Urology;  Laterality: N/A;    TOTAL KNEE ARTHROPLASTY Left 07/23/2021    Procedure: TOTAL KNEE ARTHROPLASTY WITH DORA NAVIGATION WITH BIOMET;  Surgeon: Carlitos Zhao MD;  Location: [facility] MAIN OR;  Service: Orthopedics;  Laterality: Left;    VENTRICULAR CARDIAC PACEMAKER " INSERTION      medtronic      PT Assessment (Last 12 Hours)       PT Evaluation and Treatment       Row Name 04/28/25 1054          Physical Therapy Time and Intention    Subjective Information complains of;weakness;fatigue  -DK     Document Type therapy note (daily note)  -DK     Mode of Treatment individual therapy;physical therapy  -DK     Patient Effort good  -DK     Symptoms Noted During/After Treatment fatigue  -DK     Comment Pt reports feeling rather weak, not feeling up to standing this session.  -       Row Name 04/28/25 1054          Pain    Pretreatment Pain Rating 0/10 - no pain  -DK     Posttreatment Pain Rating 0/10 - no pain  -DK       Row Name 04/28/25 1054          Cognition    Affect/Mental Status (Cognition) confused  -DK     Orientation Status (Cognition) oriented to;person;situation;place  -DK     Follows Commands (Cognition) physical/tactile prompts required;repetition of directions required;verbal cues/prompting required  -DK     Cognitive Function (Cognition) attention deficit  -DK     Attention Deficit (Cognition) minimal deficit;arousal/alertness;concentration;focused/sustained attention  -DK     Personal Safety Interventions gait belt;nonskid shoes/slippers when out of bed;supervised activity  -DK       Row Name 04/28/25 1054          Bed Mobility    Bed Mobility supine-sit-supine;scooting/bridging  -DK     All Activities, [place] (Bed Mobility) 1 person assist;contact guard;minimum assist (75% patient effort)  -DK     Scooting/Bridging [place] (Bed Mobility) 1 person assist;moderate assist (50% patient effort)  -DK     Supine-Sit [place] (Bed Mobility) 1 person assist;contact guard;minimum assist (75% patient effort)  -DK     Supine-Sit-Supine [place] (Bed Mobility) 1 person assist;contact guard;minimum assist (75% patient effort)  -DK     Bed Mobility, Safety Issues decreased use of legs for bridging/pushing;decreased use of arms for pushing/pulling  -DK      Assistive Device (Bed Mobility) bed rails;repositioning sheet  -     Comment, (Bed Mobility) Pt tolerated sitting EOB x 3-4 minutes with SBA.  He returned to bed on alert post treatment.  -       Row Name 04/28/25 1054          Safety Issues/Impairments Affecting Functional Mobility    Safety Issues Affecting Function (Mobility) impulsivity;judgment;safety precaution awareness;awareness of need for assistance;ability to follow commands  -     Impairments Affecting Function (Mobility) balance;cognition;endurance/activity tolerance;strength  -     Cognitive Impairments, Mobility Safety/Performance attention;awareness, need for assistance;impulsivity;judgment;safety precaution awareness  -       Row Name 04/28/25 1054          Balance    Balance Assessment sitting static balance;sitting dynamic balance  -     Static Sitting Balance standby assist  -     Dynamic Sitting Balance standby assist  -     Position, Sitting Balance unsupported;sitting edge of bed  -     Balance Interventions sitting;static;dynamic  -       Row Name 04/28/25 1054          Motor Skills    Motor Skills --  therapeutic exercises  -     Coordination WFL  -     Therapeutic Exercise hip;knee;ankle  -       Row Name 04/28/25 1054          Hip (Therapeutic Exercise)    Hip (Therapeutic Exercise) AAROM (active assistive range of motion)  -     Hip AAROM (Therapeutic Exercise) bilateral;flexion;extension;aBduction;aDduction;supine;10 repetitions;2 sets  -       Row Name 04/28/25 1054          Knee (Therapeutic Exercise)    Knee (Therapeutic Exercise) AAROM (active assistive range of motion)  -     Knee AAROM (Therapeutic Exercise) bilateral;flexion;extension;supine;10 repetitions;2 sets  -       Row Name 04/28/25 1054          Ankle (Therapeutic Exercise)    Ankle (Therapeutic Exercise) AAROM (active assistive range of motion)  -     Ankle AAROM (Therapeutic Exercise) bilateral;dorsiflexion;plantarflexion;supine;10  repetitions;2 sets  -DK       Row Name 04/28/25 1054          Plan of Care Review    Plan of Care Reviewed With patient  -DK     Progress improving  -DK       Row Name 04/28/25 1054          Positioning and Restraints    Pre-Treatment Position in bed  -DK     Post Treatment Position bed  -DK     In Bed supine;call light within reach;encouraged to call for assist;exit alarm on;side rails up x3;legs elevated;heels elevated  -DK               User Key  (r) = Recorded By, (t) = Taken By, (c) = Cosigned By      Initials Name Provider Type    Deandra Zuniga PTA Physical Therapist Assistant                      PT Recommendation and Plan     Plan of Care Reviewed With: patient  Progress: improving   Outcome Measures       Row Name 04/28/25 1053             How much help from another person do you currently need...    Turning from your back to your side while in flat bed without using bedrails? 3  -DK      Moving from lying on back to sitting on the side of a flat bed without bedrails? 3  -DK      Moving to and from a bed to a chair (including a wheelchair)? 2  -DK      Standing up from a chair using your arms (e.g., wheelchair, bedside chair)? 2  -DK      Climbing 3-5 steps with a railing? 1  -DK      To walk in hospital room? 2  -DK      AM-PAC 6 Clicks Score (PT) 13  -DK         Functional Assessment    Outcome Measure Options AM-PAC 6 Clicks Basic Mobility (PT)  -DK                User Key  (r) = Recorded By, (t) = Taken By, (c) = Cosigned By      Initials Name Provider Type    Deandra Zuniga PTA Physical Therapist Assistant                     Time Calculation:    PT Charges       Row Name 04/28/25 1057             Time Calculation    PT Received On 04/28/25  -DK      PT Goal Re-Cert Due Date 05/02/25  -DK         Timed Charges    20067 - PT Therapeutic Exercise Minutes 14  -DK      48243 - PT Therapeutic Activity Minutes 11  -DK         Total Minutes    Timed Charges Total Minutes 25  -DK       Total Minutes  25  -DK                User Key  (r) = Recorded By, (t) = Taken By, (c) = Cosigned By      Initials Name Provider Type    Deandra Zuniga PTA Physical Therapist Assistant                  Therapy Charges for Today       Code Description Service Date Service Provider Modifiers Qty    54377964348 HC PT THER PROC EA 15 MIN 4/28/2025 Deandra Kidd PTA GP 1    33844341842 HC PT THERAPEUTIC ACT EA 15 MIN 4/28/2025 Deandra Kidd PTA GP 1            PT G-Codes  Outcome Measure Options: AM-PAC 6 Clicks Basic Mobility (PT)  AM-PAC 6 Clicks Score (PT): 13  AM-PAC 6 Clicks Score (OT): 14    Deandra Kidd PTA  4/28/2025

## 2025-04-28 NOTE — PLAN OF CARE
Goal Outcome Evaluation:  Patient alert to self, place and able to make needs known.  Patient with no needs or concerns voiced.  VSS. Continue plan of care.

## 2025-04-28 NOTE — THERAPY RE-EVALUATION
Patient Name: Javi Martínez  : 1940    MRN: [id number]                              Today's Date: 2025       Admit Date: 4/15/2025    Visit Dx:     ICD-10-CM ICD-9-CM   1. Generalized weakness  R53.1 780.79   2. Difficulty walking  R26.2 719.7     Patient Active Problem List   Diagnosis    Primary osteoarthritis of left knee    S/P TKR (total knee replacement)    Essential hypertension    CAD with CABG    Hyperlipidemia    Presence of cardiac pacemaker single chamber    Anxiety    Gastroesophageal reflux disease    Gout    History of malignant neoplasm of prostate    Testicular hypofunction    Polyneuropathy    Chronic obstructive pulmonary disease    Benign prostatic hyperplasia without lower urinary tract symptoms    Atrial fibrillation, chronic    Stage 3b chronic kidney disease    Postprocedural membranous urethral stricture    Acute on chronic HFrEF (heart failure with reduced ejection fraction)    Suprapubic catheter dysfunction    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    Urethral stricture in male    Lyme disease    Acute exacerbation of CHF (congestive heart failure)    Other dysphagia    CHF (congestive heart failure)    Urinary retention    Influenza A    Generalized weakness     Past Medical History:   Diagnosis Date    Aneurysm     Arthritis     left knee     Atrial fibrillation, persistent     CHB (complete heart block) 2023    CHF exacerbation 2024    Chronic obstructive pulmonary disease 2023    pt denies having this    Difficulty walking     Numbness in right foot    Erectile dysfunction     Essential hypertension 2019    GERD (gastroesophageal reflux disease)     HFrEF (heart failure with reduced ejection fraction) 2021    Hyperlipidemia 2021    Paroxysmal atrial fibrillation     Polyneuropathy 2023    Prostate cancer 2010    with seeds implant     Septic joint 2021    Sleep apnea     Urinary incontinence     Urinary tract  "infection 2022     Past Surgical History:   Procedure Laterality Date    ABLATION OF DYSRHYTHMIC FOCUS  2013    APPENDECTOMY  1947    ARTERIAL BYPASS SURGERY      CARDIAC ABLATION      x2  \"years ago\"     CARDIAC ELECTROPHYSIOLOGY PROCEDURE N/A 11/01/2021    Procedure: PPM battery change;  Surgeon: Ge Whelan MD;  Location: [facility] CATH INVASIVE LOCATION;  Service: Cardiovascular;  Laterality: N/A;    CARDIAC SURGERY  2006    cabg 3v    COLONOSCOPY      CORONARY ARTERY BYPASS GRAFT  2006    CYSTOSCOPY URETHROTOMY VISUAL INTERNAL N/A 08/15/2024    Procedure: CYSTOSCOPY, URETHRAL DILATION, CATHETER PLACEMENT, RETROGRADE URETHROGRAM;  Surgeon: August Myers MD;  Location: [facility] MAIN OR;  Service: Urology;  Laterality: N/A;    CYSTOSCOPY URETHROTOMY VISUAL INTERNAL N/A 10/24/2024    Procedure: CYSTOSCOPY URETHROTOMY;  Surgeon: Mally Bernal MD;  Location: [facility] OR;  Service: Urology;  Laterality: N/A;    CYSTOSCOPY URETHROTOMY VISUAL INTERNAL N/A 03/11/2025    Procedure: CYSTOSCOPY URETHROTOMY VISUAL INTERNAL;  Surgeon: Mally Bernal MD;  Location: [facility] OR;  Service: Urology;  Laterality: N/A;    ENDOSCOPY      with dilation     ENDOSCOPY N/A 01/21/2025    Procedure: ESOPHAGOGASTRODUODENOSCOPY WITH BIOPSIES, POLYPECTOMY, BALLOON DILATION 18-20mm;  Surgeon: Simona Garcia MD;  Location: [facility] ENDOSCOPY;  Service: Gastroenterology;  Laterality: N/A;  GASTRIC POLYPS, SCHATZKI'S RING, HIATAL HERNIA    KNEE ARTHROSCOPY Left     PROSTATE SURGERY  2012    SUPRAPUBIC TUBE PLACEMENT N/A 03/11/2025    Procedure: SUPRAPUBIC CATHETER INSERTION;  Surgeon: Mally Bernal MD;  Location: [facility] MAIN OR;  Service: Urology;  Laterality: N/A;    TOTAL KNEE ARTHROPLASTY Left 07/23/2021    Procedure: TOTAL KNEE ARTHROPLASTY WITH DORA NAVIGATION WITH BIOMET;  Surgeon: Carlitos Zhao MD;  Location: [facility] MAIN OR;  Service: Orthopedics;  Laterality: Left;    VENTRICULAR CARDIAC PACEMAKER " INSERTION      medtronic       General Information       Row Name 04/28/25 1248          OT Time and Intention    Document Type re-evaluation  -PG     Mode of Treatment individual therapy;occupational therapy  -PG               User Key  (r) = Recorded By, (t) = Taken By, (c) = Cosigned By      Initials Name Provider Type    PG Martir Mesa OT Occupational Therapist                     Mobility/ADL's    No documentation.                  Obj/Interventions       Row Name 04/28/25 1248          Shoulder (Therapeutic Exercise)    Shoulder (Therapeutic Exercise) strengthening exercise  -PG     Shoulder Strengthening (Therapeutic Exercise) 1 lb free weight;10 repetitions  -PG       Row Name 04/28/25 1248          Elbow/Forearm (Therapeutic Exercise)    Elbow/Forearm (Therapeutic Exercise) strengthening exercise  -PG     Elbow/Forearm Strengthening (Therapeutic Exercise) 1 lb free weight;10 repetitions  -PG       Row Name 04/28/25 1248          Motor Skills    Therapeutic Exercise shoulder;elbow/forearm  -PG               User Key  (r) = Recorded By, (t) = Taken By, (c) = Cosigned By      Initials Name Provider Type    PG Martir Mesa OT Occupational Therapist                   Goals/Plan       Row Name 04/28/25 1248          Transfer Goal 1 (OT)    Progress/Outcome (Transfer Goal 1, OT) progress slower than expected  -PG       Row Name 04/28/25 1248          Bathing Goal 1 (OT)    Progress/Outcomes (Bathing Goal 1, OT) progress slower than expected  -PG       Row Name 04/28/25 1248          Dressing Goal 1 (OT)    Progress/Outcome (Dressing Goal 1, OT) progress slower than expected  -PG       Row Name 04/28/25 1248          Toileting Goal 1 (OT)    Progress/Outcome (Toileting Goal 1, OT) progress slower than expected  -PG       Row Name 04/28/25 1248          Grooming Goal 1 (OT)    Progress/Outcome (Grooming Goal 1, OT) progress slower than expected  -PG       Row Name 04/28/25 1248          Problem Specific Goal 1  (OT)    Progress/Outcome (Problem Specific Goal 1, OT) progress slower than expected  -PG               User Key  (r) = Recorded By, (t) = Taken By, (c) = Cosigned By      Initials Name Provider Type    PG Martir Mesa OT Occupational Therapist                   Clinical Impression       Row Name 04/28/25 1248          Plan of Care Review    Progress no change  -PG               User Key  (r) = Recorded By, (t) = Taken By, (c) = Cosigned By      Initials Name Provider Type    Martir Garcia OT Occupational Therapist                   Outcome Measures       Row Name 04/28/25 1249          How much help from another is currently needed...    Putting on and taking off regular lower body clothing? 1  -PG     Bathing (including washing, rinsing, and drying) 2  -PG     Toileting (which includes using toilet bed pan or urinal) 1  -PG     Putting on and taking off regular upper body clothing 2  -PG     Taking care of personal grooming (such as brushing teeth) 3  -PG     Eating meals 4  -PG     AM-PAC 6 Clicks Score (OT) 13  -PG       Row Name 04/28/25 1053 04/28/25 0948       How much help from another person do you currently need...    Turning from your back to your side while in flat bed without using bedrails? 3  -DK 3  -NL    Moving from lying on back to sitting on the side of a flat bed without bedrails? 3  -DK 3  -NL    Moving to and from a bed to a chair (including a wheelchair)? 2  -DK 2  -NL    Standing up from a chair using your arms (e.g., wheelchair, bedside chair)? 2  -DK 2  -NL    Climbing 3-5 steps with a railing? 1  -DK 1  -NL    To walk in hospital room? 2  -DK 2  -NL    AM-PAC 6 Clicks Score (PT) 13  -DK 13  -NL    Highest Level of Mobility Goal 4 --> Transfer to chair/commode  -DK 4 --> Transfer to chair/commode  -NL      Row Name 04/28/25 1249 04/28/25 1053       Functional Assessment    Outcome Measure Options AM-PAC 6 Clicks Daily Activity (OT);Optimal Instrument  -PG AM-PAC 6 Clicks Basic  Mobility (PT)  -DK      Row Name 04/28/25 1249          Optimal Instrument    Optimal Instrument Optimal - 3  -PG     Bending/Stooping 4  -PG     Standing 3  -PG     Reaching 2  -PG               User Key  (r) = Recorded By, (t) = Taken By, (c) = Cosigned By      Initials Name Provider Type    Deandra Zuniga, PTA Physical Therapist Assistant    PG Martir Mesa, OT Occupational Therapist    Trey Helton RN Registered Nurse                      OT Recommendation and Plan  Planned Therapy Interventions (OT): activity tolerance training, BADL retraining, strengthening exercise, transfer/mobility retraining, patient/caregiver education/training, occupation/activity based interventions  Therapy Frequency (OT): 5 times/wk  Plan of Care Review  Plan of Care Reviewed With: patient  Progress: no change  Outcome Evaluation: Patient presents with limitations affecting strength, activity tolerance, and balance impacting patient's ability to return home safely and independently.  The skills of a therapist will be required to safely and effectively implement the following treatment plan to restore maximal level of function     Time Calculation:   Evaluation Complexity (OT)  Review Occupational Profile/Medical/Therapy History Complexity: brief/low complexity  Assessment, Occupational Performance/Identification of Deficit Complexity: 3-5 performance deficits  Clinical Decision Making Complexity (OT): problem focused assessment/low complexity  Overall Complexity of Evaluation (OT): low complexity     Time Calculation- OT       Row Name 04/28/25 1249             Time Calculation- OT    OT Received On 04/28/25  -PG      OT Goal Re-Cert Due Date 05/07/25  -PG         Timed Charges    45968 - OT Therapeutic Exercise Minutes 10  -PG         Total Minutes    Timed Charges Total Minutes 10  -PG       Total Minutes 10  -PG                User Key  (r) = Recorded By, (t) = Taken By, (c) = Cosigned By      Initials Name Provider  Type    PG Martir Mesa OT Occupational Therapist                  Therapy Charges for Today       Code Description Service Date Service Provider Modifiers Qty    88631721845 HC OT THER PROC EA 15 MIN 4/28/2025 Martir Mesa OT GO 1                 Martir Mesa OT  4/28/2025

## 2025-04-29 NOTE — PLAN OF CARE
Goal Outcome Evaluation:  Plan of Care Reviewed With: patient        Progress: declining  Outcome Evaluation: Vitals WNL. Pt is A&O x1-2. Pt has been very restless. Pt has not complained of pain this shift. Pt has had very little output and has been bloody. MD aware. Pt continues to not eat or drink. Has not worked with PT in the mornings. Pt got an abrasion on his right forearm. Mepalex was placed on wound. Family was notified of an update of the patient and stated that the daughter will be in to see him Friday. Pt had a CT scan of his head today. Pending results. No other complaints this shift. Call light within reach. Will continue plan of care.

## 2025-04-29 NOTE — PROGRESS NOTES
[facility] Clinical Pharmacy Services: Renal Dose Adjustment     Famotidine has been appropriately renally dose adjusted based on our System P&T approved policy. Pharmacy will continue to monitor patient renal function while in-house.     Filiberto Navarrete [facility]  Clinical Pharmacist

## 2025-04-29 NOTE — PROGRESS NOTES
[facility]   Hospitalist Progress Note  Date: 2025  Patient Name: Javi Martínez  : 1940  MRN: [id number]  Date of admission: 4/15/2025      Subjective   Subjective     Chief Complaint: Follow-up generalized weakness    Summary:Javi Martínez is a 84 y.o. male  with past medical history of hypertension, CHF, A-fib on Eliquis, arthritis, history of prostate cancer status post brachytherapy and external beam XRT and BPH and GERD presented to the ED for evaluation of generalized weakness and fall.  Patient states that for the last week or so he has not been feeling well with increased weakness, dysuria, lethargy, decreased p.o. intake, and multiple falls.  Patient does live alone and had been unable to perform his ADLs so he was brought to the ED for further evaluation.  In the ED patient was tachypneic on arrival with remaining vitals being within normal limits on room air.  His UA was positive for a UTI with labs showing an elevated proBNP, reduced renal function, elevated lactic acid, and elevated but flat troponin.  Remaining labs are relatively unremarkable including a normal WBC and negative COVID flu RSV.  Chest x-ray showed pulmonary edema although improved from prior imaging.  When seen patient was resting comfortably and when asked he denied any recent fevers, chills, headaches, focal weakness, chest pain, palpitation, shortness of breath, cough, abdominal pain, nausea, vomiting, diarrhea, constipation, hematuria, hematochezia, melena, or anxiety.   Patient admitted for further evaluation and treatment.  Patient had pus coming from suprapubic catheter insertion site along with pus within suprapubic catheter.  Blood pressure borderline low, perfusing extremities well.  Cardiologist consulted.  Also started on IV antibiotics for possible intra-abdominal infection.  CT abdomen pelvis demonstrated some haziness of the perivesical fat suggesting cystitis but no abscess appreciated.  Also  demonstrated cardiomegaly with bilateral pleural effusions.  Wound culture grew MRSA.  Urine culture grew ESBL E. coli and Citrobacter farmeri.  Antibiotics tailored to sensitivities.  Patient noted to have some loose stools.  C. difficile detected by PCR but toxin negative indicating carrier status.  Blood culture negative to date.  Had issues with Diamond catheter clogging and urology consulted.  Diamond catheter removed and suprapubic catheter replaced.  Patient developed increased work of breathing and Lasix initiated.  Started midodrine for blood pressure support to allow for further diuresis.  Renal function continued to decline.  Nephrology consulted.  Patient planning to discharge to assisted living with family in [place] versus patient rehab locally depending on timing per family.  Discharge planning coordinating.    Interval Followup: Extremely confused not eating and drinking.    Review of Systems  Constitutional: Positive for fatigue and negative fever.   HENT: Negative for sore throat and trouble swallowing.    Eyes: Negative for pain and discharge.   Respiratory: Negative for cough and negative shortness of breath.    Cardiovascular: Negative for chest pain and palpitations.   Gastrointestinal: Negative for abdominal pain, nausea and negative vomiting.   Endocrine: Negative for cold intolerance and heat intolerance.   Genitourinary: Positive for difficulty urinating and dysuria.   Musculoskeletal: Negative for back pain and neck stiffness.   Skin: Negative for color change and rash.   Neurological: Negative for syncope and headaches.   Hematological: Negative for adenopathy.   Psychiatric/Behavioral: Negative for confusion and hallucinations.    Objective   Objective     Vitals:   Temp:  [97.3 °F (36.3 °C)-98.2 °F (36.8 °C)] 97.5 °F (36.4 °C)  Heart Rate:  [68-77] 73  Resp:  [18-20] 20  BP: (100-110)/(65-87) 104/66  Physical Exam   General: well-developed appearing stated age in no acute  distress  HEENT: Normocephalic atraumatic moist membranes pupils equal round reactive light, no scleral icterus no conjunctival injection  Cardiovascular: regular rate and rhythm no murmurs rubs or gallops S1-S2, 1+ bilateral lower extremity edema appreciated  Pulmonary: Diminished bilateral bases no wheezes or rhonchi symmetric chest expansion, labored with accessory muscle use, no conversational dyspnea appreciated  Gastrointestinal: Soft nontender nondistended positive bowel sounds all 4 quadrants no rebound or guarding  Musculoskeletal: No clubbing cyanosis, warm and well-perfused, calves soft symmetric nontender bilaterally  Skin: Clean dry without rashes  Neuro: Cranial nerves II through XII intact grossly no sensorimotor deficits appreciated bilateral upper and lower extremities  Psych: Patient is calm cooperative and appropriate with exam not responding to internal stimuli  : Suprapubic catheter no bladder distention positive suprapubic tenderness    Result Review    Result Review:  I have personally reviewed these results and agree with these findings:  [x]  Laboratory  LAB RESULTS:      Lab 04/28/25  0507 04/26/25  0504 04/25/25  2151 04/25/25  1854 04/25/25  1611 04/25/25  1231 04/23/25  0539   WBC 7.74 7.94  --   --   --  8.40 8.08   HEMOGLOBIN 14.0 13.9  --   --   --  13.9 13.9   HEMATOCRIT 43.9 43.4  --   --   --  45.1 41.3   PLATELETS 232 214  --   --   --  249 227   NEUTROS ABS  --   --   --   --   --   --  5.45   IMMATURE GRANS (ABS)  --   --   --   --   --   --  0.04   LYMPHS ABS  --   --   --   --   --   --  1.64   MONOS ABS  --   --   --   --   --   --  0.74   EOS ABS  --   --   --   --   --   --  0.13   MCV 93.8 94.3  --   --   --  94.2 95.2   LACTATE  --   --  2.5* 3.1* 3.4* 3.8*  --          Lab 04/29/25  0506 04/28/25  0507 04/27/25  0538 04/26/25  0504 04/25/25  1231 04/23/25  0539   SODIUM 141 141 139 140 139 136   POTASSIUM 4.0 3.6 3.9 3.9 4.1 3.7   CHLORIDE 100 101 102 102 99 101   CO2  20.1* 23.7 24.1 21.1* 22.4 23.2   ANION GAP 20.9* 16.3* 12.9 16.9* 17.6* 11.8   BUN 50* 44* 41* 36* 32* 24*   CREATININE 3.06* 2.53* 2.48* 2.46* 2.11* 1.50*   EGFR 19.6* 24.7* 25.3* 25.5* 30.7* 46.2*   GLUCOSE 91 102* 96 92 108* 103*   CALCIUM 9.5 9.4 9.3 9.2 9.4 9.0   MAGNESIUM  --  2.4  --   --   --  2.3   PHOSPHORUS 5.4* 4.6* 4.5 4.9*  --  3.1         Lab 04/29/25  0506 04/28/25  0507 04/27/25  0538 04/26/25  0504 04/25/25  1231   TOTAL PROTEIN  --   --   --   --  6.6   ALBUMIN 3.3* 3.0* 3.0* 2.9* 3.2*   GLOBULIN  --   --   --   --  3.4   ALT (SGPT)  --   --   --   --  9   AST (SGOT)  --   --   --   --  16   BILIRUBIN  --   --   --   --  1.2   ALK PHOS  --   --   --   --  87         Lab 04/25/25  1611   PROBNP 37,306.0*                   Lab 04/25/25  1236   PH, ARTERIAL 7.390   PCO2, ARTERIAL 40.8   PO2 ART 51.1*   O2 SATURATION ART 85.4*   FIO2 21   HCO3 ART 24.7   BASE EXCESS ART -0.3       Brief Urine Lab Results  (Last result in the past 365 days)        Color   Clarity   Blood   Leuk Est   Nitrite   Protein   CREAT   Urine HCG        04/15/25 1823 Yellow   Clear   Trace   Large (3+)   Negative   Negative                 Microbiology Results (last 10 days)       ** No results found for the last 240 hours. **            [x]  Microbiology  [x]  Radiology  XR Chest 1 View  Result Date: 4/29/2025  No significant change. Electronically Signed: Ronal Fleming MD  4/29/2025 5:53 AM EDT  Workstation ID: [id number]    XR Chest 1 View  Result Date: 4/25/2025  Impression: Similar appearance of findings suggestive of pulmonary edema with bilateral pleural effusions and bibasilar atelectasis. Electronically Signed: Simona Tadeo MD  4/25/2025 1:56 PM EDT  Workstation ID: [id number]    CT Abdomen Pelvis Without Contrast  Result Date: 4/25/2025  Impression: 1.No acute abnormality identified within the abdomen or pelvis. 2.Previously noted punctate calculus within the right renal pelvis has likely passed into the urinary  bladder in the interim (series 2, image 220). 3.Indwelling Diamond catheter and suprapubic catheter within the urinary bladder. 4.Partially visualized bilateral pleural effusions with bibasilar atelectasis. 5.Cholelithiasis. 6.Colonic diverticulosis. 7.Mild perihepatic and pelvic ascites. 8.Additional findings as detailed above. Electronically Signed: West Peña MD  4/25/2025 9:31 AM EDT  Workstation ID: [id number]        [x]  EKG/Telemetry   []  Cardiology/Vascular   []  Pathology  []  Old records  [x]  Other:  Scheduled Meds:allopurinol, 100 mg, Oral, Daily  apixaban, 2.5 mg, Oral, Q12H  dicyclomine, 10 mg, Oral, 4x Daily  [START ON 4/30/2025] famotidine, 20 mg, Oral, QAM AC  LORazepam, 0.5 mg, Oral, Nightly  midodrine, 15 mg, Oral, Q8H  [Held by provider] pantoprazole, 40 mg, Oral, Q AM  sertraline, 50 mg, Oral, Daily  sodium chloride, 10 mL, Intravenous, Q12H      Continuous Infusions:     PRN Meds:.  senna-docusate sodium **AND** polyethylene glycol **AND** bisacodyl **AND** bisacodyl    LORazepam    melatonin    nitroglycerin    ondansetron    sodium chloride    sodium chloride    sodium chloride    traZODone      Assessment & Plan   Assessment / Plan     Assessment/Plan:  UTI due to ESBL E. coli and Citrobacter associated with chronic indwelling suprapubic Diamond catheter.  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  Left forearm swelling      Patient admitted for further evaluation and treatment  Nephrology, urology and cardiology consulted thank for assistance  Completed 7-day course of piperacillin/tazobactam  Completed 7-day course of vancomycin  Continue wound  care per nursing  Will defer further diuresis to nephrology  Continue midodrine for blood pressure support and titrate as needed  Continue strict I's and O's and daily weights  Patient not able to tolerate further maximal medical directed therapy for heart failure due to hypotension  Continue monitor renal function closely  Continue to monitor electrolytes replace as needed  Suprapubic catheter exchanged and Diamond catheter removed.  Patient to follow-up with urology in 1 month  No indication for primary C. difficile prophylaxis though will hold home Protonix while on broad-spectrum antibiotics  Continue physical therapy Occupational Therapy  Continue bowel regimen as needed  Venous duplex left upper extremity within normal limits suspect possible lymph damage.  Patient instructed to keep left upper extremity elevated.  Continue physical therapy occupational therapy  Consult palliative care.  Patient is not eating or drinking.  Will also check a CT of his head since he is confused.           Discussed plan with bedside RN as well as Dr. Mendez nephrology attending.    Disposition: Inpatient rehab versus return to [place] with family.  Discharge planning coordinating.    VTE Prophylaxis:  Pharmacologic VTE prophylaxis orders are present.        CODE STATUS:   Code Status (Patient has no pulse and is not breathing): No CPR (Do Not Attempt to Resuscitate)  Medical Interventions (Patient has pulse or is breathing): Limited Support  Medical Intervention Limits: No intubation (DNI)          Electronically signed by Apollo Duarte DO, 04/29/25, 2:43 PM EDT.

## 2025-04-29 NOTE — THERAPY EVALUATION
Acute Care - Speech Language Pathology   Swallow Initial Evaluation [facility]     Patient Name: Javi Martínez  : 1940  MRN: [id number]  Today's Date: 2025               Admit Date: 4/15/2025    Visit Dx:     ICD-10-CM ICD-9-CM   1. Generalized weakness  R53.1 780.79   2. Difficulty walking  R26.2 719.7     Patient Active Problem List   Diagnosis    Primary osteoarthritis of left knee    S/P TKR (total knee replacement)    Essential hypertension    CAD with CABG    Hyperlipidemia    Presence of cardiac pacemaker single chamber    Anxiety    Gastroesophageal reflux disease    Gout    History of malignant neoplasm of prostate    Testicular hypofunction    Polyneuropathy    Chronic obstructive pulmonary disease    Benign prostatic hyperplasia without lower urinary tract symptoms    Atrial fibrillation, chronic    Stage 3b chronic kidney disease    Postprocedural membranous urethral stricture    Acute on chronic HFrEF (heart failure with reduced ejection fraction)    Suprapubic catheter dysfunction    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    Urethral stricture in male    Lyme disease    Acute exacerbation of CHF (congestive heart failure)    Other dysphagia    CHF (congestive heart failure)    Urinary retention    Influenza A    Generalized weakness     Past Medical History:   Diagnosis Date    2013    Arthritis     left knee     Atrial fibrillation, persistent     CHB (complete heart block) 2023    CHF exacerbation 2024    Chronic obstructive pulmonary disease 2023    pt denies having this    Difficulty walking     Numbness in right foot    Erectile dysfunction     Essential hypertension 2019    GERD (gastroesophageal reflux disease)     HFrEF (heart failure with reduced ejection fraction) 2021    Hyperlipidemia 2021    Paroxysmal atrial fibrillation     Polyneuropathy 2023    Prostate cancer 2010    with seeds implant     Septic joint 2021     "Sleep apnea     Urinary incontinence 2021    Urinary tract infection 2022     Past Surgical History:   Procedure Laterality Date    ABLATION OF DYSRHYTHMIC FOCUS  2013    APPENDECTOMY  1947    ARTERIAL BYPASS SURGERY      CARDIAC ABLATION      x2  \"years ago\"     CARDIAC ELECTROPHYSIOLOGY PROCEDURE N/A 11/01/2021    Procedure: PPM battery change;  Surgeon: Ge Whelan MD;  Location: [facility] CATH INVASIVE LOCATION;  Service: Cardiovascular;  Laterality: N/A;    CARDIAC SURGERY  2006    cabg 3v    COLONOSCOPY      CORONARY ARTERY BYPASS GRAFT  2006    CYSTOSCOPY URETHROTOMY VISUAL INTERNAL N/A 08/15/2024    Procedure: CYSTOSCOPY, URETHRAL DILATION, CATHETER PLACEMENT, RETROGRADE URETHROGRAM;  Surgeon: August Myers MD;  Location: [facility] MAIN OR;  Service: Urology;  Laterality: N/A;    CYSTOSCOPY URETHROTOMY VISUAL INTERNAL N/A 10/24/2024    Procedure: CYSTOSCOPY URETHROTOMY;  Surgeon: Mally Bernal MD;  Location: [facility] MAIN OR;  Service: Urology;  Laterality: N/A;    CYSTOSCOPY URETHROTOMY VISUAL INTERNAL N/A 03/11/2025    Procedure: CYSTOSCOPY URETHROTOMY VISUAL INTERNAL;  Surgeon: Mally Bernal MD;  Location: [facility] OR;  Service: Urology;  Laterality: N/A;    ENDOSCOPY      with dilation     ENDOSCOPY N/A 01/21/2025    Procedure: ESOPHAGOGASTRODUODENOSCOPY WITH BIOPSIES, POLYPECTOMY, BALLOON DILATION 18-20mm;  Surgeon: Simona Garcia MD;  Location: [facility] ENDOSCOPY;  Service: Gastroenterology;  Laterality: N/A;  GASTRIC POLYPS, SCHATZKI'S RING, HIATAL HERNIA    KNEE ARTHROSCOPY Left     PROSTATE SURGERY  2012    SUPRAPUBIC TUBE PLACEMENT N/A 03/11/2025    Procedure: SUPRAPUBIC CATHETER INSERTION;  Surgeon: Mally Bernal MD;  Location: [facility] MAIN OR;  Service: Urology;  Laterality: N/A;    TOTAL KNEE ARTHROPLASTY Left 07/23/2021    Procedure: TOTAL KNEE ARTHROPLASTY WITH DORA NAVIGATION WITH BIOMET;  Surgeon: Carlitos Zhao MD;  Location: [facility] MAIN OR;  Service: " Orthopedics;  Laterality: Left;    VENTRICULAR CARDIAC PACEMAKER INSERTION      [facility]      Inpatient Speech Pathology Dysphagia Evaluation    PAIN SCALE: did not report    PRECAUTIONS/CONTRAINDICATIONS:  fall precautions, contact spore, contact.    SUSPECTED ABUSE/NEGLECT/EXPLOITATION:  no    SOCIAL/PSYCHOLOGICAL NEEDS/BARRIERS:  no    PAST SOCIAL HISTORY:  Patient lives alone, independent on care.    PRIOR FUNCTION:  Patient lives alone, independent on care, tolerated regular diet.    PATIENT GOALS/EXPECTATIONS:  no    HISTORY:  Patient is a 84 y.o. male admitted to the hospital for generalized weakness. Medical history includes aneurysm, arthritis, atrial fibrillation, CHB, CHF, COPD, difficulty walking, erectile dysfunction, HTN, GERD, HFrEF, HLD, paroxysmal atrial fibrillation, polyneuropathy, prostate cancer, septic joint, sleep apnea, urinary incontinence, and UTI. Recent imaging includes 4/29/25 XR chest results: Heart remains enlarged status post sternotomy and pacer placement. Bilateral pleural effusions are again seen, right larger than left. These are not significantly changed. There are continued infiltrates at the left base and in the right mid to lower lung, also relatively stable. No pneumothorax. 4/25/25 CT abdomen pelvis results: 1.No acute abnormality identified within the abdomen or pelvis.  2.Previously noted punctate calculus within the right renal pelvis has likely passed into the urinary bladder in the interim (series 2, image 220). 3.Indwelling Diamond catheter and suprapubic catheter within the urinary bladder. 4.Partially visualized bilateral pleural effusions with bibasilar atelectasis. 5.Cholelithiasis. 6.Colonic diverticulosis. 7.Mild perihepatic and pelvic ascites.    CURRENT DIET LEVEL:  Regular solids and thin liquids.    OBJECTIVE:    TEST ADMINISTERED:  bedside swallow evaluation.    COGNITION/SAFETY AWARENESS:  alert    BEHAVIORAL OBSERVATIONS:  Patient was alert and  cooperative.    ORAL MOTOR EXAM:  Patient demonstrates generalized oral motor weakness.    VOICE QUALITY:  clear with reduced volume.    REFLEX EXAM:  throat clear.    POSTURE:  upright in bed.    FEEDING/SWALLOWING FUNCTION:  assessed with puree, nectar-thick liquids, and thin liquids.    CLINICAL OBSERVATIONS:  Oral phase appears to be prolonged with delay oral transit. Pharyngeal phase appears timely with good laryngeal elevation per palpation. Patient tolerated trials of puree and controlled-bolus volumes of nectar-thick liquids without overt clinical s/s of aspiration. Patient demonstrated overt clinical s/s of aspiration via throat clear with larger bolus volumes of nectar-thick liquids and spoon-fed thin liquids. Voice quality clear with reduced volume throughout study.    DYSPHAGIA CRITERIA:  risk of aspiration.    SLP Recommendation and Plan  FUNCTIONAL ASSESSMENT INSTRUMENT: Patient currently scored a level 4 of 7 on Functional Communication Measures for swallowing indicating a 40-59% limitation in function.    ASSESSMENT/ PLAN OF CARE:  Pt presents with limitations, noted below, that impede his ability to swallow safely. The skills of a therapist will be required to safely and effectively implement the following treatment plan to restore maximal level of function.    PROBLEMS:  1.  Dysphagia   LTG 1: (30 days) Patient will increase ability to tolerate least restrictive diet and improve FCM for swallowing to a 6 of 7.   STG 1a: (14 days) Patient will tolerate puree solids and nectar-thick liquids without s/s of aspiration with 8 of 10 trials.   STG 1b: (14 days) Patient will tolerate therapeutic trials of soft solids and thin liquids without s/s of aspiration with 8 of 10 trials.   STG 1c: (14 days) Patient will tolerate therapeutic trials of regular solids and thin liquids without s/s of aspiration with 8 of 10 trials.   STG 1d: (14 days) Patient/family education on diet recommendations, safe swallow  strategies, and s/s of aspiration.   TREATMENT: Dysphagia therapy to ensure diet tolerance, advance to least restrictive diet and analyze aspiration risk.    FREQUENCY/DURATION:  BID 5 times per week.    REHAB POTENTIAL:  Pt has good rehab potential.  The following limitations may influence improvement/ length of tx  medical status.    RECOMMENDATIONS:   1.   DIET: Puree solids and single sips of nectar-thick liquids    2.  POSITION: 90 degrees upright.    3.  COMPENSATORY STRATEGIES: small bites/drinks, alternate between solids and liquids, small single drinks at a time, oral meds crushed in applesauce, one-to-one supervision to encourage use of compensatory strategies.    Pt/responsible party agrees with plan of care and has been informed of all alternatives, risks and benefits.    EDUCATION  The patient has been educated in the following areas:   Dysphagia (Swallowing Impairment).     Deanna Ellis, Speech Therapy Student  4/29/2025

## 2025-04-29 NOTE — PROGRESS NOTES
[facility] Progress Note      Patient Name: Javi Martínez  : 1940  MRN: [id number]  Primary Care Physician:  Rani Lopez APRN  Date of admission: 4/15/2025    Subjective     Interval History:     Patient intermittent confused   Poor historian  Decreased oral intake  Diamond catheter in place with minimal urine output of 225 mL    Review of Systems:   As noted above    Objective     Vitals:   Temp:  [97.3 °F (36.3 °C)-98.2 °F (36.8 °C)] 97.7 °F (36.5 °C)  Heart Rate:  [68-77] 74  Resp:  [18-20] 20  BP: (100-106)/(62-87) 106/62    Intake/Output Summary (Last 24 hours) at 2025 1708  Last data filed at 2025 1243  Gross per 24 hour   Intake 100 ml   Output 225 ml   Net -125 ml       Physical Exam:    General Appearance: Chronically ill and deconditioned with bilateral temporal wasting  Skin: warm and dry  HEENT: oral mucosa normal, nonicteric sclera  Neck: supple, no JVD  Lungs: CTA  Heart: RRR, normal S1 and S2  Abdomen: soft, nontender, nondistended  : no palpable bladder  Extremities: no edema, cyanosis or clubbing  Neuro: Confused no obvious focalization    Scheduled Meds:     allopurinol, 100 mg, Oral, Daily  apixaban, 2.5 mg, Oral, Q12H  dicyclomine, 10 mg, Oral, 4x Daily  [START ON 2025] famotidine, 20 mg, Oral, QAM AC  LORazepam, 0.5 mg, Oral, Nightly  midodrine, 15 mg, Oral, Q8H  [Held by provider] pantoprazole, 40 mg, Oral, Q AM  sertraline, 50 mg, Oral, Daily  sodium chloride, 10 mL, Intravenous, Q12H      IV Meds:        Results Reviewed:   I have personally reviewed the results from the time of this admission to 2025 17:08 EDT     Results from last 7 days   Lab Units 25  0506 25  0507 25  0538 25  0504 25  1231   SODIUM mmol/L 141 141 139   < > 139   POTASSIUM mmol/L 4.0 3.6 3.9   < > 4.1   CHLORIDE mmol/L 100 101 102   < > 99   CO2 mmol/L 20.1* 23.7 24.1   < > 22.4   BUN mg/dL 50* 44* 41*   < > 32*   CREATININE  mg/dL 3.06* 2.53* 2.48*   < > 2.11*   CALCIUM mg/dL 9.5 9.4 9.3   < > 9.4   BILIRUBIN mg/dL  --   --   --   --  1.2   ALK PHOS U/L  --   --   --   --  87   ALT (SGPT) U/L  --   --   --   --  9   AST (SGOT) U/L  --   --   --   --  16   GLUCOSE mg/dL 91 102* 96   < > 108*    < > = values in this interval not displayed.       Estimated Creatinine Clearance: 23.5 mL/min (A) (by C-G formula based on SCr of 3.06 mg/dL (H)).    Results from last 7 days   Lab Units 04/29/25  0506 04/28/25  0507 04/27/25  0538 04/26/25  0504 04/23/25  0539   MAGNESIUM mg/dL  --  2.4  --   --  2.3   PHOSPHORUS mg/dL 5.4* 4.6* 4.5   < > 3.1    < > = values in this interval not displayed.             Results from last 7 days   Lab Units 04/28/25  0507 04/26/25  0504 04/25/25  1231 04/23/25  0539   WBC 10*3/mm3 7.74 7.94 8.40 8.08   HEMOGLOBIN g/dL 14.0 13.9 13.9 13.9   PLATELETS 10*3/mm3 232 214 249 227             Assessment / Plan     ASSESSMENT:    Acute kidney injury likely multifactorial from hemodynamic fluctuations, urinary retention and infection volume status remained stable.  Patient continues to remain hypotensive from his underlying cardiorenal physiology will increase midodrine to improve renal perfusion      Chronic kidney disease stage III secondary to cardiorenal syndrome     Urinary tract infection with isolation of ESBL E. coli and Citrobacter that completed course of antibiotics     Chronic indwelling catheter with urinary retention status post suprapubic catheter placement     Chronic atrial fibrillation on chronic anticoagulation in the form apixaban     Acute on chronic systolic congestive failure.  His hypotension is a limiting factor for diuresis .  His previous proBNP was elevated and his x-ray showed vascular congestion .  Will add on midodrine and will attempt albumin in combination with furosemide.  Will continue to follow patient closely     Coronary artery disease status post coronary bypass  grafting    PLAN:  Continue midodrine, and holding diuretics  Volume status currently stable renal function continues to worsen with decreased urine output.  His volume is difficult to assess and he has severe dilated cardiomyopathy and patient might need some fluids.  His repeat x-ray shows no pulmonary edema  Will continue surveillance for now  Patient long-term prognosis poor, due to decreased oral intake, decreased mobility and poor performance status    Thank you for involving us in the care of Javi Martínez.  Please feel free to call with any questions.    Hay Mendez MD  04/29/25  17:08 EDT    [facility]  [phone]    Please note that portions of this note were completed with a voice recognition program.

## 2025-04-29 NOTE — PLAN OF CARE
Goal Outcome Evaluation:      SLP Recommendation and Plan  FUNCTIONAL ASSESSMENT INSTRUMENT: Patient currently scored a level 4 of 7 on Functional Communication Measures for swallowing indicating a 40-59% limitation in function.     ASSESSMENT/ PLAN OF CARE:  Pt presents with limitations, noted below, that impede his ability to swallow safely. The skills of a therapist will be required to safely and effectively implement the following treatment plan to restore maximal level of function.     PROBLEMS:  1.  Dysphagia              LTG 1: (30 days) Patient will increase ability to tolerate least restrictive diet and improve FCM for swallowing to a 6 of 7.              STG 1a: (14 days) Patient will tolerate puree solids and nectar-thick liquids without s/s of aspiration with 8 of 10 trials.              STG 1b: (14 days) Patient will tolerate therapeutic trials of soft solids and thin liquids without s/s of aspiration with 8 of 10 trials.              STG 1c: (14 days) Patient will tolerate therapeutic trials of regular solids and thin liquids without s/s of aspiration with 8 of 10 trials.              STG 1d: (14 days) Patient/family education on diet recommendations, safe swallow strategies, and s/s of aspiration.              TREATMENT: Dysphagia therapy to ensure diet tolerance, advance to least restrictive diet and analyze aspiration risk.     FREQUENCY/DURATION:  BID 5 times per week.     REHAB POTENTIAL:  Pt has good rehab potential.  The following limitations may influence improvement/ length of tx  medical status.     RECOMMENDATIONS:   1.   DIET: Puree solids and single sips of nectar-thick liquids     2.  POSITION: 90 degrees upright.     3.  COMPENSATORY STRATEGIES: small bites/drinks, alternate between solids and liquids, small single drinks at a time, oral meds crushed in applesauce, one-to-one supervision to encourage use of compensatory strategies.     Pt/responsible party agrees with plan of care and  has been informed of all alternatives, risks and benefits.     EDUCATION  The patient has been educated in the following areas:   Dysphagia (Swallowing Impairment).      Deanna Ellis, Speech Therapy Student                  4/29/2025

## 2025-04-29 NOTE — PLAN OF CARE
Goal Outcome Evaluation:  Plan of Care Reviewed With: patient        Progress: declining  Outcome Evaluation: Pt aox4, VSS. No complaints this shift. Received albumin. Pt seems to be resting comfortably at this time. Will continue to monitor.

## 2025-04-30 NOTE — NURSING NOTE
Palliative Care follow up with patient at bedside.  Patient resting with eyes closed. Will continue to follow.

## 2025-04-30 NOTE — PLAN OF CARE
Goal Outcome Evaluation:           Progress: declining  Outcome Evaluation: Pt is alert to self.  Blood Pressures ran low this shift with mididrine given.  All other VSS.  Pt has declined this shift and has been moaning.  Ativan given for anxiety with little effect.  Dilaudid ordered for pt pain.  Pt has very little urine output this shift with 300ml of dark brown urine.  Dressing completed for suprapubic catheter.  Dressing changed on right arm.  Pt has eaten some this shift with assistance and should be considered a feeder.  NS running at 75ml/hr.  Continue POC.

## 2025-04-30 NOTE — PROGRESS NOTES
[facility] Progress Note      Patient Name: Javi Martínez  : 1940  MRN: [id number]  Primary Care Physician:  Rani Lopez APRN  Date of admission: 4/15/2025    Subjective     Interval History:     Patient intermittent confused   Poor historian  Decreased oral intake  Diamond catheter in place with minimal urine output of 350 mL     Review of Systems:   As noted above    Objective     Vitals:   Temp:  [97.5 °F (36.4 °C)-97.7 °F (36.5 °C)] 97.7 °F (36.5 °C)  Heart Rate:  [] 105  Resp:  [20] 20  BP: ()/(52-66) 98/52  Flow (L/min) (Oxygen Therapy):  [2] 2    Intake/Output Summary (Last 24 hours) at 2025 0803  Last data filed at 2025 0146  Gross per 24 hour   Intake 0 ml   Output 350 ml   Net -350 ml       Physical Exam:    General Appearance: Chronically ill and deconditioned with bilateral temporal wasting  Skin: warm and dry  HEENT: oral mucosa normal, nonicteric sclera  Neck: supple, no JVD  Lungs: CTA  Heart: RRR, normal S1 and S2  Abdomen: soft, nontender, nondistended  : no palpable bladder  Extremities: no edema, cyanosis or clubbing  Neuro: Confused no obvious focalization    Scheduled Meds:     allopurinol, 100 mg, Oral, Daily  apixaban, 2.5 mg, Oral, Q12H  dicyclomine, 10 mg, Oral, 4x Daily  famotidine, 20 mg, Oral, QAM AC  LORazepam, 0.5 mg, Oral, Nightly  midodrine, 15 mg, Oral, Q8H  [Held by provider] pantoprazole, 40 mg, Oral, Q AM  sertraline, 50 mg, Oral, Daily  sodium chloride, 10 mL, Intravenous, Q12H      IV Meds:   sodium chloride, 50 mL/hr        Results Reviewed:   I have personally reviewed the results from the time of this admission to 2025 08:03 EDT     Results from last 7 days   Lab Units 25  0506 25  0507 25  0538 25  0504 25  1231   SODIUM mmol/L 141 141 139   < > 139   POTASSIUM mmol/L 4.0 3.6 3.9   < > 4.1   CHLORIDE mmol/L 100 101 102   < > 99   CO2 mmol/L 20.1* 23.7 24.1   < > 22.4   BUN  mg/dL 50* 44* 41*   < > 32*   CREATININE mg/dL 3.06* 2.53* 2.48*   < > 2.11*   CALCIUM mg/dL 9.5 9.4 9.3   < > 9.4   BILIRUBIN mg/dL  --   --   --   --  1.2   ALK PHOS U/L  --   --   --   --  87   ALT (SGPT) U/L  --   --   --   --  9   AST (SGOT) U/L  --   --   --   --  16   GLUCOSE mg/dL 91 102* 96   < > 108*    < > = values in this interval not displayed.       Estimated Creatinine Clearance: 23.1 mL/min (A) (by C-G formula based on SCr of 3.06 mg/dL (H)).    Results from last 7 days   Lab Units 04/29/25  0506 04/28/25  0507 04/27/25  0538   MAGNESIUM mg/dL  --  2.4  --    PHOSPHORUS mg/dL 5.4* 4.6* 4.5             Results from last 7 days   Lab Units 04/28/25  0507 04/26/25  0504 04/25/25  1231   WBC 10*3/mm3 7.74 7.94 8.40   HEMOGLOBIN g/dL 14.0 13.9 13.9   PLATELETS 10*3/mm3 232 214 249             Assessment / Plan     ASSESSMENT:    Acute kidney injury likely multifactorial from hemodynamic fluctuations, urinary retention and infection volume status remained stable.  Patient continues to remain hypotensive from his underlying cardiorenal physiology will increase midodrine to improve renal perfusion      Chronic kidney disease stage III secondary to cardiorenal syndrome     Urinary tract infection with isolation of ESBL E. coli and Citrobacter that completed course of antibiotics     Chronic indwelling catheter with urinary retention status post suprapubic catheter placement     Chronic atrial fibrillation on chronic anticoagulation in the form apixaban     Acute on chronic systolic congestive failure.  His hypotension is a limiting factor for diuresis .  On midodrine     Coronary artery disease status post coronary bypass grafting    PLAN:  Last dose of diuretics 48 hours ago patient has not been drinking and eating for the last couple of days will attempt gentle hydration 50 mL an hour for 24 hours.  Discussed with nursing staff if you develop hypoxia or dyspnea to immediately stop IV fluids  Patient with  poor prognosis  Due to his extensive comorbidities and poor performance status not a good candidate for hemodialysis    Thank you for involving us in the care of Javi Martínez.  Please feel free to call with any questions.    Hay Mendez MD  04/30/25  08:03 EDT    [facility]  [phone]    Please note that portions of this note were completed with a voice recognition program.

## 2025-04-30 NOTE — PROGRESS NOTES
[facility]   Hospitalist Progress Note  Date: 2025  Patient Name: Javi Martínez  : 1940  MRN: [id number]  Date of admission: 4/15/2025      Subjective   Subjective     Chief Complaint: Follow-up generalized weakness    Summary:Javi Martínez is a 84 y.o. male  with past medical history of hypertension, CHF, A-fib on Eliquis, arthritis, history of prostate cancer status post brachytherapy and external beam XRT and BPH and GERD presented to the ED for evaluation of generalized weakness and fall.  Patient states that for the last week or so he has not been feeling well with increased weakness, dysuria, lethargy, decreased p.o. intake, and multiple falls.  Patient does live alone and had been unable to perform his ADLs so he was brought to the ED for further evaluation.  In the ED patient was tachypneic on arrival with remaining vitals being within normal limits on room air.  His UA was positive for a UTI with labs showing an elevated proBNP, reduced renal function, elevated lactic acid, and elevated but flat troponin.  Remaining labs are relatively unremarkable including a normal WBC and negative COVID flu RSV.  Chest x-ray showed pulmonary edema although improved from prior imaging.  When seen patient was resting comfortably and when asked he denied any recent fevers, chills, headaches, focal weakness, chest pain, palpitation, shortness of breath, cough, abdominal pain, nausea, vomiting, diarrhea, constipation, hematuria, hematochezia, melena, or anxiety.   Patient admitted for further evaluation and treatment.  Patient had pus coming from suprapubic catheter insertion site along with pus within suprapubic catheter.  Blood pressure borderline low, perfusing extremities well.  Cardiologist consulted.  Also started on IV antibiotics for possible intra-abdominal infection.  CT abdomen pelvis demonstrated some haziness of the perivesical fat suggesting cystitis but no abscess appreciated.  Also  demonstrated cardiomegaly with bilateral pleural effusions.  Wound culture grew MRSA.  Urine culture grew ESBL E. coli and Citrobacter farmeri.  Antibiotics tailored to sensitivities.  Patient noted to have some loose stools.  C. difficile detected by PCR but toxin negative indicating carrier status.  Blood culture negative to date.  Had issues with Diamond catheter clogging and urology consulted.  Diamond catheter removed and suprapubic catheter replaced.  Patient developed increased work of breathing and Lasix initiated.  Started midodrine for blood pressure support to allow for further diuresis.  Renal function continued to decline.  Nephrology consulted.  Patient planning to discharge to assisted living with family in [place] versus patient rehab locally depending on timing per family.  Discharge planning coordinating.    Interval Followup:Extremely confused.  Minimal urine output.  Eating a little.     Review of Systems  Constitutional: Positive for fatigue and negative fever.   HENT: Negative for sore throat and trouble swallowing.    Eyes: Negative for pain and discharge.   Respiratory: Negative for cough and negative shortness of breath.    Cardiovascular: Negative for chest pain and palpitations.   Gastrointestinal: Negative for abdominal pain, nausea and negative vomiting.   Endocrine: Negative for cold intolerance and heat intolerance.   Genitourinary: Positive for difficulty urinating and dysuria.   Musculoskeletal: Negative for back pain and neck stiffness.   Skin: Negative for color change and rash.   Neurological: Negative for syncope and headaches.   Hematological: Negative for adenopathy.   Psychiatric/Behavioral: Negative for confusion and hallucinations.    Objective   Objective     Vitals:   Temp:  [97.3 °F (36.3 °C)-97.7 °F (36.5 °C)] 97.3 °F (36.3 °C)  Heart Rate:  [] 71  Resp:  [18-20] 18  BP: ()/(52-75) 75/60  Flow (L/min) (Oxygen Therapy):  [2] 2  Physical Exam   General:  well-developed appearing stated age in no acute distress  HEENT: Normocephalic atraumatic moist membranes pupils equal round reactive light, no scleral icterus no conjunctival injection  Cardiovascular: regular rate and rhythm no murmurs rubs or gallops S1-S2, 1+ bilateral lower extremity edema appreciated  Pulmonary: Diminished bilateral bases no wheezes or rhonchi symmetric chest expansion, labored with accessory muscle use, no conversational dyspnea appreciated  Gastrointestinal: Soft nontender nondistended positive bowel sounds all 4 quadrants no rebound or guarding  Musculoskeletal: No clubbing cyanosis, warm and well-perfused, calves soft symmetric nontender bilaterally  Skin: Clean dry without rashes  Neuro: Cranial nerves II through XII intact grossly no sensorimotor deficits appreciated bilateral upper and lower extremities  Psych: Patient is calm cooperative and appropriate with exam not responding to internal stimuli  : Suprapubic catheter no bladder distention positive suprapubic tenderness    Result Review    Result Review:  I have personally reviewed these results and agree with these findings:  [x]  Laboratory  LAB RESULTS:      Lab 04/28/25  0507 04/26/25  0504 04/25/25  2151 04/25/25  1854 04/25/25  1611 04/25/25  1231   WBC 7.74 7.94  --   --   --  8.40   HEMOGLOBIN 14.0 13.9  --   --   --  13.9   HEMATOCRIT 43.9 43.4  --   --   --  45.1   PLATELETS 232 214  --   --   --  249   MCV 93.8 94.3  --   --   --  94.2   LACTATE  --   --  2.5* 3.1* 3.4* 3.8*         Lab 04/29/25  0506 04/28/25  0507 04/27/25  0538 04/26/25  0504 04/25/25  1231   SODIUM 141 141 139 140 139   POTASSIUM 4.0 3.6 3.9 3.9 4.1   CHLORIDE 100 101 102 102 99   CO2 20.1* 23.7 24.1 21.1* 22.4   ANION GAP 20.9* 16.3* 12.9 16.9* 17.6*   BUN 50* 44* 41* 36* 32*   CREATININE 3.06* 2.53* 2.48* 2.46* 2.11*   EGFR 19.6* 24.7* 25.3* 25.5* 30.7*   GLUCOSE 91 102* 96 92 108*   CALCIUM 9.5 9.4 9.3 9.2 9.4   MAGNESIUM  --  2.4  --   --   --     PHOSPHORUS 5.4* 4.6* 4.5 4.9*  --          Lab 04/29/25  0506 04/28/25  0507 04/27/25  0538 04/26/25  0504 04/25/25  1231   TOTAL PROTEIN  --   --   --   --  6.6   ALBUMIN 3.3* 3.0* 3.0* 2.9* 3.2*   GLOBULIN  --   --   --   --  3.4   ALT (SGPT)  --   --   --   --  9   AST (SGOT)  --   --   --   --  16   BILIRUBIN  --   --   --   --  1.2   ALK PHOS  --   --   --   --  87         Lab 04/25/25  1611   PROBNP 37,306.0*                   Lab 04/25/25  1236   PH, ARTERIAL 7.390   PCO2, ARTERIAL 40.8   PO2 ART 51.1*   O2 SATURATION ART 85.4*   FIO2 21   HCO3 ART 24.7   BASE EXCESS ART -0.3       Brief Urine Lab Results  (Last result in the past 365 days)        Color   Clarity   Blood   Leuk Est   Nitrite   Protein   CREAT   Urine HCG        04/15/25 1823 Yellow   Clear   Trace   Large (3+)   Negative   Negative                 Microbiology Results (last 10 days)       ** No results found for the last 240 hours. **            [x]  Microbiology  [x]  Radiology  CT Head Without Contrast  Result Date: 4/29/2025  Impression: 1.No acute intracranial process identified. 2.Findings suggestive of mild chronic small vessel ischemic disease. Electronically Signed: Javi Galvez MD  4/29/2025 3:24 PM EDT  Workstation ID: [id number]    XR Chest 1 View  Result Date: 4/29/2025  No significant change. Electronically Signed: Ronal Fleming MD  4/29/2025 5:53 AM EDT  Workstation ID: [id number]    XR Chest 1 View  Result Date: 4/25/2025  Impression: Similar appearance of findings suggestive of pulmonary edema with bilateral pleural effusions and bibasilar atelectasis. Electronically Signed: Simona Tadeo MD  4/25/2025 1:56 PM EDT  Workstation ID: [id number]    CT Abdomen Pelvis Without Contrast  Result Date: 4/25/2025  Impression: 1.No acute abnormality identified within the abdomen or pelvis. 2.Previously noted punctate calculus within the right renal pelvis has likely passed into the urinary bladder in the interim (series 2, image 220).  3.Indwelling Diamond catheter and suprapubic catheter within the urinary bladder. 4.Partially visualized bilateral pleural effusions with bibasilar atelectasis. 5.Cholelithiasis. 6.Colonic diverticulosis. 7.Mild perihepatic and pelvic ascites. 8.Additional findings as detailed above. Electronically Signed: West Peña MD  4/25/2025 9:31 AM EDT  Workstation ID: [id number]        [x]  EKG/Telemetry   []  Cardiology/Vascular   []  Pathology  []  Old records  [x]  Other:  Scheduled Meds:allopurinol, 100 mg, Oral, Daily  apixaban, 2.5 mg, Oral, Q12H  dicyclomine, 10 mg, Oral, 4x Daily  famotidine, 20 mg, Oral, QAM AC  LORazepam, 0.5 mg, Oral, Nightly  midodrine, 15 mg, Oral, Q8H  [Held by provider] pantoprazole, 40 mg, Oral, Q AM  sertraline, 50 mg, Oral, Daily  sodium chloride, 10 mL, Intravenous, Q12H      Continuous Infusions:sodium chloride, 50 mL/hr, Last Rate: 50 mL/hr (04/30/25 0829)  sodium chloride, 75 mL/hr        PRN Meds:.  senna-docusate sodium **AND** polyethylene glycol **AND** bisacodyl **AND** bisacodyl    HYDROmorphone    LORazepam    melatonin    nitroglycerin    ondansetron    sodium chloride    sodium chloride    sodium chloride    traZODone      Assessment & Plan   Assessment / Plan     Assessment/Plan:  UTI due to ESBL E. coli and Citrobacter associated with chronic indwelling suprapubic Diamond catheter.  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  Left forearm swelling      Patient admitted for further evaluation and treatment  Nephrology, urology and cardiology consulted thank for assistance  Completed 7-day course of piperacillin/tazobactam  Completed  7-day course of vancomycin  Continue wound care per nursing  Continue midodrine for blood pressure support and titrate as needed  Continue strict I's and O's and daily weights  Patient not able to tolerate further maximal medical directed therapy for heart failure due to hypotension  Continue monitor renal function closely  Continue to monitor electrolytes replace as needed  Suprapubic catheter exchanged and Diamond catheter removed.  Patient to follow-up with urology in 1 month  No indication for primary C. difficile prophylaxis though will hold home Protonix while on broad-spectrum antibiotics  Continue physical therapy Occupational Therapy  Continue bowel regimen as needed  Venous duplex left upper extremity within normal limits suspect possible lymph damage.  Patient instructed to keep left upper extremity elevated.  Continue physical therapy occupational therapy  Consult palliative care.  P    Dispo plan:  Daughter coming on Friday.  Patient will be made comfort care then.             Discussed plan with bedside RN as well as Dr. Mendez nephrology attending.    Disposition: Inpatient rehab versus return to [place] with family.  Discharge planning coordinating.    VTE Prophylaxis:  Pharmacologic VTE prophylaxis orders are present.        CODE STATUS:   Code Status (Patient has no pulse and is not breathing): No CPR (Do Not Attempt to Resuscitate)  Medical Interventions (Patient has pulse or is breathing): Limited Support  Medical Intervention Limits: No intubation (DNI)          Electronically signed by Apollo Duarte DO, 04/29/25, 2:43 PM EDT.

## 2025-04-30 NOTE — SIGNIFICANT NOTE
04/30/25 1526   Physical Therapy Time and Intention   Session Not Performed patient unavailable for treatment  (With nursing/other staff)

## 2025-04-30 NOTE — THERAPY RE-EVALUATION
Patient Name: Javi Martínez  : 1940    MRN: [id number]                              Today's Date: 2025       Admit Date: 4/15/2025    Visit Dx:     ICD-10-CM ICD-9-CM   1. Generalized weakness  R53.1 780.79   2. Difficulty walking  R26.2 719.7   3. Dysphagia, oropharyngeal  R13.12 787.22     Patient Active Problem List   Diagnosis    Primary osteoarthritis of left knee    S/P TKR (total knee replacement)    Essential hypertension    CAD with CABG    Hyperlipidemia    Presence of cardiac pacemaker single chamber    Anxiety    Gastroesophageal reflux disease    Gout    History of malignant neoplasm of prostate    Testicular hypofunction    Polyneuropathy    Chronic obstructive pulmonary disease    Benign prostatic hyperplasia without lower urinary tract symptoms    Atrial fibrillation, chronic    Stage 3b chronic kidney disease    Postprocedural membranous urethral stricture    Acute on chronic HFrEF (heart failure with reduced ejection fraction)    Suprapubic catheter dysfunction    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    Urethral stricture in male    Lyme disease    Acute exacerbation of CHF (congestive heart failure)    Other dysphagia    CHF (congestive heart failure)    Urinary retention    Influenza A    Generalized weakness     Past Medical History:   Diagnosis Date    2013    Arthritis     left knee     Atrial fibrillation, persistent     CHB (complete heart block) 2023    CHF exacerbation 2024    Chronic obstructive pulmonary disease 2023    pt denies having this    Difficulty walking     Numbness in right foot    Erectile dysfunction     Essential hypertension 2019    GERD (gastroesophageal reflux disease)     HFrEF (heart failure with reduced ejection fraction) 2021    Hyperlipidemia 2021    Paroxysmal atrial fibrillation     Polyneuropathy 2023    Prostate cancer 2010    with seeds implant     Septic joint 2021    Sleep apnea      "Urinary incontinence 2021    Urinary tract infection 2022     Past Surgical History:   Procedure Laterality Date    ABLATION OF DYSRHYTHMIC FOCUS  2013    APPENDECTOMY  1947    ARTERIAL BYPASS SURGERY      CARDIAC ABLATION      x2  \"years ago\"     CARDIAC ELECTROPHYSIOLOGY PROCEDURE N/A 11/01/2021    Procedure: PPM battery change;  Surgeon: Ge Whelan MD;  Location: [facility] CATH INVASIVE LOCATION;  Service: Cardiovascular;  Laterality: N/A;    CARDIAC SURGERY  2006    cabg 3v    COLONOSCOPY      CORONARY ARTERY BYPASS GRAFT  2006    CYSTOSCOPY URETHROTOMY VISUAL INTERNAL N/A 08/15/2024    Procedure: CYSTOSCOPY, URETHRAL DILATION, CATHETER PLACEMENT, RETROGRADE URETHROGRAM;  Surgeon: August Myers MD;  Location: [facility] MAIN OR;  Service: Urology;  Laterality: N/A;    CYSTOSCOPY URETHROTOMY VISUAL INTERNAL N/A 10/24/2024    Procedure: CYSTOSCOPY URETHROTOMY;  Surgeon: Mally Bernal MD;  Location: [facility] OR;  Service: Urology;  Laterality: N/A;    CYSTOSCOPY URETHROTOMY VISUAL INTERNAL N/A 03/11/2025    Procedure: CYSTOSCOPY URETHROTOMY VISUAL INTERNAL;  Surgeon: Mally Bernal MD;  Location: [facility] OR;  Service: Urology;  Laterality: N/A;    ENDOSCOPY      with dilation     ENDOSCOPY N/A 01/21/2025    Procedure: ESOPHAGOGASTRODUODENOSCOPY WITH BIOPSIES, POLYPECTOMY, BALLOON DILATION 18-20mm;  Surgeon: Simona Garcia MD;  Location: [facility] ENDOSCOPY;  Service: Gastroenterology;  Laterality: N/A;  GASTRIC POLYPS, SCHATZKI'S RING, HIATAL HERNIA    KNEE ARTHROSCOPY Left     PROSTATE SURGERY  2012    SUPRAPUBIC TUBE PLACEMENT N/A 03/11/2025    Procedure: SUPRAPUBIC CATHETER INSERTION;  Surgeon: Mally Bernal MD;  Location: [facility] OR;  Service: Urology;  Laterality: N/A;    TOTAL KNEE ARTHROPLASTY Left 07/23/2021    Procedure: TOTAL KNEE ARTHROPLASTY WITH DORA NAVIGATION WITH BIOMET;  Surgeon: Carlitos Zhao MD;  Location: [facility] OR;  Service: Orthopedics;  Laterality: " Left;    VENTRICULAR CARDIAC PACEMAKER INSERTION      medtronic       General Information       Row Name 04/30/25 1030          OT Time and Intention    Document Type re-evaluation  -PG     Mode of Treatment individual therapy;occupational therapy  -PG               User Key  (r) = Recorded By, (t) = Taken By, (c) = Cosigned By      Initials Name Provider Type    PG Martir Mesa OT Occupational Therapist                     Mobility/ADL's       Row Name 04/30/25 1030          Activities of Daily Living    BADL Assessment/Intervention feeding  -PG       Row Name 04/30/25 1030          Self-Feeding Assessment/Training    Hood Level (Feeding) feeding skills;scoop food and bring to mouth;liquids to mouth;minimum assist (75% patient effort);moderate assist (50% patient effort)  -PG               User Key  (r) = Recorded By, (t) = Taken By, (c) = Cosigned By      Initials Name Provider Type    PG Martir Mesa OT Occupational Therapist                   Obj/Interventions    No documentation.                  Goals/Plan       Row Name 04/30/25 1032          Transfer Goal 1 (OT)    Progress/Outcome (Transfer Goal 1, OT) progress slower than expected  -PG       [facility] Name 04/30/25 1032          Bathing Goal 1 (OT)    Progress/Outcomes (Bathing Goal 1, OT) progress slower than expected  -PG       [facility] Name 04/30/25 1032          Dressing Goal 1 (OT)    Progress/Outcome (Dressing Goal 1, OT) progress slower than expected  -PG       [facility] Name 04/30/25 1032          Toileting Goal 1 (OT)    Progress/Outcome (Toileting Goal 1, OT) progress slower than expected  -PG       [facility] Name 04/30/25 1032          Grooming Goal 1 (OT)    Progress/Outcome (Grooming Goal 1, OT) progress slower than expected  -PG       [facility] Name 04/30/25 1032          ROM Goal 1 (OT)    ROM Goal 1 (OT) Patient will independently feed himself in preparation for returning home  -PG     Time Frame (ROM Goal 1, OT) long term goal (LTG);10 days  -PG                User Key  (r) = Recorded By, (t) = Taken By, (c) = Cosigned By      Initials Name Provider Type    PG Martir Mesa, OT Occupational Therapist                   Clinical Impression       Row Name 04/30/25 1030          Plan of Care Review    Plan of Care Reviewed With patient  -PG     Progress declining  -PG     Outcome Evaluation Patient has had a significant decline in function since his initial evaluation in Occupational Therapy.  Patient has not been eating because he is not able to feed himself due to weakness.  He required minimal to moderate assistance for breakfast this morning but difficulty holding utensils and cup due to tremor/weakness.  Patient will continue to benefit from skilled OT services to maximize ADL performance  -PG       Row Name 04/30/25 1030          Therapy Plan Review/Discharge Plan (OT)    Anticipated Discharge Disposition (OT) --  Recommend palliative consult  -PG               User Key  (r) = Recorded By, (t) = Taken By, (c) = Cosigned By      Initials Name Provider Type    PG Martir Mesa, OT Occupational Therapist                   Outcome Measures       Row Name 04/30/25 1034          How much help from another is currently needed...    Putting on and taking off regular lower body clothing? 1  -PG     Bathing (including washing, rinsing, and drying) 1  -PG     Toileting (which includes using toilet bed pan or urinal) 1  -PG     Putting on and taking off regular upper body clothing 1  -PG     Taking care of personal grooming (such as brushing teeth) 1  -PG     Eating meals 2  -PG     AM-PAC 6 Clicks Score (OT) 7  -PG       Row Name 04/30/25 1034          Functional Assessment    Outcome Measure Options AM-PAC 6 Clicks Daily Activity (OT);Optimal Instrument  -PG       Row Name 04/30/25 1034          Optimal Instrument    Optimal Instrument Optimal - 3  -PG     Bending/Stooping 5  -PG     Standing 5  -PG     Reaching 3  -PG               User Key  (r) = Recorded By, (t) = Taken  By, (c) = Cosigned By      Initials Name Provider Type    PG Martir Mesa OT Occupational Therapist                      OT Recommendation and Plan  Planned Therapy Interventions (OT): activity tolerance training, BADL retraining, strengthening exercise, transfer/mobility retraining, patient/caregiver education/training, occupation/activity based interventions  Therapy Frequency (OT): 5 times/wk  Plan of Care Review  Plan of Care Reviewed With: patient  Progress: declining  Outcome Evaluation: Patient has had a significant decline in function since his initial evaluation in Occupational Therapy.  Patient has not been eating because he is not able to feed himself due to weakness.  He required minimal to moderate assistance for breakfast this morning but difficulty holding utensils and cup due to tremor/weakness.  Patient will continue to benefit from skilled OT services to maximize ADL performance     Time Calculation:   Evaluation Complexity (OT)  Review Occupational Profile/Medical/Therapy History Complexity: brief/low complexity  Assessment, Occupational Performance/Identification of Deficit Complexity: 3-5 performance deficits  Clinical Decision Making Complexity (OT): problem focused assessment/low complexity  Overall Complexity of Evaluation (OT): low complexity     Time Calculation- OT       Row Name 04/30/25 1034             Time Calculation- OT    OT Received On 04/30/25  -PG      OT Goal Re-Cert Due Date 05/09/25  -PG         Untimed Charges    OT Eval/Re-eval Minutes 30  -PG         Total Minutes    Untimed Charges Total Minutes 30  -PG       Total Minutes 30  -PG                User Key  (r) = Recorded By, (t) = Taken By, (c) = Cosigned By      Initials Name Provider Type    PG Martir Mesa OT Occupational Therapist                  Therapy Charges for Today       Code Description Service Date Service Provider Modifiers Qty    [id number]  OT RE-EVAL 2 4/30/2025 Martir Mesa OT GO 1                  Martir Mesa, OT  4/30/2025

## 2025-05-01 NOTE — SIGNIFICANT NOTE
05/01/25 0700   Physical Therapy Time and Intention   Session Not Performed other (see comments)  (DC orders- pt is on CMO)   Therapy Assessment/Plan (PT)   Criteria for Skilled Interventions Met (PT) does not meet criteria for skilled intervention

## 2025-05-01 NOTE — PROGRESS NOTES
[facility] Progress Note      Patient Name: Javi Martínez  : 1940  MRN: [id number]  Primary Care Physician:  Rani Lopez APRN  Date of admission: 4/15/2025    Subjective     Interval History:     Patient obtunded  Lying in bed  Poorly interactive    Review of Systems:   As noted above    Objective     Vitals:   Temp:  [97.3 °F (36.3 °C)-97.7 °F (36.5 °C)] 97.5 °F (36.4 °C)  Heart Rate:  [70-75] 71  Resp:  [16-18] 16  BP: ()/(50-75) 95/50  Flow (L/min) (Oxygen Therapy):  [2] 2    Intake/Output Summary (Last 24 hours) at 2025 0731  Last data filed at 2025 0900  Gross per 24 hour   Intake 120 ml   Output --   Net 120 ml       Physical Exam:    General Appearance: Chronically ill and deconditioned with bilateral temporal wasting  Skin: warm and dry  HEENT: oral mucosa normal, nonicteric sclera  Neck: supple, no JVD  Lungs: CTA  Heart: RRR, normal S1 and S2  Abdomen: soft, nontender, nondistended  : no palpable bladder  Extremities: no edema, cyanosis or clubbing  Neuro: Confused no obvious focalization    Scheduled Meds:     allopurinol, 100 mg, Oral, Daily  apixaban, 2.5 mg, Oral, Q12H  dicyclomine, 10 mg, Oral, 4x Daily  famotidine, 20 mg, Oral, QAM AC  LORazepam, 0.5 mg, Oral, Nightly  midodrine, 15 mg, Oral, Q8H  [Held by provider] pantoprazole, 40 mg, Oral, Q AM  sertraline, 50 mg, Oral, Daily  sodium chloride, 10 mL, Intravenous, Q12H      IV Meds:          Results Reviewed:   I have personally reviewed the results from the time of this admission to 2025 07:31 EDT     Results from last 7 days   Lab Units 25  0506 25  0507 25  0538 25  0504 25  1231   SODIUM mmol/L 141 141 139   < > 139   POTASSIUM mmol/L 4.0 3.6 3.9   < > 4.1   CHLORIDE mmol/L 100 101 102   < > 99   CO2 mmol/L 20.1* 23.7 24.1   < > 22.4   BUN mg/dL 50* 44* 41*   < > 32*   CREATININE mg/dL 3.06* 2.53* 2.48*   < > 2.11*   CALCIUM mg/dL 9.5 9.4 9.3   < > 9.4  Diabetes Mellitus and Nutrition, Adult  When you have diabetes, or diabetes mellitus, it is very important to have healthy eating habits because your blood sugar (glucose) levels are greatly affected by what you eat and drink. Eating healthy foods in the right amounts, at about the same times every day, can help you:  · Control your blood glucose.  · Lower your risk of heart disease.  · Improve your blood pressure.  · Reach or maintain a healthy weight.  What can affect my meal plan?  Every person with diabetes is different, and each person has different needs for a meal plan. Your health care provider may recommend that you work with a dietitian to make a meal plan that is best for you. Your meal plan may vary depending on factors such as:  · The calories you need.  · The medicines you take.  · Your weight.  · Your blood glucose, blood pressure, and cholesterol levels.  · Your activity level.  · Other health conditions you have, such as heart or kidney disease.  How do carbohydrates affect me?  Carbohydrates, also called carbs, affect your blood glucose level more than any other type of food. Eating carbs naturally raises the amount of glucose in your blood. Carb counting is a method for keeping track of how many carbs you eat. Counting carbs is important to keep your blood glucose at a healthy level, especially if you use insulin or take certain oral diabetes medicines.  It is important to know how many carbs you can safely have in each meal. This is different for every person. Your dietitian can help you calculate how many carbs you should have at each meal and for each snack.  How does alcohol affect me?  Alcohol can cause a sudden decrease in blood glucose (hypoglycemia), especially if you use insulin or take certain oral diabetes medicines. Hypoglycemia can be a life-threatening condition. Symptoms of hypoglycemia, such as sleepiness, dizziness, and confusion, are similar to symptoms of having too much    BILIRUBIN mg/dL  --   --   --   --  1.2   ALK PHOS U/L  --   --   --   --  87   ALT (SGPT) U/L  --   --   --   --  9   AST (SGOT) U/L  --   --   --   --  16   GLUCOSE mg/dL 91 102* 96   < > 108*    < > = values in this interval not displayed.       Estimated Creatinine Clearance: 23.2 mL/min (A) (by C-G formula based on SCr of 3.06 mg/dL (H)).    Results from last 7 days   Lab Units 04/29/25  0506 04/28/25  0507 04/27/25  0538   MAGNESIUM mg/dL  --  2.4  --    PHOSPHORUS mg/dL 5.4* 4.6* 4.5             Results from last 7 days   Lab Units 04/28/25  0507 04/26/25  0504 04/25/25  1231   WBC 10*3/mm3 7.74 7.94 8.40   HEMOGLOBIN g/dL 14.0 13.9 13.9   PLATELETS 10*3/mm3 232 214 249             Assessment / Plan     ASSESSMENT:    Acute kidney injury likely multifactorial from hemodynamic fluctuations, urinary retention and infection volume status remained stable.  Patient continues to remain hypotensive from his underlying cardiorenal physiology.patient on high dose of midodrine and IV fluids, unfortunately with minimal urine output     Chronic kidney disease stage III secondary to cardiorenal syndrome     Urinary tract infection with isolation of ESBL E. coli and Citrobacter that completed course of antibiotics     Chronic indwelling catheter with urinary retention status post suprapubic catheter placement     Chronic atrial fibrillation on chronic anticoagulation in the form apixaban     Acute on chronic systolic congestive failure.  His hypotension is a limiting factor for diuresis .  On midodrine     Coronary artery disease status post coronary bypass grafting    PLAN:  Patient with clinical deterioration ,family has been contacted and has decided to transition to comfort measures  Will sign off, do not hesitate if any concern would risen     Discussed with nursing staff      Thank you for involving us in the care of Javi Martínez.  Please feel free to call with any questions.    Hay Mendez,  "alcohol.  · Do not drink alcohol if:  ? Your health care provider tells you not to drink.  ? You are pregnant, may be pregnant, or are planning to become pregnant.  · If you drink alcohol:  ? Do not drink on an empty stomach.  ? Limit how much you use to:  § 0-1 drink a day for women.  § 0-2 drinks a day for men.  ? Be aware of how much alcohol is in your drink. In the [place] one drink equals one 12 oz bottle of beer (355 mL), one 5 oz glass of wine (148 mL), or one 1½ oz glass of hard liquor (44 mL).  ? Keep yourself hydrated with water, diet soda, or unsweetened iced tea.  § Keep in mind that regular soda, juice, and other mixers may contain a lot of sugar and must be counted as carbs.  What are tips for following this plan?    Reading food labels  · Start by checking the serving size on the \"Nutrition Facts\" label of packaged foods and drinks. The amount of calories, carbs, fats, and other nutrients listed on the label is based on one serving of the item. Many items contain more than one serving per package.  · Check the total grams (g) of carbs in one serving. You can calculate the number of servings of carbs in one serving by dividing the total carbs by 15. For example, if a food has 30 g of total carbs per serving, it would be equal to 2 servings of carbs.  · Check the number of grams (g) of saturated fats and trans fats in one serving. Choose foods that have a low amount or none of these fats.  · Check the number of milligrams (mg) of salt (sodium) in one serving. Most people should limit total sodium intake to less than 2,300 mg per day.  · Always check the nutrition information of foods labeled as \"low-fat\" or \"nonfat.\" These foods may be higher in added sugar or refined carbs and should be avoided.  · Talk to your dietitian to identify your daily goals for nutrients listed on the label.  Shopping  · Avoid buying canned, pre-made, or processed foods. These foods tend to be high in fat, sodium, and added " MD  05/01/25  07:31 EDT    [facility]  [phone]    Please note that portions of this note were completed with a voice recognition program.     sugar.  · Shop around the outside edge of the grocery store. This is where you will most often find fresh fruits and vegetables, bulk grains, fresh meats, and fresh dairy.  Cooking  · Use low-heat cooking methods, such as baking, instead of high-heat cooking methods like deep frying.  · Cook using healthy oils, such as olive, canola, or sunflower oil.  · Avoid cooking with butter, cream, or high-fat meats.  Meal planning  · Eat meals and snacks regularly, preferably at the same times every day. Avoid going long periods of time without eating.  · Eat foods that are high in fiber, such as fresh fruits, vegetables, beans, and whole grains. Talk with your dietitian about how many servings of carbs you can eat at each meal.  · Eat 4-6 oz (112-168 g) of lean protein each day, such as lean meat, chicken, fish, eggs, or tofu. One ounce (oz) of lean protein is equal to:  ? 1 oz (28 g) of meat, chicken, or fish.  ? 1 egg.  ? ¼ cup (62 g) of tofu.  · Eat some foods each day that contain healthy fats, such as avocado, nuts, seeds, and fish.  What foods should I eat?  Fruits  Berries. Apples. Oranges. Peaches. Apricots. Plums. Grapes. [place]. Papaya. Pomegranate. Kiwi. Cherries.  Vegetables  Lettuce. Spinach. Leafy greens, including kale, chard, adithya greens, and mustard greens. Beets. Cauliflower. Cabbage. Broccoli. Carrots. Green beans. Tomatoes. Peppers. Onions. Cucumbers. [place] sprouts.  Grains  Whole grains, such as whole-wheat or whole-grain bread, crackers, tortillas, cereal, and pasta. Unsweetened oatmeal. Quinoa. Brown or wild rice.  Meats and other proteins  Seafood. Poultry without skin. Lean cuts of poultry and beef. Tofu. Nuts. Seeds.  Dairy  Low-fat or fat-free dairy products such as milk, yogurt, and cheese.  The items listed above may not be a complete list of foods and beverages you can eat. Contact a dietitian for more information.  What foods should I avoid?  Fruits  Fruits canned with  syrup.  Vegetables  Canned vegetables. Frozen vegetables with butter or cream sauce.  Grains  Refined white flour and flour products such as bread, pasta, snack foods, and cereals. Avoid all processed foods.  Meats and other proteins  Fatty cuts of meat. Poultry with skin. Breaded or fried meats. Processed meat. Avoid saturated fats.  Dairy  Full-fat yogurt, cheese, or milk.  Beverages  Sweetened drinks, such as soda or iced tea.  The items listed above may not be a complete list of foods and beverages you should avoid. Contact a dietitian for more information.  Questions to ask a health care provider  · Do I need to meet with a diabetes educator?  · Do I need to meet with a dietitian?  · What number can I call if I have questions?  · When are the best times to check my blood glucose?  Where to find more information:  · American Diabetes Association: diabetes.org  · Academy of Nutrition and Dietetics: www.eatright.org  · National [place] of Diabetes and Digestive and Kidney Diseases: www.niddk.nih.gov  · Association of Diabetes Care and Education Specialists: www.diabeteseducator.org  Summary  · It is important to have healthy eating habits because your blood sugar (glucose) levels are greatly affected by what you eat and drink.  · A healthy meal plan will help you control your blood glucose and maintain a healthy lifestyle.  · Your health care provider may recommend that you work with a dietitian to make a meal plan that is best for you.  · Keep in mind that carbohydrates (carbs) and alcohol have immediate effects on your blood glucose levels. It is important to count carbs and to use alcohol carefully.  This information is not intended to replace advice given to you by your health care provider. Make sure you discuss any questions you have with your health care provider.  Document Revised: 11/24/2020 Document Reviewed: 11/24/2020  Elsevier Patient Education © 2021 [employer]

## 2025-05-01 NOTE — PLAN OF CARE
Goal Outcome Evaluation:  Plan of Care Reviewed With: patient        Progress: declining  Outcome Evaluation: Pt responding to painful stimuli with groans. Pt not opening eyes, unable to eat or drink safely. Rn notified pt's daughter of change in status. Daughter requested to make pt comfort care. MD aware, orders were placed. Pt became restless and began moaning, prn pain medication given. Pt seems to be resting comfortably at this time. Will continue to monitor.

## 2025-05-01 NOTE — DISCHARGE SUMMARY
[facility]         HOSPITALIST  DISCHARGE SUMMARY    Patient Name: Javi Martínez  : 1940  MRN: [id number]    Date of Admission: 4/15/2025  Date of Discharge:  2025  Primary Care Physician: Rani Lopez APRN    Consults       Date and Time Order Name Status Description    2025 10:06 AM Inpatient Nephrology Consult Completed     4/15/2025  6:59 PM Inpatient Hospitalist Consult              Active and Resolved Hospital Problems:  Active Hospital Problems    Diagnosis POA    **Generalized weakness [R53.1] Yes    Urinary retention [R33.9] Yes    CHF (congestive heart failure) [I50.9] Yes    Acute on chronic HFrEF (heart failure with reduced ejection fraction) [I50.23] Yes    Stage 3b chronic kidney disease [N18.32] Yes    Anxiety [F41.9] Yes    Essential hypertension [I10] Yes    CAD with CABG [I25.10] Yes      Resolved Hospital Problems   No resolved problems to display.       Hospital Course     Hospital Course:  From Dr. Hyde progress Note  Javi Martínez is a 84 y.o. male  with past medical history of hypertension, CHF, A-fib on Eliquis, arthritis, history of prostate cancer status post brachytherapy and external beam XRT and BPH and GERD presented to the ED for evaluation of generalized weakness and fall.  Patient states that for the last week or so he has not been feeling well with increased weakness, dysuria, lethargy, decreased p.o. intake, and multiple falls.  Patient does live alone and had been unable to perform his ADLs so he was brought to the ED for further evaluation.  In the ED patient was tachypneic on arrival with remaining vitals being within normal limits on room air.  His UA was positive for a UTI with labs showing an elevated proBNP, reduced renal function, elevated lactic acid, and elevated but flat troponin.  Remaining labs are relatively unremarkable including a normal WBC and negative COVID flu RSV.  Chest x-ray showed pulmonary edema although improved  from prior imaging.  When seen patient was resting comfortably and when asked he denied any recent fevers, chills, headaches, focal weakness, chest pain, palpitation, shortness of breath, cough, abdominal pain, nausea, vomiting, diarrhea, constipation, hematuria, hematochezia, melena, or anxiety.   Patient admitted for further evaluation and treatment.  Patient had pus coming from suprapubic catheter insertion site along with pus within suprapubic catheter.  Blood pressure borderline low, perfusing extremities well.  Cardiologist consulted.  Also started on IV antibiotics for possible intra-abdominal infection.  CT abdomen pelvis demonstrated some haziness of the perivesical fat suggesting cystitis but no abscess appreciated.  Also demonstrated cardiomegaly with bilateral pleural effusions.  Wound culture grew MRSA.  Urine culture grew ESBL E. coli and Citrobacter farmeri.  Antibiotics tailored to sensitivities.  Patient noted to have some loose stools.  C. difficile detected by PCR but toxin negative indicating carrier status.  Blood culture negative to date.  Had issues with Diamond catheter clogging and urology consulted.  Diamond catheter removed and suprapubic catheter replaced.  Patient developed increased work of breathing and Lasix initiated.  Started midodrine for blood pressure support to allow for further diuresis.  Renal function continued to decline.  Nephrology consulted.  Patient planning to discharge to assisted living with family in [place] versus patient rehab locally depending on timing per family.  Discharge planning coordinating.     UTI due to ESBL E. coli and Citrobacter associated with chronic indwelling suprapubic Diamond catheter.  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        Day of Discharge     Vital Signs:  Temp:  [97.5 °F (36.4 °C)] 97.5 °F (36.4 °C)  Heart Rate:  [71-75] 73  Resp:  [16] 16  BP: ()/(50-86) 107/86  Flow (L/min) (Oxygen Therapy):  [2] 2  Physical Exam: confused   HEENT: Normocephalic atraumatic moist membranes pupils equal round reactive light, no scleral icterus no conjunctival injection  Cardiovascular: regular rate and rhythm no murmurs rubs or gallops S1-S2, 1+ bilateral lower extremity edema appreciated  Gastrointestinal: Soft nontender nondistended positive bowel sounds all 4 quadrants no rebound or guarding  Musculoskeletal: No clubbing cyanosis, warm and well-perfused, calves soft symmetric nontender bilaterall  : Suprapubic catheter no bladder distention positive suprapubic tenderness       Discharge Details        Discharge Medications        ASK your doctor about these medications        Instructions Start Date   allopurinol 100 MG tablet  Commonly known as: ZYLOPRIM   100 mg, Daily      benzonatate 200 MG capsule  Commonly known as: TESSALON   200 mg, 3 Times Daily PRN      citalopram 20 MG tablet  Commonly known as: CeleXA   20 mg, Daily      Eliquis 2.5 MG tablet tablet  Generic drug: apixaban   2.5 mg, Every 12 Hours Scheduled      furosemide 40 MG tablet  Commonly known as: LASIX   40 mg, Oral, Daily      HYDROcodone-acetaminophen 5-325 MG per tablet  Commonly known as: NORCO   1 tablet, Every 6 Hours PRN      Jardiance 10 MG tablet tablet  Generic drug: empagliflozin   10 mg, Oral, Daily      LORazepam 0.5 MG  tablet  Commonly known as: ATIVAN   0.5 mg, Every 12 Hours PRN      melatonin 5 MG tablet tablet   5 mg, Nightly PRN      omeprazole 20 MG capsule  Commonly known as: priLOSEC   20 mg, Daily      tamsulosin 0.4 MG capsule 24 hr capsule  Commonly known as: FLOMAX   0.4 mg, Oral, Daily               No Known Allergies    Discharge Disposition:  Hospice/Medical Facility ([facility])    Diet:  Hospital:  Diet Order   Procedures    Diet: Regular/House; Fluid Consistency: Thin (IDDSI 0)       Discharge Activity:       CODE STATUS:  Code Status and Medical Interventions: No CPR (Do Not Attempt to Resuscitate); Comfort Measures   Ordered at: 04/30/25 2003     Code Status (Patient has no pulse and is not breathing):    No CPR (Do Not Attempt to Resuscitate)     Medical Interventions (Patient has pulse or is breathing):    Comfort Measures         Future Appointments   Date Time Provider Department Center   5/5/2025  2:15 PM [facility] CARD [place] DEVICE CHECK [facility] CD [place]   5/5/2025  2:30 PM Isabel Navarrete APRN [facility] [place] [facility]           Pertinent  and/or Most Recent Results     PROCEDURES:   none    LAB RESULTS:      Lab 04/28/25  0507 04/26/25  0504 04/25/25  2151 04/25/25  1854 04/25/25  1611 04/25/25  1231   WBC 7.74 7.94  --   --   --  8.40   HEMOGLOBIN 14.0 13.9  --   --   --  13.9   HEMATOCRIT 43.9 43.4  --   --   --  45.1   PLATELETS 232 214  --   --   --  249   MCV 93.8 94.3  --   --   --  94.2   LACTATE  --   --  2.5* 3.1* 3.4* 3.8*         Lab 04/29/25  0506 04/28/25  0507 04/27/25  0538 04/26/25  0504 04/25/25  1231   SODIUM 141 141 139 140 139   POTASSIUM 4.0 3.6 3.9 3.9 4.1   CHLORIDE 100 101 102 102 99   CO2 20.1* 23.7 24.1 21.1* 22.4   ANION GAP 20.9* 16.3* 12.9 16.9* 17.6*   BUN 50* 44* 41* 36* 32*   CREATININE 3.06* 2.53* 2.48* 2.46* 2.11*   EGFR 19.6* 24.7* 25.3* 25.5* 30.7*   GLUCOSE 91 102* 96 92 108*   CALCIUM 9.5 9.4 9.3 9.2 9.4   MAGNESIUM  --  2.4  --   --   --    PHOSPHORUS 5.4* 4.6*  4.5 4.9*  --          Lab 04/29/25  0506 04/28/25  0507 04/27/25  0538 04/26/25  0504 04/25/25  1231   TOTAL PROTEIN  --   --   --   --  6.6   ALBUMIN 3.3* 3.0* 3.0* 2.9* 3.2*   GLOBULIN  --   --   --   --  3.4   ALT (SGPT)  --   --   --   --  9   AST (SGOT)  --   --   --   --  16   BILIRUBIN  --   --   --   --  1.2   ALK PHOS  --   --   --   --  87         Lab 04/25/25  1611   PROBNP 37,306.0*                 Lab 04/25/25  1236   PH, ARTERIAL 7.390   PCO2, ARTERIAL 40.8   PO2 ART 51.1*   O2 SATURATION ART 85.4*   FIO2 21   HCO3 ART 24.7   BASE EXCESS ART -0.3     Brief Urine Lab Results  (Last result in the past 365 days)        Color   Clarity   Blood   Leuk Est   Nitrite   Protein   CREAT   Urine HCG        04/15/25 1823 Yellow   Clear   Trace   Large (3+)   Negative   Negative                 Microbiology Results (last 10 days)       ** No results found for the last 240 hours. **            CT Head Without Contrast  Result Date: 4/29/2025  Impression: 1.No acute intracranial process identified. 2.Findings suggestive of mild chronic small vessel ischemic disease. Electronically Signed: Javi Galvez MD  4/29/2025 3:24 PM EDT  Workstation ID: [id number]    XR Chest 1 View  Result Date: 4/29/2025  No significant change. Electronically Signed: Ronal Fleming MD  4/29/2025 5:53 AM EDT  Workstation ID: [id number]    XR Chest 1 View  Result Date: 4/25/2025  Impression: Similar appearance of findings suggestive of pulmonary edema with bilateral pleural effusions and bibasilar atelectasis. Electronically Signed: Simona Tadeo MD  4/25/2025 1:56 PM EDT  Workstation ID: [id number]    CT Abdomen Pelvis Without Contrast  Result Date: 4/25/2025  Impression: 1.No acute abnormality identified within the abdomen or pelvis. 2.Previously noted punctate calculus within the right renal pelvis has likely passed into the urinary bladder in the interim (series 2, image 220). 3.Indwelling Diamond catheter and suprapubic catheter within the  urinary bladder. 4.Partially visualized bilateral pleural effusions with bibasilar atelectasis. 5.Cholelithiasis. 6.Colonic diverticulosis. 7.Mild perihepatic and pelvic ascites. 8.Additional findings as detailed above. Electronically Signed: West Peña MD  4/25/2025 9:31 AM EDT  Workstation ID: [id number]       Results for orders placed during the hospital encounter of 04/15/25    Duplex Venous Upper Extremity - Left CV-READ    Interpretation Summary    Normal left upper extremity venous duplex scan.      Results for orders placed during the hospital encounter of 04/15/25    Duplex Venous Upper Extremity - Left CV-READ    Interpretation Summary    Normal left upper extremity venous duplex scan.      Results for orders placed during the hospital encounter of 01/18/25    Adult Transthoracic Echo Complete W/ Cont if Necessary Per Protocol    Interpretation Summary    Left ventricular ejection fraction appears to be 21 - 25%.    The left ventricular cavity is severely dilated.    Left ventricular diastolic function was indeterminate.    The left atrial cavity is moderate to severely dilated.    The right atrial cavity is mildly  dilated.    Moderate mitral valve regurgitation is present.    Estimated right ventricular systolic pressure from tricuspid regurgitation is moderately elevated (45-55 mmHg).    Aortic root measures 4.1 cm.  Ascending aorta measures 4.8 cm.    Pleural effusion noted.    No significant change from echo done 7/2024.      Labs Pending at Discharge:        Time spent on Discharge including face to face service:  greater than 30 minutes    Electronically signed by Apollo Duarte DO, 05/01/25, 4:18 PM EDT.

## 2025-05-01 NOTE — CONSULTS
05/01/25 1158   Spiritual Care   Spiritual Care Visit Type other (see comments)  (Comfort Measures Only)   Spiritual Care Source palliative care   Receptivity to Spiritual Care unresponsive  (pt comfort measures only)   Spiritual Care Request end-of-life issue(s) support   Spiritual Care Interventions end-of-life care assistance provided  (pt resting comfortably.  presence made known and assurance provided. per palliative care, pt's daughter is traveling from out of state tom[place]rw. [place] will continue to follow.)   Response to Spiritual Care other (see comments)  (unable to assess due to pt's comfort status)   Use of Spiritual Resources prayer   Spiritual Care Follow-Up will follow closely

## 2025-05-01 NOTE — CONSULTS
Patient has transitioned to comfort measures only. RD will sign off at this time. If further clinical nutrition services are desired, please re-consult.

## 2025-05-01 NOTE — PROGRESS NOTES
[facility]   Hospitalist Progress Note  Date: 2025  Patient Name: Javi Martínez  : 1940  MRN: [id number]  Date of admission: 4/15/2025      Subjective   Subjective     Chief Complaint: Follow-up generalized weakness    Summary:Javi Martínez is a 84 y.o. male  with past medical history of hypertension, CHF, A-fib on Eliquis, arthritis, history of prostate cancer status post brachytherapy and external beam XRT and BPH and GERD presented to the ED for evaluation of generalized weakness and fall.  Patient states that for the last week or so he has not been feeling well with increased weakness, dysuria, lethargy, decreased p.o. intake, and multiple falls.  Patient does live alone and had been unable to perform his ADLs so he was brought to the ED for further evaluation.  In the ED patient was tachypneic on arrival with remaining vitals being within normal limits on room air.  His UA was positive for a UTI with labs showing an elevated proBNP, reduced renal function, elevated lactic acid, and elevated but flat troponin.  Remaining labs are relatively unremarkable including a normal WBC and negative COVID flu RSV.  Chest x-ray showed pulmonary edema although improved from prior imaging.  When seen patient was resting comfortably and when asked he denied any recent fevers, chills, headaches, focal weakness, chest pain, palpitation, shortness of breath, cough, abdominal pain, nausea, vomiting, diarrhea, constipation, hematuria, hematochezia, melena, or anxiety.   Patient admitted for further evaluation and treatment.  Patient had pus coming from suprapubic catheter insertion site along with pus within suprapubic catheter.  Blood pressure borderline low, perfusing extremities well.  Cardiologist consulted.  Also started on IV antibiotics for possible intra-abdominal infection.  CT abdomen pelvis demonstrated some haziness of the perivesical fat suggesting cystitis but no abscess appreciated.  Also  demonstrated cardiomegaly with bilateral pleural effusions.  Wound culture grew MRSA.  Urine culture grew ESBL E. coli and Citrobacter farmeri.  Antibiotics tailored to sensitivities.  Patient noted to have some loose stools.  C. difficile detected by PCR but toxin negative indicating carrier status.  Blood culture negative to date.  Had issues with Diamond catheter clogging and urology consulted.  Diamond catheter removed and suprapubic catheter replaced.  Patient developed increased work of breathing and Lasix initiated.  Started midodrine for blood pressure support to allow for further diuresis.  Renal function continued to decline.  Nephrology consulted.  Patient planning to discharge to assisted living with family in [place] versus patient rehab locally depending on timing per family.  Discharge planning coordinating.    Interval Followup:Confusion worse. Step son at the bedside.  Discussed case with him.  Creatinine worse.     Review of Systems      Objective   Objective     Vitals:   Temp:  [97.3 °F (36.3 °C)-97.5 °F (36.4 °C)] 97.5 °F (36.4 °C)  Heart Rate:  [71-75] 73  Resp:  [16-18] 16  BP: ()/(50-86) 107/86  Flow (L/min) (Oxygen Therapy):  [2] 2  Physical Exam   General: well-developed appearing stated age in no acute distress  HEENT: Normocephalic atraumatic moist membranes pupils equal round reactive light, no scleral icterus no conjunctival injection  Cardiovascular: regular rate and rhythm no murmurs rubs or gallops S1-S2, 1+ bilateral lower extremity edema appreciated  Pulmonary: Diminished bilateral bases no wheezes or rhonchi symmetric chest expansion, labored with accessory muscle use, no conversational dyspnea appreciated  Gastrointestinal: Soft nontender nondistended positive bowel sounds all 4 quadrants no rebound or guarding  Musculoskeletal: No clubbing cyanosis, warm and well-perfused, calves soft symmetric nontender bilaterally  Skin: Clean dry without rashes  Neuro: Cranial nerves II  through XII intact grossly no sensorimotor deficits appreciated bilateral upper and lower extremities  Psych: Patient is calm cooperative and appropriate with exam not responding to internal stimuli  : Suprapubic catheter no bladder distention positive suprapubic tenderness    Result Review    Result Review:  I have personally reviewed these results and agree with these findings:  [x]  Laboratory  LAB RESULTS:      Lab 04/28/25  0507 04/26/25  0504 04/25/25  2151 04/25/25  1854 04/25/25  1611 04/25/25  1231   WBC 7.74 7.94  --   --   --  8.40   HEMOGLOBIN 14.0 13.9  --   --   --  13.9   HEMATOCRIT 43.9 43.4  --   --   --  45.1   PLATELETS 232 214  --   --   --  249   MCV 93.8 94.3  --   --   --  94.2   LACTATE  --   --  2.5* 3.1* 3.4* 3.8*         Lab 04/29/25  0506 04/28/25  0507 04/27/25  0538 04/26/25  0504 04/25/25  1231   SODIUM 141 141 139 140 139   POTASSIUM 4.0 3.6 3.9 3.9 4.1   CHLORIDE 100 101 102 102 99   CO2 20.1* 23.7 24.1 21.1* 22.4   ANION GAP 20.9* 16.3* 12.9 16.9* 17.6*   BUN 50* 44* 41* 36* 32*   CREATININE 3.06* 2.53* 2.48* 2.46* 2.11*   EGFR 19.6* 24.7* 25.3* 25.5* 30.7*   GLUCOSE 91 102* 96 92 108*   CALCIUM 9.5 9.4 9.3 9.2 9.4   MAGNESIUM  --  2.4  --   --   --    PHOSPHORUS 5.4* 4.6* 4.5 4.9*  --          Lab 04/29/25  0506 04/28/25  0507 04/27/25  0538 04/26/25  0504 04/25/25  1231   TOTAL PROTEIN  --   --   --   --  6.6   ALBUMIN 3.3* 3.0* 3.0* 2.9* 3.2*   GLOBULIN  --   --   --   --  3.4   ALT (SGPT)  --   --   --   --  9   AST (SGOT)  --   --   --   --  16   BILIRUBIN  --   --   --   --  1.2   ALK PHOS  --   --   --   --  87         Lab 04/25/25  1611   PROBNP 37,306.0*                   Lab 04/25/25  1236   PH, ARTERIAL 7.390   PCO2, ARTERIAL 40.8   PO2 ART 51.1*   O2 SATURATION ART 85.4*   FIO2 21   HCO3 ART 24.7   BASE EXCESS ART -0.3       Brief Urine Lab Results  (Last result in the past 365 days)        Color   Clarity   Blood   Leuk Est   Nitrite   Protein   CREAT   Urine HCG         04/15/25 1823 Yellow   Clear   Trace   Large (3+)   Negative   Negative                 Microbiology Results (last 10 days)       ** No results found for the last 240 hours. **            [x]  Microbiology  [x]  Radiology  CT Head Without Contrast  Result Date: 4/29/2025  Impression: 1.No acute intracranial process identified. 2.Findings suggestive of mild chronic small vessel ischemic disease. Electronically Signed: Javi Galvez MD  4/29/2025 3:24 PM EDT  Workstation ID: [id number]    XR Chest 1 View  Result Date: 4/29/2025  No significant change. Electronically Signed: Ronal Fleming MD  4/29/2025 5:53 AM EDT  Workstation ID: [id number]    XR Chest 1 View  Result Date: 4/25/2025  Impression: Similar appearance of findings suggestive of pulmonary edema with bilateral pleural effusions and bibasilar atelectasis. Electronically Signed: Simona Tadeo MD  4/25/2025 1:56 PM EDT  Workstation ID: [id number]    CT Abdomen Pelvis Without Contrast  Result Date: 4/25/2025  Impression: 1.No acute abnormality identified within the abdomen or pelvis. 2.Previously noted punctate calculus within the right renal pelvis has likely passed into the urinary bladder in the interim (series 2, image 220). 3.Indwelling Diamond catheter and suprapubic catheter within the urinary bladder. 4.Partially visualized bilateral pleural effusions with bibasilar atelectasis. 5.Cholelithiasis. 6.Colonic diverticulosis. 7.Mild perihepatic and pelvic ascites. 8.Additional findings as detailed above. Electronically Signed: West Peña MD  4/25/2025 9:31 AM EDT  Workstation ID: [id number]        [x]  EKG/Telemetry   []  Cardiology/Vascular   []  Pathology  []  Old records  [x]  Other:  Scheduled Meds:LORazepam, 0.5 mg, Sublingual, Nightly  sodium chloride, 10 mL, Intravenous, Q12H      Continuous Infusions:       PRN Meds:.  atropine    [DISCONTINUED] LORazepam **OR** diazePAM **OR** [DISCONTINUED] diazePAM **OR** LORazepam **OR** LORazepam     [DISCONTINUED] haloperidol **OR** haloperidol **OR** haloperidol lactate    HYDROmorphone    Morphine **OR** morphine    ondansetron    Polyvinyl Alcohol-Povidone PF    sodium chloride    sodium chloride      Assessment & Plan   Assessment / Plan     Assessment/Plan:  UTI due to ESBL E. coli and Citrobacter associated with chronic indwelling suprapubic Diamond catheter.  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  Left forearm swelling      Patient admitted for further evaluation and treatment  Nephrology, urology and cardiology consulted thank for assistance  Completed 7-day course of piperacillin/tazobactam  Completed 7-day course of vancomycin  Continue wound care per nursing  Continue midodrine for blood pressure support and titrate as needed  Continue strict I's and O's and daily weights  Patient not able to tolerate further maximal medical directed therapy for heart failure due to hypotension  Continue monitor renal function closely  Continue to monitor electrolytes replace as needed  Suprapubic catheter exchanged and Diamond catheter removed.  Patient to follow-up with urology in 1 month  No indication for primary C. difficile prophylaxis though will hold home Protonix while on broad-spectrum antibiotics  Continue physical therapy Occupational Therapy  Continue bowel regimen as needed  Venous duplex left upper extremity within normal limits suspect possible lymph damage.  Patient instructed to keep left upper extremity elevated.  Continue physical therapy occupational therapy  Consult palliative care.  P    Dispo plan:  Daughter coming on Friday.  Patient will be made  comfort care then.             Discussed plan with bedside RN as well as Dr. Mendez nephrology attending.    Disposition: Inpatient rehab versus return to [place] with family.  Discharge planning coordinating.    VTE Prophylaxis:  Mechanical VTE prophylaxis orders are present.        CODE STATUS:   Code Status (Patient has no pulse and is not breathing): No CPR (Do Not Attempt to Resuscitate)  Medical Interventions (Patient has pulse or is breathing): Comfort Measures        Electronically signed by Apollo Duarte DO, 05/01/25, 10:29 AM EDT.

## 2025-05-01 NOTE — SIGNIFICANT NOTE
05/01/25 0824   OTHER   Discipline occupational therapist   Rehab Time/Intention   Session Not Performed other (see comments)  (Comfort Measure Orders; D/C OT orders)   Therapy Assessment/Plan (PT)   Criteria for Skilled Interventions Met (PT) does not meet criteria for skilled intervention

## 2025-05-01 NOTE — CONSULTS
[facility] visit with patient and family.  Daughter was contacted and is agreeable for [facility].  Patient access and bed board notified.  Attending MD and primary RN aware of information.  Palliative Care will continue to follow.    Sammi WERNER RN, [facility]  Palliative Care

## 2025-05-02 NOTE — H&P
[facility]  Date: 2025   Patient Name: Javi Martínez  : 1940  MRN: [id number]  Primary Care Physician:  Rani Lopez APRN  Date of admission: 2025    Subjective patient made comfort care  Subjective     Chief Complaint: Patient made comfort care    HPI:    Javi Martínez is a 84 y.o. male  with past medical history of hypertension, CHF, A-fib on Eliquis, arthritis, history of prostate cancer status post brachytherapy and external beam XRT and BPH and GERD presented to the ED for evaluation of generalized weakness and fall.  Patient states that for the last week or so he has not been feeling well with increased weakness, dysuria, lethargy, decreased p.o. intake, and multiple falls.  Patient does live alone and had been unable to perform his ADLs so he was brought to the ED for further evaluation.  In the ED patient was tachypneic on arrival with remaining vitals being within normal limits on room air.  His UA was positive for a UTI with labs showing an elevated proBNP, reduced renal function, elevated lactic acid, and elevated but flat troponin.  Remaining labs are relatively unremarkable including a normal WBC and negative COVID flu RSV.  Chest x-ray showed pulmonary edema although improved from prior imaging.  When seen patient was resting comfortably and when asked he denied any recent fevers, chills, headaches, focal weakness, chest pain, palpitation, shortness of breath, cough, abdominal pain, nausea, vomiting, diarrhea, constipation, hematuria, hematochezia, melena, or anxiety.   Patient admitted for further evaluation and treatment.  Patient had pus coming from suprapubic catheter insertion site along with pus within suprapubic catheter.  Blood pressure borderline low, perfusing extremities well.  Cardiologist consulted.  Also started on IV antibiotics for possible intra-abdominal infection.  CT abdomen pelvis demonstrated some haziness of the  "perivesical fat suggesting cystitis but no abscess appreciated.  Also demonstrated cardiomegaly with bilateral pleural effusions.  Wound culture grew MRSA.  Urine culture grew ESBL E. coli and Citrobacter farmeri.  Antibiotics tailored to sensitivities.      Family decided to make the patient comfort care.    Personal History     Past Medical History:  Past Medical History:   Diagnosis Date    Aneurysm 2013    Arthritis     left knee     Atrial fibrillation, persistent     CHB (complete heart block) 06/07/2023    CHF exacerbation 04/24/2024    Chronic obstructive pulmonary disease 09/01/2023    pt denies having this    Difficulty walking 2023    Numbness in right foot    Erectile dysfunction     Essential hypertension 05/08/2019    GERD (gastroesophageal reflux disease)     HFrEF (heart failure with reduced ejection fraction) 08/11/2021    Hyperlipidemia 08/11/2021    Paroxysmal atrial fibrillation     Polyneuropathy 06/19/2023    Prostate cancer 2010    with seeds implant     Septic joint 11/26/2021    Sleep apnea     Urinary incontinence 2021    Urinary tract infection 2022   '    Past Surgical History:  Past Surgical History:   Procedure Laterality Date    ABLATION OF DYSRHYTHMIC FOCUS  2013    APPENDECTOMY  1947    ARTERIAL BYPASS SURGERY      CARDIAC ABLATION      x2  \"years ago\"     CARDIAC ELECTROPHYSIOLOGY PROCEDURE N/A 11/01/2021    Procedure: PPM battery change;  Surgeon: Ge Whelan MD;  Location: [facility] CATH INVASIVE LOCATION;  Service: Cardiovascular;  Laterality: N/A;    CARDIAC SURGERY  2006    cabg 3v    COLONOSCOPY      CORONARY ARTERY BYPASS GRAFT  2006    CYSTOSCOPY URETHROTOMY VISUAL INTERNAL N/A 08/15/2024    Procedure: CYSTOSCOPY, URETHRAL DILATION, CATHETER PLACEMENT, RETROGRADE URETHROGRAM;  Surgeon: August Myers MD;  Location: [facility] MAIN OR;  Service: Urology;  Laterality: N/A;    CYSTOSCOPY URETHROTOMY VISUAL INTERNAL N/A 10/24/2024    Procedure: CYSTOSCOPY URETHROTOMY;  Surgeon: " Mally Bernal MD;  Location: [facility] OR;  Service: Urology;  Laterality: N/A;    CYSTOSCOPY URETHROTOMY VISUAL INTERNAL N/A 2025    Procedure: CYSTOSCOPY URETHROTOMY VISUAL INTERNAL;  Surgeon: Mally Bernal MD;  Location: [facility] MAIN OR;  Service: Urology;  Laterality: N/A;    ENDOSCOPY      with dilation     ENDOSCOPY N/A 2025    Procedure: ESOPHAGOGASTRODUODENOSCOPY WITH BIOPSIES, POLYPECTOMY, BALLOON DILATION 18-20mm;  Surgeon: Simona Garcia MD;  Location: [facility] ENDOSCOPY;  Service: Gastroenterology;  Laterality: N/A;  GASTRIC POLYPS, SCHATZKI'S RING, HIATAL HERNIA    KNEE ARTHROSCOPY Left     PROSTATE SURGERY  2012    SUPRAPUBIC TUBE PLACEMENT N/A 2025    Procedure: SUPRAPUBIC CATHETER INSERTION;  Surgeon: Mally Bernal MD;  Location: [facility] MAIN OR;  Service: Urology;  Laterality: N/A;    TOTAL KNEE ARTHROPLASTY Left 2021    Procedure: TOTAL KNEE ARTHROPLASTY WITH DORA NAVIGATION WITH BIOMET;  Surgeon: Carlitos Zhao MD;  Location: [facility] OR;  Service: Orthopedics;  Laterality: Left;    VENTRICULAR CARDIAC PACEMAKER INSERTION      medtronic         Family History:   Breast Cancer-related family history is not on file.      Social History:   Social History     Socioeconomic History    Marital status:    Tobacco Use    Smoking status: Former     Current packs/day: 0.00     Average packs/day: 0.5 packs/day for 33.6 years (16.8 ttl pk-yrs)     Types: Cigarettes     Start date: 1962     Quit date: 1995     Years since quittin.7     Passive exposure: Never    Smokeless tobacco: Never   Vaping Use    Vaping status: Never Used   Substance and Sexual Activity    Alcohol use: Never    Drug use: Never    Sexual activity: Not Currently     Partners: Female         Home Medications:  HYDROcodone-acetaminophen, LORazepam, allopurinol, apixaban, benzonatate, citalopram, empagliflozin, furosemide, melatonin, omeprazole, and  tamsulosin    Allergies:  No Known Allergies    Review of Systems   All systems were reviewed and negative except for: unable to get     Objective   Objective     Vitals:   Temp:  [98.1 °F (36.7 °C)] 98.1 °F (36.7 °C)  Heart Rate:  [70] 70  Resp:  [8] 8  BP: (88)/(58) 88/58  Flow (L/min) (Oxygen Therapy):  [2] 2    Physical Exam   Physical Exam Physical Exam: confused   HEENT: Normocephalic atraumatic moist membranes pupils equal round reactive light, no scleral icterus no conjunctival injection  Cardiovascular: regular rate and rhythm no murmurs rubs or gallops S1-S2, 1+ bilateral lower extremity edema appreciated  Gastrointestinal: Soft nontender nondistended positive bowel sounds all 4 quadrants no rebound or guarding  Musculoskeletal: No clubbing cyanosis, warm and well-perfused, calves soft symmetric nontender bilaterall  : Suprapubic catheter no bladder distention positive suprapubic tenderness     Result Review      Result Review:  I have personally reviewed the results from the time of this admission to 5/2/2025 13:46 EDT and agree with these findings:  []  Laboratory  []  Microbiology  []  Radiology  []  EKG/Telemetry   []  Cardiology/Vascular   []  Pathology  []  Old records  []  Other:    Lab Results (most recent)       None            No radiology results for the last day    Assessment & Plan   Assessment / Plan   UTI due to ESBL E. coli and Citrobacter associated with chronic indwelling suprapubic Diamond catheter.  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          VTE Prophylaxis:  No VTE prophylaxis  order currently exists.        CODE STATUS:    Code Status (Patient has no pulse and is not breathing): No CPR (Do Not Attempt to Resuscitate)  Medical Interventions (Patient has pulse or is breathing): Comfort Measures      Admission Status:  I believe this patient meets inpatient status.    Electronically signed by Apollo Duarte DO, 05/02/25, 1:46 PM EDT.

## 2025-05-08 NOTE — DISCHARGE SUMMARY
[facility]         HOSPITALIST  DISCHARGE SUMMARY    Patient Name: Javi Martínez  : 1940  MRN: [id number]    Date of Admission: 2025  Date of Discharge:  2025  Primary Care Physician: Rani Lopez APRN    Consults       Date and Time Order Name Status Description    2025 10:06 AM Inpatient Nephrology Consult Completed             Active and Resolved Hospital Problems:  There are no hospital problems to display for this patient.      Hospital Course     Hospital Course:  Javi Martínez is a 84 y.o. male  with past medical history of hypertension, CHF, A-fib on Eliquis, arthritis, history of prostate cancer status post brachytherapy and external beam XRT and BPH and GERD presented to the ED for evaluation of generalized weakness and fall.  Patient states that for the last week or so he has not been feeling well with increased weakness, dysuria, lethargy, decreased p.o. intake, and multiple falls.  Patient does live alone and had been unable to perform his ADLs so he was brought to the ED for further evaluation.  In the ED patient was tachypneic on arrival with remaining vitals being within normal limits on room air.  His UA was positive for a UTI with labs showing an elevated proBNP, reduced renal function, elevated lactic acid, and elevated but flat troponin.  Remaining labs are relatively unremarkable including a normal WBC and negative COVID flu RSV.  Chest x-ray showed pulmonary edema although improved from prior imaging.  When seen patient was resting comfortably and when asked he denied any recent fevers, chills, headaches, focal weakness, chest pain, palpitation, shortness of breath, cough, abdominal pain, nausea, vomiting, diarrhea, constipation, hematuria, hematochezia, melena, or anxiety.   Patient admitted for further evaluation and treatment.  Patient had pus coming from suprapubic catheter insertion site along with pus within suprapubic catheter.  Blood pressure  borderline low, perfusing extremities well.  Cardiologist consulted.  Also started on IV antibiotics for possible intra-abdominal infection.  CT abdomen pelvis demonstrated some haziness of the perivesical fat suggesting cystitis but no abscess appreciated.  Also demonstrated cardiomegaly with bilateral pleural effusions.  Wound culture grew MRSA.  Urine culture grew ESBL E. coli and Citrobacter farmeri.  Antibiotics tailored to sensitivities.       Family decided to make the patient comfort care.     Patient passed away.       Day of Discharge       Discharge Details        Discharge Medications        ASK your doctor about these medications        Instructions Start Date   allopurinol 100 MG tablet  Commonly known as: ZYLOPRIM   100 mg, Daily      benzonatate 200 MG capsule  Commonly known as: TESSALON   200 mg, 3 Times Daily PRN      citalopram 20 MG tablet  Commonly known as: CeleXA   20 mg, Daily      Eliquis 2.5 MG tablet tablet  Generic drug: apixaban   2.5 mg, Every 12 Hours Scheduled      HYDROcodone-acetaminophen 5-325 MG per tablet  Commonly known as: NORCO   1 tablet, Every 6 Hours PRN      LORazepam 0.5 MG tablet  Commonly known as: ATIVAN   0.5 mg, Every 12 Hours PRN      melatonin 5 MG tablet tablet   5 mg, Nightly PRN      omeprazole 20 MG capsule  Commonly known as: priLOSEC   20 mg, Daily               No Known Allergies    Discharge Disposition:      Diet:  Hospital:No active diet order      Discharge Activity:       CODE STATUS:  Code Status and Medical Interventions: No CPR (Do Not Attempt to Resuscitate); Comfort Measures   Ordered at: 25 1624     Code Status (Patient has no pulse and is not breathing):    No CPR (Do Not Attempt to Resuscitate)     Medical Interventions (Patient has pulse or is breathing):    Comfort Measures         No future appointments.        Pertinent  and/or Most Recent Results                               Brief Urine Lab Results  (Last result in the past  365 days)        Color   Clarity   Blood   Leuk Est   Nitrite   Protein   CREAT   Urine HCG        04/15/25 1823 Yellow   Clear   Trace   Large (3+)   Negative   Negative                 Microbiology Results (last 10 days)       ** No results found for the last 240 hours. **            No radiology results for the last 7 days     Results for orders placed during the hospital encounter of 04/15/25    Duplex Venous Upper Extremity - Left CV-READ    Interpretation Summary    Normal left upper extremity venous duplex scan.      Results for orders placed during the hospital encounter of 04/15/25    Duplex Venous Upper Extremity - Left CV-READ    Interpretation Summary    Normal left upper extremity venous duplex scan.      Results for orders placed during the hospital encounter of 01/18/25    Adult Transthoracic Echo Complete W/ Cont if Necessary Per Protocol    Interpretation Summary    Left ventricular ejection fraction appears to be 21 - 25%.    The left ventricular cavity is severely dilated.    Left ventricular diastolic function was indeterminate.    The left atrial cavity is moderate to severely dilated.    The right atrial cavity is mildly  dilated.    Moderate mitral valve regurgitation is present.    Estimated right ventricular systolic pressure from tricuspid regurgitation is moderately elevated (45-55 mmHg).    Aortic root measures 4.1 cm.  Ascending aorta measures 4.8 cm.    Pleural effusion noted.    No significant change from echo done 7/2024.      Labs Pending at Discharge:        Time spent on Discharge including face to face service:  Greater than 30 minutes    Electronically signed by Apollo Duarte DO, 05/08/25, 3:27 PM EDT.

## (undated) DEVICE — INSTRUMENT BATTERY

## (undated) DEVICE — SOL IRRG H2O BG 3000ML STRL

## (undated) DEVICE — SLV SCD LEG COMFORT KENDALLSCD MD REPROC

## (undated) DEVICE — NDL SPINE 22G 7IN BLK

## (undated) DEVICE — UNDYED BRAIDED (POLYGLACTIN 910), SYNTHETIC ABSORBABLE SUTURE: Brand: COATED VICRYL

## (undated) DEVICE — SUT ETHIB 1 X538H ETX538H

## (undated) DEVICE — CANNULA,OXY,ADULT,SUPER SOFT,W/14'TUB,UC: Brand: MEDLINE INDUSTRIES, INC.

## (undated) DEVICE — STERILE POLYISOPRENE POWDER-FREE SURGICAL GLOVES: Brand: PROTEXIS

## (undated) DEVICE — GW ZIPWIRE STD/SHFT STR TPR/3CM .038IN 150CM

## (undated) DEVICE — MAT FLR ABS W/BLU/LINER 56X72IN WHT

## (undated) DEVICE — INTRO SUPRAPUB 1STEP 18F 20CM

## (undated) DEVICE — SKIN PREP TRAY W/CHG: Brand: MEDLINE INDUSTRIES, INC.

## (undated) DEVICE — CYSTO/BLADDER IRRIGATION SET, REGULATING CLAMP

## (undated) DEVICE — ST CATH DIL RENAL AMPLTZ W/SHEATH PTFE 6TO30F 20TO30CM 16CM

## (undated) DEVICE — Device

## (undated) DEVICE — GLV SURG SENSICARE SLT PF LF 7 STRL

## (undated) DEVICE — SOL IRRG H2O PL/BG 1000ML STRL

## (undated) DEVICE — UROLOGIC DRAIN BAG: Brand: UNBRANDED

## (undated) DEVICE — Device: Brand: STABLECUT®

## (undated) DEVICE — SINGLE-USE BIOPSY FORCEPS: Brand: RADIAL JAW 4

## (undated) DEVICE — TOWEL,OR,DSP,ST,BLUE,STD,4/PK,20PK/CS: Brand: MEDLINE

## (undated) DEVICE — CATHETER,FOLEY,100%SILICONE,20FR,10ML,LF: Brand: MEDLINE

## (undated) DEVICE — 3M™ STERI-STRIP™ REINFORCED ADHESIVE SKIN CLOSURES, R1546, 1/4 IN X 4 IN (6 MM X 100 MM), 10 STRIPS/ENVELOPE: Brand: 3M™ STERI-STRIP™

## (undated) DEVICE — GLV SURG SENSICARE SLT PF LF 8 STRL

## (undated) DEVICE — PACEMAKER PACK: Brand: MEDLINE INDUSTRIES, INC.

## (undated) DEVICE — NO-SCRATCH ™ SMALL WHITNEY CURETTE ™ IS A SINGLE-USE, PLASTIC CURETTE FOR QUICKLY APPLYING, MANIPULATING AND REMOVING BONE CEMENT DURING HIP AND KNEE REPLACEMENT SURGERY. THE PLASTIC IS SOFTER THAN STEEL INSTRUMENTS, REDUCING THE RISK OF DAMAGING THE PROSTHESIS WITH METAL INSTRUMENTS.  THE CURETTE’S 6MM TIP REMOVES EXCESS CEMENT FROM REPLACEMENT HIPS AND KNEES. EASY-TO-MANEUVER, THE SMALL BLUE CURETTE LETS YOU REMOVE CEMENT FROM ALL EDGES OF THE PROSTHESIS.NO-SCRATCH WHITNEY SMALL CURETTE FEATURES:SAFER THAN STEEL- MADE OF PLASTIC - STURDY YET SOFTER THAN SURGICAL STEEL.HANDIER- EACH TOOL HAS A MOLDED-IN THUMB INDENTATION INSTANTLY ORIENTING THE TOOL.- EASIER TO MANEUVER IN HARD TO SEE PLACES.- COLOR-CODED FOR EASY IDENTIFICATION.FASTER- COMES INDIVIDUALLY PACKAGED IN STERILE, PEEL OPEN POUCH, READY TO GO.- APPLIES, MANIPULATES, OR REMOVES CEMENT WITH FINGERTIP PRECISION.ECONOMICAL- THE COST OF A SINGLE REVISION DWARFS THE COST OF A SINGLE-USE CURETTE. - DISPOSABLE – THERE’S NO NEED TO WASTE TIME REMOVING HARDENED CEMENT OR RE-STERILIZING TOOLS.- LESS EXPENSIVE TO BUY AND INVENTORY - ORDER ONLY THE TOOL YOU USE.- PACKAGED 25 INDIVIDUALLY WRAPPED TOOLS TO A CARTON FOR CONVENIENT SHELF STORAGE.: Brand: WHITNEY NO-SCRATCH CURETTE (SMALL)

## (undated) DEVICE — CYSTO PACK: Brand: MEDLINE INDUSTRIES, INC.

## (undated) DEVICE — DRSNG PAD ABD 8X10IN STRL

## (undated) DEVICE — GLV SURG SENSICARE PI PF LF 7 GRN STRL

## (undated) DEVICE — GLOVE,SURG,SENSICARE SLT,LF,PF,6.5: Brand: MEDLINE

## (undated) DEVICE — CVR LEG BOOTLEG F/R NOSKID 33IN

## (undated) DEVICE — LINER SURG CANSTR SXN S/RIGD 1500CC

## (undated) DEVICE — GLV SURG SENSICARE SLT PF LF 7.5 STRL

## (undated) DEVICE — BLCK/BITE BLOX WO/DENTL/RIM W/STRAP 54F

## (undated) DEVICE — TOTAL KNEE-LF: Brand: MEDLINE INDUSTRIES, INC.

## (undated) DEVICE — GLV SURG ULTRAFREE MAX LTX PF 8

## (undated) DEVICE — SOL IRR NACL 0.9PCT 3000ML

## (undated) DEVICE — 450 ML BOTTLE OF 0.05% CHLORHEXIDINE GLUCONATE IN 99.95% STERILE WATER FOR IRRIGATION, USP AND APPLICATOR.: Brand: IRRISEPT ANTIMICROBIAL WOUND LAVAGE

## (undated) DEVICE — FAN SPRAY KIT: Brand: PULSAVAC®

## (undated) DEVICE — GLV SURG SENSICARE SLT PF LF 6.5 STRL

## (undated) DEVICE — GLV SURG SENSICARE PI LF PF 7.0

## (undated) DEVICE — CONN JET HYDRA H20 AUXILIARY DISP

## (undated) DEVICE — ANCHORING PIN 20MM/4MM

## (undated) DEVICE — SYR LUERLOK 30CC

## (undated) DEVICE — 3M™ STERI-DRAPE™ U-DRAPE 1015: Brand: STERI-DRAPE™

## (undated) DEVICE — CATH FOL COUNCL 2WY 16F 5CC

## (undated) DEVICE — ANTIBACTERIAL UNDYED BRAIDED (POLYGLACTIN 910), SYNTHETIC ABSORBABLE SUTURE: Brand: COATED VICRYL

## (undated) DEVICE — PROXIMATE RH ROTATING HEAD SKIN STAPLERS (35 WIDE) CONTAINS 35 STAINLESS STEEL STAPLES: Brand: PROXIMATE

## (undated) DEVICE — GLOVE,SURG,SENSICARE SLT,LF,PF,8: Brand: MEDLINE

## (undated) DEVICE — SOL NS 500ML

## (undated) DEVICE — TRAY,ADD A CATH,FOLEY,DRAIN BG,10ML SYR: Brand: MEDLINE

## (undated) DEVICE — CUFF,BP,DISP,1 TB,ADLT LNG,HP: Brand: MEDLINE

## (undated) DEVICE — ZIPPERED TOGA, PEEL-AWAY 2X LARGE: Brand: FLYTE, SURGICOOL

## (undated) DEVICE — GW PTFE FIX/CORE STFF STR .038 3X150CM

## (undated) DEVICE — DECANTER: Brand: UNBRANDED

## (undated) DEVICE — GLV SURG NEOPRN SENSICARE PF SZ/7 LF

## (undated) DEVICE — SUT VIC 0 CT1 36IN J946H

## (undated) DEVICE — CATH URETRL OPEN END W/CONNECT 5F 70CM

## (undated) DEVICE — DISPOSABLE TOURNIQUET CUFF SINGLE BLADDER, SINGLE PORT AND QUICK CONNECT CONNECTOR: Brand: COLOR CUFF

## (undated) DEVICE — NDL HYPO ECLPS SFTY 18G 1 1/2IN

## (undated) DEVICE — SUT VIC UD BR COAT 0 CP2 27IN

## (undated) DEVICE — ESOPHAGEAL BALLOON DILATATION CATHETER: Brand: CRE FIXED WIRE

## (undated) DEVICE — SOLIDIFIER LIQLOC PLS 1500CC BT

## (undated) DEVICE — PINABALL PRELOADED PINS: Brand: PINABALL

## (undated) DEVICE — APPL CHLORAPREP HI/LITE 26ML ORNG

## (undated) DEVICE — DEV INFL CRE STERIFLATE 60CC DISP

## (undated) DEVICE — INTENDED FOR TISSUE SEPARATION, AND OTHER PROCEDURES THAT REQUIRE A SHARP SURGICAL BLADE TO PUNCTURE OR CUT.: Brand: BARD-PARKER ® CARBON RIB-BACK BLADES

## (undated) DEVICE — 3 BONE CEMENT MIXER: Brand: MIXEVAC

## (undated) DEVICE — CATHETER,FOLEY,100%SILICONE,18FR,10ML,LF: Brand: MEDLINE

## (undated) DEVICE — DRSNG SURG AQUACEL AG 9X15CM

## (undated) DEVICE — STRYKER PERFORMANCE SERIES SAGITTAL BLADE: Brand: STRYKER PERFORMANCE SERIES

## (undated) DEVICE — PULLOVER TOGA, 2X LARGE: Brand: FLYTE, SURGICOOL

## (undated) DEVICE — Device: Brand: DEFENDO AIR/WATER/SUCTION AND BIOPSY VALVE

## (undated) DEVICE — ENCORE® LATEX ORTHO SIZE 8, STERILE LATEX POWDER-FREE SURGICAL GLOVE: Brand: ENCORE